# Patient Record
Sex: FEMALE | Race: BLACK OR AFRICAN AMERICAN | NOT HISPANIC OR LATINO | Employment: OTHER | URBAN - METROPOLITAN AREA
[De-identification: names, ages, dates, MRNs, and addresses within clinical notes are randomized per-mention and may not be internally consistent; named-entity substitution may affect disease eponyms.]

---

## 2017-01-09 DIAGNOSIS — E11.9 TYPE 2 DIABETES MELLITUS WITHOUT COMPLICATION: ICD-10-CM

## 2017-01-09 DIAGNOSIS — J30.89 PERENNIAL ALLERGIC RHINITIS: ICD-10-CM

## 2017-01-09 DIAGNOSIS — I10 ESSENTIAL HYPERTENSION: ICD-10-CM

## 2017-01-09 RX ORDER — FLUTICASONE PROPIONATE 50 MCG
2 SPRAY, SUSPENSION (ML) NASAL DAILY
Qty: 3 BOTTLE | Refills: 1 | Status: SHIPPED | OUTPATIENT
Start: 2017-01-09 | End: 2018-03-08 | Stop reason: SDUPTHER

## 2017-01-09 RX ORDER — ATENOLOL 50 MG/1
50 TABLET ORAL 2 TIMES DAILY
Qty: 180 TABLET | Refills: 1 | Status: SHIPPED | OUTPATIENT
Start: 2017-01-09 | End: 2017-06-02 | Stop reason: SDUPTHER

## 2017-01-19 DIAGNOSIS — E11.9 TYPE 2 DIABETES MELLITUS WITHOUT COMPLICATION: ICD-10-CM

## 2017-01-19 DIAGNOSIS — I10 ESSENTIAL HYPERTENSION: ICD-10-CM

## 2017-01-19 RX ORDER — CAPTOPRIL 50 MG/1
TABLET ORAL
Qty: 270 TABLET | Refills: 1 | Status: SHIPPED | OUTPATIENT
Start: 2017-01-19 | End: 2017-06-02 | Stop reason: SDUPTHER

## 2017-01-19 RX ORDER — LANCETS
1 EACH MISCELLANEOUS DAILY
Qty: 100 EACH | Refills: 3 | Status: SHIPPED | OUTPATIENT
Start: 2017-01-19 | End: 2017-01-31 | Stop reason: SDUPTHER

## 2017-01-23 ENCOUNTER — TELEPHONE (OUTPATIENT)
Dept: INTERNAL MEDICINE | Facility: CLINIC | Age: 67
End: 2017-01-23

## 2017-01-23 DIAGNOSIS — N18.2 TYPE 2 DIABETES MELLITUS WITH STAGE 2 CHRONIC KIDNEY DISEASE, WITHOUT LONG-TERM CURRENT USE OF INSULIN: Primary | ICD-10-CM

## 2017-01-23 DIAGNOSIS — E11.22 TYPE 2 DIABETES MELLITUS WITH STAGE 2 CHRONIC KIDNEY DISEASE, WITHOUT LONG-TERM CURRENT USE OF INSULIN: Primary | ICD-10-CM

## 2017-01-23 NOTE — TELEPHONE ENCOUNTER
----- Message from Jaylin Cesar sent at 1/23/2017 12:41 PM CST -----  Contact: call  [phone]  Talk  to someone about contour meter and strips.

## 2017-01-23 NOTE — TELEPHONE ENCOUNTER
Insurance is no longer pay for accu check meter and supplies  The new meter is true metris  Test strips and trueplus lancet 32 gauge pl adv.

## 2017-01-24 NOTE — TELEPHONE ENCOUNTER
----- Message from Elizabeth Morton sent at 1/24/2017  2:06 PM CST -----  Contact: Wilma Cadena (Pharmacy Tech)  at [phone]  Wilma requesting a call back regarding pt's diabetic supplies. Please call/advise.   Fax:   [phone]

## 2017-01-25 DIAGNOSIS — M47.16 OSTEOARTHRITIS OF LUMBAR SPINE WITH MYELOPATHY: ICD-10-CM

## 2017-01-26 RX ORDER — TRAMADOL HYDROCHLORIDE 50 MG/1
TABLET ORAL
Qty: 120 TABLET | Refills: 1 | Status: SHIPPED | OUTPATIENT
Start: 2017-01-26 | End: 2017-03-22 | Stop reason: SDUPTHER

## 2017-01-31 RX ORDER — LANCETS
1 EACH MISCELLANEOUS DAILY
Qty: 100 EACH | Refills: 3 | Status: SHIPPED | OUTPATIENT
Start: 2017-01-31 | End: 2017-01-31 | Stop reason: SDUPTHER

## 2017-01-31 RX ORDER — INSULIN PUMP SYRINGE, 3 ML
EACH MISCELLANEOUS
Qty: 1 EACH | Refills: 0 | Status: SHIPPED | OUTPATIENT
Start: 2017-01-31 | End: 2020-01-16

## 2017-01-31 RX ORDER — LANCETS
1 EACH MISCELLANEOUS DAILY
Qty: 100 EACH | Refills: 3 | Status: SHIPPED | OUTPATIENT
Start: 2017-01-31 | End: 2020-11-11 | Stop reason: SDUPTHER

## 2017-01-31 RX ORDER — INSULIN PUMP SYRINGE, 3 ML
EACH MISCELLANEOUS
Qty: 1 EACH | Refills: 0 | Status: SHIPPED | OUTPATIENT
Start: 2017-01-31 | End: 2017-01-31 | Stop reason: SDUPTHER

## 2017-02-02 ENCOUNTER — OFFICE VISIT (OUTPATIENT)
Dept: INTERNAL MEDICINE | Facility: CLINIC | Age: 67
End: 2017-02-02
Payer: MEDICARE

## 2017-02-02 VITALS
SYSTOLIC BLOOD PRESSURE: 132 MMHG | DIASTOLIC BLOOD PRESSURE: 80 MMHG | HEART RATE: 77 BPM | BODY MASS INDEX: 43.1 KG/M2 | WEIGHT: 284.38 LBS | HEIGHT: 68 IN

## 2017-02-02 DIAGNOSIS — E11.22 TYPE 2 DIABETES MELLITUS WITH STAGE 2 CHRONIC KIDNEY DISEASE, WITHOUT LONG-TERM CURRENT USE OF INSULIN: Primary | ICD-10-CM

## 2017-02-02 DIAGNOSIS — K21.9 GASTROESOPHAGEAL REFLUX DISEASE, ESOPHAGITIS PRESENCE NOT SPECIFIED: ICD-10-CM

## 2017-02-02 DIAGNOSIS — J30.2 SEASONAL ALLERGIC RHINITIS, UNSPECIFIED ALLERGIC RHINITIS TRIGGER: ICD-10-CM

## 2017-02-02 DIAGNOSIS — E78.5 HYPERLIPIDEMIA LDL GOAL <100: ICD-10-CM

## 2017-02-02 DIAGNOSIS — M48.061 DEGENERATIVE LUMBAR SPINAL STENOSIS: ICD-10-CM

## 2017-02-02 DIAGNOSIS — Z85.3 HISTORY OF BREAST CANCER: ICD-10-CM

## 2017-02-02 DIAGNOSIS — E66.01 MORBID OBESITY WITH BMI OF 40.0-44.9, ADULT: ICD-10-CM

## 2017-02-02 DIAGNOSIS — I70.0 ATHEROSCLEROSIS OF AORTA: ICD-10-CM

## 2017-02-02 DIAGNOSIS — I10 ESSENTIAL HYPERTENSION: ICD-10-CM

## 2017-02-02 DIAGNOSIS — N18.2 TYPE 2 DIABETES MELLITUS WITH STAGE 2 CHRONIC KIDNEY DISEASE, WITHOUT LONG-TERM CURRENT USE OF INSULIN: Primary | ICD-10-CM

## 2017-02-02 DIAGNOSIS — K59.09 CHRONIC CONSTIPATION: ICD-10-CM

## 2017-02-02 PROCEDURE — 1125F AMNT PAIN NOTED PAIN PRSNT: CPT | Mod: S$GLB,,, | Performed by: INTERNAL MEDICINE

## 2017-02-02 PROCEDURE — 1157F ADVNC CARE PLAN IN RCRD: CPT | Mod: S$GLB,,, | Performed by: INTERNAL MEDICINE

## 2017-02-02 PROCEDURE — 99215 OFFICE O/P EST HI 40 MIN: CPT | Mod: S$GLB,,, | Performed by: INTERNAL MEDICINE

## 2017-02-02 PROCEDURE — 3044F HG A1C LEVEL LT 7.0%: CPT | Mod: S$GLB,,, | Performed by: INTERNAL MEDICINE

## 2017-02-02 PROCEDURE — 2022F DILAT RTA XM EVC RTNOPTHY: CPT | Mod: S$GLB,,, | Performed by: INTERNAL MEDICINE

## 2017-02-02 PROCEDURE — 3079F DIAST BP 80-89 MM HG: CPT | Mod: S$GLB,,, | Performed by: INTERNAL MEDICINE

## 2017-02-02 PROCEDURE — 1160F RVW MEDS BY RX/DR IN RCRD: CPT | Mod: S$GLB,,, | Performed by: INTERNAL MEDICINE

## 2017-02-02 PROCEDURE — 1159F MED LIST DOCD IN RCRD: CPT | Mod: S$GLB,,, | Performed by: INTERNAL MEDICINE

## 2017-02-02 PROCEDURE — 3075F SYST BP GE 130 - 139MM HG: CPT | Mod: S$GLB,,, | Performed by: INTERNAL MEDICINE

## 2017-02-02 PROCEDURE — 99499 UNLISTED E&M SERVICE: CPT | Mod: S$GLB,,, | Performed by: INTERNAL MEDICINE

## 2017-02-02 PROCEDURE — 4010F ACE/ARB THERAPY RXD/TAKEN: CPT | Mod: S$GLB,,, | Performed by: INTERNAL MEDICINE

## 2017-02-02 PROCEDURE — 99999 PR PBB SHADOW E&M-EST. PATIENT-LVL III: CPT | Mod: PBBFAC,,, | Performed by: INTERNAL MEDICINE

## 2017-02-02 RX ORDER — HYDROCHLOROTHIAZIDE 25 MG/1
25 TABLET ORAL DAILY
Qty: 90 TABLET | Refills: 2 | Status: SHIPPED | OUTPATIENT
Start: 2017-02-02 | End: 2018-03-08 | Stop reason: SDUPTHER

## 2017-02-02 RX ORDER — OMEPRAZOLE 20 MG/1
20 CAPSULE, DELAYED RELEASE ORAL DAILY
Qty: 90 CAPSULE | Refills: 2 | Status: SHIPPED | OUTPATIENT
Start: 2017-02-02 | End: 2019-04-01 | Stop reason: SDUPTHER

## 2017-02-02 RX ORDER — LEVOCETIRIZINE DIHYDROCHLORIDE 5 MG/1
5 TABLET, FILM COATED ORAL NIGHTLY
Qty: 30 TABLET | Refills: 3 | Status: SHIPPED | OUTPATIENT
Start: 2017-02-02 | End: 2018-03-08 | Stop reason: SDUPTHER

## 2017-02-02 RX ORDER — TIZANIDINE 4 MG/1
4 TABLET ORAL 2 TIMES DAILY PRN
Qty: 30 TABLET | Refills: 1 | Status: SHIPPED | OUTPATIENT
Start: 2017-02-02 | End: 2017-04-21 | Stop reason: SDUPTHER

## 2017-02-02 RX ORDER — ATORVASTATIN CALCIUM 20 MG/1
20 TABLET, FILM COATED ORAL DAILY
Qty: 90 TABLET | Refills: 2 | Status: SHIPPED | OUTPATIENT
Start: 2017-02-02 | End: 2018-03-05 | Stop reason: SDUPTHER

## 2017-02-02 NOTE — MR AVS SNAPSHOT
[facility] - Internal Medicine  [address]  [facility] [address]  Phone: [phone]  Fax: [phone]                  Linnea Renae   2017 10:00 AM   Office Visit    Description:  Female : 1950   Provider:  Bailee Moss MD   Department:  [facility] - Internal Medicine           Reason for Visit     Diabetes           Diagnoses this Visit        Comments    Type 2 diabetes mellitus with stage 2 chronic kidney disease, without long-term current use of insulin    -  Primary     Essential hypertension         Atherosclerosis of aorta         Morbid obesity with BMI of 40.0-44.9, adult         History of breast cancer         Degenerative lumbar spinal stenosis         Seasonal allergic rhinitis, unspecified allergic rhinitis trigger         Hyperlipidemia LDL goal <100         Chronic constipation         Gastroesophageal reflux disease, esophagitis presence not specified                To Do List           Future Appointments        Provider Department Dept Phone    3/2/2017 11:00 AM MD [facility] - Hematology Oncology [phone]    2017 10:15 AM Radha Russell MD [facility] Breast Surgery [phone]    2017 9:00 AM Bailee Moss MD [facility] - Internal Medicine [phone]      Goals (5 Years of Data)     None      Follow-Up and Disposition     Return in about 4 months (around 2017).    Follow-up and Disposition History       These Medications        Disp Refills Start End    levocetirizine (XYZAL) 5 MG tablet 30 tablet 3 2017     Take 1 tablet (5 mg total) by mouth every evening. For Allergies. - Oral    Pharmacy: [facility] - [facility] [address] [facility] Ph #: [phone]       atorvastatin (LIPITOR) 20 MG tablet 90 tablet 2 2017     Take 1 tablet (20 mg total) by mouth once daily. - Oral    Pharmacy: [facility] - [facility] [address] [facility] Ph #: [phone]        hydrochlorothiazide (HYDRODIURIL) 25 MG tablet 90 tablet 2 2/2/2017     Take 1 tablet (25 mg total) by mouth once daily. - Oral    Pharmacy: [facility] - [facility] [address] Ph #: [phone]       linaclotide (LINZESS) 145 mcg Cap capsule 90 capsule 1 2/2/2017     Take 1 capsule (145 mcg total) by mouth once daily. - Oral    Pharmacy: [facility] - [facility] [address] Ph #: [phone]       omeprazole (PRILOSEC) 20 MG capsule 90 capsule 2 2/2/2017     Take 1 capsule (20 mg total) by mouth once daily. - Oral    Pharmacy: [facility] - [facility] [address] Ph #: [phone]       tizanidine (ZANAFLEX) 4 MG tablet 30 tablet 1 2/2/2017     Take 1 tablet (4 mg total) by mouth 2 (two) times daily as needed (Muscle spasm.). - Oral    Pharmacy: [address] Ph #: [phone]         [facility]     [facility] Line - 24/7 Assistance  Registered nurses in the [facility] provide clinical advisement, health education, appointment booking, and other advisory services.  Call for this free service at [phone].             Medications           Message regarding Medications     Verify the changes and/or additions to your medication regime listed below are the same as discussed with your clinician today.  If any of these changes or additions are incorrect, please notify your healthcare provider.        START taking these NEW medications        Refills    levocetirizine (XYZAL) 5 MG tablet 3    Sig: Take 1 tablet (5 mg total) by mouth every evening. For Allergies.    Class: Normal    Route: Oral      CHANGE how you are taking these medications     Start Taking Instead of    tizanidine (ZANAFLEX) 4 MG tablet tizanidine (ZANAFLEX) 4 MG tablet    Dosage:  Take 1 tablet (4 mg total) by mouth 2 (two) times daily as needed (Muscle spasm.). Dosage:   TAKE ONE TABLET BY MOUTH EVERY 8 HOURS AS NEEDED    Reason for Change:  Reorder       STOP taking these medications     anastrozole (ARIMIDEX) 1 mg Tab TAKE ONE TABLET BY MOUTH ONCE DAILY    triamcinolone acetonide 0.1% (KENALOG) 0.1 % cream Apply topically 2 (two) times daily.    loratadine (CLARITIN) 10 mg tablet Take 10 mg by mouth every morning.     FLUZONE HIGH-DOSE 2016-17, PF, 180 mcg/0.5 mL Syrg ADM 0.5ML IM UTD           Verify that the below list of medications is an accurate representation of the medications you are currently taking.  If none reported, the list may be blank. If incorrect, please contact your healthcare provider. Carry this list with you in case of emergency.           Current Medications     aspirin 81 MG Chew Take 81 mg by mouth once daily.    atenolol (TENORMIN) 50 MG tablet Take 1 tablet (50 mg total) by mouth 2 (two) times daily.    atorvastatin (LIPITOR) 20 MG tablet Take 1 tablet (20 mg total) by mouth once daily.    blood sugar diagnostic Strp 1 strip by Misc.(Non-Drug; Combo Route) route once daily.    blood-glucose meter kit Use as instructed    calcium-vitamin D3 500 mg(1,250mg) -200 unit per tablet Take 1 tablet by mouth 2 (two) times daily with meals.     captopril (CAPOTEN) 50 MG tablet TAKE TWO TABLETS BY MOUTH IN THE MORNING AND ONE TABLET IN THE EVENING    cycloSPORINE (RESTASIS) 0.05 % ophthalmic emulsion 1 drop 2 (two) times daily.    fluticasone (FLONASE) 50 mcg/actuation nasal spray 2 sprays by Each Nare route once daily.    hydrochlorothiazide (HYDRODIURIL) 25 MG tablet Take 1 tablet (25 mg total) by mouth once daily.    ibuprofen (ADVIL,MOTRIN) 800 MG tablet TAKE ONE TABLET BY MOUTH THREE TIMES DAILY AS NEEDED FOR PAIN    lancets Misc 1 lancet by Misc.(Non-Drug; Combo Route) route once daily.    levocetirizine (XYZAL) 5 MG tablet Take 1 tablet (5 mg total) by mouth every evening. For Allergies.    linaclotide (LINZESS) 145 mcg Cap capsule Take 1 capsule (145 mcg  "total) by mouth once daily.    metformin (GLUCOPHAGE-XR) 500 MG 24 hr tablet TAKE TWO TABLETS BY MOUTH ONCE DAILY WITH BREAKFAST    omeprazole (PRILOSEC) 20 MG capsule Take 1 capsule (20 mg total) by mouth once daily.    tizanidine (ZANAFLEX) 4 MG tablet Take 1 tablet (4 mg total) by mouth 2 (two) times daily as needed (Muscle spasm.).    tramadol (ULTRAM) 50 mg tablet TAKE ONE TABLET BY MOUTH EVERY 6 HOURS AS NEEDED FOR PAIN           Clinical Reference Information           Your Vitals Were     BP Pulse Height Weight BMI    132/80 77 5' 8" (1.727 m) 129 kg (284 lb 6.4 oz) 43.24 kg/m2      Blood Pressure          Most Recent Value    BP  132/80      Allergies as of 2/2/2017     Codeine      Immunizations Administered on Date of Encounter - 2/2/2017     None      Orders Placed During Today's Visit     Future Labs/Procedures Expected by Expires    Basic metabolic panel  2/2/2017 2/2/2018    Hemoglobin A1c  2/2/2017 2/2/2018      Language Assistance Services     ATTENTION: Language assistance services are available, free of charge. Please call [phone].      ATENCIÓN: Si habla brina, tiene a blount disposición servicios gratuitos de asistencia lingüística. Llame al [phone].     CHANA Ý: N?u b?n nói Ti?ng Vi?t, có các d?ch v? h? tr? ngôn ng? mi?n phí dành cho b?n. G?i s? [phone].         [facility] complies with applicable Federal civil rights laws and does not discriminate on the basis of race, color, national origin, age, disability, or sex.        "

## 2017-02-02 NOTE — PROGRESS NOTES
Subjective:       Patient ID: Linnea Renae is a 66 y.o. female.    Chief Complaint: Diabetes    HPI Comments: Last seen for a general check up five months ago, urgent visit three months ago for sinusitis treated with antibiotics. Presents for f/u chronic medical conditions again with upper respiratory complaints including nasal congestion, postnasal drip, right ear pain, yellow nasal discharge. Just started using Flonase three days ago, Claritin has been ineffective. Typically has seasonal allergies around this time, but also catches a lot of colds from her grandchildren.   Compliant with daily meds as prescribed. Home Glucose ranges 80-90 in morning and evening per history, no written log for review. No hypoglycemia.     PMH: .   Hypertension.  Diabetes Type 2, last HbA1c 6.4% Aug. '16, Urine Microalbumin normal.  Hyperlipidemia. TChol 112, TG 91, HDL 37, LDL 57 Aug. '16.  GERD.  Lumbar Spinal Stenosis.   Chronic Dry Eyes.   Perennial Allergic Rhinitis.  Morbid Obesity, BMI up to 46.   Right Breast Cancer diagnosed 10/15.    PSH: C/S x 3, BTL, Hysterectomy and USO, Bilateral Cataracts extracted, Cholecystectomy.    Mammogram normal , no recent Pelvic exam. No BMD. Eye exam  outside [facility]. Has had Pneumovax. Prevnar 8/15. Flu shot . Colonoscopy  - 3 polyps removed, at [facility]. Zostavax 3/16. CBC and CMP normal , Urinalysis clear.    Social: Remote tobacco use, quit over 30 years ago. No alcohol.  with three daughters and two grandchildren. Homemaker.     FMH: Father living age 83 in good health. Mother  young, cause unknown. CHF in PGM, Thyroid disease in PGF.     NKDA.     Medications: list reviewed and reconciled.             Review of Systems   Constitutional: Negative for activity change, appetite change, fatigue, fever and unexpected weight change.   HENT: Positive for congestion and postnasal drip. Negative for hearing loss, rhinorrhea, sinus pressure,  sneezing, sore throat, trouble swallowing and voice change.    Eyes: Positive for itching. Negative for pain, redness and visual disturbance.   Respiratory: Negative for cough, chest tightness, shortness of breath and wheezing.    Cardiovascular: Negative for chest pain, palpitations and leg swelling.   Gastrointestinal: Negative for abdominal pain, blood in stool, constipation, diarrhea, nausea and vomiting.   Genitourinary: Negative for difficulty urinating, dysuria, flank pain, frequency, hematuria and urgency.   Musculoskeletal: Positive for back pain. Negative for arthralgias, joint swelling and neck pain.   Skin: Negative for rash and wound.   Neurological: Negative for dizziness, syncope, facial asymmetry, speech difficulty, weakness, numbness and headaches.   Hematological: Negative for adenopathy. Does not bruise/bleed easily.   Psychiatric/Behavioral: Negative for confusion, dysphoric mood and sleep disturbance. The patient is not nervous/anxious.        Objective:    /80, Pulse 77, Temp 98.6, Wt 284 lbs (from 266 four months ago), BMI=43  Physical Exam   Constitutional: She is oriented to person, place, and time. She appears well-developed and well-nourished. No distress.   HENT:   Head: Normocephalic and atraumatic.   Right Ear: External ear normal.   Left Ear: External ear normal.   Nose: Nose normal.   Mouth/Throat: Oropharynx is clear and moist. No oropharyngeal exudate.   Eyes: Conjunctivae and EOM are normal. Pupils are equal, round, and reactive to light. Right conjunctiva is not injected. Left conjunctiva is not injected. No scleral icterus.   Neck: Normal range of motion. Neck supple. No JVD present. Carotid bruit is not present. No thyromegaly present.   Cardiovascular: Normal rate, regular rhythm, normal heart sounds and intact distal pulses.  Exam reveals no gallop and no friction rub.    No murmur heard.  Pulmonary/Chest: Effort normal and breath sounds normal. No respiratory distress.  She has no wheezes. She has no rhonchi. She has no rales.   Abdominal: Soft. Bowel sounds are normal. She exhibits no distension. There is no hepatosplenomegaly. There is no tenderness. There is no CVA tenderness.   Musculoskeletal: Normal range of motion. She exhibits no edema, tenderness or deformity.   Protective Sensation (w/ 10 gram monofilament):  Right: Intact  Left: Intact    Visual Inspection:  Normal -  Bilateral    Pedal Pulses:   Right: Present  Left: Present    Posterior tibialis:   Right:Present  Left: Present     Lymphadenopathy:     She has no cervical adenopathy.   Neurological: She is alert and oriented to person, place, and time. She has normal strength. No cranial nerve deficit. She exhibits normal muscle tone. Coordination and gait normal.   Skin: Skin is warm and dry. No lesion and no rash noted. She is not diaphoretic. No cyanosis or erythema. No pallor. Nails show no clubbing.   Psychiatric: She has a normal mood and affect. Her behavior is normal. Thought content normal.   Vitals reviewed.      Assessment:       1. Type 2 diabetes mellitus with stage 2 chronic kidney disease, without long-term current use of insulin    2. Essential hypertension    3. Atherosclerosis of aorta    4. Morbid obesity with BMI of 40.0-44.9, adult    5. History of breast cancer    6. Degenerative lumbar spinal stenosis    7. Seasonal allergic rhinitis, unspecified allergic rhinitis trigger    8. Hyperlipidemia LDL goal <100    9. Chronic constipation    10. Gastroesophageal reflux disease, esophagitis presence not specified        Plan:       Type 2 diabetes mellitus with stage 2 chronic kidney disease, without long-term current use of insulin  -     Basic metabolic panel; Future; Expected date: 2/2/17  -     Hemoglobin A1c; Future; Expected date: 2/2/17    Essential hypertension controlled  -     hydrochlorothiazide (HYDRODIURIL) 25 MG tablet; Take 1 tablet (25 mg total) by mouth once daily.  Dispense: 90 tablet;  Refill: 2    Atherosclerosis of aorta - medical management    Morbid obesity with BMI of 40.0-44.9, adult - counseled on diet, chronic pain stops her from exercising.    History of breast cancer - surveillance up to date, noncompliant with Arimidex due to hair loss.    Degenerative lumbar spinal stenosis  -     tizanidine (ZANAFLEX) 4 MG tablet; Take 1 tablet (4 mg total) by mouth 2 (two) times daily as needed (Muscle spasm.).  Dispense: 30 tablet; Refill: 1  -     May continue Tramadol prn, recently refilled.    Seasonal allergic rhinitis, unspecified allergic rhinitis trigger  -     levocetirizine (XYZAL) 5 MG tablet; Take 1 tablet (5 mg total) by mouth every evening. For Allergies.  Dispense: 30 tablet; Refill: 3  -     Continue Flonase.   No evidence of bacterial infection on exam.    Hyperlipidemia LDL goal <100  -     atorvastatin (LIPITOR) 20 MG tablet; Take 1 tablet (20 mg total) by mouth once daily.  Dispense: 90 tablet; Refill: 2    Chronic constipation  -     linaclotide (LINZESS) 145 mcg Cap capsule; Take 1 capsule (145 mcg total) by mouth once daily.  Dispense: 90 capsule; Refill: 1    Gastroesophageal reflux disease, esophagitis presence not specified  -     omeprazole (PRILOSEC) 20 MG capsule; Take 1 capsule (20 mg total) by mouth once daily.  Dispense: 90 capsule; Refill: 2    Return in 4 months with log of home glucose monitoring.

## 2017-02-06 ENCOUNTER — PATIENT MESSAGE (OUTPATIENT)
Dept: INTERNAL MEDICINE | Facility: CLINIC | Age: 67
End: 2017-02-06

## 2017-02-19 DIAGNOSIS — M15.9 PRIMARY OSTEOARTHRITIS INVOLVING MULTIPLE JOINTS: ICD-10-CM

## 2017-02-19 RX ORDER — IBUPROFEN 800 MG/1
TABLET ORAL
Qty: 60 TABLET | Refills: 0 | Status: SHIPPED | OUTPATIENT
Start: 2017-02-19 | End: 2017-03-19 | Stop reason: SDUPTHER

## 2017-02-28 ENCOUNTER — HOSPITAL ENCOUNTER (EMERGENCY)
Facility: OTHER | Age: 67
Discharge: HOME | End: 2017-02-28
Attending: EMERGENCY MEDICINE
Payer: MEDICARE

## 2017-02-28 VITALS
BODY MASS INDEX: 42.44 KG/M2 | SYSTOLIC BLOOD PRESSURE: 141 MMHG | OXYGEN SATURATION: 97 % | TEMPERATURE: 98 F | DIASTOLIC BLOOD PRESSURE: 65 MMHG | HEART RATE: 76 BPM | WEIGHT: 280 LBS | RESPIRATION RATE: 16 BRPM | HEIGHT: 68 IN

## 2017-02-28 DIAGNOSIS — K59.00 CONSTIPATION: ICD-10-CM

## 2017-02-28 DIAGNOSIS — R10.9 FLANK PAIN: Primary | ICD-10-CM

## 2017-02-28 LAB
ALBUMIN SERPL BCP-MCNC: 3.8 G/DL
ALP SERPL-CCNC: 98 U/L
ALT SERPL W/O P-5'-P-CCNC: 16 U/L
ANION GAP SERPL CALC-SCNC: 13 MMOL/L
AST SERPL-CCNC: 16 U/L
BASOPHILS # BLD AUTO: 0.03 K/UL
BASOPHILS NFR BLD: 0.2 %
BILIRUB SERPL-MCNC: 0.4 MG/DL
BILIRUB UR QL STRIP: NEGATIVE
BUN SERPL-MCNC: 12 MG/DL
CALCIUM SERPL-MCNC: 9.5 MG/DL
CHLORIDE SERPL-SCNC: 101 MMOL/L
CLARITY UR: CLEAR
CO2 SERPL-SCNC: 28 MMOL/L
COLOR UR: YELLOW
CREAT SERPL-MCNC: 0.9 MG/DL
DIFFERENTIAL METHOD: ABNORMAL
EOSINOPHIL # BLD AUTO: 0.3 K/UL
EOSINOPHIL NFR BLD: 2.2 %
ERYTHROCYTE [DISTWIDTH] IN BLOOD BY AUTOMATED COUNT: 13.7 %
EST. GFR  (AFRICAN AMERICAN): >60 ML/MIN/1.73 M^2
EST. GFR  (NON AFRICAN AMERICAN): >60 ML/MIN/1.73 M^2
GLUCOSE SERPL-MCNC: 122 MG/DL
GLUCOSE UR QL STRIP: NEGATIVE
HCT VFR BLD AUTO: 39.7 %
HGB BLD-MCNC: 12.8 G/DL
HGB UR QL STRIP: ABNORMAL
KETONES UR QL STRIP: ABNORMAL
LEUKOCYTE ESTERASE UR QL STRIP: ABNORMAL
LYMPHOCYTES # BLD AUTO: 3.9 K/UL
LYMPHOCYTES NFR BLD: 28.5 %
MCH RBC QN AUTO: 27.6 PG
MCHC RBC AUTO-ENTMCNC: 32.2 %
MCV RBC AUTO: 86 FL
MICROSCOPIC COMMENT: NORMAL
MONOCYTES # BLD AUTO: 0.7 K/UL
MONOCYTES NFR BLD: 5.2 %
NEUTROPHILS # BLD AUTO: 8.8 K/UL
NEUTROPHILS NFR BLD: 63.7 %
NITRITE UR QL STRIP: NEGATIVE
PH UR STRIP: 6 [PH] (ref 5–8)
PLATELET # BLD AUTO: 380 K/UL
PMV BLD AUTO: 9.3 FL
POTASSIUM SERPL-SCNC: 3.8 MMOL/L
PROT SERPL-MCNC: 7.6 G/DL
PROT UR QL STRIP: NEGATIVE
RBC # BLD AUTO: 4.63 M/UL
RBC #/AREA URNS HPF: 1 /HPF (ref 0–4)
SODIUM SERPL-SCNC: 142 MMOL/L
SP GR UR STRIP: 1.02 (ref 1–1.03)
URN SPEC COLLECT METH UR: ABNORMAL
UROBILINOGEN UR STRIP-ACNC: NEGATIVE EU/DL
WBC # BLD AUTO: 13.74 K/UL
WBC #/AREA URNS HPF: 1 /HPF (ref 0–5)

## 2017-02-28 PROCEDURE — 99284 EMERGENCY DEPT VISIT MOD MDM: CPT | Mod: 25

## 2017-02-28 PROCEDURE — 96375 TX/PRO/DX INJ NEW DRUG ADDON: CPT

## 2017-02-28 PROCEDURE — 87086 URINE CULTURE/COLONY COUNT: CPT

## 2017-02-28 PROCEDURE — 81000 URINALYSIS NONAUTO W/SCOPE: CPT

## 2017-02-28 PROCEDURE — 80053 COMPREHEN METABOLIC PANEL: CPT

## 2017-02-28 PROCEDURE — 85025 COMPLETE CBC W/AUTO DIFF WBC: CPT

## 2017-02-28 PROCEDURE — 96376 TX/PRO/DX INJ SAME DRUG ADON: CPT

## 2017-02-28 PROCEDURE — 63600175 PHARM REV CODE 636 W HCPCS: Performed by: PHYSICIAN ASSISTANT

## 2017-02-28 PROCEDURE — 96374 THER/PROPH/DIAG INJ IV PUSH: CPT

## 2017-02-28 RX ORDER — MORPHINE SULFATE 2 MG/ML
6 INJECTION, SOLUTION INTRAMUSCULAR; INTRAVENOUS
Status: COMPLETED | OUTPATIENT
Start: 2017-02-28 | End: 2017-02-28

## 2017-02-28 RX ORDER — MORPHINE SULFATE 2 MG/ML
4 INJECTION, SOLUTION INTRAMUSCULAR; INTRAVENOUS
Status: COMPLETED | OUTPATIENT
Start: 2017-02-28 | End: 2017-02-28

## 2017-02-28 RX ORDER — KETOROLAC TROMETHAMINE 30 MG/ML
10 INJECTION, SOLUTION INTRAMUSCULAR; INTRAVENOUS
Status: COMPLETED | OUTPATIENT
Start: 2017-02-28 | End: 2017-02-28

## 2017-02-28 RX ORDER — NAPROXEN 375 MG/1
375 TABLET ORAL 2 TIMES DAILY PRN
Qty: 30 TABLET | Refills: 0 | Status: SHIPPED | OUTPATIENT
Start: 2017-02-28 | End: 2017-03-19

## 2017-02-28 RX ORDER — CYCLOBENZAPRINE HCL 10 MG
10 TABLET ORAL 3 TIMES DAILY PRN
Qty: 15 TABLET | Refills: 0 | Status: SHIPPED | OUTPATIENT
Start: 2017-02-28 | End: 2017-03-05

## 2017-02-28 RX ADMIN — MORPHINE SULFATE 4 MG: 2 INJECTION, SOLUTION INTRAMUSCULAR; INTRAVENOUS at 08:02

## 2017-02-28 RX ADMIN — MORPHINE SULFATE 6 MG: 2 INJECTION, SOLUTION INTRAMUSCULAR; INTRAVENOUS at 07:02

## 2017-02-28 RX ADMIN — KETOROLAC TROMETHAMINE 10 MG: 30 INJECTION, SOLUTION INTRAMUSCULAR at 07:02

## 2017-02-28 NOTE — ED AVS SNAPSHOT
[facility]  [address]  [facility] [address]               Linnea Renae   2017  6:15 PM   ED    Description:  Female : 1950   Department:  [facility]           Your Care was Coordinated By:     Provider Role From To    Yessenia Andrew MD Attending Provider 17 --    Jailyn Olson PA-C Physician Assistant 17 --      Reason for Visit     Abdominal Pain           Diagnoses this Visit        Comments    Flank pain    -  Primary     Constipation           ED Disposition     None           To Do List           Follow-up Information     Follow up with Bailee Moss MD In 2 days.    Specialty:  Internal Medicine    Contact information:    Geeta KAYE  [facility] [address]  [phone]        [facility]     [facility] -  Assistance  Registered nurses in the [facility] provide clinical advisement, health education, appointment booking, and other advisory services.  Call for this free service at [phone].             Medications           Message regarding Medications     Verify the changes and/or additions to your medication regime listed below are the same as discussed with your clinician today.  If any of these changes or additions are incorrect, please notify your healthcare provider.        These medications were administered today        Dose Freq    ketorolac injection 10 mg 10 mg ED 1 Time    Sig: Inject 10 mg into the vein ED 1 Time.    Class: Normal    Route: Intravenous    morphine injection 6 mg 6 mg ED 1 Time    Sig: Inject 3 mLs (6 mg total) into the vein ED 1 Time.    Class: Normal    Route: Intravenous    morphine injection 4 mg 4 mg ED 1 Time    Sig: Inject 2 mLs (4 mg total) into the vein ED 1 Time.    Class: Normal    Route: Intravenous           Verify that the below list of medications is an accurate representation of the medications  you are currently taking.  If none reported, the list may be blank. If incorrect, please contact your healthcare provider. Carry this list with you in case of emergency.           Current Medications     aspirin 81 MG Chew Take 81 mg by mouth once daily.    atenolol (TENORMIN) 50 MG tablet Take 1 tablet (50 mg total) by mouth 2 (two) times daily.    atorvastatin (LIPITOR) 20 MG tablet Take 1 tablet (20 mg total) by mouth once daily.    blood sugar diagnostic Strp 1 strip by Misc.(Non-Drug; Combo Route) route once daily.    blood-glucose meter kit Use as instructed    calcium-vitamin D3 500 mg(1,250mg) -200 unit per tablet Take 1 tablet by mouth 2 (two) times daily with meals.     captopril (CAPOTEN) 50 MG tablet TAKE TWO TABLETS BY MOUTH IN THE MORNING AND ONE TABLET IN THE EVENING    cycloSPORINE (RESTASIS) 0.05 % ophthalmic emulsion 1 drop 2 (two) times daily.    fluticasone (FLONASE) 50 mcg/actuation nasal spray 2 sprays by Each Nare route once daily.    hydrochlorothiazide (HYDRODIURIL) 25 MG tablet Take 1 tablet (25 mg total) by mouth once daily.    ibuprofen (ADVIL,MOTRIN) 800 MG tablet TAKE ONE TABLET BY MOUTH THREE TIMES DAILY AS NEEDED FOR PAIN    lancets Misc 1 lancet by Misc.(Non-Drug; Combo Route) route once daily.    levocetirizine (XYZAL) 5 MG tablet Take 1 tablet (5 mg total) by mouth every evening. For Allergies.    linaclotide (LINZESS) 145 mcg Cap capsule Take 1 capsule (145 mcg total) by mouth once daily.    metformin (GLUCOPHAGE-XR) 500 MG 24 hr tablet TAKE TWO TABLETS BY MOUTH ONCE DAILY WITH BREAKFAST    morphine injection 4 mg Inject 2 mLs (4 mg total) into the vein ED 1 Time.    omeprazole (PRILOSEC) 20 MG capsule Take 1 capsule (20 mg total) by mouth once daily.    tizanidine (ZANAFLEX) 4 MG tablet Take 1 tablet (4 mg total) by mouth 2 (two) times daily as needed (Muscle spasm.).    tramadol (ULTRAM) 50 mg tablet TAKE ONE TABLET BY MOUTH EVERY 6 HOURS AS NEEDED FOR PAIN           Clinical  Reference Information           Your Vitals Were     BP                   154/73           Allergies as of 2/28/2017        Reactions    Codeine Itching      Immunizations Administered on Date of Encounter - 2/28/2017     None      ED Micro, Lab, POCT     Start Ordered       Status Ordering Provider    02/28/17 2039 02/28/17 2038  Urine culture  Add-on      Completed     02/28/17 1830 02/28/17 1829  CBC auto differential  STAT      Final result     02/28/17 1830 02/28/17 1829  Comprehensive metabolic panel  STAT      Final result     02/28/17 1830 02/28/17 1829  Urinalysis  STAT      Final result     02/28/17 1829 02/28/17 1829  Urinalysis Microscopic  Once      Final result       ED Imaging Orders     Start Ordered       Status Ordering Provider    02/28/17 1942 02/28/17 1941  CT Renal Stone Study ABD Pelvis WO  1 time imaging      Final result     02/28/17 1830 02/28/17 1829  X-Ray Abdomen Flat And Erect  1 time imaging      Final result         Discharge Instructions         Flank Pain, Uncertain Cause  The flank is the area between your upper abdomen and your back. Pain there is often caused by a problem with your kidneys. It might be a kidney infection or a kidney stone. Other causes of flank pain include spinal arthritis, a pinched nerve from a back injury, or a back muscle strain or spasm.  The cause of your flank pain is not certain. You may need other tests.  Home care  Follow these tips when caring for yourself at home:  · You may use acetaminophen or ibuprofen to control pain, unless your health care provider prescribed another medicine. If you have chronic liver or kidney disease, talk with your provider before taking these medicines. Also talk with your provider first if youve ever had a stomach ulcer or GI bleeding.  · If the pain is coming from your muscles, you may get relief with ice or heat. During the first 2 days after the injury, put an ice pack on the painful area for 20 minutes every 2 to 4  hours. This will reduce swelling and pain. A hot shower, hot bath, or heating pad works well for a muscle spasm. You can start with ice, then switch to heat after 2 days. You might find that alternating ice and heat works well. Use the method that feels the best to you.  Follow-up care  Follow up with your healthcare provider if your symptoms dont get better over the next few days.  When to seek medical advice  Call your healthcare provider right away if any of these happen:  · Repeated vomiting  · Fever of 100.4ºF (38ºC) or higher, or as directed by your health care provider  · Flank pain that gets worse  · Pain that spreads to the front of your belly (abdomen)  · Dizziness, weakness, or fainting  · Blood in your urine  · Burning feeling when you urinate or the need to urinate often  · Pain in one of your legs that gets worse  · Numbness or weakness in a leg  Date Last Reviewed: 10/1/2016  © 0788-9005 [employer]. [address] [place] [address]. All rights reserved. This information is not intended as a substitute for professional medical care. Always follow your healthcare professional's instructions.          Your Scheduled Appointments     Mar 02, 2017 11:00 AM CST   Established Patient Visit with Sean Woody MD   [facility] ([facility])    [address]  [place] [address]   [phone]            Apr 17, 2017 10:15 AM CDT   Established Patient Visit with MD Marcelo Mclaughlin [facility] Breast Surgery ([facility] )    [address] [facility] [id number] [phone]            Jun 02, 2017  9:00 AM CDT   Established Patient Visit with MD Marcelo Marshall [facility] - Internal Medicine ([facility])    [address]  [place] [id number] [phone]              Smoking Cessation     If you would like to quit smoking:   You may be eligible for free services if you are a  [place] resident and started smoking cigarettes before September 1, 1988.  Call the Smoking Cessation Trust (SCT) toll free at (414) 422-7702 or (998) 857-6770.   Call 1-800-QUIT-NOW if you do not meet the above criteria.             [facility] complies with applicable Federal civil rights laws and does not discriminate on the basis of race, color, national origin, age, disability, or sex.        Language Assistance Services     ATTENTION: Language assistance services are available, free of charge. Please call [phone].      ATENCIÓN: Si habla español, tiene a blount disposición servicios gratuitos de asistencia lingüística. Llame al [phone].     CHÚ Ý: N?u b?n nói Ti?ng Vi?t, có các d?ch v? h? tr? ngôn ng? mi?n phí dành cho b?n. G?i s? [phone].

## 2017-03-01 NOTE — ED NOTES
Two patient identifiers correct for Linnea Renae.  LOC: The patient is awake, alert and aware of environment with an appropriate affect. The patient is oriented x 3 and speaking appropriate.  APPEARANCE: Patient appears uncomfortable.  Patient is clean and well groomed.  The patient's clothing is properly fastened.  SKIN: The skin is warm and dry.  The patient has normal skin turgor and moist mucus membranes.  No rashes or lesions noted.  Skin Intact. No breakdown noted.  MUSCULOSKELETAL:  Left lower back pain.  Patient moving all extremities well, no obvious swelling or deformities noted.  RESPIRATORY: Airway is open and patent.  Respirations are spontaneous.  Patient has a normal effort and rate. Bilateral lungs sounds are clear.  Trachea midline.  No accessory muscle use.  CARDIAC: Patient has a normal rate and rhythm.  No peripheral edema noted.  Capillary refill is less than 3 seconds.    ABDOMEN: Soft and non tender to palpation.  Constipation.     PULSES:   Radial pulses, 2+ bilaterally.    NEUROLOGIC: PERRL . Facial expression is symmetrical.  Moving all extremities equally.  Normal sensation in all extremities when touched with a finger.  Awake, alert and cooperative with a calm affect.  Aware of environment.

## 2017-03-01 NOTE — ED NOTES
Rounding on the patient has been done. Pt is AAOx 1, no acute distress noted, respirations even, unlabored, vital signs stable with monitoring in progress, skin warm and dry. The patient has been updated on the plan of care and current status. Pain was assessed and is currently a pain level 9/10 . MAICOL Howard notified of patients current pain. Comfort positioning and restroom needs were addressed. She denies any needs. She complains of left sided flank pain. Necessary items were placed with in reach and was advised when a reassessment would take place. The call bell remains within reach for any additional patient needs. The patient is resting comfortably in recliner. Will continue to monitor.

## 2017-03-01 NOTE — ED NOTES
"Ambulated to ED room 13.  With complaint of constipation and lower back pain.  With history of chronic constipation.  With lower back pain after performing stretches 4 days ago.  Has been using Prune juice and Miralax for constipation symptoms.  Began taking Percocet after back pain began.  Appears uncomfortable.  Yesterday evening, was reaching towards cabinet and felt a "pop" in back.  Denies any numbness or tingling.  Prescribed Linzess daily. LBM-earlier today, but "it was just a little."  Denies blood in stool.   History of lumbar DDD, lumbar spinal stenosis and breast cancer (DCIS).  "

## 2017-03-01 NOTE — ED PROVIDER NOTES
Encounter Date: 2017       History     Chief Complaint   Patient presents with    Abdominal Pain     + bilateral lower back pain radiaitng to left lower abdomen since yesterday. Pt reprots chronic constipation, last BM this morning.      Review of patient's allergies indicates:   Allergen Reactions    Codeine Itching     HPI Comments: Patient is a 66-year-old female with history of breast cancer, chronic constipation, diabetes, hypertension, and hyperlipidemia who presents to the emergency department with flank pain.  Patient states over the last 2 weeks she has not had a normal bowel movement.  Patient states she had a small bowel movement this morning.  Patient states she has had pain in the left flank that radiates into the right lower abdomen.  Patient states this feels occur typical pain when constipated.  Patient rates her pain 10 out of 10.  Patient denies nausea or vomiting.  Patient denies blood per rectum.  Patient denies urinary symptoms.  Patient denies fevers or chills.  Patient ate she is still eating and drinking normally.  Patient states she is passing gas, but her belly feels bloated  No bowel gas pattern.    The history is provided by the patient.     Past Medical History:   Diagnosis Date    Allergy     Breast cancer     Lumpectomy 10/15.    Chronic constipation     Diabetes mellitus, type 2     Dry eyes     GERD (gastroesophageal reflux disease)     Hyperlipidemia     Hypertension     Lumbar disc disease     Type 2 diabetes mellitus      Past Surgical History:   Procedure Laterality Date    BREAST SURGERY      right lumpectomy    CATARACT EXTRACTION, BILATERAL       SECTION      x3    CHOLECYSTECTOMY      HYSTERECTOMY      and USO    TUBAL LIGATION       Family History   Problem Relation Age of Onset    No Known Problems Mother     No Known Problems Father     Heart disease Paternal Grandmother     Thyroid disease Paternal Grandfather      Hypertension Sister     Hypertension Brother     Glaucoma Brother     No Known Problems Daughter     No Known Problems Daughter     No Known Problems Daughter      Social History   Substance Use Topics    Smoking status: Former Smoker     Packs/day: 0.25     Years: 20.00     Quit date: 1/1/1985    Smokeless tobacco: None      Comment: Quit mid 1980's.    Alcohol use No     Review of Systems   Constitutional: Negative for activity change, appetite change, chills, fatigue and fever.   HENT: Negative for congestion, ear discharge, ear pain, hearing loss, mouth sores, postnasal drip, rhinorrhea, sore throat and trouble swallowing.    Eyes: Negative for photophobia and visual disturbance.   Respiratory: Negative for cough and shortness of breath.    Cardiovascular: Negative for chest pain.   Gastrointestinal: Positive for abdominal pain and constipation. Negative for blood in stool, diarrhea, nausea and vomiting.   Genitourinary: Positive for flank pain. Negative for dysuria, hematuria and pelvic pain.   Musculoskeletal: Negative for back pain, neck pain and neck stiffness.   Skin: Negative for rash and wound.   Neurological: Negative for dizziness, syncope, speech difficulty, weakness, light-headedness, numbness and headaches.       Physical Exam   Initial Vitals   BP Pulse Resp Temp SpO2   02/28/17 1805 02/28/17 1805 02/28/17 1805 02/28/17 1805 02/28/17 1805   190/95 100 16 97.8 °F (36.6 °C) 96 %     Physical Exam    Nursing note and vitals reviewed.  Constitutional: She appears well-developed and well-nourished. She is not diaphoretic.  Non-toxic appearance. No distress.   HENT:   Head: Normocephalic.   Right Ear: Hearing and external ear normal.   Left Ear: Hearing and external ear normal.   Nose: Nose normal.   Mouth/Throat: Uvula is midline, oropharynx is clear and moist and mucous membranes are normal. Mucous membranes are not pale. No trismus in the jaw. No uvula swelling. No oropharyngeal exudate.    Eyes: Conjunctivae are normal. Pupils are equal, round, and reactive to light.   Neck: Normal range of motion.   Cardiovascular: Normal rate, regular rhythm and normal heart sounds.   Pulmonary/Chest: Breath sounds normal. No respiratory distress. She has no wheezes. She has no rhonchi. She has no rales. She exhibits no tenderness.   Abdominal: Soft. Bowel sounds are normal. She exhibits no distension. There is tenderness in the left lower quadrant. There is CVA tenderness (left sided). There is no rebound.   Lymphadenopathy:     She has no cervical adenopathy.   Neurological: She is alert and oriented to person, place, and time.   Skin: Skin is warm and dry.   Psychiatric: She has a normal mood and affect.         ED Course   Procedures  Labs Reviewed   CBC W/ AUTO DIFFERENTIAL - Abnormal; Notable for the following:        Result Value    WBC 13.74 (*)     Platelets 380 (*)     Gran # 8.8 (*)     All other components within normal limits   COMPREHENSIVE METABOLIC PANEL - Abnormal; Notable for the following:     Glucose 122 (*)     All other components within normal limits   URINALYSIS - Abnormal; Notable for the following:     Ketones, UA Trace (*)     Occult Blood UA Trace (*)     Leukocytes, UA Trace (*)     All other components within normal limits   CULTURE, URINE   URINALYSIS MICROSCOPIC          X-Rays:   Independently Interpreted Readings:   Other Readings:  Normal bowel gas pattern    Imaging Results         CT Renal Stone Study ABD Pelvis WO (Final result) Result time:  02/28/17 20:25:24    Final result by Scar Foley MD (02/28/17 20:25:24)    Impression:         No evidence of obstructive uropathy or nephrolithiasis.        Electronically signed by: Dr. Scar Foley MD  Date:     02/28/17  Time:    20:25     Narrative:    CT OF THE ABDOMEN & PELVIS WITHOUT CONTRAST, RENAL STONE PROTOCOL:    Technique:  This exam was done specifically to evaluate for renal stone disease as per request.   Therefore, no oral and no IV contrast was used. Using helical CT technique, axial images of the abdomen and pelvis were obtained from the top of the liver through the base of the bladder.      Comparison:      Findings:     URINARY SYSTEM:  No hydronephrosis, and no perinephric or periureteral inflammatory change.  No  urinary calculi demonstrated.      ABDOMEN: Note that non-contrast CT is relatively insensitive for evaluation of the solid organs.   - Lung bases: No infiltrates and no nodules.   - Liver: Unremarkable.  Surgical clip adjacent to the RIGHT inferior hepatic margin; surgical clip at the gastrohepatic level.  - Gallbladder: Cholecystectomy  - Bile Ducts: No evidence of dilated ducts.  - Spleen: Negative.  - Adrenals: Unremarkable.  - Pancreas: Unremarkable.  - Retroperitoneum: No significant adenopathy.  - Vascular: No abdominal aortic aneurysm.   - Abdominal wall:  Unremarkable.     PELVIS:  No pelvic adenopathy, free fluid, or mass.  Hysterectomy.    BOWEL/MESENTERY:   No evidence of bowel obstruction or inflammatory process.        BONES: No acute bony abnormality or suspicious lytic or blastic lesion.            X-Ray Abdomen Flat And Erect (Final result) Result time:  02/28/17 19:05:54    Final result by Scar Foley MD (02/28/17 19:05:54)    Impression:        No evidence of bowel obstruction or perforation.      Electronically signed by: Dr. Scar Foley MD  Date:     02/28/17  Time:    19:05     Narrative:    SUPINE AND UPRIGHT ABDOMEN:      Comparison: 5/22/16    Findings:     The bowel gas pattern is non-obstructive.Surgical clips RIGHT upper quadrant.No evidence of free air.   No airspace consolidation at the lung bases.   No acute bony abnormality.              Medical Decision Making:   Initial Assessment:   Evaluation of 66-year-old female with history of breast cancer, chronic constipation, diabetes, hypertension, and hyperlipidemia who presents to the emergency department with  flank pain.  Patient is afebrile, nontoxic appearing, and hemodynamically stable.  On exam, there is left-sided CVA tenderness.  There is mild tenderness in the left lower quadrant with deep palpation.  Patient does have decreased bowel sounds.  X-ray will be obtained to rule out bowel obstruction.  Basic labs will be obtained.  Urinalysis will be obtained.  Clinical Tests:   Radiological Study: Ordered and Reviewed  ED Management:  8:12 PM  X-ray reveals normal bowel gas pattern.  Labs reveal no significant leukocytosis, efficiency, or electrolytic disturbance.  Trace leukocytes noted and urinalysis with no other findings of urinary tract infection.  There is blood in urine.  With patient's flank pain, I will obtain CT renal stone study.    8:38 PM  CT renal stone study shows no acute findings.  I am unsure of patient's etiology of pain.  She is advised to follow-up with PCP or return to the emergency department with any worsening symptoms or concerns.  Other:   I have discussed this case with another health care provider.       <> Summary of the Discussion: Dr. Andrew                   ED Course     Clinical Impression:   The primary encounter diagnosis was Flank pain. A diagnosis of Constipation was also pertinent to this visit.          Jailyn Olson PA-C  02/28/17 2039

## 2017-03-01 NOTE — DISCHARGE INSTRUCTIONS
Flank Pain, Uncertain Cause  The flank is the area between your upper abdomen and your back. Pain there is often caused by a problem with your kidneys. It might be a kidney infection or a kidney stone. Other causes of flank pain include spinal arthritis, a pinched nerve from a back injury, or a back muscle strain or spasm.  The cause of your flank pain is not certain. You may need other tests.  Home care  Follow these tips when caring for yourself at home:  · You may use acetaminophen or ibuprofen to control pain, unless your health care provider prescribed another medicine. If you have chronic liver or kidney disease, talk with your provider before taking these medicines. Also talk with your provider first if youve ever had a stomach ulcer or GI bleeding.  · If the pain is coming from your muscles, you may get relief with ice or heat. During the first 2 days after the injury, put an ice pack on the painful area for 20 minutes every 2 to 4 hours. This will reduce swelling and pain. A hot shower, hot bath, or heating pad works well for a muscle spasm. You can start with ice, then switch to heat after 2 days. You might find that alternating ice and heat works well. Use the method that feels the best to you.  Follow-up care  Follow up with your healthcare provider if your symptoms dont get better over the next few days.  When to seek medical advice  Call your healthcare provider right away if any of these happen:  · Repeated vomiting  · Fever of 100.4ºF (38ºC) or higher, or as directed by your health care provider  · Flank pain that gets worse  · Pain that spreads to the front of your belly (abdomen)  · Dizziness, weakness, or fainting  · Blood in your urine  · Burning feeling when you urinate or the need to urinate often  · Pain in one of your legs that gets worse  · Numbness or weakness in a leg  Date Last Reviewed: 10/1/2016  © 1736-8578 [employer]. [address] [place] [address], [place] [address]. All  rights reserved. This information is not intended as a substitute for professional medical care. Always follow your healthcare professional's instructions.

## 2017-03-02 ENCOUNTER — OFFICE VISIT (OUTPATIENT)
Dept: HEMATOLOGY/ONCOLOGY | Facility: CLINIC | Age: 67
End: 2017-03-02
Payer: MEDICARE

## 2017-03-02 VITALS
RESPIRATION RATE: 14 BRPM | BODY MASS INDEX: 42.91 KG/M2 | OXYGEN SATURATION: 100 % | WEIGHT: 282.19 LBS | HEART RATE: 81 BPM | DIASTOLIC BLOOD PRESSURE: 67 MMHG | SYSTOLIC BLOOD PRESSURE: 152 MMHG | TEMPERATURE: 98 F

## 2017-03-02 DIAGNOSIS — R52 PAIN: ICD-10-CM

## 2017-03-02 DIAGNOSIS — D05.11 DCIS (DUCTAL CARCINOMA IN SITU) OF BREAST, RIGHT: ICD-10-CM

## 2017-03-02 DIAGNOSIS — R52 PAIN: Primary | ICD-10-CM

## 2017-03-02 DIAGNOSIS — M54.9 ACUTE MIDLINE BACK PAIN, UNSPECIFIED BACK LOCATION: ICD-10-CM

## 2017-03-02 LAB — BACTERIA UR CULT: NORMAL

## 2017-03-02 PROCEDURE — 1157F ADVNC CARE PLAN IN RCRD: CPT | Mod: S$GLB,,, | Performed by: INTERNAL MEDICINE

## 2017-03-02 PROCEDURE — 99999 PR PBB SHADOW E&M-EST. PATIENT-LVL III: CPT | Mod: PBBFAC,,, | Performed by: INTERNAL MEDICINE

## 2017-03-02 PROCEDURE — 3077F SYST BP >= 140 MM HG: CPT | Mod: S$GLB,,, | Performed by: INTERNAL MEDICINE

## 2017-03-02 PROCEDURE — 1126F AMNT PAIN NOTED NONE PRSNT: CPT | Mod: S$GLB,,, | Performed by: INTERNAL MEDICINE

## 2017-03-02 PROCEDURE — 3078F DIAST BP <80 MM HG: CPT | Mod: S$GLB,,, | Performed by: INTERNAL MEDICINE

## 2017-03-02 PROCEDURE — 1160F RVW MEDS BY RX/DR IN RCRD: CPT | Mod: S$GLB,,, | Performed by: INTERNAL MEDICINE

## 2017-03-02 PROCEDURE — 1159F MED LIST DOCD IN RCRD: CPT | Mod: S$GLB,,, | Performed by: INTERNAL MEDICINE

## 2017-03-02 PROCEDURE — 99214 OFFICE O/P EST MOD 30 MIN: CPT | Mod: S$GLB,,, | Performed by: INTERNAL MEDICINE

## 2017-03-02 RX ORDER — HYDROMORPHONE HYDROCHLORIDE 2 MG/1
2 TABLET ORAL EVERY 4 HOURS PRN
Qty: 16 TABLET | Refills: 0 | Status: SHIPPED | OUTPATIENT
Start: 2017-03-02 | End: 2017-03-02 | Stop reason: SDUPTHER

## 2017-03-02 RX ORDER — HYDROMORPHONE HYDROCHLORIDE 2 MG/1
2 TABLET ORAL EVERY 4 HOURS PRN
Qty: 16 TABLET | Refills: 0 | Status: SHIPPED | OUTPATIENT
Start: 2017-03-02 | End: 2017-03-23 | Stop reason: ALTCHOICE

## 2017-03-02 NOTE — PROGRESS NOTES
Subjective:       Patient ID: Linnea Renae is a 66 y.o. female.    Chief Complaint: No chief complaint on file.    HPI   Mrs. Renae returns today for follow up. She had decided against XRT, and last November she had been started on anastrazole for her DCIS s/p lumpectomy.  Briefly, she is a 65-year-old -American female who underwent a lumpectomy for a 6 mm area of DCIS with the closest margin being at 1 cm superiorly.   The lesion was strongly ER positive, OH positive.     Review of Systems   Overall she feels well, however, for the last few days she has been experiencing significant back pain and she is asking for an analgesic.  She states that she was seen in the ED two nights ago and was given NSAIDs..  She states that since she got off the anastrazole the hair loss has stopped and she is growing new hair.    ECOG PS is 1.   She denies any anxiety, depression, easy bruising, fevers, chills, night sweats, weight loss, nausea, vomiting, diarrhea, constipation, diplopia, blurred vision, headache, chest pain, palpitations, shortness of breath, breast pain, abdominal pain, extremity pain, but she does have difficulty ambulating due to her back pain.  The remainder of the ten-point ROS, including general, skin, lymph, H/N, cardiorespiratory, GI, , Neuro, Endocrine, and psychiatric is negative.        Objective:      Physical Exam  Physical Exam  She is alert, oriented to time, place, person, pleasant, well   nourished, in moderate physical distress due to back pain. She is accompanied by her .   VITAL SIGNS: Reviewed   HEENT: her fair is somewhat thinned in the frontal area. There are no nasal, oral, lip, gingival, auricular, lid,   or conjunctival lesions. Mucosae are moist and pink, and there is no   thrush. Pupils are equal, reactive to light and accommodation.   Extraocular muscle movements are intact.   NECK: Supple without JVD, adenopathy, or thyromegaly.   LUNGS: Clear to auscultation without  wheezing, rales, or rhonchi.   CARDIOVASCULAR: Reveals an S1, S2, no murmurs, no rubs, no gallops.   ABDOMEN: Soft, nontender, without organomegaly. Bowel sounds are   present.   EXTREMITIES: No cyanosis, clubbing, or edema.   BREASTS: She is status post right lumpectomy with a well-healed periareolar incision.   There are no masses in either breast.   LYMPHATIC: There is no cervical, axillary, or supraclavicular adenopathy.   SKIN: Warm and moist, without petechiae, rashes, induration, or ecchymoses.   NEUROLOGIC: DTRs are 0-1+ bilaterally, symmetrical, motor function is 5/5,  and cranial nerves are within normal limits.  Assessment:       1. Pain    2. DCIS (ductal carcinoma in situ) of breast, right    3. Acute midline back pain, unspecified back location      3.     Hair loss  Plan:        She states that she is scheduled to see Dr. Russell in May with a mammogram (already scheduled, per patient).  She also stated that she would like to continue seeing me.  I will therefore see her again in October 2017.  In regards to her back pain, at her request, I have given her a prescription for 16 dilaudid 2 mh tablets and she unserstands that she needs to follow up with her PCP.  Of note, she states that she has had morphine in the past and did not experience any side effects.  Her questions were answered to her satisfaction.

## 2017-03-03 ENCOUNTER — TELEPHONE (OUTPATIENT)
Dept: HEMATOLOGY/ONCOLOGY | Facility: CLINIC | Age: 67
End: 2017-03-03

## 2017-03-03 NOTE — TELEPHONE ENCOUNTER
----- Message from Yulissa Mei sent at 3/3/2017  2:05 PM CST -----  Contact: self  Pt is calling to check the status of pain medication.  Contact number [phone]

## 2017-03-05 DIAGNOSIS — K59.09 CHRONIC CONSTIPATION: ICD-10-CM

## 2017-03-05 RX ORDER — LINACLOTIDE 145 UG/1
CAPSULE, GELATIN COATED ORAL
Qty: 30 CAPSULE | Refills: 5 | Status: SHIPPED | OUTPATIENT
Start: 2017-03-05 | End: 2017-05-08 | Stop reason: ALTCHOICE

## 2017-03-06 ENCOUNTER — OFFICE VISIT (OUTPATIENT)
Dept: INTERNAL MEDICINE | Facility: CLINIC | Age: 67
End: 2017-03-06
Payer: MEDICARE

## 2017-03-06 VITALS
BODY MASS INDEX: 42.77 KG/M2 | WEIGHT: 282.19 LBS | DIASTOLIC BLOOD PRESSURE: 80 MMHG | HEART RATE: 92 BPM | HEIGHT: 68 IN | SYSTOLIC BLOOD PRESSURE: 128 MMHG

## 2017-03-06 DIAGNOSIS — M48.061 DEGENERATIVE LUMBAR SPINAL STENOSIS: ICD-10-CM

## 2017-03-06 DIAGNOSIS — S33.5XXD LUMBAR SPRAIN, SUBSEQUENT ENCOUNTER: Primary | ICD-10-CM

## 2017-03-06 PROCEDURE — 99213 OFFICE O/P EST LOW 20 MIN: CPT | Mod: S$GLB,,, | Performed by: INTERNAL MEDICINE

## 2017-03-06 PROCEDURE — 99999 PR PBB SHADOW E&M-EST. PATIENT-LVL III: CPT | Mod: PBBFAC,,, | Performed by: INTERNAL MEDICINE

## 2017-03-06 PROCEDURE — 1159F MED LIST DOCD IN RCRD: CPT | Mod: S$GLB,,, | Performed by: INTERNAL MEDICINE

## 2017-03-06 PROCEDURE — 3079F DIAST BP 80-89 MM HG: CPT | Mod: S$GLB,,, | Performed by: INTERNAL MEDICINE

## 2017-03-06 PROCEDURE — 3074F SYST BP LT 130 MM HG: CPT | Mod: S$GLB,,, | Performed by: INTERNAL MEDICINE

## 2017-03-06 PROCEDURE — 1125F AMNT PAIN NOTED PAIN PRSNT: CPT | Mod: S$GLB,,, | Performed by: INTERNAL MEDICINE

## 2017-03-06 PROCEDURE — 1160F RVW MEDS BY RX/DR IN RCRD: CPT | Mod: S$GLB,,, | Performed by: INTERNAL MEDICINE

## 2017-03-06 PROCEDURE — 1157F ADVNC CARE PLAN IN RCRD: CPT | Mod: S$GLB,,, | Performed by: INTERNAL MEDICINE

## 2017-03-06 RX ORDER — AMOXICILLIN 500 MG/1
CAPSULE ORAL
Refills: 0 | COMMUNITY
Start: 2017-03-02 | End: 2017-06-02

## 2017-03-06 RX ORDER — HYDROCODONE BITARTRATE AND ACETAMINOPHEN 5; 325 MG/1; MG/1
TABLET ORAL
Refills: 0 | COMMUNITY
Start: 2017-03-02 | End: 2017-03-23

## 2017-03-06 RX ORDER — OXYCODONE AND ACETAMINOPHEN 7.5; 325 MG/1; MG/1
1 TABLET ORAL EVERY 6 HOURS PRN
Qty: 30 TABLET | Refills: 0 | Status: SHIPPED | OUTPATIENT
Start: 2017-03-06 | End: 2017-03-23 | Stop reason: ALTCHOICE

## 2017-03-06 NOTE — PROGRESS NOTES
Subjective:       Patient ID: Linnea Renae is a 66 y.o. female.    Chief Complaint: Follow-up and Back Pain (x 2 weeks ago )    HPI Comments: Patient known to me, last seen one month ago. Presents urgently c/o acute exacerbation of low back pain. She had chronic low back pain associated with lumbar spondylosis and spinal stenosis for which she uses Tramadol, Tizanidine and Ibuprofen 800mg routinely. Describes an acute left low back pain that began upon twisting to reach for something in the back seat of the car two weeks ago. She did this same action a couple of times, and felt sudden pain each time. Then on Jose Gras day it became severe. She presented to the ER where she was treated with Toradol and Morphine. Labs were all stable including urine. Abdominal x-ray revealed no evidence of obstruction or perforation. CT scan of Abdomen and Pelvis was entirely unremarkable. She was discharged with Naproxen and Flexeril which did not help. Presented to her Oncologist two days later, who gave her 16 tablets of Dilaudid. And records indicate Rx's filled THAT SAME DAY for 16 tablets of Norco and some Amoxicillin but she denies getting this, doesn't know where it came from. Here because NONE of these meds have relieved her pain - 10/10 severity in mid back radiating to left flank with no associated GI or  symptoms. All movement aggravates it. She has been unable to do her normal activities due to pain, just resting. No associated radiation to legs, leg weakness or incontinence. There was no fall or other trauma.     Past history, meds and allergies reviewed.       Review of Systems    Objective:    /80, Pulse 92, Wt 282 lbs  Physical Exam   Constitutional: She is oriented to person, place, and time.   Ambulates slowly with guarded posture in mild distress.    Cardiovascular: Normal rate, regular rhythm and normal heart sounds.    Pulmonary/Chest: Effort normal and breath sounds normal. No respiratory distress.    Musculoskeletal:   Tender muscles with spasm on left mid to low back, with pain aggravated upon all movement including bending, rotation, or just standing from the chair.    Neurological: She is alert and oriented to person, place, and time.   Skin: Skin is warm and dry. No rash noted.   Psychiatric: She has a normal mood and affect. Her behavior is normal.       Assessment:       1. Lumbar sprain, subsequent encounter    2. Degenerative lumbar spinal stenosis        Plan:       Lumbar sprain, subsequent encounter  -     oxycodone-acetaminophen (PERCOCET) 7.5-325 mg per tablet; Take 1 tablet by mouth every 6 (six) hours as needed for Pain.  Dispense: 30 tablet; Refill: 0  STOP Hydrocodone, Hydromorphone and Tramadol.  May take EITHER Flexeril or Tizanidine for muscle spasm.   May take EITHER Naproxen or Ibuprofen for anti-inflammatory.     Degenerative lumbar spinal stenosis         -     Follow up with the [facility] is encouraged. Consider Physical Therapy, but I would like her seen there first.   Patient verbalizes understanding and agrees to all of above.

## 2017-03-08 ENCOUNTER — OFFICE VISIT (OUTPATIENT)
Dept: SPINE | Facility: CLINIC | Age: 67
End: 2017-03-08
Payer: MEDICARE

## 2017-03-08 VITALS — HEIGHT: 68 IN | WEIGHT: 282 LBS | BODY MASS INDEX: 42.74 KG/M2

## 2017-03-08 DIAGNOSIS — M47.819 SPONDYLOSIS WITHOUT MYELOPATHY: ICD-10-CM

## 2017-03-08 DIAGNOSIS — M51.37 DDD (DEGENERATIVE DISC DISEASE), LUMBOSACRAL: ICD-10-CM

## 2017-03-08 DIAGNOSIS — M43.10 ACQUIRED SPONDYLOLISTHESIS: ICD-10-CM

## 2017-03-08 DIAGNOSIS — M54.50 ACUTE LEFT-SIDED LOW BACK PAIN WITHOUT SCIATICA: ICD-10-CM

## 2017-03-08 PROCEDURE — 1125F AMNT PAIN NOTED PAIN PRSNT: CPT | Mod: S$GLB,,, | Performed by: PHYSICIAN ASSISTANT

## 2017-03-08 PROCEDURE — 99999 PR PBB SHADOW E&M-EST. PATIENT-LVL IV: CPT | Mod: PBBFAC,,, | Performed by: PHYSICIAN ASSISTANT

## 2017-03-08 PROCEDURE — 1160F RVW MEDS BY RX/DR IN RCRD: CPT | Mod: S$GLB,,, | Performed by: PHYSICIAN ASSISTANT

## 2017-03-08 PROCEDURE — 1159F MED LIST DOCD IN RCRD: CPT | Mod: S$GLB,,, | Performed by: PHYSICIAN ASSISTANT

## 2017-03-08 PROCEDURE — 1157F ADVNC CARE PLAN IN RCRD: CPT | Mod: S$GLB,,, | Performed by: PHYSICIAN ASSISTANT

## 2017-03-08 PROCEDURE — 99214 OFFICE O/P EST MOD 30 MIN: CPT | Mod: S$GLB,,, | Performed by: PHYSICIAN ASSISTANT

## 2017-03-08 RX ORDER — METHYLPREDNISOLONE 4 MG/1
TABLET ORAL
Qty: 1 PACKAGE | Refills: 0 | Status: SHIPPED | OUTPATIENT
Start: 2017-03-08 | End: 2017-03-23 | Stop reason: SDDI

## 2017-03-08 NOTE — PROGRESS NOTES
"Subjective:     Patient ID:  Linnea Renae is a 66 y.o. female.      Chief Complaint: Acute left low back pain    HPI    Linnea Renae is a 66 y.o. female who presents for follow up.  She saw Dr. Mcdonald in October 2016 and he recommended a L3-4, L4-5 XLIF with perc instrumentation.    She wanted to think about it at that time.    She states about 3-4 weeks ago she started to have left low back pain that was severe that got worse with repeated events such as turning to look behind her with a seatbelt on, dancing with her granddaughter and then doing stretching exercises.  She had to go to the ED due to the severity of the pain about 1-2 weeks ago.  She is now taking Percocet and Zanaflex which has helped.    Pain is in the left low back region.  No leg pain or paresthesias.    Patient denies any recent accidents or trauma, no saddle anesthesias, and no bowel or bladder incontinence.      Review of Systems:  Constitution: Negative for chills, fever, night sweats and weight loss.   Musculoskeletal: Negative for falls.   Gastrointestinal: Negative for bowel incontinence, nausea and vomiting.   Genitourinary: Negative for bladder incontinence.   Neurological: Negative for disturbances in coordination and loss of balance.     Objective:      Vitals:    03/08/17 1613   Weight: 127.9 kg (282 lb)   Height: 5' 8" (1.727 m)   PainSc: 10-Worst pain ever   PainLoc: Back         Physical Exam:    General:  Linnea Renae is well-developed, well-nourished, appears stated age, in no acute distress, alert and oriented to person, place, and time.    Pulmonary/Chest:  Respiratory effort normal  Abdominal: Exhibits no distension  Psychiatric:  Normal mood and affect.  Behavior is normal.  Judgement and thought content normal    Musculoskeletal:    Patient arises from a sitting to standing position without difficulty.  Patient walks to the door without evidence of limp, pain, or abnormality of gait. Patient is able to walk on heels and " toes without difficulty.    Lumbar ROM:   Pain in lumbar flexion, extension, right lateral bending, and left lateral bending.  Worse when going from flexion to upright.    Lumbar Spine Inspection:  Normal with no surgical scars with no visible rashes.    Lumbar Spine Palpation:  Moderate tenderness to low back palpation.    SI Joint Palpation:  Mild tenderness to bilateral SI Joint palpation.    Straight Leg Raise:  Negative right and left SLR.    Neurological:  Alert and oriented to person, place, and time    Muscle strength against resistance:     Right Left   Hip flexion  5 / 5 5 / 5   Hip extension 5 / 5 5 / 5   Hip abduction 5 / 5 5 / 5   Hip adduction  5 / 5 5 / 5   Knee extension  5 / 5 5 / 5   Knee flexion 5 / 5 5 / 5   Dorsiflexion  5 / 5 5 / 5   EHL  5 / 5 5 / 5   Plantar flexion  5 / 5 5 / 5   Inversion of the feet 5 / 5 5 / 5   Eversion of the feet  5 / 5 5 / 5     Reflexes:     Right Left   Patellar 2+ 2+   Achilles 2+ 2+     Clonus:  Negative bilaterally    On gross examination of the bilateral upper extremities, patient has full painfree ROM with no signs of clubbing, cyanosis, edema, or weakness.     MRI Interpretation:       Lumbar spine MRI was personally reviewed today. L3-4 grade one spondylolisthesis. There is moderate central and bilateral NFS at L4-5.     XRAY Interpretation:       Lumbar spine ap/lateral/flexion/extension xrays were personally reviewed today. No fractures. No movement on flexion and extension. L3-4 grade one spondylolisthesis. Facet spondylosis at L3-S1.    Assessment:          1. Spondylosis without myelopathy    2. DDD (degenerative disc disease), lumbosacral    3. Acquired spondylolisthesis    4. Acute left-sided low back pain without sciatica             Plan:          Orders Placed This Encounter    methylPREDNISolone (MEDROL, SVITLANA,) 4 mg tablet       Possible flare up of facet joint pain at the level of the spondy at L3-4 versus muscular strain versus worsening spinal  stenosis    -Recommend medrol pack now with food  -Take ibuprofen she has at home with food PRN after the pack is done  -Start spacing out the Percocet over time  -Continue Zanaflex PRN  -If pain worsens or persists will need updated xrays and MRI lumbar spine    Follow-Up:  Return if symptoms worsen or fail to improve. If there are any questions prior to this, the patient was instructed to contact the office.       MICHELLE Mendez, PA-C  Neurosurgery  [facility]  [facility]

## 2017-03-08 NOTE — MR AVS SNAPSHOT
[facility]  [address]  [address]  [place] [address]  Phone: [phone]  Fax: [phone]                  Linnea Renae   3/8/2017 4:00 PM   Office Visit    Description:  Female : 1950   Provider:  Neeta Allan PA-C   Department:  [facility]           Reason for Visit     Follow-up           Diagnoses this Visit        Comments    Spondylosis without myelopathy         DDD (degenerative disc disease), lumbosacral         Acquired spondylolisthesis         Acute left-sided low back pain without sciatica                To Do List           Future Appointments        Provider Department Dept Phone    2017 10:15 AM MD Marcelo Mclaughlin [facility] Breast Surgery [phone]    2017 9:00 AM MD Marcelo Marshall [facility] - Internal Medicine [phone]      Goals (5 Years of Data)     None      Follow-Up and Disposition     Return if symptoms worsen or fail to improve.       These Medications        Disp Refills Start End    methylPREDNISolone (MEDROL, SVITLANA,) 4 mg tablet 1 Package 0 3/8/2017 3/29/2017    use as directed    Pharmacy: [facility] [address] [place] [address] ARNULFO KAYE Ph #: [phone]         [facility]     [facility] On Call Nurse Care Line - 24/7 Assistance  Registered nurses in the [facility] On Call Center provide clinical advisement, health education, appointment booking, and other advisory services.  Call for this free service at [phone].             Medications           Message regarding Medications     Verify the changes and/or additions to your medication regime listed below are the same as discussed with your clinician today.  If any of these changes or additions are incorrect, please notify your healthcare provider.        START taking these NEW medications        Refills    methylPREDNISolone (MEDROL, SVITLANA,) 4 mg tablet 0    Sig: use as directed    Class: Normal           Verify that the below  list of medications is an accurate representation of the medications you are currently taking.  If none reported, the list may be blank. If incorrect, please contact your healthcare provider. Carry this list with you in case of emergency.           Current Medications     amoxicillin (AMOXIL) 500 MG capsule TK 1 C PO Q 8 H TAT    aspirin 81 MG Chew Take 81 mg by mouth once daily.    atenolol (TENORMIN) 50 MG tablet Take 1 tablet (50 mg total) by mouth 2 (two) times daily.    atorvastatin (LIPITOR) 20 MG tablet Take 1 tablet (20 mg total) by mouth once daily.    blood sugar diagnostic Strp 1 strip by Misc.(Non-Drug; Combo Route) route once daily.    blood-glucose meter kit Use as instructed    captopril (CAPOTEN) 50 MG tablet TAKE TWO TABLETS BY MOUTH IN THE MORNING AND ONE TABLET IN THE EVENING    cycloSPORINE (RESTASIS) 0.05 % ophthalmic emulsion 1 drop 2 (two) times daily.    fluticasone (FLONASE) 50 mcg/actuation nasal spray 2 sprays by Each Nare route once daily.    hydrochlorothiazide (HYDRODIURIL) 25 MG tablet Take 1 tablet (25 mg total) by mouth once daily.    ibuprofen (ADVIL,MOTRIN) 800 MG tablet TAKE ONE TABLET BY MOUTH THREE TIMES DAILY AS NEEDED FOR PAIN    lancets Misc 1 lancet by Misc.(Non-Drug; Combo Route) route once daily.    LINZESS 145 mcg Cap capsule TAKE ONE CAPSULE BY MOUTH ONCE DAILY    metformin (GLUCOPHAGE-XR) 500 MG 24 hr tablet TAKE TWO TABLETS BY MOUTH ONCE DAILY WITH BREAKFAST    omeprazole (PRILOSEC) 20 MG capsule Take 1 capsule (20 mg total) by mouth once daily.    oxycodone-acetaminophen (PERCOCET) 7.5-325 mg per tablet Take 1 tablet by mouth every 6 (six) hours as needed for Pain.    tizanidine (ZANAFLEX) 4 MG tablet Take 1 tablet (4 mg total) by mouth 2 (two) times daily as needed (Muscle spasm.).    tramadol (ULTRAM) 50 mg tablet TAKE ONE TABLET BY MOUTH EVERY 6 HOURS AS NEEDED FOR PAIN    calcium-vitamin D3 500 mg(1,250mg) -200 unit per tablet Take 1 tablet by mouth 2 (two) times  "daily with meals.     hydrocodone-acetaminophen 5-325mg (NORCO) 5-325 mg per tablet TK 1 T PO Q 4 TO 6 H PRN P    HYDROmorphone (DILAUDID) 2 MG tablet Take 1 tablet (2 mg total) by mouth every 4 (four) hours as needed for Pain.    levocetirizine (XYZAL) 5 MG tablet Take 1 tablet (5 mg total) by mouth every evening. For Allergies.    methylPREDNISolone (MEDROL, SVITLANA,) 4 mg tablet use as directed    naproxen (NAPROSYN) 375 MG tablet Take 1 tablet (375 mg total) by mouth 2 (two) times daily as needed (Pain).           Clinical Reference Information           Your Vitals Were     Height Weight BMI          5' 8" (1.727 m) 127.9 kg (282 lb) 42.88 kg/m2        Allergies as of 3/8/2017     Codeine      Immunizations Administered on Date of Encounter - 3/8/2017     None      Language Assistance Services     ATTENTION: Language assistance services are available, free of charge. Please call [phone].      ATENCIÓN: Si ian gonsalves, tiene a blount disposición servicios gratuitos de asistencia lingüística. Llame al [phone].     CHANA Ý: N?u b?n nói Ti?ng Vi?t, có các d?ch v? h? tr? ngôn ng? mi?n phí dành cho b?n. G?i s? [phone].         [facility] complies with applicable Federal civil rights laws and does not discriminate on the basis of race, color, national origin, age, disability, or sex.        "

## 2017-03-14 DIAGNOSIS — S33.5XXD LUMBAR SPRAIN, SUBSEQUENT ENCOUNTER: ICD-10-CM

## 2017-03-14 RX ORDER — OXYCODONE AND ACETAMINOPHEN 7.5; 325 MG/1; MG/1
TABLET ORAL
Qty: 30 TABLET | Refills: 0 | OUTPATIENT
Start: 2017-03-14

## 2017-03-14 NOTE — TELEPHONE ENCOUNTER
----- Message from Nichole Bruner sent at 3/14/2017  1:41 PM CDT -----  Contact: patient [phone]  Pt requested a refill at [facility] for percocet and needs to have it approved now because her daughter is at Pediatric building and will be able to pick it up.

## 2017-03-14 NOTE — TELEPHONE ENCOUNTER
----- Message from Nichole Bruner sent at 3/14/2017  1:41 PM CDT -----  Contact: patient [phone]  Pt requested a refill at [facility] for percocet and needs to have it approved now because her daughter is at Pediatric building and will be able to pick it up.

## 2017-03-15 RX ORDER — OXYCODONE AND ACETAMINOPHEN 7.5; 325 MG/1; MG/1
1 TABLET ORAL EVERY 6 HOURS PRN
Qty: 30 TABLET | Refills: 0 | Status: CANCELLED | OUTPATIENT
Start: 2017-03-15

## 2017-03-15 NOTE — TELEPHONE ENCOUNTER
Pt state the pain is getting better  She is afraid to move because it may flare up   She is taking tramadol and ibuprofen but not relieving the pain.  She is requesting just half the script to get her though this.pl adv.

## 2017-03-15 NOTE — TELEPHONE ENCOUNTER
----- Message from Taya Chiang sent at 3/15/2017 10:46 AM CDT -----  Contact: Patient  The patient would like a call regarding the Percocet that was denied.  She would like an explanation.  Please call her at [phone].    Thanks!

## 2017-03-15 NOTE — TELEPHONE ENCOUNTER
Spoke with pt and she stated that the spine clinic told her it would take up to 6 to 8 weeks for the pain to go away. They gave her a steroid pack but she stated she didn't take it because she have to many medical problems as is and is scared of the side effects. She is still in pain but not as severe as she was. Pt also stated the only thing that helped was the meds prescribed from dr boswell and the dr over at the spine clinic told her to come back to dr boswell if symptoms got worse. She is requesting for dr. boswell to give her a call.

## 2017-03-15 NOTE — TELEPHONE ENCOUNTER
Seen at the Spine Clinic two days after she saw me, they have also treated her and have a plan if that treatment didn't work. She needs to follow up there.

## 2017-03-19 DIAGNOSIS — M15.9 PRIMARY OSTEOARTHRITIS INVOLVING MULTIPLE JOINTS: ICD-10-CM

## 2017-03-19 RX ORDER — IBUPROFEN 800 MG/1
TABLET ORAL
Qty: 60 TABLET | Refills: 1 | Status: SHIPPED | OUTPATIENT
Start: 2017-03-19 | End: 2017-05-18 | Stop reason: SDUPTHER

## 2017-03-22 DIAGNOSIS — M47.16 OSTEOARTHRITIS OF LUMBAR SPINE WITH MYELOPATHY: ICD-10-CM

## 2017-03-23 RX ORDER — TRAMADOL HYDROCHLORIDE 50 MG/1
TABLET ORAL
Qty: 120 TABLET | Refills: 2 | Status: SHIPPED | OUTPATIENT
Start: 2017-03-23 | End: 2017-06-02 | Stop reason: SDUPTHER

## 2017-04-17 ENCOUNTER — PATIENT MESSAGE (OUTPATIENT)
Dept: INTERNAL MEDICINE | Facility: CLINIC | Age: 67
End: 2017-04-17

## 2017-04-17 ENCOUNTER — TELEPHONE (OUTPATIENT)
Dept: INTERNAL MEDICINE | Facility: CLINIC | Age: 67
End: 2017-04-17

## 2017-04-17 ENCOUNTER — LAB VISIT (OUTPATIENT)
Dept: LAB | Facility: HOSPITAL | Age: 67
End: 2017-04-17
Attending: INTERNAL MEDICINE
Payer: MEDICARE

## 2017-04-17 ENCOUNTER — OFFICE VISIT (OUTPATIENT)
Dept: SURGERY | Facility: CLINIC | Age: 67
End: 2017-04-17
Payer: MEDICARE

## 2017-04-17 VITALS
WEIGHT: 280 LBS | DIASTOLIC BLOOD PRESSURE: 72 MMHG | SYSTOLIC BLOOD PRESSURE: 150 MMHG | HEART RATE: 80 BPM | HEIGHT: 68 IN | TEMPERATURE: 99 F | BODY MASS INDEX: 42.44 KG/M2

## 2017-04-17 DIAGNOSIS — L65.9 HAIR LOSS: ICD-10-CM

## 2017-04-17 DIAGNOSIS — L65.9 HAIR LOSS: Primary | ICD-10-CM

## 2017-04-17 DIAGNOSIS — D05.11 DUCTAL CARCINOMA IN SITU (DCIS) OF RIGHT BREAST: ICD-10-CM

## 2017-04-17 DIAGNOSIS — C50.912 MALIGNANT NEOPLASM OF LEFT FEMALE BREAST, UNSPECIFIED SITE OF BREAST: Primary | ICD-10-CM

## 2017-04-17 LAB
IRON SERPL-MCNC: 39 UG/DL
SATURATED IRON: 10 %
T4 FREE SERPL-MCNC: 0.93 NG/DL
TOTAL IRON BINDING CAPACITY: 406 UG/DL
TRANSFERRIN SERPL-MCNC: 274 MG/DL
TSH SERPL DL<=0.005 MIU/L-ACNC: 1.13 UIU/ML

## 2017-04-17 PROCEDURE — 1126F AMNT PAIN NOTED NONE PRSNT: CPT | Mod: S$GLB,,, | Performed by: SURGERY

## 2017-04-17 PROCEDURE — 84439 ASSAY OF FREE THYROXINE: CPT

## 2017-04-17 PROCEDURE — 99214 OFFICE O/P EST MOD 30 MIN: CPT | Mod: S$GLB,,, | Performed by: SURGERY

## 2017-04-17 PROCEDURE — 3077F SYST BP >= 140 MM HG: CPT | Mod: S$GLB,,, | Performed by: SURGERY

## 2017-04-17 PROCEDURE — 84443 ASSAY THYROID STIM HORMONE: CPT

## 2017-04-17 PROCEDURE — 36415 COLL VENOUS BLD VENIPUNCTURE: CPT

## 2017-04-17 PROCEDURE — 83540 ASSAY OF IRON: CPT

## 2017-04-17 PROCEDURE — 1160F RVW MEDS BY RX/DR IN RCRD: CPT | Mod: S$GLB,,, | Performed by: SURGERY

## 2017-04-17 PROCEDURE — 1159F MED LIST DOCD IN RCRD: CPT | Mod: S$GLB,,, | Performed by: SURGERY

## 2017-04-17 PROCEDURE — 84466 ASSAY OF TRANSFERRIN: CPT

## 2017-04-17 PROCEDURE — 99999 PR PBB SHADOW E&M-EST. PATIENT-LVL III: CPT | Mod: PBBFAC,,, | Performed by: SURGERY

## 2017-04-17 PROCEDURE — 3078F DIAST BP <80 MM HG: CPT | Mod: S$GLB,,, | Performed by: SURGERY

## 2017-04-17 NOTE — PROGRESS NOTES
"Date of Service:9/19/2016    MEDICAL ONCOLOGIST:    Sean Woody MD  RADIATION ONCOLOGIST:   Jerica Stanford MD      DIAGNOSIS:   This is a 66 y.o. 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    TREATMENT:   1. Right partial mastectomy without sentinel node biopsy on 10/7/2015. Radha Russell M.D. Surgical Oncology. Initially underwent excisional biopsy for atypia upstaged to DCIS at lumpectomy. Final path DICS, negative margins.   2. Declined Radiation therapy. Jerica Stanford M.D. Radiation Oncology   4. Adjuvant endocrine therapy started on 11/2015. Sean Woody M.D. Medical Oncology     HISTORY OF PRESENT ILLNESS:   Linnea Renae is a 66 y.o. female comes in for oncological follow up.  She denies change in her breast self-exam specifically denying new masses, skin or nipple changes, or nipple discharge. Past medical and surgical history is updated without new changes. There have been no changes to family history. The patient denies constitutional symptoms of night sweats, weight loss, new headaches, visual changes, new back or bony pain, chest pain, or shortness of breath.  Reports significant hair thinning despite stopping AI 6 months ago.    IMAGING:   Due in July 2017    MEDICATIONS/ALLERGIES:     Review of patient's allergies indicates:   Allergen Reactions    Codeine Itching       PHYSICAL EXAM:     BP (!) 150/72 (BP Location: Left arm, Patient Position: Sitting, BP Method: Automatic)  Pulse 80  Temp 98.6 °F (37 °C) (Oral)   Ht 5' 8" (1.727 m)  Wt 127 kg (280 lb)  BMI 42.57 kg/m2  General: The patient appears well and is in no acute distress.   Neuro: Alert & oriented x3.  Cardiovascular: RR  Breasts: The exam was done with the patient seated and supine. Left breast - within normal limits. No palpable masses and no abnormal skin or nipple findings. No supraclavicular or axillary lymphadenopathy on the left side.   Right breast - within normal " limits. No palpable masses and no abnormal skin or nipple findings. No supraclavicular or axillary lymphadenopathy on the right side.   Pulmonary: nonlabored breathing bilaterally   Abdomen: soft, nontender, nondistended. No masses.    Extremities: No clubbing or cyanosis. Bilateral full arm range of motion without  lymphedema.     ASSESSMENT:   This is a 66 y.o. female without evidence of recurrence by exam, history or imaging.       PLAN:   1. Continue to follow up with Dr. Woody.   2. Continue monthly self breast exams and call the clinic with any changes or problems.  3. Mammogram in 4 months.  4. Return to clinic in August 2017. The patient is in agreement with the plan. Questions were encouraged and answered to patient's satisfaction. Linnea will call our office with any questions or concerns.   5. Ordered thyroid and iron studies to identify other etiologies of hair thinning. Patient can f/u with her PCP regarding hair thinning since it continued despite stopping AI.

## 2017-04-17 NOTE — TELEPHONE ENCOUNTER
----- Message from Falguni Buchanan sent at 4/17/2017  3:44 PM CDT -----  Contact: Self/[phone]  Pt states she would like for someone to call in regards to the test results she just received. Please advise.

## 2017-04-17 NOTE — TELEPHONE ENCOUNTER
Patient would like to speak with you. Patient didn't want to go in detail with me..Please give patient call

## 2017-04-21 DIAGNOSIS — M48.061 DEGENERATIVE LUMBAR SPINAL STENOSIS: ICD-10-CM

## 2017-04-21 RX ORDER — TIZANIDINE 4 MG/1
TABLET ORAL
Qty: 60 TABLET | Refills: 1 | Status: SHIPPED | OUTPATIENT
Start: 2017-04-21 | End: 2018-07-04 | Stop reason: SDUPTHER

## 2017-04-28 ENCOUNTER — TELEPHONE (OUTPATIENT)
Dept: SURGERY | Facility: CLINIC | Age: 67
End: 2017-04-28

## 2017-04-28 NOTE — TELEPHONE ENCOUNTER
Returned call to pt, pt states that she would like to speak to MD regarding hair loss/labs, pt states that it is not urgent, pt states she would like a referral regarding the hair loss, will notify MD at this time, all questions answered, pt given reassurance

## 2017-04-28 NOTE — TELEPHONE ENCOUNTER
"----- Message from Stephany Garcia RN sent at 4/28/2017  3:22 PM CDT -----  Regarding: patient requesting callback  Patient requested a call from Dr. Russell. Would not give me a message just states "I need to talk to her please." [phone]   "

## 2017-05-01 ENCOUNTER — TELEPHONE (OUTPATIENT)
Dept: SURGERY | Facility: CLINIC | Age: 67
End: 2017-05-01

## 2017-05-01 DIAGNOSIS — L65.9 HAIR LOSS: Primary | ICD-10-CM

## 2017-05-01 NOTE — TELEPHONE ENCOUNTER
Returned call to pt regarding questions about hair loss, pt referred to derm by MD, pt to schedule appt at this time, all questions answered

## 2017-05-04 ENCOUNTER — TELEPHONE (OUTPATIENT)
Dept: INTERNAL MEDICINE | Facility: CLINIC | Age: 67
End: 2017-05-04

## 2017-05-04 DIAGNOSIS — K59.09 CHRONIC CONSTIPATION: Primary | ICD-10-CM

## 2017-05-04 NOTE — TELEPHONE ENCOUNTER
----- Message from Nichole Simon sent at 5/4/2017  9:54 AM CDT -----  Contact: self/[phone]  Pt called in regards to having question about her Rx for LINZESS 145 mcg Cap capsule.         Please advise

## 2017-05-04 NOTE — TELEPHONE ENCOUNTER
Pt is calling to request a alternative to Linzess or should she completely stop the medication, she this the med is causing her hair to thin. Plus she don't think its the chemo cause that therapy is complete. Pl adv.

## 2017-05-08 ENCOUNTER — TELEPHONE (OUTPATIENT)
Dept: SURGERY | Facility: CLINIC | Age: 67
End: 2017-05-08

## 2017-05-08 RX ORDER — LUBIPROSTONE 24 UG/1
24 CAPSULE ORAL 2 TIMES DAILY WITH MEALS
Qty: 60 CAPSULE | Refills: 2 | Status: SHIPPED | OUTPATIENT
Start: 2017-05-08 | End: 2017-06-02

## 2017-05-08 NOTE — TELEPHONE ENCOUNTER
----- Message from Iggy Anaya sent at 5/5/2017  9:59 AM CDT -----  Contact: Pt:[phone]  Pt called and states she would like to speak with 's nurse in regards to the medication she is taking for constipation. Pt states it may be causing her hair to fall out.

## 2017-05-08 NOTE — TELEPHONE ENCOUNTER
----- Message from Edward Adames MA sent at 5/8/2017 10:00 AM CDT -----  Contact: self - [phone]  Patient would like to know if she will get an answer from Dr Moss in regard to the question that was asked last Thursday. Stated the MA will know what its regarding. Please call. Thanks!

## 2017-05-08 NOTE — TELEPHONE ENCOUNTER
Pt says the Linzess is making her hair fall out. She is wanting to know an alternative. Told her to try the Miralax. She is to take one capful daily mixed in 8oz of liquid.

## 2017-05-08 NOTE — TELEPHONE ENCOUNTER
Please advise pt's previous message regarding medication:    Pt is calling to request a alternative to Linzess or should she completely stop the medication, she this the med is causing her hair to thin. Plus she don't think its the chemo cause that therapy is complete. Pl adv.

## 2017-05-08 NOTE — TELEPHONE ENCOUNTER
Stop Linzess.   Start Amitiza 24mg one tab twice a day for chronic constipation - ordered at [facility].

## 2017-05-18 DIAGNOSIS — M15.9 PRIMARY OSTEOARTHRITIS INVOLVING MULTIPLE JOINTS: ICD-10-CM

## 2017-05-19 ENCOUNTER — TELEPHONE (OUTPATIENT)
Dept: SPINE | Facility: CLINIC | Age: 67
End: 2017-05-19

## 2017-05-19 RX ORDER — IBUPROFEN 800 MG/1
TABLET ORAL
Qty: 60 TABLET | Refills: 0 | Status: SHIPPED | OUTPATIENT
Start: 2017-05-19 | End: 2017-06-26 | Stop reason: SDUPTHER

## 2017-05-19 NOTE — TELEPHONE ENCOUNTER
Everything but heavy free weightlifting.    Neeta Allan, MICHELLE, PA-C  Neurosurgery  [facility]  [facility]

## 2017-05-19 NOTE — TELEPHONE ENCOUNTER
----- Message from Ping Mendiola sent at 5/19/2017 12:56 PM CDT -----  Contact: CIARA ZIEGLER [[id number]]  Please Advise  ----- Message -----     From: Priyank Farrell     Sent: 5/19/2017   9:29 AM       To: Jerrell PATEL Staff    X_  1st Request  _  2nd Request  _  3rd Request        Who: CIARA ZIEGLER [[id number]]    Why: Patient calling to see what type of excerises she needs to do in the gym. Please call back to follow up.    What Number to Call Back: [phone]    When to Expect a call back: (Before the end of the day)   -- if the call is after 12:00, the call back will be tomorrow.

## 2017-05-19 NOTE — TELEPHONE ENCOUNTER
Called and spoke with patient and informed her no haevy weightlifting.  Patient verbalized understanding.

## 2017-06-02 ENCOUNTER — OFFICE VISIT (OUTPATIENT)
Dept: INTERNAL MEDICINE | Facility: CLINIC | Age: 67
End: 2017-06-02
Payer: MEDICARE

## 2017-06-02 VITALS
DIASTOLIC BLOOD PRESSURE: 80 MMHG | SYSTOLIC BLOOD PRESSURE: 118 MMHG | WEIGHT: 284.38 LBS | HEIGHT: 68 IN | BODY MASS INDEX: 43.1 KG/M2 | HEART RATE: 77 BPM

## 2017-06-02 DIAGNOSIS — I10 ESSENTIAL HYPERTENSION: Primary | ICD-10-CM

## 2017-06-02 DIAGNOSIS — E78.5 HYPERLIPIDEMIA LDL GOAL <100: ICD-10-CM

## 2017-06-02 DIAGNOSIS — D05.10 DUCTAL CARCINOMA IN SITU (DCIS) OF BREAST, UNSPECIFIED LATERALITY: ICD-10-CM

## 2017-06-02 DIAGNOSIS — I70.0 ATHEROSCLEROSIS OF AORTA: ICD-10-CM

## 2017-06-02 DIAGNOSIS — M48.061 DEGENERATIVE LUMBAR SPINAL STENOSIS: ICD-10-CM

## 2017-06-02 DIAGNOSIS — K21.9 GASTROESOPHAGEAL REFLUX DISEASE, ESOPHAGITIS PRESENCE NOT SPECIFIED: ICD-10-CM

## 2017-06-02 DIAGNOSIS — J30.89 PERENNIAL ALLERGIC RHINITIS: ICD-10-CM

## 2017-06-02 DIAGNOSIS — E11.9 TYPE 2 DIABETES MELLITUS WITHOUT COMPLICATION, WITHOUT LONG-TERM CURRENT USE OF INSULIN: ICD-10-CM

## 2017-06-02 DIAGNOSIS — K59.04 FUNCTIONAL CONSTIPATION: ICD-10-CM

## 2017-06-02 DIAGNOSIS — E66.01 MORBID OBESITY WITH BMI OF 40.0-44.9, ADULT: ICD-10-CM

## 2017-06-02 PROCEDURE — 99499 UNLISTED E&M SERVICE: CPT | Mod: S$GLB,,, | Performed by: INTERNAL MEDICINE

## 2017-06-02 PROCEDURE — 1125F AMNT PAIN NOTED PAIN PRSNT: CPT | Mod: S$GLB,,, | Performed by: INTERNAL MEDICINE

## 2017-06-02 PROCEDURE — 99999 PR PBB SHADOW E&M-EST. PATIENT-LVL III: CPT | Mod: PBBFAC,,, | Performed by: INTERNAL MEDICINE

## 2017-06-02 PROCEDURE — 99214 OFFICE O/P EST MOD 30 MIN: CPT | Mod: S$GLB,,, | Performed by: INTERNAL MEDICINE

## 2017-06-02 PROCEDURE — 3044F HG A1C LEVEL LT 7.0%: CPT | Mod: S$GLB,,, | Performed by: INTERNAL MEDICINE

## 2017-06-02 PROCEDURE — 4010F ACE/ARB THERAPY RXD/TAKEN: CPT | Mod: S$GLB,,, | Performed by: INTERNAL MEDICINE

## 2017-06-02 PROCEDURE — 1159F MED LIST DOCD IN RCRD: CPT | Mod: S$GLB,,, | Performed by: INTERNAL MEDICINE

## 2017-06-02 RX ORDER — BETAMETHASONE DIPROPIONATE 0.5 MG/ML
LOTION, AUGMENTED TOPICAL
Refills: 5 | COMMUNITY
Start: 2017-05-30 | End: 2018-04-11

## 2017-06-02 RX ORDER — TRAMADOL HYDROCHLORIDE 50 MG/1
50 TABLET ORAL EVERY 8 HOURS PRN
Qty: 120 TABLET | Refills: 2 | Status: SHIPPED | OUTPATIENT
Start: 2017-06-02 | End: 2017-08-24 | Stop reason: ALTCHOICE

## 2017-06-02 RX ORDER — ATENOLOL 50 MG/1
50 TABLET ORAL 2 TIMES DAILY
Qty: 180 TABLET | Refills: 1 | Status: SHIPPED | OUTPATIENT
Start: 2017-06-02 | End: 2017-12-11 | Stop reason: SDUPTHER

## 2017-06-02 RX ORDER — CAPTOPRIL 50 MG/1
TABLET ORAL
Qty: 270 TABLET | Refills: 1 | Status: SHIPPED | OUTPATIENT
Start: 2017-06-02 | End: 2018-01-19 | Stop reason: SDUPTHER

## 2017-06-02 RX ORDER — DOXYCYCLINE 100 MG/1
CAPSULE ORAL
COMMUNITY
Start: 2017-05-25 | End: 2018-03-05

## 2017-06-02 NOTE — PROGRESS NOTES
Subjective:       Patient ID: Linnea Renae is a 66 y.o. female.    Chief Complaint: Diabetes    Last seen for a check up four months ago, urgent visit three months ago for acute lumbar sprain with severe pain not responding to Tramadol used for lumbar spondylosis. This was treated short term with Percocet and Tizanidine. That pain has subsided. Also recently stopped using Linzess out of concern that it was contributing to hair loss. Did not take the Amitiza. Just using Miralax for chronic constipation and this is adequate. Has consulted a Dermatologist for hair loss, currently on long term Doxycycline and using a topical treatment. No new complaints. Home glucose ranges  fasting per verbal report.    PMH: .   Hypertension.  Diabetes Type 2, last HbA1c 6.0% .  Hyperlipidemia. TChol 112, TG 91, HDL 37, LDL 57 Aug. '16.  GERD.  Lumbar Spinal Stenosis.   Chronic Dry Eyes.   Perennial Allergic Rhinitis.  Morbid Obesity, BMI up to 46. TSH normal 1.13 .   Right Breast Cancer diagnosed 10/15.    PSH: C/S x 3, BTL, Hysterectomy and USO, Bilateral Cataracts extracted, Cholecystectomy.    Mammogram normal , no recent Pelvic exam. No BMD. Eye exam  outside [facility]. Has had Pneumovax. Prevnar 8/15. Flu shot . Colonoscopy  - 3 polyps removed, at [facility]. Zostavax 3/16. CBC and CMP normal , Urinalysis clear.    Social: Remote tobacco use, quit over 30 years ago. No alcohol.  with three daughters and two grandchildren. Homemaker.     FMH: Father living age 83 in good health. Mother  young, cause unknown. CHF in PGM, Thyroid disease in PGF.     NKDA.     Medications: list reviewed and reconciled.               Review of Systems   Constitutional: Negative for fatigue and fever.   Eyes: Negative for pain and visual disturbance.   Respiratory: Negative for cough and shortness of breath.    Cardiovascular: Negative for chest pain, palpitations and leg swelling.  "  Gastrointestinal: Negative for abdominal pain, nausea and vomiting.   Genitourinary: Negative for dysuria and frequency.   Musculoskeletal: Positive for back pain. Negative for joint swelling.   Skin: Negative for color change and rash.   Neurological: Negative for dizziness, syncope and headaches.   Psychiatric/Behavioral: Negative for dysphoric mood. The patient is not nervous/anxious.        Objective:    /80, Pulse 77, Ht 5' 8", Wt 284 lbs, BMI=43.  Physical Exam   Constitutional: She is oriented to person, place, and time. She appears well-developed and well-nourished. No distress.   HENT:   Nose: Nose normal.   Mouth/Throat: Oropharynx is clear and moist.   Eyes: Conjunctivae are normal. No scleral icterus.   Cardiovascular: Normal rate, regular rhythm and normal heart sounds.    Pulmonary/Chest: Effort normal and breath sounds normal. No respiratory distress. She has no wheezes. She has no rales.   Musculoskeletal: Normal range of motion. She exhibits no edema.   Protective Sensation (w/ 10 gram monofilament):  Right: Intact  Left: Intact    Visual Inspection:  Normal -  Bilateral    Pedal Pulses:   Right: Present  Left: Present    Posterior tibialis:   Right:Present  Left: Present     Neurological: She is alert and oriented to person, place, and time. No cranial nerve deficit. Coordination normal.   Skin: Skin is warm and dry. She is not diaphoretic.   Psychiatric: She has a normal mood and affect. Her behavior is normal.       Assessment:       1. Essential hypertension    2. Type 2 diabetes mellitus without complication, without long-term current use of insulin    3. Hyperlipidemia LDL goal <100    4. Atherosclerosis of aorta    5. Degenerative lumbar spinal stenosis    6. Ductal carcinoma in situ (DCIS) of breast, unspecified laterality    7. Perennial allergic rhinitis    8. Gastroesophageal reflux disease, esophagitis presence not specified    9. Functional constipation    10. Morbid obesity " with BMI of 40.0-44.9, adult        Plan:       Essential hypertension well controlled, continue same.   -     captopril (CAPOTEN) 50 MG tablet; TAKE TWO TABLETS BY MOUTH IN THE MORNING AND ONE TABLET IN THE EVENING  Dispense: 270 tablet; Refill: 1  -     atenolol (TENORMIN) 50 MG tablet; Take 1 tablet (50 mg total) by mouth 2 (two) times daily.  Dispense: 180 tablet; Refill: 1    Type 2 diabetes mellitus without complication, without long-term current use of insulin - well controlled, continue same.     Hyperlipidemia LDL goal <100 - at goal on current med, continue same.     Atherosclerosis of aorta - medical management as above.     Degenerative lumbar spinal stenosis  -     tramadol (ULTRAM) 50 mg tablet; Take 1 tablet (50 mg total) by mouth every 8 (eight) hours as needed.  Dispense: 120 tablet; Refill: 2  -     Ibuprofen to be used sparingly, advised on risk for gastric, renal and cardiovascular side effects.     Ductal carcinoma in situ (DCIS) of breast, unspecified laterality - mammogram due this summer, ordered by Breast Surgery.    Perennial allergic rhinitis - stable on Xyzal.     Gastroesophageal reflux disease, esophagitis presence not specified - stable on Omeprazole.     Functional constipation - relieved with Miralax.     Morbid obesity with BMI of 40.0-44.9, adult - counseled on diet.

## 2017-06-26 DIAGNOSIS — M15.9 PRIMARY OSTEOARTHRITIS INVOLVING MULTIPLE JOINTS: ICD-10-CM

## 2017-06-26 RX ORDER — IBUPROFEN 800 MG/1
TABLET ORAL
Qty: 60 TABLET | Refills: 1 | Status: SHIPPED | OUTPATIENT
Start: 2017-06-26 | End: 2017-09-06 | Stop reason: SDUPTHER

## 2017-06-30 ENCOUNTER — TELEPHONE (OUTPATIENT)
Dept: INTERNAL MEDICINE | Facility: CLINIC | Age: 67
End: 2017-06-30

## 2017-06-30 DIAGNOSIS — L65.9 HAIR LOSS: Primary | ICD-10-CM

## 2017-06-30 NOTE — TELEPHONE ENCOUNTER
----- Message from Alex Taylor sent at 6/30/2017  8:40 AM CDT -----  Contact: self/ [phone] cell  Type: Referral to a Specialist    Where would you like a referral to? Dermatology    Have you previously requested? No    Is the physician within the [facility]? No    Name and phone number of specialist: Dr. Sun Benitez/ [phone]     Reason for appointment: Hair loss    Is an appointment scheduled with specialist? When?      Comments: Pt would like to request that a referral be put in for an appt with the dermatologist she had sometime around May.  She is going to find the exact dates and would like to speak with someone in the office to discuss.  Please call and advise.    Thank you

## 2017-07-22 DIAGNOSIS — E11.9 TYPE 2 DIABETES MELLITUS WITHOUT COMPLICATION: ICD-10-CM

## 2017-07-22 RX ORDER — METFORMIN HYDROCHLORIDE 500 MG/1
TABLET, EXTENDED RELEASE ORAL
Qty: 180 TABLET | Refills: 2 | Status: SHIPPED | OUTPATIENT
Start: 2017-07-22 | End: 2018-03-08 | Stop reason: SDUPTHER

## 2017-08-04 ENCOUNTER — TELEPHONE (OUTPATIENT)
Dept: INTERNAL MEDICINE | Facility: CLINIC | Age: 67
End: 2017-08-04

## 2017-08-04 NOTE — TELEPHONE ENCOUNTER
----- Message from Mary Rodney sent at 8/4/2017  7:57 AM CDT -----  Contact: Self/[phone] cell  Pt said that she is calling in regards to needing to speak with the nurse to check the status of a referral for . Please call and advise              Thanks!!!

## 2017-08-07 ENCOUNTER — HOSPITAL ENCOUNTER (OUTPATIENT)
Dept: RADIOLOGY | Facility: HOSPITAL | Age: 67
Discharge: HOME | End: 2017-08-07
Attending: SURGERY
Payer: MEDICARE

## 2017-08-07 ENCOUNTER — OFFICE VISIT (OUTPATIENT)
Dept: SURGERY | Facility: CLINIC | Age: 67
End: 2017-08-07
Payer: MEDICARE

## 2017-08-07 VITALS
TEMPERATURE: 98 F | DIASTOLIC BLOOD PRESSURE: 75 MMHG | HEIGHT: 68 IN | BODY MASS INDEX: 43.01 KG/M2 | HEART RATE: 76 BPM | WEIGHT: 283.75 LBS | SYSTOLIC BLOOD PRESSURE: 173 MMHG

## 2017-08-07 VITALS — WEIGHT: 284 LBS | HEIGHT: 68 IN | BODY MASS INDEX: 43.04 KG/M2

## 2017-08-07 DIAGNOSIS — D05.11 DUCTAL CARCINOMA IN SITU (DCIS) OF RIGHT BREAST: Primary | ICD-10-CM

## 2017-08-07 DIAGNOSIS — C50.912 MALIGNANT NEOPLASM OF LEFT FEMALE BREAST: ICD-10-CM

## 2017-08-07 PROCEDURE — 99213 OFFICE O/P EST LOW 20 MIN: CPT | Mod: S$GLB,,, | Performed by: SURGERY

## 2017-08-07 PROCEDURE — 77066 DX MAMMO INCL CAD BI: CPT | Mod: 26,,, | Performed by: RADIOLOGY

## 2017-08-07 PROCEDURE — 3008F BODY MASS INDEX DOCD: CPT | Mod: S$GLB,,, | Performed by: SURGERY

## 2017-08-07 PROCEDURE — 1126F AMNT PAIN NOTED NONE PRSNT: CPT | Mod: S$GLB,,, | Performed by: SURGERY

## 2017-08-07 PROCEDURE — 3077F SYST BP >= 140 MM HG: CPT | Mod: S$GLB,,, | Performed by: SURGERY

## 2017-08-07 PROCEDURE — 1159F MED LIST DOCD IN RCRD: CPT | Mod: S$GLB,,, | Performed by: SURGERY

## 2017-08-07 PROCEDURE — 99999 PR PBB SHADOW E&M-EST. PATIENT-LVL III: CPT | Mod: PBBFAC,,, | Performed by: SURGERY

## 2017-08-07 PROCEDURE — 77062 BREAST TOMOSYNTHESIS BI: CPT | Mod: 26,,, | Performed by: RADIOLOGY

## 2017-08-07 PROCEDURE — 3078F DIAST BP <80 MM HG: CPT | Mod: S$GLB,,, | Performed by: SURGERY

## 2017-08-07 NOTE — PROGRESS NOTES
Date of Service:8/7/2017    MEDICAL ONCOLOGIST:    Sean Woody MD  RADIATION ONCOLOGIST:   Jerica Stanford MD      DIAGNOSIS:   This is a 66 y.o. 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    TREATMENT:   1. Right partial mastectomy without sentinel node biopsy on 10/7/2015. Radha Russell M.D. Surgical Oncology. Initially underwent excisional biopsy for atypia upstaged to DCIS at lumpectomy. Final path DICS, negative margins.   2. Declined Radiation therapy. Jerica Stanford M.D. Radiation Oncology   4. Adjuvant endocrine therapy started on 11/2015. Sean Woody M.D. Medical Oncology     HISTORY OF PRESENT ILLNESS:   Linnea Renae is a 66 y.o. female comes in for oncological follow up.  She denies change in her breast self-exam specifically denying new masses, skin or nipple changes, or nipple discharge. Past medical and surgical history is updated without new changes. There have been no changes to family history. The patient denies constitutional symptoms of night sweats, weight loss, new headaches, visual changes, new back or bony pain, chest pain, or shortness of breath.      Was referred to a dermatologist after last office visit. Hair has started to fill back in much to patients satisfaction. She is on a topical cream and topical antibiotics prescribed by her dermatologist    IMAGING:   Diagnostic Mammogram (8/7/2017):  Right  There are post-surgical findings from a previous lumpectomy seen in the upper outer quadrant of the right breast in the anterior portion. There has been no interval development of a suspicious mass, microcalcification, or architectural distortion.      Left  There is no evidence of suspicious masses, calcifications, or other abnormal findings.     Impression:  Bilateral  There is no mammographic evidence of malignancy.     BI-RADS Category:   Overall: 2 - Benign      MEDICATIONS/ALLERGIES:     Review of patient's allergies  "indicates:   Allergen Reactions    Codeine Itching     Review of Systems   Constitutional: Negative for chills, fever and weight loss.   Eyes: Negative for double vision.   Respiratory: Negative for shortness of breath.    Cardiovascular: Negative for chest pain.   Gastrointestinal: Negative for nausea and vomiting.   Genitourinary: Negative for urgency.   Musculoskeletal: Negative for myalgias.   Skin: Negative for rash.   Neurological: Negative for headaches.       PHYSICAL EXAM:     BP (!) 173/75 (BP Location: Left arm, Patient Position: Sitting, BP Method: Automatic)   Pulse 76   Temp 97.8 °F (36.6 °C) (Oral)   Ht 5' 8" (1.727 m)   Wt 128.7 kg (283 lb 11.7 oz)   BMI 43.14 kg/m²   General: The patient appears well and is in no acute distress.   Neuro: Alert & oriented x3.  Cardiovascular: RR  Breasts: The exam was done with the patient seated and supine. Left breast - within normal limits. No palpable masses and no abnormal skin or nipple findings. No supraclavicular or axillary lymphadenopathy on the left side.   Right breast - within normal limits. No palpable masses and no abnormal skin or nipple findings. No supraclavicular or axillary lymphadenopathy on the right side. Postsurgical changes palpated under scar from partial mastectomy.  Pulmonary: nonlabored breathing bilaterally   Abdomen: soft, nontender, nondistended. No masses.    Extremities: No clubbing or cyanosis. Bilateral full arm range of motion without  lymphedema.     ASSESSMENT:   This is a 66 y.o. female without evidence of recurrence by exam, history or imaging.       PLAN:   1. Continue to follow up with Dr. Woody.   2. Continue monthly self breast exams and call the clinic with any changes or problems.  3. Mammogram in 1 year.  4. Return to clinic in 6 months for clinical breast exam. The patient is in agreement with the plan. Questions were encouraged and answered to patient's satisfaction. Linnea will call our office with any " questions or concerns.

## 2017-08-14 ENCOUNTER — TELEPHONE (OUTPATIENT)
Dept: SURGERY | Facility: CLINIC | Age: 67
End: 2017-08-14

## 2017-08-14 NOTE — TELEPHONE ENCOUNTER
Left VM with my direct contact information, patient advised to give a return call with any questions or concerns

## 2017-08-19 ENCOUNTER — HOSPITAL ENCOUNTER (EMERGENCY)
Facility: OTHER | Age: 67
Discharge: HOME | End: 2017-08-19
Attending: EMERGENCY MEDICINE
Payer: MEDICARE

## 2017-08-19 VITALS
WEIGHT: 282 LBS | RESPIRATION RATE: 18 BRPM | OXYGEN SATURATION: 99 % | DIASTOLIC BLOOD PRESSURE: 82 MMHG | HEART RATE: 86 BPM | TEMPERATURE: 98 F | BODY MASS INDEX: 42.74 KG/M2 | SYSTOLIC BLOOD PRESSURE: 140 MMHG | HEIGHT: 68 IN

## 2017-08-19 DIAGNOSIS — L03.115 CELLULITIS OF RIGHT LOWER EXTREMITY WITHOUT FOOT: Primary | ICD-10-CM

## 2017-08-19 PROCEDURE — 25000003 PHARM REV CODE 250: Performed by: EMERGENCY MEDICINE

## 2017-08-19 PROCEDURE — 99283 EMERGENCY DEPT VISIT LOW MDM: CPT

## 2017-08-19 RX ORDER — SULFAMETHOXAZOLE AND TRIMETHOPRIM 800; 160 MG/1; MG/1
2 TABLET ORAL
Status: COMPLETED | OUTPATIENT
Start: 2017-08-19 | End: 2017-08-19

## 2017-08-19 RX ORDER — SULFAMETHOXAZOLE AND TRIMETHOPRIM 800; 160 MG/1; MG/1
2 TABLET ORAL 2 TIMES DAILY
Qty: 28 TABLET | Refills: 0 | Status: SHIPPED | OUTPATIENT
Start: 2017-08-19 | End: 2017-08-26

## 2017-08-19 RX ADMIN — SULFAMETHOXAZOLE AND TRIMETHOPRIM 2 TABLET: 800; 160 TABLET ORAL at 05:08

## 2017-08-19 NOTE — ED PROVIDER NOTES
"Encounter Date: 2017    SCRIBE #1 NOTE: I, Mounika Fletcher, am scribing for, and in the presence of,  Dr. Holguin. I have scribed the entire note.       History     Chief Complaint   Patient presents with    Insect Bite     pt reports insect bite to back or right calf; area is red and swollen     Time seen by provider: 4:45 PM    This is a 67 y.o. female who presents with complaint of inscect bite to R posterior calf last weekend. Pt states that she has been scratching the bite causing it to become "hard and swollen."  She notes some pain with palpation. Pt denies any numbness, tingling, and weakness. She has no lightheadedness, dizziness, fever, chills, SOB, trouble swallowing, and facial swelling. She is allergic to codeine. Pt declined pain medication because she has some at home.      The history is provided by the patient.     Review of patient's allergies indicates:   Allergen Reactions    Codeine Itching     Past Medical History:   Diagnosis Date    Allergy     Breast cancer     Lumpectomy 10/15.    Chronic constipation     Diabetes mellitus, type 2     Dry eyes     GERD (gastroesophageal reflux disease)     Hyperlipidemia     Hypertension     Lumbar disc disease     Type 2 diabetes mellitus      Past Surgical History:   Procedure Laterality Date    BREAST LUMPECTOMY Right         CATARACT EXTRACTION, BILATERAL       SECTION      x3    CHOLECYSTECTOMY  2001    HYSTERECTOMY      and USO    TUBAL LIGATION       Family History   Problem Relation Age of Onset    No Known Problems Mother     No Known Problems Father     Heart disease Paternal Grandmother     Thyroid disease Paternal Grandfather     Hypertension Sister     Hypertension Brother     Glaucoma Brother     No Known Problems Daughter     No Known Problems Daughter     No Known Problems Daughter      Social History   Substance Use Topics    Smoking status: Former Smoker     Packs/day: 0.25     Years: " 20.00     Quit date: 1/1/1985    Smokeless tobacco: Never Used      Comment: Quit mid 1980's.    Alcohol use No     Review of Systems   Constitutional: Negative for chills, diaphoresis and fever.   HENT: Negative for congestion, facial swelling, rhinorrhea, sore throat, trouble swallowing and voice change.    Eyes: Negative for pain and itching.   Respiratory: Negative for cough, choking and shortness of breath.    Cardiovascular: Negative for chest pain.   Gastrointestinal: Negative for abdominal pain, diarrhea, nausea and vomiting.   Endocrine: Negative for polyuria.   Genitourinary: Negative for decreased urine volume and dysuria.   Musculoskeletal: Negative for back pain.   Skin: Negative for rash.        Bug bite to R posterior calf.   Allergic/Immunologic: Negative for immunocompromised state.   Neurological: Negative for dizziness, weakness, light-headedness and numbness.        No tingling.   Hematological: Does not bruise/bleed easily.   Psychiatric/Behavioral: Negative for confusion.       Physical Exam     Initial Vitals [08/19/17 1630]   BP Pulse Resp Temp SpO2   (!) 140/82 86 (!) 187 98.4 °F (36.9 °C) 99 %      MAP       101.33         Physical Exam    Nursing note and vitals reviewed.  Constitutional: She appears well-developed and well-nourished. She is not diaphoretic. No distress.   HENT:   Head: Normocephalic and atraumatic.   Right Ear: External ear normal.   Left Ear: External ear normal.   Eyes: Right eye exhibits no discharge. Left eye exhibits no discharge.   Neck: Normal range of motion. Neck supple.   Cardiovascular:   Pulses:       Dorsalis pedis pulses are 2+ on the right side.        Posterior tibial pulses are 2+ on the right side.   Pulmonary/Chest: No respiratory distress.   Musculoskeletal: Normal range of motion.   Neurological: She is alert and oriented to person, place, and time. She has normal strength.   RLE strength 5/5. Sensation intact to light touch.   Skin: Skin is warm  and dry. No rash noted.   To R posterior mid calf, there is some erythema measuring 5.0cm by 5.0cm. No abscess formation. Subcutaneous induration with tenderness. No fluctuance to incise and drain. Examination consistent with early developing cellulitis. RLE with capillary refill <2 seconds.    Psychiatric: She has a normal mood and affect. Her behavior is normal.         ED Course   Procedures  Labs Reviewed - No data to display                     Scribe Attestation:   Scribe #1: I performed the above scribed service and the documentation accurately describes the services I performed. I attest to the accuracy of the note.    Attending Attestation:           Physician Attestation for Scribe:  Physician Attestation Statement for Scribe #1: I, Dr. Brower, reviewed documentation, as scribed by Mounika Fletcher in my presence, and it is both accurate and complete.         Attending ED Notes:   Urgent evaluation of 67-year-old female with lesion to right lower extremity.  Patient is afebrile, nontoxic-appearing with stable vital signs except for mild elevation in blood pressure.  Patient neurovascularly intact without focal neurologic deficits.  No fluctuant abscess formation to incise and drain.  Examination is consistent with early developing cellulitis.  The patient is extensively counseled on her diagnosis and treatment including all physical exam findings.  The patient is discharged in good condition and directed follow-up with her PCP in the next 24-48 hours.          ED Course     Clinical Impression:     1. Cellulitis of right lower extremity without foot                                 Horacio Brower MD  08/19/17 2756

## 2017-08-19 NOTE — ED NOTES
Pt reports mosquito bite to right calf one week ago. Has redness and itchiness present. Edematous. Denies fever. NAD. Will cont to monitor.

## 2017-08-22 ENCOUNTER — TELEPHONE (OUTPATIENT)
Dept: INTERNAL MEDICINE | Facility: CLINIC | Age: 67
End: 2017-08-22

## 2017-08-22 DIAGNOSIS — M48.061 DEGENERATIVE LUMBAR SPINAL STENOSIS: Primary | ICD-10-CM

## 2017-08-22 NOTE — TELEPHONE ENCOUNTER
Pt is requesting tramadol direction be changed from every 8hrs to every 6hrs due to her pain come back before the next dose pl adv.

## 2017-08-22 NOTE — TELEPHONE ENCOUNTER
----- Message from Marilu Richardson sent at 8/22/2017 12:51 PM CDT -----  Contact: self/[phone]  Patient called in regards needing to talk with Tamie pertaining to patient medication (tramadol (ULTRAM) 50 mg tablet). Please call and advise.       Thank you!!!

## 2017-08-24 RX ORDER — TRAMADOL HYDROCHLORIDE 100 MG/1
100 TABLET, EXTENDED RELEASE ORAL DAILY
Qty: 30 TABLET | Refills: 0 | Status: SHIPPED | OUTPATIENT
Start: 2017-08-24 | End: 2017-10-02 | Stop reason: CLARIF

## 2017-08-24 NOTE — TELEPHONE ENCOUNTER
Her current quantity of 120 tabs per month already allows for four doses per day. I recommend trying an extended release Tramadol, please call in this change in prescription to Tramadol ER 100mg one tablet once daily, #30 with no refills - to try it out.

## 2017-08-25 ENCOUNTER — TELEPHONE (OUTPATIENT)
Dept: INTERNAL MEDICINE | Facility: CLINIC | Age: 67
End: 2017-08-25

## 2017-08-25 NOTE — TELEPHONE ENCOUNTER
----- Message from Marilu Richardson sent at 8/25/2017  2:35 PM CDT -----  Contact: self/[phone]  Patient called in regards needing to talk with Dr Moss medical assistant, needing a response in regards her early question. Please call and advise.         Thank you!!!

## 2017-09-06 DIAGNOSIS — M15.9 PRIMARY OSTEOARTHRITIS INVOLVING MULTIPLE JOINTS: ICD-10-CM

## 2017-09-06 DIAGNOSIS — E11.9 TYPE 2 DIABETES MELLITUS WITHOUT COMPLICATION: ICD-10-CM

## 2017-09-06 RX ORDER — IBUPROFEN 800 MG/1
800 TABLET ORAL 3 TIMES DAILY PRN
Qty: 60 TABLET | Refills: 1 | Status: SHIPPED | OUTPATIENT
Start: 2017-09-06 | End: 2017-11-27 | Stop reason: SDUPTHER

## 2017-09-26 DIAGNOSIS — M48.061 DEGENERATIVE LUMBAR SPINAL STENOSIS: ICD-10-CM

## 2017-10-01 RX ORDER — TRAMADOL HYDROCHLORIDE 50 MG/1
TABLET ORAL
Qty: 120 TABLET | Refills: 2 | OUTPATIENT
Start: 2017-10-01

## 2017-10-01 RX ORDER — SULFAMETHOXAZOLE AND TRIMETHOPRIM 800; 160 MG/1; MG/1
TABLET ORAL
Refills: 0 | COMMUNITY
Start: 2017-08-19 | End: 2018-03-05

## 2017-10-02 DIAGNOSIS — M48.061 DEGENERATIVE LUMBAR SPINAL STENOSIS: ICD-10-CM

## 2017-10-02 RX ORDER — TRAMADOL HYDROCHLORIDE 50 MG/1
50 TABLET ORAL EVERY 8 HOURS PRN
Qty: 120 TABLET | Refills: 1 | Status: SHIPPED | OUTPATIENT
Start: 2017-10-02 | End: 2017-11-06 | Stop reason: SDUPTHER

## 2017-10-02 RX ORDER — TRAMADOL HYDROCHLORIDE 50 MG/1
50 TABLET ORAL EVERY 8 HOURS PRN
Qty: 120 TABLET | Refills: 0 | OUTPATIENT
Start: 2017-10-02 | End: 2017-10-12

## 2017-10-02 NOTE — TELEPHONE ENCOUNTER
Tramadol 50mg was changed to Tramadol ER 100mg once daily on August 24th. I would like to see her and discuss this before going back to Tramadol 50mg. Schedule urgently if necessary.

## 2017-10-04 ENCOUNTER — IMMUNIZATION (OUTPATIENT)
Dept: INTERNAL MEDICINE | Facility: CLINIC | Age: 67
End: 2017-10-04
Payer: MEDICARE

## 2017-10-04 PROCEDURE — 90662 IIV NO PRSV INCREASED AG IM: CPT | Mod: S$GLB,,, | Performed by: INTERNAL MEDICINE

## 2017-10-04 PROCEDURE — G0008 ADMIN INFLUENZA VIRUS VAC: HCPCS | Mod: S$GLB,,, | Performed by: INTERNAL MEDICINE

## 2017-10-10 ENCOUNTER — TELEPHONE (OUTPATIENT)
Dept: SPINE | Facility: CLINIC | Age: 67
End: 2017-10-10

## 2017-10-10 DIAGNOSIS — M54.50 LOW BACK PAIN WITHOUT SCIATICA, UNSPECIFIED BACK PAIN LATERALITY, UNSPECIFIED CHRONICITY: Primary | ICD-10-CM

## 2017-10-11 ENCOUNTER — OFFICE VISIT (OUTPATIENT)
Dept: SPINE | Facility: CLINIC | Age: 67
End: 2017-10-11
Payer: MEDICARE

## 2017-10-11 ENCOUNTER — HOSPITAL ENCOUNTER (OUTPATIENT)
Dept: RADIOLOGY | Facility: OTHER | Age: 67
Discharge: HOME | End: 2017-10-11
Attending: PHYSICIAN ASSISTANT
Payer: MEDICARE

## 2017-10-11 VITALS
WEIGHT: 282 LBS | HEART RATE: 77 BPM | HEIGHT: 68 IN | DIASTOLIC BLOOD PRESSURE: 84 MMHG | BODY MASS INDEX: 42.74 KG/M2 | SYSTOLIC BLOOD PRESSURE: 151 MMHG

## 2017-10-11 DIAGNOSIS — M43.10 ACQUIRED SPONDYLOLISTHESIS: ICD-10-CM

## 2017-10-11 DIAGNOSIS — M54.41 CHRONIC BILATERAL LOW BACK PAIN WITH RIGHT-SIDED SCIATICA: ICD-10-CM

## 2017-10-11 DIAGNOSIS — G89.29 CHRONIC BILATERAL LOW BACK PAIN WITH RIGHT-SIDED SCIATICA: ICD-10-CM

## 2017-10-11 DIAGNOSIS — M51.37 DDD (DEGENERATIVE DISC DISEASE), LUMBOSACRAL: ICD-10-CM

## 2017-10-11 DIAGNOSIS — M54.50 LOW BACK PAIN WITHOUT SCIATICA, UNSPECIFIED BACK PAIN LATERALITY, UNSPECIFIED CHRONICITY: ICD-10-CM

## 2017-10-11 DIAGNOSIS — M47.819 SPONDYLOSIS WITHOUT MYELOPATHY: ICD-10-CM

## 2017-10-11 PROCEDURE — 99999 PR PBB SHADOW E&M-EST. PATIENT-LVL IV: CPT | Mod: PBBFAC,,, | Performed by: PHYSICIAN ASSISTANT

## 2017-10-11 PROCEDURE — 99214 OFFICE O/P EST MOD 30 MIN: CPT | Mod: S$GLB,,, | Performed by: PHYSICIAN ASSISTANT

## 2017-10-11 PROCEDURE — 72120 X-RAY BEND ONLY L-S SPINE: CPT | Mod: TC

## 2017-10-11 PROCEDURE — 72120 X-RAY BEND ONLY L-S SPINE: CPT | Mod: 26,,, | Performed by: RADIOLOGY

## 2017-10-11 PROCEDURE — 72100 X-RAY EXAM L-S SPINE 2/3 VWS: CPT | Mod: 26,,, | Performed by: RADIOLOGY

## 2017-10-12 ENCOUNTER — TELEPHONE (OUTPATIENT)
Dept: PAIN MEDICINE | Facility: CLINIC | Age: 67
End: 2017-10-12

## 2017-10-12 ENCOUNTER — PATIENT MESSAGE (OUTPATIENT)
Dept: SPINE | Facility: CLINIC | Age: 67
End: 2017-10-12

## 2017-10-12 NOTE — TELEPHONE ENCOUNTER
----- Message from Beverly Wang LPN sent at 10/12/2017  3:57 PM CDT -----  Patient has been scheduled for B/L L3-4,L4-5 facet joint injections on 11-3-17 with Dr. Wilson.

## 2017-10-13 NOTE — PROGRESS NOTES
"Subjective:     Patient ID:  Linnea Renae is a 67 y.o. female.      Chief Complaint: Back and right leg pain    HPI    Linnea Renae is a 67 y.o. female who presents for follow up.  Dr. Mcdonald had recommended a L3-4, L4-5 spinal fusion but she is not ready for this.    She continues to have back pain that is constant and worse with standing and walking and better with sitting and laying down.  Pain radiates down the right lateral leg to the knee that comes and goes.  Numbness and pain in the bottom of both feet and toes.    Pain today rated 4/10.    She takes ibuprofen PRN.  Tramadol Daily.  Zanaflex very rarely.    Patient denies any recent accidents or trauma, no saddle anesthesias, and no bowel or bladder incontinence.      Review of Systems:  Constitution: Negative for chills, fever, night sweats and weight loss.   Musculoskeletal: Negative for falls.   Gastrointestinal: Negative for bowel incontinence, nausea and vomiting.   Genitourinary: Negative for bladder incontinence.   Neurological: Negative for disturbances in coordination and loss of balance.     Objective:      Vitals:    10/11/17 1117   BP: (!) 151/84   Pulse: 77   Weight: 127.9 kg (282 lb)   Height: 5' 8" (1.727 m)   PainSc:   4         Physical Exam:    General:  Linnea Renae is well-developed, well-nourished, appears stated age, in no acute distress, alert and oriented to person, place, and time.    Pulmonary/Chest:  Respiratory effort normal  Abdominal: Exhibits no distension  Psychiatric:  Normal mood and affect.  Behavior is normal.  Judgement and thought content normal    Musculoskeletal:    Patient arises from a sitting to standing position without difficulty.  Patient walks to the door without evidence of limp, pain, or abnormality of gait. Patient is able to walk on heels and toes without difficulty.    Lumbar ROM:   Pain in lumbar flexion, extension, right lateral bending, and left lateral bending.  Worse in extension and right lateral " bending.    Lumbar Spine Inspection:  Normal with no surgical scars with no visible rashes.    Lumbar Spine Palpation:  No tenderness to low back palpation.    SI Joint Palpation:  No tenderness to SI Joint palpation.    Straight Leg Raise:  Positive right and negative left SLR.    Neurological:  Alert and oriented to person, place, and time    Muscle strength against resistance:     Right Left   Hip flexion  5 / 5 5 / 5   Hip extension 5 / 5 5 / 5   Hip abduction 5 / 5 5 / 5   Hip adduction  5 / 5 5 / 5   Knee extension  5 / 5 5 / 5   Knee flexion 5 / 5 5 / 5   Dorsiflexion  5 / 5 5 / 5   EHL  5 / 5 5 / 5   Plantar flexion  5 / 5 5 / 5   Inversion of the feet 5 / 5 5 / 5   Eversion of the feet  5 / 5 5 / 5     Reflexes:     Right Left   Patellar 2+ 2+   Achilles 2+ 2+     Clonus:  Negative bilaterally    On gross examination of the bilateral upper extremities, patient has full painfree ROM with no signs of clubbing, cyanosis, edema, or weakness.     MRI Interpretation:       Lumbar spine MRI was personally reviewed today. L3-4 grade one spondylolisthesis. There is moderate central and bilateral NFS at L4-5.     XRAY Interpretation:       Lumbar spine ap/lateral/flexion/extension xrays were personally reviewed today. No fractures. No movement on flexion and extension. L3-4 grade one spondylolisthesis. Facet spondylosis at L3-S1.        Assessment:          1. Spondylosis without myelopathy    2. DDD (degenerative disc disease), lumbosacral    3. Acquired spondylolisthesis    4. Chronic bilateral low back pain with right-sided sciatica             Plan:          Orders Placed This Encounter    Procedure Order to [facility]     L3-4 grade one spondylolisthesis/  Bilateral and NFS at L4-5     -Will try bilateral L3-4 and L4-5 facet injections through the pain clinic  -Continue ibuprofen, tramadol, and zanaflex  -FU in two months      Follow-Up:  Return in about 2 months (around 12/11/2017). If there are  any questions prior to this, the patient was instructed to contact the office.       MICHELLE Mendez, PA-C  Neurosurgery  [facility]  [facility]

## 2017-10-23 ENCOUNTER — TELEPHONE (OUTPATIENT)
Dept: SPINE | Facility: CLINIC | Age: 67
End: 2017-10-23

## 2017-10-23 NOTE — TELEPHONE ENCOUNTER
Called spoke with patient advise her to speak with billing management she stated that she paid her co payment but got a bill I in the mail saying she have a balance patient stated that she have her receipt.  ----- Message from Anali Latham sent at 10/23/2017 10:50 AM CDT -----  _  1st Request  _  2nd Request  _  3rd Request        Who: patient     Why: Requesting a call back in regards to the pt co payment, she talked to billing and they didn't help her.     What Number to Call Back:[phone]    When to Expect a call back: (Within 24 hours)    Please return the call at earliest convenience. Thanks!

## 2017-11-03 ENCOUNTER — SURGERY (OUTPATIENT)
Age: 67
End: 2017-11-03

## 2017-11-03 ENCOUNTER — HOSPITAL ENCOUNTER (OUTPATIENT)
Facility: OTHER | Age: 67
Discharge: HOME | End: 2017-11-03
Attending: ANESTHESIOLOGY | Admitting: ANESTHESIOLOGY
Payer: MEDICARE

## 2017-11-03 VITALS
TEMPERATURE: 98 F | RESPIRATION RATE: 18 BRPM | BODY MASS INDEX: 42.44 KG/M2 | HEIGHT: 68 IN | DIASTOLIC BLOOD PRESSURE: 60 MMHG | OXYGEN SATURATION: 99 % | HEART RATE: 70 BPM | WEIGHT: 280 LBS | SYSTOLIC BLOOD PRESSURE: 133 MMHG

## 2017-11-03 DIAGNOSIS — M47.9 OSTEOARTHRITIS OF SPINE, UNSPECIFIED SPINAL OSTEOARTHRITIS COMPLICATION STATUS, UNSPECIFIED SPINAL REGION: Primary | ICD-10-CM

## 2017-11-03 DIAGNOSIS — M47.816 LUMBAR FACET ARTHROPATHY: ICD-10-CM

## 2017-11-03 DIAGNOSIS — M43.16 SPONDYLOLISTHESIS AT L4-L5 LEVEL: ICD-10-CM

## 2017-11-03 DIAGNOSIS — G89.29 CHRONIC PAIN: ICD-10-CM

## 2017-11-03 LAB — POCT GLUCOSE: 103 MG/DL (ref 70–110)

## 2017-11-03 PROCEDURE — 64494 INJ PARAVERT F JNT L/S 2 LEV: CPT | Mod: 50,,, | Performed by: ANESTHESIOLOGY

## 2017-11-03 PROCEDURE — 25500020 PHARM REV CODE 255: Performed by: ANESTHESIOLOGY

## 2017-11-03 PROCEDURE — 82947 ASSAY GLUCOSE BLOOD QUANT: CPT | Performed by: ANESTHESIOLOGY

## 2017-11-03 PROCEDURE — 63600175 PHARM REV CODE 636 W HCPCS: Performed by: ANESTHESIOLOGY

## 2017-11-03 PROCEDURE — 64493 INJ PARAVERT F JNT L/S 1 LEV: CPT | Mod: 50,,, | Performed by: ANESTHESIOLOGY

## 2017-11-03 PROCEDURE — 99152 MOD SED SAME PHYS/QHP 5/>YRS: CPT | Mod: ,,, | Performed by: ANESTHESIOLOGY

## 2017-11-03 PROCEDURE — 64494 INJ PARAVERT F JNT L/S 2 LEV: CPT | Mod: 50 | Performed by: ANESTHESIOLOGY

## 2017-11-03 PROCEDURE — 25000003 PHARM REV CODE 250: Performed by: ANESTHESIOLOGY

## 2017-11-03 PROCEDURE — 64493 INJ PARAVERT F JNT L/S 1 LEV: CPT | Mod: 50 | Performed by: ANESTHESIOLOGY

## 2017-11-03 RX ORDER — LIDOCAINE HYDROCHLORIDE 10 MG/ML
INJECTION INFILTRATION; PERINEURAL
Status: DISCONTINUED | OUTPATIENT
Start: 2017-11-03 | End: 2017-11-03 | Stop reason: HOSPADM

## 2017-11-03 RX ORDER — MIDAZOLAM HYDROCHLORIDE 1 MG/ML
INJECTION, SOLUTION INTRAMUSCULAR; INTRAVENOUS
Status: DISCONTINUED | OUTPATIENT
Start: 2017-11-03 | End: 2017-11-03 | Stop reason: HOSPADM

## 2017-11-03 RX ORDER — SODIUM CHLORIDE 9 MG/ML
500 INJECTION, SOLUTION INTRAVENOUS CONTINUOUS
Status: DISCONTINUED | OUTPATIENT
Start: 2017-11-03 | End: 2017-11-03 | Stop reason: HOSPADM

## 2017-11-03 RX ORDER — DEXAMETHASONE SODIUM PHOSPHATE 10 MG/ML
INJECTION INTRAMUSCULAR; INTRAVENOUS
Status: DISCONTINUED | OUTPATIENT
Start: 2017-11-03 | End: 2017-11-03 | Stop reason: HOSPADM

## 2017-11-03 RX ORDER — FENTANYL CITRATE 50 UG/ML
INJECTION, SOLUTION INTRAMUSCULAR; INTRAVENOUS
Status: DISCONTINUED | OUTPATIENT
Start: 2017-11-03 | End: 2017-11-03 | Stop reason: HOSPADM

## 2017-11-03 RX ORDER — BUPIVACAINE HYDROCHLORIDE 2.5 MG/ML
INJECTION, SOLUTION EPIDURAL; INFILTRATION; INTRACAUDAL
Status: DISCONTINUED | OUTPATIENT
Start: 2017-11-03 | End: 2017-11-03 | Stop reason: HOSPADM

## 2017-11-03 RX ADMIN — BUPIVACAINE HYDROCHLORIDE 10 ML: 2.5 INJECTION, SOLUTION EPIDURAL; INFILTRATION; INTRACAUDAL; PERINEURAL at 01:11

## 2017-11-03 RX ADMIN — MIDAZOLAM HYDROCHLORIDE 1 MG: 1 INJECTION, SOLUTION INTRAMUSCULAR; INTRAVENOUS at 01:11

## 2017-11-03 RX ADMIN — FENTANYL CITRATE 50 MCG: 50 INJECTION, SOLUTION INTRAMUSCULAR; INTRAVENOUS at 01:11

## 2017-11-03 RX ADMIN — LIDOCAINE HYDROCHLORIDE 10 ML: 10 INJECTION, SOLUTION INFILTRATION; PERINEURAL at 01:11

## 2017-11-03 RX ADMIN — DEXAMETHASONE SODIUM PHOSPHATE 10 MG: 10 INJECTION, SOLUTION INTRAMUSCULAR; INTRAVENOUS at 01:11

## 2017-11-03 RX ADMIN — IOHEXOL 3 ML: 300 INJECTION, SOLUTION INTRAVENOUS at 01:11

## 2017-11-03 RX ADMIN — SODIUM CHLORIDE 500 ML: 900 INJECTION, SOLUTION INTRAVENOUS at 01:11

## 2017-11-03 NOTE — H&P (VIEW-ONLY)
"Subjective:     Patient ID:  Linnea Renae is a 67 y.o. female.      Chief Complaint: Back and right leg pain    HPI    Linnea Renae is a 67 y.o. female who presents for follow up.  Dr. Mcdonald had recommended a L3-4, L4-5 spinal fusion but she is not ready for this.    She continues to have back pain that is constant and worse with standing and walking and better with sitting and laying down.  Pain radiates down the right lateral leg to the knee that comes and goes.  Numbness and pain in the bottom of both feet and toes.    Pain today rated 4/10.    She takes ibuprofen PRN.  Tramadol Daily.  Zanaflex very rarely.    Patient denies any recent accidents or trauma, no saddle anesthesias, and no bowel or bladder incontinence.      Review of Systems:  Constitution: Negative for chills, fever, night sweats and weight loss.   Musculoskeletal: Negative for falls.   Gastrointestinal: Negative for bowel incontinence, nausea and vomiting.   Genitourinary: Negative for bladder incontinence.   Neurological: Negative for disturbances in coordination and loss of balance.     Objective:      Vitals:    10/11/17 1117   BP: (!) 151/84   Pulse: 77   Weight: 127.9 kg (282 lb)   Height: 5' 8" (1.727 m)   PainSc:   4         Physical Exam:    General:  Linnea Renae is well-developed, well-nourished, appears stated age, in no acute distress, alert and oriented to person, place, and time.    Pulmonary/Chest:  Respiratory effort normal  Abdominal: Exhibits no distension  Psychiatric:  Normal mood and affect.  Behavior is normal.  Judgement and thought content normal    Musculoskeletal:    Patient arises from a sitting to standing position without difficulty.  Patient walks to the door without evidence of limp, pain, or abnormality of gait. Patient is able to walk on heels and toes without difficulty.    Lumbar ROM:   Pain in lumbar flexion, extension, right lateral bending, and left lateral bending.  Worse in extension and right lateral " bending.    Lumbar Spine Inspection:  Normal with no surgical scars with no visible rashes.    Lumbar Spine Palpation:  No tenderness to low back palpation.    SI Joint Palpation:  No tenderness to SI Joint palpation.    Straight Leg Raise:  Positive right and negative left SLR.    Neurological:  Alert and oriented to person, place, and time    Muscle strength against resistance:     Right Left   Hip flexion  5 / 5 5 / 5   Hip extension 5 / 5 5 / 5   Hip abduction 5 / 5 5 / 5   Hip adduction  5 / 5 5 / 5   Knee extension  5 / 5 5 / 5   Knee flexion 5 / 5 5 / 5   Dorsiflexion  5 / 5 5 / 5   EHL  5 / 5 5 / 5   Plantar flexion  5 / 5 5 / 5   Inversion of the feet 5 / 5 5 / 5   Eversion of the feet  5 / 5 5 / 5     Reflexes:     Right Left   Patellar 2+ 2+   Achilles 2+ 2+     Clonus:  Negative bilaterally    On gross examination of the bilateral upper extremities, patient has full painfree ROM with no signs of clubbing, cyanosis, edema, or weakness.     MRI Interpretation:       Lumbar spine MRI was personally reviewed today. L3-4 grade one spondylolisthesis. There is moderate central and bilateral NFS at L4-5.     XRAY Interpretation:       Lumbar spine ap/lateral/flexion/extension xrays were personally reviewed today. No fractures. No movement on flexion and extension. L3-4 grade one spondylolisthesis. Facet spondylosis at L3-S1.        Assessment:          1. Spondylosis without myelopathy    2. DDD (degenerative disc disease), lumbosacral    3. Acquired spondylolisthesis    4. Chronic bilateral low back pain with right-sided sciatica             Plan:          Orders Placed This Encounter    Procedure Order to [facility]     L3-4 grade one spondylolisthesis/  Bilateral and NFS at L4-5     -Will try bilateral L3-4 and L4-5 facet injections through the pain clinic  -Continue ibuprofen, tramadol, and zanaflex  -FU in two months      Follow-Up:  Return in about 2 months (around 12/11/2017). If there are  any questions prior to this, the patient was instructed to contact the office.       MICHELLE Mendez, PA-C  Neurosurgery  [facility]  [facility]

## 2017-11-03 NOTE — DISCHARGE SUMMARY
Discharge Note  Short Stay      SUMMARY     Admit Date: 11/3/2017    Attending Physician: Viet Wilson      Discharge Physician: Viet Wilson      Discharge Date: 11/3/2017 2:09 PM    Final Diagnosis: Spondylosis without myelopathy [M19.90]  DDD (degenerative disc disease), lumbosacral [M51.37]  Chronic bilateral low back pain with right-sided sciatica [M54.41, G89.29]    Disposition: Home or self care    Patient Instructions:   Current Discharge Medication List      CONTINUE these medications which have NOT CHANGED    Details   aspirin 81 MG Chew Take 81 mg by mouth once daily.      atenolol (TENORMIN) 50 MG tablet Take 1 tablet (50 mg total) by mouth 2 (two) times daily.  Qty: 180 tablet, Refills: 1    Associated Diagnoses: Essential hypertension      atorvastatin (LIPITOR) 20 MG tablet Take 1 tablet (20 mg total) by mouth once daily.  Qty: 90 tablet, Refills: 2    Associated Diagnoses: Hyperlipidemia LDL goal <100      augmented betamethasone (DIPROLENE) 0.05 % lotion Apply to hairline and eyebrows at bedtime  Refills: 5      blood sugar diagnostic Strp 1 strip by Misc.(Non-Drug; Combo Route) route once daily.  Qty: 100 strip, Refills: 3    Comments: TRUE METRIX STRIPS  Associated Diagnoses: Type 2 diabetes mellitus with stage 2 chronic kidney disease, without long-term current use of insulin      blood-glucose meter kit Use as instructed  Qty: 1 each, Refills: 0    Comments: TRUE METRIX METER  Associated Diagnoses: Type 2 diabetes mellitus with stage 2 chronic kidney disease, without long-term current use of insulin      calcium-vitamin D3 500 mg(1,250mg) -200 unit per tablet Take 1 tablet by mouth 2 (two) times daily with meals.       captopril (CAPOTEN) 50 MG tablet TAKE TWO TABLETS BY MOUTH IN THE MORNING AND ONE TABLET IN THE EVENING  Qty: 270 tablet, Refills: 1    Associated Diagnoses: Essential hypertension      cycloSPORINE (RESTASIS) 0.05 % ophthalmic emulsion 1 drop 2 (two) times daily.       doxycycline (MONODOX) 100 MG capsule       fluticasone (FLONASE) 50 mcg/actuation nasal spray 2 sprays by Each Nare route once daily.  Qty: 3 Bottle, Refills: 1    Associated Diagnoses: Perennial allergic rhinitis      hydrochlorothiazide (HYDRODIURIL) 25 MG tablet Take 1 tablet (25 mg total) by mouth once daily.  Qty: 90 tablet, Refills: 2    Associated Diagnoses: Essential hypertension      ibuprofen (ADVIL,MOTRIN) 800 MG tablet Take 1 tablet (800 mg total) by mouth 3 (three) times daily as needed.  Qty: 60 tablet, Refills: 1    Associated Diagnoses: Primary osteoarthritis involving multiple joints      lancets Misc 1 lancet by Misc.(Non-Drug; Combo Route) route once daily.  Qty: 100 each, Refills: 3    Comments: TRUE PLUS LANCET 32 G  Associated Diagnoses: Type 2 diabetes mellitus with stage 2 chronic kidney disease, without long-term current use of insulin      levocetirizine (XYZAL) 5 MG tablet Take 1 tablet (5 mg total) by mouth every evening. For Allergies.  Qty: 30 tablet, Refills: 3    Associated Diagnoses: Seasonal allergic rhinitis, unspecified allergic rhinitis trigger      metformin (GLUCOPHAGE-XR) 500 MG 24 hr tablet TAKE TWO TABLETS BY MOUTH ONCE DAILY WITH BREAKFAST  Qty: 180 tablet, Refills: 2    Associated Diagnoses: Type 2 diabetes mellitus without complication      omeprazole (PRILOSEC) 20 MG capsule Take 1 capsule (20 mg total) by mouth once daily.  Qty: 90 capsule, Refills: 2    Associated Diagnoses: Gastroesophageal reflux disease, esophagitis presence not specified      sulfamethoxazole-trimethoprim 800-160mg (BACTRIM DS) 800-160 mg Tab TK 2 TS PO BID  Refills: 0      tizanidine (ZANAFLEX) 4 MG tablet TAKE ONE TABLET BY MOUTH TWICE DAILY AS NEEDED MUSCLE  SPASM  Qty: 60 tablet, Refills: 1    Associated Diagnoses: Degenerative lumbar spinal stenosis      tramadol (ULTRAM) 50 mg tablet Take 1 tablet (50 mg total) by mouth every 8 (eight) hours as needed for Pain.  Qty: 120 tablet, Refills: 1     Associated Diagnoses: Degenerative lumbar spinal stenosis                 Discharge Diagnosis: Spondylosis without myelopathy [M19.90]  DDD (degenerative disc disease), lumbosacral [M51.37]  Chronic bilateral low back pain with right-sided sciatica [M54.41, G89.29]  Condition on Discharge: Stable.  Diet on Discharge: Same as before.  Activity: as per instruction sheet.  Discharge to: Home with a responsible adult.  Follow up: as per Discharge instructions.      '

## 2017-11-03 NOTE — OP NOTE
"Patient Name: Linnea Renae  MRN: [id number]    INFORMED CONSENT: The procedure, risks, benefits and options were discussed with patient. There are no contraindications to the procedure. The patient expressed understanding and agreed to proceed. The personnel performing the procedure was discussed. I verify that I personally obtained Linnea's consent prior to the start of the procedure and the signed consent can be found on the patient's chart.    Procedure Date: 11/03/2017    Anesthesia: Topical    Pre Procedure diagnosis: Spondylosis without myelopathy [M19.90]  DDD (degenerative disc disease), lumbosacral [M51.37]  Chronic bilateral low back pain with right-sided sciatica [M54.41, G89.29]  1. Osteoarthritis of spine, unspecified spinal osteoarthritis complication status, unspecified spinal region    2. Lumbar facet arthropathy    3. Spondylolisthesis at L4-L5 level    4. Chronic pain      Post-Procedure diagnosis: SAME      Sedation: Yes - Fentanyl 50 mcg and Midazolam 2 mg    PROCEDURE: bilateral L3-4/4-5 FACET JOINT INJECTION      DESCRIPTION OF PROCEDURE:The patient was brought to the procedure room.  After performing time out. IV access was obtained prior to the procedure. The patient was positioned prone on the fluoroscopy table. Continuous hemodynamic monitoring was initiated including blood pressure, EKG, and pulse oximetry. The area of the lumbar spine was prepped with chlorhexidine and draped into a sterile field.Fluoroscopy was used to identify the location of bilateral L3-4/4-5  joints respectivly. Skin anesthesia was achieved using 3 cc of Lidocaine 1% over the injection sites. A 25 gauge, 3 1/2" spinal needle was slowly inserted at each joint using APand oblique fluoroscopic imaging. Negative aspiration for blood or CSF was confirmed. 0.5 cc of Omnipaque  dye was inserted to confirm placement A combination of 4 ml bupivacaine 0.25% and 10 mg decadron was injected at all joints. The needles were removed " and bleeding was nil. A sterile dressing was applied. No specimens collected. Linnea was taken back to the recovery room for further observation.     Blood Loss: Nill  Specimen: None    Viet Wilson MD

## 2017-11-03 NOTE — DISCHARGE INSTRUCTIONS

## 2017-11-06 ENCOUNTER — TELEPHONE (OUTPATIENT)
Dept: INTERNAL MEDICINE | Facility: CLINIC | Age: 67
End: 2017-11-06

## 2017-11-06 DIAGNOSIS — E11.9 TYPE 2 DIABETES MELLITUS WITHOUT COMPLICATION, WITHOUT LONG-TERM CURRENT USE OF INSULIN: Primary | ICD-10-CM

## 2017-11-06 DIAGNOSIS — M48.061 DEGENERATIVE LUMBAR SPINAL STENOSIS: ICD-10-CM

## 2017-11-06 RX ORDER — TRAMADOL HYDROCHLORIDE 50 MG/1
50 TABLET ORAL EVERY 6 HOURS PRN
Qty: 120 TABLET | Refills: 0 | Status: SHIPPED | OUTPATIENT
Start: 2017-11-06 | End: 2018-01-03 | Stop reason: SDUPTHER

## 2017-11-06 NOTE — TELEPHONE ENCOUNTER
Order called in by me for new directions with an additional refill starting today. Patient informed.   Due for f/u visit with fasting labs prior - please schedule.

## 2017-11-06 NOTE — TELEPHONE ENCOUNTER
Pt is calling to get Tramadol 50 mg direction changed stating she take 1 tab every 6 hours instead of every 8 hours. Pt cannot get her next refill for pain med until next week pl adv.

## 2017-11-17 DIAGNOSIS — Z13.5 DIABETIC RETINOPATHY SCREENING: ICD-10-CM

## 2017-11-27 DIAGNOSIS — M15.9 PRIMARY OSTEOARTHRITIS INVOLVING MULTIPLE JOINTS: ICD-10-CM

## 2017-11-28 RX ORDER — IBUPROFEN 800 MG/1
TABLET ORAL
Qty: 60 TABLET | Refills: 1 | Status: SHIPPED | OUTPATIENT
Start: 2017-11-28 | End: 2018-02-25 | Stop reason: SDUPTHER

## 2017-12-07 ENCOUNTER — OFFICE VISIT (OUTPATIENT)
Dept: SPINE | Facility: CLINIC | Age: 67
End: 2017-12-07
Payer: MEDICARE

## 2017-12-07 VITALS
BODY MASS INDEX: 42.44 KG/M2 | HEIGHT: 68 IN | DIASTOLIC BLOOD PRESSURE: 65 MMHG | HEART RATE: 78 BPM | SYSTOLIC BLOOD PRESSURE: 144 MMHG | WEIGHT: 280 LBS

## 2017-12-07 DIAGNOSIS — G89.29 CHRONIC BILATERAL LOW BACK PAIN WITH RIGHT-SIDED SCIATICA: ICD-10-CM

## 2017-12-07 DIAGNOSIS — M43.10 ACQUIRED SPONDYLOLISTHESIS: ICD-10-CM

## 2017-12-07 DIAGNOSIS — M54.41 CHRONIC BILATERAL LOW BACK PAIN WITH RIGHT-SIDED SCIATICA: ICD-10-CM

## 2017-12-07 DIAGNOSIS — M47.819 SPONDYLOSIS WITHOUT MYELOPATHY: ICD-10-CM

## 2017-12-07 DIAGNOSIS — M51.37 DDD (DEGENERATIVE DISC DISEASE), LUMBOSACRAL: ICD-10-CM

## 2017-12-07 PROCEDURE — 99999 PR PBB SHADOW E&M-EST. PATIENT-LVL IV: CPT | Mod: PBBFAC,,, | Performed by: PHYSICIAN ASSISTANT

## 2017-12-07 PROCEDURE — 99214 OFFICE O/P EST MOD 30 MIN: CPT | Mod: S$GLB,,, | Performed by: PHYSICIAN ASSISTANT

## 2017-12-07 NOTE — PROGRESS NOTES
"Subjective:     Patient ID:  Linnea Renae is a 67 y.o. female.      Chief Complaint: Back and right leg pain    HPI    Linnea Renae is a 67 y.o. female who presents for follow up.  She had bilateral L3-4 and L4-5 facet injection which completely relieved her back pain and helped her right leg pain and bilateral feet pain.  The back pain started to come back some just recently rated 2/10 that comes and goes depending on her activity level.  She has some right lateral leg pain to the knee and pain in both feet.  No left leg pain.    She has been able to do more things since this injection than she has in years and is overall very happy about her results so far.    She take tramadol about every 6 hours.  She takes zanaflex rarely.    Patient denies any recent accidents or trauma, no saddle anesthesias, and no bowel or bladder incontinence.      Review of Systems:  Constitution: Negative for chills, fever, night sweats and weight loss.   Musculoskeletal: Negative for falls.   Gastrointestinal: Negative for bowel incontinence, nausea and vomiting.   Genitourinary: Negative for bladder incontinence.   Neurological: Negative for disturbances in coordination and loss of balance.      Objective:      Vitals:    12/07/17 0806   BP: (!) 144/65   Pulse: 78   Weight: 127 kg (280 lb)   Height: 5' 8" (1.727 m)   PainSc:   1   PainLoc: Back         Physical Exam:    General:  Linnea Renae is well-developed, well-nourished, appears stated age, in no acute distress, alert and oriented to person, place, and time.    Patient sits comfortably in the exam room and answers questions appropriately. Grossly patient is able to move bilateral lower extremities without difficulty.     MRI Interpretation:       Lumbar spine MRI was personally reviewed today. L3-4 grade one spondylolisthesis. There is moderate central and bilateral NFS at L4-5.     XRAY Interpretation:       Lumbar spine ap/lateral/flexion/extension xrays were personally " reviewed today. No fractures. No movement on flexion and extension. L3-4 grade one spondylolisthesis. Facet spondylosis at L3-S1.    Assessment:          1. Spondylosis without myelopathy    2. DDD (degenerative disc disease), lumbosacral    3. Acquired spondylolisthesis    4. Chronic bilateral low back pain with right-sided sciatica            Plan:             L3-4 grade one spondylolisthesis/  Bilateral and NFS at L4-5     -Will try bilateral L3-4 and L4-5 facet injections through the pain clinic again if the pain returns.  If these stop working as well she could also be referred to consider RFA in the future.  -Continue tramadol PRN from another provider  -FU PRN    Follow-Up:  Return if symptoms worsen or fail to improve. If there are any questions prior to this, the patient was instructed to contact the office.       MICHELLE Mendez, PA-C  Neurosurgery  [facility]  [facility]

## 2017-12-09 DIAGNOSIS — E78.5 HYPERLIPIDEMIA LDL GOAL <100: ICD-10-CM

## 2017-12-10 RX ORDER — ATORVASTATIN CALCIUM 20 MG/1
TABLET, FILM COATED ORAL
Qty: 90 TABLET | Refills: 0 | Status: SHIPPED | OUTPATIENT
Start: 2017-12-10 | End: 2018-03-05

## 2017-12-11 DIAGNOSIS — I10 ESSENTIAL HYPERTENSION: ICD-10-CM

## 2017-12-11 RX ORDER — ATENOLOL 50 MG/1
TABLET ORAL
Qty: 180 TABLET | Refills: 0 | Status: SHIPPED | OUTPATIENT
Start: 2017-12-11 | End: 2018-03-08 | Stop reason: SDUPTHER

## 2018-01-03 DIAGNOSIS — M48.061 DEGENERATIVE LUMBAR SPINAL STENOSIS: ICD-10-CM

## 2018-01-04 RX ORDER — TRAMADOL HYDROCHLORIDE 50 MG/1
TABLET ORAL
Qty: 120 TABLET | Refills: 0 | Status: SHIPPED | OUTPATIENT
Start: 2018-01-04 | End: 2018-02-02 | Stop reason: SDUPTHER

## 2018-01-12 ENCOUNTER — PES CALL (OUTPATIENT)
Dept: ADMINISTRATIVE | Facility: CLINIC | Age: 68
End: 2018-01-12

## 2018-01-19 DIAGNOSIS — I10 ESSENTIAL HYPERTENSION: ICD-10-CM

## 2018-01-19 RX ORDER — CAPTOPRIL 50 MG/1
TABLET ORAL
Qty: 270 TABLET | Refills: 0 | Status: SHIPPED | OUTPATIENT
Start: 2018-01-19 | End: 2018-03-08 | Stop reason: SDUPTHER

## 2018-02-02 DIAGNOSIS — M48.061 DEGENERATIVE LUMBAR SPINAL STENOSIS: ICD-10-CM

## 2018-02-02 RX ORDER — TRAMADOL HYDROCHLORIDE 50 MG/1
TABLET ORAL
Qty: 120 TABLET | Refills: 0 | Status: SHIPPED | OUTPATIENT
Start: 2018-02-02 | End: 2018-03-08 | Stop reason: SDUPTHER

## 2018-02-25 DIAGNOSIS — M15.9 PRIMARY OSTEOARTHRITIS INVOLVING MULTIPLE JOINTS: ICD-10-CM

## 2018-02-26 RX ORDER — IBUPROFEN 800 MG/1
TABLET ORAL
Qty: 60 TABLET | Refills: 1 | Status: SHIPPED | OUTPATIENT
Start: 2018-02-26 | End: 2018-05-28 | Stop reason: SDUPTHER

## 2018-03-05 ENCOUNTER — OFFICE VISIT (OUTPATIENT)
Dept: SURGERY | Facility: CLINIC | Age: 68
End: 2018-03-05
Payer: MEDICARE

## 2018-03-05 VITALS
DIASTOLIC BLOOD PRESSURE: 77 MMHG | SYSTOLIC BLOOD PRESSURE: 185 MMHG | WEIGHT: 293 LBS | HEART RATE: 84 BPM | HEIGHT: 68 IN | TEMPERATURE: 99 F | BODY MASS INDEX: 44.41 KG/M2

## 2018-03-05 DIAGNOSIS — Z85.3 PERSONAL HISTORY OF BREAST CANCER: Primary | ICD-10-CM

## 2018-03-05 DIAGNOSIS — E78.5 HYPERLIPIDEMIA LDL GOAL <100: ICD-10-CM

## 2018-03-05 PROCEDURE — 3078F DIAST BP <80 MM HG: CPT | Mod: S$GLB,,, | Performed by: SURGERY

## 2018-03-05 PROCEDURE — 3077F SYST BP >= 140 MM HG: CPT | Mod: S$GLB,,, | Performed by: SURGERY

## 2018-03-05 PROCEDURE — 99213 OFFICE O/P EST LOW 20 MIN: CPT | Mod: S$GLB,,, | Performed by: SURGERY

## 2018-03-05 PROCEDURE — 99999 PR PBB SHADOW E&M-EST. PATIENT-LVL III: CPT | Mod: PBBFAC,,, | Performed by: SURGERY

## 2018-03-05 RX ORDER — ATORVASTATIN CALCIUM 20 MG/1
TABLET, FILM COATED ORAL
Qty: 90 TABLET | Refills: 0 | Status: SHIPPED | OUTPATIENT
Start: 2018-03-05 | End: 2018-06-10 | Stop reason: SDUPTHER

## 2018-03-05 RX ORDER — MINOXIDIL 2.5 MG/1
TABLET ORAL
COMMUNITY
Start: 2018-02-20 | End: 2021-07-16

## 2018-03-05 NOTE — PROGRESS NOTES
Date of Service:3/5/2018    MEDICAL ONCOLOGIST:    Sean Woody MD  RADIATION ONCOLOGIST:   Jerica Stanford MD      DIAGNOSIS:   This is a 67 y.o. 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    TREATMENT:   1. Right partial mastectomy without sentinel node biopsy on 10/7/2015. Radha Russell M.D. Surgical Oncology. Initially underwent excisional biopsy for atypia upstaged to DCIS at lumpectomy. Final path DICS, negative margins.   2. Declined Radiation therapy. Jerica Stanford M.D. Radiation Oncology   4. Adjuvant endocrine therapy started on 11/2015. Sean Woody M.D. Medical Oncology     HISTORY OF PRESENT ILLNESS:   Linnea Renae is a 67 y.o. female comes in for 6 month clinical breast exam.  She denies change in her breast self-exam specifically denying new masses, skin or nipple changes, or nipple discharge. Past medical and surgical history is updated without new changes. There have been no changes to family history. The patient denies constitutional symptoms of night sweats, weight loss, new headaches, visual changes, new back or bony pain, chest pain, or shortness of breath.      IMAGING:   Diagnostic Mammogram (8/7/2017):  Right  There are post-surgical findings from a previous lumpectomy seen in the upper outer quadrant of the right breast in the anterior portion. There has been no interval development of a suspicious mass, microcalcification, or architectural distortion.      Left  There is no evidence of suspicious masses, calcifications, or other abnormal findings.     Impression:  Bilateral  There is no mammographic evidence of malignancy.     BI-RADS Category:   Overall: 2 - Benign    MEDICATIONS/ALLERGIES:     Review of patient's allergies indicates:   Allergen Reactions    Codeine Itching     Review of Systems   Constitutional: Negative for chills, fever and weight loss.   Eyes: Negative for double vision.   Respiratory: Negative for  "shortness of breath.    Cardiovascular: Negative for chest pain.   Gastrointestinal: Negative for nausea and vomiting.   Genitourinary: Negative for urgency.   Musculoskeletal: Positive for back pain. Negative for myalgias.   Skin: Negative for rash.   Neurological: Negative for dizziness and headaches.     PHYSICAL EXAM:     BP (!) 185/77 (BP Location: Left arm, Patient Position: Sitting, BP Method: Large (Automatic))   Pulse 84   Temp 98.6 °F (37 °C) (Oral)   Ht 5' 8" (1.727 m)   Wt 133.8 kg (295 lb)   BMI 44.85 kg/m²   General: The patient appears well and is in no acute distress.   Neuro: Alert & oriented x3.  Cardiovascular: RR  Breasts: The exam was done with the patient seated and supine. Left breast - within normal limits. No palpable masses and no abnormal skin or nipple findings. No supraclavicular or axillary lymphadenopathy on the left side.   Right breast - within normal limits. No palpable masses and no abnormal skin or nipple findings. No supraclavicular or axillary lymphadenopathy on the right side. Postsurgical changes palpated under scar from partial mastectomy.  Pulmonary: nonlabored breathing bilaterally   Abdomen: soft, nontender, nondistended. No masses.    Extremities: No clubbing or cyanosis. Bilateral full arm range of motion without  lymphedema.     ASSESSMENT:   This is a 67 y.o. female without evidence of recurrence by exam, history or imaging.       PLAN:   1. Continue to follow up with Dr. Woody.   2. Continue monthly self breast exams and call the clinic with any changes or problems.  3. Mammogram in 6 months from now  4. Return to clinic in 6 months for clinical breast exam. The patient is in agreement with the plan. Questions were encouraged and answered to patient's satisfaction. Linnea will call our office with any questions or concerns.   "

## 2018-03-08 ENCOUNTER — OFFICE VISIT (OUTPATIENT)
Dept: INTERNAL MEDICINE | Facility: CLINIC | Age: 68
End: 2018-03-08
Payer: MEDICARE

## 2018-03-08 VITALS
SYSTOLIC BLOOD PRESSURE: 130 MMHG | DIASTOLIC BLOOD PRESSURE: 80 MMHG | BODY MASS INDEX: 44.41 KG/M2 | HEIGHT: 68 IN | WEIGHT: 293 LBS | HEART RATE: 80 BPM

## 2018-03-08 DIAGNOSIS — M48.061 DEGENERATIVE LUMBAR SPINAL STENOSIS: ICD-10-CM

## 2018-03-08 DIAGNOSIS — E66.01 MORBID OBESITY WITH BMI OF 40.0-44.9, ADULT: ICD-10-CM

## 2018-03-08 DIAGNOSIS — J30.89 PERENNIAL ALLERGIC RHINITIS: ICD-10-CM

## 2018-03-08 DIAGNOSIS — E78.5 HYPERLIPIDEMIA LDL GOAL <100: ICD-10-CM

## 2018-03-08 DIAGNOSIS — K21.9 GASTROESOPHAGEAL REFLUX DISEASE, ESOPHAGITIS PRESENCE NOT SPECIFIED: ICD-10-CM

## 2018-03-08 DIAGNOSIS — I10 ESSENTIAL HYPERTENSION: ICD-10-CM

## 2018-03-08 DIAGNOSIS — E11.9 TYPE 2 DIABETES MELLITUS WITHOUT COMPLICATION, WITHOUT LONG-TERM CURRENT USE OF INSULIN: Primary | ICD-10-CM

## 2018-03-08 DIAGNOSIS — B37.2 CANDIDAL INTERTRIGO: ICD-10-CM

## 2018-03-08 DIAGNOSIS — I70.0 ATHEROSCLEROSIS OF AORTA: ICD-10-CM

## 2018-03-08 DIAGNOSIS — M47.816 FACET ARTHROPATHY, LUMBAR: ICD-10-CM

## 2018-03-08 LAB
BILIRUB UR QL STRIP: NEGATIVE
CLARITY UR REFRACT.AUTO: ABNORMAL
COLOR UR AUTO: YELLOW
CREAT UR-MCNC: 104 MG/DL
GLUCOSE UR QL STRIP: NEGATIVE
HGB UR QL STRIP: NEGATIVE
KETONES UR QL STRIP: NEGATIVE
LEUKOCYTE ESTERASE UR QL STRIP: ABNORMAL
MICROALBUMIN UR DL<=1MG/L-MCNC: 6 UG/ML
MICROALBUMIN/CREATININE RATIO: 5.8 UG/MG
MICROSCOPIC COMMENT: NORMAL
NITRITE UR QL STRIP: NEGATIVE
PH UR STRIP: 5 [PH] (ref 5–8)
PROT UR QL STRIP: NEGATIVE
RBC #/AREA URNS AUTO: 1 /HPF (ref 0–4)
SP GR UR STRIP: 1.02 (ref 1–1.03)
SQUAMOUS #/AREA URNS AUTO: 1 /HPF
URN SPEC COLLECT METH UR: ABNORMAL
UROBILINOGEN UR STRIP-ACNC: NEGATIVE EU/DL
WBC #/AREA URNS AUTO: 4 /HPF (ref 0–5)

## 2018-03-08 PROCEDURE — 82043 UR ALBUMIN QUANTITATIVE: CPT

## 2018-03-08 PROCEDURE — 99215 OFFICE O/P EST HI 40 MIN: CPT | Mod: S$GLB,,, | Performed by: INTERNAL MEDICINE

## 2018-03-08 PROCEDURE — 3079F DIAST BP 80-89 MM HG: CPT | Mod: S$GLB,,, | Performed by: INTERNAL MEDICINE

## 2018-03-08 PROCEDURE — 81001 URINALYSIS AUTO W/SCOPE: CPT

## 2018-03-08 PROCEDURE — 99999 PR PBB SHADOW E&M-EST. PATIENT-LVL IV: CPT | Mod: PBBFAC,,, | Performed by: INTERNAL MEDICINE

## 2018-03-08 PROCEDURE — 99499 UNLISTED E&M SERVICE: CPT | Mod: S$GLB,,, | Performed by: INTERNAL MEDICINE

## 2018-03-08 PROCEDURE — 3075F SYST BP GE 130 - 139MM HG: CPT | Mod: S$GLB,,, | Performed by: INTERNAL MEDICINE

## 2018-03-08 RX ORDER — CLOTRIMAZOLE 1 %
CREAM (GRAM) TOPICAL 2 TIMES DAILY
Qty: 60 G | Refills: 5 | Status: SHIPPED | OUTPATIENT
Start: 2018-03-08 | End: 2020-01-16

## 2018-03-08 RX ORDER — CAPTOPRIL 50 MG/1
TABLET ORAL
Qty: 270 TABLET | Refills: 1 | Status: SHIPPED | OUTPATIENT
Start: 2018-03-08 | End: 2018-09-17 | Stop reason: SDUPTHER

## 2018-03-08 RX ORDER — ATENOLOL 50 MG/1
50 TABLET ORAL 2 TIMES DAILY
Qty: 180 TABLET | Refills: 1 | Status: SHIPPED | OUTPATIENT
Start: 2018-03-08 | End: 2018-09-07 | Stop reason: SDUPTHER

## 2018-03-08 RX ORDER — METFORMIN HYDROCHLORIDE 500 MG/1
TABLET, EXTENDED RELEASE ORAL
Qty: 180 TABLET | Refills: 1 | Status: SHIPPED | OUTPATIENT
Start: 2018-03-08 | End: 2018-09-17 | Stop reason: SDUPTHER

## 2018-03-08 RX ORDER — FLUTICASONE PROPIONATE 50 MCG
2 SPRAY, SUSPENSION (ML) NASAL DAILY
Qty: 3 BOTTLE | Refills: 1 | Status: SHIPPED | OUTPATIENT
Start: 2018-03-08 | End: 2019-04-01 | Stop reason: SDUPTHER

## 2018-03-08 RX ORDER — HYDROCHLOROTHIAZIDE 25 MG/1
25 TABLET ORAL DAILY
Qty: 90 TABLET | Refills: 1 | Status: SHIPPED | OUTPATIENT
Start: 2018-03-08 | End: 2018-09-17 | Stop reason: SDUPTHER

## 2018-03-08 RX ORDER — LEVOCETIRIZINE DIHYDROCHLORIDE 5 MG/1
5 TABLET, FILM COATED ORAL NIGHTLY
Qty: 90 TABLET | Refills: 1 | Status: SHIPPED | OUTPATIENT
Start: 2018-03-08 | End: 2018-04-11

## 2018-03-08 RX ORDER — MULTIVITAMIN
1 TABLET ORAL DAILY
COMMUNITY

## 2018-03-08 RX ORDER — TRAMADOL HYDROCHLORIDE 50 MG/1
50 TABLET ORAL EVERY 6 HOURS PRN
Qty: 120 TABLET | Refills: 5 | Status: SHIPPED | OUTPATIENT
Start: 2018-03-08 | End: 2018-09-03 | Stop reason: SDUPTHER

## 2018-03-09 ENCOUNTER — LAB VISIT (OUTPATIENT)
Dept: LAB | Facility: HOSPITAL | Age: 68
End: 2018-03-09
Attending: INTERNAL MEDICINE
Payer: MEDICARE

## 2018-03-09 DIAGNOSIS — I10 ESSENTIAL HYPERTENSION: ICD-10-CM

## 2018-03-09 DIAGNOSIS — E78.5 HYPERLIPIDEMIA LDL GOAL <100: ICD-10-CM

## 2018-03-09 DIAGNOSIS — E11.9 TYPE 2 DIABETES MELLITUS WITHOUT COMPLICATION, WITHOUT LONG-TERM CURRENT USE OF INSULIN: ICD-10-CM

## 2018-03-09 LAB
ALBUMIN SERPL BCP-MCNC: 3.8 G/DL
ALP SERPL-CCNC: 87 U/L
ALT SERPL W/O P-5'-P-CCNC: 19 U/L
ANION GAP SERPL CALC-SCNC: 11 MMOL/L
AST SERPL-CCNC: 19 U/L
BASOPHILS # BLD AUTO: 0.02 K/UL
BASOPHILS NFR BLD: 0.2 %
BILIRUB SERPL-MCNC: 0.4 MG/DL
BUN SERPL-MCNC: 15 MG/DL
CALCIUM SERPL-MCNC: 9.5 MG/DL
CHLORIDE SERPL-SCNC: 101 MMOL/L
CHOLEST SERPL-MCNC: 134 MG/DL
CHOLEST/HDLC SERPL: 3.3 {RATIO}
CO2 SERPL-SCNC: 28 MMOL/L
CREAT SERPL-MCNC: 0.7 MG/DL
DIFFERENTIAL METHOD: ABNORMAL
EOSINOPHIL # BLD AUTO: 0.3 K/UL
EOSINOPHIL NFR BLD: 3.1 %
ERYTHROCYTE [DISTWIDTH] IN BLOOD BY AUTOMATED COUNT: 14.6 %
EST. GFR  (AFRICAN AMERICAN): >60 ML/MIN/1.73 M^2
EST. GFR  (NON AFRICAN AMERICAN): >60 ML/MIN/1.73 M^2
ESTIMATED AVG GLUCOSE: 123 MG/DL
GLUCOSE SERPL-MCNC: 114 MG/DL
HBA1C MFR BLD HPLC: 5.9 %
HCT VFR BLD AUTO: 37.6 %
HDLC SERPL-MCNC: 41 MG/DL
HDLC SERPL: 30.6 %
HGB BLD-MCNC: 12 G/DL
LDLC SERPL CALC-MCNC: 67.8 MG/DL
LYMPHOCYTES # BLD AUTO: 2.4 K/UL
LYMPHOCYTES NFR BLD: 27.7 %
MCH RBC QN AUTO: 27 PG
MCHC RBC AUTO-ENTMCNC: 31.9 G/DL
MCV RBC AUTO: 85 FL
MONOCYTES # BLD AUTO: 0.6 K/UL
MONOCYTES NFR BLD: 7.3 %
NEUTROPHILS # BLD AUTO: 5.3 K/UL
NEUTROPHILS NFR BLD: 61.6 %
NONHDLC SERPL-MCNC: 93 MG/DL
PLATELET # BLD AUTO: 380 K/UL
PMV BLD AUTO: 10.3 FL
POTASSIUM SERPL-SCNC: 4 MMOL/L
PROT SERPL-MCNC: 7.6 G/DL
RBC # BLD AUTO: 4.45 M/UL
SODIUM SERPL-SCNC: 140 MMOL/L
TRIGL SERPL-MCNC: 126 MG/DL
WBC # BLD AUTO: 8.64 K/UL

## 2018-03-09 PROCEDURE — 85025 COMPLETE CBC W/AUTO DIFF WBC: CPT

## 2018-03-09 PROCEDURE — 83036 HEMOGLOBIN GLYCOSYLATED A1C: CPT

## 2018-03-09 PROCEDURE — 36415 COLL VENOUS BLD VENIPUNCTURE: CPT

## 2018-03-09 PROCEDURE — 80053 COMPREHEN METABOLIC PANEL: CPT

## 2018-03-09 PROCEDURE — 80061 LIPID PANEL: CPT

## 2018-03-09 NOTE — PROGRESS NOTES
Subjective:       Patient ID: Linnea Renae is a 67 y.o. female.    Chief Complaint: Annual Exam    Last seen 9 months ago. Returns for annual exam and f/u chronic medical conditions. Diabetes has been consistently well controlled. Noted some elevated blood pressure readings at other clinics, but it is typically normal at home 110-120's/70's. Home Glucose ranges  fasting. Compliant with meds as prescribed.     PMH: .   Hypertension.  Diabetes Type 2, last HbA1c 6.0% .  Hyperlipidemia. TChol 112, TG 91, HDL 37, LDL 57 Aug. '16.  GERD.  Lumbar Spinal Stenosis seen regularly by [facility].   Chronic Dry Eyes.   Perennial Allergic Rhinitis.  Morbid Obesity, BMI up to 46. TSH normal 1.13 .   Right Breast Cancer diagnosed 10/15.    PSH: C/S x 3, BTL, Hysterectomy and USO, Bilateral Cataracts extracted, Cholecystectomy. Right breast excisional biopsy .    Mammogram normal , no recent Pelvic exam. No BMD. Eye exam . Pneumovax . Prevnar 8/15. Zostavax 3/16. Flu shot 10/17. Colonoscopy  - 3 polyps removed, at [facility].     Social: Remote tobacco use, quit over 30 years ago. No alcohol.  with three daughters and three grandchildren. Homemaker.     FMH: Father living age 83 in good health. Mother  young, cause unknown. CHF in PGM, Thyroid disease in PGF.     NKDA.     Medications: list reviewed and reconciled.               Review of Systems   Constitutional: Negative for activity change, appetite change, fatigue, fever and unexpected weight change.   HENT: Positive for congestion and postnasal drip. Negative for hearing loss, rhinorrhea, sneezing, sore throat, trouble swallowing and voice change.         Allergy symptoms relieved with Xyzal and Flonase as needed.    Eyes: Negative for pain and visual disturbance.   Respiratory: Negative for cough, chest tightness, shortness of breath and wheezing.    Cardiovascular: Negative for chest pain,  "palpitations and leg swelling.   Gastrointestinal: Negative for abdominal pain, blood in stool, constipation, diarrhea, nausea and vomiting.   Genitourinary: Negative for difficulty urinating, dysuria, flank pain, frequency, hematuria, urgency and vaginal bleeding.   Musculoskeletal: Positive for back pain. Negative for arthralgias, joint swelling, myalgias and neck pain.        Low back pain radiates down both legs to feet.    Skin: Positive for rash. Negative for color change.        Irritation of skin under pannus chronic recurrent, pruritic and painful.    Neurological: Negative for dizziness, syncope, facial asymmetry, speech difficulty, weakness, numbness and headaches.   Hematological: Negative for adenopathy. Does not bruise/bleed easily.   Psychiatric/Behavioral: Negative for agitation, dysphoric mood and sleep disturbance. The patient is not nervous/anxious.        Objective:    /80, Pulse 80, Ht 5' 8", Wt 295 lbs (from 284), BMI=44.9  Physical Exam   Constitutional: She is oriented to person, place, and time. She appears well-developed and well-nourished. No distress.   HENT:   Head: Normocephalic and atraumatic.   Right Ear: External ear normal.   Left Ear: External ear normal.   Nose: Nose normal.   Mouth/Throat: Oropharynx is clear and moist. No oropharyngeal exudate.   Eyes: Conjunctivae and EOM are normal. Pupils are equal, round, and reactive to light. Right conjunctiva is not injected. Left conjunctiva is not injected. No scleral icterus.   Neck: Normal range of motion. Neck supple. No JVD present. Carotid bruit is not present. No thyromegaly present.   Cardiovascular: Normal rate, regular rhythm, normal heart sounds and intact distal pulses.  Exam reveals no gallop and no friction rub.    No murmur heard.  Pulmonary/Chest: Effort normal and breath sounds normal. No respiratory distress. She has no wheezes. She has no rhonchi. She has no rales.   Abdominal: Soft. Bowel sounds are normal. She " exhibits no distension and no mass. There is no hepatosplenomegaly. There is no tenderness. There is no CVA tenderness.   Musculoskeletal: Normal range of motion. She exhibits no edema, tenderness or deformity.   Lymphadenopathy:     She has no cervical adenopathy.   Neurological: She is alert and oriented to person, place, and time. She has normal strength and normal reflexes. No cranial nerve deficit. She exhibits normal muscle tone. Coordination and gait normal.   Skin: Skin is warm and dry. Rash noted. No lesion noted. She is not diaphoretic. No cyanosis or erythema. No pallor. Nails show no clubbing.   Patchy erythema under pannus, worse on right. No induration, open sores or drainage.    Psychiatric: She has a normal mood and affect. Her behavior is normal. Judgment and thought content normal.   Vitals reviewed.      Assessment:       1. Type 2 diabetes mellitus without complication, without long-term current use of insulin    2. Essential hypertension    3. Hyperlipidemia LDL goal <100    4. Atherosclerosis of aorta    5. Perennial allergic rhinitis    6. Gastroesophageal reflux disease, esophagitis presence not specified    7. Morbid obesity with BMI of 40.0-44.9, adult    8. Candidal intertrigo    9. Facet arthropathy, lumbar    10. Degenerative lumbar spinal stenosis        Plan:       Type 2 diabetes mellitus without complication, without long-term current use of insulin  -     Ambulatory Referral to Podiatry  -     CBC auto differential; Future; Expected date: 03/08/2018  -     Hemoglobin A1c; Future; Expected date: 03/08/2018  -     Microalbumin/creatinine urine ratio  -     metFORMIN (GLUCOPHAGE-XR) 500 MG 24 hr tablet; TAKE TWO TABLETS BY MOUTH ONCE DAILY WITH BREAKFAST  Dispense: 180 tablet; Refill: 1  -     blood sugar diagnostic Strp; 1 strip by Misc.(Non-Drug; Combo Route) route once daily.  Dispense: 100 strip; Refill: 3    Essential hypertension controlled, continue same.  -     Comprehensive  metabolic panel; Future; Expected date: 03/08/2018  -     Urinalysis  -     hydroCHLOROthiazide (HYDRODIURIL) 25 MG tablet; Take 1 tablet (25 mg total) by mouth once daily.  Dispense: 90 tablet; Refill: 1  -     captopril (CAPOTEN) 50 MG tablet; TAKE TWO TABLETS BY MOUTH IN THE MORNING AND ONE TABLET IN THE EVENING  Dispense: 270 tablet; Refill: 1  -     atenolol (TENORMIN) 50 MG tablet; Take 1 tablet (50 mg total) by mouth 2 (two) times daily.  Dispense: 180 tablet; Refill: 1    Hyperlipidemia LDL goal <100  -     Lipid panel; Future; Expected date: 03/08/2018    Atherosclerosis of aorta - medical management, continue Atorvastatin.     Perennial allergic rhinitis  -     levocetirizine (XYZAL) 5 MG tablet; Take 1 tablet (5 mg total) by mouth every evening. For Allergies.  Dispense: 90 tablet; Refill: 1  -     fluticasone (FLONASE) 50 mcg/actuation nasal spray; 2 sprays (100 mcg total) by Each Nare route once daily.  Dispense: 3 Bottle; Refill: 1    Gastroesophageal reflux disease, esophagitis presence not specified - stable on Omeprazole as needed.     Morbid obesity with BMI of 40.0-44.9, adult - she requests assistance with weight management.  -     Ambulatory Referral to Bariatric Medicine    Candidal intertrigo  -     clotrimazole (LOTRIMIN) 1 % cream; Apply topically 2 (two) times daily.  Dispense: 60 g; Refill: 5    Facet arthropathy, lumbar  -     traMADol (ULTRAM) 50 mg tablet; Take 1 tablet (50 mg total) by mouth every 6 (six) hours as needed.  Dispense: 120 tablet; Refill: 5    Degenerative lumbar spinal stenosis  -     traMADol (ULTRAM) 50 mg tablet; Take 1 tablet (50 mg total) by mouth every 6 (six) hours as needed.  Dispense: 120 tablet; Refill: 5

## 2018-03-11 ENCOUNTER — PATIENT MESSAGE (OUTPATIENT)
Dept: INTERNAL MEDICINE | Facility: CLINIC | Age: 68
End: 2018-03-11

## 2018-03-21 ENCOUNTER — OFFICE VISIT (OUTPATIENT)
Dept: PODIATRY | Facility: CLINIC | Age: 68
End: 2018-03-21
Payer: MEDICARE

## 2018-03-21 VITALS — WEIGHT: 293 LBS | HEIGHT: 68 IN | BODY MASS INDEX: 44.41 KG/M2

## 2018-03-21 DIAGNOSIS — G89.29 OTHER CHRONIC PAIN: ICD-10-CM

## 2018-03-21 DIAGNOSIS — M21.6X9 EQUINUS DEFORMITY OF FOOT: ICD-10-CM

## 2018-03-21 DIAGNOSIS — E11.42 DIABETIC PERIPHERAL NEUROPATHY ASSOCIATED WITH TYPE 2 DIABETES MELLITUS: Primary | ICD-10-CM

## 2018-03-21 DIAGNOSIS — M77.41 METATARSALGIA OF BOTH FEET: ICD-10-CM

## 2018-03-21 DIAGNOSIS — M77.42 METATARSALGIA OF BOTH FEET: ICD-10-CM

## 2018-03-21 DIAGNOSIS — M48.061 SPINAL STENOSIS OF LUMBAR REGION WITHOUT NEUROGENIC CLAUDICATION: ICD-10-CM

## 2018-03-21 PROCEDURE — 99203 OFFICE O/P NEW LOW 30 MIN: CPT | Mod: S$GLB,,,

## 2018-03-21 PROCEDURE — 3044F HG A1C LEVEL LT 7.0%: CPT | Mod: CPTII,S$GLB,,

## 2018-03-21 PROCEDURE — 99499 UNLISTED E&M SERVICE: CPT | Mod: S$GLB,,,

## 2018-03-21 NOTE — PATIENT INSTRUCTIONS
How to Check Your Feet    Below are tips to help you look for foot problems. Try to check your feet at the same time each day, such as when you get out of bed in the morning.    · Check the top of each foot. The tops of toes, back of the heel, and outer edge of the foot can get a lot of rubbing from poor-fitting shoes.    · Check the bottom of each foot. Daily wear and tear often leads to problems at pressure spots.    · Check the toes and nails. Fungal infections often occur between toes. Toenail problems can also be a sign of fungal infections or lead to breaks in the skin.    · Check your shoes, too. Loose objects inside a shoe can injure the foot. Use your hand to feel inside your shoes for things like bao, loose stitching, or rough areas that could irritate your skin.        Diabetic Foot Care    Diabetes can lead to a number of different foot complications. Fortunately, most of these complications can be prevented with a little extra foot care. If diabetes is not well controlled, the high blood sugar can cause damage to blood vessels and result in poor circulation to the foot. When the skin does not get enough blood flow, it becomes prone to pressure sores and ulcers, which heal slowly.  High blood sugar can also damage nerves, interfering with the ability to feel pain and pressure. When you cant feel your foot normally, it is easy to injure your skin, bones and joints without knowing it. For these reasons diabetes increases the risk of fungal infections, bunions and ulcers. Deep ulcers can lead to bone infection. Gangrene is the most serious foot complication of diabetes. It usually occurs on the tips of the toes as blacked areas of skin. The black area is dead tissue. In severe cases, gangrene spreads to involve the entire toe, other toes and the entire foot. Foot or toe amputation may be required. Good foot care and blood sugar control can prevent this.    Home Care  1. Wear comfortable, proper  fitting shoes.  2. Wash your feet daily with warm water and mild soap.  3. After drying, apply a moisturizing cream or lotion.  4. Check your feet daily for skin breaks, blisters, swelling, or redness. Look between your toes also.  5. Wear cotton socks and change them every day.  6. Trim toe nails carefully and do not cut your cuticles.  7. Strive to keep your blood sugar under control with a combination of medicines, diet and activity.  8. If you smoke and have diabetes, it is very important that you stop. Smoking reduces blood flow to your foot.  9. Avoid activities that increase your risk of foot injury:  · Do not walk barefoot.  · Do not use heating pads or hot water bottles on your feet.  · Do not put your foot in a hot tub without first checking the temperature with your hand.  10) Schedule yearly foot exams.    Follow Up  with your doctor or as advised by our staff. Report any cut, puncture, scrape, other injury, blister, ingrown toenail or ulcer on your foot.    Get Prompt Medical Attention  if any of the following occur:  -- Open ulcer with pus draining from the wound  -- Increasing foot or leg pain  -- New areas of redness or swelling or tender areas of the foot    © 5687-1580 [employer]. [address] [place] [address], [place] [address]. All rights reserved. This information is not intended as a substitute for professional medical care. Always follow your healthcare professional's instructions.

## 2018-03-21 NOTE — LETTER
March 21, 2018      Bailee Moss MD  [address]  [facility] [address]           [facility]  [address] [place] [id number]  Phone: [phone]  Fax: [phone]          Patient: Linnea Renae   MR Number: [id number]   YOB: 1950   Date of Visit: 3/21/2018       Dear Dr. Bailee Moss:    Thank you for referring Linnea Renae to me for evaluation. Attached you will find relevant portions of my assessment and plan of care.    If you have questions, please do not hesitate to call me. I look forward to following Linnea Renae along with you.    Sincerely,    Phillip Lan, DALY    Enclosure  CC:  No Recipients    If you would like to receive this communication electronically, please contact [email or web address] or [phone] to request more information on [email or web address] Link access.    For providers and/or their staff who would like to refer a patient to [facility], please contact us through our one-stop-shop provider referral line, [facility], at [phone].    If you feel you have received this communication in error or would no longer like to receive these types of communications, please e-mail [email or web address]

## 2018-03-21 NOTE — PROGRESS NOTES
Subjective:      Patient ID: Linnea Renae is a 67 y.o. female.    Chief Complaint: Diabetic Foot Exam (A1C 5.9 3-9-18 Dr Moss 3-8-2018) and Numbness    Linnea is a 67 y.o. female who presents to the clinic for evaluation and treatment of high risk feet. Linnea has a past medical history of Allergy; Breast cancer; Chronic constipation; Diabetes mellitus, type 2; Dry eyes; GERD (gastroesophageal reflux disease); Hyperlipidemia; Hypertension; Lumbar disc disease; and Type 2 diabetes mellitus. The patient's chief complaint is pain balls of her feet and right sided sciatica.    PCP: Bailee Moss MD        Current shoe gear:  Affected Foot: Casual shoes     Unaffected Foot: Casual shoes    Hemoglobin A1C   Date Value Ref Range Status   03/09/2018 5.9 (H) 4.0 - 5.6 % Final     Comment:     According to ADA guidelines, hemoglobin A1c <7.0% represents  optimal control in non-pregnant diabetic patients. Different  metrics may apply to specific patient populations.   Standards of Medical Care in Diabetes-2016.  For the purpose of screening for the presence of diabetes:  <5.7%     Consistent with the absence of diabetes  5.7-6.4%  Consistent with increasing risk for diabetes   (prediabetes)  >or=6.5%  Consistent with diabetes  Currently, no consensus exists for use of hemoglobin A1c  for diagnosis of diabetes for children.  This Hemoglobin A1c assay has significant interference with fetal   hemoglobin   (HbF). The results are invalid for patients with abnormal amounts of   HbF,   including those with known Hereditary Persistence   of Fetal Hemoglobin. Heterozygous hemoglobin variants (HbAS, HbAC,   HbAD, HbAE, HbA2) do not significantly interfere with this assay;   however, presence of multiple variants in a sample may impact the %   interference.     02/02/2017 6.0 4.5 - 6.2 % Final     Comment:     According to ADA guidelines, hemoglobin A1C <7.0% represents  optimal control in non-pregnant diabetic patients.   Different  metrics may apply to specific populations.   Standards of Medical Care in Diabetes - 2016.  For the purpose of screening for the presence of diabetes:  <5.7%     Consistent with the absence of diabetes  5.7-6.4%  Consistent with increasing risk for diabetes   (prediabetes)  >or=6.5%  Consistent with diabetes  Currently no consensus exists for use of hemoglobin A1C  for diagnosis of diabetes for children.     2016 6.4 (H) 4.5 - 6.2 % Final     Comment:     According to ADA guidelines, hemoglobin A1C <7.0% represents  optimal control in non-pregnant diabetic patients.  Different  metrics may apply to specific populations.   Standards of Medical Care in Diabetes - 2016.  For the purpose of screening for the presence of diabetes:  <5.7%     Consistent with the absence of diabetes  5.7-6.4%  Consistent with increasing risk for diabetes   (prediabetes)  >or=6.5%  Consistent with diabetes  Currently no consensus exists for use of hemoglobin A1C  for diagnosis of diabetes for children.         Past Medical History:   Diagnosis Date    Allergy     Breast cancer     Lumpectomy 10/15.    Chronic constipation     Diabetes mellitus, type 2     Dry eyes     GERD (gastroesophageal reflux disease)     Hyperlipidemia     Hypertension     Lumbar disc disease     Type 2 diabetes mellitus        Past Surgical History:   Procedure Laterality Date    BREAST LUMPECTOMY Right     2015    CATARACT EXTRACTION, BILATERAL       SECTION      x3    CHOLECYSTECTOMY      HYSTERECTOMY      and USO    TUBAL LIGATION         Family History   Problem Relation Age of Onset    No Known Problems Mother     No Known Problems Father     Heart disease Paternal Grandmother     Thyroid disease Paternal Grandfather     Hypertension Sister     Hypertension Brother     Glaucoma Brother     No Known Problems Daughter     No Known Problems Daughter     No Known Problems Daughter        Social History      Social History    Marital status:      Spouse name: N/A    Number of children: 3    Years of education: N/A     Social History Main Topics    Smoking status: Former Smoker     Packs/day: 0.25     Years: 20.00     Quit date: 1/1/1985    Smokeless tobacco: Never Used      Comment: Quit mid 1980's.    Alcohol use No    Drug use: No    Sexual activity: Yes     Other Topics Concern    None     Social History Narrative    None       Current Outpatient Prescriptions   Medication Sig Dispense Refill    aspirin 81 MG Chew Take 81 mg by mouth once daily.      atenolol (TENORMIN) 50 MG tablet Take 1 tablet (50 mg total) by mouth 2 (two) times daily. 180 tablet 1    atorvastatin (LIPITOR) 20 MG tablet TAKE ONE TABLET BY MOUTH ONCE DAILY 90 tablet 0    augmented betamethasone (DIPROLENE) 0.05 % lotion Apply to hairline and eyebrows at bedtime  5    blood sugar diagnostic Strp 1 strip by Misc.(Non-Drug; Combo Route) route once daily. 100 strip 3    blood-glucose meter kit Use as instructed 1 each 0    calcium-vitamin D3 500 mg(1,250mg) -200 unit per tablet Take 1 tablet by mouth 2 (two) times daily with meals.       captopril (CAPOTEN) 50 MG tablet TAKE TWO TABLETS BY MOUTH IN THE MORNING AND ONE TABLET IN THE EVENING 270 tablet 1    clotrimazole (LOTRIMIN) 1 % cream Apply topically 2 (two) times daily. 60 g 5    cycloSPORINE (RESTASIS) 0.05 % ophthalmic emulsion 1 drop 2 (two) times daily.      fluticasone (FLONASE) 50 mcg/actuation nasal spray 2 sprays (100 mcg total) by Each Nare route once daily. 3 Bottle 1    hydroCHLOROthiazide (HYDRODIURIL) 25 MG tablet Take 1 tablet (25 mg total) by mouth once daily. 90 tablet 1    ibuprofen (ADVIL,MOTRIN) 800 MG tablet TAKE ONE TABLET BY MOUTH THREE TIMES DAILY AS NEEDED 60 tablet 1    lancets Misc 1 lancet by Misc.(Non-Drug; Combo Route) route once daily. 100 each 3    levocetirizine (XYZAL) 5 MG tablet Take 1 tablet (5 mg total) by mouth every  evening. For Allergies. 90 tablet 1    metFORMIN (GLUCOPHAGE-XR) 500 MG 24 hr tablet TAKE TWO TABLETS BY MOUTH ONCE DAILY WITH BREAKFAST 180 tablet 1    minoxidil (LONITEN) 2.5 MG tablet       multivitamin (ONE DAILY MULTIVITAMIN) per tablet Take 1 tablet by mouth once daily.      omeprazole (PRILOSEC) 20 MG capsule Take 1 capsule (20 mg total) by mouth once daily. 90 capsule 2    tizanidine (ZANAFLEX) 4 MG tablet TAKE ONE TABLET BY MOUTH TWICE DAILY AS NEEDED MUSCLE  SPASM 60 tablet 1    traMADol (ULTRAM) 50 mg tablet Take 1 tablet (50 mg total) by mouth every 6 (six) hours as needed. 120 tablet 5     No current facility-administered medications for this visit.        Review of patient's allergies indicates:   Allergen Reactions    Codeine Itching         ROS  ROS:  Constitution: Negative for chills, fever, weakness and malaise/fatigue.   HEENT: Negative for headaches.   Cardiovascular: Negative for chest pain and claudication.   Respiratory: Negative for cough and shortness of breath.   Musculoskeletal: (+) for foot pain.  Negative for muscle cramps and muscle weakness.   Gastrointestinal: Negative for nausea and vomiting.   Neurological:(+) for numbness, tingling and paresthesias.   Dermatological:  (--) for skin rash, (--) for keratosis, (--) for fungal toenails, (--) for wound.          Objective:      Physical Exam  Constitutional:  Patient is oriented to person, place, and time. Vital signs are normal.  Appears well-developed and well-nourished.     Vascular:  Dorsalis pedis pulses are 2/4 on the right side, and 2/4 on the left side.   Posterior tibial pulses are 2/4 on the right side, and 2/4 on the left side.   (+) digital hair growth, capillary fill time to all toes <3 seconds, toes are cool touch, trace pedal swelling    Skin/Dermatological:  Skin is warm and intact.  No cyanosis or clubbing.  No rashes noted.  No open wounds.  Nails trim without thickening.  (--) keratosis     Musculoskeletal:       Hallux abducto valgus bilaterally, hammertoes 2-5 observed.  Pedal rom within normal limits.  (+) ankle joint DF restriction with both knee flexed and extened.    Neurological:  (--) deficits to sharp/dull, light touch or vibratory sensation bilateral feet, ten points tested.   Muscle strength to tibialis anterior, extensor hallucis longus, extensor digitorum longus, peroneal muscles, flexor hallucis/digotorum longus, posterior tibial and gastrosoleal complex is 5/5, normal tone without assymmetry   Patellar reflexes are 2+ on the right side and 2+ on the left side.  Achilles reflexes are 2+ on the right side and 2+ on the left side.            Assessment:       Encounter Diagnoses   Name Primary?    Diabetic peripheral neuropathy associated with type 2 diabetes mellitus Yes    Spinal stenosis of lumbar region without neurogenic claudication     Other chronic pain     Equinus deformity of foot     Metatarsalgia of both feet          Plan:       Linnea was seen today for diabetic foot exam and numbness.    Diagnoses and all orders for this visit:    Diabetic peripheral neuropathy associated with type 2 diabetes mellitus  -     DIABETIC SHOES FOR HOME USE    Spinal stenosis of lumbar region without neurogenic claudication  -     DIABETIC SHOES FOR HOME USE    Other chronic pain  -     DIABETIC SHOES FOR HOME USE    Equinus deformity of foot  -     DIABETIC SHOES FOR HOME USE    Metatarsalgia of both feet  -     DIABETIC SHOES FOR HOME USE      I counseled the patient on her conditions, their implications and medical management.    Achilles tendon stretching and diabetic inserts and shoes.  F/U with pain clinic for sciatica.    Shoe inspection. Diabetic Foot Education. Patient reminded of the importance of good nutrition and blood sugar control to help prevent podiatric complications of diabetes. Patient instructed on proper foot hygeine. We discussed wearing proper shoe gear, daily foot inspections, never walking  without protective shoe gear, never putting sharp instruments to feet.  We also discussed padding and shoes with high toe boxes for foot deformities. Patient relates relief following the procedure. Patient will continue to monitor the areas daily, inspect feet, wear protective shoe gear when ambulatory, moisturizer to maintain skin integrity and follow in this office in approximately 6 months, sooner p.dipika.        Phillip Rangel DPM

## 2018-04-03 ENCOUNTER — TELEPHONE (OUTPATIENT)
Dept: SPINE | Facility: CLINIC | Age: 68
End: 2018-04-03

## 2018-04-03 DIAGNOSIS — M47.819 SPONDYLOSIS WITHOUT MYELOPATHY: Primary | ICD-10-CM

## 2018-04-03 DIAGNOSIS — M54.14 THORACIC AND LUMBOSACRAL NEURITIS: ICD-10-CM

## 2018-04-03 DIAGNOSIS — M54.41 CHRONIC BILATERAL LOW BACK PAIN WITH RIGHT-SIDED SCIATICA: ICD-10-CM

## 2018-04-03 DIAGNOSIS — G89.29 CHRONIC BILATERAL LOW BACK PAIN WITH RIGHT-SIDED SCIATICA: ICD-10-CM

## 2018-04-03 DIAGNOSIS — M54.17 THORACIC AND LUMBOSACRAL NEURITIS: ICD-10-CM

## 2018-04-03 DIAGNOSIS — M43.10 ACQUIRED SPONDYLOLISTHESIS: ICD-10-CM

## 2018-04-03 DIAGNOSIS — M51.37 DDD (DEGENERATIVE DISC DISEASE), LUMBOSACRAL: ICD-10-CM

## 2018-04-03 NOTE — TELEPHONE ENCOUNTER
----- Message from Zo Urban sent at 4/3/2018  3:13 PM CDT -----  Contact: pt            Name of Who is Calling: pt      What is the request in detail: needs to schedule injection. Call pt       Can the clinic reply by [facility]:       What [facility] Back [facility]: [phone]

## 2018-04-04 NOTE — TELEPHONE ENCOUNTER
Bilateral L3-L4 and L4-L5 facet injections ordered with Steve. She should f/u with [facility] in 6/-8 weeks.

## 2018-04-11 ENCOUNTER — SURGERY (OUTPATIENT)
Age: 68
End: 2018-04-11

## 2018-04-11 ENCOUNTER — OFFICE VISIT (OUTPATIENT)
Dept: INTERNAL MEDICINE | Facility: CLINIC | Age: 68
End: 2018-04-11
Payer: MEDICARE

## 2018-04-11 VITALS
DIASTOLIC BLOOD PRESSURE: 82 MMHG | HEIGHT: 68 IN | BODY MASS INDEX: 44.01 KG/M2 | SYSTOLIC BLOOD PRESSURE: 130 MMHG | HEART RATE: 89 BPM | WEIGHT: 290.38 LBS

## 2018-04-11 DIAGNOSIS — Z86.59 HISTORY OF RECENT STRESSFUL LIFE EVENT: ICD-10-CM

## 2018-04-11 DIAGNOSIS — B96.89 BACTERIAL SINUSITIS: Primary | ICD-10-CM

## 2018-04-11 DIAGNOSIS — J32.9 BACTERIAL SINUSITIS: Primary | ICD-10-CM

## 2018-04-11 DIAGNOSIS — I49.3 PVC (PREMATURE VENTRICULAR CONTRACTION): ICD-10-CM

## 2018-04-11 PROCEDURE — 3075F SYST BP GE 130 - 139MM HG: CPT | Mod: CPTII,S$GLB,, | Performed by: INTERNAL MEDICINE

## 2018-04-11 PROCEDURE — 3079F DIAST BP 80-89 MM HG: CPT | Mod: CPTII,S$GLB,, | Performed by: INTERNAL MEDICINE

## 2018-04-11 PROCEDURE — 99214 OFFICE O/P EST MOD 30 MIN: CPT | Mod: S$GLB,,, | Performed by: INTERNAL MEDICINE

## 2018-04-11 PROCEDURE — 99999 PR PBB SHADOW E&M-EST. PATIENT-LVL III: CPT | Mod: PBBFAC,,, | Performed by: INTERNAL MEDICINE

## 2018-04-11 RX ORDER — AMOXICILLIN AND CLAVULANATE POTASSIUM 875; 125 MG/1; MG/1
1 TABLET, FILM COATED ORAL 2 TIMES DAILY
Qty: 14 TABLET | Refills: 0 | Status: SHIPPED | OUTPATIENT
Start: 2018-04-11 | End: 2018-04-18

## 2018-04-11 NOTE — PROGRESS NOTES
Subjective:       Patient ID: Linnea Renae is a 67 y.o. female.    Chief Complaint: Sinus Problem (about a week or two); Headache (sinus related); and Nasal Congestion (week or two)    HPI    Patient of Bailee Moss MD, here today for Urgent Care visit. PMH of HTN, DM, HLD< allergies, obesity, GERD, DCIS, spinal stenosis, back pain. Recent visits with Internal Medicine, Podiatry, Breast Surg, and Orthopedics.     Patient reports having chronic sinus congestion.  She was sent today for a same-day visit from her pain management team because of yellow mucus drainage from sinuses.  She reports this is a problem that occurs all year round, problem comes and goes.  Usually resolves with regular use of Claritin, Flonase, and diabetic cough medications.  She reports having diffuse pain and pressure throughout her sinuses, but denies any fever or chills.  She was having cough, however this has gotten better over the last 2 weeks.  Denies any shortness of breath.  Denies any nausea or vomiting.    Patient has not been using her regular regimen of over-the-counter medications as there have been many deaths in family members and friends over the last few weeks, she hasn't been as regimented about taking care of her health needs.    Review of Systems    As per HPI    Objective:      Physical Exam   Constitutional: No distress.   -American woman whose Body mass index is 44.15 kg/m².    HENT:   Right Ear: Tympanic membrane normal. No tenderness.   Left Ear: Tympanic membrane normal. No tenderness.   Mouth/Throat: Oropharynx is clear and moist. No oropharyngeal exudate.   Neck: Normal range of motion. No thyromegaly present.   Cardiovascular: Normal rate, regular rhythm and normal heart sounds.  Frequent extrasystoles are present.   Pulmonary/Chest: Effort normal and breath sounds normal. No stridor. She has no wheezes. She has no rales.   Lymphadenopathy:     She has no cervical adenopathy.   Skin: She is not  "diaphoretic.   Nursing note and vitals reviewed.      Vitals:    04/11/18 1546   BP: 130/82   BP Location: Right arm   Patient Position: Sitting   BP Method: Large (Manual)   Pulse: 89   Weight: 131.7 kg (290 lb 5.5 oz)   Height: 5' 8" (1.727 m)     Body mass index is 44.15 kg/m².    RESULTS: Reviewed labs from last 2 months. Reviewed EKG 2015.    Assessment:       1. Bacterial sinusitis    2. PVC (premature ventricular contraction)    3. History of recent stressful life event        Plan:   Linnea was seen today for sinus problem, headache and nasal congestion.    Diagnoses and all orders for this visit:    Bacterial sinusitis:  Ongoing problem, persistent but not getting worse. possibly exacerbated by allergies. Usually resolves with regular over-the-counter treatment without need for antibiotics. Continue over-the-counter meds, if after 1 week no improvement or if develops fever/chills, fill printed prescription for Augmentin.  -     amoxicillin-clavulanate 875-125mg (AUGMENTIN) 875-125 mg per tablet; Take 1 tablet by mouth 2 (two) times daily.    PVC:  Prior dx, seen on EKG 2015. Active today however the patient is asymptomatic. No further workup needed at this time, continue to monitor.    History of recent stressful life event:  Multiple deaths of family & friends, pt working to continue taking care of herself throughout this time.    Keep regular follow up appointments with Bailee Moss MD.   Valentin Escobedo MD  Internal Medicine    Portions of this note were completed using [employer] dictation software. Please excuse typographical or syntax errors.   "

## 2018-04-24 ENCOUNTER — TELEPHONE (OUTPATIENT)
Dept: PAIN MEDICINE | Facility: CLINIC | Age: 68
End: 2018-04-24

## 2018-04-24 NOTE — TELEPHONE ENCOUNTER
----- Message from Reva Reece sent at 4/23/2018  4:45 PM CDT -----  Contact: Lninea  Name of Who is Calling: Linnea      What is the request in detail: Patient is requesting a call back to schedule her injection      Can the clinic reply by [facility]: No       [facility][phone]

## 2018-04-24 NOTE — TELEPHONE ENCOUNTER
Spoke with patient and she has rescheduled her procedure bilateral L3-4, L4-5 Facet injections to 5-9-18 with Dr. Wilson.  It was first scheduled for 4-11-18 but was cancelled.

## 2018-05-09 ENCOUNTER — HOSPITAL ENCOUNTER (OUTPATIENT)
Facility: OTHER | Age: 68
Discharge: HOME | End: 2018-05-09
Attending: ANESTHESIOLOGY | Admitting: ANESTHESIOLOGY
Payer: MEDICARE

## 2018-05-09 ENCOUNTER — SURGERY (OUTPATIENT)
Age: 68
End: 2018-05-09

## 2018-05-09 VITALS
HEIGHT: 68 IN | SYSTOLIC BLOOD PRESSURE: 133 MMHG | HEART RATE: 76 BPM | BODY MASS INDEX: 43.19 KG/M2 | DIASTOLIC BLOOD PRESSURE: 65 MMHG | RESPIRATION RATE: 18 BRPM | OXYGEN SATURATION: 97 % | TEMPERATURE: 99 F | WEIGHT: 285 LBS

## 2018-05-09 DIAGNOSIS — M47.9 OSTEOARTHRITIS OF SPINE, UNSPECIFIED SPINAL OSTEOARTHRITIS COMPLICATION STATUS, UNSPECIFIED SPINAL REGION: Primary | ICD-10-CM

## 2018-05-09 DIAGNOSIS — G89.29 CHRONIC PAIN: ICD-10-CM

## 2018-05-09 LAB — POCT GLUCOSE: 103 MG/DL (ref 70–110)

## 2018-05-09 PROCEDURE — 99152 MOD SED SAME PHYS/QHP 5/>YRS: CPT | Mod: ,,, | Performed by: ANESTHESIOLOGY

## 2018-05-09 PROCEDURE — 25000003 PHARM REV CODE 250: Performed by: ANESTHESIOLOGY

## 2018-05-09 PROCEDURE — 25500020 PHARM REV CODE 255: Performed by: ANESTHESIOLOGY

## 2018-05-09 PROCEDURE — 64494 INJ PARAVERT F JNT L/S 2 LEV: CPT | Mod: 50,,, | Performed by: ANESTHESIOLOGY

## 2018-05-09 PROCEDURE — 64494 INJ PARAVERT F JNT L/S 2 LEV: CPT | Mod: 50 | Performed by: ANESTHESIOLOGY

## 2018-05-09 PROCEDURE — 63600175 PHARM REV CODE 636 W HCPCS: Performed by: ANESTHESIOLOGY

## 2018-05-09 PROCEDURE — 82947 ASSAY GLUCOSE BLOOD QUANT: CPT | Performed by: ANESTHESIOLOGY

## 2018-05-09 PROCEDURE — 64493 INJ PARAVERT F JNT L/S 1 LEV: CPT | Mod: 50,,, | Performed by: ANESTHESIOLOGY

## 2018-05-09 PROCEDURE — 64493 INJ PARAVERT F JNT L/S 1 LEV: CPT | Mod: 50 | Performed by: ANESTHESIOLOGY

## 2018-05-09 RX ORDER — BUPIVACAINE HYDROCHLORIDE 2.5 MG/ML
INJECTION, SOLUTION EPIDURAL; INFILTRATION; INTRACAUDAL
Status: DISCONTINUED | OUTPATIENT
Start: 2018-05-09 | End: 2018-05-09 | Stop reason: HOSPADM

## 2018-05-09 RX ORDER — SODIUM CHLORIDE 9 MG/ML
500 INJECTION, SOLUTION INTRAVENOUS CONTINUOUS
Status: DISCONTINUED | OUTPATIENT
Start: 2018-05-09 | End: 2018-05-09 | Stop reason: HOSPADM

## 2018-05-09 RX ORDER — MIDAZOLAM HYDROCHLORIDE 1 MG/ML
INJECTION INTRAMUSCULAR; INTRAVENOUS
Status: DISCONTINUED | OUTPATIENT
Start: 2018-05-09 | End: 2018-05-09 | Stop reason: HOSPADM

## 2018-05-09 RX ORDER — FENTANYL CITRATE 50 UG/ML
INJECTION, SOLUTION INTRAMUSCULAR; INTRAVENOUS
Status: DISCONTINUED | OUTPATIENT
Start: 2018-05-09 | End: 2018-05-09 | Stop reason: HOSPADM

## 2018-05-09 RX ORDER — LIDOCAINE HYDROCHLORIDE 10 MG/ML
INJECTION INFILTRATION; PERINEURAL
Status: DISCONTINUED | OUTPATIENT
Start: 2018-05-09 | End: 2018-05-09 | Stop reason: HOSPADM

## 2018-05-09 RX ORDER — DEXAMETHASONE SODIUM PHOSPHATE 10 MG/ML
INJECTION INTRAMUSCULAR; INTRAVENOUS
Status: DISCONTINUED | OUTPATIENT
Start: 2018-05-09 | End: 2018-05-09 | Stop reason: HOSPADM

## 2018-05-09 RX ADMIN — DEXAMETHASONE SODIUM PHOSPHATE 10 MG: 10 INJECTION, SOLUTION INTRAMUSCULAR; INTRAVENOUS at 02:05

## 2018-05-09 RX ADMIN — BUPIVACAINE HYDROCHLORIDE 10 ML: 2.5 INJECTION, SOLUTION EPIDURAL; INFILTRATION; INTRACAUDAL; PERINEURAL at 02:05

## 2018-05-09 RX ADMIN — IOHEXOL 5 ML: 300 INJECTION, SOLUTION INTRAVENOUS at 02:05

## 2018-05-09 RX ADMIN — MIDAZOLAM HYDROCHLORIDE 1 MG: 1 INJECTION, SOLUTION INTRAMUSCULAR; INTRAVENOUS at 02:05

## 2018-05-09 RX ADMIN — SODIUM CHLORIDE 500 ML: 0.9 INJECTION, SOLUTION INTRAVENOUS at 01:05

## 2018-05-09 RX ADMIN — FENTANYL CITRATE 50 MCG: 50 INJECTION, SOLUTION INTRAMUSCULAR; INTRAVENOUS at 02:05

## 2018-05-09 RX ADMIN — LIDOCAINE HYDROCHLORIDE 10 ML: 10 INJECTION, SOLUTION INFILTRATION; PERINEURAL at 02:05

## 2018-05-09 NOTE — PLAN OF CARE
PATIENT TOLERATED PROCEDURE WELL. PT COMPLAINS OF  4/10 PAIN. ASSISTED PATIENT UP FOR FIRST TIME. STEADY ON FEET AND DISCHARGE INSTRUCTIONS GIVEN.

## 2018-05-09 NOTE — H&P
"HPI  66 yo female with history of facet arthropathy and DDD who previously has undergone bilateral L3-4 and L4-5 facet joint injections with complete resolution of her back pain. She presents back for injections again.     Presented for injections on , however had purulent discharge from her nose as well as a temperature of 99*, thus procedure was postponed.     Past Medical History:   Diagnosis Date    Allergy     Breast cancer     Lumpectomy 10/15.    Chronic constipation     Diabetes mellitus, type 2     Dry eyes     GERD (gastroesophageal reflux disease)     Hyperlipidemia     Hypertension     Lumbar disc disease     Type 2 diabetes mellitus      Past Surgical History:   Procedure Laterality Date    BREAST LUMPECTOMY Right         CATARACT EXTRACTION, BILATERAL       SECTION      x3    CHOLECYSTECTOMY      HYSTERECTOMY      and USO    TUBAL LIGATION       Review of patient's allergies indicates:   Allergen Reactions    Codeine Itching        PMHx, PSHx, Allergies, Medications reviewed in epic      ROS negative except pain complaints in HPI    OBJECTIVE:    BP (!) 184/82   Pulse (!) 53   Temp 98.8 °F (37.1 °C)   Resp 18   Ht 5' 8" (1.727 m)   Wt 129.3 kg (285 lb)   SpO2 99%   Breastfeeding? No   BMI 43.33 kg/m²     PHYSICAL EXAMINATION:    GENERAL: Well appearing, in no acute distress, alert and oriented x3.  PSYCH:  Mood and affect appropriate.  SKIN: Skin color, texture, turgor normal, no rashes or lesions.  CV: RRR with palpation of the radial artery.  PULM: No evidence of respiratory difficulty, symmetric chest rise. Clear to auscultation.  NEURO: Cranial nerves grossly intact.    Plan:    Proceed with procedure as planned    Herrera Fernandez  2018    "

## 2018-05-09 NOTE — DISCHARGE INSTRUCTIONS
Adult Procedural Sedation Instructions    Recovery After Procedural Sedation (Adult)  You have been given medicine by vein to make you sleep during your surgery. This may have included both a pain medicine and sleeping medicine. Most of the effects have worn off. But you may still have some drowsiness for the next 6 to 8 hours.  Home care  Follow these guidelines when you get home:  · For the next 8 hours, you should be watched by a responsible adult. This person should make sure your condition is not getting worse.  · Don't drink any alcohol for the next 24 hours.  · Don't drive, operate dangerous machinery, or make important business or personal decisions during the next 24 hours.  Note: Your healthcare provider may tell you not to take any medicine by mouth for pain or sleep in the next 4 hours. These medicines may react with the medicines you were given in the hospital. This could cause a much stronger response than usual.  Follow-up care  Follow up with your healthcare provider if you are not alert and back to your usual level of activity within 12 hours.  When to seek medical advice  Call your healthcare provider right away if any of these occur:  · Drowsiness gets worse  · Weakness or dizziness gets worse  · Repeated vomiting  · You can't be awakened   Date Last Reviewed: 10/18/2016  © 5324-5566 [employer]. [address] [place] [address], [place] [address]. All rights reserved. This information is not intended as a substitute for professional medical care. Always follow your healthcare professional's instructions.    Home Care Instructions Pain Management:    1. DIET:   You may resume your normal diet today.   2. BATHING:   You may shower with luke warm water.  3. DRESSING:   You may remove your bandage today.   4. ACTIVITY LEVEL:   You may resume your normal activities 24 hrs after your procedure.  5. MEDICATIONS:   You may resume your normal medications today.   6. SPECIAL INSTRUCTIONS:   No heat to  the injection site for 24 hrs including, bath or shower, heating pad, moist heat, or hot tubs.    Use ice pack to injection site for any pain or discomfort.  Apply ice packs for 20 minute intervals as needed.   If you have received any sedatives by mouth today you may not drive for 12 hours.    If you have received any sedation through your IV, you may not drive for 24 hrs.     PLEASE CALL YOUR DOCTOR IF:  1. Redness or swelling around the injection site.  2. Fever of 101 degrees  3. Drainage (pus) from the injection site.  4. For any continuous bleeding (some dried blood over the incision is normal.)    FOR EMERGENCIES:   If any unusual problems or difficulties occur during clinic hours, call [phone] or [phone].     Thank you for allowing us to care for you today. You may receive a survey about the care we provided. Your feedback is valuable and helps us provide excellent care throughout the community.

## 2018-05-09 NOTE — OP NOTE
"Patient Name: Linnea Renae  MRN: [id number]    INFORMED CONSENT: The procedure, risks, benefits and options were discussed with patient. There are no contraindications to the procedure. The patient expressed understanding and agreed to proceed. The personnel performing the procedure was discussed. I verify that I personally obtained Linnea's consent prior to the start of the procedure and the signed consent can be found on the patient's chart.    Procedure Date: 05/09/2018    Anesthesia: Topical    Pre Procedure diagnosis: Lumbar spondylolysis [M43.06]  1. Osteoarthritis of spine, unspecified spinal osteoarthritis complication status, unspecified spinal region    2. Chronic pain      Post-Procedure diagnosis: SAME      Sedation: Yes - Fentanyl 50 mcg and Midazolam 2 mg    PROCEDURE: bilateral L3-4/4-5 FACET JOINT INJECTION      DESCRIPTION OF PROCEDURE:The patient was brought to the procedure room.  After performing time out. IV access was obtained prior to the procedure. The patient was positioned prone on the fluoroscopy table. Continuous hemodynamic monitoring was initiated including blood pressure, EKG, and pulse oximetry. The area of the lumbar spine was prepped with chlorhexidine and draped into a sterile field.Fluoroscopy was used to identify the location of bilateral L3-4/4-5   joints respectivly. Skin anesthesia was achieved using 3 cc of Lidocaine 1% over the injection sites. A 25 gauge, 3 1/2" spinal needle was slowly inserted at each joint using APand oblique fluoroscopic imaging. Negative aspiration for blood or CSF was confirmed. 0.5 cc of Omnipaque  dye was inserted to confirm placement A combination of 4 ml bupivacaine 0.25% and 10 mg decadron was injected at all joints. The needles were removed and bleeding was nil. A sterile dressing was applied. No specimens collected. Linnea was taken back to the recovery room for further observation.     Blood Loss: Nill  Specimen: None    Viet Wilson MD  "

## 2018-05-09 NOTE — DISCHARGE SUMMARY
Discharge Note  Short Stay      SUMMARY     Admit Date: 5/9/2018    Attending Physician: Viet Wilson      Discharge Physician: Viet Wilson      Discharge Date: 5/9/2018 2:28 PM    Procedure(s) (LRB):  INJECTION-FACET (Bilateral)    Final Diagnosis: Lumbar spondylolysis [M43.06]    Disposition: Home or self care    Patient Instructions:   Current Discharge Medication List      CONTINUE these medications which have NOT CHANGED    Details   aspirin 81 MG Chew Take 81 mg by mouth once daily.      atenolol (TENORMIN) 50 MG tablet Take 1 tablet (50 mg total) by mouth 2 (two) times daily.  Qty: 180 tablet, Refills: 1    Associated Diagnoses: Essential hypertension      atorvastatin (LIPITOR) 20 MG tablet TAKE ONE TABLET BY MOUTH ONCE DAILY  Qty: 90 tablet, Refills: 0    Associated Diagnoses: Hyperlipidemia LDL goal <100      blood sugar diagnostic Strp 1 strip by Misc.(Non-Drug; Combo Route) route once daily.  Qty: 100 strip, Refills: 3    Comments: TRUE METRIX STRIPS  Associated Diagnoses: Type 2 diabetes mellitus without complication, without long-term current use of insulin      blood-glucose meter kit Use as instructed  Qty: 1 each, Refills: 0    Comments: TRUE METRIX METER  Associated Diagnoses: Type 2 diabetes mellitus with stage 2 chronic kidney disease, without long-term current use of insulin      calcium-vitamin D3 500 mg(1,250mg) -200 unit per tablet Take 1 tablet by mouth 2 (two) times daily with meals.       captopril (CAPOTEN) 50 MG tablet TAKE TWO TABLETS BY MOUTH IN THE MORNING AND ONE TABLET IN THE EVENING  Qty: 270 tablet, Refills: 1    Associated Diagnoses: Essential hypertension      clotrimazole (LOTRIMIN) 1 % cream Apply topically 2 (two) times daily.  Qty: 60 g, Refills: 5    Associated Diagnoses: Candidal intertrigo      cycloSPORINE (RESTASIS) 0.05 % ophthalmic emulsion 1 drop 2 (two) times daily.      fluticasone (FLONASE) 50 mcg/actuation nasal spray 2 sprays (100 mcg total) by Each  Nare route once daily.  Qty: 3 Bottle, Refills: 1    Associated Diagnoses: Perennial allergic rhinitis      hydroCHLOROthiazide (HYDRODIURIL) 25 MG tablet Take 1 tablet (25 mg total) by mouth once daily.  Qty: 90 tablet, Refills: 1    Associated Diagnoses: Essential hypertension      ibuprofen (ADVIL,MOTRIN) 800 MG tablet TAKE ONE TABLET BY MOUTH THREE TIMES DAILY AS NEEDED  Qty: 60 tablet, Refills: 1    Comments: Please consider 90 day supplies to promote better adherence  Associated Diagnoses: Primary osteoarthritis involving multiple joints      lancets Misc 1 lancet by Misc.(Non-Drug; Combo Route) route once daily.  Qty: 100 each, Refills: 3    Comments: TRUE PLUS LANCET 32 G  Associated Diagnoses: Type 2 diabetes mellitus with stage 2 chronic kidney disease, without long-term current use of insulin      metFORMIN (GLUCOPHAGE-XR) 500 MG 24 hr tablet TAKE TWO TABLETS BY MOUTH ONCE DAILY WITH BREAKFAST  Qty: 180 tablet, Refills: 1    Associated Diagnoses: Type 2 diabetes mellitus without complication, without long-term current use of insulin      minoxidil (LONITEN) 2.5 MG tablet       multivitamin (ONE DAILY MULTIVITAMIN) per tablet Take 1 tablet by mouth once daily.      omeprazole (PRILOSEC) 20 MG capsule Take 1 capsule (20 mg total) by mouth once daily.  Qty: 90 capsule, Refills: 2    Associated Diagnoses: Gastroesophageal reflux disease, esophagitis presence not specified      tizanidine (ZANAFLEX) 4 MG tablet TAKE ONE TABLET BY MOUTH TWICE DAILY AS NEEDED MUSCLE  SPASM  Qty: 60 tablet, Refills: 1    Associated Diagnoses: Degenerative lumbar spinal stenosis      traMADol (ULTRAM) 50 mg tablet Take 1 tablet (50 mg total) by mouth every 6 (six) hours as needed.  Qty: 120 tablet, Refills: 5    Associated Diagnoses: Degenerative lumbar spinal stenosis; Facet arthropathy, lumbar                 Discharge Diagnosis: Lumbar spondylolysis [M43.06]  Condition on Discharge: Stable with no complications to procedure    Diet on Discharge: Same as before.  Activity: as per instruction sheet.  Discharge to: Home with a responsible adult.  Follow up: 2-4 weeks

## 2018-05-17 ENCOUNTER — TELEPHONE (OUTPATIENT)
Dept: ORTHOPEDICS | Facility: CLINIC | Age: 68
End: 2018-05-17

## 2018-05-17 NOTE — TELEPHONE ENCOUNTER
----- Message from Anastasiia Moseley sent at 5/17/2018 11:43 AM CDT -----  Contact: [email or web address]  Patient wants to know can she receive injections in her hips please call patient.

## 2018-05-22 ENCOUNTER — OFFICE VISIT (OUTPATIENT)
Dept: ORTHOPEDICS | Facility: CLINIC | Age: 68
End: 2018-05-22
Payer: MEDICARE

## 2018-05-22 ENCOUNTER — HOSPITAL ENCOUNTER (OUTPATIENT)
Dept: RADIOLOGY | Facility: HOSPITAL | Age: 68
Discharge: HOME | End: 2018-05-22
Attending: PHYSICIAN ASSISTANT
Payer: MEDICARE

## 2018-05-22 VITALS — BODY MASS INDEX: 44.07 KG/M2 | HEIGHT: 68 IN | WEIGHT: 290.81 LBS

## 2018-05-22 DIAGNOSIS — M70.61 TROCHANTERIC BURSITIS OF BOTH HIPS: Primary | ICD-10-CM

## 2018-05-22 DIAGNOSIS — M25.551 BILATERAL HIP PAIN: ICD-10-CM

## 2018-05-22 DIAGNOSIS — M70.62 TROCHANTERIC BURSITIS OF BOTH HIPS: Primary | ICD-10-CM

## 2018-05-22 DIAGNOSIS — M25.552 BILATERAL HIP PAIN: ICD-10-CM

## 2018-05-22 PROCEDURE — 73521 X-RAY EXAM HIPS BI 2 VIEWS: CPT | Mod: 26,,, | Performed by: RADIOLOGY

## 2018-05-22 PROCEDURE — 99213 OFFICE O/P EST LOW 20 MIN: CPT | Mod: 25,S$GLB,, | Performed by: PHYSICIAN ASSISTANT

## 2018-05-22 PROCEDURE — 20610 DRAIN/INJ JOINT/BURSA W/O US: CPT | Mod: 50,S$GLB,, | Performed by: PHYSICIAN ASSISTANT

## 2018-05-22 PROCEDURE — 99999 PR PBB SHADOW E&M-EST. PATIENT-LVL IV: CPT | Mod: PBBFAC,,, | Performed by: PHYSICIAN ASSISTANT

## 2018-05-22 PROCEDURE — 73521 X-RAY EXAM HIPS BI 2 VIEWS: CPT | Mod: TC

## 2018-05-22 RX ORDER — METHYLPREDNISOLONE ACETATE 80 MG/ML
80 INJECTION, SUSPENSION INTRA-ARTICULAR; INTRALESIONAL; INTRAMUSCULAR; SOFT TISSUE
Status: COMPLETED | OUTPATIENT
Start: 2018-05-22 | End: 2018-05-22

## 2018-05-22 RX ADMIN — METHYLPREDNISOLONE ACETATE 80 MG: 80 INJECTION, SUSPENSION INTRA-ARTICULAR; INTRALESIONAL; INTRAMUSCULAR; SOFT TISSUE at 12:05

## 2018-05-22 NOTE — PROGRESS NOTES
Subjective:      Patient ID: Linnea Renae is a 67 y.o. female.    Chief Complaint: No chief complaint on file.    HPI  67 year old female presents with chief complaint of intermittent bilateral hip pain R>L since 2016. No trauma. She has h/o bursitis and has received cortisone injections. Last injections were in 2016 and it gave short relief. She reports the same pain at the lateral hips that is worse with walking. Ibuprofen prn does not give much relief. She denies groin pain. She has LBP. She does not use assistive devices. No PT has been done.   Review of Systems   Constitution: Negative for chills, fever and night sweats.   Cardiovascular: Negative for chest pain.   Respiratory: Negative for cough and shortness of breath.    Hematologic/Lymphatic: Does not bruise/bleed easily.   Skin: Negative for color change.   Gastrointestinal: Negative for heartburn.   Genitourinary: Negative for dysuria.   Neurological: Negative for numbness and paresthesias.   Psychiatric/Behavioral: Negative for altered mental status.   Allergic/Immunologic: Negative for persistent infections.         Objective:            General    Vitals reviewed.  Constitutional: She is oriented to person, place, and time. She appears well-developed and well-nourished.   Cardiovascular: Normal rate.    Neurological: She is alert and oriented to person, place, and time.             Right Hip Exam     Tenderness   The patient tender to palpation of the trochanteric bursa.    Range of Motion   The patient has normal right hip ROM.    Muscle Strength   The patient has normal right hip strength.    Tests   Stinchfield test: negative  Log Roll: negative    Other   Sensation: normal    Comments:  No pain with hip ROM.   Left Hip Exam     Tenderness   The patient tender to palpation of the trochanteric bursa.    Range of Motion   The patient has normal left hip ROM.    Muscle Strength   The patient has normal left hip strength.     Tests   Stinchfield test:  negative  Log Roll: negative    Other   Sensation: normal    Comments:  No pain with hip ROM.           Vascular Exam     Right Pulses  Dorsalis Pedis:      2+          Left Pulses  Dorsalis Pedis:      2+                X-ray: ordered and reviewed by myself. No fx or dislocation.       Assessment:       Encounter Diagnosis   Name Primary?    Trochanteric bursitis of both hips Yes          Plan:       Discussed treatment options with patient. She would like injections. Ice hip. Order placed for PT. RTC prn.     PROCEDURE:  I have explained the risks, benefits, and alternatives of the procedure in detail.  The patient voices understanding and all questions have been answered.  The patient agrees to proceed as planned. So after I performed a sterile pre of the skin in the normal fashion the bilateral greater trochanteric area is injected from the lateral approach using an 2 inch 22 gauge needle with a combination of 4cc 1% lidocaine and 80 mg of depo medrol.  The patient is cautioned and immediate relief of pain is secondary to the local anesthetic and will be temporary.  After the anesthetic wears off there may be a increase in pain that may last for a few hours or a few days and they should use ice to help alleviate this flair up of pain.

## 2018-05-28 DIAGNOSIS — M15.9 PRIMARY OSTEOARTHRITIS INVOLVING MULTIPLE JOINTS: ICD-10-CM

## 2018-05-28 RX ORDER — IBUPROFEN 800 MG/1
TABLET ORAL
Qty: 60 TABLET | Refills: 3 | Status: SHIPPED | OUTPATIENT
Start: 2018-05-28 | End: 2018-12-06 | Stop reason: SDUPTHER

## 2018-06-10 DIAGNOSIS — E78.5 HYPERLIPIDEMIA LDL GOAL <100: ICD-10-CM

## 2018-06-10 RX ORDER — ATORVASTATIN CALCIUM 20 MG/1
TABLET, FILM COATED ORAL
Qty: 90 TABLET | Refills: 2 | Status: SHIPPED | OUTPATIENT
Start: 2018-06-10 | End: 2019-03-19 | Stop reason: SDUPTHER

## 2018-06-13 ENCOUNTER — CLINICAL SUPPORT (OUTPATIENT)
Dept: REHABILITATION | Facility: HOSPITAL | Age: 68
End: 2018-06-13
Payer: MEDICARE

## 2018-06-13 DIAGNOSIS — R26.89 IMPAIRED GAIT AND MOBILITY: ICD-10-CM

## 2018-06-13 DIAGNOSIS — M25.551 HIP PAIN, BILATERAL: ICD-10-CM

## 2018-06-13 DIAGNOSIS — R53.1 DECREASED STRENGTH: ICD-10-CM

## 2018-06-13 DIAGNOSIS — R26.89 DECREASED SPINAL MOBILITY: ICD-10-CM

## 2018-06-13 DIAGNOSIS — R68.89 DECREASED FUNCTIONAL ACTIVITY TOLERANCE: ICD-10-CM

## 2018-06-13 DIAGNOSIS — M25.552 HIP PAIN, BILATERAL: ICD-10-CM

## 2018-06-13 PROCEDURE — G8979 MOBILITY GOAL STATUS: HCPCS | Mod: CJ,PO

## 2018-06-13 PROCEDURE — G8978 MOBILITY CURRENT STATUS: HCPCS | Mod: CK,PO

## 2018-06-13 PROCEDURE — 97161 PT EVAL LOW COMPLEX 20 MIN: CPT | Mod: PO
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
## 2018-06-14 PROBLEM — R26.89 DECREASED SPINAL MOBILITY: Status: ACTIVE | Noted: 2018-06-14

## 2018-06-14 PROBLEM — M25.551 HIP PAIN, BILATERAL: Status: ACTIVE | Noted: 2018-06-14

## 2018-06-14 PROBLEM — R26.89 IMPAIRED GAIT AND MOBILITY: Status: ACTIVE | Noted: 2018-06-14

## 2018-06-14 PROBLEM — R68.89 DECREASED FUNCTIONAL ACTIVITY TOLERANCE: Status: ACTIVE | Noted: 2018-06-14

## 2018-06-14 PROBLEM — R53.1 DECREASED STRENGTH: Status: ACTIVE | Noted: 2018-06-14

## 2018-06-14 PROBLEM — M25.552 HIP PAIN, BILATERAL: Status: ACTIVE | Noted: 2018-06-14

## 2018-06-22 ENCOUNTER — OFFICE VISIT (OUTPATIENT)
Dept: SPINE | Facility: CLINIC | Age: 68
End: 2018-06-22
Payer: MEDICARE

## 2018-06-22 VITALS
BODY MASS INDEX: 43.95 KG/M2 | HEART RATE: 78 BPM | DIASTOLIC BLOOD PRESSURE: 79 MMHG | WEIGHT: 290 LBS | SYSTOLIC BLOOD PRESSURE: 184 MMHG | HEIGHT: 68 IN

## 2018-06-22 DIAGNOSIS — M54.41 CHRONIC BILATERAL LOW BACK PAIN WITH RIGHT-SIDED SCIATICA: ICD-10-CM

## 2018-06-22 DIAGNOSIS — M47.819 SPONDYLOSIS WITHOUT MYELOPATHY: ICD-10-CM

## 2018-06-22 DIAGNOSIS — M43.10 ACQUIRED SPONDYLOLISTHESIS: ICD-10-CM

## 2018-06-22 DIAGNOSIS — G89.29 CHRONIC BILATERAL LOW BACK PAIN WITH RIGHT-SIDED SCIATICA: ICD-10-CM

## 2018-06-22 DIAGNOSIS — M51.37 DDD (DEGENERATIVE DISC DISEASE), LUMBOSACRAL: ICD-10-CM

## 2018-06-22 PROCEDURE — 99214 OFFICE O/P EST MOD 30 MIN: CPT | Mod: S$GLB,,, | Performed by: PHYSICIAN ASSISTANT

## 2018-06-22 PROCEDURE — 99999 PR PBB SHADOW E&M-EST. PATIENT-LVL V: CPT | Mod: PBBFAC,,, | Performed by: PHYSICIAN ASSISTANT

## 2018-06-22 NOTE — PROGRESS NOTES
"Subjective:     Patient ID:  Linnea Renae is a 67 y.o. female.      Chief Complaint: Back and right leg pain    HPI    Linnea Renae is a 67 y.o. female who presents for follow up.  She had bilateral L3-4 and L4-5 facet injections done and got 100% relief of her back and right leg pain.  She then went to PT for her hip pain about one week ago and started to have back and right lateral leg pain to the midthigh region again.  This feels like a different type of pain then what she had before.  No left leg pain.    Patient denies any recent accidents or trauma, no saddle anesthesias, and no bowel or bladder incontinence.      Review of Systems:  Constitution: Negative for chills, fever, night sweats and weight loss.   Musculoskeletal: Negative for falls.   Gastrointestinal: Negative for bowel incontinence, nausea and vomiting.   Genitourinary: Negative for bladder incontinence.   Neurological: Negative for disturbances in coordination and loss of balance.      Objective:      Vitals:    06/22/18 1501   BP: (!) 184/79   Pulse: 78   Weight: 131.5 kg (290 lb)   Height: 5' 8" (1.727 m)   PainSc:   5   PainLoc: Back         Physical Exam:    General:  Linnea Renae is well-developed, well-nourished, appears stated age, in no acute distress, alert and oriented to person, place, and time.    Patient sits comfortably in the exam room and answers questions appropriately. Grossly patient is able to move bilateral lower extremities without difficulty.     MRI Interpretation:     Lumbar spine MRI was personally reviewed today.    Assessment:          1. Spondylosis without myelopathy    2. DDD (degenerative disc disease), lumbosacral    3. Acquired spondylolisthesis    4. Chronic bilateral low back pain with right-sided sciatica            Plan:          Orders Placed This Encounter    Procedure Order to [facility]    Ambulatory referral to Pain Clinic     L3-4 grade one spondylolisthesis/  Bilateral and NFS at " L4-5     -Will try bilateral L3-4 and L4-5 facet injections through the pain clinic again with Dr. Wilson.    -Will refer her to Dr. Wilson after the injections to see if she could benefit from RFA  -FU in the future for worsening pain and an updated MRI lumbar spine and xrays would be needed      Follow-Up:  Follow-up if symptoms worsen or fail to improve. If there are any questions prior to this, the patient was instructed to contact the office.       MICHELLE Mendez, PA-C  Neurosurgery  [facility]  [facility]

## 2018-07-04 DIAGNOSIS — M48.061 DEGENERATIVE LUMBAR SPINAL STENOSIS: ICD-10-CM

## 2018-07-05 RX ORDER — TIZANIDINE 4 MG/1
TABLET ORAL
Qty: 60 TABLET | Refills: 2 | Status: SHIPPED | OUTPATIENT
Start: 2018-07-05 | End: 2019-02-28 | Stop reason: SDUPTHER

## 2018-07-25 ENCOUNTER — SURGERY (OUTPATIENT)
Age: 68
End: 2018-07-25

## 2018-07-25 ENCOUNTER — HOSPITAL ENCOUNTER (OUTPATIENT)
Facility: OTHER | Age: 68
Discharge: HOME | End: 2018-07-25
Attending: ANESTHESIOLOGY | Admitting: ANESTHESIOLOGY
Payer: MEDICARE

## 2018-07-25 VITALS
RESPIRATION RATE: 18 BRPM | HEART RATE: 69 BPM | HEIGHT: 68 IN | BODY MASS INDEX: 44.41 KG/M2 | TEMPERATURE: 99 F | OXYGEN SATURATION: 97 % | SYSTOLIC BLOOD PRESSURE: 117 MMHG | DIASTOLIC BLOOD PRESSURE: 53 MMHG | WEIGHT: 293 LBS

## 2018-07-25 DIAGNOSIS — M47.816 LUMBAR SPONDYLOSIS: ICD-10-CM

## 2018-07-25 DIAGNOSIS — M47.819 SPONDYLOSIS WITHOUT MYELOPATHY: Primary | ICD-10-CM

## 2018-07-25 LAB — POCT GLUCOSE: 89 MG/DL (ref 70–110)

## 2018-07-25 PROCEDURE — 25000003 PHARM REV CODE 250: Performed by: ANESTHESIOLOGY

## 2018-07-25 PROCEDURE — 25500020 PHARM REV CODE 255: Performed by: ANESTHESIOLOGY

## 2018-07-25 PROCEDURE — 64493 INJ PARAVERT F JNT L/S 1 LEV: CPT | Mod: 50,,, | Performed by: ANESTHESIOLOGY

## 2018-07-25 PROCEDURE — 64493 INJ PARAVERT F JNT L/S 1 LEV: CPT | Mod: 50 | Performed by: ANESTHESIOLOGY

## 2018-07-25 PROCEDURE — 82947 ASSAY GLUCOSE BLOOD QUANT: CPT | Performed by: ANESTHESIOLOGY

## 2018-07-25 PROCEDURE — 64494 INJ PARAVERT F JNT L/S 2 LEV: CPT | Mod: 50,,, | Performed by: ANESTHESIOLOGY

## 2018-07-25 PROCEDURE — 64494 INJ PARAVERT F JNT L/S 2 LEV: CPT | Mod: 50 | Performed by: ANESTHESIOLOGY

## 2018-07-25 PROCEDURE — 99152 MOD SED SAME PHYS/QHP 5/>YRS: CPT | Mod: ,,, | Performed by: ANESTHESIOLOGY

## 2018-07-25 PROCEDURE — 63600175 PHARM REV CODE 636 W HCPCS: Performed by: ANESTHESIOLOGY

## 2018-07-25 RX ORDER — BUPIVACAINE HYDROCHLORIDE 2.5 MG/ML
INJECTION, SOLUTION EPIDURAL; INFILTRATION; INTRACAUDAL
Status: DISCONTINUED | OUTPATIENT
Start: 2018-07-25 | End: 2018-07-25 | Stop reason: HOSPADM

## 2018-07-25 RX ORDER — MIDAZOLAM HYDROCHLORIDE 1 MG/ML
INJECTION INTRAMUSCULAR; INTRAVENOUS
Status: DISCONTINUED | OUTPATIENT
Start: 2018-07-25 | End: 2018-07-25 | Stop reason: HOSPADM

## 2018-07-25 RX ORDER — FENTANYL CITRATE 50 UG/ML
INJECTION, SOLUTION INTRAMUSCULAR; INTRAVENOUS
Status: DISCONTINUED | OUTPATIENT
Start: 2018-07-25 | End: 2018-07-25 | Stop reason: HOSPADM

## 2018-07-25 RX ORDER — SODIUM CHLORIDE 9 MG/ML
INJECTION, SOLUTION INTRAVENOUS CONTINUOUS
Status: DISCONTINUED | OUTPATIENT
Start: 2018-07-25 | End: 2018-07-25 | Stop reason: HOSPADM

## 2018-07-25 RX ORDER — DEXAMETHASONE SODIUM PHOSPHATE 4 MG/ML
INJECTION, SOLUTION INTRA-ARTICULAR; INTRALESIONAL; INTRAMUSCULAR; INTRAVENOUS; SOFT TISSUE
Status: DISCONTINUED | OUTPATIENT
Start: 2018-07-25 | End: 2018-07-25 | Stop reason: HOSPADM

## 2018-07-25 RX ORDER — LIDOCAINE HYDROCHLORIDE 10 MG/ML
INJECTION INFILTRATION; PERINEURAL
Status: DISCONTINUED | OUTPATIENT
Start: 2018-07-25 | End: 2018-07-25 | Stop reason: HOSPADM

## 2018-07-25 RX ADMIN — BUPIVACAINE HYDROCHLORIDE 10 ML: 2.5 INJECTION, SOLUTION EPIDURAL; INFILTRATION; INTRACAUDAL; PERINEURAL at 09:07

## 2018-07-25 RX ADMIN — DEXAMETHASONE SODIUM PHOSPHATE 10 MG: 4 INJECTION, SOLUTION INTRAMUSCULAR; INTRAVENOUS at 09:07

## 2018-07-25 RX ADMIN — IOHEXOL 5 ML: 300 INJECTION, SOLUTION INTRAVENOUS at 09:07

## 2018-07-25 RX ADMIN — SODIUM CHLORIDE 100 ML/HR: 0.9 INJECTION, SOLUTION INTRAVENOUS at 08:07

## 2018-07-25 RX ADMIN — LIDOCAINE HYDROCHLORIDE 10 ML: 10 INJECTION, SOLUTION INFILTRATION; PERINEURAL at 09:07

## 2018-07-25 RX ADMIN — MIDAZOLAM HYDROCHLORIDE 1 MG: 1 INJECTION, SOLUTION INTRAMUSCULAR; INTRAVENOUS at 09:07

## 2018-07-25 RX ADMIN — FENTANYL CITRATE 50 MCG: 50 INJECTION, SOLUTION INTRAMUSCULAR; INTRAVENOUS at 09:07

## 2018-07-25 NOTE — OP NOTE
"Patient Name: Linnea Renae  MRN: [id number]    INFORMED CONSENT: The procedure, risks, benefits and options were discussed with patient. There are no contraindications to the procedure. The patient expressed understanding and agreed to proceed. The personnel performing the procedure was discussed. I verify that I personally obtained Linnea's consent prior to the start of the procedure and the signed consent can be found on the patient's chart.    Procedure Date: 07/25/2018    Anesthesia: Topical    Pre Procedure diagnosis: Spondylosis without myelopathy [M47.819]  DDD (degenerative disc disease), lumbosacral [M51.37]  Acquired spondylolisthesis [M43.10]  1. Spondylosis without myelopathy    2. Lumbar spondylosis      Post-Procedure diagnosis: SAME      Sedation: Yes - Fentanyl 100 mcg and Midazolam 2 mg    PROCEDURE: Bilateral L3-4 and L4-5 FACET JOINT INJECTION      DESCRIPTION OF PROCEDURE:The patient was brought to the procedure room.  After performing time out. IV access was obtained prior to the procedure. The patient was positioned prone on the fluoroscopy table. Continuous hemodynamic monitoring was initiated including blood pressure, EKG, and pulse oximetry. The area of the lumbar spine was prepped with chlorhexidine and draped into a sterile field.Fluoroscopy was used to identify the location of L3-4 L4-5  joints respectivly. Skin anesthesia was achieved using 4 cc of Lidocaine 1% over the injection sites. A 22 gauge, 5" spinal needle was slowly inserted at each joint using APand oblique fluoroscopic imaging. Negative aspiration for blood or CSF was confirmed. 0.5 cc of Omnipaque  dye was inserted to confirm placement A combination of 4ml bupivacaine 0.25% and 10 mg decadron was injected at all joints. The needles were removed and bleeding was nil. A sterile dressing was applied. No specimens collected. Linnea was taken back to the recovery room for further observation.     Blood Loss: Nill  Specimen: " None    Viet Wilson MD

## 2018-07-25 NOTE — DISCHARGE INSTRUCTIONS
Thank you for allowing us to care for you today. You may receive a survey about the care we provided. Your feedback is valuable and helps us provide excellent care throughout the community.     Home Care Instructions for Pain Management:    1. DIET:   You may resume your normal diet today.   2. BATHING:   You may shower with luke warm water. No tub baths or anything that will soak injection sites under water for the next 24 hours.  3. DRESSING:   You may remove your bandage today.   4. ACTIVITY LEVEL:   You may resume your normal activities 24 hrs after your procedure. Nothing strenuous today.  5. MEDICATIONS:   You may resume your normal medications today. To restart blood thinners, ask your doctor.  6. DRIVING    If you have received any sedatives by mouth today, you may not drive for 12 hours.    If you have received any sedation through your IV, you may not drive for 24 hrs.   7. SPECIAL INSTRUCTIONS:   No heat to the injection site for 24 hrs including, hot bath or shower, heating pad, moist heat, or hot tubs.    Use ice pack to injection site for any pain or discomfort.  Apply ice packs for 20 minute intervals as needed.    IF you have diabetes, be sure to monitor your blood sugar more closely. IF your injection contained steroids your blood sugar levels may become higher than normal.    If you are still having pain upon discharge:  Your pain may improve over the next 48 hours. The anesthetic (numbing medication) works immediately to 48 hours. IF your injection contained a steroid (anti-inflammatory medication), it takes approximately 3 days to start feeling relief and 7-10 days to see your greatest results from the medication. It is possible you may need subsequent injections. This would be discussed at your follow up appointment with pain management or your referring doctor.      PLEASE CALL YOUR DOCTOR IF:  1. Redness or swelling around the injection site.  2. Fever of 101 degrees or more  3. Drainage  (pus) from the injection site.  4. For any continuous bleeding (some dried blood over the incision is normal.)    FOR EMERGENCIES:   If any unusual problems or difficulties occur during clinic hours, call [phone] or [phone]. Adult Procedural Sedation Instructions    Recovery After Procedural Sedation (Adult)  You have been given medicine by vein to make you sleep during your surgery. This may have included both a pain medicine and sleeping medicine. Most of the effects have worn off. But you may still have some drowsiness for the next 6 to 8 hours.  Home care  Follow these guidelines when you get home:  · For the next 8 hours, you should be watched by a responsible adult. This person should make sure your condition is not getting worse.  · Don't drink any alcohol for the next 24 hours.  · Don't drive, operate dangerous machinery, or make important business or personal decisions during the next 24 hours.  Note: Your healthcare provider may tell you not to take any medicine by mouth for pain or sleep in the next 4 hours. These medicines may react with the medicines you were given in the hospital. This could cause a much stronger response than usual.  Follow-up care  Follow up with your healthcare provider if you are not alert and back to your usual level of activity within 12 hours.  When to seek medical advice  Call your healthcare provider right away if any of these occur:  · Drowsiness gets worse  · Weakness or dizziness gets worse  · Repeated vomiting  · You can't be awakened   Date Last Reviewed: 10/18/2016  © 0861-9187 [employer]. [address] [place] [address], [place] [address]. All rights reserved. This information is not intended as a substitute for professional medical care. Always follow your healthcare professional's instructions.

## 2018-07-25 NOTE — DISCHARGE SUMMARY
Discharge Note  Short Stay      SUMMARY     Admit Date: 7/25/2018    Attending Physician: Viet Wilson      Discharge Physician: Viet Wilson      Discharge Date: 7/25/2018 11:41 AM    Procedure(s) (LRB):  INJECTION, FACET JOINT (Bilateral)    Final Diagnosis: Spondylosis without myelopathy [M47.819]  DDD (degenerative disc disease), lumbosacral [M51.37]  Acquired spondylolisthesis [M43.10]    Disposition: Home or self care    Patient Instructions:   Discharge Medication List as of 7/25/2018  9:58 AM      CONTINUE these medications which have NOT CHANGED    Details   aspirin 81 MG Chew Take 81 mg by mouth once daily., Until Discontinued, Historical Med      atenolol (TENORMIN) 50 MG tablet Take 1 tablet (50 mg total) by mouth 2 (two) times daily., Starting Thu 3/8/2018, Normal      atorvastatin (LIPITOR) 20 MG tablet TAKE 1 TABLET BY MOUTH ONCE DAILY, Normal      blood sugar diagnostic Strp 1 strip by Misc.(Non-Drug; Combo Route) route once daily., Starting Thu 3/8/2018, Normal      blood-glucose meter kit Use as instructed, Normal      calcium-vitamin D3 500 mg(1,250mg) -200 unit per tablet Take 1 tablet by mouth 2 (two) times daily with meals. , Until Discontinued, Historical Med      captopril (CAPOTEN) 50 MG tablet TAKE TWO TABLETS BY MOUTH IN THE MORNING AND ONE TABLET IN THE EVENING, Normal      clotrimazole (LOTRIMIN) 1 % cream Apply topically 2 (two) times daily., Starting Thu 3/8/2018, Normal      cycloSPORINE (RESTASIS) 0.05 % ophthalmic emulsion 1 drop 2 (two) times daily., Until Discontinued, Historical Med      fluticasone (FLONASE) 50 mcg/actuation nasal spray 2 sprays (100 mcg total) by Each Nare route once daily., Starting Thu 3/8/2018, Normal      hydroCHLOROthiazide (HYDRODIURIL) 25 MG tablet Take 1 tablet (25 mg total) by mouth once daily., Starting Thu 3/8/2018, Normal      ibuprofen (ADVIL,MOTRIN) 800 MG tablet TAKE 1 TABLET BY MOUTH THREE TIMES DAILY AS NEEDED, Normal      lancets Misc  1 lancet by Misc.(Non-Drug; Combo Route) route once daily., Starting 1/31/2017, Until Discontinued, Normal      metFORMIN (GLUCOPHAGE-XR) 500 MG 24 hr tablet TAKE TWO TABLETS BY MOUTH ONCE DAILY WITH BREAKFAST, Normal      minoxidil (LONITEN) 2.5 MG tablet Starting Tue 2/20/2018, Historical Med      multivitamin (ONE DAILY MULTIVITAMIN) per tablet Take 1 tablet by mouth once daily., Historical Med      omeprazole (PRILOSEC) 20 MG capsule Take 1 capsule (20 mg total) by mouth once daily., Starting 2/2/2017, Until Discontinued, Normal      tiZANidine (ZANAFLEX) 4 MG tablet TAKE ONE TABLET BY MOUTH TWICE DAILY AS NEEDED FOR MUSCLE SPASM, Normal      traMADol (ULTRAM) 50 mg tablet Take 1 tablet (50 mg total) by mouth every 6 (six) hours as needed., Starting u 3/8/2018, Print                 Discharge Diagnosis: Spondylosis without myelopathy [M47.819]  DDD (degenerative disc disease), lumbosacral [M51.37]  Acquired spondylolisthesis [M43.10]  Condition on Discharge: Stable with no complications to procedure   Diet on Discharge: Same as before.  Activity: as per instruction sheet.  Discharge to: Home with a responsible adult.  Follow up: 2-4 weeks

## 2018-07-25 NOTE — H&P
"HPI    68 yo female with PMHx facet athropathy presents for L3-4, L4-5 facet injections.      No s/s infection, glucose within acceptable range, not currently on anticoagulation or anticoagulation has been held.      PMHx, PSHx, Allergies, Medications reviewed in epic    Review of Systems:  Constitution: Negative for chills, fever, night sweats and weight loss.   Musculoskeletal: Negative for falls.   Gastrointestinal: Negative for bowel incontinence, nausea and vomiting.   Genitourinary: Negative for bladder incontinence.   Neurological: Negative for disturbances in coordination and loss of balance.     OBJECTIVE:    BP (!) 153/70 (BP Location: Right arm, Patient Position: Lying)   Pulse 78   Temp 98.6 °F (37 °C) (Oral)   Resp 18   Ht 5' 8" (1.727 m)   Wt 133.4 kg (294 lb)   SpO2 100%   BMI 44.70 kg/m²     PHYSICAL EXAMINATION:    GENERAL: Well appearing, in no acute distress, alert and oriented x3.  PSYCH:  Mood and affect appropriate.  SKIN: Skin color, texture, turgor normal, no rashes or lesions.  CV: RRR with palpation of the radial artery.  PULM: No evidence of respiratory difficulty, symmetric chest rise. Clear to auscultation.  NEURO: Cranial nerves grossly intact.    Plan:    Proceed with procedure as planned    Allen Brown  07/25/2018          "

## 2018-07-30 ENCOUNTER — DOCUMENTATION ONLY (OUTPATIENT)
Dept: REHABILITATION | Facility: HOSPITAL | Age: 68
End: 2018-07-30

## 2018-07-30 NOTE — PROGRESS NOTES
PHYSICAL THERAPY  Discharge  Date of Discharge 7/30/2018    Name: Linnea PATEL [facility] #: [id number]    Pt was seen in Physical Therapy for initial evaluation on: 6/13/18  Pt's last date of treatment in Physical Therapy was: 6/13/18  Number of treatment sessions completed: 1- Eval only  Reason for discharge:  PT contacted [facility] to transfer pt's care to that location, as it was more convenient. Pt has not scheduled and/or attended more PT visits  Status at Discharge:  Goals not met     Discharge update in functional G code not available due to unplanned discharge.

## 2018-08-09 ENCOUNTER — OFFICE VISIT (OUTPATIENT)
Dept: PAIN MEDICINE | Facility: CLINIC | Age: 68
End: 2018-08-09
Attending: ANESTHESIOLOGY
Payer: MEDICARE

## 2018-08-09 VITALS
TEMPERATURE: 98 F | BODY MASS INDEX: 43.71 KG/M2 | HEART RATE: 76 BPM | DIASTOLIC BLOOD PRESSURE: 66 MMHG | SYSTOLIC BLOOD PRESSURE: 122 MMHG | HEIGHT: 68 IN | WEIGHT: 288.38 LBS

## 2018-08-09 DIAGNOSIS — M47.819 SPONDYLOSIS WITHOUT MYELOPATHY: ICD-10-CM

## 2018-08-09 DIAGNOSIS — M46.1 SACROILIITIS: ICD-10-CM

## 2018-08-09 DIAGNOSIS — M51.36 DDD (DEGENERATIVE DISC DISEASE), LUMBAR: ICD-10-CM

## 2018-08-09 DIAGNOSIS — M47.9 OSTEOARTHRITIS OF SPINE, UNSPECIFIED SPINAL OSTEOARTHRITIS COMPLICATION STATUS, UNSPECIFIED SPINAL REGION: Primary | ICD-10-CM

## 2018-08-09 PROCEDURE — 3074F SYST BP LT 130 MM HG: CPT | Mod: CPTII,S$GLB,, | Performed by: ANESTHESIOLOGY

## 2018-08-09 PROCEDURE — 99999 PR PBB SHADOW E&M-EST. PATIENT-LVL III: CPT | Mod: PBBFAC,,, | Performed by: ANESTHESIOLOGY

## 2018-08-09 PROCEDURE — 99499 UNLISTED E&M SERVICE: CPT | Mod: HCNC,S$GLB,, | Performed by: ANESTHESIOLOGY

## 2018-08-09 PROCEDURE — 3078F DIAST BP <80 MM HG: CPT | Mod: CPTII,S$GLB,, | Performed by: ANESTHESIOLOGY

## 2018-08-09 PROCEDURE — 99214 OFFICE O/P EST MOD 30 MIN: CPT | Mod: GC,S$GLB,, | Performed by: ANESTHESIOLOGY

## 2018-08-09 NOTE — PROGRESS NOTES
Chronic Pain - New Consult    Referring Physician: Neeta Allan, *    Chief Complaint: back pain       SUBJECTIVE:    Linnea Renae presents to the clinic for the evaluation of back pain. The pain started 13 years ago following no specific incident and symptoms have been improving.The pain is located in the lower back area and is localized.  The pain is described as aching, burning, sharp, shooting, throbbing, tight band and tingling and is rated as 3/10. The pain is rated with a score of  3/10 on the BEST day and a score of 10/10 on the WORST day.  Symptoms interfere with daily activity. The pain is exacerbated by Standing,walking, Bending, Morning and Getting out of bed/chair.  The pain is mitigated by rest. She reports spending 4-5 hours per day reclining. The patient reports 8 hours of uninterrupted sleep per night.    Patient was referred to us in the past for facet injections   She states that since having the bilateral facet injections she reports that her pain has significantly improved. She states that this most recent injection reduced her pain 90%. She says that she still has muscular tightness. She reports that she takes the ultram which helps if she has a slight exacerbation.         Patient denies night fever/night sweats, urinary incontinence, bowel incontinence, significant weight loss, significant motor weakness and loss of sensations.    Physical Therapy/Home Exercise: no      Pain Disability Index Review:  Last 3 PDI Scores 8/9/2018   Pain Disability Index (PDI) 31       Pain Medications:    - Opioids: Ultram (Tramadol HCL)  - Adjuvant Medications: Advil,Motrin ( Ibuprofen) and zanaflex  - Anti-Coagulants: Aspirin  - Others: see med list     report:  Reviewed and consistent with medication use as prescribed.    Pain Procedures:     Bilateral facet joint injection on July 25th of 2018.     Imaging: MRI Lumbar Spine W WO Contrast    Narrative     MRI LUMBAR SPINE W WO  CONTRAST    SUPPLIED CLINICAL HISTORY:  Radiculopathy    TECHNIQUE:  Multiplanar, multisequence images of the Lumbar Spine were obtained before and after administration of 10 mL gadavist intravenous contrast.      COMPARISON: MRI lumbar spine 11/23/2015     FINDINGS:  Alignment: There is grade 1 anterolisthesis of L3 on L4.  Normal lumbar lordosis is preserved.  Vertebrae:  Body heights are maintained. No evidence for acute fracture or compression deformity.  No marrow signal abnormality suspicious for an infiltrative process.  There is a L1 hemangioma present.  Discs:  There is desiccation in the lower lumbar spine.  Cord:  Visualized lower thoracic spinal cord, conus medullaris and lumbar spinal nerve roots demonstrate normal signal.  The conus terminates at L1.    Soft tissue structures:  No significant abnormalities.    No abnormal enhancement after administration of intravenous contrast.      T12-L1:  There is no focal disk abnormality.  No significant spinal canal stenosis.  No significant neural foraminal narrowing.    L1-2:  There is no focal disk abnormality.  No significant spinal canal stenosis.  No significant neural foraminal narrowing.    L2-3:  There is diffuse disc bulge, ligamentum flavum buckling and mild facet arthropathy causing mild spinal canal stenosis.  No significant neuroforaminal narrowing.    L3-4:  There is diffuse disc bulge, ligamentum flavum buckling and moderate facet arthropathy causing mild spinal canal stenosis.  No significant neuroforaminal narrowing.    L4-5:  There is diffuse disc bulge, ligamentum flavum buckling and moderate facet arthropathy causing severe spinal canal stenosis and mild left neuroforaminal narrowing.    L5-S1:  There is mild diffuse disc bulge and moderate facet arthropathy causing mild left neuroforaminal narrowing.  No significant spinal canal stenosis.      Impression          Lumbar spondylosis as above, grossly unchanged since prior exam, most  prominent at L4-5 with severe spinal canal stenosis at this level.    ______________________________________     Electronically signed by resident: SHIKHA MASSEY MD  Date: 16  Time: 16:54     ______________________________________     Electronically signed by: JUAN F BLANCO MD  Date: 16  Time: 17:06      X-Ray Lumbar Spine Ap Lateral w/Flex Ext    Narrative     10/11/17 10:17:19    Accession: [id number]    CLINICAL INDICATION: 67 year old F with  low back pain    COMPARISON: Lumbar spine x-rays, 2016.    TECHNIQUE: AP, lateral, flexion, extension, and coned down lateral radiographs of the lumbar spine.    FINDINGS:     Vertebral body heights are maintained.  No evidence of fracture.      Normal sagittal alignment is preserved.No significant translation with flexion-extension.    Moderate multilevel degenerative changes are again present. Mild anterolisthesis of L3 with respect to L4 is again noted. Multilevel facet arthropathy is present, most pronounced in the lower lumbar spine.  No detrimental change is identified when compared with the examination performed one year prior.      Impression         Moderate multilevel degenerative changes in the lumbar spine, similar in appearance to the prior exam.    No evidence of fracture or malalignment.      Electronically signed by: Heber Frost  Date: 10/11/17  Time: 10:37            Past Medical History:   Diagnosis Date    Allergy     Breast cancer     Lumpectomy 10/15.    Chronic constipation     Diabetes mellitus, type 2     Dry eyes     GERD (gastroesophageal reflux disease)     Hyperlipidemia     Hypertension     Lumbar disc disease     Type 2 diabetes mellitus      Past Surgical History:   Procedure Laterality Date    BREAST LUMPECTOMY Right         CATARACT EXTRACTION, BILATERAL       SECTION      x3    CHOLECYSTECTOMY      HYSTERECTOMY      and USO    INJECTION OF FACET JOINT Bilateral 2018    Procedure:  INJECTION, FACET JOINT;  Surgeon: Viet Wilson MD;  Location: [facility];  Service: Pain Management;  Laterality: Bilateral;  LUMBAR BILATERAL L3-L4 AND L4-L5 FACET STEROID INJECTION    NEED CONSENT    TUBAL LIGATION       Social History     Social History    Marital status:      Spouse name: N/A    Number of children: 3    Years of education: N/A     Occupational History    Not on file.     Social History Main Topics    Smoking status: Former Smoker     Packs/day: 0.25     Years: 20.00     Quit date: 1/1/1985    Smokeless tobacco: Never Used      Comment: Quit mid 1980's.    Alcohol use No    Drug use: No    Sexual activity: Yes     Other Topics Concern    Not on file     Social History Narrative    No narrative on file     Family History   Problem Relation Age of Onset    No Known Problems Mother     No Known Problems Father     Heart disease Paternal Grandmother     Thyroid disease Paternal Grandfather     Hypertension Sister     Hypertension Brother     Glaucoma Brother     No Known Problems Daughter     No Known Problems Daughter     No Known Problems Daughter        Review of patient's allergies indicates:   Allergen Reactions    Codeine Itching       Current Outpatient Prescriptions   Medication Sig    aspirin 81 MG Chew Take 81 mg by mouth once daily.    atenolol (TENORMIN) 50 MG tablet Take 1 tablet (50 mg total) by mouth 2 (two) times daily.    atorvastatin (LIPITOR) 20 MG tablet TAKE 1 TABLET BY MOUTH ONCE DAILY    blood sugar diagnostic Strp 1 strip by Misc.(Non-Drug; Combo Route) route once daily.    calcium-vitamin D3 500 mg(1,250mg) -200 unit per tablet Take 1 tablet by mouth 2 (two) times daily with meals.     captopril (CAPOTEN) 50 MG tablet TAKE TWO TABLETS BY MOUTH IN THE MORNING AND ONE TABLET IN THE EVENING    clotrimazole (LOTRIMIN) 1 % cream Apply topically 2 (two) times daily.    cycloSPORINE (RESTASIS) 0.05 % ophthalmic emulsion 1 drop 2 (two)  "times daily.    fluticasone (FLONASE) 50 mcg/actuation nasal spray 2 sprays (100 mcg total) by Each Nare route once daily.    hydroCHLOROthiazide (HYDRODIURIL) 25 MG tablet Take 1 tablet (25 mg total) by mouth once daily.    ibuprofen (ADVIL,MOTRIN) 800 MG tablet TAKE 1 TABLET BY MOUTH THREE TIMES DAILY AS NEEDED    lancets Misc 1 lancet by Misc.(Non-Drug; Combo Route) route once daily.    metFORMIN (GLUCOPHAGE-XR) 500 MG 24 hr tablet TAKE TWO TABLETS BY MOUTH ONCE DAILY WITH BREAKFAST    minoxidil (LONITEN) 2.5 MG tablet     multivitamin (ONE DAILY MULTIVITAMIN) per tablet Take 1 tablet by mouth once daily.    omeprazole (PRILOSEC) 20 MG capsule Take 1 capsule (20 mg total) by mouth once daily.    tiZANidine (ZANAFLEX) 4 MG tablet TAKE ONE TABLET BY MOUTH TWICE DAILY AS NEEDED FOR MUSCLE SPASM    traMADol (ULTRAM) 50 mg tablet Take 1 tablet (50 mg total) by mouth every 6 (six) hours as needed.    blood-glucose meter kit Use as instructed     No current facility-administered medications for this visit.        REVIEW OF SYSTEMS:    GENERAL:  No weight loss, malaise or fevers.  HEENT:  Negative for frequent or significant headaches.  NECK:  Negative for lumps, goiter, pain and significant neck swelling.  RESPIRATORY:  Negative for cough, wheezing or shortness of breath.  CARDIOVASCULAR:  Negative for chest pain, leg swelling or palpitations.  GI:  Negative for abdominal discomfort, blood in stools or black stools or change in bowel habits.  MUSCULOSKELETAL:  See HPI.  SKIN:  Negative for lesions, rash, and itching.  PSYCH: Positive for sleep disturbance, mood disorder and recent psychosocial stressors.  HEMATOLOGY/LYMPHOLOGY:  Negative for prolonged bleeding, bruising easily or swollen nodes.  NEURO:   No history of headaches, syncope, paralysis, seizures or tremors.  All other reviewed and negative other than HPI.    OBJECTIVE:    /66   Pulse 76   Temp 98.2 °F (36.8 °C)   Ht 5' 8" (1.727 m)   Wt " 130.8 kg (288 lb 5.8 oz)   BMI 43.85 kg/m²     PHYSICAL EXAMINATION:    General appearance: Well appearing, in no acute distress, alert and oriented x3.  Psych:  Mood and affect appropriate.  Skin: Skin color, texture, turgor normal, no rashes or lesions, in both upper and lower body.  Head/face:  Atraumatic, normocephalic. No palpable lymph nodes  Neck: No pain to palpation over the cervical paraspinous muscles. Spurling Negative. No pain with neck flexion, extension, or lateral flexion. .  Cor: RRR  Pulm: CTA  GI: Abdomen soft and non-tender.  Back: Straight leg raising in the sitting and supine positions is negative to radicular pain. Moderate pain to palpation over the spine and SI joints. Full range of motion with  pain reproduction on extension. Positive axial loading test bilateral.  Extremities: Peripheral joint ROM is full and pain free without obvious instability or laxity in all four extremities. No deformities, edema, or skin discoloration. Good capillary refill.  Musculoskeletal: Shoulder, hip and knee provocative maneuvers are negative. Bilateral upper and lower extremity strength is normal and symmetric.  No atrophy or tone abnormalities are noted.  Neuro: Bilateral upper and lower extremity coordination and muscle stretch reflexes are physiologic and symmetric.  Plantar response are downgoing. No loss of sensation is noted.  Gait:antalgic      ASSESSMENT: 67 y.o. year old female with low back pain, consistent with     1. Osteoarthritis of spine, unspecified spinal osteoarthritis complication status, unspecified spinal region  Ambulatory consult to Physical Therapy   2. Spondylosis without myelopathy  Ambulatory consult to Physical Therapy   3. Sacroiliitis  Ambulatory consult to Physical Therapy   4. DDD (degenerative disc disease), lumbar  Ambulatory consult to Physical Therapy         PLAN:     - I have stressed the importance of physical activity and a home exercise plan to help with pain and  improve health.  - Patient can continue with medications for now since they are providing benefits, using them appropriately, and without side effects.  - RTC PRN  - Depending on continued response to facet injection will schedule for mbb and rfa  - Water aerobics ordered.    - Counseled patient regarding the importance of activity modification, constant sleeping habits and physical therapy.    The above plan and management options were discussed at length with patient. Patient is in agreement with the above and verbalized understanding. It will be communicated with the referring physician via electronic record, fax, or mail.    David Coombs I have personally reviewed the history and exam of this patient and agree with the resident/fellow/NPs note as stated above.    Viet Wilson MD    08/09/2018

## 2018-08-09 NOTE — LETTER
August 15, 2018      Neeta Allan PA-C  [address]  [facility] [address]           [facility]  [address]  [facility] [address]  Phone: [phone]  Fax: [phone]          Patient: Linnea Renae   MR Number: [id number]   YOB: 1950   Date of Visit: 8/9/2018       Dear Neeta Allan:    Thank you for referring Linnea Renae to me for evaluation. Attached you will find relevant portions of my assessment and plan of care.    If you have questions, please do not hesitate to call me. I look forward to following Linnea Renae along with you.    Sincerely,    Julius Bond  CC:  No Recipients    If you would like to receive this communication electronically, please contact [email or web address] or [phone] to request more information on [facility] access.    For providers and/or their staff who would like to refer a patient to [facility], please contact us through our one-stop-shop provider referral line, [facility], at [phone].    If you feel you have received this communication in error or would no longer like to receive these types of communications, please e-mail [email or web address]

## 2018-09-03 DIAGNOSIS — M47.816 FACET ARTHROPATHY, LUMBAR: ICD-10-CM

## 2018-09-03 DIAGNOSIS — M48.061 DEGENERATIVE LUMBAR SPINAL STENOSIS: ICD-10-CM

## 2018-09-03 RX ORDER — TRAMADOL HYDROCHLORIDE 50 MG/1
TABLET ORAL
Qty: 120 TABLET | Refills: 0 | Status: SHIPPED | OUTPATIENT
Start: 2018-09-03 | End: 2018-09-17

## 2018-09-07 DIAGNOSIS — I10 ESSENTIAL HYPERTENSION: ICD-10-CM

## 2018-09-07 RX ORDER — ATENOLOL 50 MG/1
TABLET ORAL
Qty: 180 TABLET | Refills: 1 | Status: SHIPPED | OUTPATIENT
Start: 2018-09-07 | End: 2019-03-19 | Stop reason: SDUPTHER

## 2018-09-10 ENCOUNTER — HOSPITAL ENCOUNTER (OUTPATIENT)
Dept: RADIOLOGY | Facility: HOSPITAL | Age: 68
Discharge: HOME | End: 2018-09-10
Attending: SURGERY
Payer: MEDICARE

## 2018-09-10 ENCOUNTER — OFFICE VISIT (OUTPATIENT)
Dept: SURGERY | Facility: CLINIC | Age: 68
End: 2018-09-10
Payer: MEDICARE

## 2018-09-10 VITALS
SYSTOLIC BLOOD PRESSURE: 163 MMHG | WEIGHT: 288 LBS | RESPIRATION RATE: 16 BRPM | DIASTOLIC BLOOD PRESSURE: 70 MMHG | TEMPERATURE: 99 F | HEIGHT: 68 IN | HEART RATE: 94 BPM | BODY MASS INDEX: 43.65 KG/M2

## 2018-09-10 DIAGNOSIS — Z85.3 PERSONAL HISTORY OF BREAST CANCER: Primary | ICD-10-CM

## 2018-09-10 DIAGNOSIS — Z85.3 PERSONAL HISTORY OF BREAST CANCER: ICD-10-CM

## 2018-09-10 PROCEDURE — 1101F PT FALLS ASSESS-DOCD LE1/YR: CPT | Mod: CPTII,,, | Performed by: SURGERY

## 2018-09-10 PROCEDURE — 3077F SYST BP >= 140 MM HG: CPT | Mod: CPTII,,, | Performed by: SURGERY

## 2018-09-10 PROCEDURE — 3078F DIAST BP <80 MM HG: CPT | Mod: CPTII,,, | Performed by: SURGERY

## 2018-09-10 PROCEDURE — 77066 DX MAMMO INCL CAD BI: CPT | Mod: TC,PO

## 2018-09-10 PROCEDURE — 99999 PR PBB SHADOW E&M-EST. PATIENT-LVL III: CPT | Mod: PBBFAC,,, | Performed by: SURGERY

## 2018-09-10 PROCEDURE — 99213 OFFICE O/P EST LOW 20 MIN: CPT | Mod: S$PBB,,, | Performed by: SURGERY

## 2018-09-10 PROCEDURE — 99213 OFFICE O/P EST LOW 20 MIN: CPT | Mod: PBBFAC,PO | Performed by: SURGERY

## 2018-09-10 PROCEDURE — 77066 DX MAMMO INCL CAD BI: CPT | Mod: 26,,, | Performed by: RADIOLOGY

## 2018-09-10 PROCEDURE — 77062 BREAST TOMOSYNTHESIS BI: CPT | Mod: 26,,, | Performed by: RADIOLOGY

## 2018-09-10 NOTE — PROGRESS NOTES
Date of Service: 9/10/2018    MEDICAL ONCOLOGIST:    Sean Woody MD  RADIATION ONCOLOGIST:   Jerica Stanford MD      DIAGNOSIS:   This is a 68 y.o. 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    TREATMENT:   1. Right partial mastectomy without sentinel node biopsy on 10/7/2015. Radha Russell M.D. Surgical Oncology. Initially underwent excisional biopsy for atypia upstaged to DCIS at lumpectomy. Final path DICS, negative margins.   2. Declined Radiation therapy. Jerica Stanford M.D. Radiation Oncology   3. Adjuvant endocrine therapy started on 11/2015. Since discontinued 2/2 hair loss. Sean Woody M.D. Medical Oncology     HISTORY OF PRESENT ILLNESS:   Linnea Renae is a 68 y.o. female comes in for 6 month clinical breast exam.  She denies change in her breast self-exam specifically denying new masses, skin or nipple changes, or nipple discharge. Past medical and surgical history is updated without new changes. There have been no changes to family history. The patient denies constitutional symptoms of night sweats, weight loss, new headaches, visual changes, new back or bony pain, chest pain, or shortness of breath.      Interval History:  She has done well since her last clinic visit (3/5/18). Reports no interval changes. No new breast mass or complaints. Bilateral diagnostic mammogram today BIRADs 2. Followed by Dr. Kent. Previously on anastrazole but stopped secondary to side effects (hair loss)    IMAGING:   Diagnostic Mammogram (9/10/18):     Findings:  This procedure was performed using tomosynthesis.  Computer-aided detection was utilized in the interpretation of this examination.  The breasts are almost entirely fatty.      Right  There are post-surgical findings from a previous lumpectomy seen in the upper outer quadrant of the right breast in the anterior depth. There has been no interval development of a suspicious mass, microcalcification, or  "architectural distortion.      Left  There is no evidence of suspicious masses, calcifications, or other abnormal findings.     Impression:  Bilateral  There is no mammographic evidence of malignancy.     BI-RADS Category:   Overall: 2 - Benign    MEDICATIONS/ALLERGIES:     Review of patient's allergies indicates:   Allergen Reactions    Codeine Itching     Review of Systems   Constitutional: Negative for chills, fever and weight loss.   Eyes: Negative for double vision.   Respiratory: Negative for shortness of breath.    Cardiovascular: Negative for chest pain.   Gastrointestinal: Negative for nausea and vomiting.   Genitourinary: Negative for urgency.   Musculoskeletal: Positive for back pain. Negative for myalgias.   Skin: Negative for rash.   Neurological: Negative for dizziness and headaches.     PHYSICAL EXAM:     BP (!) 163/70   Pulse 94   Temp 98.7 °F (37.1 °C) (Oral)   Resp 16   Ht 5' 8" (1.727 m)   Wt 130.6 kg (288 lb)   BMI 43.79 kg/m²   General: The patient appears well and is in no acute distress.   Neuro: Alert & oriented x3.  Cardiovascular: RR  Breasts: The exam was done with the patient seated and supine. Left breast - within normal limits. No palpable masses and no abnormal skin or nipple findings. No supraclavicular or axillary lymphadenopathy on the left side.   Right breast - within normal limits. No palpable masses and no abnormal skin or nipple findings. No supraclavicular or axillary lymphadenopathy on the right side. Postsurgical changes palpated under scar from partial mastectomy.  Pulmonary: nonlabored breathing bilaterally   Abdomen: soft, nontender, nondistended. No masses.    Extremities: No clubbing or cyanosis. Bilateral full arm range of motion without  lymphedema.     ASSESSMENT:   This is a 68 y.o. female without evidence of recurrence by exam, history or imaging.       PLAN:   1. Continue to follow up with Dr. Woody.   2. Continue monthly self breast exams and call " the clinic with any changes or problems.  3. Mammogram in 1 year from now  4. Return to clinic in 6 months for clinical breast exam. The patient is in agreement with the plan. Questions were encouraged and answered to patient's satisfaction. Linnea will call our office with any questions or concerns.

## 2018-09-11 ENCOUNTER — TELEPHONE (OUTPATIENT)
Dept: INTERNAL MEDICINE | Facility: CLINIC | Age: 68
End: 2018-09-11

## 2018-09-11 NOTE — TELEPHONE ENCOUNTER
----- Message from Christie Garcia sent at 9/11/2018  9:40 AM CDT -----  Contact: PT  PT is calling regarding a follow up appointment needed for September  Epic isn't showing anything for me to schedule for her.     Callback: [phone]

## 2018-09-14 ENCOUNTER — CLINICAL SUPPORT (OUTPATIENT)
Dept: REHABILITATION | Facility: HOSPITAL | Age: 68
End: 2018-09-14
Attending: ANESTHESIOLOGY
Payer: MEDICARE

## 2018-09-14 DIAGNOSIS — G89.29 CHRONIC RIGHT-SIDED LOW BACK PAIN WITH RIGHT-SIDED SCIATICA: ICD-10-CM

## 2018-09-14 DIAGNOSIS — M54.41 CHRONIC RIGHT-SIDED LOW BACK PAIN WITH RIGHT-SIDED SCIATICA: ICD-10-CM

## 2018-09-14 DIAGNOSIS — M25.60 DECREASED RANGE OF MOTION: ICD-10-CM

## 2018-09-14 PROBLEM — M54.50 PAIN IN LOWER BACK: Status: ACTIVE | Noted: 2018-09-14

## 2018-09-14 PROCEDURE — 97110 THERAPEUTIC EXERCISES: CPT

## 2018-09-14 PROCEDURE — G8978 MOBILITY CURRENT STATUS: HCPCS | Mod: CL

## 2018-09-14 PROCEDURE — G8979 MOBILITY GOAL STATUS: HCPCS | Mod: CK

## 2018-09-14 PROCEDURE — 97161 PT EVAL LOW COMPLEX 20 MIN: CPT
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
## 2018-09-17 ENCOUNTER — IMMUNIZATION (OUTPATIENT)
Dept: INTERNAL MEDICINE | Facility: CLINIC | Age: 68
End: 2018-09-17
Payer: MEDICARE

## 2018-09-17 ENCOUNTER — OFFICE VISIT (OUTPATIENT)
Dept: INTERNAL MEDICINE | Facility: CLINIC | Age: 68
End: 2018-09-17
Payer: MEDICARE

## 2018-09-17 ENCOUNTER — LAB VISIT (OUTPATIENT)
Dept: LAB | Facility: HOSPITAL | Age: 68
End: 2018-09-17
Attending: INTERNAL MEDICINE
Payer: MEDICARE

## 2018-09-17 VITALS
SYSTOLIC BLOOD PRESSURE: 138 MMHG | DIASTOLIC BLOOD PRESSURE: 90 MMHG | WEIGHT: 293 LBS | HEIGHT: 68 IN | BODY MASS INDEX: 44.41 KG/M2 | HEART RATE: 72 BPM

## 2018-09-17 DIAGNOSIS — E78.5 HYPERLIPIDEMIA LDL GOAL <100: ICD-10-CM

## 2018-09-17 DIAGNOSIS — E11.9 TYPE 2 DIABETES MELLITUS WITHOUT COMPLICATION, WITHOUT LONG-TERM CURRENT USE OF INSULIN: Primary | ICD-10-CM

## 2018-09-17 DIAGNOSIS — Z12.11 COLON CANCER SCREENING: ICD-10-CM

## 2018-09-17 DIAGNOSIS — Z23 INFLUENZA VACCINE NEEDED: ICD-10-CM

## 2018-09-17 DIAGNOSIS — B37.2 CANDIDAL INTERTRIGO: ICD-10-CM

## 2018-09-17 DIAGNOSIS — E11.9 TYPE 2 DIABETES MELLITUS WITHOUT COMPLICATION, WITHOUT LONG-TERM CURRENT USE OF INSULIN: ICD-10-CM

## 2018-09-17 DIAGNOSIS — I10 ESSENTIAL HYPERTENSION: ICD-10-CM

## 2018-09-17 DIAGNOSIS — M48.061 DEGENERATIVE LUMBAR SPINAL STENOSIS: ICD-10-CM

## 2018-09-17 LAB
ANION GAP SERPL CALC-SCNC: 10 MMOL/L
BUN SERPL-MCNC: 12 MG/DL
CALCIUM SERPL-MCNC: 9.4 MG/DL
CHLORIDE SERPL-SCNC: 104 MMOL/L
CO2 SERPL-SCNC: 25 MMOL/L
CREAT SERPL-MCNC: 0.7 MG/DL
EST. GFR  (AFRICAN AMERICAN): >60 ML/MIN/1.73 M^2
EST. GFR  (NON AFRICAN AMERICAN): >60 ML/MIN/1.73 M^2
ESTIMATED AVG GLUCOSE: 128 MG/DL
GLUCOSE SERPL-MCNC: 100 MG/DL
HBA1C MFR BLD HPLC: 6.1 %
POTASSIUM SERPL-SCNC: 3.7 MMOL/L
SODIUM SERPL-SCNC: 139 MMOL/L

## 2018-09-17 PROCEDURE — 80048 BASIC METABOLIC PNL TOTAL CA: CPT

## 2018-09-17 PROCEDURE — 83036 HEMOGLOBIN GLYCOSYLATED A1C: CPT

## 2018-09-17 PROCEDURE — 90662 IIV NO PRSV INCREASED AG IM: CPT | Mod: PBBFAC

## 2018-09-17 PROCEDURE — 36415 COLL VENOUS BLD VENIPUNCTURE: CPT

## 2018-09-17 PROCEDURE — 3044F HG A1C LEVEL LT 7.0%: CPT | Mod: CPTII,,, | Performed by: INTERNAL MEDICINE

## 2018-09-17 PROCEDURE — 99214 OFFICE O/P EST MOD 30 MIN: CPT | Mod: S$PBB,,, | Performed by: INTERNAL MEDICINE

## 2018-09-17 PROCEDURE — 1101F PT FALLS ASSESS-DOCD LE1/YR: CPT | Mod: CPTII,,, | Performed by: INTERNAL MEDICINE

## 2018-09-17 PROCEDURE — 3080F DIAST BP >= 90 MM HG: CPT | Mod: CPTII,,, | Performed by: INTERNAL MEDICINE

## 2018-09-17 PROCEDURE — 99214 OFFICE O/P EST MOD 30 MIN: CPT | Mod: PBBFAC | Performed by: INTERNAL MEDICINE

## 2018-09-17 PROCEDURE — 99499 UNLISTED E&M SERVICE: CPT | Mod: HCNC,S$GLB,, | Performed by: INTERNAL MEDICINE

## 2018-09-17 PROCEDURE — 99999 PR PBB SHADOW E&M-EST. PATIENT-LVL IV: CPT | Mod: PBBFAC,,, | Performed by: INTERNAL MEDICINE

## 2018-09-17 PROCEDURE — 3075F SYST BP GE 130 - 139MM HG: CPT | Mod: CPTII,,, | Performed by: INTERNAL MEDICINE

## 2018-09-17 RX ORDER — HYDROCHLOROTHIAZIDE 25 MG/1
25 TABLET ORAL DAILY
Qty: 90 TABLET | Refills: 2 | Status: SHIPPED | OUTPATIENT
Start: 2018-09-17 | End: 2019-03-19 | Stop reason: SDUPTHER

## 2018-09-17 RX ORDER — CLOTRIMAZOLE AND BETAMETHASONE DIPROPIONATE 10; .64 MG/G; MG/G
CREAM TOPICAL 2 TIMES DAILY
COMMUNITY
End: 2018-09-17

## 2018-09-17 RX ORDER — METFORMIN HYDROCHLORIDE 500 MG/1
TABLET, EXTENDED RELEASE ORAL
Qty: 180 TABLET | Refills: 2 | Status: SHIPPED | OUTPATIENT
Start: 2018-09-17 | End: 2019-03-19 | Stop reason: SDUPTHER

## 2018-09-17 RX ORDER — KETOCONAZOLE 20 MG/G
CREAM TOPICAL DAILY
Qty: 60 G | Refills: 3 | Status: SHIPPED | OUTPATIENT
Start: 2018-09-17 | End: 2019-04-01 | Stop reason: SDUPTHER

## 2018-09-17 RX ORDER — CAPTOPRIL 50 MG/1
TABLET ORAL
Qty: 270 TABLET | Refills: 2 | Status: SHIPPED | OUTPATIENT
Start: 2018-09-17 | End: 2019-03-19 | Stop reason: SDUPTHER

## 2018-09-17 RX ORDER — HYDROCODONE BITARTRATE AND ACETAMINOPHEN 5; 325 MG/1; MG/1
1 TABLET ORAL EVERY 8 HOURS PRN
Qty: 90 TABLET | Refills: 0 | Status: SHIPPED | OUTPATIENT
Start: 2018-09-17 | End: 2018-11-28 | Stop reason: ALTCHOICE

## 2018-09-17 NOTE — PROGRESS NOTES
Subjective:       Patient ID: Linnea Renae is a 68 y.o. female.    Chief Complaint: Hypertension and Diabetes    Last seen 6 months ago for annual exam with hypertension, diabetes and cholesterol all controlled. Returns for scheduled f/u chronic medical conditions. C/O intermittent rash under panniculus has not resolved with Clotrimazole cream. Some involvement in the gluteal crease as well. She tends to wear a lot of synthetic fabrics in restrictive styles, I suggested loose fitting cotton clothing, and change promptly if perspiring. Severe back pain persists, very temporary relief with facet injections she received twice this summer. Little relief with Tramadol, she requests something stronger. Her pain is aggravated by  physical therapy she is doing now.     PMH: .   Hypertension.  Diabetes Type 2, last HbA1c 5.9% .  Hyperlipidemia. TChol 134, , HDL 41, LDL 68 .  GERD.  Lumbar Spinal Stenosis seen [facility].   Chronic Dry Eyes.   Perennial Allergic Rhinitis.  Morbid Obesity, BMI up to 46. TSH normal 1.13 .   Right Breast Cancer diagnosed 10/15.    PSH: C/S x 3, BTL, Hysterectomy and USO, Bilateral Cataracts extracted, Cholecystectomy. Right breast excisional biopsy .    Mammogram normal , no recent Pelvic exam. BMD normal 8/15. Eye exam  - goes twice a year. Podiatry 3/18. Pneumovax . Prevnar 8/15. Zostavax 3/16. Flu shot 10/17. Colonoscopy  - 3 polyps removed, at [facility].     Social: Remote tobacco use, quit over 30 years ago. No alcohol.  with three daughters and three grandchildren. Homemaker.     FMH: Father living age 83 in good health. Mother  young, cause unknown. CHF in PGM, Thyroid disease in PGF.     NKDA.     Medications: list reviewed and reconciled.               Review of Systems   Constitutional: Negative for chills, diaphoresis and fever.   Eyes: Negative for pain and visual disturbance.    Respiratory: Negative for cough and shortness of breath.    Cardiovascular: Negative for chest pain, palpitations and leg swelling.   Gastrointestinal: Negative for abdominal pain, diarrhea, nausea and vomiting.   Genitourinary: Negative for dysuria and frequency.   Musculoskeletal: Positive for arthralgias and myalgias. Negative for gait problem and joint swelling.   Skin: Positive for rash. Negative for wound.   Neurological: Negative for dizziness, syncope, weakness and headaches.   Psychiatric/Behavioral: Negative for agitation, confusion and dysphoric mood. The patient is not nervous/anxious.        Objective:    /90, Pulse 72, Wt 293 lbs, BMI=44.6  Physical Exam   Constitutional: She is oriented to person, place, and time. She appears well-nourished. No distress.   HENT:   Nose: Nose normal.   Mouth/Throat: Oropharynx is clear and moist.   Eyes: Conjunctivae and EOM are normal.   Cardiovascular: Normal rate, regular rhythm, normal heart sounds and intact distal pulses.   Pulmonary/Chest: Effort normal and breath sounds normal. No respiratory distress. She has no wheezes. She has no rales.   Abdominal: Soft. Bowel sounds are normal. She exhibits no distension. There is no tenderness.   Musculoskeletal: Normal range of motion. She exhibits no edema.   Neurological: She is alert and oriented to person, place, and time. She displays normal reflexes. No cranial nerve deficit. She exhibits normal muscle tone.   Skin: Skin is warm and dry.   Small areas of faint erythema under central panniculus, no induration, purulence.    Psychiatric: She has a normal mood and affect. Her behavior is normal. Judgment and thought content normal.       Assessment:       1. Type 2 diabetes mellitus without complication, without long-term current use of insulin    2. Essential hypertension    3. Hyperlipidemia LDL goal <100    4. Degenerative lumbar spinal stenosis        Plan:       Type 2 diabetes mellitus without  complication, without long-term current use of insulin  -     Hemoglobin A1c; Future; Expected date: 09/17/2018  -     metFORMIN (GLUCOPHAGE-XR) 500 MG 24 hr tablet; TAKE TWO TABLETS BY MOUTH ONCE DAILY WITH BREAKFAST  Dispense: 180 tablet; Refill: 2  -     Ambulatory Referral to Podiatry    Essential hypertension - usually well controlled, continue same.   -     Basic metabolic panel; Future; Expected date: 09/17/2018  -     captopril (CAPOTEN) 50 MG tablet; TAKE TWO TABLETS BY MOUTH IN THE MORNING AND ONE TABLET IN THE EVENING  Dispense: 270 tablet; Refill: 2  -     hydroCHLOROthiazide (HYDRODIURIL) 25 MG tablet; Take 1 tablet (25 mg total) by mouth once daily.  Dispense: 90 tablet; Refill: 2    Hyperlipidemia LDL goal <100 - at goal, continue same.     Degenerative lumbar spinal stenosis        -     Stop Tramadol per request.   -     HYDROcodone-acetaminophen (NORCO) 5-325 mg per tablet; Take 1 tablet by mouth every 8 (eight) hours as needed for Pain.  Dispense: 90 tablet; Refill: 0    Candidal intertrigo  -     ketoconazole (NIZORAL) 2 % cream; Apply topically once daily.  Dispense: 60 g; Refill: 3    Colon cancer screening  -     Case request GI: COLONOSCOPY    Influenza vaccine needed - Flu shot today.

## 2018-09-19 ENCOUNTER — CLINICAL SUPPORT (OUTPATIENT)
Dept: REHABILITATION | Facility: HOSPITAL | Age: 68
End: 2018-09-19
Attending: ANESTHESIOLOGY
Payer: MEDICARE

## 2018-09-19 DIAGNOSIS — G89.29 CHRONIC RIGHT-SIDED LOW BACK PAIN WITH RIGHT-SIDED SCIATICA: Primary | ICD-10-CM

## 2018-09-19 DIAGNOSIS — M54.41 CHRONIC RIGHT-SIDED LOW BACK PAIN WITH RIGHT-SIDED SCIATICA: Primary | ICD-10-CM

## 2018-09-19 DIAGNOSIS — M25.60 DECREASED RANGE OF MOTION: ICD-10-CM

## 2018-09-19 PROCEDURE — 97113 AQUATIC THERAPY/EXERCISES: CPT

## 2018-09-19 NOTE — PROGRESS NOTES
"[facility]  Physical Therapy Treatment     Name: Linnea Renae  Clinic Number: [id number]     Therapy Diagnosis:        Encounter Diagnoses   Name Primary?    Chronic right-sided low back pain with right-sided sciatica      Decreased range of motion        Physician: Viet Wilson MD     Physician Orders: Aquatic Therapy   Medical Diagnosis:   M47.9 (ICD-10-CM) - Osteoarthritis of spine, unspecified spinal osteoarthritis complication status, unspecified spinal region   M47.819 (ICD-10-CM) - Spondylosis without myelopathy   M46.1 (ICD-10-CM) - Sacroiliitis   M51.36 (ICD-10-CM) - DDD (degenerative disc disease), lumbar         Evaluation Date: 9/14/2018  Authorization Period Expiration: 12/31/2018  Plan of Care Certification Period: 11/10/2018  Visit # / Visits authorized: 2/20     Time In: 1120  Time Out: 1220  Total Billable Time: 60 minutes     Precautions: standard fear of water    Subjective  Pt reports: LBP that extends to LLE all the way to foot; "I have not moved this much in years."   Pt pain level: 7/10    Objective    Treatment: Pt was instructed in and performed therapeutic exercises to develop core/hip stabilization, BLE flexibility/strengthening, posture correction, and lumbar mobility  for 60 minutes. Patient performed therapeutic exercises consisting of the following exercises:    Warm-up Laps 2 x each  fwd/bkw/lat     Stretches: 2 x 30 sec  HS  Piriformis   Seated trunk flexion lumbar stretch w/ noodle 2 min     LE exs: 20x each   Mini Squats with QS  Heel Raise with GS  Hip flx/ext  Hip abd/add  Lunges fwd/lat  Hip cw/ccw circles    UE exs: 20x each  Shld flex/ext apo   Shld abd/add apo  Lat pull ytb     Endurance: 2 min  Marching    Cool down laps 1 x each  fwd/bkw/lat    Patient was not issued HEP for pool.      Assessment  Pt's tolerated initial aquatic tx well w/ no increase in symptoms.  Tx focused on BLE flexibility/strengthening, lumbar mobility and hip/core " stabilization.  Pt could also benefit from performing some balance activities in the aquatic setting in order to promote core activation and increase gait stability. Will progress as tolerated. Patient will continue to benefit from skilled PT intervention.     Linnea Renae is making good progress towards established goals.    Anticipated barriers to physical therapy: None    Goals:  Short Term Goals: 4 weeks   I HEP  Pt will demonsrate I in body mechanics and posture to improve symptoms  Pt will report pain 5/10 at worst  Long Term Goals: 8-12 weeks   foto 52% limitation or less to show improvement in function  Pt will demonstrate 5/5 strength in BLE to improve pain and function  Pt will be able to walk for 30 mins without rest and increase in symptoms to show improvement in function    Plan  Continue POC established by PT.

## 2018-09-21 DIAGNOSIS — Z12.11 SCREEN FOR COLON CANCER: Primary | ICD-10-CM

## 2018-09-21 RX ORDER — POLYETHYLENE GLYCOL 3350, SODIUM SULFATE ANHYDROUS, SODIUM BICARBONATE, SODIUM CHLORIDE, POTASSIUM CHLORIDE 236; 22.74; 6.74; 5.86; 2.97 G/4L; G/4L; G/4L; G/4L; G/4L
4 POWDER, FOR SOLUTION ORAL ONCE
Qty: 4000 ML | Refills: 0 | Status: SHIPPED | OUTPATIENT
Start: 2018-09-21 | End: 2018-10-03

## 2018-09-23 ENCOUNTER — PATIENT MESSAGE (OUTPATIENT)
Dept: INTERNAL MEDICINE | Facility: CLINIC | Age: 68
End: 2018-09-23

## 2018-09-24 DIAGNOSIS — R21 RASH OF NECK: ICD-10-CM

## 2018-09-24 RX ORDER — TRIAMCINOLONE ACETONIDE 1 MG/G
CREAM TOPICAL
Refills: 0 | OUTPATIENT
Start: 2018-09-24

## 2018-09-25 ENCOUNTER — CLINICAL SUPPORT (OUTPATIENT)
Dept: REHABILITATION | Facility: HOSPITAL | Age: 68
End: 2018-09-25
Attending: ANESTHESIOLOGY
Payer: MEDICARE

## 2018-09-25 DIAGNOSIS — M54.5 LOW BACK PAIN, UNSPECIFIED BACK PAIN LATERALITY, UNSPECIFIED CHRONICITY, WITH SCIATICA PRESENCE UNSPECIFIED: ICD-10-CM

## 2018-09-25 DIAGNOSIS — M25.60 DECREASED RANGE OF MOTION: ICD-10-CM

## 2018-09-25 PROCEDURE — 97113 AQUATIC THERAPY/EXERCISES: CPT

## 2018-09-25 NOTE — PROGRESS NOTES
"[facility]  Physical Therapy Treatment     Name: Linnea Renae  Clinic Number: [id number]     Therapy Diagnosis:        Encounter Diagnoses   Name Primary?    Chronic right-sided low back pain with right-sided sciatica      Decreased range of motion        Physician: Viet Wilson MD     Physician Orders: Aquatic Therapy   Medical Diagnosis:   M47.9 (ICD-10-CM) - Osteoarthritis of spine, unspecified spinal osteoarthritis complication status, unspecified spinal region   M47.819 (ICD-10-CM) - Spondylosis without myelopathy   M46.1 (ICD-10-CM) - Sacroiliitis   M51.36 (ICD-10-CM) - DDD (degenerative disc disease), lumbar         Evaluation Date: 9/14/2018  Authorization Period Expiration: 12/31/2018  Plan of Care Certification Period: 11/10/2018  Visit # / Visits authorized: 3/20     Time In: 1050  Time Out: 1200  Total Billable Time: 70 minutes     Precautions: standard fear of water    Subjective  Pt reports: I had more relief after the last treatment than I have had in years.  I felt good for a few days, "then it all came back".  Pt complains of pain from "head to toe" today.    Pt pain level: 8/10    Objective    Treatment: Pt was instructed in and performed therapeutic exercises to develop core/hip stabilization, BLE flexibility/strengthening, posture correction, and lumbar mobility  for 60 minutes. Patient performed therapeutic exercises consisting of the following exercises:    Warm-up Laps 2 x each  fwd/bkw/lat     Stretches: 2 x 30 sec  HS  Piriformis   Seated trunk flexion lumbar stretch w/ noodle 2 min     LE exs: 20x each   Mini Squats with QS  Heel Raise with GS  Hip flx/ext  Hip abd/add  Lunges lat  Hip cw/ccw circles - np    UE exs: 20x each  Shld flex/ext apo   Shld abd/add apo  Lat pull ytb - np     Endurance: 2 min  Marching    Cool down laps 1 x each  fwd/bkw/lat    Patient was not issued HEP for pool.      Assessment  Pt's tolerated  aquatic tx well w/ no increase in " symptoms.  Tx focused on BLE flexibility/strengthening, lumbar mobility and hip/core stabilization.  She continues to be uneasy in pool requiring HHA on rails and moving very slowly.  Pt could also benefit from performing some balance activities in the aquatic setting in order to promote core activation and increase gait stability. Will progress as tolerated. Patient will continue to benefit from skilled PT intervention.     Linnea Renae is making good progress towards established goals.    Anticipated barriers to physical therapy: None    Goals:  Short Term Goals: 4 weeks   I HEP  Pt will demonsrate I in body mechanics and posture to improve symptoms  Pt will report pain 5/10 at worst  Long Term Goals: 8-12 weeks   foto 52% limitation or less to show improvement in function  Pt will demonstrate 5/5 strength in BLE to improve pain and function  Pt will be able to walk for 30 mins without rest and increase in symptoms to show improvement in function    Plan  Continue POC established by PT.

## 2018-10-02 ENCOUNTER — CLINICAL SUPPORT (OUTPATIENT)
Dept: REHABILITATION | Facility: HOSPITAL | Age: 68
End: 2018-10-02
Attending: ANESTHESIOLOGY
Payer: MEDICARE

## 2018-10-02 ENCOUNTER — TELEPHONE (OUTPATIENT)
Dept: INTERNAL MEDICINE | Facility: CLINIC | Age: 68
End: 2018-10-02

## 2018-10-02 DIAGNOSIS — B37.2 CANDIDAL INTERTRIGO: Primary | ICD-10-CM

## 2018-10-02 DIAGNOSIS — M25.60 DECREASED RANGE OF MOTION: ICD-10-CM

## 2018-10-02 DIAGNOSIS — M54.5 LOW BACK PAIN, UNSPECIFIED BACK PAIN LATERALITY, UNSPECIFIED CHRONICITY, WITH SCIATICA PRESENCE UNSPECIFIED: ICD-10-CM

## 2018-10-02 PROCEDURE — 97113 AQUATIC THERAPY/EXERCISES: CPT

## 2018-10-02 RX ORDER — FLUCONAZOLE 150 MG/1
150 TABLET ORAL DAILY
Qty: 3 TABLET | Refills: 0 | Status: SHIPPED | OUTPATIENT
Start: 2018-10-02 | End: 2018-10-05

## 2018-10-02 NOTE — PROGRESS NOTES
[facility]  Physical Therapy Treatment     Name: Linnea Renae  Clinic Number: [id number]     Therapy Diagnosis:        Encounter Diagnoses   Name Primary?    Chronic right-sided low back pain with right-sided sciatica      Decreased range of motion        Physician: Viet Wilson MD     Physician Orders: Aquatic Therapy   Medical Diagnosis:   M47.9 (ICD-10-CM) - Osteoarthritis of spine, unspecified spinal osteoarthritis complication status, unspecified spinal region   M47.819 (ICD-10-CM) - Spondylosis without myelopathy   M46.1 (ICD-10-CM) - Sacroiliitis   M51.36 (ICD-10-CM) - DDD (degenerative disc disease), lumbar         Evaluation Date: 9/14/2018  Authorization Period Expiration: 12/31/2018  Plan of Care Certification Period: 11/10/2018  Visit # / Visits authorized: 4/20     Time In: 1400  Time Out: 1500  Total Billable Time: 60 minutes     Precautions: standard fear of water    Subjective  Pt reports: She has a lot of pain today in the back. After last visit, she had a rash along her right groin skin fold which was very sore and made her limp around, flaring up her back.    Pt pain level: 8/10    Objective    Treatment: Pt was instructed in and performed therapeutic exercises to develop core/hip stabilization, BLE flexibility/strengthening, posture correction, and lumbar mobility  for 60 minutes. Patient performed therapeutic exercises consisting of the following exercises:    Warm-up Laps 2 x each  fwd/bkw/lat     Stretches: 2 x 30 sec  HS  Piriformis   Seated trunk flexion lumbar stretch w/ noodle 2 min     LE exs: 20x each   Mini Squats with QS  Heel Raise with GS  Hip flx/ext  Hip abd/add  LAQ with hip flex  Lunges lat  Hip cw/ccw circles - np    UE exs: 20x each  Shld flex/ext apo   Shld abd/add apo  Lat pull ytb - np     Endurance: 2 min  Marching    Cool down laps 1 x each  fwd/bkw/lat    Patient was not issued HEP for pool.    Educated pt on skin care, washing chlorine  off after shower and making sure the area is dry.     Assessment  Pt's tolerated  aquatic tx well with a decrease in back and hip symptoms afterwards.   Tx focused on BLE flexibility/strengthening, lumbar mobility and hip/core stabilization.  She is beginning to decrease UE support in the pool and use more core activation for balance. .  Pt could also benefit from adding more  balance activities in the aquatic setting in order to promote core activation and increase gait stability. Will progress as tolerated. Patient will continue to benefit from skilled PT intervention.     Linnea Renae is making good progress towards established goals.    Anticipated barriers to physical therapy: None    Goals:  Short Term Goals: 4 weeks   I HEP  Pt will demonsrate I in body mechanics and posture to improve symptoms  Pt will report pain 5/10 at worst  Long Term Goals: 8-12 weeks   foto 52% limitation or less to show improvement in function  Pt will demonstrate 5/5 strength in BLE to improve pain and function  Pt will be able to walk for 30 mins without rest and increase in symptoms to show improvement in function    Plan  Continue POC established by PT.

## 2018-10-02 NOTE — TELEPHONE ENCOUNTER
Patient here in the office accompanying her  for a visit, c/o worsening rash under panniculus more inflamed and painful but no swelling/pus to suggest abscess. Ketoconazole cream not helping. Ordered oral Diflucan today.

## 2018-10-04 RX ORDER — TRAMADOL HYDROCHLORIDE 50 MG/1
TABLET ORAL
Qty: 120 TABLET | Refills: 0 | OUTPATIENT
Start: 2018-10-04

## 2018-10-05 ENCOUNTER — CLINICAL SUPPORT (OUTPATIENT)
Dept: REHABILITATION | Facility: HOSPITAL | Age: 68
End: 2018-10-05
Attending: ANESTHESIOLOGY
Payer: MEDICARE

## 2018-10-05 DIAGNOSIS — M54.5 LOW BACK PAIN, UNSPECIFIED BACK PAIN LATERALITY, UNSPECIFIED CHRONICITY, WITH SCIATICA PRESENCE UNSPECIFIED: ICD-10-CM

## 2018-10-05 DIAGNOSIS — M25.60 DECREASED RANGE OF MOTION: ICD-10-CM

## 2018-10-05 PROCEDURE — 97113 AQUATIC THERAPY/EXERCISES: CPT

## 2018-10-05 RX ORDER — TRAMADOL HYDROCHLORIDE 50 MG/1
TABLET ORAL
Qty: 120 TABLET | Refills: 0 | OUTPATIENT
Start: 2018-10-05

## 2018-10-05 NOTE — TELEPHONE ENCOUNTER
I denied Tramadol refill yesterday because she asked me to change her treatment when last seen, now has Hydrocodone. What's the deal? I don't see any messages explaining this. Definitely can't have both. Will need to wait until the 17th if she's trying to switch back.

## 2018-10-05 NOTE — TELEPHONE ENCOUNTER
Pt requested the refill   Request denied per Dr. Stringer, pharmacy informed   Pt has alternative pain med

## 2018-10-05 NOTE — PROGRESS NOTES
[facility]  Physical Therapy Treatment     Name: Linnea Renae  Clinic Number: [id number]     Therapy Diagnosis:        Encounter Diagnoses   Name Primary?    Chronic right-sided low back pain with right-sided sciatica      Decreased range of motion        Physician: Viet Wilson MD     Physician Orders: Aquatic Therapy   Medical Diagnosis:   M47.9 (ICD-10-CM) - Osteoarthritis of spine, unspecified spinal osteoarthritis complication status, unspecified spinal region   M47.819 (ICD-10-CM) - Spondylosis without myelopathy   M46.1 (ICD-10-CM) - Sacroiliitis   M51.36 (ICD-10-CM) - DDD (degenerative disc disease), lumbar         Evaluation Date: 9/14/2018  Authorization Period Expiration: 12/31/2018  Plan of Care Certification Period: 11/10/2018  Visit # / Visits authorized: 5/20     Time In: 1100 Time Out: 1200   Total Billable Time: 60 minutes     Precautions: standard fear of water    Subjective  Pt reports: She is feeling better today.  She did not have any skin problems after the last visit  Pt pain level: 6/10    Objective    Treatment: Pt was instructed in and performed therapeutic exercises to develop core/hip stabilization, BLE flexibility/strengthening, posture correction, and lumbar mobility  for 60 minutes. Patient performed therapeutic exercises consisting of the following exercises:    Warm-up Laps 3 x each  fwd/bkw/lat     Stretches: 2 x 30 sec  HS  Piriformis   Seated trunk flexion lumbar stretch w/ noodle 2 min     LE exs: 25x each   Mini Squats with QS  Heel Raise with GS  Hip flx/ext  Hip abd/add  LAQ with hip flex  Ham curls with hip ext   Lunges lat  Hip cw/ccw circles - np    UE exs: 20x each  Shld flex/ext apo   Shld abd/add apo  Lat pull ytb - np     Endurance: 2 min  Marching    Cool down laps 1 x each  fwd/bkw/lat    Patient was not issued HEP for pool.    Educated pt on skin care, washing chlorine off after shower and making sure the area is dry.      Assessment  Pt's tolerated  aquatic tx well with a decrease in back and hip symptoms afterwards.  She tolerated progression and was able to lateral lunges without UE support.    Tx focused on BLE flexibility/strengthening, lumbar mobility and hip/core stabilization.  She is beginning to decrease UE support in the pool and use more core activation for balance. .  Pt could also benefit from adding more  balance activities in the aquatic setting in order to promote core activation and increase gait stability. Will progress as tolerated. Patient will continue to benefit from skilled PT intervention.     Linnea Renae is making good progress towards established goals.    Anticipated barriers to physical therapy: None    Goals:  Short Term Goals: 4 weeks   I HEP  Pt will demonsrate I in body mechanics and posture to improve symptoms  Pt will report pain 5/10 at worst  Long Term Goals: 8-12 weeks   foto 52% limitation or less to show improvement in function  Pt will demonstrate 5/5 strength in BLE to improve pain and function  Pt will be able to walk for 30 mins without rest and increase in symptoms to show improvement in function    Plan  Continue POC established by PT.

## 2018-10-06 ENCOUNTER — PATIENT MESSAGE (OUTPATIENT)
Dept: INTERNAL MEDICINE | Facility: CLINIC | Age: 68
End: 2018-10-06

## 2018-10-08 ENCOUNTER — CLINICAL SUPPORT (OUTPATIENT)
Dept: REHABILITATION | Facility: HOSPITAL | Age: 68
End: 2018-10-08
Attending: ANESTHESIOLOGY
Payer: MEDICARE

## 2018-10-08 ENCOUNTER — PATIENT MESSAGE (OUTPATIENT)
Dept: INTERNAL MEDICINE | Facility: CLINIC | Age: 68
End: 2018-10-08

## 2018-10-08 DIAGNOSIS — M54.5 LOW BACK PAIN, UNSPECIFIED BACK PAIN LATERALITY, UNSPECIFIED CHRONICITY, WITH SCIATICA PRESENCE UNSPECIFIED: ICD-10-CM

## 2018-10-08 DIAGNOSIS — M25.60 DECREASED RANGE OF MOTION: ICD-10-CM

## 2018-10-08 DIAGNOSIS — M48.061 DEGENERATIVE LUMBAR SPINAL STENOSIS: Primary | ICD-10-CM

## 2018-10-08 PROCEDURE — 97113 AQUATIC THERAPY/EXERCISES: CPT

## 2018-10-08 RX ORDER — TRAMADOL HYDROCHLORIDE 50 MG/1
50 TABLET ORAL EVERY 6 HOURS PRN
OUTPATIENT
Start: 2018-10-08 | End: 2018-10-18

## 2018-10-08 RX ORDER — TRAMADOL HYDROCHLORIDE 50 MG/1
50 TABLET ORAL EVERY 8 HOURS PRN
Qty: 60 TABLET | Refills: 0 | Status: SHIPPED | OUTPATIENT
Start: 2018-10-08 | End: 2018-10-25 | Stop reason: SDUPTHER

## 2018-10-08 NOTE — TELEPHONE ENCOUNTER
"Was on Tramadol, changed per HER request to "something stronger". Got 90 tabs of Norco two weeks ago, then turned around and asked for Tramadol again.     I will authorize Tramadol but it will be a smaller quantity because she shouldn't have both meds at the same time,  and she needs to SAVE her Hydrocodone for the severe pain and NOT give these pills away!!! Please CALL IN the Tramadol to [facility].   "

## 2018-10-08 NOTE — PROGRESS NOTES
[facility]  Physical Therapy Treatment     Name: Linnea Renae  Clinic Number: [id number]     Therapy Diagnosis:        Encounter Diagnoses   Name Primary?    Chronic right-sided low back pain with right-sided sciatica      Decreased range of motion        Physician: Viet Wilson MD     Physician Orders: Aquatic Therapy   Medical Diagnosis:   M47.9 (ICD-10-CM) - Osteoarthritis of spine, unspecified spinal osteoarthritis complication status, unspecified spinal region   M47.819 (ICD-10-CM) - Spondylosis without myelopathy   M46.1 (ICD-10-CM) - Sacroiliitis   M51.36 (ICD-10-CM) - DDD (degenerative disc disease), lumbar         Evaluation Date: 9/14/2018  Authorization Period Expiration: 12/31/2018  Plan of Care Certification Period: 11/10/2018  Visit # / Visits authorized: 5/20     Time In: 1100 Time Out: 1200   Total Billable Time: 60 minutes     Precautions: standard fear of water    Subjective  Pt reports: She is feeling better today.  She did not have any skin problems after the last visit  Pt pain level: 6/10    Objective    Treatment: Pt was instructed in and performed therapeutic exercises to develop core/hip stabilization, BLE flexibility/strengthening, posture correction, and lumbar mobility  for 60 minutes. Patient performed therapeutic exercises consisting of the following exercises:    Warm-up Laps 3 x each  fwd/bkw/lat     Stretches: 2 x 30 sec  HS  Piriformis   Seated trunk flexion lumbar stretch w/ noodle 2 min     LE exs: 25x each   Mini Squats with QS  Heel Raise with GS  Hip flx/ext - np  Hip abd/add  LAQ with hip flex  Ham curls with hip ext   Lunges lat - np  Hip cw/ccw circles - np    UE exs: 20x each  Shld flex/ext apo   Shld abd/add apo  Lat pull ytb -      Endurance: 2 min  Marching    Cool down laps 1 x each  fwd/bkw/lat    Patient was not issued HEP for pool.    Educated pt on skin care, washing chlorine off after shower and making sure the area is dry.      Assessment  Pt's was moving slowly throughout treatment.  She has having increased back tightness with standing exercises.  Encouraged pt to increase speed next visit to help increase endurance and blood flow to decrease tightness.    Will progress as tolerated. Patient will continue to benefit from skilled PT intervention.     Linnea PATEL Marianojavier is making good progress towards established goals.    Anticipated barriers to physical therapy: None    Goals:  Short Term Goals: 4 weeks   I HEP  Pt will demonsrate I in body mechanics and posture to improve symptoms  Pt will report pain 5/10 at worst  Long Term Goals: 8-12 weeks   foto 52% limitation or less to show improvement in function  Pt will demonstrate 5/5 strength in BLE to improve pain and function  Pt will be able to walk for 30 mins without rest and increase in symptoms to show improvement in function    Plan  Continue POC established by PT.     Cindi Galicia, PT

## 2018-10-09 ENCOUNTER — PATIENT MESSAGE (OUTPATIENT)
Dept: INTERNAL MEDICINE | Facility: CLINIC | Age: 68
End: 2018-10-09

## 2018-10-11 ENCOUNTER — ANESTHESIA (OUTPATIENT)
Dept: ENDOSCOPY | Facility: HOSPITAL | Age: 68
End: 2018-10-11
Payer: MEDICARE

## 2018-10-11 ENCOUNTER — ANESTHESIA EVENT (OUTPATIENT)
Dept: ENDOSCOPY | Facility: HOSPITAL | Age: 68
End: 2018-10-11
Payer: MEDICARE

## 2018-10-11 ENCOUNTER — HOSPITAL ENCOUNTER (OUTPATIENT)
Facility: HOSPITAL | Age: 68
Discharge: HOME | End: 2018-10-11
Attending: COLON & RECTAL SURGERY | Admitting: COLON & RECTAL SURGERY
Payer: MEDICARE

## 2018-10-11 VITALS
TEMPERATURE: 98 F | OXYGEN SATURATION: 100 % | RESPIRATION RATE: 20 BRPM | WEIGHT: 293 LBS | HEART RATE: 72 BPM | BODY MASS INDEX: 44.41 KG/M2 | HEIGHT: 68 IN | SYSTOLIC BLOOD PRESSURE: 140 MMHG | DIASTOLIC BLOOD PRESSURE: 64 MMHG

## 2018-10-11 DIAGNOSIS — Z12.11 SCREENING FOR COLON CANCER: ICD-10-CM

## 2018-10-11 DIAGNOSIS — Z86.010 HISTORY OF COLON POLYPS: Primary | ICD-10-CM

## 2018-10-11 LAB — POCT GLUCOSE: 127 MG/DL (ref 70–110)

## 2018-10-11 PROCEDURE — 37000009 HC ANESTHESIA EA ADD 15 MINS: Performed by: COLON & RECTAL SURGERY

## 2018-10-11 PROCEDURE — G0105 COLORECTAL SCRN; HI RISK IND: HCPCS | Performed by: COLON & RECTAL SURGERY

## 2018-10-11 PROCEDURE — 37000008 HC ANESTHESIA 1ST 15 MINUTES: Performed by: COLON & RECTAL SURGERY

## 2018-10-11 PROCEDURE — 63600175 PHARM REV CODE 636 W HCPCS: Performed by: NURSE ANESTHETIST, CERTIFIED REGISTERED

## 2018-10-11 PROCEDURE — 25000003 PHARM REV CODE 250: Performed by: NURSE PRACTITIONER

## 2018-10-11 PROCEDURE — G0105 COLORECTAL SCRN; HI RISK IND: HCPCS | Mod: GC,,, | Performed by: COLON & RECTAL SURGERY

## 2018-10-11 PROCEDURE — E9220 PRA ENDO ANESTHESIA: HCPCS | Mod: ,,, | Performed by: NURSE ANESTHETIST, CERTIFIED REGISTERED

## 2018-10-11 RX ORDER — SODIUM CHLORIDE 9 MG/ML
INJECTION, SOLUTION INTRAVENOUS CONTINUOUS
Status: DISCONTINUED | OUTPATIENT
Start: 2018-10-11 | End: 2018-10-11 | Stop reason: HOSPADM

## 2018-10-11 RX ORDER — PROPOFOL 10 MG/ML
VIAL (ML) INTRAVENOUS
Status: DISCONTINUED | OUTPATIENT
Start: 2018-10-11 | End: 2018-10-11

## 2018-10-11 RX ORDER — PROPOFOL 10 MG/ML
VIAL (ML) INTRAVENOUS CONTINUOUS PRN
Status: DISCONTINUED | OUTPATIENT
Start: 2018-10-11 | End: 2018-10-11

## 2018-10-11 RX ORDER — SODIUM CHLORIDE 0.9 % (FLUSH) 0.9 %
3 SYRINGE (ML) INJECTION
Status: DISCONTINUED | OUTPATIENT
Start: 2018-10-11 | End: 2018-10-11 | Stop reason: HOSPADM

## 2018-10-11 RX ORDER — LIDOCAINE HCL/PF 100 MG/5ML
SYRINGE (ML) INTRAVENOUS
Status: DISCONTINUED | OUTPATIENT
Start: 2018-10-11 | End: 2018-10-11

## 2018-10-11 RX ADMIN — PROPOFOL 150 MCG/KG/MIN: 10 INJECTION, EMULSION INTRAVENOUS at 11:10

## 2018-10-11 RX ADMIN — PROPOFOL 20 MG: 10 INJECTION, EMULSION INTRAVENOUS at 11:10

## 2018-10-11 RX ADMIN — LIDOCAINE HYDROCHLORIDE 20 MG: 20 INJECTION, SOLUTION INTRAVENOUS at 11:10

## 2018-10-11 RX ADMIN — SODIUM CHLORIDE: 0.9 INJECTION, SOLUTION INTRAVENOUS at 11:10

## 2018-10-11 RX ADMIN — PROPOFOL 50 MG: 10 INJECTION, EMULSION INTRAVENOUS at 11:10

## 2018-10-11 NOTE — H&P
Endoscopy H&P    Procedure : Colonoscopy      personal history of colon polyps      Past Medical History:   Diagnosis Date    Allergy     Breast cancer     Lumpectomy 10/15.    Chronic constipation     Colon polyps     Diabetes mellitus, type 2     Dry eyes     GERD (gastroesophageal reflux disease)     Hyperlipidemia     Hypertension     Lumbar disc disease     Type 2 diabetes mellitus          Review of patient's allergies indicates:   Allergen Reactions    Codeine Itching         No current facility-administered medications on file prior to encounter.      Current Outpatient Medications on File Prior to Encounter   Medication Sig Dispense Refill    aspirin 81 MG Chew Take 81 mg by mouth once daily.      atenolol (TENORMIN) 50 MG tablet TAKE 1 TABLET BY MOUTH TWICE DAILY 180 tablet 1    atorvastatin (LIPITOR) 20 MG tablet TAKE 1 TABLET BY MOUTH ONCE DAILY 90 tablet 2    blood sugar diagnostic Strp 1 strip by Misc.(Non-Drug; Combo Route) route once daily. 100 strip 3    blood-glucose meter kit Use as instructed 1 each 0    calcium-vitamin D3 500 mg(1,250mg) -200 unit per tablet Take 1 tablet by mouth 2 (two) times daily with meals.       captopril (CAPOTEN) 50 MG tablet TAKE TWO TABLETS BY MOUTH IN THE MORNING AND ONE TABLET IN THE EVENING 270 tablet 2    clotrimazole (LOTRIMIN) 1 % cream Apply topically 2 (two) times daily. 60 g 5    cycloSPORINE (RESTASIS) 0.05 % ophthalmic emulsion 1 drop 2 (two) times daily.      fluticasone (FLONASE) 50 mcg/actuation nasal spray 2 sprays (100 mcg total) by Each Nare route once daily. 3 Bottle 1    hydroCHLOROthiazide (HYDRODIURIL) 25 MG tablet Take 1 tablet (25 mg total) by mouth once daily. 90 tablet 2    HYDROcodone-acetaminophen (NORCO) 5-325 mg per tablet Take 1 tablet by mouth every 8 (eight) hours as needed for Pain. 90 tablet 0    ibuprofen (ADVIL,MOTRIN) 800 MG tablet TAKE 1 TABLET BY MOUTH THREE TIMES DAILY AS NEEDED 60 tablet 3     ketoconazole (NIZORAL) 2 % cream Apply topically once daily. 60 g 3    lancets Misc 1 lancet by Misc.(Non-Drug; Combo Route) route once daily. 100 each 3    metFORMIN (GLUCOPHAGE-XR) 500 MG 24 hr tablet TAKE TWO TABLETS BY MOUTH ONCE DAILY WITH BREAKFAST 180 tablet 2    minoxidil (LONITEN) 2.5 MG tablet       multivitamin (ONE DAILY MULTIVITAMIN) per tablet Take 1 tablet by mouth once daily.      omeprazole (PRILOSEC) 20 MG capsule Take 1 capsule (20 mg total) by mouth once daily. 90 capsule 2    tiZANidine (ZANAFLEX) 4 MG tablet TAKE ONE TABLET BY MOUTH TWICE DAILY AS NEEDED FOR MUSCLE SPASM 60 tablet 2         Review of Systems -ROS:  GENERAL: No fever, chills, fatigability or weight loss.  CHEST: Denies NEVILLE, cyanosis, wheezing, cough and sputum production.  CARDIOVASCULAR: Denies chest pain, PND, orthopnea or reduced exercise tolerance.   Musculoskeletal ROS: negative for - gait disturbance or joint pain  Neurological ROS: negative for - confusion or memory loss        Physical Exam:  General: well developed, well nourished, no distress  Head: normocephalic  Neck: supple, symmetrical, trachea midline  Lungs:  clear to auscultation bilaterally and normal respiratory effort  Heart: regular rate and rhythm, S1, S2 normal, no murmur, rub or gallop and regular rate and rhythm  Abdomen: soft, non-tender non-distented; bowel sounds normal; no masses,  no organomegaly  Extremities: no cyanosis or edema, or clubbing       Moderate Sedation (choice): Mallampati Score per anesthesia    ASA : II    IMP: personal history of colon polyps    Plan: Colonoscopy with Moderate sedation.  I have explained the procedure including indications, alternatives, expected outcomes and potential complications. The patient appears to understand and gives informed consent. The patient is medically ready for surgery.      Christie Montemayor MD

## 2018-10-11 NOTE — ANESTHESIA PREPROCEDURE EVALUATION
10/11/2018  Linnea Renae is a 68 y.o., female.    Past Medical History:   Diagnosis Date    Allergy     Breast cancer     Lumpectomy 10/15.    Chronic constipation     Colon polyps     Diabetes mellitus, type 2     Dry eyes     GERD (gastroesophageal reflux disease)     Hyperlipidemia     Hypertension     Lumbar disc disease     Type 2 diabetes mellitus      Past Surgical History:   Procedure Laterality Date    BIOPSY-EXCISIONAL right breast with Wire Localization  (wire inserted at [facility] for 0900 Right 10/7/2015    Performed by Radha Russell [facility] at [facility] OR 2ND FLR    BLOCK-FACET-LUMBAR Bilateral 11/3/2017    Performed by Viet [facility] at [facility]    BREAST BIOPSY      BREAST LUMPECTOMY Right         CATARACT EXTRACTION, BILATERAL       SECTION      x3    CHOLECYSTECTOMY      COLONOSCOPY W/ POLYPECTOMY      HYSTERECTOMY      and USO    INJECTION OF FACET JOINT Bilateral 2018    Procedure: INJECTION, FACET JOINT;  Surgeon: Viet Wilson MD;  Location: [facility];  Service: Pain Management;  Laterality: Bilateral;  LUMBAR BILATERAL L3-L4 AND L4-L5 FACET STEROID INJECTION    NEED CONSENT    INJECTION, FACET JOINT Bilateral 2018    [facility]    INJECTION-FACET Bilateral 2018    [facility]    INJECTION-STEROID-EPIDURAL-TRANSFORAMINAL Right 2016    [facility]    INJECTION-STEROID-EPIDURAL-TRANSFORAMINAL Bilateral 3/4/2016    [facility]    INJECTION-STEROID-EPIDURAL-TRANSFORAMINAL Bilateral 2016    Performed by [facility] at [facility]    TUBAL LIGATION           Anesthesia Evaluation    I have reviewed the Patient Summary Reports.     I have  reviewed the Medications.     Review of Systems  Anesthesia Hx:  No problems with previous Anesthesia  Denies Family Hx of Anesthesia complications.   Denies Personal Hx of Anesthesia complications.   Social:  Former Smoker    Hematology/Oncology:  Hematology Normal   Oncology Normal     EENT/Dental:EENT/Dental Normal   Cardiovascular:   Hypertension    Pulmonary:  Pulmonary Normal    Renal/:   Chronic Renal Disease    Hepatic/GI:   GERD    Musculoskeletal:   Arthritis     Neurological:  Neurology Normal    Endocrine:   Diabetes    Dermatological:  Skin Normal    Psych:  Psychiatric Normal           Physical Exam  General:  Well nourished, Obesity    Airway/Jaw/Neck:  Airway Findings: Mouth Opening: Normal Tongue: Large  General Airway Assessment: Adult  Mallampati: II  Improves to I with phonation.  TM Distance: Normal, at least 6 cm      Dental:  Dental Findings: In tact   Chest/Lungs:  Chest/Lungs Findings: Clear to auscultation, Normal Respiratory Rate     Heart/Vascular:  Heart Findings: Rate: Normal  Rhythm: Regular Rhythm  Sounds: Normal        Mental Status:  Mental Status Findings:  Cooperative, Alert and Oriented         Anesthesia Plan  Type of Anesthesia, risks & benefits discussed:  Anesthesia Type:  general  Patient's Preference:   Intra-op Monitoring Plan: standard ASA monitors  Intra-op Monitoring Plan Comments:   Post Op Pain Control Plan: per primary service following discharge from PACU  Post Op Pain Control Plan Comments:   Induction:   IV  Beta Blocker:  Patient is on a Beta-Blocker and has received one dose within the past 24 hours (No further documentation required).       Informed Consent: Patient understands risks and agrees with Anesthesia plan.  Questions answered. Anesthesia consent signed with patient.  ASA Score: 3     Day of Surgery Review of History & Physical:    H&P update referred to the provider.         Ready For Surgery From Anesthesia Perspective.

## 2018-10-11 NOTE — ANESTHESIA POSTPROCEDURE EVALUATION
"Anesthesia Post Evaluation    Patient: Linnea Renae    Procedure(s) Performed: Procedure(s) (LRB):  COLONOSCOPY (N/A)    Final Anesthesia Type: general  Patient location during evaluation: PACU  Patient participation: Yes- Able to Participate  Level of consciousness: awake and alert  Post-procedure vital signs: reviewed and stable  Pain management: adequate  Airway patency: patent  PONV status at discharge: No PONV  Anesthetic complications: no      Cardiovascular status: hemodynamically stable  Respiratory status: unassisted  Hydration status: euvolemic  Follow-up not needed.        Visit Vitals  BP (!) 140/64   Pulse 72   Temp 36.4 °C (97.5 °F)   Resp 20   Ht 5' 8" (1.727 m)   Wt 133.4 kg (294 lb)   SpO2 100%   Breastfeeding? No   BMI 44.70 kg/m²       Pain/Nneka Score: Pain Assessment Performed: Yes (10/11/2018 10:58 AM)  Presence of Pain: denies (10/11/2018 12:10 PM)  Nneka Score: 10 (10/11/2018 12:17 PM)        "

## 2018-10-11 NOTE — [employer] PATIENT INSTRUCTIONS
Discharge Summary/Instructions after an Endoscopic Procedure  Patient Name: Linnea Renae  Patient MRN: [id number]  Patient YOB: 1950 Thursday, October 11, 2018  Lorenzo Tanner MD  RESTRICTIONS:  During your procedure today, you received medications for sedation.  These   medications may affect your judgment, balance and coordination.  Therefore,   for 24 hours, you have the following restrictions:   - DO NOT drive a car, operate machinery, make legal/financial decisions,   sign important papers or drink alcohol.    ACTIVITY:  Today: no heavy lifting, straining or running due to procedural   sedation/anesthesia.  The following day: return to full activity including work.  DIET:  Eat and drink normally unless instructed otherwise.     TREATMENT FOR COMMON SIDE EFFECTS:  - Mild abdominal pain, nausea, belching, bloating or excessive gas:  rest,   eat lightly and use a heating pad.  - Sore Throat: treat with throat lozenges and/or gargle with warm salt   water.  - Because air was used during the procedure, expelling large amounts of air   from your rectum or belching is normal.  - If a bowel prep was taken, you may not have a bowel movement for 1-3 days.    This is normal.  SYMPTOMS TO WATCH FOR AND REPORT TO YOUR PHYSICIAN:  1. Abdominal pain or bloating, other than gas cramps.  2. Chest pain.  3. Back pain.  4. Signs of infection such as: chills or fever occurring within 24 hours   after the procedure.  5. Rectal bleeding, which would show as bright red, maroon, or black stools.   (A tablespoon of blood from the rectum is not serious, especially if   hemorrhoids are present.)  6. Vomiting.  7. Weakness or dizziness.  GO DIRECTLY TO THE NEAREST EMERGENCY ROOM IF YOU HAVE ANY OF THE FOLLOWING:      Difficulty breathing              Chills and/or fever over 101 F   Persistent vomiting and/or vomiting blood   Severe abdominal pain   Severe chest pain   Black, tarry stools   Bleeding- more than one tablespoon   Any  other symptom or condition that you feel may need urgent attention  Your doctor recommends these additional instructions:  If any biopsies were taken, your doctors clinic will contact you in 1 to 2   weeks with any results.  - Discharge patient to home (ambulatory).   - High fiber diet daily.   - Repeat colonoscopy in 5 years for surveillance.  For questions, problems or results please call your physician - Lorenzo Tanner MD at Work:  [phone].  [facility], EMERGENCY ROOM PHONE NUMBER: [phone]  IF A COMPLICATION OR EMERGENCY SITUATION ARISES AND YOU ARE UNABLE TO REACH   YOUR PHYSICIAN - GO DIRECTLY TO THE EMERGENCY ROOM.  Lorenzo Tanner MD  10/11/2018 11:42:04 AM  This report has been verified and signed electronically.  [employer]

## 2018-10-18 ENCOUNTER — TELEPHONE (OUTPATIENT)
Dept: ENDOSCOPY | Facility: HOSPITAL | Age: 68
End: 2018-10-18

## 2018-10-18 ENCOUNTER — CLINICAL SUPPORT (OUTPATIENT)
Dept: REHABILITATION | Facility: HOSPITAL | Age: 68
End: 2018-10-18
Attending: ANESTHESIOLOGY
Payer: MEDICARE

## 2018-10-18 DIAGNOSIS — M25.60 DECREASED RANGE OF MOTION: ICD-10-CM

## 2018-10-18 DIAGNOSIS — M54.40 LOW BACK PAIN WITH SCIATICA, SCIATICA LATERALITY UNSPECIFIED, UNSPECIFIED BACK PAIN LATERALITY, UNSPECIFIED CHRONICITY: ICD-10-CM

## 2018-10-18 PROCEDURE — 97113 AQUATIC THERAPY/EXERCISES: CPT

## 2018-10-18 NOTE — PROGRESS NOTES
"[facility]  Physical Therapy Treatment     Name: Linnea Renae  Clinic Number: [id number]     Therapy Diagnosis:        Encounter Diagnoses   Name Primary?    Chronic right-sided low back pain with right-sided sciatica      Decreased range of motion        Physician: Viet Wilson MD     Physician Orders: Aquatic Therapy   Medical Diagnosis:   M47.9 (ICD-10-CM) - Osteoarthritis of spine, unspecified spinal osteoarthritis complication status, unspecified spinal region   M47.819 (ICD-10-CM) - Spondylosis without myelopathy   M46.1 (ICD-10-CM) - Sacroiliitis   M51.36 (ICD-10-CM) - DDD (degenerative disc disease), lumbar         Evaluation Date: 9/14/2018  Authorization Period Expiration: 12/31/2018  Plan of Care Certification Period: 11/10/2018  Visit # / Visits authorized: 6/20     Time In: 1100 Time Out: 1200   Total Billable Time: 60 minutes     Precautions: standard fear of water     Subjective  Pt reports: ", My back feels horrible, I missed 2 appoints and I think I've been doing more than I should ".  Pt reports no adverse effects post last tx. Pt reports that she requires the A of her  to dress post tx.  Pt pain level: 8/10    Objective    Treatment: Pt was instructed in and performed therapeutic exercises to develop core/hip stabilization, BLE flexibility/strengthening, posture correction, and lumbar mobility  for 60 minutes. Patient performed therapeutic exercises consisting of the following exercises:    Warm-up Laps 3 x each  fwd/bkw/lat     Stretches: 2 x 30 sec  HS  Piriformis   Seated trunk flexion lumbar stretch w/ noodle 2 min     LE exs: 25x each   Mini Squats with QS  Heel Raise with GS  Hip flx/ext - np  Hip abd/add  LAQ with hip flex  Ham curls with hip ext   Lunges lat - np  Hip cw/ccw circles - np    UE exs: 20x each  Shld flex/ext apo   Shld abd/add apo  Lat pull ytb -      Endurance: 2 min  Marching    Cool down laps 1 x each  fwd/bkw/lat    Patient was " not issued HEP for pool.    Educated pt on skin care, washing chlorine off after shower and making sure the area is dry.     Assessment  Pt's was moving slowly throughout treatment. Pt breana tx with ther ex well w/o increase in pn sx.  Pt was not progressed on this date due to above report of increased pn sx.   Will progress as tolerated. Patient will continue to benefit from skilled PT intervention.     Linnea Renae is making good progress towards established goals.    Anticipated barriers to physical therapy: None    Goals:  Short Term Goals: 4 weeks   I HEP  Pt will demonsrate I in body mechanics and posture to improve symptoms  Pt will report pain 5/10 at worst  Long Term Goals: 8-12 weeks   foto 52% limitation or less to show improvement in function  Pt will demonstrate 5/5 strength in BLE to improve pain and function  Pt will be able to walk for 30 mins without rest and increase in symptoms to show improvement in function    Plan  Continue POC established by PT.     Cindi Galicia, PT

## 2018-10-22 ENCOUNTER — CLINICAL SUPPORT (OUTPATIENT)
Dept: REHABILITATION | Facility: HOSPITAL | Age: 68
End: 2018-10-22
Attending: ANESTHESIOLOGY
Payer: MEDICARE

## 2018-10-22 DIAGNOSIS — M54.40 LOW BACK PAIN WITH SCIATICA, SCIATICA LATERALITY UNSPECIFIED, UNSPECIFIED BACK PAIN LATERALITY, UNSPECIFIED CHRONICITY: ICD-10-CM

## 2018-10-22 DIAGNOSIS — M25.60 DECREASED RANGE OF MOTION: ICD-10-CM

## 2018-10-22 PROCEDURE — 97113 AQUATIC THERAPY/EXERCISES: CPT

## 2018-10-22 NOTE — PROGRESS NOTES
"[facility]  Physical Therapy Treatment     Name: Linnea Renae  Clinic Number: [id number]     Therapy Diagnosis:        Encounter Diagnoses   Name Primary?    Chronic right-sided low back pain with right-sided sciatica      Decreased range of motion        Physician: Viet Wilson MD     Physician Orders: Aquatic Therapy   Medical Diagnosis:   M47.9 (ICD-10-CM) - Osteoarthritis of spine, unspecified spinal osteoarthritis complication status, unspecified spinal region   M47.819 (ICD-10-CM) - Spondylosis without myelopathy   M46.1 (ICD-10-CM) - Sacroiliitis   M51.36 (ICD-10-CM) - DDD (degenerative disc disease), lumbar         Evaluation Date: 9/14/2018  Authorization Period Expiration: 12/31/2018  Plan of Care Certification Period: 11/10/2018  Visit # / Visits authorized: 7/20     Time In: 1040 Time Out: 1140  Total Billable Time: 60 minutes     Precautions: standard fear of water     Subjective  Pt reports: "I'm all off, I think missing two appointments has set me back". She is aching in her back, R leg/hip and foot.  Pt pain level: 8/10    Objective    Treatment: Pt was instructed in and performed therapeutic exercises to develop core/hip stabilization, BLE flexibility/strengthening, posture correction, and lumbar mobility  for 60 minutes. Patient performed therapeutic exercises consisting of the following exercises:    Warm-up Laps 3 x each  fwd/bkw/lat     Stretches: 2 x 30 sec  HS  Piriformis   Seated trunk flexion lumbar stretch w/ noodle 2 min     LE exs: 25x each   Mini Squats with QS  Heel Raise with GS  Hip flx/ext - np  Hip abd/add  LAQ with hip flex  Ham curls with hip ext   Lunges lat -   Hip cw/ccw circles - np    UE exs: 20x each  Shld flex/ext apo   Shld abd/add apo  Lat pull ytb -      Endurance: 2 min  Marching    Manual trigger point release to R piriformis with PROM of hip IR/ER and flexion.  Hip joint distraction grade 1-2 oscillations.  All manual therapy " performed in the pool.    Cool down laps 1 x each  fwd/bkw/lat      Patient was not issued HEP for pool.    Educated pt on skin care, washing chlorine off after shower and making sure the area is dry.     Assessment  Pt had a big decrease in hip pain after manual treatment and was able to continue her exercises with decreased pain.  She is very tight per palpation, but multiple trigger points released. Pt has met STG for improved body awareness and posture.  Pain rating is still variable.  Will progress as tolerated. Patient will continue to benefit from skilled PT intervention.     Linnea Renae is making good progress towards established goals.    Anticipated barriers to physical therapy: None    Goals:  Short Term Goals: 4 weeks   I HEP  Pt will demonsrate I in body mechanics and posture to improve symptoms - met  Pt will report pain 5/10 at worst  Long Term Goals: 8-12 weeks   foto 52% limitation or less to show improvement in function  Pt will demonstrate 5/5 strength in BLE to improve pain and function  Pt will be able to walk for 30 mins without rest and increase in symptoms to show improvement in function    Plan  Continue POC established by PT.     Cindi Galicia, PT

## 2018-10-24 ENCOUNTER — TELEPHONE (OUTPATIENT)
Dept: INTERNAL MEDICINE | Facility: CLINIC | Age: 68
End: 2018-10-24

## 2018-10-24 DIAGNOSIS — M48.061 DEGENERATIVE LUMBAR SPINAL STENOSIS: ICD-10-CM

## 2018-10-24 NOTE — TELEPHONE ENCOUNTER
----- Message from Collette Mckeon sent at 10/24/2018 11:15 AM CDT -----  Contact: pt  Pt would like to be called back regarding Tramodol    Pt can be reached at [phone]

## 2018-10-25 RX ORDER — TRAMADOL HYDROCHLORIDE 50 MG/1
50 TABLET ORAL EVERY 8 HOURS PRN
Qty: 90 TABLET | Refills: 0 | Status: SHIPPED | OUTPATIENT
Start: 2018-10-25 | End: 2018-11-04

## 2018-10-30 ENCOUNTER — CLINICAL SUPPORT (OUTPATIENT)
Dept: REHABILITATION | Facility: HOSPITAL | Age: 68
End: 2018-10-30
Attending: ANESTHESIOLOGY
Payer: MEDICARE

## 2018-10-30 DIAGNOSIS — M25.60 DECREASED RANGE OF MOTION: ICD-10-CM

## 2018-10-30 DIAGNOSIS — M54.40 LOW BACK PAIN WITH SCIATICA, SCIATICA LATERALITY UNSPECIFIED, UNSPECIFIED BACK PAIN LATERALITY, UNSPECIFIED CHRONICITY: ICD-10-CM

## 2018-10-30 PROCEDURE — 97113 AQUATIC THERAPY/EXERCISES: CPT | Mod: HCNC

## 2018-10-30 NOTE — PROGRESS NOTES
"[facility]  Physical Therapy Treatment     Name: Linnea Renae  Clinic Number: [id number]     Therapy Diagnosis:        Encounter Diagnoses   Name Primary?    Chronic right-sided low back pain with right-sided sciatica      Decreased range of motion        Physician: Viet Wilson MD     Physician Orders: Aquatic Therapy   Medical Diagnosis:   M47.9 (ICD-10-CM) - Osteoarthritis of spine, unspecified spinal osteoarthritis complication status, unspecified spinal region   M47.819 (ICD-10-CM) - Spondylosis without myelopathy   M46.1 (ICD-10-CM) - Sacroiliitis   M51.36 (ICD-10-CM) - DDD (degenerative disc disease), lumbar         Evaluation Date: 9/14/2018  Authorization Period Expiration: 12/31/2018  Plan of Care Certification Period: 11/10/2018  Visit # / Visits authorized: 8/20     Time In: 1305 Time Out: 1405  Total Billable Time: 30 minutes     Precautions: standard fear of water     Subjective  Pt reports:  "I'm in pain!"  Pt states she had relief for a full day but the pain returned the next day.  Pt admits she is not doing any exercises or stretches at home between sessions,  Just staying active taking care of grandkids.    Pt pain level: 8/10    Objective    Treatment: Pt was instructed in and performed therapeutic exercises to develop core/hip stabilization, BLE flexibility/strengthening, posture correction, and lumbar mobility  for 60 minutes. Patient performed therapeutic exercises consisting of the following exercises:    Warm-up Laps 3 x each  fwd/bkw/lat     Stretches: 2 x 30 sec  HS  Piriformis   Seated trunk flexion lumbar stretch w/ noodle 2 min     LE exs: 25x each   Mini Squats with QS  Heel Raise with GS  Hip flx/ext - np  Hip abd/add  LAQ with hip flex  Ham curls with hip ext   Lunges lat -   Hip cw/ccw circles - np    UE exs: 20x each  Shld flex/ext apo   Shld abd/add apo  Lat pull ytb -      Endurance: 2 min  Marching    Cool down laps 1 x " each  fwd/bkw/lat    Patient was not issued HEP for pool.      Assessment  Pt pain level decreased after treatment, but she was still moving slowly.  Pt needs encouragement to continue with home stretching and walking program at home.   Will progress as tolerated. Patient will continue to benefit from skilled PT intervention.     Linnea PATEL Marianojavier is making good progress towards established goals.    Anticipated barriers to physical therapy: None    Goals:  Short Term Goals: 4 weeks   I HEP  Pt will demonsrate I in body mechanics and posture to improve symptoms - met  Pt will report pain 5/10 at worst  Long Term Goals: 8-12 weeks   foto 52% limitation or less to show improvement in function  Pt will demonstrate 5/5 strength in BLE to improve pain and function  Pt will be able to walk for 30 mins without rest and increase in symptoms to show improvement in function    Plan  Continue POC established by PT.     Cindi Galicia, PT

## 2018-11-01 ENCOUNTER — CLINICAL SUPPORT (OUTPATIENT)
Dept: REHABILITATION | Facility: HOSPITAL | Age: 68
End: 2018-11-01
Attending: ANESTHESIOLOGY
Payer: MEDICARE

## 2018-11-01 DIAGNOSIS — M54.40 LOW BACK PAIN WITH SCIATICA, SCIATICA LATERALITY UNSPECIFIED, UNSPECIFIED BACK PAIN LATERALITY, UNSPECIFIED CHRONICITY: ICD-10-CM

## 2018-11-01 DIAGNOSIS — R53.1 DECREASED STRENGTH: ICD-10-CM

## 2018-11-01 DIAGNOSIS — R68.89 DECREASED FUNCTIONAL ACTIVITY TOLERANCE: ICD-10-CM

## 2018-11-01 DIAGNOSIS — M25.551 HIP PAIN, BILATERAL: ICD-10-CM

## 2018-11-01 DIAGNOSIS — R26.89 DECREASED SPINAL MOBILITY: ICD-10-CM

## 2018-11-01 DIAGNOSIS — R26.89 IMPAIRED GAIT AND MOBILITY: ICD-10-CM

## 2018-11-01 DIAGNOSIS — M25.60 DECREASED RANGE OF MOTION: ICD-10-CM

## 2018-11-01 DIAGNOSIS — M25.552 HIP PAIN, BILATERAL: ICD-10-CM

## 2018-11-01 PROCEDURE — 97113 AQUATIC THERAPY/EXERCISES: CPT | Mod: HCNC

## 2018-11-01 NOTE — PROGRESS NOTES
[facility]  Physical Therapy Treatment     Name: Linnea Renae  Clinic Number: [id number]     Therapy Diagnosis:        Encounter Diagnoses   Name Primary?    Chronic right-sided low back pain with right-sided sciatica      Decreased range of motion        Physician: Viet Wilson MD     Physician Orders: Aquatic Therapy   Medical Diagnosis:   M47.9 (ICD-10-CM) - Osteoarthritis of spine, unspecified spinal osteoarthritis complication status, unspecified spinal region   M47.819 (ICD-10-CM) - Spondylosis without myelopathy   M46.1 (ICD-10-CM) - Sacroiliitis   M51.36 (ICD-10-CM) - DDD (degenerative disc disease), lumbar         Evaluation Date: 9/14/2018  Authorization Period Expiration: 12/31/2018  Plan of Care Certification Period: 11/10/2018  Visit # / Visits authorized: 9/20     Time In: 11:00   Time Out: 12:00  Total Billable Time: 60 minutes     Precautions: standard fear of water     Subjective  Pt reports: she had relief for the rest of the day post last tx, but the pain returned the next day. rates her pn 9/10 today  Pt states that her bal is improving since beginning therapy.Pt admits she is not doing any exercises or stretches at home     Pt pain level: 9/10 upon arrival, 4/10 post tx    Objective    Treatment: Pt was instructed in and performed therapeutic exercises to develop core/hip stabilization, BLE flexibility/strengthening, posture correction, and lumbar mobility  for 60 minutes. Patient performed therapeutic exercises consisting of the following exercises:    Warm-up Laps 3 x each  fwd/bkw/lat     Stretches: 2 x 30 sec  HS  Piriformis   Seated trunk flexion lumbar stretch w/ noodle 2 min     LE exs: 25x each   Mini Squats with QS  Heel Raise with GS  Hip flx/ext - np  Hip abd/add  LAQ with hip flex  Ham curls with hip ext   Lunges lat -   Hip cw/ccw circles - np    UE exs: 25x each  Shld flex/ext apo   Shld abd/add apo  Lat pull ytb -      Endurance: 2  min  Marching    Cool down laps 1 x each  fwd/bkw/lat    Patient was not issued HEP for pool.      Assessment  Pt  breana tx with progression of reps well with benefit of  decreased pain level after treatment, but she was still moving slowly and continues to require the A of her  to change post tx.  Pt needs encouragement to continue with home stretching and walking program at home.   Will progress as tolerated. Patient will continue to benefit from skilled PT intervention.     Linnea Renae is making good progress towards established goals.    Anticipated barriers to physical therapy: None    Goals:  Short Term Goals: 4 weeks   I HEP  Pt will demonsrate I in body mechanics and posture to improve symptoms - met  Pt will report pain 5/10 at worst  Long Term Goals: 8-12 weeks   foto 52% limitation or less to show improvement in function  Pt will demonstrate 5/5 strength in BLE to improve pain and function  Pt will be able to walk for 30 mins without rest and increase in symptoms to show improvement in function    Plan  Continue POC established by PT.

## 2018-11-06 ENCOUNTER — CLINICAL SUPPORT (OUTPATIENT)
Dept: REHABILITATION | Facility: HOSPITAL | Age: 68
End: 2018-11-06
Attending: ANESTHESIOLOGY
Payer: MEDICARE

## 2018-11-06 DIAGNOSIS — M25.60 DECREASED RANGE OF MOTION: ICD-10-CM

## 2018-11-06 DIAGNOSIS — M54.40 LOW BACK PAIN WITH SCIATICA, SCIATICA LATERALITY UNSPECIFIED, UNSPECIFIED BACK PAIN LATERALITY, UNSPECIFIED CHRONICITY: ICD-10-CM

## 2018-11-06 PROCEDURE — G8978 MOBILITY CURRENT STATUS: HCPCS | Mod: CK,HCNC

## 2018-11-06 PROCEDURE — G8980 MOBILITY D/C STATUS: HCPCS | Mod: CK,HCNC

## 2018-11-06 PROCEDURE — 97113 AQUATIC THERAPY/EXERCISES: CPT | Mod: HCNC

## 2018-11-06 NOTE — PLAN OF CARE
[facility]  Physical Therapy Progress Note    Name: Linnea Renae  Clinic Number: [id number]     Therapy Diagnosis:        Encounter Diagnoses   Name Primary?    Chronic right-sided low back pain with right-sided sciatica      Decreased range of motion        Physician: Viet Wilson MD     Physician Orders: Aquatic Therapy   Medical Diagnosis:   M47.9 (ICD-10-CM) - Osteoarthritis of spine, unspecified spinal osteoarthritis complication status, unspecified spinal region   M47.819 (ICD-10-CM) - Spondylosis without myelopathy   M46.1 (ICD-10-CM) - Sacroiliitis   M51.36 (ICD-10-CM) - DDD (degenerative disc disease), lumbar         Evaluation Date: 9/14/2018  Authorization Period Expiration: 12/31/2018  Updated Plan of Care Certification Period: 11/06/2018 - 12/5/2018  Visit # / Visits authorized:11/20     Time In: 11:00   Time Out: 12:00  Total Billable Time: 60 minutes     Precautions: standard fear of water     Subjective  Pt reports: Pt has felt a little better over the past few day, but she woke up with pain in her right hip this morning.  Overall she feels therapy is helping.  Her balance is better, she is standing more upright, and can stand to cook longer.  She is still having difficulty walking longer than 5 min at a time without assist due to back pain.   Pt pain level: 7/10 upon arrival, 4/10 post tx    Objective    Strength:  Hip Left Right   Flexion 4+/5 4/5   Abduction 4/5 4-/5   Adduction 4/5 4/5      Knee Left Right   Extension 4+/5 4/5   Flexion 4+/5 4/5            Ankle Left Right   Dorsiflexion 5/5 5/5      Slump neural tension negative B     LUMBAR SPINE AROM:   Flexion: 0 romero   Extension: WNL   Sidebend R WNL with pain   Sidebend L  WNL min pain   Left Rotation: WNL   Right Rotation: 75%      SLS L 15 sec, R 4 sec         CMS Impairment/Limitation/Restriction for FOTO back Survey     Therapist reviewed FOTO scores for Linnea Renae on 11/6/2018   FOTO documents  entered into EPIC - see Media section.     Limitation Score: 54%  Category: Mobility     Current : CK =at least 40% but less than 60% limited    Goal: CK= atleast 40 but less than 60% limited               Treatment: Pt was instructed in and performed therapeutic exercises to develop core/hip stabilization, BLE flexibility/strengthening, posture correction, and lumbar mobility  for 60 minutes. Patient performed therapeutic exercises consisting of the following exercises:    Warm-up Laps 3 x each  fwd/bkw/lat     Stretches: 2 x 30 sec  HS  Piriformis   QS  Seated trunk flexion lumbar stretch w/ noodle 2 min     LE exs: 25x each   Mini Squats with QS  Heel Raise with GS  Hip flx/ext - np  Hip abd/add  LAQ with hip flex  Ham curls with hip ext   Lunges lat -   Hip cw/ccw circles - np    UE exs: 25x each  Shld flex/ext apo   Shld abd/add apo  Lat pull ytb -      Endurance: 2 min  Marching    Cool down laps 1 x each  fwd/bkw/lat    Patient was not issued HEP for pool.      Assessment  Pt  breana tx with progression of reps well with benefit of  decreased pain level after treatment, but she was still moving slowly and continues to require the A of her  to change post tx.  Pt needs encouragement to continue with home stretching and walking program at home.   Will progress as tolerated. Patient will continue to benefit from skilled PT intervention.     Linnea Renae is making good progress towards established goals.    Anticipated barriers to physical therapy: None    Goals:  Short Term Goals: 4 weeks   I HEP  Pt will demonsrate I in body mechanics and posture to improve symptoms - met  Pt will report pain 5/10 at worst    Long Term Goals: 8-12 weeks   foto 52% limitation or less to show improvement in function - near goal  Pt will demonstrate 5/5 strength in BLE to improve pain and function- improving  Pt will be able to walk for 30 mins without rest and increase in symptoms to show improvement in function - not  met    Plan    Updated Plan of Care Certification Period: 11/06/2018 - 12/5/2018    Continue PT 2x wk x 4 wk to progress functional strength, gait, balance, and ROM.  She benefits from treatment in aquatic environment because she is able to complete weight bearing activities without pain and challenge balance in safety     Cindi Galicia, PT

## 2018-11-06 NOTE — PROGRESS NOTES
[facility]  Physical Therapy Treatment     Name: Linnea Renae  Clinic Number: [id number]     Therapy Diagnosis:        Encounter Diagnoses   Name Primary?    Chronic right-sided low back pain with right-sided sciatica      Decreased range of motion        Physician: Viet Wilson MD     Physician Orders: Aquatic Therapy   Medical Diagnosis:   M47.9 (ICD-10-CM) - Osteoarthritis of spine, unspecified spinal osteoarthritis complication status, unspecified spinal region   M47.819 (ICD-10-CM) - Spondylosis without myelopathy   M46.1 (ICD-10-CM) - Sacroiliitis   M51.36 (ICD-10-CM) - DDD (degenerative disc disease), lumbar         Evaluation Date: 9/14/2018  Authorization Period Expiration: 12/31/2018  Updated Plan of Care Certification Period: 11/06/2018 - 12/5/2018  Visit # / Visits authorized:11/20     Time In: 11:00   Time Out: 12:00  Total Billable Time: 60 minutes     Precautions: standard fear of water     Subjective  Pt reports: Pt has felt a little better over the past few day, but she woke up with pain in her right hip this morning.  Overall she feels therapy is helping.  Her balance is better, she is standing more upright, and can stand to cook longer.  She is still having difficulty walking longer than 5 min at a time without assist due to back pain.   Pt pain level: 7/10 upon arrival, 4/10 post tx    Objective    Strength:  Hip Left Right   Flexion 4+/5 4/5   Abduction 4/5 4-/5   Adduction 4/5 4/5      Knee Left Right   Extension 4+/5 4/5   Flexion 4+/5 4/5            Ankle Left Right   Dorsiflexion 5/5 5/5      Slump neural tension negative B     LUMBAR SPINE AROM:   Flexion: 0 romero   Extension: WNL   Sidebend R WNL with pain   Sidebend L  WNL min pain   Left Rotation: WNL   Right Rotation: 75%      SLS L 15 sec, R 4 sec         CMS Impairment/Limitation/Restriction for FOTO back Survey     Therapist reviewed FOTO scores for Linnea Renae on 11/6/2018   FOTO documents entered  into EPIC - see Media section.     Limitation Score: 54%  Category: Mobility     Current : CK =at least 40% but less than 60% limited    Goal: CK= atleast 40 but less than 60% limited               Treatment: Pt was instructed in and performed therapeutic exercises to develop core/hip stabilization, BLE flexibility/strengthening, posture correction, and lumbar mobility  for 60 minutes. Patient performed therapeutic exercises consisting of the following exercises:    Warm-up Laps 3 x each  fwd/bkw/lat     Stretches: 2 x 30 sec  HS  Piriformis   QS  Seated trunk flexion lumbar stretch w/ noodle 2 min     LE exs: 25x each   Mini Squats with QS  Heel Raise with GS  Hip flx/ext - np  Hip abd/add  LAQ with hip flex  Ham curls with hip ext   Lunges lat -   Hip cw/ccw circles - np    UE exs: 25x each  Shld flex/ext apo   Shld abd/add apo  Lat pull ytb -      Endurance: 2 min  Marching    Cool down laps 1 x each  fwd/bkw/lat    Patient was not issued HEP for pool.      Assessment  Pt  breana tx with progression of reps well with benefit of  decreased pain level after treatment, but she was still moving slowly and continues to require the A of her  to change post tx.  Pt needs encouragement to continue with home stretching and walking program at home.   Will progress as tolerated. Patient will continue to benefit from skilled PT intervention.     Linnea Renae is making good progress towards established goals.    Anticipated barriers to physical therapy: None    Goals:  Short Term Goals: 4 weeks   I HEP  Pt will demonsrate I in body mechanics and posture to improve symptoms - met  Pt will report pain 5/10 at worst    Long Term Goals: 8-12 weeks   foto 52% limitation or less to show improvement in function - near goal  Pt will demonstrate 5/5 strength in BLE to improve pain and function- improving  Pt will be able to walk for 30 mins without rest and increase in symptoms to show improvement in function - not  met    Plan    Updated Plan of Care Certification Period: 11/06/2018 - 12/5/2018    Continue PT 2x wk x 4 wk to progress functional strength, gait, balance, and ROM.  She benefits from treatment in aquatic environment because she is able to complete weight bearing activities without pain and challenge balance in safety     Cindi Galicia, PT

## 2018-11-08 ENCOUNTER — CLINICAL SUPPORT (OUTPATIENT)
Dept: REHABILITATION | Facility: HOSPITAL | Age: 68
End: 2018-11-08
Attending: ANESTHESIOLOGY
Payer: MEDICARE

## 2018-11-08 DIAGNOSIS — M25.60 DECREASED RANGE OF MOTION: ICD-10-CM

## 2018-11-08 PROCEDURE — 97113 AQUATIC THERAPY/EXERCISES: CPT | Mod: HCNC

## 2018-11-08 NOTE — PROGRESS NOTES
[facility]  Physical Therapy Treatment     Name: Linnea Renae  Clinic Number: [id number]     Therapy Diagnosis:        Encounter Diagnoses   Name Primary?    Chronic right-sided low back pain with right-sided sciatica      Decreased range of motion        Physician: Viet Wilson MD     Physician Orders: Aquatic Therapy   Medical Diagnosis:   M47.9 (ICD-10-CM) - Osteoarthritis of spine, unspecified spinal osteoarthritis complication status, unspecified spinal region   M47.819 (ICD-10-CM) - Spondylosis without myelopathy   M46.1 (ICD-10-CM) - Sacroiliitis   M51.36 (ICD-10-CM) - DDD (degenerative disc disease), lumbar         Evaluation Date: 9/14/2018  Authorization Period Expiration: 12/31/2018  Updated Plan of Care Certification Period: 11/06/2018 - 12/5/2018  Visit # / Visits authorized:11/20     Time In: 11:00   Time Out: 12:00  Total Billable Time: 60 minutes     Precautions: standard fear of water     Subjective  Pt reports: that she is able to amb up stairs with decreased pn.    Pt pain level: 5/10 upon arrival, 4/10 post tx    Objective      Treatment: Pt was instructed in and performed therapeutic exercises to develop core/hip stabilization, BLE flexibility/strengthening, posture correction, and lumbar mobility  for 60 minutes. Patient performed therapeutic exercises consisting of the following exercises:    Warm-up Laps 3 x each  fwd/bkw/lat     Stretches: 2 x 30 sec  HS  Piriformis   QS  Seated trunk flexion lumbar stretch w/ noodle 2 min     LE exs: 25x each   Mini Squats with QS  Heel Raise with GS  Hip flx/ext - np  Hip abd/add  LAQ with hip flex  Ham curls with hip ext   Lunges lat -   Hip cw/ccw circles - np    UE exs: 25x each  Shld flex/ext apo   Shld abd/add apo  Lat pull ytb -      Endurance: 2 min  Marching    Cool down laps 1 x each  fwd/bkw/lat    Patient was not issued HEP for pool.      Assessment  Pt  breana tx with ther ex well with benefit of  decreased pain  level after treatment, but continues to have return of sx after therapy. She continues to require the A of her  to change post tx.  Pt needs encouragement to continue with home stretching and walking program at home.   Will progress as tolerated. Patient will continue to benefit from skilled PT intervention.     Linnea Renae is making good progress towards established goals.    Anticipated barriers to physical therapy: None    Goals:  Short Term Goals: 4 weeks   I HEP  Pt will demonsrate I in body mechanics and posture to improve symptoms - met  Pt will report pain 5/10 at worst    Long Term Goals: 8-12 weeks   foto 52% limitation or less to show improvement in function - near goal  Pt will demonstrate 5/5 strength in BLE to improve pain and function- improving  Pt will be able to walk for 30 mins without rest and increase in symptoms to show improvement in function - not met    Plan    Updated Plan of Care Certification Period: 11/06/2018 - 12/5/2018    Continue PT 2x wk x 4 wk to progress functional strength, gait, balance, and ROM.  She benefits from treatment in aquatic environment because she is able to complete weight bearing activities without pain and challenge balance in safety

## 2018-11-15 ENCOUNTER — PATIENT MESSAGE (OUTPATIENT)
Dept: REHABILITATION | Facility: HOSPITAL | Age: 68
End: 2018-11-15

## 2018-11-23 ENCOUNTER — CLINICAL SUPPORT (OUTPATIENT)
Dept: REHABILITATION | Facility: HOSPITAL | Age: 68
End: 2018-11-23
Attending: ANESTHESIOLOGY
Payer: MEDICARE

## 2018-11-23 DIAGNOSIS — G89.29 CHRONIC BILATERAL LOW BACK PAIN, WITH SCIATICA PRESENCE UNSPECIFIED: ICD-10-CM

## 2018-11-23 DIAGNOSIS — M54.5 CHRONIC BILATERAL LOW BACK PAIN, WITH SCIATICA PRESENCE UNSPECIFIED: ICD-10-CM

## 2018-11-23 DIAGNOSIS — M25.60 DECREASED RANGE OF MOTION: ICD-10-CM

## 2018-11-23 PROCEDURE — 97113 AQUATIC THERAPY/EXERCISES: CPT | Mod: HCNC

## 2018-11-23 NOTE — PROGRESS NOTES
[facility]  Physical Therapy Treatment     Name: Linnea Renae  Clinic Number: [id number]     Therapy Diagnosis:    Chronic right-sided low back pain with right-sided sciatica      Decreased range of motion        Physician: Viet Wilson MD     Physician Orders: Aquatic Therapy   Medical Diagnosis:   M47.9 (ICD-10-CM) - Osteoarthritis of spine, unspecified spinal osteoarthritis complication status, unspecified spinal region   M47.819 (ICD-10-CM) - Spondylosis without myelopathy   M46.1 (ICD-10-CM) - Sacroiliitis   M51.36 (ICD-10-CM) - DDD (degenerative disc disease), lumbar         Evaluation Date: 9/14/2018  Authorization Period Expiration: 12/31/2018  Updated Plan of Care Certification Period: 11/06/2018 - 12/5/2018  Visit # / Visits authorized:12/20     Time In: 10:30   Time Out: 1130  Total Billable Time: 30 minutes     Precautions: standard fear of water     Subjective  Pt reports: She is very tired after Thanksgiving holiday. She is having a sharp pain on and off in the left buttocks even though pain is usually on the right.      Pt pain level: 5/10 upon arrival, 4/10 post tx    Objective  Treatment: Pt was instructed in and performed therapeutic exercises to develop core/hip stabilization, BLE flexibility/strengthening, posture correction, and lumbar mobility  for 60 minutes. Patient performed therapeutic exercises consisting of the following exercises:    Warm-up Laps 3 x each  fwd/bkw/lat     Stretches: 2 x 30 sec  HS  Piriformis   QS  Seated trunk flexion lumbar stretch w/ noodle 2 min     LE exs: 25x each   Mini Squats with QS  Heel Raise with GS  Hip flx/ext - np  Hip abd/add  LAQ with hip flex  Ham curls with hip ext   Lunges lat -   Hip cw/ccw circles - np    UE exs: 25x each  Shld flex/ext apo   Shld abd/add apo  Lat pull ytb -      Endurance: 2 min  Marching    Cool down laps 1 x each  fwd/bkw/lat    Patient was not issued HEP for pool.      Assessment  Pt  breana tx  with ther ex fair with some symptoms in the L hip, but symptoms decreased after treatment.  She continues to require the A of her  to change post tx.  Pt needs encouragement to continue with home stretching and walking program at home.   Will progress as tolerated. Patient will continue to benefit from skilled PT intervention.     Linnea Renae is making good progress towards established goals.    Anticipated barriers to physical therapy: None    Goals:  Short Term Goals: 4 weeks   I HEP  Pt will demonsrate I in body mechanics and posture to improve symptoms - met  Pt will report pain 5/10 at worst    Long Term Goals: 8-12 weeks   foto 52% limitation or less to show improvement in function - near goal  Pt will demonstrate 5/5 strength in BLE to improve pain and function- improving  Pt will be able to walk for 30 mins without rest and increase in symptoms to show improvement in function - not met    Plan    Updated Plan of Care Certification Period: 11/06/2018 - 12/5/2018    Continue PT 2x wk x 4 wk to progress functional strength, gait, balance, and ROM.  She benefits from treatment in aquatic environment because she is able to complete weight bearing activities without pain and challenge balance in safety

## 2018-11-26 ENCOUNTER — PATIENT MESSAGE (OUTPATIENT)
Dept: INTERNAL MEDICINE | Facility: CLINIC | Age: 68
End: 2018-11-26

## 2018-11-26 ENCOUNTER — PATIENT MESSAGE (OUTPATIENT)
Dept: BARIATRICS | Facility: CLINIC | Age: 68
End: 2018-11-26

## 2018-11-26 DIAGNOSIS — M48.061 DEGENERATIVE LUMBAR SPINAL STENOSIS: ICD-10-CM

## 2018-11-28 ENCOUNTER — OFFICE VISIT (OUTPATIENT)
Dept: PAIN MEDICINE | Facility: CLINIC | Age: 68
End: 2018-11-28
Payer: MEDICARE

## 2018-11-28 VITALS
WEIGHT: 291 LBS | BODY MASS INDEX: 44.1 KG/M2 | HEIGHT: 68 IN | HEART RATE: 74 BPM | TEMPERATURE: 98 F | DIASTOLIC BLOOD PRESSURE: 69 MMHG | SYSTOLIC BLOOD PRESSURE: 133 MMHG

## 2018-11-28 DIAGNOSIS — M47.816 LUMBAR FACET ARTHROPATHY: ICD-10-CM

## 2018-11-28 DIAGNOSIS — M47.819 SPONDYLOSIS WITHOUT MYELOPATHY: ICD-10-CM

## 2018-11-28 DIAGNOSIS — M47.9 OSTEOARTHRITIS OF SPINE, UNSPECIFIED SPINAL OSTEOARTHRITIS COMPLICATION STATUS, UNSPECIFIED SPINAL REGION: ICD-10-CM

## 2018-11-28 DIAGNOSIS — M51.36 DDD (DEGENERATIVE DISC DISEASE), LUMBAR: ICD-10-CM

## 2018-11-28 DIAGNOSIS — M47.816 LUMBAR SPONDYLOSIS: Primary | ICD-10-CM

## 2018-11-28 DIAGNOSIS — M46.1 SACROILIITIS: ICD-10-CM

## 2018-11-28 PROCEDURE — 99214 OFFICE O/P EST MOD 30 MIN: CPT | Mod: HCNC,S$GLB,, | Performed by: NURSE PRACTITIONER

## 2018-11-28 PROCEDURE — 3078F DIAST BP <80 MM HG: CPT | Mod: CPTII,HCNC,S$GLB, | Performed by: NURSE PRACTITIONER

## 2018-11-28 PROCEDURE — 99999 PR PBB SHADOW E&M-EST. PATIENT-LVL III: CPT | Mod: PBBFAC,HCNC,, | Performed by: NURSE PRACTITIONER

## 2018-11-28 PROCEDURE — 1101F PT FALLS ASSESS-DOCD LE1/YR: CPT | Mod: CPTII,HCNC,S$GLB, | Performed by: NURSE PRACTITIONER

## 2018-11-28 PROCEDURE — 3075F SYST BP GE 130 - 139MM HG: CPT | Mod: CPTII,HCNC,S$GLB, | Performed by: NURSE PRACTITIONER

## 2018-11-28 PROCEDURE — 99499 UNLISTED E&M SERVICE: CPT | Mod: HCNC,S$GLB,, | Performed by: NURSE PRACTITIONER

## 2018-11-28 RX ORDER — TRAMADOL HYDROCHLORIDE 50 MG/1
50 TABLET ORAL EVERY 6 HOURS PRN
Qty: 120 TABLET | Refills: 2 | Status: SHIPPED | OUTPATIENT
Start: 2018-11-28 | End: 2019-02-27 | Stop reason: SDUPTHER

## 2018-11-28 NOTE — TELEPHONE ENCOUNTER
Please call in Tramadol with new directions and quantity, total of 3 fills, then tell her it's done.

## 2018-11-28 NOTE — PROGRESS NOTES
Chronic Pain - Established Visit    Referring Physician: No ref. provider found    Chief Complaint: back pain       SUBJECTIVE:    Linnea Renae presents to the clinic for follow uo of lower back pain.  She had facet injections in the past with benefit, last in July.  She feels as though that lasted for 2-3 months.  She has been having significant pain for about one month.  She is currently in PT which is helping some.  Her pain is across the back mainly, worse on the right.  She does have some radiation into the legs, stopping at the knees.  She has persistent numbness to both feet which has been present for many years.  She is diabetic but has been fairly well controlled.  Her pain today is 7/10.      Patient denies night fever/night sweats, urinary incontinence, bowel incontinence, significant weight loss, significant motor weakness.    Physical Therapy/Home Exercise: yes currently    Pain Disability Index Review:  Last 3 PDI Scores 11/28/2018 8/9/2018   Pain Disability Index (PDI) 33 31       Pain Medications:    - Opioids: Ultram (Tramadol HCL)  - Adjuvant Medications: Advil,Motrin ( Ibuprofen) and zanaflex  - Anti-Coagulants: Aspirin  - Others: see med list     report:  Reviewed and consistent with medication use as prescribed.    Lab Results   Component Value Date    HGBA1C 6.1 (H) 09/17/2018     CMP  Sodium   Date Value Ref Range Status   09/17/2018 139 136 - 145 mmol/L Final     Potassium   Date Value Ref Range Status   09/17/2018 3.7 3.5 - 5.1 mmol/L Final     Chloride   Date Value Ref Range Status   09/17/2018 104 95 - 110 mmol/L Final     CO2   Date Value Ref Range Status   09/17/2018 25 23 - 29 mmol/L Final     Glucose   Date Value Ref Range Status   09/17/2018 100 70 - 110 mg/dL Final     BUN, Bld   Date Value Ref Range Status   09/17/2018 12 8 - 23 mg/dL Final     Creatinine   Date Value Ref Range Status   09/17/2018 0.7 0.5 - 1.4 mg/dL Final     Calcium   Date Value Ref Range Status   09/17/2018  9.4 8.7 - 10.5 mg/dL Final     Total Protein   Date Value Ref Range Status   03/09/2018 7.6 6.0 - 8.4 g/dL Final     Albumin   Date Value Ref Range Status   03/09/2018 3.8 3.5 - 5.2 g/dL Final     Total Bilirubin   Date Value Ref Range Status   03/09/2018 0.4 0.1 - 1.0 mg/dL Final     Comment:     For infants and newborns, interpretation of results should be based  on gestational age, weight and in agreement with clinical  observations.  Premature Infant recommended reference ranges:  Up to 24 hours.............<8.0 mg/dL  Up to 48 hours............<12.0 mg/dL  3-5 days..................<15.0 mg/dL  6-29 days.................<15.0 mg/dL       Alkaline Phosphatase   Date Value Ref Range Status   03/09/2018 87 55 - 135 U/L Final     AST   Date Value Ref Range Status   03/09/2018 19 10 - 40 U/L Final     ALT   Date Value Ref Range Status   03/09/2018 19 10 - 44 U/L Final     Anion Gap   Date Value Ref Range Status   09/17/2018 10 8 - 16 mmol/L Final     eGFR if    Date Value Ref Range Status   09/17/2018 >60 >60 mL/min/1.73 m^2 Final     eGFR if non    Date Value Ref Range Status   09/17/2018 >60 >60 mL/min/1.73 m^2 Final     Comment:     Calculation used to obtain the estimated glomerular filtration  rate (eGFR) is the CKD-EPI equation.          Pain Procedures:   7/29/16 Right L3-4 TF CHRISTIAN  11/3/17 Bilateral L3-4 and L4-5 facet injections  5/9/18 Bilateral L3-4 and L4-5 facet injections  7/25/18 Bilateral L3-4 and L4-5 facet injections    Imaging: MRI Lumbar Spine W WO Contrast    Narrative     MRI LUMBAR SPINE W WO CONTRAST    SUPPLIED CLINICAL HISTORY:  Radiculopathy    TECHNIQUE:  Multiplanar, multisequence images of the Lumbar Spine were obtained before and after administration of 10 mL gadavist intravenous contrast.      COMPARISON: MRI lumbar spine 11/23/2015     FINDINGS:  Alignment: There is grade 1 anterolisthesis of L3 on L4.  Normal lumbar lordosis is preserved.  Vertebrae:   Body heights are maintained. No evidence for acute fracture or compression deformity.  No marrow signal abnormality suspicious for an infiltrative process.  There is a L1 hemangioma present.  Discs:  There is desiccation in the lower lumbar spine.  Cord:  Visualized lower thoracic spinal cord, conus medullaris and lumbar spinal nerve roots demonstrate normal signal.  The conus terminates at L1.    Soft tissue structures:  No significant abnormalities.    No abnormal enhancement after administration of intravenous contrast.      T12-L1:  There is no focal disk abnormality.  No significant spinal canal stenosis.  No significant neural foraminal narrowing.    L1-2:  There is no focal disk abnormality.  No significant spinal canal stenosis.  No significant neural foraminal narrowing.    L2-3:  There is diffuse disc bulge, ligamentum flavum buckling and mild facet arthropathy causing mild spinal canal stenosis.  No significant neuroforaminal narrowing.    L3-4:  There is diffuse disc bulge, ligamentum flavum buckling and moderate facet arthropathy causing mild spinal canal stenosis.  No significant neuroforaminal narrowing.    L4-5:  There is diffuse disc bulge, ligamentum flavum buckling and moderate facet arthropathy causing severe spinal canal stenosis and mild left neuroforaminal narrowing.    L5-S1:  There is mild diffuse disc bulge and moderate facet arthropathy causing mild left neuroforaminal narrowing.  No significant spinal canal stenosis.      Impression          Lumbar spondylosis as above, grossly unchanged since prior exam, most prominent at L4-5 with severe spinal canal stenosis at this level.    ______________________________________     Electronically signed by resident: SHIKHA MASSEY MD  Date: 09/20/16  Time: 16:54     ______________________________________     Electronically signed by: JUAN F BLANCO MD  Date: 09/21/16  Time: 17:06      X-Ray Lumbar Spine Ap Lateral w/Flex Ext    Narrative     10/11/17  10:17:19    Accession: [id number]    CLINICAL INDICATION: 67 year old F with  low back pain    COMPARISON: Lumbar spine x-rays, 2016.    TECHNIQUE: AP, lateral, flexion, extension, and coned down lateral radiographs of the lumbar spine.    FINDINGS:     Vertebral body heights are maintained.  No evidence of fracture.      Normal sagittal alignment is preserved.No significant translation with flexion-extension.    Moderate multilevel degenerative changes are again present. Mild anterolisthesis of L3 with respect to L4 is again noted. Multilevel facet arthropathy is present, most pronounced in the lower lumbar spine.  No detrimental change is identified when compared with the examination performed one year prior.      Impression         Moderate multilevel degenerative changes in the lumbar spine, similar in appearance to the prior exam.    No evidence of fracture or malalignment.      Electronically signed by: Heber Frost  Date: 10/11/17  Time: 10:37            Past Medical History:   Diagnosis Date    Allergy     Breast cancer     Lumpectomy 10/15.    Chronic constipation     Colon polyps     Diabetes mellitus, type 2     Dry eyes     GERD (gastroesophageal reflux disease)     Hyperlipidemia     Hypertension     Lumbar disc disease     Type 2 diabetes mellitus      Past Surgical History:   Procedure Laterality Date    BIOPSY-EXCISIONAL right breast with Wire Localization  (wire inserted at [facility] for 0900 Right 10/7/2015    Performed by [facility] at [facility] OR 2ND FLR    BLOCK-FACET-LUMBAR Bilateral 11/3/2017    [facility] T    BREAST BIOPSY      BREAST LUMPECTOMY Right         CATARACT EXTRACTION, BILATERAL       SECTION      x3    CHOLECYSTECTOMY      COLONOSCOPY N/A 10/11/2018    Procedure: COLONOSCOPY;  Surgeon: Lorenzo Tanner MD;  Location: [facility] (4TH FLR);  Service: Endoscopy;  Laterality: N/A;    COLONOSCOPY N/A  10/11/2018    Performed by Lorenzo Tanner MD at [facility] (4TH FLR)    COLONOSCOPY W/ POLYPECTOMY      HYSTERECTOMY      and USO    INJECTION OF FACET JOINT Bilateral 2018    Procedure: INJECTION, FACET JOINT;  Surgeon: Viet Wilson MD;  Location: [facility];  Service: Pain Management;  Laterality: Bilateral;  LUMBAR BILATERAL L3-L4 AND L4-L5 FACET STEROID INJECTION    NEED CONSENT    INJECTION, FACET JOINT Bilateral 2018    Performed by [facility] at [facility]    INJECTION-FACET Bilateral 2018    Performed by [facility] at [facility]    INJECTION-STEROID-EPIDURAL-TRANSFORAMINAL Right 2016    Performed by [facility] at [facility]    INJECTION-STEROID-EPIDURAL-TRANSFORAMINAL Bilateral 3/4/2016    Performed by [facility] at [facility]    INJECTION-STEROID-EPIDURAL-TRANSFORAMINAL Bilateral 2016    Performed by [facility] at [facility]    TUBAL LIGATION       Social History     Socioeconomic History    Marital status:      Spouse name: Not on file    Number of children: 3    Years of education: Not on file    Highest education level: Not on file   Social Needs    Financial resource strain: Not on file    Food insecurity - worry: Not on file    Food insecurity - inability: Not on file    Transportation needs - medical: Not on file    Transportation needs - non-medical: Not on file   Occupational History    Not on file   Tobacco Use    Smoking status: Former Smoker     Packs/day: 0.25     Years: 20.00     Pack years: 5.00     Last attempt to quit: 1985     Years since quittin.9    Smokeless tobacco: Never Used    Tobacco comment: Quit mid .   Substance and Sexual Activity    Alcohol use: No     Alcohol/week: 0.0 oz    Drug use: No    Sexual activity: Yes   Other Topics Concern    Not on file   Social History Narrative    Not on file     Family History   Problem  Relation Age of Onset    No Known Problems Mother     No Known Problems Father     Heart disease Paternal Grandmother     Thyroid disease Paternal Grandfather     Hypertension Sister     Hypertension Brother     Glaucoma Brother     No Known Problems Daughter     No Known Problems Daughter     No Known Problems Daughter        Review of patient's allergies indicates:   Allergen Reactions    Codeine Itching       Current Outpatient Medications   Medication Sig    aspirin 81 MG Chew Take 81 mg by mouth once daily.    atenolol (TENORMIN) 50 MG tablet TAKE 1 TABLET BY MOUTH TWICE DAILY    atorvastatin (LIPITOR) 20 MG tablet TAKE 1 TABLET BY MOUTH ONCE DAILY    blood sugar diagnostic Strp 1 strip by Misc.(Non-Drug; Combo Route) route once daily.    blood-glucose meter kit Use as instructed    calcium-vitamin D3 500 mg(1,250mg) -200 unit per tablet Take 1 tablet by mouth 2 (two) times daily with meals.     captopril (CAPOTEN) 50 MG tablet TAKE TWO TABLETS BY MOUTH IN THE MORNING AND ONE TABLET IN THE EVENING    clotrimazole (LOTRIMIN) 1 % cream Apply topically 2 (two) times daily.    cycloSPORINE (RESTASIS) 0.05 % ophthalmic emulsion 1 drop 2 (two) times daily.    fluticasone (FLONASE) 50 mcg/actuation nasal spray 2 sprays (100 mcg total) by Each Nare route once daily.    hydroCHLOROthiazide (HYDRODIURIL) 25 MG tablet Take 1 tablet (25 mg total) by mouth once daily.    HYDROcodone-acetaminophen (NORCO) 5-325 mg per tablet Take 1 tablet by mouth every 8 (eight) hours as needed for Pain.    ibuprofen (ADVIL,MOTRIN) 800 MG tablet TAKE 1 TABLET BY MOUTH THREE TIMES DAILY AS NEEDED    ketoconazole (NIZORAL) 2 % cream Apply topically once daily.    lancets Misc 1 lancet by Misc.(Non-Drug; Combo Route) route once daily.    metFORMIN (GLUCOPHAGE-XR) 500 MG 24 hr tablet TAKE TWO TABLETS BY MOUTH ONCE DAILY WITH BREAKFAST    minoxidil (LONITEN) 2.5 MG tablet     multivitamin (ONE DAILY MULTIVITAMIN) per  "tablet Take 1 tablet by mouth once daily.    omeprazole (PRILOSEC) 20 MG capsule Take 1 capsule (20 mg total) by mouth once daily.    tiZANidine (ZANAFLEX) 4 MG tablet TAKE ONE TABLET BY MOUTH TWICE DAILY AS NEEDED FOR MUSCLE SPASM     No current facility-administered medications for this visit.        REVIEW OF SYSTEMS:    GENERAL:  No weight loss, malaise or fevers.  HEENT:  Negative for frequent or significant headaches.  NECK:  Negative for lumps, goiter, pain and significant neck swelling.  RESPIRATORY:  Negative for cough, wheezing or shortness of breath.  CARDIOVASCULAR:  Negative for chest pain, leg swelling or palpitations.  GI:  Negative for abdominal discomfort, blood in stools or black stools or change in bowel habits.  MUSCULOSKELETAL:  See HPI.  SKIN:  Negative for lesions, rash, and itching.  PSYCH: Positive for sleep disturbance, mood disorder and recent psychosocial stressors.  HEMATOLOGY/LYMPHOLOGY:  Negative for prolonged bleeding, bruising easily or swollen nodes.  NEURO:   No history of headaches, syncope, paralysis, seizures or tremors.  All other reviewed and negative other than HPI.    OBJECTIVE:    /69   Pulse 74   Temp 98 °F (36.7 °C) (Oral)   Ht 5' 8" (1.727 m)   Wt 132 kg (291 lb)   BMI 44.25 kg/m²     PHYSICAL EXAMINATION:    General appearance: Well appearing, in no acute distress, alert and oriented x3.  Psych:  Mood and affect appropriate.  Skin: Skin color, texture, turgor normal, no rashes or lesions, in both upper and lower body.  Head/face:  Atraumatic, normocephalic. No palpable lymph nodes  Cor: RRR  Pulm: CTA  GI: Abdomen soft and non-tender.  Back: Straight leg raising in the sitting and supine positions is negative to radicular pain.  Moderate pain to palpation over the lumbar facet joints.  Limited ROM with pain on extension greater than flexion.  Positive facet loading bilaterally.  Painful palpation to SI joints.  MARY is positive on the right.  Extremities: " Peripheral joint ROM is full and pain free without obvious instability or laxity in all four extremities. No deformities, edema, or skin discoloration. Good capillary refill.  Musculoskeletal:  Bilateral upper and lower extremity strength is normal and symmetric.  No atrophy or tone abnormalities are noted.  Neuro: Bilateral upper and lower extremity coordination and muscle stretch reflexes are physiologic and symmetric.  Plantar response are downgoing.  Decreased sensation to bilateral feet.  Gait: Antalgic.      ASSESSMENT: 68 y.o. year old female with lower back pain, consistent with the following diagnoses:     1. Lumbar spondylosis     2. Osteoarthritis of spine, unspecified spinal osteoarthritis complication status, unspecified spinal region     3. Spondylosis without myelopathy     4. Lumbar facet arthropathy     5. Sacroiliitis     6. DDD (degenerative disc disease), lumbar           PLAN:     - I have stressed the importance of physical activity and a home exercise plan to help with pain and improve health.    - Patient can continue with medications for now since they are providing benefits, using them appropriately, and without side effects.    - Schedule for right then left L2,3,4 RFAs, 2 weeks apart.  The procedure, risks, benefits and options were discussed with patient. There are no contraindications to the procedure. The patient expressed understanding and agreed to proceed.     - Continue with PT.    - RTC 4 weeks after completion of procedures.    - Counseled patient regarding the importance of activity modification, constant sleeping habits and physical therapy.      The above plan and management options were discussed at length with patient. Patient is in agreement with the above and verbalized understanding.      CARMEN Granados  11/28/2018

## 2018-12-05 ENCOUNTER — HOSPITAL ENCOUNTER (OUTPATIENT)
Facility: OTHER | Age: 68
Discharge: HOME | End: 2018-12-05
Attending: ANESTHESIOLOGY | Admitting: ANESTHESIOLOGY
Payer: MEDICARE

## 2018-12-05 VITALS
RESPIRATION RATE: 20 BRPM | WEIGHT: 280 LBS | TEMPERATURE: 99 F | OXYGEN SATURATION: 98 % | BODY MASS INDEX: 42.44 KG/M2 | HEART RATE: 63 BPM | DIASTOLIC BLOOD PRESSURE: 56 MMHG | SYSTOLIC BLOOD PRESSURE: 112 MMHG | HEIGHT: 68 IN

## 2018-12-05 DIAGNOSIS — M47.9 OSTEOARTHRITIS OF SPINE, UNSPECIFIED SPINAL OSTEOARTHRITIS COMPLICATION STATUS, UNSPECIFIED SPINAL REGION: Primary | ICD-10-CM

## 2018-12-05 DIAGNOSIS — G89.29 CHRONIC PAIN: ICD-10-CM

## 2018-12-05 LAB — POCT GLUCOSE: 96 MG/DL (ref 70–110)

## 2018-12-05 PROCEDURE — 64636 DESTROY L/S FACET JNT ADDL: CPT | Mod: HCNC | Performed by: ANESTHESIOLOGY

## 2018-12-05 PROCEDURE — 99152 MOD SED SAME PHYS/QHP 5/>YRS: CPT | Mod: HCNC,,, | Performed by: ANESTHESIOLOGY

## 2018-12-05 PROCEDURE — 25000003 PHARM REV CODE 250: Mod: HCNC | Performed by: ANESTHESIOLOGY

## 2018-12-05 PROCEDURE — 64635 DESTROY LUMB/SAC FACET JNT: CPT | Mod: HCNC,RT,, | Performed by: ANESTHESIOLOGY

## 2018-12-05 PROCEDURE — 82947 ASSAY GLUCOSE BLOOD QUANT: CPT | Mod: HCNC | Performed by: ANESTHESIOLOGY

## 2018-12-05 PROCEDURE — 64636 DESTROY L/S FACET JNT ADDL: CPT | Mod: HCNC,RT,, | Performed by: ANESTHESIOLOGY

## 2018-12-05 PROCEDURE — 63600175 PHARM REV CODE 636 W HCPCS: Mod: HCNC | Performed by: ANESTHESIOLOGY

## 2018-12-05 PROCEDURE — 64635 DESTROY LUMB/SAC FACET JNT: CPT | Mod: HCNC | Performed by: ANESTHESIOLOGY

## 2018-12-05 RX ORDER — FENTANYL CITRATE 50 UG/ML
INJECTION, SOLUTION INTRAMUSCULAR; INTRAVENOUS
Status: DISCONTINUED | OUTPATIENT
Start: 2018-12-05 | End: 2018-12-05 | Stop reason: HOSPADM

## 2018-12-05 RX ORDER — DEXAMETHASONE SODIUM PHOSPHATE 4 MG/ML
INJECTION, SOLUTION INTRA-ARTICULAR; INTRALESIONAL; INTRAMUSCULAR; INTRAVENOUS; SOFT TISSUE
Status: DISCONTINUED | OUTPATIENT
Start: 2018-12-05 | End: 2018-12-05 | Stop reason: HOSPADM

## 2018-12-05 RX ORDER — LIDOCAINE HYDROCHLORIDE 10 MG/ML
INJECTION INFILTRATION; PERINEURAL
Status: DISCONTINUED | OUTPATIENT
Start: 2018-12-05 | End: 2018-12-05 | Stop reason: HOSPADM

## 2018-12-05 RX ORDER — SODIUM CHLORIDE 9 MG/ML
INJECTION, SOLUTION INTRAVENOUS
Status: COMPLETED | OUTPATIENT
Start: 2018-12-05 | End: 2018-12-05

## 2018-12-05 RX ORDER — MIDAZOLAM HYDROCHLORIDE 1 MG/ML
INJECTION INTRAMUSCULAR; INTRAVENOUS
Status: DISCONTINUED | OUTPATIENT
Start: 2018-12-05 | End: 2018-12-05 | Stop reason: HOSPADM

## 2018-12-05 RX ORDER — SODIUM CHLORIDE 9 MG/ML
500 INJECTION, SOLUTION INTRAVENOUS CONTINUOUS
Status: CANCELLED | OUTPATIENT
Start: 2018-12-05

## 2018-12-05 RX ORDER — BUPIVACAINE HYDROCHLORIDE 2.5 MG/ML
INJECTION, SOLUTION EPIDURAL; INFILTRATION; INTRACAUDAL
Status: DISCONTINUED | OUTPATIENT
Start: 2018-12-05 | End: 2018-12-05 | Stop reason: HOSPADM

## 2018-12-05 NOTE — H&P
HPI  67yo with lumbar spondylosis who presents for right RFA L2, 3, 4 after having good results with MBB in July.  Patient denies any recent changes in health status or medication adjustments.         Past Medical History:   Diagnosis Date    Allergy     Breast cancer     Lumpectomy 10/15.    Chronic constipation     Colon polyps     Diabetes mellitus, type 2     Dry eyes     GERD (gastroesophageal reflux disease)     Hyperlipidemia     Hypertension     Lumbar disc disease     Type 2 diabetes mellitus      Past Surgical History:   Procedure Laterality Date    BIOPSY-EXCISIONAL right breast with Wire Localization  (wire inserted at [facility] for 0900 Right 10/7/2015    [facility] 2ND FLR    BLOCK-FACET-LUMBAR Bilateral 11/3/2017    Per[facility] by [facility] at [facility]    BREAST BIOPSY      BREAST LUMPECTOMY Right         CATARACT EXTRACTION, BILATERAL       SECTION      x3    CHOLECYSTECTOMY      COLONOSCOPY N/A 10/11/2018    Procedure: COLONOSCOPY;  Surgeon: Lorenzo Tanner MD;  Location: [facility] (4TH FLR);  Service: Endoscopy;  Laterality: N/A;    COLONOSCOPY N/A 10/11/2018    Performed by Lorenzo Tanner MD at [facility] (4TH FLR)    COLONOSCOPY W/ POLYPECTOMY      HYSTERECTOMY      and USO    INJECTION OF FACET JOINT Bilateral 2018    Procedure: INJECTION, FACET JOINT;  Surgeon: Viet Wilson MD;  Location: [facility];  Service: Pain Management;  Laterality: Bilateral;  LUMBAR BILATERAL L3-L4 AND L4-L5 FACET STEROID INJECTION    NEED CONSENT    INJECTION, FACET JOINT Bilateral 2018    [facility]    INJECTION-FACET Bilateral 2018    [facility]    INJECTION-STEROID-EPIDURAL-TRANSFORAMINAL Right 2016    [facility]    INJECTION-STEROID-EPIDURAL-TRANSFORAMINAL Bilateral 3/4/2016     "[facility] at [facility]    INJECTION-STEROID-EPIDURAL-TRANSFORAMINAL Bilateral 1/8/2016    Performed [facility] at [facility]    TUBAL LIGATION       Review of patient's allergies indicates:   Allergen Reactions    Codeine Itching        PMHx, PSHx, Allergies, Medications reviewed in epic      ROS negative except pain complaints in HPI    OBJECTIVE:    /69 (BP Location: Right arm, Patient Position: Lying)   Pulse 77   Temp 98.5 °F (36.9 °C) (Oral)   Resp 18   Ht 5' 8" (1.727 m)   Wt 127 kg (280 lb)   SpO2 98%   BMI 42.57 kg/m²     PHYSICAL EXAMINATION:    GENERAL: Well appearing, in no acute distress, alert and oriented x3.  PSYCH:  Mood and affect appropriate.  SKIN: Skin color, texture, turgor normal, no rashes or lesions.  CV: RRR with palpation of the radial artery.  PULM: No evidence of respiratory difficulty, symmetric chest rise. Clear to auscultation.  NEURO: Cranial nerves grossly intact.    Plan:    Proceed with procedure as planned    Jami Henson  12/05/2018    "

## 2018-12-05 NOTE — OP NOTE
Patient Name: Linnea Renae  MRN: [id number]    INFORMED CONSENT: The procedure, risks, benefits and options were discussed with patient. There are no contraindications to the procedure. The patient expressed understanding and agreed to proceed. The personnel performing the procedure was discussed. I verify that I personally obtained Linnea's consent prior to the start of the procedure and the signed consent can be found on the patient's chart.    Procedure Date: 12/05/2018    Anesthesia: Topical    Pre Procedure diagnosis: Lumbar spondylosis [M47.816]  1. Osteoarthritis of spine, unspecified spinal osteoarthritis complication status, unspecified spinal region    2. Chronic pain      Post-Procedure diagnosis: SAME      Moderate Sedation: Yes - Fentanyl 100 mcg and Midazolam 2 mg    PROCEDURE: right L2-3-4-5 FACET MEDIAL BRANCH NERVE RADIOFREQUENCY NEUROTOMY (lumbar)          DESCRIPTION OF PROCEDURE: The patient was brought to the procedure room.  After performing time out IV access was obtained prior to the procedure. The patient was positioned prone on the fluoroscopy table. Continuous hemodynamic monitoring was initiated including blood pressure and pulse oximetry. IV sedation was administered incrementally to allow the patient to remain comfortable and conversant throughout the procedure. The area of the lumbar spine was prepped chlorhexidine three times and draped into a sterile field.  Fluoroscopy was used to identify the location of the rt side L2, L3, L4, and L5 medial branch nerves at the junctions of the superior articular process and the transverse processes of L3, L4, L5, and the sacral ala respectively.  Skin anesthesia was achieved using 3 cc of Lidocaine 1% over the injection sites. A 20 gauge, 150mm (10mm active tip) curved RF needle was slowly inserted at each level using AP, lateral and oblique fluoroscopic imaging. Negative aspiration for blood or CSF was confirmed.  Sensory stimulation at 50Hz below  0.5V was achieved at every level. Motor stimulation at 2Hz up to 1.5V did not cause any radicular symptoms at any level. Each level was anesthetized with 1.5 cc of lidocaine 1%.  Radiofrequency lesioning was performed for 90 seconds at 80 degrees in two different positions at each level.  Total of 4 cc of bupivacaine 0.25% and 10 mg of Decadron was injected was injected at all levels.. The needles were removed and bleeding was nil.  A sterile dressing was applied. Linnea was taken back to the recovery room for further observation.     Stimulation Results:  L2 = Sensory positive @ 0.5, Motor negative @ 1.5  L3 = Sensory positive @ 0.4, Motor negative @ 1.5  L4 = Sensory positive @ 0.5, Motor negative @ 1.5  L5 = Sensory positive @ 0.5, Motor negative @ 1.5    Blood Loss: Nill  Specimen: None    Viet Wilson MD

## 2018-12-05 NOTE — DISCHARGE INSTRUCTIONS
Thank you for allowing us to care for you today. You may receive a survey about the care we provided. Your feedback is valuable and helps us provide excellent care throughout the community.     Home Care Instructions for Pain Management:    1. DIET:   You may resume your normal diet today.   2. BATHING:   You may shower with luke warm water. No tub baths or anything that will soak injection sites under water for the next 24 hours.  3. DRESSING:   You may remove your bandage today.   4. ACTIVITY LEVEL:   You may resume your normal activities 24 hrs after your procedure. Nothing strenuous today.  5. MEDICATIONS:   You may resume your normal medications today. To restart blood thinners, ask your doctor.  6. DRIVING    If you have received any sedatives by mouth today, you may not drive for 12 hours.    If you have received any sedation through your IV, you may not drive for 24 hrs.   7. SPECIAL INSTRUCTIONS:   No heat to the injection site for 24 hrs including, hot bath or shower, heating pad, moist heat, or hot tubs.    Use ice pack to injection site for any pain or discomfort.  Apply ice packs for 20 minute intervals as needed.    IF you have diabetes, be sure to monitor your blood sugar more closely. IF your injection contained steroids your blood sugar levels may become higher than normal.    If you are still having pain upon discharge:  Your pain may improve over the next 48 hours. The anesthetic (numbing medication) works immediately to 48 hours. IF your injection contained a steroid (anti-inflammatory medication), it takes approximately 3 days to start feeling relief and 7-10 days to see your greatest results from the medication. It is possible you may need subsequent injections. This would be discussed at your follow up appointment with pain management or your referring doctor.      PLEASE CALL YOUR DOCTOR IF:  1. Redness or swelling around the injection site.  2. Fever of 101 degrees or more  3. Drainage  (pus) from the injection site.  4. For any continuous bleeding (some dried blood over the incision is normal.)    FOR EMERGENCIES:   If any unusual problems or difficulties occur during clinic hours, call [phone] or [phone]. Adult Procedural Sedation Instructions    Recovery After Procedural Sedation (Adult)  You have been given medicine by vein to make you sleep during your surgery. This may have included both a pain medicine and sleeping medicine. Most of the effects have worn off. But you may still have some drowsiness for the next 6 to 8 hours.  Home care  Follow these guidelines when you get home:  · For the next 8 hours, you should be watched by a responsible adult. This person should make sure your condition is not getting worse.  · Don't drink any alcohol for the next 24 hours.  · Don't drive, operate dangerous machinery, or make important business or personal decisions during the next 24 hours.  Note: Your healthcare provider may tell you not to take any medicine by mouth for pain or sleep in the next 4 hours. These medicines may react with the medicines you were given in the hospital. This could cause a much stronger response than usual.  Follow-up care  Follow up with your healthcare provider if you are not alert and back to your usual level of activity within 12 hours.  When to seek medical advice  Call your healthcare provider right away if any of these occur:  · Drowsiness gets worse  · Weakness or dizziness gets worse  · Repeated vomiting  · You can't be awakened   Date Last Reviewed: 10/18/2016  © 9082-9699 [employer]. [address] [place] [address], [place] [address]. All rights reserved. This information is not intended as a substitute for professional medical care. Always follow your healthcare professional's instructions.

## 2018-12-05 NOTE — DISCHARGE SUMMARY
Discharge Note  Short Stay      SUMMARY     Admit Date: 12/5/2018    Attending Physician: Viet Wilson      Discharge Physician: Viet Wilson      Discharge Date: 12/5/2018 1:53 PM    Procedure(s) (LRB):  Radiofrequency Ablation RIGHT LUMBAR L2,3,4 RFA (Right)    Final Diagnosis: Lumbar spondylosis [M47.816]    Disposition: Home or self care    Patient Instructions:   Current Discharge Medication List      CONTINUE these medications which have NOT CHANGED    Details   aspirin 81 MG Chew Take 81 mg by mouth once daily.      atenolol (TENORMIN) 50 MG tablet TAKE 1 TABLET BY MOUTH TWICE DAILY  Qty: 180 tablet, Refills: 1    Associated Diagnoses: Essential hypertension      atorvastatin (LIPITOR) 20 MG tablet TAKE 1 TABLET BY MOUTH ONCE DAILY  Qty: 90 tablet, Refills: 2    Associated Diagnoses: Hyperlipidemia LDL goal <100      blood sugar diagnostic Strp 1 strip by Misc.(Non-Drug; Combo Route) route once daily.  Qty: 100 strip, Refills: 3    Comments: TRUE METRIX STRIPS  Associated Diagnoses: Type 2 diabetes mellitus without complication, without long-term current use of insulin      blood-glucose meter kit Use as instructed  Qty: 1 each, Refills: 0    Comments: TRUE METRIX METER  Associated Diagnoses: Type 2 diabetes mellitus with stage 2 chronic kidney disease, without long-term current use of insulin      calcium-vitamin D3 500 mg(1,250mg) -200 unit per tablet Take 1 tablet by mouth 2 (two) times daily with meals.       captopril (CAPOTEN) 50 MG tablet TAKE TWO TABLETS BY MOUTH IN THE MORNING AND ONE TABLET IN THE EVENING  Qty: 270 tablet, Refills: 2    Associated Diagnoses: Essential hypertension      clotrimazole (LOTRIMIN) 1 % cream Apply topically 2 (two) times daily.  Qty: 60 g, Refills: 5    Associated Diagnoses: Candidal intertrigo      cycloSPORINE (RESTASIS) 0.05 % ophthalmic emulsion 1 drop 2 (two) times daily.      fluticasone (FLONASE) 50 mcg/actuation nasal spray 2 sprays (100 mcg total) by Each  Nare route once daily.  Qty: 3 Bottle, Refills: 1    Associated Diagnoses: Perennial allergic rhinitis      hydroCHLOROthiazide (HYDRODIURIL) 25 MG tablet Take 1 tablet (25 mg total) by mouth once daily.  Qty: 90 tablet, Refills: 2    Associated Diagnoses: Essential hypertension      ibuprofen (ADVIL,MOTRIN) 800 MG tablet TAKE 1 TABLET BY MOUTH THREE TIMES DAILY AS NEEDED  Qty: 60 tablet, Refills: 3    Comments: Please consider 90 day supplies to promote better adherence  Associated Diagnoses: Primary osteoarthritis involving multiple joints      ketoconazole (NIZORAL) 2 % cream Apply topically once daily.  Qty: 60 g, Refills: 3    Associated Diagnoses: Candidal intertrigo      lancets Misc 1 lancet by Misc.(Non-Drug; Combo Route) route once daily.  Qty: 100 each, Refills: 3    Comments: TRUE PLUS LANCET 32 G  Associated Diagnoses: Type 2 diabetes mellitus with stage 2 chronic kidney disease, without long-term current use of insulin      metFORMIN (GLUCOPHAGE-XR) 500 MG 24 hr tablet TAKE TWO TABLETS BY MOUTH ONCE DAILY WITH BREAKFAST  Qty: 180 tablet, Refills: 2    Associated Diagnoses: Type 2 diabetes mellitus without complication, without long-term current use of insulin      minoxidil (LONITEN) 2.5 MG tablet       multivitamin (ONE DAILY MULTIVITAMIN) per tablet Take 1 tablet by mouth once daily.      omeprazole (PRILOSEC) 20 MG capsule Take 1 capsule (20 mg total) by mouth once daily.  Qty: 90 capsule, Refills: 2    Associated Diagnoses: Gastroesophageal reflux disease, esophagitis presence not specified      tiZANidine (ZANAFLEX) 4 MG tablet TAKE ONE TABLET BY MOUTH TWICE DAILY AS NEEDED FOR MUSCLE SPASM  Qty: 60 tablet, Refills: 2    Comments: Please consider 90 day supplies to promote better adherence  Associated Diagnoses: Degenerative lumbar spinal stenosis      traMADol (ULTRAM) 50 mg tablet Take 1 tablet (50 mg total) by mouth every 6 (six) hours as needed.  Qty: 120 tablet, Refills: 2    Associated  Diagnoses: Degenerative lumbar spinal stenosis                 Discharge Diagnosis: Lumbar spondylosis [M47.816]  Condition on Discharge: Stable with no complications to procedure   Diet on Discharge: Same as before.  Activity: as per instruction sheet.  Discharge to: Home with a responsible adult.  Follow up: 2-4 weeks

## 2018-12-06 DIAGNOSIS — M15.9 PRIMARY OSTEOARTHRITIS INVOLVING MULTIPLE JOINTS: ICD-10-CM

## 2018-12-06 RX ORDER — IBUPROFEN 800 MG/1
TABLET ORAL
Qty: 60 TABLET | Refills: 3 | Status: CANCELLED | OUTPATIENT
Start: 2018-12-06

## 2018-12-06 RX ORDER — IBUPROFEN 800 MG/1
800 TABLET ORAL 3 TIMES DAILY PRN
Qty: 60 TABLET | Refills: 3 | Status: SHIPPED | OUTPATIENT
Start: 2018-12-06 | End: 2019-11-29 | Stop reason: SDUPTHER

## 2018-12-17 ENCOUNTER — OFFICE VISIT (OUTPATIENT)
Dept: PAIN MEDICINE | Facility: CLINIC | Age: 68
End: 2018-12-17
Payer: MEDICARE

## 2018-12-17 VITALS
WEIGHT: 289.88 LBS | HEIGHT: 68 IN | HEART RATE: 71 BPM | SYSTOLIC BLOOD PRESSURE: 126 MMHG | BODY MASS INDEX: 43.93 KG/M2 | DIASTOLIC BLOOD PRESSURE: 76 MMHG

## 2018-12-17 DIAGNOSIS — M79.2 NEURITIS: Primary | ICD-10-CM

## 2018-12-17 DIAGNOSIS — M51.36 DDD (DEGENERATIVE DISC DISEASE), LUMBAR: ICD-10-CM

## 2018-12-17 DIAGNOSIS — G89.29 OTHER CHRONIC PAIN: ICD-10-CM

## 2018-12-17 DIAGNOSIS — M47.819 SPONDYLOSIS WITHOUT MYELOPATHY: ICD-10-CM

## 2018-12-17 DIAGNOSIS — M79.10 MYALGIA: ICD-10-CM

## 2018-12-17 DIAGNOSIS — M46.1 SACROILIITIS: ICD-10-CM

## 2018-12-17 DIAGNOSIS — M47.816 LUMBAR SPONDYLOSIS: ICD-10-CM

## 2018-12-17 DIAGNOSIS — M47.9 OSTEOARTHRITIS OF SPINE, UNSPECIFIED SPINAL OSTEOARTHRITIS COMPLICATION STATUS, UNSPECIFIED SPINAL REGION: ICD-10-CM

## 2018-12-17 PROCEDURE — 1101F PT FALLS ASSESS-DOCD LE1/YR: CPT | Mod: CPTII,HCNC,S$GLB, | Performed by: NURSE PRACTITIONER

## 2018-12-17 PROCEDURE — 99213 OFFICE O/P EST LOW 20 MIN: CPT | Mod: 25,HCNC,S$GLB, | Performed by: NURSE PRACTITIONER

## 2018-12-17 PROCEDURE — 99999 PR PBB SHADOW E&M-EST. PATIENT-LVL III: CPT | Mod: PBBFAC,HCNC,, | Performed by: NURSE PRACTITIONER

## 2018-12-17 PROCEDURE — 20553 NJX 1/MLT TRIGGER POINTS 3/>: CPT | Mod: HCNC,S$GLB,, | Performed by: NURSE PRACTITIONER

## 2018-12-17 PROCEDURE — 99499 UNLISTED E&M SERVICE: CPT | Mod: HCNC,S$GLB,, | Performed by: NURSE PRACTITIONER

## 2018-12-17 PROCEDURE — 3078F DIAST BP <80 MM HG: CPT | Mod: CPTII,HCNC,S$GLB, | Performed by: NURSE PRACTITIONER

## 2018-12-17 PROCEDURE — 3074F SYST BP LT 130 MM HG: CPT | Mod: CPTII,HCNC,S$GLB, | Performed by: NURSE PRACTITIONER

## 2018-12-17 RX ORDER — KETOCONAZOLE 20 MG/ML
SHAMPOO, SUSPENSION TOPICAL
COMMUNITY
Start: 2018-12-03 | End: 2021-07-16 | Stop reason: SDUPTHER

## 2018-12-17 RX ORDER — METHYLPREDNISOLONE ACETATE 40 MG/ML
40 INJECTION, SUSPENSION INTRA-ARTICULAR; INTRALESIONAL; INTRAMUSCULAR; SOFT TISSUE ONCE
Status: COMPLETED | OUTPATIENT
Start: 2018-12-17 | End: 2018-12-17

## 2018-12-17 RX ORDER — BUPIVACAINE HYDROCHLORIDE 5 MG/ML
9 INJECTION, SOLUTION EPIDURAL; INTRACAUDAL ONCE
Status: COMPLETED | OUTPATIENT
Start: 2018-12-17 | End: 2018-12-17

## 2018-12-17 RX ADMIN — METHYLPREDNISOLONE ACETATE 40 MG: 40 INJECTION, SUSPENSION INTRA-ARTICULAR; INTRALESIONAL; INTRAMUSCULAR; SOFT TISSUE at 02:12

## 2018-12-17 RX ADMIN — BUPIVACAINE HYDROCHLORIDE 45 MG: 5 INJECTION, SOLUTION EPIDURAL; INTRACAUDAL at 02:12

## 2018-12-17 NOTE — PROGRESS NOTES
Chronic Pain - Established Visit    Referring Physician: No ref. provider found    Chief Complaint: back pain       SUBJECTIVE:    Linnea Renae presents to the clinic for follow uo of lower back pain.  She is s/p right L2,3,4,5 RFA completed on 12/5/18.  She says that she started to have increased pain the day after the procedure.  She noticed a bruise to her skin which was tender.  She also has been having burning and numb sensations throughout the right side of her back and hip.  She is diabetic but states that her sugar has been stable.  She denies fever, falls or significant weakness.  Her pain today is 10/10.      Physical Therapy/Home Exercise: yes     Pain Disability Index Review:  Last 3 PDI Scores 12/17/2018 11/28/2018 8/9/2018   Pain Disability Index (PDI) 70 33 31       Pain Medications:    - Opioids: Ultram (Tramadol HCL)  - Adjuvant Medications: Advil,Motrin ( Ibuprofen) and zanaflex  - Anti-Coagulants: Aspirin  - Others: see med list     report:  Reviewed and consistent with medication use as prescribed.    Lab Results   Component Value Date    HGBA1C 6.1 (H) 09/17/2018     CMP  Sodium   Date Value Ref Range Status   09/17/2018 139 136 - 145 mmol/L Final     Potassium   Date Value Ref Range Status   09/17/2018 3.7 3.5 - 5.1 mmol/L Final     Chloride   Date Value Ref Range Status   09/17/2018 104 95 - 110 mmol/L Final     CO2   Date Value Ref Range Status   09/17/2018 25 23 - 29 mmol/L Final     Glucose   Date Value Ref Range Status   09/17/2018 100 70 - 110 mg/dL Final     BUN, Bld   Date Value Ref Range Status   09/17/2018 12 8 - 23 mg/dL Final     Creatinine   Date Value Ref Range Status   09/17/2018 0.7 0.5 - 1.4 mg/dL Final     Calcium   Date Value Ref Range Status   09/17/2018 9.4 8.7 - 10.5 mg/dL Final     Total Protein   Date Value Ref Range Status   03/09/2018 7.6 6.0 - 8.4 g/dL Final     Albumin   Date Value Ref Range Status   03/09/2018 3.8 3.5 - 5.2 g/dL Final     Total Bilirubin   Date  Value Ref Range Status   03/09/2018 0.4 0.1 - 1.0 mg/dL Final     Comment:     For infants and newborns, interpretation of results should be based  on gestational age, weight and in agreement with clinical  observations.  Premature Infant recommended reference ranges:  Up to 24 hours.............<8.0 mg/dL  Up to 48 hours............<12.0 mg/dL  3-5 days..................<15.0 mg/dL  6-29 days.................<15.0 mg/dL       Alkaline Phosphatase   Date Value Ref Range Status   03/09/2018 87 55 - 135 U/L Final     AST   Date Value Ref Range Status   03/09/2018 19 10 - 40 U/L Final     ALT   Date Value Ref Range Status   03/09/2018 19 10 - 44 U/L Final     Anion Gap   Date Value Ref Range Status   09/17/2018 10 8 - 16 mmol/L Final     eGFR if    Date Value Ref Range Status   09/17/2018 >60 >60 mL/min/1.73 m^2 Final     eGFR if non    Date Value Ref Range Status   09/17/2018 >60 >60 mL/min/1.73 m^2 Final     Comment:     Calculation used to obtain the estimated glomerular filtration  rate (eGFR) is the CKD-EPI equation.          Pain Procedures:   7/29/16 Right L3-4 TF [facility]IAN  11/3/17 Bilateral L3-4 and L4-5 facet injections  5/9/18 Bilateral L3-4 and L4-5 facet injections  7/25/18 Bilateral L3-4 and L4-5 facet injections  12/5/18 Right L2,3,4,5 RFA    Imaging: MRI Lumbar Spine W WO Contrast    Narrative     MRI LUMBAR SPINE W WO CONTRAST    SUPPLIED CLINICAL HISTORY:  Radiculopathy    TECHNIQUE:  Multiplanar, multisequence images of the Lumbar Spine were obtained before and after administration of 10 mL gadavist intravenous contrast.      COMPARISON: MRI lumbar spine 11/23/2015     FINDINGS:  Alignment: There is grade 1 anterolisthesis of L3 on L4.  Normal lumbar lordosis is preserved.  Vertebrae:  Body heights are maintained. No evidence for acute fracture or compression deformity.  No marrow signal abnormality suspicious for an infiltrative process.  There is a L1 hemangioma  present.  Discs:  There is desiccation in the lower lumbar spine.  Cord:  Visualized lower thoracic spinal cord, conus medullaris and lumbar spinal nerve roots demonstrate normal signal.  The conus terminates at L1.    Soft tissue structures:  No significant abnormalities.    No abnormal enhancement after administration of intravenous contrast.      T12-L1:  There is no focal disk abnormality.  No significant spinal canal stenosis.  No significant neural foraminal narrowing.    L1-2:  There is no focal disk abnormality.  No significant spinal canal stenosis.  No significant neural foraminal narrowing.    L2-3:  There is diffuse disc bulge, ligamentum flavum buckling and mild facet arthropathy causing mild spinal canal stenosis.  No significant neuroforaminal narrowing.    L3-4:  There is diffuse disc bulge, ligamentum flavum buckling and moderate facet arthropathy causing mild spinal canal stenosis.  No significant neuroforaminal narrowing.    L4-5:  There is diffuse disc bulge, ligamentum flavum buckling and moderate facet arthropathy causing severe spinal canal stenosis and mild left neuroforaminal narrowing.    L5-S1:  There is mild diffuse disc bulge and moderate facet arthropathy causing mild left neuroforaminal narrowing.  No significant spinal canal stenosis.      Impression          Lumbar spondylosis as above, grossly unchanged since prior exam, most prominent at L4-5 with severe spinal canal stenosis at this level.    ______________________________________     Electronically signed by resident: SHIKHA MASSEY MD  Date: 09/20/16  Time: 16:54     ______________________________________     Electronically signed by: JUAN F BLANCO MD  Date: 09/21/16  Time: 17:06      X-Ray Lumbar Spine Ap Lateral w/Flex Ext    Narrative     10/11/17 10:17:19    Accession: [id number]    CLINICAL INDICATION: 67 year old F with  low back pain    COMPARISON: Lumbar spine x-rays, 09/20/2016.    TECHNIQUE: AP, lateral, flexion,  extension, and coned down lateral radiographs of the lumbar spine.    FINDINGS:     Vertebral body heights are maintained.  No evidence of fracture.      Normal sagittal alignment is preserved.No significant translation with flexion-extension.    Moderate multilevel degenerative changes are again present. Mild anterolisthesis of L3 with respect to L4 is again noted. Multilevel facet arthropathy is present, most pronounced in the lower lumbar spine.  No detrimental change is identified when compared with the examination performed one year prior.      Impression         Moderate multilevel degenerative changes in the lumbar spine, similar in appearance to the prior exam.    No evidence of fracture or malalignment.      Electronically signed by: Heber Frost  Date: 10/11/17  Time: 10:37            Past Medical History:   Diagnosis Date    Allergy     Breast cancer     Lumpectomy 10/15.    Chronic constipation     Colon polyps     Diabetes mellitus, type 2     Dry eyes     GERD (gastroesophageal reflux disease)     Hyperlipidemia     Hypertension     Lumbar disc disease     Type 2 diabetes mellitus      Past Surgical History:   Procedure Laterality Date    BIOPSY-EXCISIONAL right breast with Wire Localization  (wire [facility] at [facility] for 0900 Right 10/7/2015    Performed by Radha Russell [facility] at [facility] OR 2ND FLR    BLOCK-FACET-LUMBAR Bilateral 11/3/2017    Per[facility] Viet Wilson [facility] at [facility]    BREAST BIOPSY      BREAST LUMPECTOMY Right         CATARACT EXTRACTION, BILATERAL       SECTION      x3    CHOLECYSTECTOMY      COLONOSCOPY N/A 10/11/2018    Procedure: COLONOSCOPY;  Surgeon: Lorenzo Tanner MD;  Location: [facility] (4TH FLR);  Service: Endoscopy;  Laterality: N/A;    COLONOSCOPY N/A 10/11/2018    Performed by Lorenzo Tanner MD at [facility] (4TH FLR)    COLONOSCOPY W/ POLYPECTOMY      HYSTERECTOMY      and USO    INJECTION OF FACET JOINT Bilateral  2018    Procedure: INJECTION, FACET JOINT;  Surgeon: Viet Wilson MD;  Location: [facility];  Service: Pain Management;  Laterality: Bilateral;  LUMBAR BILATERAL L3-L4 AND L4-L5 FACET STEROID INJECTION    NEED CONSENT    INJECTION, FACET JOINT Bilateral 2018    Performed by Viet [facility] MD at [facility]    INJECTION-FACET Bilateral 2018    Performed by [facility] MD at [facility]    INJECTION-STEROID-EPIDURAL-TRANSFORAMINAL Right 2016    Performed by Viet Wilson MD at [facility]    INJECTION-STEROID-EPIDURAL-TRANSFORAMINAL Bilateral 3/4/2016    Performed by Viet Wilson MD at [facility]    INJECTION-STEROID-EPIDURAL-TRANSFORAMINAL Bilateral 2016    Performed by Viet [facility] MD at [facility]    Radiofrequency Ablation RIGHT LUMBAR L2,3,4 RFA Right 2018    Performed by Viet Wilson MD at [facility]    TUBAL LIGATION       Social History     Socioeconomic History    Marital status:      Spouse name: Not on file    Number of children: 3    Years of education: Not on file    Highest education level: Not on file   Social Needs    Financial resource strain: Not on file    Food insecurity - worry: Not on file    Food insecurity - inability: Not on file    Transportation needs - medical: Not on file    Transportation needs - non-medical: Not on file   Occupational History    Not on file   Tobacco Use    Smoking status: Former Smoker     Packs/day: 0.25     Years: 20.00     Pack years: 5.00     Last attempt to quit: 1985     Years since quittin.9    Smokeless tobacco: Never Used    Tobacco comment: Quit mid .   Substance and Sexual Activity    Alcohol use: No     Alcohol/week: 0.0 oz    Drug use: No    Sexual activity: Yes   Other Topics Concern    Not on file   Social History Narrative    Not on file     Family History   Problem Relation Age of Onset    No Known Problems Mother     No  Known Problems Father     Heart disease Paternal Grandmother     Thyroid disease Paternal Grandfather     Hypertension Sister     Hypertension Brother     Glaucoma Brother     No Known Problems Daughter     No Known Problems Daughter     No Known Problems Daughter        Review of patient's allergies indicates:   Allergen Reactions    Codeine Itching       Current Outpatient Medications   Medication Sig    aspirin 81 MG Chew Take 81 mg by mouth once daily.    atenolol (TENORMIN) 50 MG tablet TAKE 1 TABLET BY MOUTH TWICE DAILY    atorvastatin (LIPITOR) 20 MG tablet TAKE 1 TABLET BY MOUTH ONCE DAILY    blood sugar diagnostic Strp 1 strip by Misc.(Non-Drug; Combo Route) route once daily.    calcium-vitamin D3 500 mg(1,250mg) -200 unit per tablet Take 1 tablet by mouth 2 (two) times daily with meals.     captopril (CAPOTEN) 50 MG tablet TAKE TWO TABLETS BY MOUTH IN THE MORNING AND ONE TABLET IN THE EVENING    clotrimazole (LOTRIMIN) 1 % cream Apply topically 2 (two) times daily.    cycloSPORINE (RESTASIS) 0.05 % ophthalmic emulsion 1 drop 2 (two) times daily.    fluticasone (FLONASE) 50 mcg/actuation nasal spray 2 sprays (100 mcg total) by Each Nare route once daily.    hydroCHLOROthiazide (HYDRODIURIL) 25 MG tablet Take 1 tablet (25 mg total) by mouth once daily.    ibuprofen (ADVIL,MOTRIN) 800 MG tablet Take 1 tablet (800 mg total) by mouth 3 (three) times daily as needed.    ketoconazole (NIZORAL) 2 % cream Apply topically once daily.    ketoconazole (NIZORAL) 2 % shampoo     lancets Misc 1 lancet by Misc.(Non-Drug; Combo Route) route once daily.    metFORMIN (GLUCOPHAGE-XR) 500 MG 24 hr tablet TAKE TWO TABLETS BY MOUTH ONCE DAILY WITH BREAKFAST    minoxidil (LONITEN) 2.5 MG tablet     multivitamin (ONE DAILY MULTIVITAMIN) per tablet Take 1 tablet by mouth once daily.    omeprazole (PRILOSEC) 20 MG capsule Take 1 capsule (20 mg total) by mouth once daily.    tiZANidine (ZANAFLEX) 4 MG  "tablet TAKE ONE TABLET BY MOUTH TWICE DAILY AS NEEDED FOR MUSCLE SPASM    traMADol (ULTRAM) 50 mg tablet Take 1 tablet (50 mg total) by mouth every 6 (six) hours as needed.    blood-glucose meter kit Use as instructed     No current facility-administered medications for this visit.        REVIEW OF SYSTEMS:    GENERAL:  No weight loss, malaise or fevers.  HEENT:  Negative for frequent or significant headaches.  NECK:  Negative for lumps, goiter, pain and significant neck swelling.  RESPIRATORY:  Negative for cough, wheezing or shortness of breath.  CARDIOVASCULAR:  Negative for chest pain, leg swelling or palpitations.  GI:  Negative for abdominal discomfort, blood in stools or black stools or change in bowel habits. GERD.  MUSCULOSKELETAL:  See HPI.  SKIN:  Negative for lesions, rash, and itching.  PSYCH: Positive for sleep disturbance, mood disorder and recent psychosocial stressors.  HEMATOLOGY/LYMPHOLOGY:  Negative for prolonged bleeding, bruising easily or swollen nodes. H/O breast cancer with mastectomy.  NEURO:   No history of headaches, syncope, paralysis, seizures or tremors.  All other reviewed and negative other than HPI.    OBJECTIVE:    /76   Pulse 71   Ht 5' 8" (1.727 m)   Wt 131.5 kg (289 lb 14.5 oz)   BMI 44.08 kg/m²     PHYSICAL EXAMINATION:    General appearance: Well appearing, in no acute distress, alert and oriented x3.  Psych:  Mood and affect appropriate.  Skin: Bruising to right lower back at recent injection site.  Head/face:  Atraumatic, normocephalic. No palpable lymph nodes  Cor: RRR  Pulm: CTA  GI: Abdomen soft and non-tender.  Back: Straight leg raising in the sitting and supine positions is negative to radicular pain.  Pain to palpation over the lumbar facet joints, R>L.  Limited ROM with pain on extension greater than flexion.  Positive facet loading bilaterally, R>L.  Painful palpation to SI joints.  MARY is negative.  Extremities: Peripheral joint ROM is full and pain " free without obvious instability or laxity in all four extremities. No deformities, edema, or skin discoloration. Good capillary refill.  Musculoskeletal:  Bilateral upper and lower extremity strength is normal and symmetric.  No atrophy or tone abnormalities are noted.  Neuro: Bilateral upper and lower extremity coordination and muscle stretch reflexes are physiologic and symmetric.  Plantar response are downgoing.  Decreased sensation to bilateral feet and right lower back.  Gait: Antalgic.      ASSESSMENT: 68 y.o. year old female with lower back pain, consistent with the following diagnoses:     1. Neuritis     2. Lumbar spondylosis     3. Other chronic pain     4. Osteoarthritis of spine, unspecified spinal osteoarthritis complication status, unspecified spinal region     5. Sacroiliitis     6. Spondylosis without myelopathy     7. DDD (degenerative disc disease), lumbar     8. Myalgia  methylPREDNISolone acetate injection 40 mg    bupivacaine (PF) injection 45 mg         PLAN:     - I have stressed the importance of physical activity and a home exercise plan to help with pain and improve health.    - She is s/p right sided lumbar RFA.  She has bruising from the procedure which is sensitive.  She also seems to have neuritis.  Will give TPIs today as below.  She denies fever or malaise.  If pain worsens, consider MRI.    - She will contact us with any weakness, falls or fever.    - RTC in 2 weeks or sooner if needed.    - Counseled patient regarding the importance of activity modification, constant sleeping habits and physical therapy.      The above plan and management options were discussed at length with patient. Patient is in agreement with the above and verbalized understanding.      Amara Hawthorne, CARMEN  12/17/2018     Trigger Point Injection:   The procedure was discussed with the patient including complications of nerve damage,  bleeding, infection, and failure of pain relief.   Trigger points were  identified by palpation and marked. Alcohol prep of sites done. A mixture of 9mL 0.50% bupivacaine +40mg Depo-Medrol was prepared (10 mL total).   A 27-gauge needle was advanced to the point of maximal tenderness, and medication was injected after negative aspiration. All sites done in the same manner. Patient tolerated the procedure well and without complications. Sites injected included: lumbar paraspinals on the right (3 sites total).

## 2018-12-19 ENCOUNTER — TELEPHONE (OUTPATIENT)
Dept: PAIN MEDICINE | Facility: CLINIC | Age: 68
End: 2018-12-19

## 2018-12-19 NOTE — TELEPHONE ENCOUNTER
----- Message from Rain Baeza sent at 12/19/2018  3:01 PM CST -----  Contact: CIARA ZIEGLER [[id number]]  Name of Who is Calling: CIARA ZIEGLER [[id number]]        What is the request in detail: Pt called stating she is experiencing excrutiating back pain (level 10). She has additional concerns. Contact pt at your earliest convenience.  Thanks-          Can the clinic reply by [facility]: N    What [facility] [phone]

## 2018-12-20 ENCOUNTER — TELEPHONE (OUTPATIENT)
Dept: PAIN MEDICINE | Facility: CLINIC | Age: 68
End: 2018-12-20

## 2018-12-20 DIAGNOSIS — M47.819 SPONDYLOSIS WITHOUT MYELOPATHY: ICD-10-CM

## 2018-12-20 DIAGNOSIS — M54.50 ACUTE LEFT-SIDED LOW BACK PAIN WITHOUT SCIATICA: ICD-10-CM

## 2018-12-20 DIAGNOSIS — M54.16 LUMBAR RADICULOPATHY: Primary | ICD-10-CM

## 2018-12-20 DIAGNOSIS — M43.10 ACQUIRED SPONDYLOLISTHESIS: ICD-10-CM

## 2018-12-20 DIAGNOSIS — G89.29 CHRONIC BILATERAL LOW BACK PAIN WITH RIGHT-SIDED SCIATICA: ICD-10-CM

## 2018-12-20 DIAGNOSIS — M54.41 CHRONIC BILATERAL LOW BACK PAIN WITH RIGHT-SIDED SCIATICA: ICD-10-CM

## 2018-12-20 DIAGNOSIS — M51.37 DDD (DEGENERATIVE DISC DISEASE), LUMBOSACRAL: ICD-10-CM

## 2018-12-20 NOTE — TELEPHONE ENCOUNTER
Staff contacted the patient to get her scheduled for her MRI L Spine ordered by Amara Hawthorne Np.     Patient accepted an appointment for 12/28/18 for 9:45am. Patient was concerned about the smaller machines but staff informed the patient that the new imaging center has Wide Bore MRI machines which are wider than the regular MRI machine.     Patient vebalized understanding and expressed thanks.

## 2018-12-20 NOTE — TELEPHONE ENCOUNTER
----- Message from CARMEN Delgadillo sent at 12/20/2018  4:09 PM CST -----  Regarding: MRI  Please call patient and schedule MRI I ordered for as soon as her insurance allows.  Thank you

## 2018-12-20 NOTE — TELEPHONE ENCOUNTER
----- Message from Jami Lazaro sent at 12/20/2018 12:54 PM CST -----  Contact: CIARA ZIEGLER [[id number]]            Name of Who is Calling: CIARA ZIEGLER [[id number]]      What is the request in detail: patient is returning NP phone call       Can the clinic reply by [facility]: no    [facility]: [phone]

## 2018-12-28 ENCOUNTER — HOSPITAL ENCOUNTER (OUTPATIENT)
Dept: RADIOLOGY | Facility: HOSPITAL | Age: 68
Discharge: HOME | End: 2018-12-28
Attending: NURSE PRACTITIONER
Payer: MEDICARE

## 2018-12-28 DIAGNOSIS — M47.819 SPONDYLOSIS WITHOUT MYELOPATHY: ICD-10-CM

## 2018-12-28 DIAGNOSIS — M54.50 ACUTE LEFT-SIDED LOW BACK PAIN WITHOUT SCIATICA: ICD-10-CM

## 2018-12-28 DIAGNOSIS — M43.10 ACQUIRED SPONDYLOLISTHESIS: ICD-10-CM

## 2018-12-28 DIAGNOSIS — G89.29 CHRONIC BILATERAL LOW BACK PAIN WITH RIGHT-SIDED SCIATICA: ICD-10-CM

## 2018-12-28 DIAGNOSIS — M51.37 DDD (DEGENERATIVE DISC DISEASE), LUMBOSACRAL: ICD-10-CM

## 2018-12-28 DIAGNOSIS — M54.41 CHRONIC BILATERAL LOW BACK PAIN WITH RIGHT-SIDED SCIATICA: ICD-10-CM

## 2018-12-28 DIAGNOSIS — M54.16 LUMBAR RADICULOPATHY: ICD-10-CM

## 2018-12-28 LAB
CREAT SERPL-MCNC: 0.8 MG/DL (ref 0.5–1.4)
SAMPLE: NORMAL

## 2018-12-28 PROCEDURE — 72158 MRI LUMBAR SPINE W/O & W/DYE: CPT | Mod: TC,HCNC

## 2018-12-28 PROCEDURE — 25500020 PHARM REV CODE 255: Mod: HCNC | Performed by: NURSE PRACTITIONER

## 2018-12-28 PROCEDURE — A9585 GADOBUTROL INJECTION: HCPCS | Mod: HCNC | Performed by: NURSE PRACTITIONER

## 2018-12-28 PROCEDURE — 72158 MRI LUMBAR SPINE W/O & W/DYE: CPT | Mod: 26,HCNC,, | Performed by: RADIOLOGY

## 2018-12-28 RX ORDER — GADOBUTROL 604.72 MG/ML
10 INJECTION INTRAVENOUS
Status: COMPLETED | OUTPATIENT
Start: 2018-12-28 | End: 2018-12-28

## 2018-12-28 RX ADMIN — GADOBUTROL 10 ML: 604.72 INJECTION INTRAVENOUS at 09:12

## 2019-01-01 ENCOUNTER — HOSPITAL ENCOUNTER (EMERGENCY)
Facility: OTHER | Age: 69
Discharge: HOME | End: 2019-01-01
Attending: EMERGENCY MEDICINE
Payer: MEDICARE

## 2019-01-01 VITALS
RESPIRATION RATE: 18 BRPM | HEART RATE: 75 BPM | SYSTOLIC BLOOD PRESSURE: 141 MMHG | WEIGHT: 280 LBS | OXYGEN SATURATION: 98 % | HEIGHT: 68 IN | BODY MASS INDEX: 42.44 KG/M2 | DIASTOLIC BLOOD PRESSURE: 67 MMHG | TEMPERATURE: 98 F

## 2019-01-01 DIAGNOSIS — S91.211A LACERATION OF RIGHT GREAT TOE WITHOUT FOREIGN BODY WITH DAMAGE TO NAIL, INITIAL ENCOUNTER: Primary | ICD-10-CM

## 2019-01-01 DIAGNOSIS — S99.929A TOE INJURY: ICD-10-CM

## 2019-01-01 PROCEDURE — 90471 IMMUNIZATION ADMIN: CPT | Mod: HCNC | Performed by: EMERGENCY MEDICINE

## 2019-01-01 PROCEDURE — 12041 INTMD RPR N-HF/GENIT 2.5CM/<: CPT

## 2019-01-01 PROCEDURE — 90714 TD VACC NO PRESV 7 YRS+ IM: CPT | Mod: HCNC | Performed by: EMERGENCY MEDICINE

## 2019-01-01 PROCEDURE — 99283 EMERGENCY DEPT VISIT LOW MDM: CPT | Mod: 25,HCNC

## 2019-01-01 PROCEDURE — 25000003 PHARM REV CODE 250: Mod: HCNC | Performed by: EMERGENCY MEDICINE

## 2019-01-01 PROCEDURE — 63600175 PHARM REV CODE 636 W HCPCS: Mod: HCNC | Performed by: EMERGENCY MEDICINE

## 2019-01-01 PROCEDURE — 11760 REPAIR OF NAIL BED: CPT

## 2019-01-01 PROCEDURE — 12001 RPR S/N/AX/GEN/TRNK 2.5CM/<: CPT | Mod: TA,HCNC

## 2019-01-01 PROCEDURE — 11730 AVULSION NAIL PLATE SIMPLE 1: CPT | Mod: TA,59,HCNC

## 2019-01-01 RX ORDER — LIDOCAINE HYDROCHLORIDE 10 MG/ML
10 INJECTION, SOLUTION EPIDURAL; INFILTRATION; INTRACAUDAL; PERINEURAL
Status: COMPLETED | OUTPATIENT
Start: 2019-01-01 | End: 2019-01-01

## 2019-01-01 RX ADMIN — LIDOCAINE HYDROCHLORIDE 100 MG: 10 INJECTION, SOLUTION EPIDURAL; INFILTRATION; INTRACAUDAL; PERINEURAL at 08:01

## 2019-01-01 RX ADMIN — CLOSTRIDIUM TETANI TOXOID ANTIGEN (FORMALDEHYDE INACTIVATED) AND CORYNEBACTERIUM DIPHTHERIAE TOXOID ANTIGEN (FORMALDEHYDE INACTIVATED) 0.5 ML: 5; 2 INJECTION, SUSPENSION INTRAMUSCULAR at 09:01

## 2019-01-01 NOTE — ED PROVIDER NOTES
Encounter Date: 2019       History     Chief Complaint   Patient presents with    Toe Injury     pt c/o pain and bleeding to right first toe after stumbling over the rug      68-year-old female complains of sudden onset of left toe laceration when she bent it backwards after stubbing on a into the bed.  Complains of sharp pain worse with movement.  It is constant.           Review of patient's allergies indicates:   Allergen Reactions    Codeine Itching     Past Medical History:   Diagnosis Date    Allergy     Breast cancer     Lumpectomy 10/15.    Chronic constipation     Colon polyps     Diabetes mellitus, type 2     Dry eyes     GERD (gastroesophageal reflux disease)     Hyperlipidemia     Hypertension     Lumbar disc disease     Type 2 diabetes mellitus      Past Surgical History:   Procedure Laterality Date    BIOPSY-EXCISIONAL right breast with Wire Localization  (wire inserted at [facility] for 0900 Right 10/7/2015    Performed by Radha Russell MD at [facility] OR 2ND FLR    BLOCK-FACET-LUMBAR Bilateral 11/3/2017    Performed by Viet Wilson MD at [facility] PAIN MGT    BREAST BIOPSY      BREAST LUMPECTOMY Right         CATARACT EXTRACTION, BILATERAL       SECTION      x3    CHOLECYSTECTOMY      COLONOSCOPY N/A 10/11/2018    Performed by Lorenzo Tanner MD at [facility] ENDO (4TH FLR)    COLONOSCOPY W/ POLYPECTOMY      HYSTERECTOMY      and USO    INJECTION, FACET JOINT Bilateral 2018    Performed by Viet [facility] at [facility] PAIN MGT    INJECTION-FACET Bilateral 2018    Performed by Viet Wilson MD at [facility] PAIN MGT    INJECTION-STEROID-EPIDURAL-TRANSFORAMINAL Right 2016    Performed by Viet Wilson MD at [facility] PAIN MGT    INJECTION-STEROID-EPIDURAL-TRANSFORAMINAL Bilateral 3/4/2016    Performed by Viet Wilson MD at [facility] PAIN MGT    INJECTION-STEROID-EPIDURAL-TRANSFORAMINAL Bilateral 2016    Performed by Viet Wilson MD at [facility]  PAIN MGT    Radiofrequency Ablation RIGHT LUMBAR L2,3,4 RFA Right 2018    Performed by [facility] at [facility] PAIN MGT    TUBAL LIGATION       Family History   Problem Relation Age of Onset    No Known Problems Mother     No Known Problems Father     Heart disease Paternal Grandmother     Thyroid disease Paternal Grandfather     Hypertension Sister     Hypertension Brother     Glaucoma Brother     No Known Problems Daughter     No Known Problems Daughter     No Known Problems Daughter      Social History     Tobacco Use    Smoking status: Former Smoker     Packs/day: 0.25     Years: 20.00     Pack years: 5.00     Last attempt to quit: 1985     Years since quittin.0    Smokeless tobacco: Never Used    Tobacco comment: Quit mid .   Substance Use Topics    Alcohol use: No     Alcohol/week: 0.0 oz    Drug use: No     Review of Systems   Constitutional: Negative for activity change and appetite change.   HENT: Negative for rhinorrhea.    Respiratory: Negative for shortness of breath.    Cardiovascular: Negative for chest pain.   Skin:        Positive for left toe laceration       Physical Exam     Initial Vitals [19 0420]   BP Pulse Resp Temp SpO2   (!) 150/73 85 16 98.4 °F (36.9 °C) 96 %      MAP       --         Physical Exam    Nursing note and vitals reviewed.  Constitutional: Vital signs are normal. She appears well-developed and well-nourished. She does not have a sickly appearance.   HENT:   Head: Normocephalic and atraumatic.   Eyes: Conjunctivae and EOM are normal.   Neck: Normal range of motion.   Musculoskeletal:   The patient has 2 lacerations each adjacent to the junction of the paronychia and eponychium of the great toe on the left foot.  The nail itself appears to be intact. There is an avulsion of the proximal germinal matrix that is overlying the nail.  Neurovascularly intact.   Neurological: She is alert and oriented to person, place, and time.   Skin:  Skin is warm, dry and intact. Capillary refill takes less than 2 seconds.         ED Course   Lac Repair  Date/Time: 1/1/2019 7:27 AM  Performed by: Aden Logan DO  Authorized by: Aden Logan DO   Body area: lower extremity  Location details: left big toe  Laceration length: 2 cm  Tendon involvement: none  Nerve involvement: none  Anesthesia: digital block    Anesthesia:  Local Anesthetic: lidocaine 1% without epinephrine  Irrigation solution: saline  Irrigation method: syringe  Amount of cleaning: standard  Debridement: minimal  Degree of undermining: removed the nail.  Skin closure: 4-0 nylon  Subcutaneous closure: 4-0 Vicryl  Number of sutures: 4  Technique: simple  Approximation: close  Approximation difficulty: simple  Dressing: 4x4 sterile gauze  Patient tolerance: Patient tolerated the procedure well with no immediate complications  Comments: One does all verbal suture approximating the germinal matrix to the underlying tissue from which was .  Due to avulsion of underlying proximal nail it was necessary to remove the entire nail the to evaluate the germinal matrix as well as reattached to its proximal tissue.        Labs Reviewed - No data to display       Imaging Results          X-Ray Foot Complete Right (Final result)  Result time 01/01/19 07:55:46    Final result by Scar Foley MD (01/01/19 07:55:46)                 Impression:      No evidence for displaced fracture.      Electronically signed by: Scar Foley MD  Date:    01/01/2019  Time:    07:55             Narrative:    EXAMINATION:  XR FOOT COMPLETE 3 VIEW RIGHT    CLINICAL HISTORY:  . Unspecified injury of unspecified foot, initial encounter    TECHNIQUE:  AP, lateral, and oblique views of the right foot were performed.    COMPARISON:  None.    FINDINGS:  No displaced fracture or dislocation.  Mild cortical thickening identified level the diaphysis of the RIGHT 2nd and 3rd metatarsals.  Osseous calcaneal  spurs.  Lisfranc articulation is congruent.  Cartilage spaces are maintained.  Soft tissues are unremarkable.                              X-Rays:   Independently Interpreted Readings:   Other Readings:  X-ray independently interpreted by myself shows no fracture dislocation.    Medical Decision Making:   ED Management:  A 68-year-old female with toe injury. X-rays negative for fracture.  Lacerations repaired and will discharge to follow up with primary care for suture removal.  No antibiotics indicated at this time.                      Clinical Impression:     1. Laceration of right great toe without foreign body with damage to nail, initial encounter    2. Toe injury                                  Aden Logan,   01/01/19 0910

## 2019-01-01 NOTE — ED NOTES
Patient Identifiers for Linnea Renae checked and correct  LOC: The patient is awake, alert and aware of environment with an appropriate affect, the patient is oriented x 3 and speaking appropriate.  APPEARANCE: Patient resting comfortably and in no acute distress. The patient is clean and well groomed. The patient's clothing is properly fastened.  SKIN: The skin is warm and dry. The patient has normal skin turgor and moist mucus membranes. No rashes or lesions upon observation. Pt has jagged 0.5cm lac on the lateral & medial edges of the great toe at the top of the nail bed, tissue protruding from the cuticle at the top of the nailbed. Right great toe is pink & warm with brisk cap refill = 2 seconds.  Musculoskeletal :  Normal range of motion noted. Moves all extremities well. Pt has full active & passive ROM of the right great toe. No swelling or palpation tenderness.  RESPIRATORY: Airway is open and patent, respirations are spontaneous, patient has a normal effort and rate.   PULSES: 2+ radial & pedal pulses, symmetrical in all extremities.        Will continue to monitor

## 2019-01-03 ENCOUNTER — HOSPITAL ENCOUNTER (EMERGENCY)
Facility: OTHER | Age: 69
Discharge: HOME | End: 2019-01-03
Attending: EMERGENCY MEDICINE
Payer: MEDICARE

## 2019-01-03 ENCOUNTER — NURSE TRIAGE (OUTPATIENT)
Dept: ADMINISTRATIVE | Facility: CLINIC | Age: 69
End: 2019-01-03

## 2019-01-03 ENCOUNTER — TELEPHONE (OUTPATIENT)
Dept: INTERNAL MEDICINE | Facility: CLINIC | Age: 69
End: 2019-01-03

## 2019-01-03 ENCOUNTER — OFFICE VISIT (OUTPATIENT)
Dept: SPINE | Facility: CLINIC | Age: 69
End: 2019-01-03
Payer: MEDICARE

## 2019-01-03 VITALS
HEART RATE: 75 BPM | WEIGHT: 280 LBS | HEIGHT: 68 IN | SYSTOLIC BLOOD PRESSURE: 154 MMHG | BODY MASS INDEX: 42.44 KG/M2 | DIASTOLIC BLOOD PRESSURE: 70 MMHG

## 2019-01-03 VITALS
HEIGHT: 68 IN | SYSTOLIC BLOOD PRESSURE: 198 MMHG | DIASTOLIC BLOOD PRESSURE: 81 MMHG | RESPIRATION RATE: 18 BRPM | TEMPERATURE: 99 F | HEART RATE: 87 BPM | OXYGEN SATURATION: 98 % | BODY MASS INDEX: 42.44 KG/M2 | WEIGHT: 280 LBS

## 2019-01-03 DIAGNOSIS — M51.37 DDD (DEGENERATIVE DISC DISEASE), LUMBOSACRAL: ICD-10-CM

## 2019-01-03 DIAGNOSIS — S91.211A LACERATION OF RIGHT GREAT TOE WITHOUT FOREIGN BODY WITH DAMAGE TO NAIL, INITIAL ENCOUNTER: Primary | ICD-10-CM

## 2019-01-03 DIAGNOSIS — Z51.89 VISIT FOR WOUND CHECK: ICD-10-CM

## 2019-01-03 DIAGNOSIS — M47.819 SPONDYLOSIS WITHOUT MYELOPATHY: Primary | ICD-10-CM

## 2019-01-03 DIAGNOSIS — M54.41 CHRONIC BILATERAL LOW BACK PAIN WITH RIGHT-SIDED SCIATICA: ICD-10-CM

## 2019-01-03 DIAGNOSIS — S91.211S: ICD-10-CM

## 2019-01-03 DIAGNOSIS — G89.29 CHRONIC BILATERAL LOW BACK PAIN WITH RIGHT-SIDED SCIATICA: ICD-10-CM

## 2019-01-03 DIAGNOSIS — E11.9 TYPE 2 DIABETES MELLITUS WITHOUT COMPLICATION, WITHOUT LONG-TERM CURRENT USE OF INSULIN: Primary | ICD-10-CM

## 2019-01-03 DIAGNOSIS — M43.10 ACQUIRED SPONDYLOLISTHESIS: ICD-10-CM

## 2019-01-03 DIAGNOSIS — M54.16 LUMBAR RADICULOPATHY: ICD-10-CM

## 2019-01-03 DIAGNOSIS — M54.50 ACUTE LEFT-SIDED LOW BACK PAIN WITHOUT SCIATICA: ICD-10-CM

## 2019-01-03 PROCEDURE — 99499 RISK ADDL DX/OHS AUDIT: ICD-10-PCS | Mod: HCNC,S$GLB,, | Performed by: NURSE PRACTITIONER

## 2019-01-03 PROCEDURE — 3078F DIAST BP <80 MM HG: CPT | Mod: CPTII,HCNC,S$GLB, | Performed by: NURSE PRACTITIONER

## 2019-01-03 PROCEDURE — 3078F PR MOST RECENT DIASTOLIC BLOOD PRESSURE < 80 MM HG: ICD-10-PCS | Mod: CPTII,HCNC,S$GLB, | Performed by: NURSE PRACTITIONER

## 2019-01-03 PROCEDURE — 99999 PR PBB SHADOW E&M-EST. PATIENT-LVL III: CPT | Mod: PBBFAC,HCNC,, | Performed by: NURSE PRACTITIONER

## 2019-01-03 PROCEDURE — 99213 PR OFFICE/OUTPT VISIT, EST, LEVL III, 20-29 MIN: ICD-10-PCS | Mod: HCNC,S$GLB,, | Performed by: NURSE PRACTITIONER

## 2019-01-03 PROCEDURE — 3077F PR MOST RECENT SYSTOLIC BLOOD PRESSURE >= 140 MM HG: ICD-10-PCS | Mod: CPTII,HCNC,S$GLB, | Performed by: NURSE PRACTITIONER

## 2019-01-03 PROCEDURE — 99499 UNLISTED E&M SERVICE: CPT | Mod: HCNC,S$GLB,, | Performed by: NURSE PRACTITIONER

## 2019-01-03 PROCEDURE — 99283 EMERGENCY DEPT VISIT LOW MDM: CPT | Mod: HCNC

## 2019-01-03 PROCEDURE — 99213 OFFICE O/P EST LOW 20 MIN: CPT | Mod: HCNC,S$GLB,, | Performed by: NURSE PRACTITIONER

## 2019-01-03 PROCEDURE — 1101F PR PT FALLS ASSESS DOC 0-1 FALLS W/OUT INJ PAST YR: ICD-10-PCS | Mod: CPTII,HCNC,S$GLB, | Performed by: NURSE PRACTITIONER

## 2019-01-03 PROCEDURE — 99999 PR PBB SHADOW E&M-EST. PATIENT-LVL III: ICD-10-PCS | Mod: PBBFAC,HCNC,, | Performed by: NURSE PRACTITIONER

## 2019-01-03 PROCEDURE — 3077F SYST BP >= 140 MM HG: CPT | Mod: CPTII,HCNC,S$GLB, | Performed by: NURSE PRACTITIONER

## 2019-01-03 PROCEDURE — 1101F PT FALLS ASSESS-DOCD LE1/YR: CPT | Mod: CPTII,HCNC,S$GLB, | Performed by: NURSE PRACTITIONER

## 2019-01-03 NOTE — PROGRESS NOTES
Patient, Linnea Renae (MRN #[id number]), presented with a recorded BMI of 42.57 kg/m^2 consistent with the definition of morbid obesity (ICD-10 E66.01). The patient's morbid obesity was monitored, evaluated, addressed and/or treated. This addendum to the medical record is made on 01/03/2019.

## 2019-01-03 NOTE — PROGRESS NOTES
Chronic Pain - Established Visit    Referring Physician: No ref. provider found    Chief Complaint: back pain       SUBJECTIVE:    Linnea Renae presents to the clinic for follow uo of lower back pain. I last evaluated her after increased pain following right L2,3,4,5 RFA completed on 12/5/18.  I gave her an IM steroid injection which helped her.  She had an updated lumbar MRI which did not show any acute changes.  She denies fever, falls or significant weakness.  She went to the ED on 1/1/19 after she stubbed her first toe.  She had her toenail removed and stitches placed.  She went to today for wound evaluation because she did not know if it was healing properly.  She has an appointment with podiatry next week.  Her pain today is 7/10.      Physical Therapy/Home Exercise: yes     Pain Disability Index Review:  Last 3 PDI Scores 12/17/2018 11/28/2018 8/9/2018   Pain Disability Index (PDI) 70 33 31       Pain Medications:    - Opioids: Ultram (Tramadol HCL)  - Adjuvant Medications: Advil,Motrin ( Ibuprofen) and zanaflex  - Anti-Coagulants: Aspirin  - Others: see med list     report:  Reviewed and consistent with medication use as prescribed.    Lab Results   Component Value Date    HGBA1C 6.1 (H) 09/17/2018     CMP  Sodium   Date Value Ref Range Status   09/17/2018 139 136 - 145 mmol/L Final     Potassium   Date Value Ref Range Status   09/17/2018 3.7 3.5 - 5.1 mmol/L Final     Chloride   Date Value Ref Range Status   09/17/2018 104 95 - 110 mmol/L Final     CO2   Date Value Ref Range Status   09/17/2018 25 23 - 29 mmol/L Final     Glucose   Date Value Ref Range Status   09/17/2018 100 70 - 110 mg/dL Final     BUN, Bld   Date Value Ref Range Status   09/17/2018 12 8 - 23 mg/dL Final     Creatinine   Date Value Ref Range Status   09/17/2018 0.7 0.5 - 1.4 mg/dL Final     Calcium   Date Value Ref Range Status   09/17/2018 9.4 8.7 - 10.5 mg/dL Final     Total Protein   Date Value Ref Range Status   03/09/2018 7.6 6.0  - 8.4 g/dL Final     Albumin   Date Value Ref Range Status   03/09/2018 3.8 3.5 - 5.2 g/dL Final     Total Bilirubin   Date Value Ref Range Status   03/09/2018 0.4 0.1 - 1.0 mg/dL Final     Comment:     For infants and newborns, interpretation of results should be based  on gestational age, weight and in agreement with clinical  observations.  Premature Infant recommended reference ranges:  Up to 24 hours.............<8.0 mg/dL  Up to 48 hours............<12.0 mg/dL  3-5 days..................<15.0 mg/dL  6-29 days.................<15.0 mg/dL       Alkaline Phosphatase   Date Value Ref Range Status   03/09/2018 87 55 - 135 U/L Final     AST   Date Value Ref Range Status   03/09/2018 19 10 - 40 U/L Final     ALT   Date Value Ref Range Status   03/09/2018 19 10 - 44 U/L Final     Anion Gap   Date Value Ref Range Status   09/17/2018 10 8 - 16 mmol/L Final     eGFR if    Date Value Ref Range Status   09/17/2018 >60 >60 mL/min/1.73 m^2 Final     eGFR if non    Date Value Ref Range Status   09/17/2018 >60 >60 mL/min/1.73 m^2 Final     Comment:     Calculation used to obtain the estimated glomerular filtration  rate (eGFR) is the CKD-EPI equation.          Pain Procedures:   7/29/16 Right L3-4 TF CHRISTIAN  11/3/17 Bilateral L3-4 and L4-5 facet injections  5/9/18 Bilateral L3-4 and L4-5 facet injections  7/25/18 Bilateral L3-4 and L4-5 facet injections  12/5/18 Right L2,3,4,5 RFA    Imaging:  Narrative     EXAMINATION:  MRI LUMBAR SPINE W WO CONTRAST    CLINICAL HISTORY:  Low back pain, >6wks conservative tx, persistent-progressive sx, surgical candidate; Spondylosis without myelopathy or radiculopathy, site unspecified    TECHNIQUE:  Multiplanar, multisequence MR images were acquired from the thoracolumbar junction to the sacrum prior to and following administration of 10 cc IV Gadavist.    COMPARISON:  Lumbar spine radiograph 10/11/2017; MRI lumbar spine with/without contrast  09/20/2016    FINDINGS:  Sagittal alignment demonstrates grade 1 anterolisthesis of L3 on L4 with slight uncovering of the intervertebral disc.  Vertebral body heights are satisfactorily maintained.  Intervertebral disc spaces are preserved.  Marrow signal is within normal limits with no evidence of fracture or marrow replacement process noting a small vertebral body hemangioma at L1.    Conus appears within normal limits terminating at the level of the L1 level.  Cauda equina appears normal.    Paraspinous soft tissues demonstrate mild prominence of the common bile duct and small left renal cysts..    T12-L1:   No spinal canal or neuroforaminal narrowing.    L1-2:  No spinal canal or neuroforaminal narrowing.    L2-3:  Mild posterior disc bulge and moderate bilateral facet arthropathy without spinal canal stenosis or neural foraminal narrowing.    L3-4:  The diffuse posterior disc bulge, buckling of the ligamentum flavum, and moderate bilateral facet arthropathy results in mild spinal canal stenosis and no neural foraminal narrowing.    L4-5:  Diffuse posterior disc bulge with superimposed central disc protrusion, buckling of the ligamentum flavum, and moderate bilateral facet arthropathy result in moderate spinal canal stenosis and no significant neural foraminal narrowing.    L5-S1:  Mild posterior disc bulge and mild facet arthropathy results in mild right/moderate left neural foraminal narrowing.    No abnormal enhancement.      Impression       Multilevel lumbar spondylosis worst at L4-5 resulting in moderate spinal canal stenosis.    Mild prominence of the common bile duct which is nonspecific and may represent sequela of cholecystectomy.         X-Ray Lumbar Spine Ap Lateral w/Flex Ext    Narrative     10/11/17 10:17:19    Accession: [id number]    CLINICAL INDICATION: 67 year old F with  low back pain    COMPARISON: Lumbar spine x-rays, 09/20/2016.    TECHNIQUE: AP, lateral, flexion, extension, and coned down  lateral radiographs of the lumbar spine.    FINDINGS:     Vertebral body heights are maintained.  No evidence of fracture.      Normal sagittal alignment is preserved.No significant translation with flexion-extension.    Moderate multilevel degenerative changes are again present. Mild anterolisthesis of L3 with respect to L4 is again noted. Multilevel facet arthropathy is present, most pronounced in the lower lumbar spine.  No detrimental change is identified when compared with the examination performed one year prior.      Impression         Moderate multilevel degenerative changes in the lumbar spine, similar in appearance to the prior exam.    No evidence of fracture or malalignment.      Electronically signed by: Heber Frost  Date: 10/11/17  Time: 10:37            Past Medical History:   Diagnosis Date    Allergy     Breast cancer     Lumpectomy 10/15.    Chronic constipation     Colon polyps     Diabetes mellitus, type 2     Dry eyes     GERD (gastroesophageal reflux disease)     Hyperlipidemia     Hypertension     Lumbar disc disease     Type 2 diabetes mellitus      Past Surgical History:   Procedure Laterality Date    BIOPSY-EXCISIONAL right breast with Wire Localization  (wire inserted at [facility] for 0900 Right 10/7/2015    Performed by Radha Russell MD at [facility] OR 2ND FLR    BLOCK-FACET-LUMBAR Bilateral 11/3/2017    Performed by [facility] at [facility]    BREAST BIOPSY      BREAST LUMPECTOMY Right         CATARACT EXTRACTION, BILATERAL       SECTION      x3    CHOLECYSTECTOMY      COLONOSCOPY N/A 10/11/2018    Performed by Lorenzo Tanner MD at [facility] ENDO (4TH FLR)    COLONOSCOPY W/ POLYPECTOMY      HYSTERECTOMY      and USO    INJECTION, FACET JOINT Bilateral 2018    Performed by [facility] at [facility]    INJECTION-FACET Bilateral 2018    Performed by [facility] MD at [facility]     INJECTION-STEROID-EPIDURAL-TRANSFORAMINAL Right 2016    Performed by [facility] at [facility]    INJECTION-STEROID-EPIDURAL-TRANSFORAMINAL Bilateral 3/4/2016    Performed by Viet [facility] at [facility]    INJECTION-STEROID-EPIDURAL-TRANSFORAMINAL Bilateral 2016    Performed by Viet [facility] MD at [facility]    Radiofrequency Ablation RIGHT LUMBAR L2,3,4 RFA Right 2018    Performed by Viet Wilson MD at [facility]    TUBAL LIGATION       Social History     Socioeconomic History    Marital status:      Spouse name: Not on file    Number of children: 3    Years of education: Not on file    Highest education level: Not on file   Social Needs    Financial resource strain: Not on file    Food insecurity - worry: Not on file    Food insecurity - inability: Not on file    Transportation needs - medical: Not on file    Transportation needs - non-medical: Not on file   Occupational History    Not on file   Tobacco Use    Smoking status: Former Smoker     Packs/day: 0.25     Years: 20.00     Pack years: 5.00     Last attempt to quit: 1985     Years since quittin.0    Smokeless tobacco: Never Used    Tobacco comment: Quit mid .   Substance and Sexual Activity    Alcohol use: No     Alcohol/week: 0.0 oz    Drug use: No    Sexual activity: Yes   Other Topics Concern    Not on file   Social History Narrative    Not on file     Family History   Problem Relation Age of Onset    No Known Problems Mother     No Known Problems Father     Heart disease Paternal Grandmother     Thyroid disease Paternal Grandfather     Hypertension Sister     Hypertension Brother     Glaucoma Brother     No Known Problems Daughter     No Known Problems Daughter     No Known Problems Daughter        Review of patient's allergies indicates:   Allergen Reactions    Codeine Itching       Current Outpatient Medications   Medication Sig    aspirin 81  MG Chew Take 81 mg by mouth once daily.    atenolol (TENORMIN) 50 MG tablet TAKE 1 TABLET BY MOUTH TWICE DAILY    atorvastatin (LIPITOR) 20 MG tablet TAKE 1 TABLET BY MOUTH ONCE DAILY    blood sugar diagnostic Strp 1 strip by Misc.(Non-Drug; Combo Route) route once daily.    blood-glucose meter kit Use as instructed    calcium-vitamin D3 500 mg(1,250mg) -200 unit per tablet Take 1 tablet by mouth 2 (two) times daily with meals.     captopril (CAPOTEN) 50 MG tablet TAKE TWO TABLETS BY MOUTH IN THE MORNING AND ONE TABLET IN THE EVENING    clotrimazole (LOTRIMIN) 1 % cream Apply topically 2 (two) times daily.    cycloSPORINE (RESTASIS) 0.05 % ophthalmic emulsion 1 drop 2 (two) times daily.    fluticasone (FLONASE) 50 mcg/actuation nasal spray 2 sprays (100 mcg total) by Each Nare route once daily.    hydroCHLOROthiazide (HYDRODIURIL) 25 MG tablet Take 1 tablet (25 mg total) by mouth once daily.    ibuprofen (ADVIL,MOTRIN) 800 MG tablet Take 1 tablet (800 mg total) by mouth 3 (three) times daily as needed.    ketoconazole (NIZORAL) 2 % cream Apply topically once daily.    ketoconazole (NIZORAL) 2 % shampoo     lancets Misc 1 lancet by Misc.(Non-Drug; Combo Route) route once daily.    metFORMIN (GLUCOPHAGE-XR) 500 MG 24 hr tablet TAKE TWO TABLETS BY MOUTH ONCE DAILY WITH BREAKFAST (Patient taking differently: 1,000 mg 2 (two) times daily with meals. TAKE TWO TABLETS BY MOUTH ONCE DAILY WITH BREAKFAST)    minoxidil (LONITEN) 2.5 MG tablet     multivitamin (ONE DAILY MULTIVITAMIN) per tablet Take 1 tablet by mouth once daily.    omeprazole (PRILOSEC) 20 MG capsule Take 1 capsule (20 mg total) by mouth once daily.    tiZANidine (ZANAFLEX) 4 MG tablet TAKE ONE TABLET BY MOUTH TWICE DAILY AS NEEDED FOR MUSCLE SPASM    traMADol (ULTRAM) 50 mg tablet Take 1 tablet (50 mg total) by mouth every 6 (six) hours as needed.     No current facility-administered medications for this visit.        REVIEW OF  "SYSTEMS:    GENERAL:  No weight loss, malaise or fevers.  HEENT:  Negative for frequent or significant headaches.  NECK:  Negative for lumps, goiter, pain and significant neck swelling.  RESPIRATORY:  Negative for cough, wheezing or shortness of breath.  CARDIOVASCULAR:  Negative for chest pain, leg swelling or palpitations.  GI:  Negative for abdominal discomfort, blood in stools or black stools or change in bowel habits. GERD.  MUSCULOSKELETAL:  See HPI.  SKIN:  Negative for lesions, rash, and itching.  PSYCH: Positive for sleep disturbance, mood disorder and recent psychosocial stressors.  HEMATOLOGY/LYMPHOLOGY:  Negative for prolonged bleeding, bruising easily or swollen nodes. H/O breast cancer with mastectomy.  NEURO:   No history of headaches, syncope, paralysis, seizures or tremors.  All other reviewed and negative other than HPI.    OBJECTIVE:    BP (!) 154/70   Pulse 75   Ht 5' 8" (1.727 m)   Wt 127 kg (280 lb)   BMI 42.57 kg/m²     PHYSICAL EXAMINATION:    General appearance: Well appearing, in no acute distress, alert and oriented x3.  Psych:  Mood and affect appropriate.  Skin: Bruising to right lower back at recent injection site- improved.  Head/face:  Atraumatic, normocephalic. No palpable lymph nodes  Cor: RRR  Pulm: CTA  GI: Abdomen soft and non-tender.  Back: Straight leg raising in the sitting and supine positions is negative to radicular pain.  Pain to palpation over the lumbar facet joints.  Limited ROM with pain on extension and flexion.  Positive facet loading bilaterally.  Painful palpation to SI joints.  MARY is negative.  Musculoskeletal:  Bilateral upper and lower extremity strength is normal and symmetric.  No atrophy or tone abnormalities are noted.  Neuro: Bilateral upper and lower extremity coordination and muscle stretch reflexes are physiologic and symmetric.  Plantar response are downgoing.  Decreased sensation to bilateral feet and right lower back.  Gait: " Antalgic.      ASSESSMENT: 68 y.o. year old female with lower back pain, consistent with the following diagnoses:     1. Spondylosis without myelopathy     2. DDD (degenerative disc disease), lumbosacral     3. Acquired spondylolisthesis     4. Lumbar radiculopathy     5. Acute left-sided low back pain without sciatica     6. Chronic bilateral low back pain with right-sided sciatica           PLAN:     - I have stressed the importance of physical activity and a home exercise plan to help with pain and improve health.    - MRI reviewed with patient.    - She will contact us with any weakness, falls or fever.    - She is going back to the ER today for evaluation of wound healing of great toe.  I evaluated this today and it seems to be healing OK.    - RTC in 2 weeks or sooner if needed.    - Counseled patient regarding the importance of activity modification, constant sleeping habits and physical therapy.      The above plan and management options were discussed at length with patient. Patient is in agreement with the above and verbalized understanding.      Amara Hawthorne, CARMEN  01/03/2019

## 2019-01-03 NOTE — ED NOTES
Pt was seen in PIT by CHAVEZ Merlos PA-C. Pt AAOx4 and appropriate at this time. Respirations even and unlabored. No acute distress noted. Ambulatory with steady gait.

## 2019-01-03 NOTE — TELEPHONE ENCOUNTER
Pt is asking to be seen ASAP for a ER visit for toe injury  I advised pt Dr. Stringer do not have anything available for today . Pt state she will go back to the ER

## 2019-01-03 NOTE — ED PROVIDER NOTES
Encounter Date: 1/3/2019       History     Chief Complaint   Patient presents with    Wound Check     wound check to R great toe. reports yellow drainage. denies any redness     68-year-old female with history of breast cancer, chronic constipation, colon polyps, diabetes, GERD, hyperlipidemia, hypertension, lumbar disc disease presents to the emergency department here for wound check of the right great toe.  She admits that she was seen here a couple of days ago after she injured her toe.  She states the nail was removed in sutures were placed.  She states that when she changed the dressing today she noticed a small area of yellow and was concerned that it was pus.  She denies any other complaints at this time.  She denies fever, chills, redness or worsening swelling. She admits that she did not uncover the wound for the 1st 24 hr as directed on her visit.  She states that she change dressing for the 1st time this morning.  She admits that she clean with mild soap and water as well as applied antibiotic ointment and replaced antibiotic gauze and dressing.  She states that she has scheduled follow-up with Podiatry on the 8th.      The history is provided by the patient and the spouse.     Review of patient's allergies indicates:   Allergen Reactions    Codeine Itching     Past Medical History:   Diagnosis Date    Allergy     Breast cancer     Lumpectomy 10/15.    Chronic constipation     Colon polyps     Diabetes mellitus, type 2     Dry eyes     GERD (gastroesophageal reflux disease)     Hyperlipidemia     Hypertension     Lumbar disc disease     Type 2 diabetes mellitus      Past Surgical History:   Procedure Laterality Date    BIOPSY-EXCISIONAL right breast with Wire Localization  (wire inserted at [facility] for 0900 Right 10/7/2015    [facility] 2ND FLR    BLOCK-FACET-LUMBAR Bilateral 11/3/2017    [facility]    BREAST BIOPSY       BREAST LUMPECTOMY Right         CATARACT EXTRACTION, BILATERAL       SECTION      x3    CHOLECYSTECTOMY      COLONOSCOPY N/A 10/11/2018    Performed by Lorenzo [facility]anner [facility] at [facility] (4TH FLR)    COLONOSCOPY W/ POLYPECTOMY      HYSTERECTOMY      and USO    INJECTION, FACET JOINT Bilateral 2018    [facility] M[facility]    INJECTION-FACET Bilateral 2018    [facility] at [facility]    INJECTION-STEROID-EPIDURAL-TRANSFORAMINAL Right 2016    Per[facility] at [facility] MGT    INJECTION-STEROID-EPIDURAL-TRANSFORAMINAL Bilateral 3/4/2016    Per[facility] at [facility] MGT    INJECTION-STEROID-EPIDURAL-TRANSFORAMINAL Bilateral 2016    [facility]    Radiofrequency Ablation RIGHT LUMBAR L2,3,4 RFA Right 2018    [facility]    TUBAL LIGATION       Family History   Problem Relation Age of Onset    No Known Problems Mother     No Known Problems Father     Heart disease Paternal Grandmother     Thyroid disease Paternal Grandfather     Hypertension Sister     Hypertension Brother     Glaucoma Brother     No Known Problems Daughter     No Known Problems Daughter     No Known Problems Daughter      Social History     Tobacco Use    Smoking status: Former Smoker     Packs/day: 0.25     Years: 20.00     Pack years: 5.00     Last attempt to quit: 1985     Years since quittin.0    Smokeless tobacco: Never Used    Tobacco comment: Quit mid .   Substance Use Topics    Alcohol use: No     Alcohol/week: 0.0 oz    Drug use: No     Review of Systems   Constitutional: Negative for chills and fever.   Musculoskeletal: Negative for back pain.   Skin: Positive for wound (right great toe). Negative for rash.   Neurological: Negative for weakness and numbness.   Hematological: Does not  bruise/bleed easily.       Physical Exam     Initial Vitals [01/03/19 1233]   BP Pulse Resp Temp SpO2   (!) 198/81 87 18 98.5 °F (36.9 °C) 98 %      MAP       --         Physical Exam    Nursing note and vitals reviewed.  Constitutional: Vital signs are normal. She appears well-developed and well-nourished.  Non-toxic appearance. No distress.   HENT:   Head: Normocephalic and atraumatic.   Right Ear: External ear normal.   Left Ear: External ear normal.   Nose: Nose normal.   Eyes: Conjunctivae and lids are normal. No scleral icterus.   Neck: Phonation normal.   Cardiovascular: Intact distal pulses.   Abdominal: Normal appearance.   Musculoskeletal: Normal range of motion.   No obvious deformities, moving all extremities, normal gait  Right great toe- s/p nail removal with 2 sutures in place to lateral and medial aspect of the base of the nail. No drainage. No erythema or warmth. There is some maceration of the skin present. No significant TTP. Mild edema noted to the digit   Neurological: She is alert and oriented to person, place, and time. She has normal strength. No sensory deficit.   Skin: Skin is warm, dry and intact. Capillary refill takes less than 2 seconds. No lesion and no rash noted. No erythema.   Psychiatric: She has a normal mood and affect. Her speech is normal and behavior is normal. Judgment normal. Cognition and memory are normal.         ED Course   Procedures  Labs Reviewed - No data to display       Imaging Results    None          Medical Decision Making:   History:   I obtained history from: someone other than patient.       <> Summary of History: spouse  Old Medical Records: I decided to obtain old medical records.  Initial Assessment:   68-year-old female with complaints consistent with wound to the right great toe here for wound check.  Afebrile neurovascularly intact.  She is alert and healthy and nontoxic appearing.  She is in no apparent distress. Exam documented above.  Wound appears  clean, dry and intact.  There is some mild maceration.  No drainage or signs of infection.  ED Management:  Wound was redressed in the emergency department.  She is urged to intermittently let the wound air out to prevent maceration.  She is given care instructions and will be discharged home.  She is urged to keep scheduled follow-up with podiatry or return to the emergency department for any worsening signs or symptoms. She states understanding agrees with this plan.  This is the extent of patient's complaints today.  This patient was discussed with the attending physician who agrees with treatment plan.  Other:   I have discussed this case with another health care provider.       <> Summary of the Discussion: Kamran  This note was created using [employer] dictation.  There may be typographical errors secondary to dictation.                        Clinical Impression:     1. Laceration of right great toe without foreign body with damage to nail, initial encounter    2. Visit for wound check            Disposition:   Disposition: Discharged  Condition: Stable                        Dana Merlos PA-C  01/03/19 3993

## 2019-01-03 NOTE — TELEPHONE ENCOUNTER
Reason for Disposition   Pus or cloudy fluid draining from wound    Protocols used: ST WOUND INFECTION-A-OH    Pt states that she removed dressing from foot wound and noticed a yellow spot at the tip of her toe. Unsure if it is pus. Denies fever, odor, swelling or redness. Advised pt make appt to see PCP today. States she has appt with neurosurgeon office will ask them to look at it and if they are unable to she will go to the ED.

## 2019-01-03 NOTE — TELEPHONE ENCOUNTER
----- Message from Lesli Norton sent at 1/3/2019 11:13 AM CST -----  Contact: patient  Patient needs to speak with the nurse to find out if Dr Stringer does wound care as she injured her right great Toe New Years Day she tripped on a rug.  Patient's # is [phone]

## 2019-01-08 ENCOUNTER — OFFICE VISIT (OUTPATIENT)
Dept: PODIATRY | Facility: CLINIC | Age: 69
End: 2019-01-08
Payer: MEDICARE

## 2019-01-08 VITALS — WEIGHT: 280 LBS | BODY MASS INDEX: 42.44 KG/M2 | HEIGHT: 68 IN

## 2019-01-08 DIAGNOSIS — S99.921A INJURY OF TOENAIL OF RIGHT FOOT, INITIAL ENCOUNTER: Primary | ICD-10-CM

## 2019-01-08 DIAGNOSIS — E11.42 DIABETIC PERIPHERAL NEUROPATHY ASSOCIATED WITH TYPE 2 DIABETES MELLITUS: ICD-10-CM

## 2019-01-08 PROCEDURE — 99999 PR PBB SHADOW E&M-EST. PATIENT-LVL III: ICD-10-PCS | Mod: PBBFAC,,,

## 2019-01-08 PROCEDURE — 1101F PT FALLS ASSESS-DOCD LE1/YR: CPT | Mod: CPTII,S$GLB,,

## 2019-01-08 PROCEDURE — 99499 RISK ADDL DX/OHS AUDIT: ICD-10-PCS | Mod: HCNC,S$GLB,,

## 2019-01-08 PROCEDURE — 99213 OFFICE O/P EST LOW 20 MIN: CPT | Mod: S$GLB,,,

## 2019-01-08 PROCEDURE — 3044F HG A1C LEVEL LT 7.0%: CPT | Mod: CPTII,S$GLB,,

## 2019-01-08 PROCEDURE — 3044F PR MOST RECENT HEMOGLOBIN A1C LEVEL <7.0%: ICD-10-PCS | Mod: CPTII,S$GLB,,

## 2019-01-08 PROCEDURE — 99499 UNLISTED E&M SERVICE: CPT | Mod: HCNC,S$GLB,,

## 2019-01-08 PROCEDURE — 99999 PR PBB SHADOW E&M-EST. PATIENT-LVL III: CPT | Mod: PBBFAC,,,

## 2019-01-08 PROCEDURE — 99213 PR OFFICE/OUTPT VISIT, EST, LEVL III, 20-29 MIN: ICD-10-PCS | Mod: S$GLB,,,

## 2019-01-08 PROCEDURE — 1101F PR PT FALLS ASSESS DOC 0-1 FALLS W/OUT INJ PAST YR: ICD-10-PCS | Mod: CPTII,S$GLB,,

## 2019-01-08 NOTE — PROGRESS NOTES
Subjective:      Patient ID: Linnea Renae is a 68 y.o. female.    Chief Complaint: Follow-up (ER ) and Nail Problem (Right Great Toe nail)    Linnea is a 68 y.o. female who presents to the clinic reporting that she ripped off her nail about a week ago, went to ED twice, it was  Removed from the right hallux and sutured, last ed note states no SOI 1/3/18.  She has been cleaning and airing out as needed, no pain or drainage. Linnea has a past medical history of Allergy, Breast cancer, Chronic constipation, Colon polyps, Diabetes mellitus, type 2, Dry eyes, GERD (gastroesophageal reflux disease), Hyperlipidemia, Hypertension, Lumbar disc disease, and Type 2 diabetes mellitus.     PCP: Bailee Moss MD        Current shoe gear:  Affected Foot: Casual shoes     Unaffected Foot: Casual shoes    Hemoglobin A1C   Date Value Ref Range Status   09/17/2018 6.1 (H) 4.0 - 5.6 % Final     Comment:     ADA Screening Guidelines:  5.7-6.4%  Consistent with prediabetes  >or=6.5%  Consistent with diabetes  High levels of fetal hemoglobin interfere with the HbA1C  assay. Heterozygous hemoglobin variants (HbS, HgC, etc)do  not significantly interfere with this assay.   However, presence of multiple variants may affect accuracy.     03/09/2018 5.9 (H) 4.0 - 5.6 % Final     Comment:     According to ADA guidelines, hemoglobin A1c <7.0% represents  optimal control in non-pregnant diabetic patients. Different  metrics may apply to specific patient populations.   Standards of Medical Care in Diabetes-2016.  For the purpose of screening for the presence of diabetes:  <5.7%     Consistent with the absence of diabetes  5.7-6.4%  Consistent with increasing risk for diabetes   (prediabetes)  >or=6.5%  Consistent with diabetes  Currently, no consensus exists for use of hemoglobin A1c  for diagnosis of diabetes for children.  This Hemoglobin A1c assay has significant interference with fetal   hemoglobin   (HbF). The results are invalid for  patients with abnormal amounts of   HbF,   including those with known Hereditary Persistence   of Fetal Hemoglobin. Heterozygous hemoglobin variants (HbAS, HbAC,   HbAD, HbAE, HbA2) do not significantly interfere with this assay;   however, presence of multiple variants in a sample may impact the %   interference.     2017 6.0 4.5 - 6.2 % Final     Comment:     According to ADA guidelines, hemoglobin A1C <7.0% represents  optimal control in non-pregnant diabetic patients.  Different  metrics may apply to specific populations.   Standards of Medical Care in Diabetes - 2016.  For the purpose of screening for the presence of diabetes:  <5.7%     Consistent with the absence of diabetes  5.7-6.4%  Consistent with increasing risk for diabetes   (prediabetes)  >or=6.5%  Consistent with diabetes  Currently no consensus exists for use of hemoglobin A1C  for diagnosis of diabetes for children.         Past Medical History:   Diagnosis Date    Allergy     Breast cancer     Lumpectomy 10/15.    Chronic constipation     Colon polyps     Diabetes mellitus, type 2     Dry eyes     GERD (gastroesophageal reflux disease)     Hyperlipidemia     Hypertension     Lumbar disc disease     Type 2 diabetes mellitus        Past Surgical History:   Procedure Laterality Date    BIOPSY-EXCISIONAL right breast with Wire Localization  (wire inserted at [facility] for 0900 Right 10/7/2015    Performed by [facility] at [facility] OR 2ND FLR    BLOCK-FACET-LUMBAR Bilateral 11/3/2017    [facility]    BREAST BIOPSY      BREAST LUMPECTOMY Right         CATARACT EXTRACTION, BILATERAL       SECTION      x3    CHOLECYSTECTOMY      COLONOSCOPY N/A 10/11/2018    Performed by [facility] at [facility] ENDO (4TH FLR)    COLONOSCOPY W/ POLYPECTOMY      HYSTERECTOMY      and USO    INJECTION, FACET JOINT Bilateral 2018    [facility]  MGT    INJECTION-FACET Bilateral 2018    Performed by [facility] at [facility] M[facility]    INJECTION-STEROID-EPIDURAL-TRANSFORAMINAL Right 2016    Performed by [facility] at [facility] MGT    INJECTION-STEROID-EPIDURAL-TRANSFORAMINAL Bilateral 3/4/2016    Performed by [facility] at [facility] MGT    INJECTION-STEROID-EPIDURAL-TRANSFORAMINAL Bilateral 2016    [facility]    Radiofrequency Ablation RIGHT LUMBAR L2,3,4 RFA Right 2018    [facility] at [facility]    TUBAL LIGATION         Family History   Problem Relation Age of Onset    No Known Problems Mother     No Known Problems Father     Heart disease Paternal Grandmother     Thyroid disease Paternal Grandfather     Hypertension Sister     Hypertension Brother     Glaucoma Brother     No Known Problems Daughter     No Known Problems Daughter     No Known Problems Daughter        Social History     Socioeconomic History    Marital status:      Spouse name: None    Number of children: 3    Years of education: None    Highest education level: None   Social Needs    Financial resource strain: None    Food insecurity - worry: None    Food insecurity - inability: None    Transportation needs - medical: None    Transportation needs - non-medical: None   Occupational History    None   Tobacco Use    Smoking status: Former Smoker     Packs/day: 0.25     Years: 20.00     Pack years: 5.00     Last attempt to quit: 1985     Years since quittin.0    Smokeless tobacco: Never Used    Tobacco comment: Quit mid .   Substance and Sexual Activity    Alcohol use: No     Alcohol/week: 0.0 oz    Drug use: No    Sexual activity: Yes   Other Topics Concern    None   Social History Narrative    None       Current Outpatient Medications   Medication Sig Dispense Refill    aspirin 81 MG Chew Take 81 mg by mouth once daily.       atenolol (TENORMIN) 50 MG tablet TAKE 1 TABLET BY MOUTH TWICE DAILY 180 tablet 1    atorvastatin (LIPITOR) 20 MG tablet TAKE 1 TABLET BY MOUTH ONCE DAILY 90 tablet 2    blood sugar diagnostic Strp 1 strip by Misc.(Non-Drug; Combo Route) route once daily. 100 strip 3    calcium-vitamin D3 500 mg(1,250mg) -200 unit per tablet Take 1 tablet by mouth 2 (two) times daily with meals.       captopril (CAPOTEN) 50 MG tablet TAKE TWO TABLETS BY MOUTH IN THE MORNING AND ONE TABLET IN THE EVENING 270 tablet 2    clotrimazole (LOTRIMIN) 1 % cream Apply topically 2 (two) times daily. 60 g 5    cycloSPORINE (RESTASIS) 0.05 % ophthalmic emulsion 1 drop 2 (two) times daily.      fluticasone (FLONASE) 50 mcg/actuation nasal spray 2 sprays (100 mcg total) by Each Nare route once daily. 3 Bottle 1    hydroCHLOROthiazide (HYDRODIURIL) 25 MG tablet Take 1 tablet (25 mg total) by mouth once daily. 90 tablet 2    ibuprofen (ADVIL,MOTRIN) 800 MG tablet Take 1 tablet (800 mg total) by mouth 3 (three) times daily as needed. 60 tablet 3    ketoconazole (NIZORAL) 2 % cream Apply topically once daily. 60 g 3    ketoconazole (NIZORAL) 2 % shampoo       lancets Misc 1 lancet by Misc.(Non-Drug; Combo Route) route once daily. 100 each 3    metFORMIN (GLUCOPHAGE-XR) 500 MG 24 hr tablet TAKE TWO TABLETS BY MOUTH ONCE DAILY WITH BREAKFAST (Patient taking differently: 1,000 mg 2 (two) times daily with meals. TAKE TWO TABLETS BY MOUTH ONCE DAILY WITH BREAKFAST) 180 tablet 2    minoxidil (LONITEN) 2.5 MG tablet       multivitamin (ONE DAILY MULTIVITAMIN) per tablet Take 1 tablet by mouth once daily.      omeprazole (PRILOSEC) 20 MG capsule Take 1 capsule (20 mg total) by mouth once daily. 90 capsule 2    tiZANidine (ZANAFLEX) 4 MG tablet TAKE ONE TABLET BY MOUTH TWICE DAILY AS NEEDED FOR MUSCLE SPASM 60 tablet 2    traMADol (ULTRAM) 50 mg tablet Take 1 tablet (50 mg total) by mouth every 6 (six) hours as needed. 120 tablet 2     blood-glucose meter kit Use as instructed 1 each 0     No current facility-administered medications for this visit.        Review of patient's allergies indicates:   Allergen Reactions    Codeine Itching         ROS  ROS:  Constitution: Negative for chills, fever, weakness and malaise/fatigue.   HEENT: Negative for headaches.   Cardiovascular: Negative for chest pain and claudication.   Respiratory: Negative for cough and shortness of breath.   Musculoskeletal: (+) for foot pain.  Negative for muscle cramps and muscle weakness.   Gastrointestinal: Negative for nausea and vomiting.   Neurological:(+) for numbness, tingling and paresthesias.   Dermatological:  (--) for skin rash, (--) for keratosis, (--) for fungal toenails, (--) for wound.          Objective:      Physical Exam  Constitutional:  Patient is oriented to person, place, and time. Vital signs are normal.  Appears well-developed and well-nourished.     Vascular:  Dorsalis pedis pulses are 2/4 on the right side, and 2/4 on the left side.   Posterior tibial pulses are 2/4 on the right side, and 2/4 on the left side.   (+) digital hair growth, capillary fill time to all toes <3 seconds, toes are cool touch, trace pedal swelling    Skin/Dermatological:  Right hallux nail bed is granular, proximal medial and lateral borders sutures intact and coapted, no redness or drainage, no soi.    Musculoskeletal:      Hallux abducto valgus bilaterally, hammertoes 2-5 observed.  Pedal rom within normal limits.  (+) ankle joint DF restriction with both knee flexed and extened.    Neurological:  (--) deficits to sharp/dull, light touch or vibratory sensation bilateral feet, ten points tested.   Muscle strength to tibialis anterior, extensor hallucis longus, extensor digitorum longus, peroneal muscles, flexor hallucis/digotorum longus, posterior tibial and gastrosoleal complex is 5/5, normal tone without assymmetry   Patellar reflexes are 2+ on the right side and 2+ on the  left side.  Achilles reflexes are 2+ on the right side and 2+ on the left side.            Assessment:       Encounter Diagnoses   Name Primary?    Injury of toenail of right foot, initial encounter Yes    Diabetic peripheral neuropathy associated with type 2 diabetes mellitus          Plan:       Linnea was seen today for follow-up and nail problem.    Diagnoses and all orders for this visit:    Injury of toenail of right foot, initial encounter    Diabetic peripheral neuropathy associated with type 2 diabetes mellitus      I counseled the patient on her conditions, their implications and medical management.    Removed sutures, neosporin and bandaid applied. Encouraged to apply antifungal soluttion to toenail daily for up to a year as nail grows out. We discussed chance that the nail tye not grow out normally due to the trauma and a risk of a fungal infection.     Shoe inspection. Diabetic Foot Education. Patient reminded of the importance of good nutrition and blood sugar control to help prevent podiatric complications of diabetes. Patient instructed on proper foot hygeine. We discussed wearing proper shoe gear, daily foot inspections, never walking without protective shoe gear, never putting sharp instruments to feet.  We also discussed padding and shoes with high toe boxes for foot deformities. Patient relates relief following the procedure. Patient will continue to monitor the areas daily, inspect feet, wear protective shoe gear when ambulatory, moisturizer to maintain skin integrity and follow in this office in approximately 3 months, sooner p.dipika.        Phillip Rangel DPM

## 2019-01-22 ENCOUNTER — OFFICE VISIT (OUTPATIENT)
Dept: PODIATRY | Facility: CLINIC | Age: 69
End: 2019-01-22
Payer: MEDICARE

## 2019-01-22 VITALS — WEIGHT: 280 LBS | HEIGHT: 68 IN | BODY MASS INDEX: 42.44 KG/M2

## 2019-01-22 DIAGNOSIS — S99.921A INJURY OF TOENAIL OF RIGHT FOOT, INITIAL ENCOUNTER: ICD-10-CM

## 2019-01-22 DIAGNOSIS — E11.42 DIABETIC PERIPHERAL NEUROPATHY ASSOCIATED WITH TYPE 2 DIABETES MELLITUS: Primary | ICD-10-CM

## 2019-01-22 PROCEDURE — 99999 PR PBB SHADOW E&M-EST. PATIENT-LVL II: ICD-10-PCS | Mod: PBBFAC,HCNC,,

## 2019-01-22 PROCEDURE — 3044F PR MOST RECENT HEMOGLOBIN A1C LEVEL <7.0%: ICD-10-PCS | Mod: HCNC,CPTII,S$GLB,

## 2019-01-22 PROCEDURE — 1101F PR PT FALLS ASSESS DOC 0-1 FALLS W/OUT INJ PAST YR: ICD-10-PCS | Mod: HCNC,CPTII,S$GLB,

## 2019-01-22 PROCEDURE — 99213 PR OFFICE/OUTPT VISIT, EST, LEVL III, 20-29 MIN: ICD-10-PCS | Mod: HCNC,S$GLB,,

## 2019-01-22 PROCEDURE — 3044F HG A1C LEVEL LT 7.0%: CPT | Mod: HCNC,CPTII,S$GLB,

## 2019-01-22 PROCEDURE — 99999 PR PBB SHADOW E&M-EST. PATIENT-LVL II: CPT | Mod: PBBFAC,HCNC,,

## 2019-01-22 PROCEDURE — 1101F PT FALLS ASSESS-DOCD LE1/YR: CPT | Mod: HCNC,CPTII,S$GLB,

## 2019-01-22 PROCEDURE — 99213 OFFICE O/P EST LOW 20 MIN: CPT | Mod: HCNC,S$GLB,,

## 2019-01-24 DIAGNOSIS — R21 RASH OF NECK: ICD-10-CM

## 2019-01-24 RX ORDER — TRIAMCINOLONE ACETONIDE 1 MG/G
CREAM TOPICAL
Refills: 0 | OUTPATIENT
Start: 2019-01-24

## 2019-01-29 PROBLEM — E11.42 DIABETIC PERIPHERAL NEUROPATHY ASSOCIATED WITH TYPE 2 DIABETES MELLITUS: Status: ACTIVE | Noted: 2019-01-29

## 2019-01-29 NOTE — PROGRESS NOTES
Subjective:      Patient ID: Linnea Renae is a 68 y.o. female.    Chief Complaint: Post-op Evaluation (Nail removal, Right great toe)  PAtient f/u for right great toenail injury, doing much better.    1/8/16  Linnea is a 68 y.o. female who presents to the clinic reporting that she ripped off her nail about a week ago, went to ED twice, it was  Removed from the right hallux and sutured, last ed note states no SOI 1/3/18.  She has been cleaning and airing out as needed, no pain or drainage. Linnea has a past medical history of Allergy, Breast cancer, Chronic constipation, Colon polyps, Diabetes mellitus, type 2, Dry eyes, GERD (gastroesophageal reflux disease), Hyperlipidemia, Hypertension, Lumbar disc disease, and Type 2 diabetes mellitus.     PCP: Bailee Moss MD        Current shoe gear:  Affected Foot: Casual shoes     Unaffected Foot: Casual shoes    Hemoglobin A1C   Date Value Ref Range Status   09/17/2018 6.1 (H) 4.0 - 5.6 % Final     Comment:     ADA Screening Guidelines:  5.7-6.4%  Consistent with prediabetes  >or=6.5%  Consistent with diabetes  High levels of fetal hemoglobin interfere with the HbA1C  assay. Heterozygous hemoglobin variants (HbS, HgC, etc)do  not significantly interfere with this assay.   However, presence of multiple variants may affect accuracy.     03/09/2018 5.9 (H) 4.0 - 5.6 % Final     Comment:     According to ADA guidelines, hemoglobin A1c <7.0% represents  optimal control in non-pregnant diabetic patients. Different  metrics may apply to specific patient populations.   Standards of Medical Care in Diabetes-2016.  For the purpose of screening for the presence of diabetes:  <5.7%     Consistent with the absence of diabetes  5.7-6.4%  Consistent with increasing risk for diabetes   (prediabetes)  >or=6.5%  Consistent with diabetes  Currently, no consensus exists for use of hemoglobin A1c  for diagnosis of diabetes for children.  This Hemoglobin A1c assay has significant  interference with fetal   hemoglobin   (HbF). The results are invalid for patients with abnormal amounts of   HbF,   including those with known Hereditary Persistence   of Fetal Hemoglobin. Heterozygous hemoglobin variants (HbAS, HbAC,   HbAD, HbAE, HbA2) do not significantly interfere with this assay;   however, presence of multiple variants in a sample may impact the %   interference.     2017 6.0 4.5 - 6.2 % Final     Comment:     According to ADA guidelines, hemoglobin A1C <7.0% represents  optimal control in non-pregnant diabetic patients.  Different  metrics may apply to specific populations.   Standards of Medical Care in Diabetes - 2016.  For the purpose of screening for the presence of diabetes:  <5.7%     Consistent with the absence of diabetes  5.7-6.4%  Consistent with increasing risk for diabetes   (prediabetes)  >or=6.5%  Consistent with diabetes  Currently no consensus exists for use of hemoglobin A1C  for diagnosis of diabetes for children.         Past Medical History:   Diagnosis Date    Allergy     Breast cancer     Lumpectomy 10/15.    Chronic constipation     Colon polyps     Diabetes mellitus, type 2     Dry eyes     GERD (gastroesophageal reflux disease)     Hyperlipidemia     Hypertension     Lumbar disc disease     Type 2 diabetes mellitus        Past Surgical History:   Procedure Laterality Date    BIOPSY-EXCISIONAL right breast with Wire Localization  (wire [facility] at [facility] for 0900 Right 10/7/2015    Performed by Rad[facility] Russell [facility] at [facility] OR 2ND FLR    BLOCK-FACET-LUMBAR Bilateral 11/3/2017    [facility]    BREAST BIOPSY      BREAST LUMPECTOMY Right         CATARACT EXTRACTION, BILATERAL       SECTION      x3    CHOLECYSTECTOMY      COLONOSCOPY N/A 10/11/2018    Performed by Lorenzo Tanner [facility] at [facility] ENDO (4TH FLR)    COLONOSCOPY W/ POLYPECTOMY      HYSTERECTOMY      and USO    INJECTION, FACET JOINT  Bilateral 2018    [facility]formed [facility]    INJECTION-FACET Bilateral 2018    [facility]    INJECTION-STEROID-EPIDURAL-TRANSFORAMINAL Right 2016    [facility]    INJECTION-STEROID-EPIDURAL-TRANSFORAMINAL Bilateral 3/4/2016    [facility]    INJECTION-STEROID-EPIDURAL-TRANSFORAMINAL Bilateral 2016    [facility]    Radiofrequency Ablation RIGHT LUMBAR L2,3,4 RFA Right 2018    [facility]    TUBAL LIGATION         Family History   Problem Relation Age of Onset    No Known Problems Mother     No Known Problems Father     Heart disease Paternal Grandmother     Thyroid disease Paternal Grandfather     Hypertension Sister     Hypertension Brother     Glaucoma Brother     No Known Problems Daughter     No Known Problems Daughter     No Known Problems Daughter        Social History     Socioeconomic History    Marital status:      Spouse name: None    Number of children: 3    Years of education: None    Highest education level: None   Social Needs    Financial resource strain: None    Food insecurity - worry: None    Food insecurity - inability: None    Transportation needs - medical: None    Transportation needs - non-medical: None   Occupational History    None   Tobacco Use    Smoking status: Former Smoker     Packs/day: 0.25     Years: 20.00     Pack years: 5.00     Last attempt to quit: 1985     Years since quittin.0    Smokeless tobacco: Never Used    Tobacco comment: Quit mid .   Substance and Sexual Activity    Alcohol use: No     Alcohol/week: 0.0 oz    Drug use: No    Sexual activity: Yes   Other Topics Concern    None   Social History Narrative    None       Current Outpatient Medications   Medication Sig  Dispense Refill    aspirin 81 MG Chew Take 81 mg by mouth once daily.      atenolol (TENORMIN) 50 MG tablet TAKE 1 TABLET BY MOUTH TWICE DAILY 180 tablet 1    atorvastatin (LIPITOR) 20 MG tablet TAKE 1 TABLET BY MOUTH ONCE DAILY 90 tablet 2    blood sugar diagnostic Strp 1 strip by Misc.(Non-Drug; Combo Route) route once daily. 100 strip 3    calcium-vitamin D3 500 mg(1,250mg) -200 unit per tablet Take 1 tablet by mouth 2 (two) times daily with meals.       captopril (CAPOTEN) 50 MG tablet TAKE TWO TABLETS BY MOUTH IN THE MORNING AND ONE TABLET IN THE EVENING 270 tablet 2    clotrimazole (LOTRIMIN) 1 % cream Apply topically 2 (two) times daily. 60 g 5    cycloSPORINE (RESTASIS) 0.05 % ophthalmic emulsion 1 drop 2 (two) times daily.      fluticasone (FLONASE) 50 mcg/actuation nasal spray 2 sprays (100 mcg total) by Each Nare route once daily. 3 Bottle 1    hydroCHLOROthiazide (HYDRODIURIL) 25 MG tablet Take 1 tablet (25 mg total) by mouth once daily. 90 tablet 2    ibuprofen (ADVIL,MOTRIN) 800 MG tablet Take 1 tablet (800 mg total) by mouth 3 (three) times daily as needed. 60 tablet 3    ketoconazole (NIZORAL) 2 % cream Apply topically once daily. 60 g 3    ketoconazole (NIZORAL) 2 % shampoo       lancets Misc 1 lancet by Misc.(Non-Drug; Combo Route) route once daily. 100 each 3    metFORMIN (GLUCOPHAGE-XR) 500 MG 24 hr tablet TAKE TWO TABLETS BY MOUTH ONCE DAILY WITH BREAKFAST (Patient taking differently: 1,000 mg 2 (two) times daily with meals. TAKE TWO TABLETS BY MOUTH ONCE DAILY WITH BREAKFAST) 180 tablet 2    minoxidil (LONITEN) 2.5 MG tablet       multivitamin (ONE DAILY MULTIVITAMIN) per tablet Take 1 tablet by mouth once daily.      omeprazole (PRILOSEC) 20 MG capsule Take 1 capsule (20 mg total) by mouth once daily. 90 capsule 2    tiZANidine (ZANAFLEX) 4 MG tablet TAKE ONE TABLET BY MOUTH TWICE DAILY AS NEEDED FOR MUSCLE SPASM 60 tablet 2    traMADol (ULTRAM) 50 mg tablet Take 1 tablet  (50 mg total) by mouth every 6 (six) hours as needed. 120 tablet 2    blood-glucose meter kit Use as instructed 1 each 0     No current facility-administered medications for this visit.        Review of patient's allergies indicates:   Allergen Reactions    Codeine Itching         ROS  ROS:  Constitution: Negative for chills, fever, weakness and malaise/fatigue.   HEENT: Negative for headaches.   Cardiovascular: Negative for chest pain and claudication.   Respiratory: Negative for cough and shortness of breath.   Musculoskeletal: (+) for foot pain.  Negative for muscle cramps and muscle weakness.   Gastrointestinal: Negative for nausea and vomiting.   Neurological:(+) for numbness, tingling and paresthesias.   Dermatological:  (--) for skin rash, (--) for keratosis, (--) for fungal toenails, (--) for wound.          Objective:      Physical Exam  Constitutional:  Patient is oriented to person, place, and time. Vital signs are normal.  Appears well-developed and well-nourished.     Vascular:  Dorsalis pedis pulses are 2/4 on the right side, and 2/4 on the left side.   Posterior tibial pulses are 2/4 on the right side, and 2/4 on the left side.   (+) digital hair growth, capillary fill time to all toes <3 seconds, toes are cool touch, trace pedal swelling    Skin/Dermatological:  Right hallux toenail shows no cryptosis, dry, skin formation  Over the nail bed, no SOI.    Musculoskeletal:      Hallux abducto valgus bilaterally, hammertoes 2-5 observed.  Pedal rom within normal limits.  (+) ankle joint DF restriction with both knee flexed and extened.    Neurological:  (--) deficits to sharp/dull, light touch or vibratory sensation bilateral feet, ten points tested.   Muscle strength to tibialis anterior, extensor hallucis longus, extensor digitorum longus, peroneal muscles, flexor hallucis/digotorum longus, posterior tibial and gastrosoleal complex is 5/5, normal tone without assymmetry   Patellar reflexes are 2+ on  the right side and 2+ on the left side.  Achilles reflexes are 2+ on the right side and 2+ on the left side.            Assessment:       Encounter Diagnoses   Name Primary?    Diabetic peripheral neuropathy associated with type 2 diabetes mellitus Yes    Injury of toenail of right foot, initial encounter          Plan:       Linnea was seen today for post-op evaluation.    Diagnoses and all orders for this visit:    Diabetic peripheral neuropathy associated with type 2 diabetes mellitus    Injury of toenail of right foot, initial encounter      I counseled the patient on her conditions, their implications and medical management.    No further treatment needed for nail injury.    Shoe inspection. Diabetic Foot Education. Patient reminded of the importance of good nutrition and blood sugar control to help prevent podiatric complications of diabetes. Patient instructed on proper foot hygeine. We discussed wearing proper shoe gear, daily foot inspections, never walking without protective shoe gear, never putting sharp instruments to feet.  We also discussed padding and shoes with high toe boxes for foot deformities. Patient relates relief following the procedure. Patient will continue to monitor the areas daily, inspect feet, wear protective shoe gear when ambulatory, moisturizer to maintain skin integrity and follow in this office in approximately 3 months, sooner p.r.n.        Phillip Rangel DPM

## 2019-02-14 ENCOUNTER — OFFICE VISIT (OUTPATIENT)
Dept: PAIN MEDICINE | Facility: CLINIC | Age: 69
End: 2019-02-14
Payer: MEDICARE

## 2019-02-14 VITALS
HEART RATE: 74 BPM | BODY MASS INDEX: 42.44 KG/M2 | WEIGHT: 280 LBS | TEMPERATURE: 98 F | SYSTOLIC BLOOD PRESSURE: 147 MMHG | HEIGHT: 68 IN | DIASTOLIC BLOOD PRESSURE: 69 MMHG

## 2019-02-14 DIAGNOSIS — M47.816 LUMBAR SPONDYLOSIS: Primary | ICD-10-CM

## 2019-02-14 DIAGNOSIS — G89.29 OTHER CHRONIC PAIN: ICD-10-CM

## 2019-02-14 DIAGNOSIS — M47.9 OSTEOARTHRITIS OF SPINE, UNSPECIFIED SPINAL OSTEOARTHRITIS COMPLICATION STATUS, UNSPECIFIED SPINAL REGION: ICD-10-CM

## 2019-02-14 DIAGNOSIS — M79.10 MYALGIA: ICD-10-CM

## 2019-02-14 DIAGNOSIS — M51.36 DDD (DEGENERATIVE DISC DISEASE), LUMBAR: ICD-10-CM

## 2019-02-14 PROCEDURE — 3077F PR MOST RECENT SYSTOLIC BLOOD PRESSURE >= 140 MM HG: ICD-10-PCS | Mod: HCNC,CPTII,S$GLB, | Performed by: NURSE PRACTITIONER

## 2019-02-14 PROCEDURE — 3078F DIAST BP <80 MM HG: CPT | Mod: HCNC,CPTII,S$GLB, | Performed by: NURSE PRACTITIONER

## 2019-02-14 PROCEDURE — 99499 RISK ADDL DX/OHS AUDIT: ICD-10-PCS | Mod: HCNC,S$GLB,, | Performed by: NURSE PRACTITIONER

## 2019-02-14 PROCEDURE — 99213 OFFICE O/P EST LOW 20 MIN: CPT | Mod: HCNC,S$GLB,, | Performed by: NURSE PRACTITIONER

## 2019-02-14 PROCEDURE — 99213 PR OFFICE/OUTPT VISIT, EST, LEVL III, 20-29 MIN: ICD-10-PCS | Mod: HCNC,S$GLB,, | Performed by: NURSE PRACTITIONER

## 2019-02-14 PROCEDURE — 1101F PR PT FALLS ASSESS DOC 0-1 FALLS W/OUT INJ PAST YR: ICD-10-PCS | Mod: HCNC,CPTII,S$GLB, | Performed by: NURSE PRACTITIONER

## 2019-02-14 PROCEDURE — 1101F PT FALLS ASSESS-DOCD LE1/YR: CPT | Mod: HCNC,CPTII,S$GLB, | Performed by: NURSE PRACTITIONER

## 2019-02-14 PROCEDURE — 99999 PR PBB SHADOW E&M-EST. PATIENT-LVL III: CPT | Mod: PBBFAC,HCNC,, | Performed by: NURSE PRACTITIONER

## 2019-02-14 PROCEDURE — 3077F SYST BP >= 140 MM HG: CPT | Mod: HCNC,CPTII,S$GLB, | Performed by: NURSE PRACTITIONER

## 2019-02-14 PROCEDURE — 3078F PR MOST RECENT DIASTOLIC BLOOD PRESSURE < 80 MM HG: ICD-10-PCS | Mod: HCNC,CPTII,S$GLB, | Performed by: NURSE PRACTITIONER

## 2019-02-14 PROCEDURE — 99999 PR PBB SHADOW E&M-EST. PATIENT-LVL III: ICD-10-PCS | Mod: PBBFAC,HCNC,, | Performed by: NURSE PRACTITIONER

## 2019-02-14 PROCEDURE — 99499 UNLISTED E&M SERVICE: CPT | Mod: HCNC,S$GLB,, | Performed by: NURSE PRACTITIONER

## 2019-02-14 NOTE — PROGRESS NOTES
Chronic Pain - Established Visit    Referring Physician: No ref. provider found    Chief Complaint: back pain       SUBJECTIVE:    Linnea Renae presents to the clinic for follow up of lower back pain.  She reports significant improvement in pain since last OV.  She feels that right L2,3,4,5 RFA has provided about 80% relief.  She is having pain on the left and would like to schedule this side.  Her toe pain has improved.  She is not having drainage nor is she on antibiotics.  Her pain today is 3/10.      Physical Therapy/Home Exercise: yes     Pain Disability Index Review:  Last 3 PDI Scores 2/14/2019 1/3/2019 12/17/2018   Pain Disability Index (PDI) 12 35 70       Pain Medications:    - Opioids: Ultram (Tramadol HCL)  - Adjuvant Medications: Advil,Motrin ( Ibuprofen) and zanaflex  - Anti-Coagulants: Aspirin  - Others: see med list     report:  Reviewed and consistent with medication use as prescribed.    Lab Results   Component Value Date    HGBA1C 6.1 (H) 09/17/2018     CMP  Sodium   Date Value Ref Range Status   09/17/2018 139 136 - 145 mmol/L Final     Potassium   Date Value Ref Range Status   09/17/2018 3.7 3.5 - 5.1 mmol/L Final     Chloride   Date Value Ref Range Status   09/17/2018 104 95 - 110 mmol/L Final     CO2   Date Value Ref Range Status   09/17/2018 25 23 - 29 mmol/L Final     Glucose   Date Value Ref Range Status   09/17/2018 100 70 - 110 mg/dL Final     BUN, Bld   Date Value Ref Range Status   09/17/2018 12 8 - 23 mg/dL Final     Creatinine   Date Value Ref Range Status   09/17/2018 0.7 0.5 - 1.4 mg/dL Final     Calcium   Date Value Ref Range Status   09/17/2018 9.4 8.7 - 10.5 mg/dL Final     Total Protein   Date Value Ref Range Status   03/09/2018 7.6 6.0 - 8.4 g/dL Final     Albumin   Date Value Ref Range Status   03/09/2018 3.8 3.5 - 5.2 g/dL Final     Total Bilirubin   Date Value Ref Range Status   03/09/2018 0.4 0.1 - 1.0 mg/dL Final     Comment:     For infants and newborns,  interpretation of results should be based  on gestational age, weight and in agreement with clinical  observations.  Premature Infant recommended reference ranges:  Up to 24 hours.............<8.0 mg/dL  Up to 48 hours............<12.0 mg/dL  3-5 days..................<15.0 mg/dL  6-29 days.................<15.0 mg/dL       Alkaline Phosphatase   Date Value Ref Range Status   03/09/2018 87 55 - 135 U/L Final     AST   Date Value Ref Range Status   03/09/2018 19 10 - 40 U/L Final     ALT   Date Value Ref Range Status   03/09/2018 19 10 - 44 U/L Final     Anion Gap   Date Value Ref Range Status   09/17/2018 10 8 - 16 mmol/L Final     eGFR if    Date Value Ref Range Status   09/17/2018 >60 >60 mL/min/1.73 m^2 Final     eGFR if non    Date Value Ref Range Status   09/17/2018 >60 >60 mL/min/1.73 m^2 Final     Comment:     Calculation used to obtain the estimated glomerular filtration  rate (eGFR) is the CKD-EPI equation.          Pain Procedures:   7/29/16 Right L3-4 TF CHRISTIAN  11/3/17 Bilateral L3-4 and L4-5 facet injections  5/9/18 Bilateral L3-4 and L4-5 facet injections  7/25/18 Bilateral L3-4 and L4-5 facet injections  12/5/18 Right L2,3,4,5 RFA- 80% relief    Imaging:  Narrative     EXAMINATION:  MRI LUMBAR SPINE W WO CONTRAST    CLINICAL HISTORY:  Low back pain, >6wks conservative tx, persistent-progressive sx, surgical candidate; Spondylosis without myelopathy or radiculopathy, site unspecified    TECHNIQUE:  Multiplanar, multisequence MR images were acquired from the thoracolumbar junction to the sacrum prior to and following administration of 10 cc IV Gadavist.    COMPARISON:  Lumbar spine radiograph 10/11/2017; MRI lumbar spine with/without contrast 09/20/2016    FINDINGS:  Sagittal alignment demonstrates grade 1 anterolisthesis of L3 on L4 with slight uncovering of the intervertebral disc.  Vertebral body heights are satisfactorily maintained.  Intervertebral disc spaces are  preserved.  Marrow signal is within normal limits with no evidence of fracture or marrow replacement process noting a small vertebral body hemangioma at L1.    Conus appears within normal limits terminating at the level of the L1 level.  Cauda equina appears normal.    Paraspinous soft tissues demonstrate mild prominence of the common bile duct and small left renal cysts..    T12-L1:   No spinal canal or neuroforaminal narrowing.    L1-2:  No spinal canal or neuroforaminal narrowing.    L2-3:  Mild posterior disc bulge and moderate bilateral facet arthropathy without spinal canal stenosis or neural foraminal narrowing.    L3-4:  The diffuse posterior disc bulge, buckling of the ligamentum flavum, and moderate bilateral facet arthropathy results in mild spinal canal stenosis and no neural foraminal narrowing.    L4-5:  Diffuse posterior disc bulge with superimposed central disc protrusion, buckling of the ligamentum flavum, and moderate bilateral facet arthropathy result in moderate spinal canal stenosis and no significant neural foraminal narrowing.    L5-S1:  Mild posterior disc bulge and mild facet arthropathy results in mild right/moderate left neural foraminal narrowing.    No abnormal enhancement.      Impression       Multilevel lumbar spondylosis worst at L4-5 resulting in moderate spinal canal stenosis.    Mild prominence of the common bile duct which is nonspecific and may represent sequela of cholecystectomy.         X-Ray Lumbar Spine Ap Lateral w/Flex Ext    Narrative     10/11/17 10:17:19    Accession: [id number]    CLINICAL INDICATION: 67 year old F with  low back pain    COMPARISON: Lumbar spine x-rays, 09/20/2016.    TECHNIQUE: AP, lateral, flexion, extension, and coned down lateral radiographs of the lumbar spine.    FINDINGS:     Vertebral body heights are maintained.  No evidence of fracture.      Normal sagittal alignment is preserved.No significant translation with flexion-extension.    Moderate  multilevel degenerative changes are again present. Mild anterolisthesis of L3 with respect to L4 is again noted. Multilevel facet arthropathy is present, most pronounced in the lower lumbar spine.  No detrimental change is identified when compared with the examination performed one year prior.      Impression         Moderate multilevel degenerative changes in the lumbar spine, similar in appearance to the prior exam.    No evidence of fracture or malalignment.      Electronically signed by: Heber Frost  Date: 10/11/17  Time: 10:37            Past Medical History:   Diagnosis Date    Allergy     Breast cancer     Lumpectomy 10/15.    Chronic constipation     Colon polyps     Diabetes mellitus, type 2     Dry eyes     GERD (gastroesophageal reflux disease)     Hyperlipidemia     Hypertension     Lumbar disc disease     Type 2 diabetes mellitus      Past Surgical History:   Procedure Laterality Date    BIOPSY-EXCISIONAL right breast with Wire Localization  (wire inserted at [facility] for 0900 Right 10/7/2015    Performed [facility] Radha Russell [facility] at [facility] OR 2ND FLR    BLOCK-FACET-LUMBAR Bilateral 11/3/2017    [facility] by [facility] at [facility]    BREAST BIOPSY      BREAST LUMPECTOMY Right         CATARACT EXTRACTION, BILATERAL       SECTION      x3    CHOLECYSTECTOMY      COLONOSCOPY N/A 10/11/2018    Performed by Lorenzo Tanner MD at [facility] ENDO (4TH FLR)    COLONOSCOPY W/ POLYPECTOMY      HYSTERECTOMY      and USO    INJECTION, FACET JOINT Bilateral 2018    Performed by [facility] MGT    [facility] 2018    [facility]    INJECTION-STEROID-EPIDURAL-TRANSFORAMINAL Right 2016    [facility]    INJECTION-STEROID-EPIDURAL-TRANSFORAMINAL Bilateral 3/4/2016    [facility] at [facility]     INJECTION-STEROID-EPIDURAL-TRANSFORAMINAL Bilateral 2016    [facility] PAIN T    Radiofrequency Ablation RIGHT LUMBAR L2,3,4 RFA Right 2018    Per[facility] at [facility] PAIN MGT    TUBAL LIGATION       Social History     Socioeconomic History    Marital status:      Spouse name: Not on file    Number of children: 3    Years of education: Not on file    Highest education level: Not on file   Social Needs    Financial resource strain: Not on file    Food insecurity - worry: Not on file    Food insecurity - inability: Not on file    Transportation needs - medical: Not on file    Transportation needs - non-medical: Not on file   Occupational History    Not on file   Tobacco Use    Smoking status: Former Smoker     Packs/day: 0.25     Years: 20.00     Pack years: 5.00     Last attempt to quit: 1985     Years since quittin.1    Smokeless tobacco: Never Used    Tobacco comment: Quit mid .   Substance and Sexual Activity    Alcohol use: No     Alcohol/week: 0.0 oz    Drug use: No    Sexual activity: Yes   Other Topics Concern    Not on file   Social History Narrative    Not on file     Family History   Problem Relation Age of Onset    No Known Problems Mother     No Known Problems Father     Heart disease Paternal Grandmother     Thyroid disease Paternal Grandfather     Hypertension Sister     Hypertension Brother     Glaucoma Brother     No Known Problems Daughter     No Known Problems Daughter     No Known Problems Daughter        Review of patient's allergies indicates:   Allergen Reactions    Codeine Itching       Current Outpatient Medications   Medication Sig    aspirin 81 MG Chew Take 81 mg by mouth once daily.    atenolol (TENORMIN) 50 MG tablet TAKE 1 TABLET BY MOUTH TWICE DAILY    atorvastatin (LIPITOR) 20 MG tablet TAKE 1 TABLET BY MOUTH ONCE DAILY    blood sugar diagnostic Strp 1 strip by  Misc.(Non-Drug; Combo Route) route once daily.    calcium-vitamin D3 500 mg(1,250mg) -200 unit per tablet Take 1 tablet by mouth 2 (two) times daily with meals.     captopril (CAPOTEN) 50 MG tablet TAKE TWO TABLETS BY MOUTH IN THE MORNING AND ONE TABLET IN THE EVENING    clotrimazole (LOTRIMIN) 1 % cream Apply topically 2 (two) times daily.    cycloSPORINE (RESTASIS) 0.05 % ophthalmic emulsion 1 drop 2 (two) times daily.    fluticasone (FLONASE) 50 mcg/actuation nasal spray 2 sprays (100 mcg total) by Each Nare route once daily.    hydroCHLOROthiazide (HYDRODIURIL) 25 MG tablet Take 1 tablet (25 mg total) by mouth once daily.    ibuprofen (ADVIL,MOTRIN) 800 MG tablet Take 1 tablet (800 mg total) by mouth 3 (three) times daily as needed.    ketoconazole (NIZORAL) 2 % cream Apply topically once daily.    ketoconazole (NIZORAL) 2 % shampoo     lancets Misc 1 lancet by Misc.(Non-Drug; Combo Route) route once daily.    metFORMIN (GLUCOPHAGE-XR) 500 MG 24 hr tablet TAKE TWO TABLETS BY MOUTH ONCE DAILY WITH BREAKFAST (Patient taking differently: 1,000 mg 2 (two) times daily with meals. TAKE TWO TABLETS BY MOUTH ONCE DAILY WITH BREAKFAST)    minoxidil (LONITEN) 2.5 MG tablet     multivitamin (ONE DAILY MULTIVITAMIN) per tablet Take 1 tablet by mouth once daily.    omeprazole (PRILOSEC) 20 MG capsule Take 1 capsule (20 mg total) by mouth once daily.    tiZANidine (ZANAFLEX) 4 MG tablet TAKE ONE TABLET BY MOUTH TWICE DAILY AS NEEDED FOR MUSCLE SPASM    traMADol (ULTRAM) 50 mg tablet Take 1 tablet (50 mg total) by mouth every 6 (six) hours as needed.    blood-glucose meter kit Use as instructed     No current facility-administered medications for this visit.        REVIEW OF SYSTEMS:    GENERAL:  No weight loss, malaise or fevers.  HEENT:  Negative for frequent or significant headaches.  NECK:  Negative for lumps, goiter, pain and significant neck swelling.  RESPIRATORY:  Negative for cough, wheezing or  "shortness of breath.  CARDIOVASCULAR:  Negative for chest pain, leg swelling or palpitations.  GI:  Negative for abdominal discomfort, blood in stools or black stools or change in bowel habits. GERD.  MUSCULOSKELETAL:  See HPI.  SKIN:  Negative for lesions, rash, and itching.  PSYCH: Positive for sleep disturbance, mood disorder and recent psychosocial stressors.  HEMATOLOGY/LYMPHOLOGY:  Negative for prolonged bleeding, bruising easily or swollen nodes. H/O breast cancer with mastectomy.  NEURO:   No history of headaches, syncope, paralysis, seizures or tremors.  All other reviewed and negative other than HPI.    OBJECTIVE:    BP (!) 147/69   Pulse 74   Temp 98.3 °F (36.8 °C)   Ht 5' 8" (1.727 m)   Wt 127 kg (279 lb 15.8 oz)   BMI 42.57 kg/m²     PHYSICAL EXAMINATION:    General appearance: Well appearing, in no acute distress, alert and oriented x3.  Psych:  Mood and affect appropriate.  Skin: No bruising or rashes.  Head/face:  Atraumatic, normocephalic. No palpable lymph nodes  Cor: RRR  Pulm: CTA  GI: Abdomen soft and non-tender.  Back: Straight leg raising in the sitting and supine positions is negative to radicular pain.  Pain to palpation over the lumbar facet joints on the left.  Limited ROM with pain on extension.  Positive facet loading bilaterally, L>R.  Painful palpation to SI joints.  MARY is negative.  Musculoskeletal:  Bilateral upper and lower extremity strength is normal and symmetric.  No atrophy or tone abnormalities are noted.  Neuro: Bilateral upper and lower extremity coordination and muscle stretch reflexes are physiologic and symmetric.  Plantar response are downgoing.  Decreased sensation to bilateral feet and right lower back.  Gait: Antalgic.      ASSESSMENT: 68 y.o. year old female with lower back pain, consistent with the following diagnoses:     1. Lumbar spondylosis     2. Other chronic pain     3. Osteoarthritis of spine, unspecified spinal osteoarthritis complication status, " unspecified spinal region     4. Myalgia     5. DDD (degenerative disc disease), lumbar           PLAN:     - I have stressed the importance of physical activity and a home exercise plan to help with pain and improve health.    - Previous imaging was reviewed and discussed with the patient today.    - Schedule for left L2,3,4,5 RFA.    - RTC 4 weeks after procedure.    - Counseled patient regarding the importance of activity modification, constant sleeping habits and physical therapy.      The above plan and management options were discussed at length with patient. Patient is in agreement with the above and verbalized understanding.      Amara Hawthorne, CARMEN  02/14/2019

## 2019-02-27 DIAGNOSIS — M48.061 DEGENERATIVE LUMBAR SPINAL STENOSIS: ICD-10-CM

## 2019-02-27 RX ORDER — TRAMADOL HYDROCHLORIDE 50 MG/1
TABLET ORAL
Qty: 120 TABLET | Refills: 0 | Status: SHIPPED | OUTPATIENT
Start: 2019-02-27 | End: 2019-03-19 | Stop reason: SDUPTHER

## 2019-02-28 ENCOUNTER — PATIENT MESSAGE (OUTPATIENT)
Dept: INTERNAL MEDICINE | Facility: CLINIC | Age: 69
End: 2019-02-28

## 2019-02-28 DIAGNOSIS — E11.9 TYPE 2 DIABETES MELLITUS WITHOUT COMPLICATION, WITHOUT LONG-TERM CURRENT USE OF INSULIN: ICD-10-CM

## 2019-02-28 DIAGNOSIS — M48.061 DEGENERATIVE LUMBAR SPINAL STENOSIS: ICD-10-CM

## 2019-02-28 RX ORDER — TIZANIDINE 4 MG/1
4 TABLET ORAL 2 TIMES DAILY PRN
Qty: 60 TABLET | Refills: 2 | Status: SHIPPED | OUTPATIENT
Start: 2019-02-28 | End: 2020-11-23

## 2019-02-28 RX ORDER — TRAMADOL HYDROCHLORIDE 50 MG/1
50 TABLET ORAL EVERY 6 HOURS PRN
Qty: 120 TABLET | Refills: 0 | Status: CANCELLED | OUTPATIENT
Start: 2019-02-28

## 2019-02-28 NOTE — TELEPHONE ENCOUNTER
Pt was asking for me to call the pharmacy to have med tramdol filled   Kit at the pharmacy has some information her needed from me about pt's condition, diagnosis, and reason for drug  Therapy. All questions answered

## 2019-03-06 ENCOUNTER — TELEPHONE (OUTPATIENT)
Dept: PAIN MEDICINE | Facility: CLINIC | Age: 69
End: 2019-03-06

## 2019-03-06 NOTE — TELEPHONE ENCOUNTER
----- Message from Rain Baeza sent at 3/6/2019 11:03 AM CST -----  Contact: CIARA ZIEGLER [[id number]]  Name of Who is Calling:CIARA ZIEGLER [[id number]]        What is the request in detail: Pt requesting to cancel 3.13.19 procedure. She will call back to reschedule. Thanks-          Can the clinic reply by [facility]: N    [facility]: [phone]

## 2019-03-18 ENCOUNTER — OFFICE VISIT (OUTPATIENT)
Dept: PODIATRY | Facility: CLINIC | Age: 69
End: 2019-03-18
Payer: MEDICARE

## 2019-03-18 VITALS
HEART RATE: 86 BPM | HEIGHT: 68 IN | BODY MASS INDEX: 42.28 KG/M2 | DIASTOLIC BLOOD PRESSURE: 81 MMHG | SYSTOLIC BLOOD PRESSURE: 148 MMHG | WEIGHT: 279 LBS

## 2019-03-18 DIAGNOSIS — G57.53 TARSAL TUNNEL SYNDROME, BILATERAL LOWER LIMBS: ICD-10-CM

## 2019-03-18 DIAGNOSIS — E11.42 DIABETIC POLYNEUROPATHY ASSOCIATED WITH TYPE 2 DIABETES MELLITUS: Primary | ICD-10-CM

## 2019-03-18 DIAGNOSIS — G58.9 NERVE ENTRAPMENT: ICD-10-CM

## 2019-03-18 PROCEDURE — 64450 NJX AA&/STRD OTHER PN/BRANCH: CPT | Mod: 50,HCNC,S$GLB, | Performed by: PODIATRIST

## 2019-03-18 PROCEDURE — 99214 OFFICE O/P EST MOD 30 MIN: CPT | Mod: 25,HCNC,S$GLB, | Performed by: PODIATRIST

## 2019-03-18 PROCEDURE — 99999 PR PBB SHADOW E&M-EST. PATIENT-LVL III: ICD-10-PCS | Mod: PBBFAC,HCNC,, | Performed by: PODIATRIST

## 2019-03-18 PROCEDURE — 64450 PR NERVE BLOCK INJ, ANES/STEROID, OTHER PERIPHERAL: ICD-10-PCS | Mod: 50,HCNC,S$GLB, | Performed by: PODIATRIST

## 2019-03-18 PROCEDURE — 3079F PR MOST RECENT DIASTOLIC BLOOD PRESSURE 80-89 MM HG: ICD-10-PCS | Mod: HCNC,CPTII,S$GLB, | Performed by: PODIATRIST

## 2019-03-18 PROCEDURE — 1101F PT FALLS ASSESS-DOCD LE1/YR: CPT | Mod: HCNC,CPTII,S$GLB, | Performed by: PODIATRIST

## 2019-03-18 PROCEDURE — 3044F PR MOST RECENT HEMOGLOBIN A1C LEVEL <7.0%: ICD-10-PCS | Mod: HCNC,CPTII,S$GLB, | Performed by: PODIATRIST

## 2019-03-18 PROCEDURE — 99999 PR PBB SHADOW E&M-EST. PATIENT-LVL III: CPT | Mod: PBBFAC,HCNC,, | Performed by: PODIATRIST

## 2019-03-18 PROCEDURE — 1101F PR PT FALLS ASSESS DOC 0-1 FALLS W/OUT INJ PAST YR: ICD-10-PCS | Mod: HCNC,CPTII,S$GLB, | Performed by: PODIATRIST

## 2019-03-18 PROCEDURE — 3077F SYST BP >= 140 MM HG: CPT | Mod: HCNC,CPTII,S$GLB, | Performed by: PODIATRIST

## 2019-03-18 PROCEDURE — 3044F HG A1C LEVEL LT 7.0%: CPT | Mod: HCNC,CPTII,S$GLB, | Performed by: PODIATRIST

## 2019-03-18 PROCEDURE — 3077F PR MOST RECENT SYSTOLIC BLOOD PRESSURE >= 140 MM HG: ICD-10-PCS | Mod: HCNC,CPTII,S$GLB, | Performed by: PODIATRIST

## 2019-03-18 PROCEDURE — 99214 PR OFFICE/OUTPT VISIT, EST, LEVL IV, 30-39 MIN: ICD-10-PCS | Mod: 25,HCNC,S$GLB, | Performed by: PODIATRIST

## 2019-03-18 PROCEDURE — 3079F DIAST BP 80-89 MM HG: CPT | Mod: HCNC,CPTII,S$GLB, | Performed by: PODIATRIST

## 2019-03-18 RX ORDER — DICLOFENAC SODIUM 10 MG/G
2 GEL TOPICAL 4 TIMES DAILY
Qty: 1 TUBE | Refills: 2 | Status: SHIPPED | OUTPATIENT
Start: 2019-03-18 | End: 2019-05-21 | Stop reason: SDUPTHER

## 2019-03-18 NOTE — PROGRESS NOTES
Subjective:      Patient ID: Linnea Renae is a 68 y.o. female.    Chief Complaint: Diabetes Mellitus (dr bailee moss 3/19/19) and Nail Care    Linnea is a 68 y.o. female who presents to the clinic upon referral from Dr. Hassan  for evaluation and treatment of diabetic feet. Linnea has a past medical history of Allergy, Breast cancer, Chronic constipation, Colon polyps, Diabetes mellitus, type 2, Dry eyes, GERD (gastroesophageal reflux disease), Hyperlipidemia, Hypertension, Lumbar disc disease, and Type 2 diabetes mellitus. Patient relates no major problem with feet. Only complaints today consist of routine foot evaluation and painful nerve symptoms to both feet.    PCP: Bailee Moss MD    Date Last Seen by PCP:   Chief Complaint   Patient presents with    Diabetes Mellitus     dr bailee moss 3/19/19    Nail Care         Current shoe gear: Casual shoes    Hemoglobin A1C   Date Value Ref Range Status   09/17/2018 6.1 (H) 4.0 - 5.6 % Final     Comment:     ADA Screening Guidelines:  5.7-6.4%  Consistent with prediabetes  >or=6.5%  Consistent with diabetes  High levels of fetal hemoglobin interfere with the HbA1C  assay. Heterozygous hemoglobin variants (HbS, HgC, etc)do  not significantly interfere with this assay.   However, presence of multiple variants may affect accuracy.     03/09/2018 5.9 (H) 4.0 - 5.6 % Final     Comment:     According to ADA guidelines, hemoglobin A1c <7.0% represents  optimal control in non-pregnant diabetic patients. Different  metrics may apply to specific patient populations.   Standards of Medical Care in Diabetes-2016.  For the purpose of screening for the presence of diabetes:  <5.7%     Consistent with the absence of diabetes  5.7-6.4%  Consistent with increasing risk for diabetes   (prediabetes)  >or=6.5%  Consistent with diabetes  Currently, no consensus exists for use of hemoglobin A1c  for diagnosis of diabetes for children.  This Hemoglobin A1c assay has  significant interference with fetal   hemoglobin   (HbF). The results are invalid for patients with abnormal amounts of   HbF,   including those with known Hereditary Persistence   of Fetal Hemoglobin. Heterozygous hemoglobin variants (HbAS, HbAC,   HbAD, HbAE, HbA2) do not significantly interfere with this assay;   however, presence of multiple variants in a sample may impact the %   interference.     02/02/2017 6.0 4.5 - 6.2 % Final     Comment:     According to ADA guidelines, hemoglobin A1C <7.0% represents  optimal control in non-pregnant diabetic patients.  Different  metrics may apply to specific populations.   Standards of Medical Care in Diabetes - 2016.  For the purpose of screening for the presence of diabetes:  <5.7%     Consistent with the absence of diabetes  5.7-6.4%  Consistent with increasing risk for diabetes   (prediabetes)  >or=6.5%  Consistent with diabetes  Currently no consensus exists for use of hemoglobin A1C  for diagnosis of diabetes for children.             Review of Systems   Constitution: Negative for chills and fever.   HENT: Negative for congestion and tinnitus.    Eyes: Negative for double vision and visual disturbance.   Cardiovascular: Negative for chest pain and claudication.   Respiratory: Negative for hemoptysis and shortness of breath.    Endocrine: Negative for cold intolerance and heat intolerance.   Hematologic/Lymphatic: Negative for adenopathy and bleeding problem.   Skin: Positive for color change, dry skin and nail changes.   Musculoskeletal: Positive for stiffness. Negative for myalgias.   Gastrointestinal: Negative for nausea and vomiting.   Genitourinary: Negative for dysuria and hematuria.   Neurological: Positive for numbness and paresthesias.   Psychiatric/Behavioral: Negative for altered mental status and suicidal ideas.   Allergic/Immunologic: Negative for environmental allergies and persistent infections.           Objective:      Physical Exam    Constitutional: She is oriented to person, place, and time. She appears well-developed and well-nourished.   Cardiovascular:   Pulses:       Dorsalis pedis pulses are 1+ on the right side, and 1+ on the left side.        Posterior tibial pulses are 1+ on the right side, and 1+ on the left side.   Pulmonary/Chest: Effort normal.   Musculoskeletal: Normal range of motion.   Anterior, lateral, and posterior muscle groups bilateral lower extremities show strength 4 over 5 symmetrically. Inspection and palpation of the joints and bones reveal no crepitus or joint effusion. No tenderness upon palpation. Mild plantar flexor contractures noted to digits 2 through 5 bilaterally.  Angle and base of gait are normal.   Feet:   Right Foot:   Skin Integrity: Positive for callus and dry skin.   Left Foot:   Skin Integrity: Positive for callus and dry skin.   Neurological: She is alert and oriented to person, place, and time. She displays atrophy and abnormal reflex. A sensory deficit is present.   Reflex Scores:       Patellar reflexes are 1+ on the right side and 1+ on the left side.       Achilles reflexes are 1+ on the right side and 1+ on the left side.  Sharp, dull, light touch, and vibratory sensation are diminished bilaterally. Proprioceptive sensation is intact to both lower extremities. Lincoln Jackie monofilament exam shows loss of protective sensation to plantar toes 1 through 5 bilaterally. Deep tendon reflexes to the patellar tendons is 1 over 4 bilaterally symmetrical. Deep tendon reflexes to the Achilles tendon is 0 over 4 bilaterally symmetrical. No ankle clonus or Babinski reflex noted bilaterally. Coordination is fair to both lower extremities.  Patient admits to intermittent burning and tingling in the feet.    Positive Tinel sign bilateral tibial nerves with tenderness and electrical sensation distally.   Skin: Skin is warm and dry. Capillary refill takes 2 to 3 seconds. There is pallor.   Skin bilateral  lower extremities noted to be thin, dry, and atrophic.  Toenails normal.   Psychiatric: She has a normal mood and affect.   Vitals reviewed.            Assessment:       Encounter Diagnoses   Name Primary?    Diabetic polyneuropathy associated with type 2 diabetes mellitus Yes    Nerve entrapment     Tarsal tunnel syndrome, bilateral lower limbs          Plan:       Linnea was seen today for diabetes mellitus and nail care.    Diagnoses and all orders for this visit:    Diabetic polyneuropathy associated with type 2 diabetes mellitus    Nerve entrapment    Tarsal tunnel syndrome, bilateral lower limbs    Other orders  -     diclofenac sodium (VOLTAREN) 1 % Gel; Apply 2 g topically 4 (four) times daily.      I counseled the patient on her conditions, their implications and medical management.      Shoe inspection. Diabetic Foot Education. Patient reminded of the importance of good nutrition and blood sugar control to help prevent podiatric complications of diabetes. Patient instructed on proper foot hygeine. We discussed wearing proper shoe gear, daily foot inspections and Diabetic foot education in detail.    The area overlying the bilateral tibial nerve(s) was sterilely prepped, verbal consent was obtained, 2 cc's of 0.5% Marcaine plain was infiltrated around the affected nerve(s) for a diagnostic nerve block.  This was well tolerated with no complications.  .  Follow-up and 1-2 weeks to discuss the effectiveness of the nerve blocks.

## 2019-03-19 ENCOUNTER — LAB VISIT (OUTPATIENT)
Dept: LAB | Facility: HOSPITAL | Age: 69
End: 2019-03-19
Attending: INTERNAL MEDICINE
Payer: MEDICARE

## 2019-03-19 ENCOUNTER — OFFICE VISIT (OUTPATIENT)
Dept: INTERNAL MEDICINE | Facility: CLINIC | Age: 69
End: 2019-03-19
Payer: MEDICARE

## 2019-03-19 ENCOUNTER — PATIENT MESSAGE (OUTPATIENT)
Dept: INTERNAL MEDICINE | Facility: CLINIC | Age: 69
End: 2019-03-19

## 2019-03-19 ENCOUNTER — IMMUNIZATION (OUTPATIENT)
Dept: PHARMACY | Facility: CLINIC | Age: 69
End: 2019-03-19
Payer: MEDICARE

## 2019-03-19 VITALS
HEIGHT: 68 IN | HEART RATE: 69 BPM | BODY MASS INDEX: 43.44 KG/M2 | DIASTOLIC BLOOD PRESSURE: 80 MMHG | WEIGHT: 286.63 LBS | SYSTOLIC BLOOD PRESSURE: 118 MMHG

## 2019-03-19 DIAGNOSIS — J30.89 PERENNIAL ALLERGIC RHINITIS: ICD-10-CM

## 2019-03-19 DIAGNOSIS — E66.01 MORBID OBESITY WITH BMI OF 40.0-44.9, ADULT: ICD-10-CM

## 2019-03-19 DIAGNOSIS — M89.9 DISORDER OF BONE AND CARTILAGE: ICD-10-CM

## 2019-03-19 DIAGNOSIS — E11.9 TYPE 2 DIABETES MELLITUS WITHOUT COMPLICATION, WITHOUT LONG-TERM CURRENT USE OF INSULIN: Primary | ICD-10-CM

## 2019-03-19 DIAGNOSIS — I10 ESSENTIAL HYPERTENSION: ICD-10-CM

## 2019-03-19 DIAGNOSIS — M48.061 DEGENERATIVE LUMBAR SPINAL STENOSIS: ICD-10-CM

## 2019-03-19 DIAGNOSIS — K21.9 GASTROESOPHAGEAL REFLUX DISEASE, ESOPHAGITIS PRESENCE NOT SPECIFIED: ICD-10-CM

## 2019-03-19 DIAGNOSIS — Z23 NEED FOR PNEUMOCOCCAL VACCINE: ICD-10-CM

## 2019-03-19 DIAGNOSIS — I70.0 ATHEROSCLEROSIS OF AORTA: ICD-10-CM

## 2019-03-19 DIAGNOSIS — M94.9 DISORDER OF BONE AND CARTILAGE: ICD-10-CM

## 2019-03-19 DIAGNOSIS — E11.9 TYPE 2 DIABETES MELLITUS WITHOUT COMPLICATION, WITHOUT LONG-TERM CURRENT USE OF INSULIN: ICD-10-CM

## 2019-03-19 DIAGNOSIS — E78.5 HYPERLIPIDEMIA LDL GOAL <100: ICD-10-CM

## 2019-03-19 LAB
25(OH)D3+25(OH)D2 SERPL-MCNC: 42 NG/ML
ALBUMIN SERPL BCP-MCNC: 3.9 G/DL
ALBUMIN/CREAT UR: 6.2 UG/MG
ALP SERPL-CCNC: 82 U/L
ALT SERPL W/O P-5'-P-CCNC: 12 U/L
ANION GAP SERPL CALC-SCNC: 12 MMOL/L
AST SERPL-CCNC: 20 U/L
BASOPHILS # BLD AUTO: 0.03 K/UL
BASOPHILS NFR BLD: 0.3 %
BILIRUB SERPL-MCNC: 0.5 MG/DL
BILIRUB UR QL STRIP: NEGATIVE
BUN SERPL-MCNC: 15 MG/DL
CALCIUM SERPL-MCNC: 9.9 MG/DL
CHLORIDE SERPL-SCNC: 100 MMOL/L
CHOLEST SERPL-MCNC: 135 MG/DL
CHOLEST/HDLC SERPL: 3.1 {RATIO}
CLARITY UR REFRACT.AUTO: CLEAR
CO2 SERPL-SCNC: 28 MMOL/L
COLOR UR AUTO: YELLOW
CREAT SERPL-MCNC: 0.8 MG/DL
CREAT UR-MCNC: 81 MG/DL
DIFFERENTIAL METHOD: ABNORMAL
EOSINOPHIL # BLD AUTO: 0.1 K/UL
EOSINOPHIL NFR BLD: 1.5 %
ERYTHROCYTE [DISTWIDTH] IN BLOOD BY AUTOMATED COUNT: 14.1 %
EST. GFR  (AFRICAN AMERICAN): >60 ML/MIN/1.73 M^2
EST. GFR  (NON AFRICAN AMERICAN): >60 ML/MIN/1.73 M^2
ESTIMATED AVG GLUCOSE: 126 MG/DL
GLUCOSE SERPL-MCNC: 102 MG/DL
GLUCOSE UR QL STRIP: NEGATIVE
HBA1C MFR BLD HPLC: 6 %
HCT VFR BLD AUTO: 38.3 %
HDLC SERPL-MCNC: 43 MG/DL
HDLC SERPL: 31.9 %
HGB BLD-MCNC: 12 G/DL
HGB UR QL STRIP: NEGATIVE
KETONES UR QL STRIP: NEGATIVE
LDLC SERPL CALC-MCNC: 78 MG/DL
LEUKOCYTE ESTERASE UR QL STRIP: NEGATIVE
LYMPHOCYTES # BLD AUTO: 2.4 K/UL
LYMPHOCYTES NFR BLD: 24.7 %
MCH RBC QN AUTO: 26.7 PG
MCHC RBC AUTO-ENTMCNC: 31.3 G/DL
MCV RBC AUTO: 85 FL
MICROALBUMIN UR DL<=1MG/L-MCNC: 5 UG/ML
MONOCYTES # BLD AUTO: 0.6 K/UL
MONOCYTES NFR BLD: 6.7 %
NEUTROPHILS # BLD AUTO: 6.4 K/UL
NEUTROPHILS NFR BLD: 66.6 %
NITRITE UR QL STRIP: NEGATIVE
NONHDLC SERPL-MCNC: 92 MG/DL
PH UR STRIP: 7 [PH] (ref 5–8)
PLATELET # BLD AUTO: 360 K/UL
PMV BLD AUTO: 10.1 FL
POTASSIUM SERPL-SCNC: 4 MMOL/L
PROT SERPL-MCNC: 7.7 G/DL
PROT UR QL STRIP: NEGATIVE
RBC # BLD AUTO: 4.49 M/UL
SODIUM SERPL-SCNC: 140 MMOL/L
SP GR UR STRIP: 1.01 (ref 1–1.03)
TRIGL SERPL-MCNC: 70 MG/DL
URN SPEC COLLECT METH UR: NORMAL
WBC # BLD AUTO: 9.56 K/UL

## 2019-03-19 PROCEDURE — 3044F PR MOST RECENT HEMOGLOBIN A1C LEVEL <7.0%: ICD-10-PCS | Mod: HCNC,CPTII,S$GLB, | Performed by: INTERNAL MEDICINE

## 2019-03-19 PROCEDURE — 85025 COMPLETE CBC W/AUTO DIFF WBC: CPT | Mod: HCNC

## 2019-03-19 PROCEDURE — 3074F PR MOST RECENT SYSTOLIC BLOOD PRESSURE < 130 MM HG: ICD-10-PCS | Mod: HCNC,CPTII,S$GLB, | Performed by: INTERNAL MEDICINE

## 2019-03-19 PROCEDURE — 80053 COMPREHEN METABOLIC PANEL: CPT | Mod: HCNC

## 2019-03-19 PROCEDURE — 82306 VITAMIN D 25 HYDROXY: CPT | Mod: HCNC

## 2019-03-19 PROCEDURE — 81003 URINALYSIS AUTO W/O SCOPE: CPT | Mod: HCNC

## 2019-03-19 PROCEDURE — 99999 PR PBB SHADOW E&M-EST. PATIENT-LVL III: ICD-10-PCS | Mod: PBBFAC,HCNC,, | Performed by: INTERNAL MEDICINE

## 2019-03-19 PROCEDURE — 83036 HEMOGLOBIN GLYCOSYLATED A1C: CPT | Mod: HCNC

## 2019-03-19 PROCEDURE — 3074F SYST BP LT 130 MM HG: CPT | Mod: HCNC,CPTII,S$GLB, | Performed by: INTERNAL MEDICINE

## 2019-03-19 PROCEDURE — 99214 OFFICE O/P EST MOD 30 MIN: CPT | Mod: HCNC,S$GLB,, | Performed by: INTERNAL MEDICINE

## 2019-03-19 PROCEDURE — 3079F PR MOST RECENT DIASTOLIC BLOOD PRESSURE 80-89 MM HG: ICD-10-PCS | Mod: HCNC,CPTII,S$GLB, | Performed by: INTERNAL MEDICINE

## 2019-03-19 PROCEDURE — 82043 UR ALBUMIN QUANTITATIVE: CPT | Mod: HCNC

## 2019-03-19 PROCEDURE — 3044F HG A1C LEVEL LT 7.0%: CPT | Mod: HCNC,CPTII,S$GLB, | Performed by: INTERNAL MEDICINE

## 2019-03-19 PROCEDURE — 99999 PR PBB SHADOW E&M-EST. PATIENT-LVL III: CPT | Mod: PBBFAC,HCNC,, | Performed by: INTERNAL MEDICINE

## 2019-03-19 PROCEDURE — 1101F PR PT FALLS ASSESS DOC 0-1 FALLS W/OUT INJ PAST YR: ICD-10-PCS | Mod: HCNC,CPTII,S$GLB, | Performed by: INTERNAL MEDICINE

## 2019-03-19 PROCEDURE — 99499 RISK ADDL DX/OHS AUDIT: ICD-10-PCS | Mod: HCNC,S$GLB,, | Performed by: INTERNAL MEDICINE

## 2019-03-19 PROCEDURE — 99499 UNLISTED E&M SERVICE: CPT | Mod: HCNC,S$GLB,, | Performed by: INTERNAL MEDICINE

## 2019-03-19 PROCEDURE — 99214 PR OFFICE/OUTPT VISIT, EST, LEVL IV, 30-39 MIN: ICD-10-PCS | Mod: HCNC,S$GLB,, | Performed by: INTERNAL MEDICINE

## 2019-03-19 PROCEDURE — 80061 LIPID PANEL: CPT | Mod: HCNC

## 2019-03-19 PROCEDURE — 36415 COLL VENOUS BLD VENIPUNCTURE: CPT | Mod: HCNC

## 2019-03-19 PROCEDURE — 1101F PT FALLS ASSESS-DOCD LE1/YR: CPT | Mod: HCNC,CPTII,S$GLB, | Performed by: INTERNAL MEDICINE

## 2019-03-19 PROCEDURE — 3079F DIAST BP 80-89 MM HG: CPT | Mod: HCNC,CPTII,S$GLB, | Performed by: INTERNAL MEDICINE

## 2019-03-19 RX ORDER — HYDROCHLOROTHIAZIDE 25 MG/1
25 TABLET ORAL DAILY
Qty: 90 TABLET | Refills: 2 | Status: SHIPPED | OUTPATIENT
Start: 2019-03-19 | End: 2020-01-16 | Stop reason: SDUPTHER

## 2019-03-19 RX ORDER — ATENOLOL 50 MG/1
50 TABLET ORAL 2 TIMES DAILY
Qty: 180 TABLET | Refills: 2 | Status: SHIPPED | OUTPATIENT
Start: 2019-03-19 | End: 2019-12-23

## 2019-03-19 RX ORDER — ATORVASTATIN CALCIUM 20 MG/1
20 TABLET, FILM COATED ORAL DAILY
Qty: 90 TABLET | Refills: 2 | Status: SHIPPED | OUTPATIENT
Start: 2019-03-19 | End: 2019-12-23

## 2019-03-19 RX ORDER — CAPTOPRIL 50 MG/1
TABLET ORAL
Qty: 270 TABLET | Refills: 2 | Status: SHIPPED | OUTPATIENT
Start: 2019-03-19 | End: 2019-07-18 | Stop reason: ALTCHOICE

## 2019-03-19 RX ORDER — METFORMIN HYDROCHLORIDE 500 MG/1
TABLET, EXTENDED RELEASE ORAL
Qty: 180 TABLET | Refills: 2 | Status: SHIPPED | OUTPATIENT
Start: 2019-03-19 | End: 2020-01-16 | Stop reason: SDUPTHER

## 2019-03-19 RX ORDER — TRAMADOL HYDROCHLORIDE 50 MG/1
50 TABLET ORAL EVERY 6 HOURS PRN
Qty: 120 TABLET | Refills: 2 | Status: SHIPPED | OUTPATIENT
Start: 2019-03-27 | End: 2019-06-27 | Stop reason: SDUPTHER

## 2019-03-19 NOTE — PROGRESS NOTES
Subjective:       Patient ID: Linnea Renae is a 68 y.o. female.    Chief Complaint: Annual Exam    Last seen 6 months ago. Returns for annual exam and f/u chronic medical conditions. Recent right great toe injury has healed, seen by Podiatry yesterday. Home glucose ranges in the 90's fasting per history, no written log for review.     PMH: .   Hypertension.  Diabetes Type 2, last HbA1c 6.1% Sep. '18.  Hyperlipidemia. TChol 134, , HDL 41, LDL 68 .  GERD.  Lumbar Spinal Stenosis seen [facility].   Chronic Dry Eyes.   Perennial Allergic Rhinitis.  Morbid Obesity, BMI up to 46. TSH normal 1.13 .   Right Breast Cancer diagnosed 10/15.  H/O Colon Polyps.     PSH: C/S x 3, BTL, Hysterectomy and USO, Bilateral Cataracts extracted, Cholecystectomy. Right breast excisional biopsy .    Mammogram normal , no recent Pelvic exam. BMD normal 8/15. Colonoscopy 10/18 - sigmoid diverticulosis, otherwise normal. Eye exam  - scheduled later this month. Podiatry 3/19. Pneumovax . Prevnar 8/15. Zostavax 3/16. Flu shot . Td .     Social: Remote tobacco use, quit over 30 years ago. No alcohol.  with three daughters and three grandchildren. Homemaker.     FMH: Father living age 83 in good health. Mother  young, cause unknown. CHF in PGM, Thyroid disease in PGF.     NKDA.     Medications: list reviewed and reconciled. Uses Ibuprofen intermittently, not every day.               Review of Systems   Constitutional: Negative for activity change, appetite change, fatigue, fever and unexpected weight change.        Exercise has been limited by bilateral foot pain that is just getting better.    HENT: Negative for congestion, hearing loss, rhinorrhea, sneezing, sore throat, trouble swallowing and voice change.         Recent bout of sinusitis managed with OTC meds.    Eyes: Negative for pain and visual disturbance.   Respiratory: Negative for cough, chest tightness,  shortness of breath and wheezing.    Cardiovascular: Negative for chest pain, palpitations and leg swelling.   Gastrointestinal: Negative for abdominal pain, blood in stool, constipation, diarrhea, nausea and vomiting.   Genitourinary: Negative for difficulty urinating, dysuria, flank pain, frequency, hematuria and urgency.   Musculoskeletal: Positive for arthralgias and back pain. Negative for gait problem, joint swelling, myalgias and neck pain.        Right elbow pain after leaning on it a lot.    Skin: Negative for color change and rash.        Recurrent rash under panniculus is doing better.    Neurological: Negative for dizziness, syncope, facial asymmetry, speech difficulty, weakness, numbness and headaches.   Hematological: Negative for adenopathy. Does not bruise/bleed easily.   Psychiatric/Behavioral: Negative for agitation, dysphoric mood and sleep disturbance. The patient is not nervous/anxious.        Objective:    /80, Pulse 69, Wt 286.6 lbs, BMI=43.6  Physical Exam   Constitutional: She is oriented to person, place, and time. She appears well-developed and well-nourished. No distress.   HENT:   Head: Normocephalic and atraumatic.   Right Ear: External ear normal.   Left Ear: External ear normal.   Nose: Nose normal.   Mouth/Throat: Oropharynx is clear and moist. No oropharyngeal exudate.   Eyes: Conjunctivae and EOM are normal. Pupils are equal, round, and reactive to light. Right conjunctiva is not injected. Left conjunctiva is not injected. No scleral icterus.   Neck: Normal range of motion. Neck supple. No JVD present. Carotid bruit is not present. No thyromegaly present.   Cardiovascular: Normal rate, regular rhythm, normal heart sounds and intact distal pulses. Exam reveals no gallop and no friction rub.   No murmur heard.  Pulmonary/Chest: Effort normal and breath sounds normal. No respiratory distress. She has no wheezes. She has no rhonchi. She has no rales.   Abdominal: Soft. Bowel  sounds are normal. She exhibits no distension and no mass. There is no hepatosplenomegaly. There is no tenderness. There is no CVA tenderness.   Musculoskeletal: Normal range of motion. She exhibits no edema, tenderness or deformity.   Lymphadenopathy:     She has no cervical adenopathy.   Neurological: She is alert and oriented to person, place, and time. She has normal strength and normal reflexes. No cranial nerve deficit. She exhibits normal muscle tone. Coordination and gait normal.   Skin: Skin is warm and dry. No lesion noted. She is not diaphoretic. No cyanosis. Nails show no clubbing.   Psychiatric: She has a normal mood and affect. Her behavior is normal. Judgment and thought content normal.   Vitals reviewed.      Assessment:       1. Type 2 diabetes mellitus without complication, without long-term current use of insulin    2. Essential hypertension    3. Hyperlipidemia LDL goal <100    4. Atherosclerosis of aorta    5. Perennial allergic rhinitis    6. Degenerative lumbar spinal stenosis    7. Gastroesophageal reflux disease, esophagitis presence not specified    8. Morbid obesity with BMI of 40.0-44.9, adult    9. Need for pneumococcal vaccine        Plan:       Type 2 diabetes mellitus without complication, without long-term current use of insulin  -     CBC auto differential; Future; Expected date: 03/19/2019  -     Comprehensive metabolic panel; Future; Expected date: 03/19/2019  -     Lipid panel; Future; Expected date: 03/19/2019  -     Hemoglobin A1c; Future; Expected date: 03/19/2019  -     Urinalysis  -     Microalbumin/creatinine urine ratio  -     metFORMIN (GLUCOPHAGE-XR) 500 MG 24 hr tablet; TAKE TWO TABLETS BY MOUTH ONCE DAILY WITH BREAKFAST  Dispense: 180 tablet; Refill: 2    Essential hypertension controlled   -     hydroCHLOROthiazide (HYDRODIURIL) 25 MG tablet; Take 1 tablet (25 mg total) by mouth once daily.  Dispense: 90 tablet; Refill: 2  -     captopril (CAPOTEN) 50 MG tablet; TAKE  TWO TABLETS BY MOUTH IN THE MORNING AND ONE TABLET IN THE EVENING  Dispense: 270 tablet; Refill: 2  -     atenolol (TENORMIN) 50 MG tablet; Take 1 tablet (50 mg total) by mouth 2 (two) times daily.  Dispense: 180 tablet; Refill: 2    Hyperlipidemia LDL goal <100  -     atorvastatin (LIPITOR) 20 MG tablet; Take 1 tablet (20 mg total) by mouth once daily.  Dispense: 90 tablet; Refill: 2    Atherosclerosis of aorta  -     atorvastatin (LIPITOR) 20 MG tablet; Take 1 tablet (20 mg total) by mouth once daily.  Dispense: 90 tablet; Refill: 2    Perennial allergic rhinitis - antihistamines prn.    Degenerative lumbar spinal stenosis  -     traMADol (ULTRAM) 50 mg tablet; Take 1 tablet (50 mg total) by mouth every 6 (six) hours as needed.  Dispense: 120 tablet; Refill: 2  -     Also has Tizanidine and Ibuprofen prn, and getting injections from Pain Management.    Disorder of bone and cartilage  -     Vitamin D; Future; Expected date: 03/19/2019    Gastroesophageal reflux disease, esophagitis presence not specified - stable.     Morbid obesity with BMI of 40.0-44.9, adult - encouraged diet for weight reduction.    Need for pneumococcal vaccine - due to repeat Pneumovax 23 today.

## 2019-03-20 ENCOUNTER — HOSPITAL ENCOUNTER (OUTPATIENT)
Facility: OTHER | Age: 69
Discharge: HOME | End: 2019-03-20
Attending: ANESTHESIOLOGY | Admitting: ANESTHESIOLOGY
Payer: MEDICARE

## 2019-03-20 VITALS
OXYGEN SATURATION: 95 % | TEMPERATURE: 98 F | DIASTOLIC BLOOD PRESSURE: 61 MMHG | HEIGHT: 68 IN | WEIGHT: 289 LBS | BODY MASS INDEX: 43.8 KG/M2 | HEART RATE: 65 BPM | RESPIRATION RATE: 18 BRPM | SYSTOLIC BLOOD PRESSURE: 133 MMHG

## 2019-03-20 DIAGNOSIS — G89.29 CHRONIC PAIN: ICD-10-CM

## 2019-03-20 DIAGNOSIS — M47.9 OSTEOARTHRITIS OF SPINE, UNSPECIFIED SPINAL OSTEOARTHRITIS COMPLICATION STATUS, UNSPECIFIED SPINAL REGION: Primary | ICD-10-CM

## 2019-03-20 LAB — POCT GLUCOSE: 102 MG/DL (ref 70–110)

## 2019-03-20 PROCEDURE — 99152 MOD SED SAME PHYS/QHP 5/>YRS: CPT | Mod: HCNC,,, | Performed by: PHYSICAL MEDICINE & REHABILITATION

## 2019-03-20 PROCEDURE — 64636 DESTROY L/S FACET JNT ADDL: CPT | Mod: HCNC,LT,, | Performed by: PHYSICAL MEDICINE & REHABILITATION

## 2019-03-20 PROCEDURE — 64636 PR DESTROY L/S FACET JNT ADDL: ICD-10-PCS | Mod: HCNC,LT,, | Performed by: PHYSICAL MEDICINE & REHABILITATION

## 2019-03-20 PROCEDURE — 99152 PR MOD CONSCIOUS SEDATION, SAME PHYS, 5+ YRS, FIRST 15 MIN: ICD-10-PCS | Mod: HCNC,,, | Performed by: PHYSICAL MEDICINE & REHABILITATION

## 2019-03-20 PROCEDURE — 64636 DESTROY L/S FACET JNT ADDL: CPT | Mod: HCNC | Performed by: ANESTHESIOLOGY

## 2019-03-20 PROCEDURE — 64635 PR DESTROY LUMB/SAC FACET JNT: ICD-10-PCS | Mod: HCNC,LT,, | Performed by: PHYSICAL MEDICINE & REHABILITATION

## 2019-03-20 PROCEDURE — 25000003 PHARM REV CODE 250: Mod: HCNC | Performed by: STUDENT IN AN ORGANIZED HEALTH CARE EDUCATION/TRAINING PROGRAM

## 2019-03-20 PROCEDURE — 64635 DESTROY LUMB/SAC FACET JNT: CPT | Mod: HCNC,LT,, | Performed by: PHYSICAL MEDICINE & REHABILITATION

## 2019-03-20 PROCEDURE — 25000003 PHARM REV CODE 250: Mod: HCNC | Performed by: ANESTHESIOLOGY

## 2019-03-20 PROCEDURE — 63600175 PHARM REV CODE 636 W HCPCS: Mod: HCNC | Performed by: ANESTHESIOLOGY

## 2019-03-20 PROCEDURE — 64635 DESTROY LUMB/SAC FACET JNT: CPT | Mod: HCNC | Performed by: ANESTHESIOLOGY

## 2019-03-20 RX ORDER — MIDAZOLAM HYDROCHLORIDE 1 MG/ML
INJECTION INTRAMUSCULAR; INTRAVENOUS
Status: DISCONTINUED | OUTPATIENT
Start: 2019-03-20 | End: 2019-03-20 | Stop reason: HOSPADM

## 2019-03-20 RX ORDER — DEXAMETHASONE SODIUM PHOSPHATE 4 MG/ML
INJECTION, SOLUTION INTRA-ARTICULAR; INTRALESIONAL; INTRAMUSCULAR; INTRAVENOUS; SOFT TISSUE
Status: DISCONTINUED | OUTPATIENT
Start: 2019-03-20 | End: 2019-03-20 | Stop reason: HOSPADM

## 2019-03-20 RX ORDER — LIDOCAINE HYDROCHLORIDE 10 MG/ML
INJECTION INFILTRATION; PERINEURAL
Status: DISCONTINUED | OUTPATIENT
Start: 2019-03-20 | End: 2019-03-20 | Stop reason: HOSPADM

## 2019-03-20 RX ORDER — BUPIVACAINE HYDROCHLORIDE 2.5 MG/ML
INJECTION, SOLUTION EPIDURAL; INFILTRATION; INTRACAUDAL
Status: DISCONTINUED | OUTPATIENT
Start: 2019-03-20 | End: 2019-03-20 | Stop reason: HOSPADM

## 2019-03-20 RX ORDER — FENTANYL CITRATE 50 UG/ML
INJECTION, SOLUTION INTRAMUSCULAR; INTRAVENOUS
Status: DISCONTINUED | OUTPATIENT
Start: 2019-03-20 | End: 2019-03-20 | Stop reason: HOSPADM

## 2019-03-20 RX ORDER — SODIUM CHLORIDE 9 MG/ML
500 INJECTION, SOLUTION INTRAVENOUS CONTINUOUS
Status: DISCONTINUED | OUTPATIENT
Start: 2019-03-20 | End: 2019-03-20 | Stop reason: HOSPADM

## 2019-03-20 RX ADMIN — SODIUM CHLORIDE 500 ML: 0.9 INJECTION, SOLUTION INTRAVENOUS at 10:03

## 2019-03-20 NOTE — H&P
HPI  Ms. Renae is a 67 yo F with PMHx of lumbar spondylosis who is scheduled for Left L2, L3, L4, L5 RFA.         Past Medical History:   Diagnosis Date    Allergy     Breast cancer     Lumpectomy 10/15.    Chronic constipation     Colon polyps     Diabetes mellitus, type 2     Dry eyes     GERD (gastroesophageal reflux disease)     Hyperlipidemia     Hypertension     Lumbar disc disease     Type 2 diabetes mellitus      Past Surgical History:   Procedure Laterality Date    BIOPSY-EXCISIONAL right breast with Wire Localization  (wire inserted at [facility] for 0900 Right 10/7/2015    [facility] Rad[facility] Russell [facility] at [facility] OR 2ND FLR    BLOCK-FACET-LUMBAR Bilateral 11/3/2017    [facility]    BREAST BIOPSY      BREAST LUMPECTOMY Right         CATARACT EXTRACTION, BILATERAL       SECTION      x3    CHOLECYSTECTOMY      COLONOSCOPY N/A 10/11/2018    Performed by Lorenzo Tanner [facility] at [facility] ENDO (4TH FLR)    COLONOSCOPY W/ POLYPECTOMY      HYSTERECTOMY      and USO    INJECTION, FACET JOINT Bilateral 2018    Performed by Viet Wilson MD [facility]    INJECTION-FACET Bilateral 2018    Performed [facility] at [facility]    INJECTION-STEROID-EPIDURAL-TRANSFORAMINAL Right 2016    [facility]    INJECTION-STEROID-EPIDURAL-TRANSFORAMINAL Bilateral 3/4/2016    [facility]    INJECTION-STEROID-EPIDURAL-TRANSFORAMINAL Bilateral 2016    [facility] at [facility]    Radiofrequency Ablation RIGHT LUMBAR L2,3,4 RFA Right 2018    [facility] at [facility]    TUBAL LIGATION       Review of patient's allergies indicates:   Allergen Reactions    Codeine Itching        PMHx, PSHx, Allergies, Medications reviewed in epic      ROS negative except pain complaints in  HPI    OBJECTIVE:    There were no vitals taken for this visit.    PHYSICAL EXAMINATION:    GENERAL: Well appearing, in no acute distress, alert and oriented x3.  PSYCH:  Mood and affect appropriate.  SKIN: Skin color, texture, turgor normal, no rashes or lesions.  CV: RRR with palpation of the radial artery.  PULM: No evidence of respiratory difficulty, symmetric chest rise. Clear to auscultation.  NEURO: Cranial nerves grossly intact.    Plan:    Proceed with procedure as planned    Jonn Bennett MD  03/20/2019

## 2019-03-20 NOTE — DISCHARGE INSTRUCTIONS
Thank you for allowing us to care for you today. You may receive a survey about the care we provided. Your feedback is valuable and helps us provide excellent care throughout the community.     Home Care Instructions for Pain Management:    1. DIET:   You may resume your normal diet today.   2. BATHING:   You may shower with luke warm water. No tub baths or anything that will soak injection sites under water for the next 24 hours.  3. DRESSING:   You may remove your bandage today.   4. ACTIVITY LEVEL:   You may resume your normal activities 24 hrs after your procedure. Nothing strenuous today.  5. MEDICATIONS:   You may resume your normal medications today. To restart blood thinners, ask your doctor.  6. DRIVING    If you have received any sedatives by mouth today, you may not drive for 12 hours.    If you have received any sedation through your IV, you may not drive for 24 hrs.   7. SPECIAL INSTRUCTIONS:   No heat to the injection site for 24 hrs including, hot bath or shower, heating pad, moist heat, or hot tubs.    Use ice pack to injection site for any pain or discomfort.  Apply ice packs for 20 minute intervals as needed.    IF you have diabetes, be sure to monitor your blood sugar more closely. IF your injection contained steroids your blood sugar levels may become higher than normal.    If you are still having pain upon discharge:  Your pain may improve over the next 48 hours. The anesthetic (numbing medication) works immediately to 48 hours. IF your injection contained a steroid (anti-inflammatory medication), it takes approximately 3 days to start feeling relief and 7-10 days to see your greatest results from the medication. It is possible you may need subsequent injections. This would be discussed at your follow up appointment with pain management or your referring doctor.      PLEASE CALL YOUR DOCTOR IF:  1. Redness or swelling around the injection site.  2. Fever of 101 degrees or more  3. Drainage  (pus) from the injection site.  4. For any continuous bleeding (some dried blood over the incision is normal.)    FOR EMERGENCIES:   If any unusual problems or difficulties occur during clinic hours, call [phone] or [phone].     Adult Procedural Sedation Instructions    Recovery After Procedural Sedation (Adult)  You have been given medicine by vein to make you sleep during your surgery. This may have included both a pain medicine and sleeping medicine. Most of the effects have worn off. But you may still have some drowsiness for the next 6 to 8 hours.  Home care  Follow these guidelines when you get home:  · For the next 8 hours, you should be watched by a responsible adult. This person should make sure your condition is not getting worse.  · Don't drink any alcohol for the next 24 hours.  · Don't drive, operate dangerous machinery, or make important business or personal decisions during the next 24 hours.  Note: Your healthcare provider may tell you not to take any medicine by mouth for pain or sleep in the next 4 hours. These medicines may react with the medicines you were given in the hospital. This could cause a much stronger response than usual.  Follow-up care  Follow up with your healthcare provider if you are not alert and back to your usual level of activity within 12 hours.  When to seek medical advice  Call your healthcare provider right away if any of these occur:  · Drowsiness gets worse  · Weakness or dizziness gets worse  · Repeated vomiting  · You can't be awakened   Date Last Reviewed: 10/18/2016  © 6622-0703 [employer]. [address] [place] [address], [place] [address]. All rights reserved. This information is not intended as a substitute for professional medical care. Always follow your healthcare professional's instructions.

## 2019-03-20 NOTE — OP NOTE
Patient Name: Linnea Renae  MRN: [id number]    INFORMED CONSENT: The procedure, risks, benefits and options were discussed with patient. There are no contraindications to the procedure. The patient expressed understanding and agreed to proceed. The personnel performing the procedure was discussed. I verify that I personally obtained Linnea's consent prior to the start of the procedure and the signed consent can be found on the patient's chart.    Procedure Date: 03/20/2019    Anesthesia: Topical    Pre Procedure diagnosis: Lumbar spondylosis [M47.816]  1. Osteoarthritis of spine, unspecified spinal osteoarthritis complication status, unspecified spinal region    2. Chronic pain      Post-Procedure diagnosis: SAME      Moderate Sedation: Yes - Fentanyl 50 mcg and Midazolam 2 mg    PROCEDURE: Left L2, L3, L4, L5 FACET MEDIAL BRANCH NERVE RADIOFREQUENCY NEUROTOMY (lumbar)          DESCRIPTION OF PROCEDURE: The patient was brought to the procedure room.  After performing time out IV access was obtained prior to the procedure. The patient was positioned prone on the fluoroscopy table. Continuous hemodynamic monitoring was initiated including blood pressure and pulse oximetry. IV sedation was administered incrementally to allow the patient to remain comfortable and conversant throughout the procedure. The area of the lumbar spine was prepped chlorhexidine three times and draped into a sterile field.  Fluoroscopy was used to identify the location of the left side L2, L3, L4, and L5 medial branch nerves at the junctions of the superior articular process and the transverse processes of L3, L4, L5, and the sacral ala respectively.  Skin anesthesia was achieved using 3 cc of Lidocaine 1% over the injection sites. A 20 gauge, 150mm (10mm active tip) curved RF needle was slowly inserted at each level using AP, lateral and oblique fluoroscopic imaging. Negative aspiration for blood or CSF was confirmed.  Sensory stimulation at 50Hz  below 0.5V was achieved at every level. Motor stimulation at 2Hz up to 1.5V did not cause any radicular symptoms at any level. Each level was anesthetized with 1.5 cc of lidocaine 1%.  Radiofrequency lesioning was performed for 90 seconds at 80 degrees in two different positions at each level.  Total of 3 cc of bupivacaine 0.25% and 10 mg of Decadron was injected was injected at all levels.. The needles were removed and bleeding was nil.  A sterile dressing was applied. Linnea was taken back to the recovery room for further observation.     Stimulation Results:  L2 = Sensory positive @ 0.5, Motor negative @ 1.5  L3 = Sensory positive @ 0.4, Motor negative @ 1.5  L4 = Sensory positive @ 0.5, Motor negative @ 1.5  L5 = Sensory positive @ 0.6, Motor negative @ 1.5    Blood Loss: Nill  Specimen: None    Deanna Causey MD    Staff Physician was present during the critical and key portions of the procedure.

## 2019-03-28 ENCOUNTER — OFFICE VISIT (OUTPATIENT)
Dept: SPINE | Facility: CLINIC | Age: 69
End: 2019-03-28
Payer: MEDICARE

## 2019-03-28 VITALS
HEIGHT: 68 IN | HEART RATE: 75 BPM | DIASTOLIC BLOOD PRESSURE: 81 MMHG | WEIGHT: 284.63 LBS | BODY MASS INDEX: 43.14 KG/M2 | SYSTOLIC BLOOD PRESSURE: 140 MMHG | TEMPERATURE: 99 F

## 2019-03-28 DIAGNOSIS — M51.37 DDD (DEGENERATIVE DISC DISEASE), LUMBOSACRAL: ICD-10-CM

## 2019-03-28 DIAGNOSIS — M47.819 SPONDYLOSIS WITHOUT MYELOPATHY: Primary | ICD-10-CM

## 2019-03-28 DIAGNOSIS — M54.50 LOW BACK PAIN WITHOUT SCIATICA, UNSPECIFIED BACK PAIN LATERALITY, UNSPECIFIED CHRONICITY: ICD-10-CM

## 2019-03-28 DIAGNOSIS — M54.16 LUMBAR RADICULOPATHY: ICD-10-CM

## 2019-03-28 DIAGNOSIS — M79.2 NEURITIS: ICD-10-CM

## 2019-03-28 LAB
LEFT EYE DM RETINOPATHY: NEGATIVE
RIGHT EYE DM RETINOPATHY: NEGATIVE

## 2019-03-28 PROCEDURE — 3079F PR MOST RECENT DIASTOLIC BLOOD PRESSURE 80-89 MM HG: ICD-10-PCS | Mod: HCNC,CPTII,S$GLB, | Performed by: NURSE PRACTITIONER

## 2019-03-28 PROCEDURE — 96372 PR INJECTION,THERAP/PROPH/DIAG2ST, IM OR SUBCUT: ICD-10-PCS | Mod: HCNC,S$GLB,, | Performed by: NURSE PRACTITIONER

## 2019-03-28 PROCEDURE — 99213 PR OFFICE/OUTPT VISIT, EST, LEVL III, 20-29 MIN: ICD-10-PCS | Mod: 25,HCNC,S$GLB, | Performed by: NURSE PRACTITIONER

## 2019-03-28 PROCEDURE — 99499 UNLISTED E&M SERVICE: CPT | Mod: HCNC,S$GLB,, | Performed by: NURSE PRACTITIONER

## 2019-03-28 PROCEDURE — 1101F PT FALLS ASSESS-DOCD LE1/YR: CPT | Mod: HCNC,CPTII,S$GLB, | Performed by: NURSE PRACTITIONER

## 2019-03-28 PROCEDURE — 1101F PR PT FALLS ASSESS DOC 0-1 FALLS W/OUT INJ PAST YR: ICD-10-PCS | Mod: HCNC,CPTII,S$GLB, | Performed by: NURSE PRACTITIONER

## 2019-03-28 PROCEDURE — 99999 PR PBB SHADOW E&M-EST. PATIENT-LVL III: CPT | Mod: PBBFAC,HCNC,, | Performed by: NURSE PRACTITIONER

## 2019-03-28 PROCEDURE — 3077F SYST BP >= 140 MM HG: CPT | Mod: HCNC,CPTII,S$GLB, | Performed by: NURSE PRACTITIONER

## 2019-03-28 PROCEDURE — 96372 THER/PROPH/DIAG INJ SC/IM: CPT | Mod: HCNC,S$GLB,, | Performed by: NURSE PRACTITIONER

## 2019-03-28 PROCEDURE — 3077F PR MOST RECENT SYSTOLIC BLOOD PRESSURE >= 140 MM HG: ICD-10-PCS | Mod: HCNC,CPTII,S$GLB, | Performed by: NURSE PRACTITIONER

## 2019-03-28 PROCEDURE — 3079F DIAST BP 80-89 MM HG: CPT | Mod: HCNC,CPTII,S$GLB, | Performed by: NURSE PRACTITIONER

## 2019-03-28 PROCEDURE — 99213 OFFICE O/P EST LOW 20 MIN: CPT | Mod: 25,HCNC,S$GLB, | Performed by: NURSE PRACTITIONER

## 2019-03-28 PROCEDURE — 99999 PR PBB SHADOW E&M-EST. PATIENT-LVL III: ICD-10-PCS | Mod: PBBFAC,HCNC,, | Performed by: NURSE PRACTITIONER

## 2019-03-28 PROCEDURE — 99499 RISK ADDL DX/OHS AUDIT: ICD-10-PCS | Mod: HCNC,S$GLB,, | Performed by: NURSE PRACTITIONER

## 2019-03-28 RX ORDER — METHYLPREDNISOLONE ACETATE 40 MG/ML
40 INJECTION, SUSPENSION INTRA-ARTICULAR; INTRALESIONAL; INTRAMUSCULAR; SOFT TISSUE ONCE
Status: COMPLETED | OUTPATIENT
Start: 2019-03-28 | End: 2019-03-28

## 2019-03-28 RX ADMIN — METHYLPREDNISOLONE ACETATE 40 MG: 40 INJECTION, SUSPENSION INTRA-ARTICULAR; INTRALESIONAL; INTRAMUSCULAR; SOFT TISSUE at 02:03

## 2019-03-28 NOTE — PROGRESS NOTES
Chronic Pain - Established Visit    Referring Physician: No ref. provider found    Chief Complaint: back pain       SUBJECTIVE:    Linnea Renae presents to the clinic for follow up of lower back pain.  She is s/p left L2,3,4,5 RFA with 60% relief.  She is having a burning pain around the injections areas and into the left buttocks.  She says that this is similar to what occurred with the right side.  However, the pain is not as severe as after the RFA on the right.  She is not having any leg pain today.  Her pain today is 7/10.      Physical Therapy/Home Exercise: yes     Pain Disability Index Review:  Last 3 PDI Scores 2/14/2019 1/3/2019 12/17/2018   Pain Disability Index (PDI) 12 35 70       Pain Medications:    - Opioids: Ultram (Tramadol HCL)  - Adjuvant Medications: Advil,Motrin ( Ibuprofen) and zanaflex  - Anti-Coagulants: Aspirin  - Others: see med list     report:  Reviewed and consistent with medication use as prescribed.    Lab Results   Component Value Date    HGBA1C 6.0 (H) 03/19/2019     CMP  Sodium   Date Value Ref Range Status   03/19/2019 140 136 - 145 mmol/L Final     Potassium   Date Value Ref Range Status   03/19/2019 4.0 3.5 - 5.1 mmol/L Final     Chloride   Date Value Ref Range Status   03/19/2019 100 95 - 110 mmol/L Final     CO2   Date Value Ref Range Status   03/19/2019 28 23 - 29 mmol/L Final     Glucose   Date Value Ref Range Status   03/19/2019 102 70 - 110 mg/dL Final     BUN, Bld   Date Value Ref Range Status   03/19/2019 15 8 - 23 mg/dL Final     Creatinine   Date Value Ref Range Status   03/19/2019 0.8 0.5 - 1.4 mg/dL Final     Calcium   Date Value Ref Range Status   03/19/2019 9.9 8.7 - 10.5 mg/dL Final     Total Protein   Date Value Ref Range Status   03/19/2019 7.7 6.0 - 8.4 g/dL Final     Albumin   Date Value Ref Range Status   03/19/2019 3.9 3.5 - 5.2 g/dL Final     Total Bilirubin   Date Value Ref Range Status   03/19/2019 0.5 0.1 - 1.0 mg/dL Final     Comment:     For  infants and newborns, interpretation of results should be based  on gestational age, weight and in agreement with clinical  observations.  Premature Infant recommended reference ranges:  Up to 24 hours.............<8.0 mg/dL  Up to 48 hours............<12.0 mg/dL  3-5 days..................<15.0 mg/dL  6-29 days.................<15.0 mg/dL       Alkaline Phosphatase   Date Value Ref Range Status   03/19/2019 82 55 - 135 U/L Final     AST   Date Value Ref Range Status   03/19/2019 20 10 - 40 U/L Final     ALT   Date Value Ref Range Status   03/19/2019 12 10 - 44 U/L Final     Anion Gap   Date Value Ref Range Status   03/19/2019 12 8 - 16 mmol/L Final     eGFR if    Date Value Ref Range Status   03/19/2019 >60 >60 mL/min/1.73 m^2 Final     eGFR if non    Date Value Ref Range Status   03/19/2019 >60 >60 mL/min/1.73 m^2 Final     Comment:     Calculation used to obtain the estimated glomerular filtration  rate (eGFR) is the CKD-EPI equation.          Pain Procedures:   7/29/16 Right L3-4 TF CHRISTIAN  11/3/17 Bilateral L3-4 and L4-5 facet injections  5/9/18 Bilateral L3-4 and L4-5 facet injections  7/25/18 Bilateral L3-4 and L4-5 facet injections  12/5/18 Right L2,3,4,5 RFA- 80% relief  3/20/19 Left L2,3,4,5 RFA- 60% relief    Imaging:  Narrative     EXAMINATION:  MRI LUMBAR SPINE W WO CONTRAST    CLINICAL HISTORY:  Low back pain, >6wks conservative tx, persistent-progressive sx, surgical candidate; Spondylosis without myelopathy or radiculopathy, site unspecified    TECHNIQUE:  Multiplanar, multisequence MR images were acquired from the thoracolumbar junction to the sacrum prior to and following administration of 10 cc IV Gadavist.    COMPARISON:  Lumbar spine radiograph 10/11/2017; MRI lumbar spine with/without contrast 09/20/2016    FINDINGS:  Sagittal alignment demonstrates grade 1 anterolisthesis of L3 on L4 with slight uncovering of the intervertebral disc.  Vertebral body heights are  satisfactorily maintained.  Intervertebral disc spaces are preserved.  Marrow signal is within normal limits with no evidence of fracture or marrow replacement process noting a small vertebral body hemangioma at L1.    Conus appears within normal limits terminating at the level of the L1 level.  Cauda equina appears normal.    Paraspinous soft tissues demonstrate mild prominence of the common bile duct and small left renal cysts..    T12-L1:   No spinal canal or neuroforaminal narrowing.    L1-2:  No spinal canal or neuroforaminal narrowing.    L2-3:  Mild posterior disc bulge and moderate bilateral facet arthropathy without spinal canal stenosis or neural foraminal narrowing.    L3-4:  The diffuse posterior disc bulge, buckling of the ligamentum flavum, and moderate bilateral facet arthropathy results in mild spinal canal stenosis and no neural foraminal narrowing.    L4-5:  Diffuse posterior disc bulge with superimposed central disc protrusion, buckling of the ligamentum flavum, and moderate bilateral facet arthropathy result in moderate spinal canal stenosis and no significant neural foraminal narrowing.    L5-S1:  Mild posterior disc bulge and mild facet arthropathy results in mild right/moderate left neural foraminal narrowing.    No abnormal enhancement.      Impression       Multilevel lumbar spondylosis worst at L4-5 resulting in moderate spinal canal stenosis.    Mild prominence of the common bile duct which is nonspecific and may represent sequela of cholecystectomy.         X-Ray Lumbar Spine Ap Lateral w/Flex Ext    Narrative     10/11/17 10:17:19    Accession: [id number]    CLINICAL INDICATION: 67 year old F with  low back pain    COMPARISON: Lumbar spine x-rays, 09/20/2016.    TECHNIQUE: AP, lateral, flexion, extension, and coned down lateral radiographs of the lumbar spine.    FINDINGS:     Vertebral body heights are maintained.  No evidence of fracture.      Normal sagittal alignment is preserved.No  significant translation with flexion-extension.    Moderate multilevel degenerative changes are again present. Mild anterolisthesis of L3 with respect to L4 is again noted. Multilevel facet arthropathy is present, most pronounced in the lower lumbar spine.  No detrimental change is identified when compared with the examination performed one year prior.      Impression         Moderate multilevel degenerative changes in the lumbar spine, similar in appearance to the prior exam.    No evidence of fracture or malalignment.      Electronically signed by: Heber Frost  Date: 10/11/17  Time: 10:37            Past Medical History:   Diagnosis Date    Allergy     Breast cancer     Lumpectomy 10/15.    Chronic constipation     Colon polyps     Diabetes mellitus, type 2     Dry eyes     GERD (gastroesophageal reflux disease)     Hyperlipidemia     Hypertension     Lumbar disc disease     Type 2 diabetes mellitus      Past Surgical History:   Procedure Laterality Date    BIOPSY-EXCISIONAL right breast with Wire Localization  (wire inserted at [facility] for 0900 Right 10/7/2015    Performed by Radha Russell [facility] at [facility] OR 2ND FLR    BLOCK-FACET-LUMBAR Bilateral 11/3/2017    Per[facility] PAIN MGT    BREAST BIOPSY      BREAST LUMPECTOMY Right         CATARACT EXTRACTION, BILATERAL       SECTION      x3    CHOLECYSTECTOMY      COLONOSCOPY N/A 10/11/2018    Performed by Lorenzo Tanner MD at [facility] ENDO (4TH FLR)    COLONOSCOPY W/ POLYPECTOMY      HYSTERECTOMY      and USO    INJECTION, FACET JOINT Bilateral 2018    Performed by Viet Wilson MD at [facility] PAIN MGT    INJECTION-FACET Bilateral 2018    Performed by Viet Wilson MD at [facility] PAIN MGT    INJECTION-STEROID-EPIDURAL-TRANSFORAMINAL Right 2016    Performed by Viet Wilson MD at [facility] PAIN MGT    INJECTION-STEROID-EPIDURAL-TRANSFORAMINAL Bilateral 3/4/2016    Performed by Viet  [facility]ABRINA Wilson [facility] at [facility]    INJECTION-STEROID-EPIDURAL-TRANSFORAMINAL Bilateral 2016    Per[facility] by [facility] at [facility]    RADIOFREQUENCY ABLATION LEFT L2,3,4,5 Left 3/20/2019    Performed by [facility] at [facility]    Radiofrequency Ablation RIGHT LUMBAR L2,3,4 RFA Right 2018    Per[facility] by [facility] at [facility]    TUBAL LIGATION       Social History     Socioeconomic History    Marital status:      Spouse name: Not on file    Number of children: 3    Years of education: Not on file    Highest education level: Not on file   Occupational History    Not on file   Social Needs    Financial resource strain: Not on file    Food insecurity:     Worry: Not on file     Inability: Not on file    Transportation needs:     Medical: Not on file     Non-medical: Not on file   Tobacco Use    Smoking status: Former Smoker     Packs/day: 0.25     Years: 20.00     Pack years: 5.00     Last attempt to quit: 1985     Years since quittin.2    Smokeless tobacco: Never Used    Tobacco comment: Quit mid .   Substance and Sexual Activity    Alcohol use: No     Alcohol/week: 0.0 oz    Drug use: No    Sexual activity: Yes   Lifestyle    Physical activity:     Days per week: Not on file     Minutes per session: Not on file    Stress: Not on file   Relationships    Social connections:     Talks on phone: Not on file     Gets together: Not on file     Attends Rastafari service: Not on file     Active member of club or organization: Not on file     Attends meetings of clubs or organizations: Not on file     Relationship status: Not on file    Intimate partner violence:     Fear of current or ex partner: Not on file     Emotionally abused: Not on file     Physically abused: Not on file     Forced sexual activity: Not on file   Other Topics Concern    Not on file   Social History Narrative    Not on file     Family History   Problem  Relation Age of Onset    No Known Problems Mother     No Known Problems Father     Heart disease Paternal Grandmother     Thyroid disease Paternal Grandfather     Hypertension Sister     Hypertension Brother     Glaucoma Brother     No Known Problems Daughter     No Known Problems Daughter     No Known Problems Daughter        Review of patient's allergies indicates:   Allergen Reactions    Codeine Itching       Current Outpatient Medications   Medication Sig    aspirin 81 MG Chew Take 81 mg by mouth once daily.    atenolol (TENORMIN) 50 MG tablet Take 1 tablet (50 mg total) by mouth 2 (two) times daily.    atorvastatin (LIPITOR) 20 MG tablet Take 1 tablet (20 mg total) by mouth once daily.    blood sugar diagnostic Strp 1 strip by Misc.(Non-Drug; Combo Route) route once daily.    blood-glucose meter kit Use as instructed    calcium-vitamin D3 500 mg(1,250mg) -200 unit per tablet Take 1 tablet by mouth 2 (two) times daily with meals.     captopril (CAPOTEN) 50 MG tablet TAKE TWO TABLETS BY MOUTH IN THE MORNING AND ONE TABLET IN THE EVENING    clotrimazole (LOTRIMIN) 1 % cream Apply topically 2 (two) times daily.    cycloSPORINE (RESTASIS) 0.05 % ophthalmic emulsion 1 drop 2 (two) times daily.    diclofenac sodium (VOLTAREN) 1 % Gel Apply 2 g topically 4 (four) times daily.    fluticasone (FLONASE) 50 mcg/actuation nasal spray 2 sprays (100 mcg total) by Each Nare route once daily.    hydroCHLOROthiazide (HYDRODIURIL) 25 MG tablet Take 1 tablet (25 mg total) by mouth once daily.    ibuprofen (ADVIL,MOTRIN) 800 MG tablet Take 1 tablet (800 mg total) by mouth 3 (three) times daily as needed.    ketoconazole (NIZORAL) 2 % cream Apply topically once daily.    ketoconazole (NIZORAL) 2 % shampoo     lancets Misc 1 lancet by Misc.(Non-Drug; Combo Route) route once daily.    metFORMIN (GLUCOPHAGE-XR) 500 MG 24 hr tablet TAKE TWO TABLETS BY MOUTH ONCE DAILY WITH BREAKFAST    minoxidil (LONITEN)  "2.5 MG tablet     multivitamin (ONE DAILY MULTIVITAMIN) per tablet Take 1 tablet by mouth once daily.    omeprazole (PRILOSEC) 20 MG capsule Take 1 capsule (20 mg total) by mouth once daily.    tiZANidine (ZANAFLEX) 4 MG tablet Take 1 tablet (4 mg total) by mouth 2 (two) times daily as needed.    traMADol (ULTRAM) 50 mg tablet Take 1 tablet (50 mg total) by mouth every 6 (six) hours as needed.     No current facility-administered medications for this visit.        REVIEW OF SYSTEMS:    GENERAL:  No weight loss, malaise or fevers.  HEENT:  Negative for frequent or significant headaches.  NECK:  Negative for lumps, goiter, pain and significant neck swelling.  RESPIRATORY:  Negative for cough, wheezing or shortness of breath.  CARDIOVASCULAR:  Negative for chest pain, leg swelling or palpitations.  GI:  Negative for abdominal discomfort, blood in stools or black stools or change in bowel habits. GERD.  MUSCULOSKELETAL:  See HPI.  SKIN:  Negative for lesions, rash, and itching.  PSYCH: Positive for sleep disturbance, mood disorder and recent psychosocial stressors.  HEMATOLOGY/LYMPHOLOGY:  Negative for prolonged bleeding, bruising easily or swollen nodes. H/O breast cancer with mastectomy.  NEURO:   No history of headaches, syncope, paralysis, seizures or tremors.  All other reviewed and negative other than HPI.    OBJECTIVE:    BP (!) 140/81   Pulse 75   Temp 98.6 °F (37 °C)   Ht 5' 8" (1.727 m)   Wt 129.1 kg (284 lb 9.8 oz)   BMI 43.28 kg/m²     PHYSICAL EXAMINATION:    General appearance: Well appearing, in no acute distress, alert and oriented x3.  Psych:  Mood and affect appropriate.  Skin: No bruising or rashes.  Head/face:  Atraumatic, normocephalic. No palpable lymph nodes  Cor: RRR  Pulm: CTA  GI: Abdomen soft and non-tender.  Back: Straight leg raising in the sitting and supine positions is negative to radicular pain.  Pain to palpation over the lumbar facet joints on the left.  Limited ROM with pain " on extension > flexion.  Positive facet loading bilaterally, L>R.  Painful palpation to left SI joint.  MARY is negative.  Musculoskeletal:  Bilateral upper and lower extremity strength is normal and symmetric.  No atrophy or tone abnormalities are noted.  Neuro: Bilateral upper and lower extremity coordination and muscle stretch reflexes are physiologic and symmetric.  Plantar response are downgoing.  Decreased sensation to bilateral feet and right lower back.  Gait: Antalgic.      ASSESSMENT: 68 y.o. year old female with lower back pain, consistent with the following diagnoses:     1. Spondylosis without myelopathy     2. DDD (degenerative disc disease), lumbosacral     3. Low back pain without sciatica, unspecified back pain laterality, unspecified chronicity     4. Lumbar radiculopathy     5. Neuritis           PLAN:     - I have stressed the importance of physical activity and a home exercise plan to help with pain and improve health.    - Previous imaging was reviewed and discussed with the patient today.    - She is s/p left L2,3,4,5 RFA with benefit.  She is having some symptoms of neuritis and myalgia.  Will give 40 mg IM depo today.    - RTC in 6 weeks or sooner if needed.    - Counseled patient regarding the importance of activity modification, constant sleeping habits and physical therapy.      The above plan and management options were discussed at length with patient. Patient is in agreement with the above and verbalized understanding.      Amara Hawthorne, CARMEN  03/28/2019

## 2019-04-01 DIAGNOSIS — B37.2 CANDIDAL INTERTRIGO: ICD-10-CM

## 2019-04-01 DIAGNOSIS — K21.9 GASTROESOPHAGEAL REFLUX DISEASE, ESOPHAGITIS PRESENCE NOT SPECIFIED: ICD-10-CM

## 2019-04-01 DIAGNOSIS — J30.89 PERENNIAL ALLERGIC RHINITIS: ICD-10-CM

## 2019-04-01 RX ORDER — OMEPRAZOLE 20 MG/1
20 CAPSULE, DELAYED RELEASE ORAL DAILY
Qty: 90 CAPSULE | Refills: 2 | Status: SHIPPED | OUTPATIENT
Start: 2019-04-01 | End: 2019-12-10 | Stop reason: SDUPTHER

## 2019-04-01 RX ORDER — KETOCONAZOLE 20 MG/G
CREAM TOPICAL DAILY
Qty: 60 G | Refills: 3 | Status: SHIPPED | OUTPATIENT
Start: 2019-04-01 | End: 2021-07-16 | Stop reason: SDUPTHER

## 2019-04-01 RX ORDER — FLUTICASONE PROPIONATE 50 MCG
2 SPRAY, SUSPENSION (ML) NASAL DAILY
Qty: 3 BOTTLE | Refills: 1 | Status: SHIPPED | OUTPATIENT
Start: 2019-04-01 | End: 2020-01-16 | Stop reason: SDUPTHER

## 2019-04-08 ENCOUNTER — TELEPHONE (OUTPATIENT)
Dept: SURGERY | Facility: CLINIC | Age: 69
End: 2019-04-08

## 2019-04-08 NOTE — TELEPHONE ENCOUNTER
Returning patient's call, she has been scheduled for 5/1/19 at 1:30 with Dr. Russell here at [facility]. Patient voiced understanding of appointment date and time. Reminder letter sent to patient.

## 2019-04-08 NOTE — TELEPHONE ENCOUNTER
Called to schedule patien'ts 6mo f/u with Dr. Russell. Patient did not answer, left her a message with my direct number.     ----- Message from Phillip Ponce sent at 4/8/2019  9:24 AM CDT -----  Contact: pt  Needs Advice    Reason for call: Pt calling to schedule her 6 month f/u appt. Pt states she saw an appt scheduled for march on her my [facility] and then the appt went away. Pt states she is unsure why this happened.       Communication Preference: [phone]    Additional Information: please contact pt regarding this matter

## 2019-04-15 ENCOUNTER — TELEPHONE (OUTPATIENT)
Dept: INTERNAL MEDICINE | Facility: CLINIC | Age: 69
End: 2019-04-15

## 2019-04-15 NOTE — TELEPHONE ENCOUNTER
----- Message from Avril Ozuna sent at 4/15/2019  2:43 PM CDT -----  Contact: pt  [phone]  Pt is requesting to speak with nurse in regards to insurance authorization.

## 2019-04-15 NOTE — TELEPHONE ENCOUNTER
Pt is asking for a  Verification letter for her insurance stating that she do have diabetes this is due before 4/30      [facility] ID number   [id number]

## 2019-04-16 NOTE — TELEPHONE ENCOUNTER
Isn't there a standard form sent to patient for me to complete and sign? If so, make them provide it to me.

## 2019-04-17 ENCOUNTER — OFFICE VISIT (OUTPATIENT)
Dept: PODIATRY | Facility: CLINIC | Age: 69
End: 2019-04-17
Payer: MEDICARE

## 2019-04-17 VITALS
HEART RATE: 64 BPM | DIASTOLIC BLOOD PRESSURE: 71 MMHG | HEIGHT: 68 IN | WEIGHT: 293 LBS | BODY MASS INDEX: 44.41 KG/M2 | SYSTOLIC BLOOD PRESSURE: 132 MMHG

## 2019-04-17 DIAGNOSIS — E11.42 DIABETIC POLYNEUROPATHY ASSOCIATED WITH TYPE 2 DIABETES MELLITUS: ICD-10-CM

## 2019-04-17 DIAGNOSIS — G57.53 TARSAL TUNNEL SYNDROME, BILATERAL LOWER LIMBS: Primary | ICD-10-CM

## 2019-04-17 PROCEDURE — 64450 PR NERVE BLOCK INJ, ANES/STEROID, OTHER PERIPHERAL: ICD-10-PCS | Mod: 50,HCNC,S$GLB, | Performed by: PODIATRIST

## 2019-04-17 PROCEDURE — 3078F DIAST BP <80 MM HG: CPT | Mod: HCNC,CPTII,S$GLB, | Performed by: PODIATRIST

## 2019-04-17 PROCEDURE — 1101F PT FALLS ASSESS-DOCD LE1/YR: CPT | Mod: HCNC,CPTII,S$GLB, | Performed by: PODIATRIST

## 2019-04-17 PROCEDURE — 99999 PR PBB SHADOW E&M-EST. PATIENT-LVL III: ICD-10-PCS | Mod: PBBFAC,HCNC,, | Performed by: PODIATRIST

## 2019-04-17 PROCEDURE — 3044F PR MOST RECENT HEMOGLOBIN A1C LEVEL <7.0%: ICD-10-PCS | Mod: HCNC,CPTII,S$GLB, | Performed by: PODIATRIST

## 2019-04-17 PROCEDURE — 1101F PR PT FALLS ASSESS DOC 0-1 FALLS W/OUT INJ PAST YR: ICD-10-PCS | Mod: HCNC,CPTII,S$GLB, | Performed by: PODIATRIST

## 2019-04-17 PROCEDURE — 99999 PR PBB SHADOW E&M-EST. PATIENT-LVL III: CPT | Mod: PBBFAC,HCNC,, | Performed by: PODIATRIST

## 2019-04-17 PROCEDURE — 64450 NJX AA&/STRD OTHER PN/BRANCH: CPT | Mod: 50,HCNC,S$GLB, | Performed by: PODIATRIST

## 2019-04-17 PROCEDURE — 3044F HG A1C LEVEL LT 7.0%: CPT | Mod: HCNC,CPTII,S$GLB, | Performed by: PODIATRIST

## 2019-04-17 PROCEDURE — 99213 OFFICE O/P EST LOW 20 MIN: CPT | Mod: 25,HCNC,S$GLB, | Performed by: PODIATRIST

## 2019-04-17 PROCEDURE — 3078F PR MOST RECENT DIASTOLIC BLOOD PRESSURE < 80 MM HG: ICD-10-PCS | Mod: HCNC,CPTII,S$GLB, | Performed by: PODIATRIST

## 2019-04-17 PROCEDURE — 99213 PR OFFICE/OUTPT VISIT, EST, LEVL III, 20-29 MIN: ICD-10-PCS | Mod: 25,HCNC,S$GLB, | Performed by: PODIATRIST

## 2019-04-17 PROCEDURE — 3075F SYST BP GE 130 - 139MM HG: CPT | Mod: HCNC,CPTII,S$GLB, | Performed by: PODIATRIST

## 2019-04-17 PROCEDURE — 3075F PR MOST RECENT SYSTOLIC BLOOD PRESS GE 130-139MM HG: ICD-10-PCS | Mod: HCNC,CPTII,S$GLB, | Performed by: PODIATRIST

## 2019-04-17 RX ORDER — DICLOFENAC SODIUM 10 MG/G
2 GEL TOPICAL 4 TIMES DAILY
Qty: 1 TUBE | Refills: 2 | Status: SHIPPED | OUTPATIENT
Start: 2019-04-17 | End: 2020-09-08

## 2019-04-23 ENCOUNTER — TELEPHONE (OUTPATIENT)
Dept: INTERNAL MEDICINE | Facility: CLINIC | Age: 69
End: 2019-04-23

## 2019-04-23 NOTE — TELEPHONE ENCOUNTER
----- Message from Avril Ozuna sent at 4/23/2019  2:43 PM CDT -----  Contact: pt [phone]  Pt called in requesting a return call to confirmed fax was received for varications paperwork .  Please

## 2019-04-24 NOTE — PROGRESS NOTES
Subjective:      Patient ID: Linnea Renae is a 68 y.o. female.    Chief Complaint: Diabetic polyneuropathy associated with type 2 diabetes autumn (bilateral pcp Dr Moss 3/19/19)    Linnea is a 68 y.o. female who presents to the clinic upon referral from Dr. Nat marsh. provider found  for evaluation and treatment of diabetic feet. Linnea has a past medical history of Allergy, Breast cancer, Chronic constipation, Colon polyps, Diabetes mellitus, type 2, Dry eyes, GERD (gastroesophageal reflux disease), Hyperlipidemia, Hypertension, Lumbar disc disease, and Type 2 diabetes mellitus. Patient relates no major problem with feet. Only complaints today consist of routine foot evaluation and painful nerve symptoms to both feet.  She did very well with the nerve blocks previous visit, she would like to repeat these today for further comfort level and to decide possible nerve decompression procedures in the future.    PCP: Bailee Moss MD    Date Last Seen by PCP:   Chief Complaint   Patient presents with    Diabetic polyneuropathy associated with type 2 diabetes autumn     bilateral pcp Dr Moss 3/19/19         Current shoe gear: Casual shoes    Hemoglobin A1C   Date Value Ref Range Status   03/19/2019 6.0 (H) 4.0 - 5.6 % Final     Comment:     ADA Screening Guidelines:  5.7-6.4%  Consistent with prediabetes  >or=6.5%  Consistent with diabetes  High levels of fetal hemoglobin interfere with the HbA1C  assay. Heterozygous hemoglobin variants (HbS, HgC, etc)do  not significantly interfere with this assay.   However, presence of multiple variants may affect accuracy.     09/17/2018 6.1 (H) 4.0 - 5.6 % Final     Comment:     ADA Screening Guidelines:  5.7-6.4%  Consistent with prediabetes  >or=6.5%  Consistent with diabetes  High levels of fetal hemoglobin interfere with the HbA1C  assay. Heterozygous hemoglobin variants (HbS, HgC, etc)do  not significantly interfere with this assay.   However, presence of multiple variants  may affect accuracy.     03/09/2018 5.9 (H) 4.0 - 5.6 % Final     Comment:     According to ADA guidelines, hemoglobin A1c <7.0% represents  optimal control in non-pregnant diabetic patients. Different  metrics may apply to specific patient populations.   Standards of Medical Care in Diabetes-2016.  For the purpose of screening for the presence of diabetes:  <5.7%     Consistent with the absence of diabetes  5.7-6.4%  Consistent with increasing risk for diabetes   (prediabetes)  >or=6.5%  Consistent with diabetes  Currently, no consensus exists for use of hemoglobin A1c  for diagnosis of diabetes for children.  This Hemoglobin A1c assay has significant interference with fetal   hemoglobin   (HbF). The results are invalid for patients with abnormal amounts of   HbF,   including those with known Hereditary Persistence   of Fetal Hemoglobin. Heterozygous hemoglobin variants (HbAS, HbAC,   HbAD, HbAE, HbA2) do not significantly interfere with this assay;   however, presence of multiple variants in a sample may impact the %   interference.             Review of Systems   Constitution: Negative for chills and fever.   HENT: Negative for congestion and tinnitus.    Eyes: Negative for double vision and visual disturbance.   Cardiovascular: Negative for chest pain and claudication.   Respiratory: Negative for hemoptysis and shortness of breath.    Endocrine: Negative for cold intolerance and heat intolerance.   Hematologic/Lymphatic: Negative for adenopathy and bleeding problem.   Skin: Positive for color change, dry skin and nail changes.   Musculoskeletal: Positive for stiffness. Negative for myalgias.   Gastrointestinal: Negative for nausea and vomiting.   Genitourinary: Negative for dysuria and hematuria.   Neurological: Positive for numbness and paresthesias.   Psychiatric/Behavioral: Negative for altered mental status and suicidal ideas.   Allergic/Immunologic: Negative for environmental allergies and persistent  infections.           Objective:      Physical Exam   Constitutional: She is oriented to person, place, and time. She appears well-developed and well-nourished.   Cardiovascular:   Pulses:       Dorsalis pedis pulses are 1+ on the right side, and 1+ on the left side.        Posterior tibial pulses are 1+ on the right side, and 1+ on the left side.   Pulmonary/Chest: Effort normal.   Musculoskeletal: Normal range of motion.   Anterior, lateral, and posterior muscle groups bilateral lower extremities show strength 4 over 5 symmetrically. Inspection and palpation of the joints and bones reveal no crepitus or joint effusion. No tenderness upon palpation. Mild plantar flexor contractures noted to digits 2 through 5 bilaterally.  Angle and base of gait are normal.   Feet:   Right Foot:   Skin Integrity: Positive for callus and dry skin.   Left Foot:   Skin Integrity: Positive for callus and dry skin.   Neurological: She is alert and oriented to person, place, and time. She displays atrophy and abnormal reflex. A sensory deficit is present.   Reflex Scores:       Patellar reflexes are 1+ on the right side and 1+ on the left side.       Achilles reflexes are 1+ on the right side and 1+ on the left side.  Sharp, dull, light touch, and vibratory sensation are diminished bilaterally. Proprioceptive sensation is intact to both lower extremities. [place] Jackie monofilament exam shows loss of protective sensation to plantar toes 1 through 5 bilaterally. Deep tendon reflexes to the patellar tendons is 1 over 4 bilaterally symmetrical. Deep tendon reflexes to the Achilles tendon is 0 over 4 bilaterally symmetrical. No ankle clonus or Babinski reflex noted bilaterally. Coordination is fair to both lower extremities.  Patient admits to intermittent burning and tingling in the feet.    Positive Tinel sign bilateral tibial nerves with tenderness and electrical sensation distally.   Skin: Skin is warm and dry. Capillary refill takes  2 to 3 seconds. There is pallor.   Skin bilateral lower extremities noted to be thin, dry, and atrophic.  Toenails normal.   Psychiatric: She has a normal mood and affect.   Vitals reviewed.            Assessment:       No diagnosis found.      Plan:       There are no diagnoses linked to this encounter.  I counseled the patient on her conditions, their implications and medical management.      Shoe inspection. Diabetic Foot Education. Patient reminded of the importance of good nutrition and blood sugar control to help prevent podiatric complications of diabetes. Patient instructed on proper foot hygeine. We discussed wearing proper shoe gear, daily foot inspections and Diabetic foot education in detail.    The area overlying the bilateral tibial nerve(s) was sterilely prepped, verbal consent was obtained, 2 cc's of 0.5% Marcaine plain was infiltrated around the affected nerve(s) for a diagnostic nerve block.  This was well tolerated with no complications.  .  Follow-up in 3 months.

## 2019-04-28 NOTE — PROGRESS NOTES
Date of Service: 4/28/2019    MEDICAL ONCOLOGIST:    Sean Woody MD  RADIATION ONCOLOGIST:   Jerica Stanford MD      DIAGNOSIS:   This is a 68 y.o. 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    TREATMENT:   1. Right partial mastectomy without sentinel node biopsy on 10/7/2015. Radha Russell M.D. Surgical Oncology. Initially underwent excisional biopsy for atypia upstaged to DCIS at lumpectomy. Final path DICS, negative margins.   2. Declined Radiation therapy. Jerica Stanford M.D. Radiation Oncology   3. Adjuvant endocrine therapy started on 11/2015. Since discontinued 2/2 hair loss. Sean Woody M.D. Medical Oncology     HISTORY OF PRESENT ILLNESS:   Linnea Renae is a 68 y.o. female comes in for 6 month clinical breast exam.  She denies change in her breast self-exam specifically denying new masses, skin or nipple changes, or nipple discharge. Past medical and surgical history is updated without new changes. There have been no changes to family history. The patient denies constitutional symptoms of night sweats, weight loss, new headaches, visual changes, new back or bony pain, chest pain, or shortness of breath.      Interval History:  She has done well since her last clinic visit (9/11/18). Reports no interval changes. No new breast mass or complaints. Followed by Dr. Kent. Previously on anastrazole but stopped secondary to side effects (hair loss).    IMAGING:   Diagnostic Mammogram (9/10/18):     Findings:  This procedure was performed using tomosynthesis.  Computer-aided detection was utilized in the interpretation of this examination.  The breasts are almost entirely fatty.      Right  There are post-surgical findings from a previous lumpectomy seen in the upper outer quadrant of the right breast in the anterior depth. There has been no interval development of a suspicious mass, microcalcification, or architectural distortion.      Left  There is  "no evidence of suspicious masses, calcifications, or other abnormal findings.     Impression:  Bilateral  There is no mammographic evidence of malignancy.     BI-RADS Category:   Overall: 2 - Benign    MEDICATIONS/ALLERGIES:     Review of patient's allergies indicates:   Allergen Reactions    Codeine Itching     Review of Systems   Constitutional: Negative for chills, fever and weight loss.   Eyes: Negative for double vision.   Respiratory: Negative for shortness of breath.    Cardiovascular: Negative for chest pain.   Gastrointestinal: Negative for nausea and vomiting.   Genitourinary: Negative for urgency.   Musculoskeletal: Positive for back pain. Negative for myalgias.   Skin: Negative for rash.   Neurological: Negative for dizziness and headaches.     PHYSICAL EXAM:     BP (!) 147/64 (BP Location: Right arm, Patient Position: Sitting, BP Method: Large (Automatic))   Pulse 70   Temp 96.9 °F (36.1 °C) (Oral)   Ht 5' 8" (1.727 m)   Wt 127.1 kg (280 lb 3.3 oz)   BMI 42.60 kg/m²     General: The patient appears well and is in no acute distress.   Neuro: Alert & oriented x3.  Cardiovascular: RR  Breasts: The exam was done with the patient seated and supine. Left breast - within normal limits. No palpable masses and no abnormal skin or nipple findings. No supraclavicular or axillary lymphadenopathy on the left side.   Right breast - within normal limits. No palpable masses and no abnormal skin or nipple findings. No supraclavicular or axillary lymphadenopathy on the right side. Postsurgical changes palpated under scar from partial mastectomy; incision well healed.  Pulmonary: nonlabored breathing bilaterally   Abdomen: soft, nontender, nondistended. No masses.    Extremities: No clubbing or cyanosis. Bilateral full arm range of motion without  lymphedema.     ASSESSMENT:   This is a 68 y.o. female without evidence of recurrence by exam, history or imaging.       PLAN:   1. Continue to follow up with  " Bong (no longer on anastrozole therapy).  2. Continue monthly self breast exams and call the clinic with any changes or problems.  3. Mammogram due September, 2019.  4. Return to clinic in 6 months for clinical breast exam. The patient is in agreement with the plan. Questions were encouraged and answered to patient's satisfaction. Linnea will call our office with any questions or concerns.

## 2019-05-01 ENCOUNTER — OFFICE VISIT (OUTPATIENT)
Dept: SURGERY | Facility: CLINIC | Age: 69
End: 2019-05-01
Payer: MEDICARE

## 2019-05-01 VITALS
DIASTOLIC BLOOD PRESSURE: 64 MMHG | BODY MASS INDEX: 42.46 KG/M2 | TEMPERATURE: 97 F | HEIGHT: 68 IN | SYSTOLIC BLOOD PRESSURE: 147 MMHG | WEIGHT: 280.19 LBS | HEART RATE: 70 BPM

## 2019-05-01 DIAGNOSIS — Z85.3 PERSONAL HISTORY OF BREAST CANCER: Primary | ICD-10-CM

## 2019-05-01 DIAGNOSIS — Z12.31 ENCOUNTER FOR SCREENING MAMMOGRAM FOR BREAST CANCER: ICD-10-CM

## 2019-05-01 PROCEDURE — 99213 OFFICE O/P EST LOW 20 MIN: CPT | Mod: HCNC,S$GLB,, | Performed by: SURGERY

## 2019-05-01 PROCEDURE — 3077F SYST BP >= 140 MM HG: CPT | Mod: HCNC,CPTII,S$GLB, | Performed by: SURGERY

## 2019-05-01 PROCEDURE — 3077F PR MOST RECENT SYSTOLIC BLOOD PRESSURE >= 140 MM HG: ICD-10-PCS | Mod: HCNC,CPTII,S$GLB, | Performed by: SURGERY

## 2019-05-01 PROCEDURE — 99213 PR OFFICE/OUTPT VISIT, EST, LEVL III, 20-29 MIN: ICD-10-PCS | Mod: HCNC,S$GLB,, | Performed by: SURGERY

## 2019-05-01 PROCEDURE — 3288F PR FALLS RISK ASSESSMENT DOCUMENTED: ICD-10-PCS | Mod: HCNC,CPTII,S$GLB, | Performed by: SURGERY

## 2019-05-01 PROCEDURE — 1100F PTFALLS ASSESS-DOCD GE2>/YR: CPT | Mod: HCNC,CPTII,S$GLB, | Performed by: SURGERY

## 2019-05-01 PROCEDURE — 3078F PR MOST RECENT DIASTOLIC BLOOD PRESSURE < 80 MM HG: ICD-10-PCS | Mod: HCNC,CPTII,S$GLB, | Performed by: SURGERY

## 2019-05-01 PROCEDURE — 99999 PR PBB SHADOW E&M-EST. PATIENT-LVL III: CPT | Mod: PBBFAC,HCNC,, | Performed by: SURGERY

## 2019-05-01 PROCEDURE — 3078F DIAST BP <80 MM HG: CPT | Mod: HCNC,CPTII,S$GLB, | Performed by: SURGERY

## 2019-05-01 PROCEDURE — 99999 PR PBB SHADOW E&M-EST. PATIENT-LVL III: ICD-10-PCS | Mod: PBBFAC,HCNC,, | Performed by: SURGERY

## 2019-05-01 PROCEDURE — 1100F PR PT FALLS ASSESS DOC 2+ FALLS/FALL W/INJURY/YR: ICD-10-PCS | Mod: HCNC,CPTII,S$GLB, | Performed by: SURGERY

## 2019-05-01 PROCEDURE — 3288F FALL RISK ASSESSMENT DOCD: CPT | Mod: HCNC,CPTII,S$GLB, | Performed by: SURGERY

## 2019-05-07 DIAGNOSIS — Z13.5 ENCOUNTER FOR SCREENING FOR DIABETIC RETINOPATHY: Primary | ICD-10-CM

## 2019-05-09 ENCOUNTER — OFFICE VISIT (OUTPATIENT)
Dept: PAIN MEDICINE | Facility: CLINIC | Age: 69
End: 2019-05-09
Payer: MEDICARE

## 2019-05-09 VITALS
SYSTOLIC BLOOD PRESSURE: 153 MMHG | TEMPERATURE: 99 F | HEART RATE: 83 BPM | HEIGHT: 68 IN | WEIGHT: 283.5 LBS | DIASTOLIC BLOOD PRESSURE: 83 MMHG | BODY MASS INDEX: 42.97 KG/M2

## 2019-05-09 DIAGNOSIS — M47.819 SPONDYLOSIS WITHOUT MYELOPATHY: ICD-10-CM

## 2019-05-09 DIAGNOSIS — M47.816 LUMBAR SPONDYLOSIS: ICD-10-CM

## 2019-05-09 DIAGNOSIS — M79.10 MYALGIA: ICD-10-CM

## 2019-05-09 DIAGNOSIS — M46.1 SACROILIITIS: ICD-10-CM

## 2019-05-09 DIAGNOSIS — M51.36 DDD (DEGENERATIVE DISC DISEASE), LUMBAR: ICD-10-CM

## 2019-05-09 DIAGNOSIS — M47.9 OSTEOARTHRITIS OF SPINE, UNSPECIFIED SPINAL OSTEOARTHRITIS COMPLICATION STATUS, UNSPECIFIED SPINAL REGION: Primary | ICD-10-CM

## 2019-05-09 PROCEDURE — 3077F PR MOST RECENT SYSTOLIC BLOOD PRESSURE >= 140 MM HG: ICD-10-PCS | Mod: CPTII,S$GLB,, | Performed by: NURSE PRACTITIONER

## 2019-05-09 PROCEDURE — 1101F PR PT FALLS ASSESS DOC 0-1 FALLS W/OUT INJ PAST YR: ICD-10-PCS | Mod: CPTII,S$GLB,, | Performed by: NURSE PRACTITIONER

## 2019-05-09 PROCEDURE — 99999 PR PBB SHADOW E&M-EST. PATIENT-LVL III: ICD-10-PCS | Mod: PBBFAC,HCNC,, | Performed by: NURSE PRACTITIONER

## 2019-05-09 PROCEDURE — 99499 RISK ADDL DX/OHS AUDIT: ICD-10-PCS | Mod: HCNC,S$GLB,, | Performed by: NURSE PRACTITIONER

## 2019-05-09 PROCEDURE — 99999 PR PBB SHADOW E&M-EST. PATIENT-LVL III: CPT | Mod: PBBFAC,HCNC,, | Performed by: NURSE PRACTITIONER

## 2019-05-09 PROCEDURE — 3077F SYST BP >= 140 MM HG: CPT | Mod: CPTII,S$GLB,, | Performed by: NURSE PRACTITIONER

## 2019-05-09 PROCEDURE — 99499 UNLISTED E&M SERVICE: CPT | Mod: HCNC,S$GLB,, | Performed by: NURSE PRACTITIONER

## 2019-05-09 PROCEDURE — 99213 PR OFFICE/OUTPT VISIT, EST, LEVL III, 20-29 MIN: ICD-10-PCS | Mod: S$GLB,,, | Performed by: NURSE PRACTITIONER

## 2019-05-09 PROCEDURE — 3079F PR MOST RECENT DIASTOLIC BLOOD PRESSURE 80-89 MM HG: ICD-10-PCS | Mod: CPTII,S$GLB,, | Performed by: NURSE PRACTITIONER

## 2019-05-09 PROCEDURE — 3079F DIAST BP 80-89 MM HG: CPT | Mod: CPTII,S$GLB,, | Performed by: NURSE PRACTITIONER

## 2019-05-09 PROCEDURE — 99213 OFFICE O/P EST LOW 20 MIN: CPT | Mod: S$GLB,,, | Performed by: NURSE PRACTITIONER

## 2019-05-09 PROCEDURE — 1101F PT FALLS ASSESS-DOCD LE1/YR: CPT | Mod: CPTII,S$GLB,, | Performed by: NURSE PRACTITIONER

## 2019-05-09 NOTE — PROGRESS NOTES
Chronic Pain - Established Visit    Referring Physician: No ref. provider found    Chief Complaint: back pain       SUBJECTIVE:    Linnea Renae presents to the clinic for follow up of lower back pain.  She reports significant improvement since her previous encounter.  Her left sided back pain has resolved.  She is a slight ache to right lower back and buttocks which is tolerable.  She is interested in PT for strengthening.  She has done aquatic PT in the past but would like land therapy.  She is a member at [employer] in [place].  She denies any radicular symptoms at this time.  Her pain today is 1/10.      Physical Therapy/Home Exercise: yes     Pain Disability Index Review:  Last 3 PDI Scores 5/9/2019 3/28/2019 2/14/2019   Pain Disability Index (PDI) 0 0 12       Pain Medications:    - Opioids: Ultram (Tramadol HCL)  - Adjuvant Medications: Advil,Motrin ( Ibuprofen) and zanaflex  - Anti-Coagulants: Aspirin  - Others: see med list     report:  Reviewed and consistent with medication use as prescribed.    Lab Results   Component Value Date    HGBA1C 6.0 (H) 03/19/2019     CMP  Sodium   Date Value Ref Range Status   03/19/2019 140 136 - 145 mmol/L Final     Potassium   Date Value Ref Range Status   03/19/2019 4.0 3.5 - 5.1 mmol/L Final     Chloride   Date Value Ref Range Status   03/19/2019 100 95 - 110 mmol/L Final     CO2   Date Value Ref Range Status   03/19/2019 28 23 - 29 mmol/L Final     Glucose   Date Value Ref Range Status   03/19/2019 102 70 - 110 mg/dL Final     BUN, Bld   Date Value Ref Range Status   03/19/2019 15 8 - 23 mg/dL Final     Creatinine   Date Value Ref Range Status   03/19/2019 0.8 0.5 - 1.4 mg/dL Final     Calcium   Date Value Ref Range Status   03/19/2019 9.9 8.7 - 10.5 mg/dL Final     Total Protein   Date Value Ref Range Status   03/19/2019 7.7 6.0 - 8.4 g/dL Final     Albumin   Date Value Ref Range Status   03/19/2019 3.9 3.5 - 5.2 g/dL Final     Total Bilirubin   Date Value Ref  Range Status   03/19/2019 0.5 0.1 - 1.0 mg/dL Final     Comment:     For infants and newborns, interpretation of results should be based  on gestational age, weight and in agreement with clinical  observations.  Premature Infant recommended reference ranges:  Up to 24 hours.............<8.0 mg/dL  Up to 48 hours............<12.0 mg/dL  3-5 days..................<15.0 mg/dL  6-29 days.................<15.0 mg/dL       Alkaline Phosphatase   Date Value Ref Range Status   03/19/2019 82 55 - 135 U/L Final     AST   Date Value Ref Range Status   03/19/2019 20 10 - 40 U/L Final     ALT   Date Value Ref Range Status   03/19/2019 12 10 - 44 U/L Final     Anion Gap   Date Value Ref Range Status   03/19/2019 12 8 - 16 mmol/L Final     eGFR if    Date Value Ref Range Status   03/19/2019 >60 >60 mL/min/1.73 m^2 Final     eGFR if non    Date Value Ref Range Status   03/19/2019 >60 >60 mL/min/1.73 m^2 Final     Comment:     Calculation used to obtain the estimated glomerular filtration  rate (eGFR) is the CKD-EPI equation.          Pain Procedures:   7/29/16 Right L3-4 TF CHRISTIAN  11/3/17 Bilateral L3-4 and L4-5 facet injections  5/9/18 Bilateral L3-4 and L4-5 facet injections  7/25/18 Bilateral L3-4 and L4-5 facet injections  12/5/18 Right L2,3,4,5 RFA- 80% relief  3/20/19 Left L2,3,4,5 RFA- 100% relief    Imaging:  Narrative     EXAMINATION:  MRI LUMBAR SPINE W WO CONTRAST    CLINICAL HISTORY:  Low back pain, >6wks conservative tx, persistent-progressive sx, surgical candidate; Spondylosis without myelopathy or radiculopathy, site unspecified    TECHNIQUE:  Multiplanar, multisequence MR images were acquired from the thoracolumbar junction to the sacrum prior to and following administration of 10 cc IV Gadavist.    COMPARISON:  Lumbar spine radiograph 10/11/2017; MRI lumbar spine with/without contrast 09/20/2016    FINDINGS:  Sagittal alignment demonstrates grade 1 anterolisthesis of L3 on L4 with  slight uncovering of the intervertebral disc.  Vertebral body heights are satisfactorily maintained.  Intervertebral disc spaces are preserved.  Marrow signal is within normal limits with no evidence of fracture or marrow replacement process noting a small vertebral body hemangioma at L1.    Conus appears within normal limits terminating at the level of the L1 level.  Cauda equina appears normal.    Paraspinous soft tissues demonstrate mild prominence of the common bile duct and small left renal cysts..    T12-L1:   No spinal canal or neuroforaminal narrowing.    L1-2:  No spinal canal or neuroforaminal narrowing.    L2-3:  Mild posterior disc bulge and moderate bilateral facet arthropathy without spinal canal stenosis or neural foraminal narrowing.    L3-4:  The diffuse posterior disc bulge, buckling of the ligamentum flavum, and moderate bilateral facet arthropathy results in mild spinal canal stenosis and no neural foraminal narrowing.    L4-5:  Diffuse posterior disc bulge with superimposed central disc protrusion, buckling of the ligamentum flavum, and moderate bilateral facet arthropathy result in moderate spinal canal stenosis and no significant neural foraminal narrowing.    L5-S1:  Mild posterior disc bulge and mild facet arthropathy results in mild right/moderate left neural foraminal narrowing.    No abnormal enhancement.      Impression       Multilevel lumbar spondylosis worst at L4-5 resulting in moderate spinal canal stenosis.    Mild prominence of the common bile duct which is nonspecific and may represent sequela of cholecystectomy.         X-Ray Lumbar Spine Ap Lateral w/Flex Ext    Narrative     10/11/17 10:17:19    Accession: [id number]    CLINICAL INDICATION: 67 year old F with  low back pain    COMPARISON: Lumbar spine x-rays, 09/20/2016.    TECHNIQUE: AP, lateral, flexion, extension, and coned down lateral radiographs of the lumbar spine.    FINDINGS:     Vertebral body heights are maintained.   No evidence of fracture.      Normal sagittal alignment is preserved.No significant translation with flexion-extension.    Moderate multilevel degenerative changes are again present. Mild anterolisthesis of L3 with respect to L4 is again noted. Multilevel facet arthropathy is present, most pronounced in the lower lumbar spine.  No detrimental change is identified when compared with the examination performed one year prior.      Impression         Moderate multilevel degenerative changes in the lumbar spine, similar in appearance to the prior exam.    No evidence of fracture or malalignment.      Electronically signed by: Heber Frost  Date: 10/11/17  Time: 10:37            Past Medical History:   Diagnosis Date    Allergy     Breast cancer     Lumpectomy 10/15.    Chronic constipation     Colon polyps     Diabetes mellitus, type 2     Dry eyes     GERD (gastroesophageal reflux disease)     Hyperlipidemia     Hypertension     Lumbar disc disease     Type 2 diabetes mellitus      Past Surgical History:   Procedure Laterality Date    BIOPSY-EXCISIONAL right breast with Wire Localization  (wire inserted at [facility] for 0900 Right 10/7/2015    Performed by Radha Russell MD at [facility] OR 2ND FLR    BLOCK-FACET-LUMBAR Bilateral 11/3/2017    [facility] by [facility] at [facility]    BREAST BIOPSY      BREAST LUMPECTOMY Right         CATARACT EXTRACTION, BILATERAL       SECTION      x3    CHOLECYSTECTOMY      COLONOSCOPY N/A 10/11/2018    Performed by Lorenzo Tanner MD at [facility] ENDO (4TH FLR)    COLONOSCOPY W/ POLYPECTOMY      HYSTERECTOMY      and USO    INJECTION, FACET JOINT Bilateral 2018    Performed by [facility] at [facility]    INJECTION-FACET Bilateral 2018    Performed by [facility] at [facility]    INJECTION-STEROID-EPIDURAL-TRANSFORAMINAL Right 2016    Performed by [facility] at [facility]     INJECTION-STEROID-EPIDURAL-TRANSFORAMINAL Bilateral 3/4/2016    [facility] at [facility]    INJECTION-STEROID-EPIDURAL-TRANSFORAMINAL Bilateral 2016    Performed by [facility] at [facility]    RADIOFREQUENCY ABLATION LEFT L2,3,4,5 Left 3/20/2019    Performed by [facility] at [facility]    Radiofrequency Ablation RIGHT LUMBAR L2,3,4 RFA Right 2018    Performed by [facility] at [facility]    TUBAL LIGATION       Social History     Socioeconomic History    Marital status:      Spouse name: Not on file    Number of children: 3    Years of education: Not on file    Highest education level: Not on file   Occupational History    Not on file   Social Needs    Financial resource strain: Not on file    Food insecurity:     Worry: Not on file     Inability: Not on file    Transportation needs:     Medical: Not on file     Non-medical: Not on file   Tobacco Use    Smoking status: Former Smoker     Packs/day: 0.25     Years: 20.00     Pack years: 5.00     Last attempt to quit: 1985     Years since quittin.3    Smokeless tobacco: Never Used    Tobacco comment: Quit mid .   Substance and Sexual Activity    Alcohol use: No     Alcohol/week: 0.0 oz    Drug use: No    Sexual activity: Yes   Lifestyle    Physical activity:     Days per week: Not on file     Minutes per session: Not on file    Stress: Not on file   Relationships    Social connections:     Talks on phone: Not on file     Gets together: Not on file     Attends [employer] service: Not on file     Active member of club or organization: Not on file     Attends meetings of clubs or organizations: Not on file     Relationship status: Not on file   Other Topics Concern    Not on file   Social History Narrative    Not on file     Family History   Problem Relation Age of Onset    No Known Problems Mother     No Known Problems Father     Heart disease  Paternal Grandmother     Thyroid disease Paternal Grandfather     Hypertension Sister     Hypertension Brother     Glaucoma Brother     No Known Problems Daughter     No Known Problems Daughter     No Known Problems Daughter        Review of patient's allergies indicates:   Allergen Reactions    Codeine Itching       Current Outpatient Medications   Medication Sig    aspirin 81 MG Chew Take 81 mg by mouth once daily.    atenolol (TENORMIN) 50 MG tablet Take 1 tablet (50 mg total) by mouth 2 (two) times daily.    atorvastatin (LIPITOR) 20 MG tablet Take 1 tablet (20 mg total) by mouth once daily.    blood sugar diagnostic Strp 1 strip by Misc.(Non-Drug; Combo Route) route once daily.    calcium-vitamin D3 500 mg(1,250mg) -200 unit per tablet Take 1 tablet by mouth 2 (two) times daily with meals.     captopril (CAPOTEN) 50 MG tablet TAKE TWO TABLETS BY MOUTH IN THE MORNING AND ONE TABLET IN THE EVENING    clotrimazole (LOTRIMIN) 1 % cream Apply topically 2 (two) times daily.    cycloSPORINE (RESTASIS) 0.05 % ophthalmic emulsion 1 drop 2 (two) times daily.    diclofenac sodium (VOLTAREN) 1 % Gel Apply 2 g topically 4 (four) times daily.    diclofenac sodium (VOLTAREN) 1 % Gel Apply 2 g topically 4 (four) times daily.    fluticasone (FLONASE) 50 mcg/actuation nasal spray 2 sprays (100 mcg total) by Each Nare route once daily.    hydroCHLOROthiazide (HYDRODIURIL) 25 MG tablet Take 1 tablet (25 mg total) by mouth once daily.    ibuprofen (ADVIL,MOTRIN) 800 MG tablet Take 1 tablet (800 mg total) by mouth 3 (three) times daily as needed.    ketoconazole (NIZORAL) 2 % cream Apply topically once daily.    ketoconazole (NIZORAL) 2 % shampoo     lancets Misc 1 lancet by Misc.(Non-Drug; Combo Route) route once daily.    loratadine 10 mg Cap     metFORMIN (GLUCOPHAGE-XR) 500 MG 24 hr tablet TAKE TWO TABLETS BY MOUTH ONCE DAILY WITH BREAKFAST    minoxidil (LONITEN) 2.5 MG tablet     multivitamin (ONE  "DAILY MULTIVITAMIN) per tablet Take 1 tablet by mouth once daily.    omeprazole (PRILOSEC) 20 MG capsule Take 1 capsule (20 mg total) by mouth once daily.    tiZANidine (ZANAFLEX) 4 MG tablet Take 1 tablet (4 mg total) by mouth 2 (two) times daily as needed.    traMADol (ULTRAM) 50 mg tablet Take 1 tablet (50 mg total) by mouth every 6 (six) hours as needed.    blood-glucose meter kit Use as instructed     No current facility-administered medications for this visit.        REVIEW OF SYSTEMS:    GENERAL:  No weight loss, malaise or fevers.  HEENT:  Negative for frequent or significant headaches.  NECK:  Negative for lumps, goiter, pain and significant neck swelling.  RESPIRATORY:  Negative for cough, wheezing or shortness of breath.  CARDIOVASCULAR:  Negative for chest pain, leg swelling or palpitations.  GI:  Negative for abdominal discomfort, blood in stools or black stools or change in bowel habits. GERD.  MUSCULOSKELETAL:  See HPI.  SKIN:  Negative for lesions, rash, and itching.  PSYCH: Positive for sleep disturbance, mood disorder and recent psychosocial stressors.  HEMATOLOGY/LYMPHOLOGY:  Negative for prolonged bleeding, bruising easily or swollen nodes. H/O breast cancer with mastectomy.  NEURO:   No history of headaches, syncope, paralysis, seizures or tremors.  All other reviewed and negative other than HPI.    OBJECTIVE:    BP (!) 153/83   Pulse 83   Temp 98.6 °F (37 °C)   Ht 5' 8" (1.727 m)   Wt 128.6 kg (283 lb 8.2 oz)   BMI 43.11 kg/m²     PHYSICAL EXAMINATION:    General appearance: Well appearing, in no acute distress, alert and oriented x3.  Psych:  Mood and affect appropriate.  Skin: No bruising or rashes.  Head/face:  Atraumatic, normocephalic. No palpable lymph nodes  Cor: RRR  Pulm: CTA  GI: Abdomen soft and non-tender.  Back: Straight leg raising in the sitting and supine positions is negative to radicular pain.  Pain to palpation over the lumbar facet joints on the left.  Limited ROM " with pain on flexion.  Mild facet loading on the right.  Painful palpation to both SI joints.  MARY is positive on the right side.  Musculoskeletal:  Bilateral upper and lower extremity strength is normal and symmetric.  No atrophy or tone abnormalities are noted.  Neuro: Bilateral upper and lower extremity coordination and muscle stretch reflexes are physiologic and symmetric.  Plantar response are downgoing.  Decreased sensation to bilateral feet and right lower back.  Gait: Antalgic.      ASSESSMENT: 68 y.o. year old female with lower back pain, consistent with the following diagnoses:     1. Osteoarthritis of spine, unspecified spinal osteoarthritis complication status, unspecified spinal region  Ambulatory Referral to Physical/Occupational Therapy   2. Myalgia  Ambulatory Referral to Physical/Occupational Therapy   3. Lumbar spondylosis  Ambulatory Referral to Physical/Occupational Therapy   4. DDD (degenerative disc disease), lumbar  Ambulatory Referral to Physical/Occupational Therapy   5. Sacroiliitis  Ambulatory Referral to Physical/Occupational Therapy   6. Spondylosis without myelopathy  Ambulatory Referral to Physical/Occupational Therapy         PLAN:     - I have stressed the importance of physical activity and a home exercise plan to help with pain and improve health.    - Previous imaging was reviewed and discussed with the patient today.    - She is doing well at this time.  Can consider right SI joint injection if pain worsens.  Can also repeat right L2,3,4,5 RFA if needed after June.    - Referral placed for PT.    - RTC in 8 weeks or sooner if needed.    - Counseled patient regarding the importance of activity modification, constant sleeping habits and physical therapy.      The above plan and management options were discussed at length with patient. Patient is in agreement with the above and verbalized understanding.      Amara Hawthorne, CARMEN  05/09/2019

## 2019-05-12 NOTE — TELEPHONE ENCOUNTER
----- Message from Darlin House MA sent at 1/19/2017 11:28 AM CST -----  Contact: [phone]  Pt is requesting to speak with the Assistant regarding a Pharmaceutical issue that she is having. Please advise and call. Thanks!   12-May-2019 03:53

## 2019-05-21 ENCOUNTER — INITIAL CONSULT (OUTPATIENT)
Dept: BARIATRICS | Facility: CLINIC | Age: 69
End: 2019-05-21
Payer: MEDICARE

## 2019-05-21 VITALS
HEIGHT: 67 IN | WEIGHT: 285.06 LBS | BODY MASS INDEX: 44.74 KG/M2 | DIASTOLIC BLOOD PRESSURE: 66 MMHG | HEART RATE: 73 BPM | SYSTOLIC BLOOD PRESSURE: 116 MMHG

## 2019-05-21 DIAGNOSIS — E66.01 CLASS 3 SEVERE OBESITY DUE TO EXCESS CALORIES WITH SERIOUS COMORBIDITY AND BODY MASS INDEX (BMI) OF 45.0 TO 49.9 IN ADULT: Primary | ICD-10-CM

## 2019-05-21 DIAGNOSIS — I10 ESSENTIAL HYPERTENSION: ICD-10-CM

## 2019-05-21 DIAGNOSIS — E11.9 TYPE 2 DIABETES MELLITUS WITHOUT COMPLICATION, WITHOUT LONG-TERM CURRENT USE OF INSULIN: ICD-10-CM

## 2019-05-21 DIAGNOSIS — M48.061 SPINAL STENOSIS OF LUMBAR REGION WITHOUT NEUROGENIC CLAUDICATION: ICD-10-CM

## 2019-05-21 DIAGNOSIS — K21.9 GASTROESOPHAGEAL REFLUX DISEASE, ESOPHAGITIS PRESENCE NOT SPECIFIED: ICD-10-CM

## 2019-05-21 DIAGNOSIS — E78.5 HYPERLIPIDEMIA, UNSPECIFIED HYPERLIPIDEMIA TYPE: ICD-10-CM

## 2019-05-21 PROCEDURE — 99215 OFFICE O/P EST HI 40 MIN: CPT | Mod: S$GLB,,, | Performed by: INTERNAL MEDICINE

## 2019-05-21 PROCEDURE — 3074F SYST BP LT 130 MM HG: CPT | Mod: CPTII,S$GLB,, | Performed by: INTERNAL MEDICINE

## 2019-05-21 PROCEDURE — 1101F PT FALLS ASSESS-DOCD LE1/YR: CPT | Mod: CPTII,S$GLB,, | Performed by: INTERNAL MEDICINE

## 2019-05-21 PROCEDURE — 3044F PR MOST RECENT HEMOGLOBIN A1C LEVEL <7.0%: ICD-10-PCS | Mod: CPTII,S$GLB,, | Performed by: INTERNAL MEDICINE

## 2019-05-21 PROCEDURE — 99999 PR PBB SHADOW E&M-EST. PATIENT-LVL III: CPT | Mod: PBBFAC,,, | Performed by: INTERNAL MEDICINE

## 2019-05-21 PROCEDURE — 1101F PR PT FALLS ASSESS DOC 0-1 FALLS W/OUT INJ PAST YR: ICD-10-PCS | Mod: CPTII,S$GLB,, | Performed by: INTERNAL MEDICINE

## 2019-05-21 PROCEDURE — 3074F PR MOST RECENT SYSTOLIC BLOOD PRESSURE < 130 MM HG: ICD-10-PCS | Mod: CPTII,S$GLB,, | Performed by: INTERNAL MEDICINE

## 2019-05-21 PROCEDURE — 3078F PR MOST RECENT DIASTOLIC BLOOD PRESSURE < 80 MM HG: ICD-10-PCS | Mod: CPTII,S$GLB,, | Performed by: INTERNAL MEDICINE

## 2019-05-21 PROCEDURE — 3044F HG A1C LEVEL LT 7.0%: CPT | Mod: CPTII,S$GLB,, | Performed by: INTERNAL MEDICINE

## 2019-05-21 PROCEDURE — 99215 PR OFFICE/OUTPT VISIT, EST, LEVL V, 40-54 MIN: ICD-10-PCS | Mod: S$GLB,,, | Performed by: INTERNAL MEDICINE

## 2019-05-21 PROCEDURE — 3078F DIAST BP <80 MM HG: CPT | Mod: CPTII,S$GLB,, | Performed by: INTERNAL MEDICINE

## 2019-05-21 PROCEDURE — 99999 PR PBB SHADOW E&M-EST. PATIENT-LVL III: ICD-10-PCS | Mod: PBBFAC,,, | Performed by: INTERNAL MEDICINE

## 2019-05-21 RX ORDER — TOPIRAMATE 25 MG/1
25 TABLET ORAL 2 TIMES DAILY
Qty: 60 TABLET | Refills: 3 | Status: SHIPPED | OUTPATIENT
Start: 2019-05-21 | End: 2019-10-15 | Stop reason: SDUPTHER

## 2019-05-21 NOTE — PATIENT INSTRUCTIONS
Patient was informed that topiramate is used for migraine prevention and seizures. Weight loss is a common side effect that is well documented. S/he understands this. S/he was informed of the potential side effects such as serious and possibly fatal rash in which case the medication should be discontinued immediately. Paresthesias, forgetfulness, fatigue, kidney stones, GI symptoms, and changes in lab values such as electrolytes, blood counts and kidney function.    Start topiramate  in the evening for 1 week, then morning and evening.       No soda, sweet tea, juices or lemonade. All drinks should be 5 calories or less.     Exercise - Start exercise program.        Limit starchy carbohydrates (bread, rice, pasta, potatoes, corn, peas, oatmeal, grits, tortillas, crackers, chips)        Sample meal plan  80-120g protein; 7169-0434 calories  Protein drinks and bars: 0-4 grams sugar  Drink lots of water throughout the day and exercise!  MENU # 1  Breakfast: 2 eggs, 1 turkey sausage nina  Lunch: 2-3 roll-ups (sliced turkey, low-fat slice cheese), baby carrots dipped in 1 Tbsp natural peanut butter  Mid-Day Snack: ¼ cup unsalted almonds, ½ cup fruit  Supper: 1 chicken thigh simmered in tomato sauce + 2 Tbsp mozzarella cheese, ½ cup black beans, 1/2 cup steamed veggies  Evening Snack: 1/2 cup grapes with 4 cubes low-fat cheddar cheese   ___________________________________________________  MENU # 2  Breakfast: protein drink  Mid-morning snack : ¼ cup unsalted nuts  Lunch: 1 cup tuna or chicken salad made with light pineda, over salad.   Supper: hamburger nina, slice of cheese, 1 cup steamed veggies.   Snack: light yogurt      Menu #3  Breakfast: 6oz plain Greek yogurt + fruit (½ banana, ½ cup fruit - pineapple, blueberries, strawberries, peach), may add Splenda to juliann.  Lunch: ½  chicken breast w/ slice pepper favian cheese, 1/2 cup steamed veggies and small salad with Salad Spritzer.    Mid-Day snack: protein drink    Supper: 4oz Grilled fish, grilled veggie kabob ( mushrooms, onion, bell pepper, yellow squash, zucchini, cherry tomatoes)  Evening Snack: fudgsicle no-sugar-added    Menu # 4  Breakfast: vanilla iced coffee: 1 scoop vanilla protein powder + 4oz skim milk + 4oz coffee   Snack: protein bar  Lunch: 2 Lettuce tacos: ¼ cup seasoned ground turkey wrapped in a Lyle lettuce leaf with 1 Tbsp shredded cheese and dollop low-fat sour cream  Dinner: Shrimp omelet: 2 eggs, ½ cup shrimp, green onions, and shredded cheese        Menu #5  Breakfast: 1 cup low-fat cottage cheese, ½ cup peaches (no sugar added)  Lunch: 2 oz baked pork chop, 1 cup okra and tomato stew  Snack: 1 cup black beans + salsa + dollop sour cream  Dinner: Caprese chicken salad: 2 oz chicken, 1oz fresh mozzarella, sliced tomato, 1 Tbsp olive oil, basil  Snack: sugar-free pudding cup      Menu #6  Breakfast: ½ cup part-skim ricotta cheese, 2 Tbsp sugar-free strawberry preserves, ¼ cup slivered almonds  Lunch: 2 oz canned chicken, 1oz shredded cheddar cheese, ½ cup black beans, 2 Tbsp salsa  Snack: Protein drink  Dinner: 4 oz grilled salmon steak, over mixed green salad with light dressing        Meal Ideas for Regular Bariatric Diet  *Recipes and products available at [email or web address]      Breakfast: (15-20g protein)    - Egg white omelet: 2 egg whites or ½ cup Egg Beaters. (Optional proteins: cheese, shrimp, black beans, chicken, sliced turkey) (Optional veggies: tomatoes, salsa, spinach, mushrooms, onions, green peppers, or small slice avocado)     - Egg and sausage: 1 egg or ¼ cup Egg Beaters (any variety), with 1 nina or 2 links of Turkey sausage or Veggie breakfast sausage ([employer]is [employer]ms or Boca)    - Crust-less breakfast quiche: To make a glass pie dish, mix 4oz part skim Ricotta, 1 cup skim milk, and 2 eggs as your base. Add protein: shredded cheese, sliced lean ham or turkey, turkey lara/sausage. Add veggies: tomato, onion, green onion,  mushroom, green pepper, spinach, etc.    - Yogurt parfait: Mix 1 - 6oz container Dannon Light N Fit vanilla yogurt, with ¼ cup Kashi Go Lean cereal    - Cottage cheese and fruit: ½ cup part-skim cottage cheese or ricotta cheese topped with fresh fruit or sugar free preserves     - Gayathri Stewards Vanilla Egg custard* (add 2 Tbsp instant coffee granules to make Cappuccino Custard*)    - Hi-Protein café latte (skim milk, decaf coffee, 1 scoop protein powder). Optional to add Sugar free syrup or extract flavoring.    Lunch: (20-30g protein)    - ½ cup Black bean soup (Homemade or Progresso), with ¼ cup shredded low-fat cheese. Top with chopped tomato or fresh salsa.     - Lean deli turkey breast and low-fat sliced cheese, mustard or light pineda to moisten, rolled up together, or wrapped in a Lyle lettuce leaf    - Chicken salad made from dinner leftovers, moisten with low-fat salad dressing or light pineda. Also try leftover salmon, shrimp, tuna or boiled eggs. Serve ½ cup over dark green salad    - Fat-free canned refried beans, topped with ¼ cup shredded low-fat cheese. Top with chopped tomato or fresh salsa.     - Greek salad: Top mixed greens with 1-2oz grilled chicken, tomatoes, red onions, 2-3 kalamata olives, and sprinkle lightly with feta cheese. Spritz with Balsamic vinegar to taste.     - Crust-less lunch quiche: To make a glass pie dish, mix 4oz part skim Ricotta, 1 cup skim milk, and 2 eggs as your base. Add protein: shredded cheese, sliced lean ham or turkey, shrimp, chicken. Add veggies: tomato, onion, green onion, mushroom, green pepper, spinach, artichoke, broccoli, etc.    - Pizza bake: tomato sauce, low-fat shredded mozzarella and turkey pepperoni or German lara. Add any veggies.    - Cucumber crab bites: Spread ¼ cup crab dip (lump crabmeat + light cream cheese and green onions) over sliced cucumber.     - Chicken with light spinach and artichoke dip*: Puree in : 6oz cooked and  drained spinach, 2 cloves garlic, 1 can cannelloni beans, ½ cup chopped green onions, 1 can drained artichoke hearts (not marinated in oil), lemon juice and basil. Mix in 2oz chopped up chicken.    Supper: (20-30g protein)    - Serve grilled fish over dark green salad tossed with low-fat dressing, served with grilled asparagus aragon     - Rotisserie chicken salad: served with sliced strawberries, walnuts, fat-free feta cheese crumbles and 1 tbsp Graces Own Light Raspberry Matador Vinaigrette    - Shrimp cocktail: Dip cold boiled shrimp in homemade low-sugar cocktail sauce (1/2 cup Charlette One Carb ketchup, 2 tbsp horseradish, 1/4 tsp hot sauce, 1 tsp Worcestershire sauce, 1 tbsp freshly-squeezed lemon juice). Serve with dark green salad, walnuts, and crumbled blue cheese drizzled with olive oil and Balsamic vinegar    - Tuna Melt: Spread tuna salad onto 2 thick slices of tomato. Top with low-fat cheese and broil until cheese is melted. May also be made with chicken salad of shrimp salad. [place] with different types of cheeses.    - Homemade low-fat Chili using extra lean ground beef or ground turkey. Top with shredded cheese and salsa as desired. May add dollop fat-free sour cream if desired    - Dinner Omelet with shrimp or chicken and onion, green peppers and chives.    - No noodle lasagna: Use sliced zucchini or eggplant in place of noodles.  Layer with part skim ricotta cheese and low sugar meat sauce (use very lean ground beef or ground turkey).    - Mexican chicken bake: Bake chunks of chicken breast or thigh with taco seasoning, Pace brand enchilada sauce, green onions and low-fat cheese. Serve with ¼ cup black beans or fat free refried beans topped with chopped tomatoes or salsa.    - Tiffanie frozen meatballs, simmered in Classico Marinara sauce. Different flavors of salsa or spaghetti sauce create different dishes! Sprinkle with parmesan cheese. Serve with grilled or steamed veggies, or a dark green  salad.    - Simmer boneless skinless chicken thigh chunks in Classico Marinara sauce or roasted salsa until tender with chopped onion, bell pepper, garlic, mushrooms, spinach, etc.     - Hamburger, without the bun, dressed the way you like. Served with grilled or steamed veggies.    - Eggplant parmesan: Bake slices of eggplant at 350 degrees for 15 minutes. Layer tomato sauce, sliced eggplant and low-fat mozzarella cheese in a baking dish and cover with foil. Bake 30-40 more minutes or until bubbly. Uncover and bake at 400 degrees for about 15 more minutes, or until top is slightly crisp.    - Fish tacos: grilled/baked white fish, wrapped in Lyle lettuce leaf, topped with salsa, shredded low-fat cheese, and light coleslaw.    Snacks: (100-200 calories; >5g protein)    - 1 low-fat cheese stick with 8 cherry tomatoes or 1 serving fresh fruit  - 4 thin slices fat-free turkey breast and 1 slice low-fat cheese  - 4 thin slices fat-free honey ham with wedge of melon  - 1/4 cup unsalted nuts with ½ cup fruit  - 6-oz container Dannon Light n Fit vanilla yogurt, topped with 1oz unsalted nuts         - apple, celery or baby carrots spread with 2 Tbsp natural peanut butter or almond butter   - apple slices with 1 oz slice low-fat cheese  - celery, cucumber, bell pepper or baby carrots dipped in ¼ cup hummus bean spread or light spinach and artichoke dip (*recipe in lunch section)  - 100 calorie bag microwave light popcorn with 3 tbsp grated parmesan cheese  - Ck Links Beef Steak - 14g protein! (similar to beef jerky)  - 2 wedges [place] Cow - Light Herb & Garlic Cheese with sliced cucumber or green bell pepper  - 1/2 cup low-fat cottage cheese with ¼ cup fruit or ¼ cup salsa  - RTD Protein drinks: Atkins, Low Carb Slim Fast, EAS light, Muscle Milk Light, etc.  - Homemade Protein drinks: GNC Soy95, Isopure, Nectar, UNJURY, Whey Gourmet, etc. Mix 1 scoop powder with 8oz skim/1% milk or light soymilk.  - Protein bars:  Atkins, EAS, Pure Protein, Think Thin, Detour, etc. Must have 0-4 grams sugar - Read the label.    Takeout Options: No more than twice/week  Deli - Salads (no pasta or rice), meats, cheeses. Roasted chicken. Lox (salmon)    Mexican - Platters which don't include tortillas, chips, or rice. Go easy on the beans. Example: Fajitas without the tortillas. Ask the  not to bring chips to the table if they are too tempting.    Greek - Meat or fish and vegetable, but no bread or rice. Including hummus, baba ganoush, etc, is OK. Most sit-down Greek restaurants can provide you with cucumber slices for dipping instead of najma bread.    Fast Food (Avoid as much as possible) - Salads (no croutons and limit salad dressing to 2 tbsp), grilled chicken sandwich without the bun and ask for no pineda. Nells low fat chili or Taco Bell pintos and cheese.    BBQ - The meats are fine if you ask for sauces on the side, but most of the traditional side dishes are loaded with carbs. Trevor slaw, baked beans and BBQ sauce are typically made with sugar.    Chinese - Nothing deep-fried, no rice or noodles. Many Chinese sauces have starch and sugar in them, so you'll have to use your judgement. If you find that these sauces trigger cravings, or cause Dumping, you can ask for the sauce to be made without sugar or just use soy sauce.

## 2019-05-21 NOTE — LETTER
May 23, 2019      Bailee Moss MD  [address]  [facility] [address]           [facility]  [address]  [facility] [address]  Phone: [phone]  Fax: [phone]          Patient: Linnea Renae   MR Number: [id number]   YOB: 1950   Date of Visit: 5/21/2019       Dear Dr. Bailee Moss:    Thank you for referring Linnea Renae to me for evaluation. Attached you will find relevant portions of my assessment and plan of care.    If you have questions, please do not hesitate to call me. I look forward to following Linnea Renae along with you.    Sincerely,    Tonja Bennett MD    Enclosure  CC:  No Recipients    If you would like to receive this communication electronically, please contact [email or web address] or [phone] to request more information on [employer] Link access.    For providers and/or their staff who would like to refer a patient to [facility], please contact us through our one-stop-shop provider referral line, [facility], at [phone].    If you feel you have received this communication in error or would no longer like to receive these types of communications, please e-mail [email or web address]

## 2019-05-21 NOTE — PROGRESS NOTES
Subjective:       Patient ID: Linnea Renae is a 68 y.o. female.    Chief Complaint: Consult    CC: Weight. Hanging pannus.     Current attempts at weight loss: New pt to me, referred by Bailee Moss MD  [address] [place] [address] , with Patient Active Problem List:     Hypertension     Type 2 diabetes mellitus without complication, without long-term current use of insulin     Hyperlipidemia     DDD (degenerative disc disease), lumbar     Perennial allergic rhinitis     Dry eyes     Morbid obesity with BMI of 40.0-44.9, adult     GERD (gastroesophageal reflux disease)     Mazoplasia     DCIS (ductal carcinoma in situ) of breast     Anal skin tag     Spondylolisthesis at L4-L5 level     Spinal stenosis, lumbar     Atherosclerosis of aorta     Chronic kidney disease, stage II (mild)     Positive depression screening     Back pain     Chronic pain     Hip pain, bilateral     Impaired gait and mobility     Decreased functional activity tolerance     Decreased spinal mobility     Decreased strength     Lumbar spondylosis     Pain in lower back     Decreased range of motion     Diabetic peripheral neuropathy associated with type 2 diabetes mellitus     Lab Results       Component                Value               Date                       HGBA1C                   6.0 (H)             03/19/2019                 HGBA1C                   6.1 (H)             09/17/2018                 HGBA1C                   5.9 (H)             03/09/2018            Lab Results       Component                Value               Date                       LDLCALC                  78.0                03/19/2019                 CREATININE               0.8                 03/19/2019                 No efforts currently.  Does not exercise. Will be starting with  in June. Does state that eating habits are poor.       Previous diet attempts:  Denies.     History of medication for loss: Denies.     Heaviest  "weight: 310#    Lightest weight: 150#    Goal weight: 200#      Last eye exam:       March 28, 2019. No glaucoma per pt.                                 Provider:    Typical eating patterns:  Retired. Lives with . Pt does cooking.   Breakfast: cheerios with milk. Weekends: egg, meat, bread.     Lunch: may skip. If out- fried oysters.     Dinner: beans and rice. Stewed or baked chicken. Meatballs and spaghetti. [place]. Fried chicken with mashed and salad.  or pork chop on bread     Snacks: chips, popcorn, pickles, lollipops.     Beverages: water. Lemonade. Tea on occasion. Denies ETOH    Willingness to change: 8/10    EKG: Sinus rhythm with occasional Premature ventricular complexes  Otherwise normal ECG  When compared with ECG of 26-AUG-2014 08:53,  Premature ventricular complexes Now present    BMR: [id number]    Review of Systems   Constitutional: Negative for chills and fever.   Respiratory: Negative for shortness of breath.         Denies snoring   Cardiovascular: Positive for leg swelling. Negative for chest pain.   Gastrointestinal: Positive for constipation. Negative for diarrhea.        + GERD   Genitourinary: Negative for difficulty urinating and dysuria.   Musculoskeletal: Positive for arthralgias and back pain.   Skin: Positive for rash.   Neurological: Positive for dizziness. Negative for light-headedness.   Psychiatric/Behavioral: Positive for dysphoric mood. The patient is nervous/anxious.        Objective:     /66   Pulse 73   Ht 5' 6.5" (1.689 m)   Wt 129.3 kg (285 lb 0.9 oz)   BMI 45.32 kg/m²     Physical Exam   Constitutional: She is oriented to person, place, and time. She appears well-developed. No distress.   obese   HENT:   Head: Normocephalic and atraumatic.   Eyes: Pupils are equal, round, and reactive to light. EOM are normal. No scleral icterus.   Neck: Normal range of motion. Neck supple.   Cardiovascular: Normal rate and normal heart sounds. Exam reveals no gallop and no " friction rub.   No murmur heard.  Pulmonary/Chest: Effort normal and breath sounds normal. No respiratory distress. She has no wheezes.   Abdominal: Soft. Bowel sounds are normal. She exhibits no distension. There is no tenderness.   Musculoskeletal: Normal range of motion. She exhibits no edema.   Neurological: She is alert and oriented to person, place, and time. No cranial nerve deficit.   Skin: Skin is warm and dry. No erythema.   Psychiatric: She has a normal mood and affect. Her behavior is normal. Judgment normal.   Vitals reviewed.      Assessment:       1. Class 3 severe obesity due to excess calories with serious comorbidity and body mass index (BMI) of 45.0 to 49.9 in adult    2. Essential hypertension    3. Type 2 diabetes mellitus without complication, without long-term current use of insulin    4. Hyperlipidemia, unspecified hyperlipidemia type    5. Gastroesophageal reflux disease, esophagitis presence not specified    6. Spinal stenosis of lumbar region without neurogenic claudication        Plan:         1. Class 3 severe obesity due to excess calories with serious comorbidity and body mass index (BMI) of 45.0 to 49.9 in adult  Medical and surgical options discussed today.     Patient was informed that topiramate is used for migraine prevention and seizures. Weight loss is a common side effect that is well documented. S/he understands this. S/he was informed of the potential side effects such as serious and possibly fatal rash in which case the medication should be discontinued immediately. Paresthesias, forgetfulness, fatigue, kidney stones, GI symptoms, and changes in lab values such as electrolytes, blood counts and kidney function.    Start topiramate  in the evening for 1 week, then morning and evening.       No soda, sweet tea, juices or lemonade. All drinks should be 5 calories or less.     Exercise - Start exercise program.        Limit starchy carbohydrates (bread, rice, pasta, potatoes,  corn, peas, oatmeal, grits, tortillas, crackers, chips)    4401-2113 piyush menu and meal ideas given.     2. Essential hypertension  The current medical regimen is effective;  continue present plan and medications. Expect improvement with weight loss.     3. Type 2 diabetes mellitus without complication, without long-term current use of insulin  Expect improvement with weight loss.     4. Hyperlipidemia, unspecified hyperlipidemia type  .Expect improvement with weight loss. Recheck after 10% TBW lost.      5. Gastroesophageal reflux disease, esophagitis presence not specified  Expect improvement with weight loss  Attempt to wean PPI once 10% TBW lost.      6. Spinal stenosis of lumbar region without neurogenic claudication  Increase low impact activity as tolerated.  Avoid high impact activity, very heavy lifting or other exercise regimens that may cause discomfort.

## 2019-06-02 DIAGNOSIS — R21 RASH OF NECK: ICD-10-CM

## 2019-06-02 RX ORDER — TRIAMCINOLONE ACETONIDE 1 MG/G
CREAM TOPICAL
Refills: 0 | OUTPATIENT
Start: 2019-06-02

## 2019-06-03 ENCOUNTER — CLINICAL SUPPORT (OUTPATIENT)
Dept: REHABILITATION | Facility: HOSPITAL | Age: 69
End: 2019-06-03
Payer: MEDICARE

## 2019-06-03 DIAGNOSIS — M53.86 DECREASED RANGE OF MOTION OF LUMBAR SPINE: ICD-10-CM

## 2019-06-03 DIAGNOSIS — R29.898 DECREASED STRENGTH OF LOWER EXTREMITY: Primary | ICD-10-CM

## 2019-06-03 PROCEDURE — G8978 MOBILITY CURRENT STATUS: HCPCS | Mod: CK,HCNC

## 2019-06-03 PROCEDURE — 97110 THERAPEUTIC EXERCISES: CPT | Mod: HCNC

## 2019-06-03 PROCEDURE — 97161 PT EVAL LOW COMPLEX 20 MIN: CPT | Mod: HCNC

## 2019-06-03 PROCEDURE — G8979 MOBILITY GOAL STATUS: HCPCS | Mod: CK,HCNC

## 2019-06-03 NOTE — PLAN OF CARE
Physical Therapy Initial Evaluation     Name: Linnea Renae  [facility] Number: [id number]    Diagnosis:   Encounter Diagnoses   Name Primary?    Decreased strength of lower extremity Yes    Decreased range of motion of lumbar spine      Physician: Amara Hawthorne,*  Treatment Orders: PT Eval and Treat  Past Medical History:   Diagnosis Date    Allergy     Breast cancer     Lumpectomy 10/15.    Chronic constipation     Colon polyps     Diabetes mellitus, type 2     Dry eyes     GERD (gastroesophageal reflux disease)     Hyperlipidemia     Hypertension     Lumbar disc disease     Type 2 diabetes mellitus      Current Outpatient Medications   Medication Sig    aspirin 81 MG Chew Take 81 mg by mouth once daily.    atenolol (TENORMIN) 50 MG tablet Take 1 tablet (50 mg total) by mouth 2 (two) times daily.    atorvastatin (LIPITOR) 20 MG tablet Take 1 tablet (20 mg total) by mouth once daily.    blood sugar diagnostic Strp 1 strip by Misc.(Non-Drug; Combo Route) route once daily.    blood-glucose meter kit Use as instructed    calcium-vitamin D3 500 mg(1,250mg) -200 unit per tablet Take 1 tablet by mouth 2 (two) times daily with meals.     captopril (CAPOTEN) 50 MG tablet TAKE TWO TABLETS BY MOUTH IN THE MORNING AND ONE TABLET IN THE EVENING    clotrimazole (LOTRIMIN) 1 % cream Apply topically 2 (two) times daily.    cycloSPORINE (RESTASIS) 0.05 % ophthalmic emulsion 1 drop 2 (two) times daily.    diclofenac sodium (VOLTAREN) 1 % Gel Apply 2 g topically 4 (four) times daily.    fluticasone (FLONASE) 50 mcg/actuation nasal spray 2 sprays (100 mcg total) by Each Nare route once daily.    hydroCHLOROthiazide (HYDRODIURIL) 25 MG tablet Take 1 tablet (25 mg total) by mouth once daily.    ibuprofen (ADVIL,MOTRIN) 800 MG tablet Take 1 tablet (800 mg total) by mouth 3 (three) times daily as needed.    ketoconazole (NIZORAL) 2 % cream Apply topically  once daily.    ketoconazole (NIZORAL) 2 % shampoo     lancets Misc 1 lancet by Misc.(Non-Drug; Combo Route) route once daily.    loratadine 10 mg Cap     metFORMIN (GLUCOPHAGE-XR) 500 MG 24 hr tablet TAKE TWO TABLETS BY MOUTH ONCE DAILY WITH BREAKFAST    minoxidil (LONITEN) 2.5 MG tablet     multivitamin (ONE DAILY MULTIVITAMIN) per tablet Take 1 tablet by mouth once daily.    omeprazole (PRILOSEC) 20 MG capsule Take 1 capsule (20 mg total) by mouth once daily.    tiZANidine (ZANAFLEX) 4 MG tablet Take 1 tablet (4 mg total) by mouth 2 (two) times daily as needed.    topiramate (TOPAMAX) 25 MG tablet Take 1 tablet (25 mg total) by mouth 2 (two) times daily.    traMADol (ULTRAM) 50 mg tablet Take 1 tablet (50 mg total) by mouth every 6 (six) hours as needed.     No current facility-administered medications for this visit.      Review of patient's allergies indicates:   Allergen Reactions    Codeine Itching       Time In: 1:00 PM  Time Out: 1:50 PM  Visit amount: 113.2  Total amount: 113.2    Evaluation Date: 6/3/19  Visit # authorized: 1/20  Authorization period: 12/31/19  Plan of care Expiration: 9/3/19 or 12 visits  MD referral:   M47.9 (ICD-10-CM) - Osteoarthritis of spine, unspecified spinal osteoarthritis complication status, unspecified spinal region   M79.10 (ICD-10-CM) - Myalgia   M47.816 (ICD-10-CM) - Lumbar spondylosis   M51.36 (ICD-10-CM) - DDD (degenerative disc disease), lumbar   M46.1 (ICD-10-CM) - Sacroiliitis   M47.819 (ICD-10-CM) - Spondylosis without myelopathy         Subjective     Patient reports that her low back has been bothering her since 2005.  Symptoms have now increased to B hips c/ R hip worse than L.  Radicular symptoms down to B feet. Pt reports that she could not place any weight on R side and she was compensating c/ LLE.  Nerve ablasion to R side in December 2018 and L side March 2019; symptoms decreased in April 2019.      Diagnostic Imaging: See EMR for imaging    Pain  Scale: Linnea rates pain on a scale of 0-10 to be 10 at worst; 2 currently; 2 at best .  Onset: gradual  Aggravating factors:   Walking long distances and standing  Easing factors:  Sitting in certain positions and sleep.  Pain medications and ice.  Prior Therapy: aquatic therapy  Functional Deficits Leading to Referral: pain and limitations with functional mobility  Prior functional status: Independent   Occupation: Retired                       Pts goals:  To be able to walk in the park and play with grandkids.    Objective     Posture Alignment: slouched posture, forward head, rounded shoulders    Palpation: TTP along lateral hips and low back    LUMBAR SPINE AROM:   Flexion: 100%   Extension: 50%   Left Sidebend: 50%   Right Sidebend: 50%   Left Rotation: 50%   Right Rotation: 50%     SEGMENTAL MOBILITY: hypomobile    LOWER EXTREMITY STRENGTH:   Left Right   Quadriceps 4/5 4/5   Hamstrings 4/5 4/5     Iliopsoas 4-/5 4-/5   PGM 4/5 4/5   Hip IR 4/5 4/5   Hip ER 4/5 4/5   Hip Ext 4/5 4/5     Dermatomes: Sensation: Light Touch: Intact    FLEXIBILITY: decreased B piriformis length    Special Tests:   Left Right   Slump (-) (+)     GAIT: Linnea displays antalgic gait pattern c/ B trendelenburg and decreased step length.    Pt/family was provided educational information, including: role of PT, goals for PT, scheduling - pt verbalized understanding. Discussed insurance limitations with pt.     TREATMENT     PT Evaluation Completed? Yes  Discussed Plan of Care with patient: Yes    Linnea received 10 minutes of therapeutic exercise including:    *pt performed therex in inclined position*  Prone on elbows (centralized symptoms)  Supine clamshells  Ball squeeze    Written Home Exercises Provided: see pt instructions (see handout)   Linnea demo good understanding of the education provided. Patient demo good return demo of skill of exercises.    Assessment     History  Co-morbidities and personal factors that may impact the plan of  care Examination  Body Structures and Functions, activity limitations and participation restrictions that may impact the plan of care    Clinical Presentation   Co-morbidities:   diabetes, high BMI, history of cancer and HTN        Personal Factors:   lifestyle Body Regions:   back  lower extremities    Body Systems:    ROM  strength  gait            Participation Restrictions:   Exercise, fishing, and crabbing     Activity limitations:     Mobility  walking    Self care  dressing    Domestic Life  assisting others             stable and uncomplicated                      low   low  low Decision Making/ Complexity Score:  low     Patient is a 68 year old female that presents to outpatient PT c/ a PT diagnosis of decreased BLE strength and lumbar ROM. Pt responded well to prone on elbows c/ symptoms centralizing.  Pt is highly motivated to return to ambulating in the park c/ her .  Pt prognosis is Good.  Pt will benefit from skilled outpatient physical therapy to address the above stated deficits, provide pt/family education and to maximize pt's level of independence.     Medical necessity is demonstrated by the following IMPAIRMENTS/PROBLEMS:  1. Increased Pain  2. Decreased Segmental Mobility & Decreased ROM  3. Decreased Core & BLE strength  4. Decreased Flexibility BLE  5. Decreased Tolerance to Functional Activities    Pt's spiritual, cultural and educational needs considered and pt agreeable to plan of care and goals as stated below:     Anticipated Barriers for physical therapy: none    Short Term GOALS: 4 visits. Pt agrees with goals set.  1. Patient demonstrates independence with HEP.   2. Patient demonstrates independence with Postural Awareness.   3. Patient demonstrates independence with body mechanics.   4. Pt will be able to walk 2 blocks without an increase in symptoms.    Long Term GOALS: 12 visits. Pt agrees with goals set.  1. Patient demonstrates increased lumbar ROM by 25% in all planes to  improve tolerance to functional activities.   2. Patient demonstrates increased strength BLE's to 4+/5 or greater to improve tolerance to functional activities.   3. Patient demonstrates improved overall function per FOTO Lumbar Survey to 51% or less.   4. Pt will be able to walk 4 blocks without an increase in symptoms.      Functional Limitations Reports - G Codes  Category: Mobility  Tool: FOTO Lumbar Survey  Score: 53% Limitation   TEST SCORE  Modifier  Impairment Limitation Restriction   0  CH  0 % impaired, limited or restricted   1-9  CI  @ least 1% but less than 20% impaired, limited or restricted   10- 19  CJ  @ least 20%<40% impaired, limited or restricted   20- 29  CK  @ least 40%<60% impaired, limited or restricted   30- 39  CL  @ least 60% <80% impaired, limited or restricted   40- 49  CM  @ least 80%<100% impaired limited or restricted   50/50  CN  100% impaired, limited or restricted     Current/: CK = 53% Limitation  Goal/ : CK = 51% Limitation     PLAN     Outpatient physical therapy 2 times weekly to include: pt ed, hep, therapeutic exercises, neuromuscular re-education/ balance exercises, joint mobilizations, aquatic therapy and modalities prn. Cont PT for 12 visits. Pt may be seen by PTA as part of the rehabilitation team.     Therapist: Basil Mcmillan, PT,DPT  6/3/2019

## 2019-06-03 NOTE — PROGRESS NOTES
Physical Therapy Initial Evaluation     Name: Linnea Renae  [facility] Number: [id number]    Diagnosis:   Encounter Diagnoses   Name Primary?    Decreased strength of lower extremity Yes    Decreased range of motion of lumbar spine      Physician: Amara Hawthorne,*  Treatment Orders: PT Eval and Treat  Past Medical History:   Diagnosis Date    Allergy     Breast cancer     Lumpectomy 10/15.    Chronic constipation     Colon polyps     Diabetes mellitus, type 2     Dry eyes     GERD (gastroesophageal reflux disease)     Hyperlipidemia     Hypertension     Lumbar disc disease     Type 2 diabetes mellitus      Current Outpatient Medications   Medication Sig    aspirin 81 MG Chew Take 81 mg by mouth once daily.    atenolol (TENORMIN) 50 MG tablet Take 1 tablet (50 mg total) by mouth 2 (two) times daily.    atorvastatin (LIPITOR) 20 MG tablet Take 1 tablet (20 mg total) by mouth once daily.    blood sugar diagnostic Strp 1 strip by Misc.(Non-Drug; Combo Route) route once daily.    blood-glucose meter kit Use as instructed    calcium-vitamin D3 500 mg(1,250mg) -200 unit per tablet Take 1 tablet by mouth 2 (two) times daily with meals.     captopril (CAPOTEN) 50 MG tablet TAKE TWO TABLETS BY MOUTH IN THE MORNING AND ONE TABLET IN THE EVENING    clotrimazole (LOTRIMIN) 1 % cream Apply topically 2 (two) times daily.    cycloSPORINE (RESTASIS) 0.05 % ophthalmic emulsion 1 drop 2 (two) times daily.    diclofenac sodium (VOLTAREN) 1 % Gel Apply 2 g topically 4 (four) times daily.    fluticasone (FLONASE) 50 mcg/actuation nasal spray 2 sprays (100 mcg total) by Each Nare route once daily.    hydroCHLOROthiazide (HYDRODIURIL) 25 MG tablet Take 1 tablet (25 mg total) by mouth once daily.    ibuprofen (ADVIL,MOTRIN) 800 MG tablet Take 1 tablet (800 mg total) by mouth 3 (three) times daily as needed.    ketoconazole (NIZORAL) 2 % cream Apply topically  once daily.    ketoconazole (NIZORAL) 2 % shampoo     lancets Misc 1 lancet by Misc.(Non-Drug; Combo Route) route once daily.    loratadine 10 mg Cap     metFORMIN (GLUCOPHAGE-XR) 500 MG 24 hr tablet TAKE TWO TABLETS BY MOUTH ONCE DAILY WITH BREAKFAST    minoxidil (LONITEN) 2.5 MG tablet     multivitamin (ONE DAILY MULTIVITAMIN) per tablet Take 1 tablet by mouth once daily.    omeprazole (PRILOSEC) 20 MG capsule Take 1 capsule (20 mg total) by mouth once daily.    tiZANidine (ZANAFLEX) 4 MG tablet Take 1 tablet (4 mg total) by mouth 2 (two) times daily as needed.    topiramate (TOPAMAX) 25 MG tablet Take 1 tablet (25 mg total) by mouth 2 (two) times daily.    traMADol (ULTRAM) 50 mg tablet Take 1 tablet (50 mg total) by mouth every 6 (six) hours as needed.     No current facility-administered medications for this visit.      Review of patient's allergies indicates:   Allergen Reactions    Codeine Itching       Time In: 1:00 PM  Time Out: 1:50 PM  Visit amount: 113.2  Total amount: 113.2    Evaluation Date: 6/3/19  Visit # authorized: 1/20  Authorization period: 12/31/19  Plan of care Expiration: 9/3/19 or 12 visits  MD referral:   M47.9 (ICD-10-CM) - Osteoarthritis of spine, unspecified spinal osteoarthritis complication status, unspecified spinal region   M79.10 (ICD-10-CM) - Myalgia   M47.816 (ICD-10-CM) - Lumbar spondylosis   M51.36 (ICD-10-CM) - DDD (degenerative disc disease), lumbar   M46.1 (ICD-10-CM) - Sacroiliitis   M47.819 (ICD-10-CM) - Spondylosis without myelopathy         Subjective     Patient reports that her low back has been bothering her since 2005.  Symptoms have now increased to B hips c/ R hip worse than L.  Radicular symptoms down to B feet. Pt reports that she could not place any weight on R side and she was compensating c/ LLE.  Nerve ablasion to R side in December 2018 and L side March 2019; symptoms decreased in April 2019.      Diagnostic Imaging: See EMR for imaging    Pain  Scale: Linnea rates pain on a scale of 0-10 to be 10 at worst; 2 currently; 2 at best .  Onset: gradual  Aggravating factors:   Walking long distances and standing  Easing factors:  Sitting in certain positions and sleep.  Pain medications and ice.  Prior Therapy: aquatic therapy  Functional Deficits Leading to Referral: pain and limitations with functional mobility  Prior functional status: Independent   Occupation: Retired                       Pts goals:  To be able to walk in the park and play with grandkids.    Objective     Posture Alignment: slouched posture, forward head, rounded shoulders    Palpation: TTP along lateral hips and low back    LUMBAR SPINE AROM:   Flexion: 100%   Extension: 50%   Left Sidebend: 50%   Right Sidebend: 50%   Left Rotation: 50%   Right Rotation: 50%     SEGMENTAL MOBILITY: hypomobile    LOWER EXTREMITY STRENGTH:   Left Right   Quadriceps 4/5 4/5   Hamstrings 4/5 4/5     Iliopsoas 4-/5 4-/5   PGM 4/5 4/5   Hip IR 4/5 4/5   Hip ER 4/5 4/5   Hip Ext 4/5 4/5     Dermatomes: Sensation: Light Touch: Intact    FLEXIBILITY: decreased B piriformis length    Special Tests:   Left Right   Slump (-) (+)     GAIT: Linnea displays antalgic gait pattern c/ B trendelenburg and decreased step length.    Pt/family was provided educational information, including: role of PT, goals for PT, scheduling - pt verbalized understanding. Discussed insurance limitations with pt.     TREATMENT     PT Evaluation Completed? Yes  Discussed Plan of Care with patient: Yes    Linnea received 10 minutes of therapeutic exercise including:    *pt performed therex in inclined position*  Prone on elbows (centralized symptoms)  Supine clamshells  Ball squeeze    Written Home Exercises Provided: see pt instructions (see handout)   Linnea demo good understanding of the education provided. Patient demo good return demo of skill of exercises.    Assessment     History  Co-morbidities and personal factors that may impact the plan of  care Examination  Body Structures and Functions, activity limitations and participation restrictions that may impact the plan of care    Clinical Presentation   Co-morbidities:   diabetes, high BMI, history of cancer and HTN        Personal Factors:   lifestyle Body Regions:   back  lower extremities    Body Systems:    ROM  strength  gait            Participation Restrictions:   Exercise, fishing, and crabbing     Activity limitations:     Mobility  walking    Self care  dressing    Domestic Life  assisting others             stable and uncomplicated                      low   low  low Decision Making/ Complexity Score:  low     Patient is a 68 year old female that presents to outpatient PT c/ a PT diagnosis of decreased BLE strength and lumbar ROM. Pt responded well to prone on elbows c/ symptoms centralizing.  Pt is highly motivated to return to ambulating in the park c/ her .  Pt prognosis is Good.  Pt will benefit from skilled outpatient physical therapy to address the above stated deficits, provide pt/family education and to maximize pt's level of independence.     Medical necessity is demonstrated by the following IMPAIRMENTS/PROBLEMS:  1. Increased Pain  2. Decreased Segmental Mobility & Decreased ROM  3. Decreased Core & BLE strength  4. Decreased Flexibility BLE  5. Decreased Tolerance to Functional Activities    Pt's spiritual, cultural and educational needs considered and pt agreeable to plan of care and goals as stated below:     Anticipated Barriers for physical therapy: none    Short Term GOALS: 4 visits. Pt agrees with goals set.  1. Patient demonstrates independence with HEP.   2. Patient demonstrates independence with Postural Awareness.   3. Patient demonstrates independence with body mechanics.   4. Pt will be able to walk 2 blocks without an increase in symptoms.    Long Term GOALS: 12 visits. Pt agrees with goals set.  1. Patient demonstrates increased lumbar ROM by 25% in all planes to  improve tolerance to functional activities.   2. Patient demonstrates increased strength BLE's to 4+/5 or greater to improve tolerance to functional activities.   3. Patient demonstrates improved overall function per FOTO Lumbar Survey to 51% or less.   4. Pt will be able to walk 4 blocks without an increase in symptoms.      Functional Limitations Reports - G Codes  Category: Mobility  Tool: FOTO Lumbar Survey  Score: 53% Limitation   TEST SCORE  Modifier  Impairment Limitation Restriction   0  CH  0 % impaired, limited or restricted   1-9  CI  @ least 1% but less than 20% impaired, limited or restricted   10- 19  CJ  @ least 20%<40% impaired, limited or restricted   20- 29  CK  @ least 40%<60% impaired, limited or restricted   30- 39  CL  @ least 60% <80% impaired, limited or restricted   40- 49  CM  @ least 80%<100% impaired limited or restricted   50/50  CN  100% impaired, limited or restricted     Current/: CK = 53% Limitation  Goal/ : CK = 51% Limitation     PLAN     Outpatient physical therapy 2 times weekly to include: pt ed, hep, therapeutic exercises, neuromuscular re-education/ balance exercises, joint mobilizations, aquatic therapy and modalities prn. Cont PT for 12 visits. Pt may be seen by PTA as part of the rehabilitation team.     Therapist: Basil Mcmillan, PT,DPT  6/3/2019

## 2019-06-17 NOTE — PROGRESS NOTES
Physical Therapy Daily Treatment Note     Name: Linnea Renae  Clinic Number: [id number]    Therapy Diagnosis:   Encounter Diagnoses   Name Primary?    Decreased strength of lower extremity     Decreased range of motion of lumbar spine      Physician: Amara Hawthorne,*    Visit Date: 6/19/2019    Physician Orders: PT eval & treat  Medical Diagnosis:   M47.9 (ICD-10-CM) - Osteoarthritis of spine, unspecified spinal osteoarthritis complication status, unspecified spinal region   M79.10 (ICD-10-CM) - Myalgia   M47.816 (ICD-10-CM) - Lumbar spondylosis   M51.36 (ICD-10-CM) - DDD (degenerative disc disease), lumbar   M46.1 (ICD-10-CM) - Sacroiliitis   M47.819 (ICD-10-CM) - Spondylosis without myelopathy       Evaluation Date: 6/3/19  Authorization Period Expiration: 12/31/19  Plan of Care Certification Period: 9/3/19 or 12 visits  Visit #/Visits authorized: 2/ 20   FOTO: 2/5    Gcode: 2/10  Visit:  57.78  Total: 170.98    Time In: 4:00pm  Time Out: 4:35pm  Total Billable Time: 25 minutes    Precautions: Standard and Diabetes    Subjective     Pt reports:  She was cleaning the bathtub today & thinks she pulled something in her gluteal because she felt good enough to do it. C/o R gluteal pain.   She was compliant with home exercise program.  Response to previous treatment: was hurting more an hour after completing the exercises, each time   Functional change: none; last visit was initial eval     Pain: 3/10  Location: right back  and buttocks      Objective     Linnea received education & therapeutic exercises to develop strength, endurance, ROM, flexibility and posture for 15 minutes including:    Prone on elbows (centralized symptoms) 3' x3 reps     *pt performed therex in inclined position*  Supine clamshells NP  Ball squeeze NP      Linnea received the following manual therapy techniques: Soft tissue Mobilization were applied to the: R piriformis for 10 minutes, including:  Gentle STM in L sidelying       Linnea  received cold pack for 10 minutes to R gluteal in supine (on incline)  to decrease circulation, pain, and swelling.      Home Exercises Provided and Patient Education Provided     Education provided:   - HEP compliance, avoidance of repetitive & prolonged lumbar flexion, proper sitting posture, slow return to pain free activities     Written Home Exercises Provided: yes.  Exercises were reviewed and Linnea was able to demonstrate them prior to the end of the session.  Linnea demonstrated good  understanding of the education provided.     See EMR under Patient Instructions for exercises provided 6/19/2019.      Assessment     Pt demonstrated fair tolerance today, had increased pain from cleaning the tub prior to therapy. Pt's symptoms centralized to central lumbar spine after prone lumbar extension exercise. Pt had increased TTP R gluteal, was unable to tolerate moderate pressure with STM; light pressure was applied.   Linnea is progressing well towards her goals.   Pt prognosis is Good.     Pt will continue to benefit from skilled outpatient physical therapy to address the deficits listed in the problem list box on initial evaluation, provide pt/family education and to maximize pt's level of independence in the home and community environment.     Pt's spiritual, cultural and educational needs considered and pt agreeable to plan of care and goals.    Anticipated barriers to physical therapy: none    Goals:   Short Term GOALS: 4 visits. Pt agrees with goals set.  1. Patient demonstrates independence with HEP.  (progressing, not met)   2. Patient demonstrates independence with Postural Awareness. (progressing, not met)   3. Patient demonstrates independence with body mechanics.  (progressing, not met)   4. Pt will be able to walk 2 blocks without an increase in symptoms. (progressing, not met)      Long Term GOALS: 12 visits. Pt agrees with goals set.  1. Patient demonstrates increased lumbar ROM by 25% in all planes to  improve tolerance to functional activities. (progressing, not met)   2. Patient demonstrates increased strength BLE's to 4+/5 or greater to improve tolerance to functional activities. (progressing, not met)   3. Patient demonstrates improved overall function per FOTO Lumbar Survey to 51% or less. (progressing, not met)   4. Pt will be able to walk 4 blocks without an increase in symptoms. (progressing, not met)     Plan     Continue with established plan of care towards PT goals.     Mariza Galaviz, PT

## 2019-06-19 ENCOUNTER — CLINICAL SUPPORT (OUTPATIENT)
Dept: REHABILITATION | Facility: HOSPITAL | Age: 69
End: 2019-06-19
Payer: MEDICARE

## 2019-06-19 DIAGNOSIS — M53.86 DECREASED RANGE OF MOTION OF LUMBAR SPINE: ICD-10-CM

## 2019-06-19 DIAGNOSIS — R29.898 DECREASED STRENGTH OF LOWER EXTREMITY: ICD-10-CM

## 2019-06-19 PROCEDURE — 97140 MANUAL THERAPY 1/> REGIONS: CPT | Mod: HCNC

## 2019-06-19 PROCEDURE — 97110 THERAPEUTIC EXERCISES: CPT | Mod: HCNC

## 2019-06-27 DIAGNOSIS — M48.061 DEGENERATIVE LUMBAR SPINAL STENOSIS: ICD-10-CM

## 2019-06-28 RX ORDER — TRAMADOL HYDROCHLORIDE 50 MG/1
TABLET ORAL
Qty: 120 TABLET | Refills: 2 | Status: SHIPPED | OUTPATIENT
Start: 2019-06-28 | End: 2019-09-27 | Stop reason: SDUPTHER

## 2019-07-09 ENCOUNTER — OFFICE VISIT (OUTPATIENT)
Dept: PAIN MEDICINE | Facility: CLINIC | Age: 69
End: 2019-07-09
Payer: MEDICARE

## 2019-07-09 VITALS
DIASTOLIC BLOOD PRESSURE: 62 MMHG | BODY MASS INDEX: 43.47 KG/M2 | HEART RATE: 68 BPM | HEIGHT: 67 IN | SYSTOLIC BLOOD PRESSURE: 152 MMHG | WEIGHT: 277 LBS | TEMPERATURE: 98 F | RESPIRATION RATE: 18 BRPM

## 2019-07-09 DIAGNOSIS — M79.10 MYALGIA: ICD-10-CM

## 2019-07-09 DIAGNOSIS — M47.816 LUMBAR SPONDYLOSIS: ICD-10-CM

## 2019-07-09 DIAGNOSIS — M47.819 SPONDYLOSIS WITHOUT MYELOPATHY: ICD-10-CM

## 2019-07-09 DIAGNOSIS — M47.9 OSTEOARTHRITIS OF SPINE, UNSPECIFIED SPINAL OSTEOARTHRITIS COMPLICATION STATUS, UNSPECIFIED SPINAL REGION: Primary | ICD-10-CM

## 2019-07-09 DIAGNOSIS — M46.1 SACROILIITIS: ICD-10-CM

## 2019-07-09 PROCEDURE — 1101F PT FALLS ASSESS-DOCD LE1/YR: CPT | Mod: HCNC,CPTII,S$GLB, | Performed by: NURSE PRACTITIONER

## 2019-07-09 PROCEDURE — 3077F PR MOST RECENT SYSTOLIC BLOOD PRESSURE >= 140 MM HG: ICD-10-PCS | Mod: HCNC,CPTII,S$GLB, | Performed by: NURSE PRACTITIONER

## 2019-07-09 PROCEDURE — 99999 PR PBB SHADOW E&M-EST. PATIENT-LVL III: CPT | Mod: PBBFAC,HCNC,, | Performed by: NURSE PRACTITIONER

## 2019-07-09 PROCEDURE — 99213 PR OFFICE/OUTPT VISIT, EST, LEVL III, 20-29 MIN: ICD-10-PCS | Mod: HCNC,S$GLB,, | Performed by: NURSE PRACTITIONER

## 2019-07-09 PROCEDURE — 1101F PR PT FALLS ASSESS DOC 0-1 FALLS W/OUT INJ PAST YR: ICD-10-PCS | Mod: HCNC,CPTII,S$GLB, | Performed by: NURSE PRACTITIONER

## 2019-07-09 PROCEDURE — 99213 OFFICE O/P EST LOW 20 MIN: CPT | Mod: HCNC,S$GLB,, | Performed by: NURSE PRACTITIONER

## 2019-07-09 PROCEDURE — 3077F SYST BP >= 140 MM HG: CPT | Mod: HCNC,CPTII,S$GLB, | Performed by: NURSE PRACTITIONER

## 2019-07-09 PROCEDURE — 99499 RISK ADDL DX/OHS AUDIT: ICD-10-PCS | Mod: HCNC,S$GLB,, | Performed by: NURSE PRACTITIONER

## 2019-07-09 PROCEDURE — 99999 PR PBB SHADOW E&M-EST. PATIENT-LVL III: ICD-10-PCS | Mod: PBBFAC,HCNC,, | Performed by: NURSE PRACTITIONER

## 2019-07-09 PROCEDURE — 99499 UNLISTED E&M SERVICE: CPT | Mod: HCNC,S$GLB,, | Performed by: NURSE PRACTITIONER

## 2019-07-09 PROCEDURE — 3078F DIAST BP <80 MM HG: CPT | Mod: HCNC,CPTII,S$GLB, | Performed by: NURSE PRACTITIONER

## 2019-07-09 PROCEDURE — 3078F PR MOST RECENT DIASTOLIC BLOOD PRESSURE < 80 MM HG: ICD-10-PCS | Mod: HCNC,CPTII,S$GLB, | Performed by: NURSE PRACTITIONER

## 2019-07-09 NOTE — PROGRESS NOTES
Chronic Pain - Established Visit    Referring Physician: No ref. provider found    Chief Complaint: back pain       SUBJECTIVE:    Linnea Renae presents to the clinic for follow up of lower back pain.  She reports doing well since last visit.  Her pain has been tolerable.  She had benefit with previous RFAs.  She has been doing PT but feels like it worsens her pain.  She would like to stop and go to the gym on her own.  She is a member at [employer] in [place].  She is not having any leg pain or numbness today.  Her pain today is 2/10.    Physical Therapy/Home Exercise: yes     Pain Disability Index Review:  Last 3 PDI Scores 7/9/2019 5/9/2019 3/28/2019   Pain Disability Index (PDI) 41 0 0       Pain Medications:    - Opioids: Ultram (Tramadol HCL)  - Adjuvant Medications: Advil,Motrin ( Ibuprofen) and zanaflex  - Anti-Coagulants: Aspirin  - Others: see med list     report:  Reviewed and consistent with medication use as prescribed.    Lab Results   Component Value Date    HGBA1C 6.0 (H) 03/19/2019     CMP  Sodium   Date Value Ref Range Status   03/19/2019 140 136 - 145 mmol/L Final     Potassium   Date Value Ref Range Status   03/19/2019 4.0 3.5 - 5.1 mmol/L Final     Chloride   Date Value Ref Range Status   03/19/2019 100 95 - 110 mmol/L Final     CO2   Date Value Ref Range Status   03/19/2019 28 23 - 29 mmol/L Final     Glucose   Date Value Ref Range Status   03/19/2019 102 70 - 110 mg/dL Final     BUN, Bld   Date Value Ref Range Status   03/19/2019 15 8 - 23 mg/dL Final     Creatinine   Date Value Ref Range Status   03/19/2019 0.8 0.5 - 1.4 mg/dL Final     Calcium   Date Value Ref Range Status   03/19/2019 9.9 8.7 - 10.5 mg/dL Final     Total Protein   Date Value Ref Range Status   03/19/2019 7.7 6.0 - 8.4 g/dL Final     Albumin   Date Value Ref Range Status   03/19/2019 3.9 3.5 - 5.2 g/dL Final     Total Bilirubin   Date Value Ref Range Status   03/19/2019 0.5 0.1 - 1.0 mg/dL Final     Comment:     For  infants and newborns, interpretation of results should be based  on gestational age, weight and in agreement with clinical  observations.  Premature Infant recommended reference ranges:  Up to 24 hours.............<8.0 mg/dL  Up to 48 hours............<12.0 mg/dL  3-5 days..................<15.0 mg/dL  6-29 days.................<15.0 mg/dL       Alkaline Phosphatase   Date Value Ref Range Status   03/19/2019 82 55 - 135 U/L Final     AST   Date Value Ref Range Status   03/19/2019 20 10 - 40 U/L Final     ALT   Date Value Ref Range Status   03/19/2019 12 10 - 44 U/L Final     Anion Gap   Date Value Ref Range Status   03/19/2019 12 8 - 16 mmol/L Final     eGFR if    Date Value Ref Range Status   03/19/2019 >60 >60 mL/min/1.73 m^2 Final     eGFR if non    Date Value Ref Range Status   03/19/2019 >60 >60 mL/min/1.73 m^2 Final     Comment:     Calculation used to obtain the estimated glomerular filtration  rate (eGFR) is the CKD-EPI equation.          Pain Procedures:   7/29/16 Right L3-4 TF CHRISTIAN  11/3/17 Bilateral L3-4 and L4-5 facet injections  5/9/18 Bilateral L3-4 and L4-5 facet injections  7/25/18 Bilateral L3-4 and L4-5 facet injections  12/5/18 Right L2,3,4,5 RFA- 80% relief  3/20/19 Left L2,3,4,5 RFA- 100% relief    Imaging:  Narrative     EXAMINATION:  MRI LUMBAR SPINE W WO CONTRAST    CLINICAL HISTORY:  Low back pain, >6wks conservative tx, persistent-progressive sx, surgical candidate; Spondylosis without myelopathy or radiculopathy, site unspecified    TECHNIQUE:  Multiplanar, multisequence MR images were acquired from the thoracolumbar junction to the sacrum prior to and following administration of 10 cc IV Gadavist.    COMPARISON:  Lumbar spine radiograph 10/11/2017; MRI lumbar spine with/without contrast 09/20/2016    FINDINGS:  Sagittal alignment demonstrates grade 1 anterolisthesis of L3 on L4 with slight uncovering of the intervertebral disc.  Vertebral body heights are  satisfactorily maintained.  Intervertebral disc spaces are preserved.  Marrow signal is within normal limits with no evidence of fracture or marrow replacement process noting a small vertebral body hemangioma at L1.    Conus appears within normal limits terminating at the level of the L1 level.  Cauda equina appears normal.    Paraspinous soft tissues demonstrate mild prominence of the common bile duct and small left renal cysts..    T12-L1:   No spinal canal or neuroforaminal narrowing.    L1-2:  No spinal canal or neuroforaminal narrowing.    L2-3:  Mild posterior disc bulge and moderate bilateral facet arthropathy without spinal canal stenosis or neural foraminal narrowing.    L3-4:  The diffuse posterior disc bulge, buckling of the ligamentum flavum, and moderate bilateral facet arthropathy results in mild spinal canal stenosis and no neural foraminal narrowing.    L4-5:  Diffuse posterior disc bulge with superimposed central disc protrusion, buckling of the ligamentum flavum, and moderate bilateral facet arthropathy result in moderate spinal canal stenosis and no significant neural foraminal narrowing.    L5-S1:  Mild posterior disc bulge and mild facet arthropathy results in mild right/moderate left neural foraminal narrowing.    No abnormal enhancement.      Impression       Multilevel lumbar spondylosis worst at L4-5 resulting in moderate spinal canal stenosis.    Mild prominence of the common bile duct which is nonspecific and may represent sequela of cholecystectomy.         X-Ray Lumbar Spine Ap Lateral w/Flex Ext    Narrative     10/11/17 10:17:19    Accession: [id number]    CLINICAL INDICATION: 67 year old F with  low back pain    COMPARISON: Lumbar spine x-rays, 09/20/2016.    TECHNIQUE: AP, lateral, flexion, extension, and coned down lateral radiographs of the lumbar spine.    FINDINGS:     Vertebral body heights are maintained.  No evidence of fracture.      Normal sagittal alignment is preserved.No  significant translation with flexion-extension.    Moderate multilevel degenerative changes are again present. Mild anterolisthesis of L3 with respect to L4 is again noted. Multilevel facet arthropathy is present, most pronounced in the lower lumbar spine.  No detrimental change is identified when compared with the examination performed one year prior.      Impression         Moderate multilevel degenerative changes in the lumbar spine, similar in appearance to the prior exam.    No evidence of fracture or malalignment.      Electronically signed by: Heber Frost  Date: 10/11/17  Time: 10:37            Past Medical History:   Diagnosis Date    Allergy     Breast cancer     Lumpectomy 10/15.    Chronic constipation     Colon polyps     Diabetes mellitus, type 2     Dry eyes     GERD (gastroesophageal reflux disease)     Hyperlipidemia     Hypertension     Lumbar disc disease     Type 2 diabetes mellitus      Past Surgical History:   Procedure Laterality Date    BIOPSY-EXCISIONAL right breast with Wire Localization  (wire [facility] at [facility] for 0900 Right 10/7/2015    Performed by Rad[facility] Russell [facility] at [facility] OR 2ND FLR    BLOCK-FACET-LUMBAR Bilateral 11/3/2017    Performed by Viet Wilson [facility] at [facility] PAIN MGT    BREAST BIOPSY      BREAST LUMPECTOMY Right         CATARACT EXTRACTION, BILATERAL       SECTION      x3    CHOLECYSTECTOMY      COLONOSCOPY N/A 10/11/2018    Performed by Lorenzo Tanner MD at [facility] ENDO (4TH FLR)    COLONOSCOPY W/ POLYPECTOMY      HYSTERECTOMY      and USO    INJECTION, FACET JOINT Bilateral 2018    Performed by Viet Wilson MD at [facility] PAIN MGT    INJECTION-FACET Bilateral 2018    Performed by Viet Wilson MD at [facility] PAIN MGT    INJECTION-STEROID-EPIDURAL-TRANSFORAMINAL Right 2016    Performed by Viet Wilson MD at [facility] PAIN MGT    INJECTION-STEROID-EPIDURAL-TRANSFORAMINAL Bilateral 3/4/2016    Performed by Viet  [facility]ABRINA [facility] at [facility]    INJECTION-STEROID-EPIDURAL-TRANSFORAMINAL Bilateral 2016    Performed by [facility] at [facility]    RADIOFREQUENCY ABLATION LEFT L2,3,4,5 Left 3/20/2019    Performed by Viet Wislon MD at [facility]    Radiofrequency Ablation RIGHT LUMBAR L2,3,4 RFA Right 2018    Performed by Viet [facility] at [facility]    TUBAL LIGATION       Social History     Socioeconomic History    Marital status:      Spouse name: Not on file    Number of children: 3    Years of education: Not on file    Highest education level: Not on file   Occupational History    Not on file   Social Needs    Financial resource strain: Not on file    Food insecurity:     Worry: Not on file     Inability: Not on file    Transportation needs:     Medical: Not on file     Non-medical: Not on file   Tobacco Use    Smoking status: Former Smoker     Packs/day: 0.25     Years: 20.00     Pack years: 5.00     Last attempt to quit: 1985     Years since quittin.5    Smokeless tobacco: Never Used    Tobacco comment: Quit mid .   Substance and Sexual Activity    Alcohol use: No     Alcohol/week: 0.0 oz    Drug use: No    Sexual activity: Yes   Lifestyle    Physical activity:     Days per week: Not on file     Minutes per session: Not on file    Stress: Not on file   Relationships    Social connections:     Talks on phone: Not on file     Gets together: Not on file     Attends [employer] service: Not on file     Active member of club or organization: Not on file     Attends meetings of clubs or organizations: Not on file     Relationship status: Not on file   Other Topics Concern    Not on file   Social History Narrative    Not on file     Family History   Problem Relation Age of Onset    No Known Problems Mother     No Known Problems Father     Heart disease Paternal Grandmother     Thyroid disease Paternal Grandfather     Hypertension Sister      Hypertension Brother     Glaucoma Brother     No Known Problems Daughter     No Known Problems Daughter     No Known Problems Daughter        Review of patient's allergies indicates:   Allergen Reactions    Codeine Itching       Current Outpatient Medications   Medication Sig    aspirin 81 MG Chew Take 81 mg by mouth once daily.    atenolol (TENORMIN) 50 MG tablet Take 1 tablet (50 mg total) by mouth 2 (two) times daily.    atorvastatin (LIPITOR) 20 MG tablet Take 1 tablet (20 mg total) by mouth once daily.    blood sugar diagnostic Strp 1 strip by Misc.(Non-Drug; Combo Route) route once daily.    calcium-vitamin D3 500 mg(1,250mg) -200 unit per tablet Take 1 tablet by mouth 2 (two) times daily with meals.     captopril (CAPOTEN) 50 MG tablet TAKE TWO TABLETS BY MOUTH IN THE MORNING AND ONE TABLET IN THE EVENING    clotrimazole (LOTRIMIN) 1 % cream Apply topically 2 (two) times daily.    cycloSPORINE (RESTASIS) 0.05 % ophthalmic emulsion 1 drop 2 (two) times daily.    diclofenac sodium (VOLTAREN) 1 % Gel Apply 2 g topically 4 (four) times daily.    fluticasone (FLONASE) 50 mcg/actuation nasal spray 2 sprays (100 mcg total) by Each Nare route once daily.    hydroCHLOROthiazide (HYDRODIURIL) 25 MG tablet Take 1 tablet (25 mg total) by mouth once daily.    ibuprofen (ADVIL,MOTRIN) 800 MG tablet Take 1 tablet (800 mg total) by mouth 3 (three) times daily as needed.    ketoconazole (NIZORAL) 2 % cream Apply topically once daily.    ketoconazole (NIZORAL) 2 % shampoo     lancets Misc 1 lancet by Misc.(Non-Drug; Combo Route) route once daily.    loratadine 10 mg Cap     metFORMIN (GLUCOPHAGE-XR) 500 MG 24 hr tablet TAKE TWO TABLETS BY MOUTH ONCE DAILY WITH BREAKFAST    minoxidil (LONITEN) 2.5 MG tablet     multivitamin (ONE DAILY MULTIVITAMIN) per tablet Take 1 tablet by mouth once daily.    omeprazole (PRILOSEC) 20 MG capsule Take 1 capsule (20 mg total) by mouth once daily.    tiZANidine  "(ZANAFLEX) 4 MG tablet Take 1 tablet (4 mg total) by mouth 2 (two) times daily as needed.    topiramate (TOPAMAX) 25 MG tablet Take 1 tablet (25 mg total) by mouth 2 (two) times daily.    traMADol (ULTRAM) 50 mg tablet TAKE 1 TABLET BY MOUTH EVERY 6 HOURS AS NEEDED    blood-glucose meter kit Use as instructed     No current facility-administered medications for this visit.        REVIEW OF SYSTEMS:    GENERAL:  No weight loss, malaise or fevers.  HEENT:  Negative for frequent or significant headaches.  NECK:  Negative for lumps, goiter, pain and significant neck swelling.  RESPIRATORY:  Negative for cough, wheezing or shortness of breath.  CARDIOVASCULAR:  Negative for chest pain, leg swelling or palpitations.  GI:  Negative for abdominal discomfort, blood in stools or black stools or change in bowel habits. GERD.  MUSCULOSKELETAL:  See HPI.  SKIN:  Negative for lesions, rash, and itching.  PSYCH: Positive for sleep disturbance, mood disorder and recent psychosocial stressors.  HEMATOLOGY/LYMPHOLOGY:  Negative for prolonged bleeding, bruising easily or swollen nodes. H/O breast cancer with mastectomy.  NEURO:   No history of headaches, syncope, paralysis, seizures or tremors.  All other reviewed and negative other than HPI.    OBJECTIVE:    BP (!) 152/62   Pulse 68   Temp 98.1 °F (36.7 °C)   Resp 18   Ht 5' 6.5" (1.689 m)   Wt 125.6 kg (277 lb)   BMI 44.04 kg/m²     PHYSICAL EXAMINATION:    General appearance: Well appearing, in no acute distress, alert and oriented x3.  Psych:  Mood and affect appropriate.  Skin: No bruising or rashes.  Head/face:  Atraumatic, normocephalic. No palpable lymph nodes  Cor: RRR  Pulm: CTA  GI: Abdomen soft and non-tender.  Back: Straight leg raising in the sitting and supine positions is negative to radicular pain.  Pain to palpation over the lumbar facet joints on the right.  Limited ROM with pain on flexion greater than extension.  Mild facet loading bilaterally.  Painful " palpation to right SI joint.  MARY is negative.  Musculoskeletal:  Bilateral upper and lower extremity strength is normal and symmetric.  No atrophy or tone abnormalities are noted.  Neuro: Bilateral upper and lower extremity coordination and muscle stretch reflexes are physiologic and symmetric.  Plantar response are downgoing.  No loss of sensation.  Gait: Antalgic.      ASSESSMENT: 68 y.o. year old female with lower back pain, consistent with the following diagnoses:     1. Osteoarthritis of spine, unspecified spinal osteoarthritis complication status, unspecified spinal region     2. Myalgia     3. Lumbar spondylosis     4. Spondylosis without myelopathy     5. Sacroiliitis           PLAN:     - I have stressed the importance of physical activity and a home exercise plan to help with pain and improve health.    - Previous imaging was reviewed and discussed with the patient today.    - She is doing well at this time.  Can consider right SI joint injection or repeat right RFA if needed.    - Can discontinue PT and participate in gym exercises on her own.    - RTC in 6-8 weeks or sooner if needed.    - Counseled patient regarding the importance of activity modification, constant sleeping habits and physical therapy.      The above plan and management options were discussed at length with patient. Patient is in agreement with the above and verbalized understanding.      Amara Hawthorne, CARMEN  07/09/2019

## 2019-07-17 ENCOUNTER — OFFICE VISIT (OUTPATIENT)
Dept: PODIATRY | Facility: CLINIC | Age: 69
End: 2019-07-17
Payer: MEDICARE

## 2019-07-17 VITALS
HEIGHT: 66 IN | HEART RATE: 69 BPM | BODY MASS INDEX: 45.96 KG/M2 | WEIGHT: 286 LBS | SYSTOLIC BLOOD PRESSURE: 132 MMHG | DIASTOLIC BLOOD PRESSURE: 59 MMHG

## 2019-07-17 DIAGNOSIS — G57.53 TARSAL TUNNEL SYNDROME, BILATERAL LOWER LIMBS: Primary | ICD-10-CM

## 2019-07-17 DIAGNOSIS — E11.42 DIABETIC PERIPHERAL NEUROPATHY ASSOCIATED WITH TYPE 2 DIABETES MELLITUS: ICD-10-CM

## 2019-07-17 PROCEDURE — 99999 PR PBB SHADOW E&M-EST. PATIENT-LVL III: ICD-10-PCS | Mod: PBBFAC,HCNC,, | Performed by: PODIATRIST

## 2019-07-17 PROCEDURE — 99214 PR OFFICE/OUTPT VISIT, EST, LEVL IV, 30-39 MIN: ICD-10-PCS | Mod: HCNC,S$GLB,, | Performed by: PODIATRIST

## 2019-07-17 PROCEDURE — 3075F SYST BP GE 130 - 139MM HG: CPT | Mod: HCNC,CPTII,S$GLB, | Performed by: PODIATRIST

## 2019-07-17 PROCEDURE — 1101F PT FALLS ASSESS-DOCD LE1/YR: CPT | Mod: HCNC,CPTII,S$GLB, | Performed by: PODIATRIST

## 2019-07-17 PROCEDURE — 3044F HG A1C LEVEL LT 7.0%: CPT | Mod: HCNC,CPTII,S$GLB, | Performed by: PODIATRIST

## 2019-07-17 PROCEDURE — 99214 OFFICE O/P EST MOD 30 MIN: CPT | Mod: HCNC,S$GLB,, | Performed by: PODIATRIST

## 2019-07-17 PROCEDURE — 3078F PR MOST RECENT DIASTOLIC BLOOD PRESSURE < 80 MM HG: ICD-10-PCS | Mod: HCNC,CPTII,S$GLB, | Performed by: PODIATRIST

## 2019-07-17 PROCEDURE — 3078F DIAST BP <80 MM HG: CPT | Mod: HCNC,CPTII,S$GLB, | Performed by: PODIATRIST

## 2019-07-17 PROCEDURE — 3075F PR MOST RECENT SYSTOLIC BLOOD PRESS GE 130-139MM HG: ICD-10-PCS | Mod: HCNC,CPTII,S$GLB, | Performed by: PODIATRIST

## 2019-07-17 PROCEDURE — 1101F PR PT FALLS ASSESS DOC 0-1 FALLS W/OUT INJ PAST YR: ICD-10-PCS | Mod: HCNC,CPTII,S$GLB, | Performed by: PODIATRIST

## 2019-07-17 PROCEDURE — 3044F PR MOST RECENT HEMOGLOBIN A1C LEVEL <7.0%: ICD-10-PCS | Mod: HCNC,CPTII,S$GLB, | Performed by: PODIATRIST

## 2019-07-17 PROCEDURE — 99999 PR PBB SHADOW E&M-EST. PATIENT-LVL III: CPT | Mod: PBBFAC,HCNC,, | Performed by: PODIATRIST

## 2019-07-18 ENCOUNTER — OFFICE VISIT (OUTPATIENT)
Dept: PRIMARY CARE CLINIC | Facility: CLINIC | Age: 69
End: 2019-07-18
Payer: MEDICARE

## 2019-07-18 ENCOUNTER — IMMUNIZATION (OUTPATIENT)
Dept: PHARMACY | Facility: CLINIC | Age: 69
End: 2019-07-18
Payer: MEDICARE

## 2019-07-18 VITALS
WEIGHT: 280.88 LBS | DIASTOLIC BLOOD PRESSURE: 70 MMHG | HEART RATE: 70 BPM | BODY MASS INDEX: 45.14 KG/M2 | SYSTOLIC BLOOD PRESSURE: 128 MMHG | HEIGHT: 66 IN

## 2019-07-18 DIAGNOSIS — Z23 NEED FOR SHINGLES VACCINE: ICD-10-CM

## 2019-07-18 DIAGNOSIS — M48.061 DEGENERATIVE LUMBAR SPINAL STENOSIS: ICD-10-CM

## 2019-07-18 DIAGNOSIS — E78.5 HYPERLIPIDEMIA, UNSPECIFIED HYPERLIPIDEMIA TYPE: ICD-10-CM

## 2019-07-18 DIAGNOSIS — I10 ESSENTIAL HYPERTENSION: ICD-10-CM

## 2019-07-18 DIAGNOSIS — E11.9 TYPE 2 DIABETES MELLITUS WITHOUT COMPLICATION, WITHOUT LONG-TERM CURRENT USE OF INSULIN: Primary | ICD-10-CM

## 2019-07-18 PROCEDURE — 99214 OFFICE O/P EST MOD 30 MIN: CPT | Mod: HCNC,S$GLB,, | Performed by: INTERNAL MEDICINE

## 2019-07-18 PROCEDURE — 99999 PR PBB SHADOW E&M-EST. PATIENT-LVL III: CPT | Mod: PBBFAC,HCNC,, | Performed by: INTERNAL MEDICINE

## 2019-07-18 PROCEDURE — 3074F SYST BP LT 130 MM HG: CPT | Mod: HCNC,CPTII,S$GLB, | Performed by: INTERNAL MEDICINE

## 2019-07-18 PROCEDURE — 3074F PR MOST RECENT SYSTOLIC BLOOD PRESSURE < 130 MM HG: ICD-10-PCS | Mod: HCNC,CPTII,S$GLB, | Performed by: INTERNAL MEDICINE

## 2019-07-18 PROCEDURE — 1101F PT FALLS ASSESS-DOCD LE1/YR: CPT | Mod: HCNC,CPTII,S$GLB, | Performed by: INTERNAL MEDICINE

## 2019-07-18 PROCEDURE — 99214 PR OFFICE/OUTPT VISIT, EST, LEVL IV, 30-39 MIN: ICD-10-PCS | Mod: HCNC,S$GLB,, | Performed by: INTERNAL MEDICINE

## 2019-07-18 PROCEDURE — 3044F PR MOST RECENT HEMOGLOBIN A1C LEVEL <7.0%: ICD-10-PCS | Mod: HCNC,CPTII,S$GLB, | Performed by: INTERNAL MEDICINE

## 2019-07-18 PROCEDURE — 99999 PR PBB SHADOW E&M-EST. PATIENT-LVL III: ICD-10-PCS | Mod: PBBFAC,HCNC,, | Performed by: INTERNAL MEDICINE

## 2019-07-18 PROCEDURE — 3044F HG A1C LEVEL LT 7.0%: CPT | Mod: HCNC,CPTII,S$GLB, | Performed by: INTERNAL MEDICINE

## 2019-07-18 PROCEDURE — 1101F PR PT FALLS ASSESS DOC 0-1 FALLS W/OUT INJ PAST YR: ICD-10-PCS | Mod: HCNC,CPTII,S$GLB, | Performed by: INTERNAL MEDICINE

## 2019-07-18 PROCEDURE — 3078F DIAST BP <80 MM HG: CPT | Mod: HCNC,CPTII,S$GLB, | Performed by: INTERNAL MEDICINE

## 2019-07-18 PROCEDURE — 99499 RISK ADDL DX/OHS AUDIT: ICD-10-PCS | Mod: HCNC,S$GLB,, | Performed by: INTERNAL MEDICINE

## 2019-07-18 PROCEDURE — 3078F PR MOST RECENT DIASTOLIC BLOOD PRESSURE < 80 MM HG: ICD-10-PCS | Mod: HCNC,CPTII,S$GLB, | Performed by: INTERNAL MEDICINE

## 2019-07-18 PROCEDURE — 99499 UNLISTED E&M SERVICE: CPT | Mod: HCNC,S$GLB,, | Performed by: INTERNAL MEDICINE

## 2019-07-18 RX ORDER — VALSARTAN 160 MG/1
160 TABLET ORAL DAILY
Qty: 30 TABLET | Refills: 3 | Status: SHIPPED | OUTPATIENT
Start: 2019-07-18 | End: 2019-10-11 | Stop reason: ALTCHOICE

## 2019-07-18 NOTE — PROGRESS NOTES
"Subjective:       Patient ID: Linnea Renae is a 68 y.o. female.    Chief Complaint: Diabetes    Last seen 4 months ago for annual exam. Returns for f/u chronic medical conditions. Back pain has improved after having the ablation earlier this year, and foot pain has improved after receiving injections from Podiatry. Glucose remains controlled near 100 fasting. No new complaints.     PMH: .   Hypertension.  Diabetes Type 2, last HbA1c 6.0% .  Hyperlipidemia. LDL 78 .  GERD.  Lumbar Spinal Stenosis seen regularly by [facility].   Chronic Dry Eyes.   Perennial Allergic Rhinitis.  Morbid Obesity, BMI up to 46. TSH normal 1.13 .   Right Breast Cancer diagnosed 10/15.  H/O Colon Polyps.     PSH: C/S x 3, BTL, Hysterectomy and USO, Bilateral Cataracts extracted, Cholecystectomy. Right breast excisional biopsy .    Mammogram normal , no recent Pelvic exam. BMD normal 8/15. Colonoscopy 10/18 - sigmoid diverticulosis, otherwise normal. Eye exam 3/28/19. Podiatry . Prevnar 8/15. Zostavax 3/16. Flu shot . Td . Pneumovax 3/19.     Social: Remote tobacco use, quit over 30 years ago. No alcohol.  with three daughters and three grandchildren. Homemaker.     FMH: Father living age 83 in good health. Mother  young, cause unknown. CHF in PGM, Thyroid disease in PGF.     NKDA.     Medications: list reviewed and reconciled. Uses Ibuprofen intermittently, not every day.             Review of Systems   Constitutional: Negative for chills and fever.   Respiratory: Negative for cough and shortness of breath.    Cardiovascular: Negative for chest pain, palpitations and leg swelling.   Neurological: Negative for dizziness, syncope, weakness and headaches.       Objective:    /70, Pulse 70, Ht 5' 6", Wt 281 lbs, BMI=45  Physical Exam   Constitutional: She is oriented to person, place, and time. She appears well-developed and well-nourished. No distress.   Neck: Normal " range of motion. Neck supple. No JVD present.   Cardiovascular: Normal rate, regular rhythm and normal heart sounds.   Pulmonary/Chest: Effort normal and breath sounds normal. No respiratory distress. She has no wheezes. She has no rales.   Neurological: She is alert and oriented to person, place, and time. No cranial nerve deficit. Coordination normal.   Skin: Skin is warm and dry.   Psychiatric: She has a normal mood and affect. Her behavior is normal.       Assessment:       1. Type 2 diabetes mellitus without complication, without long-term current use of insulin    2. Essential hypertension    3. Hyperlipidemia, unspecified hyperlipidemia type    4. Degenerative lumbar spinal stenosis        Plan:       Type 2 diabetes mellitus without complication, without long-term current use of insulin  -     Basic metabolic panel; Future; Expected date: 07/18/2019  -     Hemoglobin A1c; Future; Expected date: 07/18/2019    Essential hypertension  -     Change Captopril to Valsartan (DIOVAN) 160 MG tablet; Take 1 tablet (160 mg total) by mouth once daily. For Blood Pressure.  Dispense: 30 tablet; Refill: 3    Hyperlipidemia, unspecified hyperlipidemia type - controlled, continue same.     Degenerative lumbar spinal stenosis - stable on current meds.     Need for shingles vaccine - Shingrix from the pharmacy.

## 2019-07-19 ENCOUNTER — DOCUMENTATION ONLY (OUTPATIENT)
Dept: ADMINISTRATIVE | Facility: HOSPITAL | Age: 69
End: 2019-07-19

## 2019-07-20 NOTE — PROGRESS NOTES
Subjective:      Patient ID: Linnea Renae is a 68 y.o. female.    Chief Complaint: Foot Pain (both feet) and Diabetes Mellitus    Linnea is a 68 y.o. female who presents to the clinic upon referral from Dr. Nat marsh. provider found  for evaluation and treatment of diabetic feet. Linnea has a past medical history of Allergy, Breast cancer, Chronic constipation, Colon polyps, Diabetes mellitus, type 2, Dry eyes, GERD (gastroesophageal reflux disease), Hyperlipidemia, Hypertension, Lumbar disc disease, and Type 2 diabetes mellitus. Patient relates no major problem with feet. Only complaints today consist of routine foot evaluation and painful nerve symptoms to both feet.  She is here for routine evaluation/diabetic foot check.  She is also here to discuss her neuropathic symptoms which have significantly improved injections.  PCP: Bailee Moss MD    Date Last Seen by PCP:   Chief Complaint   Patient presents with    Foot Pain     both feet    Diabetes Mellitus         Current shoe gear: Casual shoes    Hemoglobin A1C   Date Value Ref Range Status   03/19/2019 6.0 (H) 4.0 - 5.6 % Final     Comment:     ADA Screening Guidelines:  5.7-6.4%  Consistent with prediabetes  >or=6.5%  Consistent with diabetes  High levels of fetal hemoglobin interfere with the HbA1C  assay. Heterozygous hemoglobin variants (HbS, HgC, etc)do  not significantly interfere with this assay.   However, presence of multiple variants may affect accuracy.     09/17/2018 6.1 (H) 4.0 - 5.6 % Final     Comment:     ADA Screening Guidelines:  5.7-6.4%  Consistent with prediabetes  >or=6.5%  Consistent with diabetes  High levels of fetal hemoglobin interfere with the HbA1C  assay. Heterozygous hemoglobin variants (HbS, HgC, etc)do  not significantly interfere with this assay.   However, presence of multiple variants may affect accuracy.     03/09/2018 5.9 (H) 4.0 - 5.6 % Final     Comment:     According to ADA guidelines, hemoglobin A1c <7.0%  represents  optimal control in non-pregnant diabetic patients. Different  metrics may apply to specific patient populations.   Standards of Medical Care in Diabetes-2016.  For the purpose of screening for the presence of diabetes:  <5.7%     Consistent with the absence of diabetes  5.7-6.4%  Consistent with increasing risk for diabetes   (prediabetes)  >or=6.5%  Consistent with diabetes  Currently, no consensus exists for use of hemoglobin A1c  for diagnosis of diabetes for children.  This Hemoglobin A1c assay has significant interference with fetal   hemoglobin   (HbF). The results are invalid for patients with abnormal amounts of   HbF,   including those with known Hereditary Persistence   of Fetal Hemoglobin. Heterozygous hemoglobin variants (HbAS, HbAC,   HbAD, HbAE, HbA2) do not significantly interfere with this assay;   however, presence of multiple variants in a sample may impact the %   interference.             Review of Systems   Constitution: Negative for chills and fever.   HENT: Negative for congestion and tinnitus.    Eyes: Negative for double vision and visual disturbance.   Cardiovascular: Negative for chest pain and claudication.   Respiratory: Negative for hemoptysis and shortness of breath.    Endocrine: Negative for cold intolerance and heat intolerance.   Hematologic/Lymphatic: Negative for adenopathy and bleeding problem.   Skin: Positive for color change, dry skin and nail changes.   Musculoskeletal: Positive for stiffness. Negative for myalgias.   Gastrointestinal: Negative for nausea and vomiting.   Genitourinary: Negative for dysuria and hematuria.   Neurological: Positive for numbness and paresthesias.   Psychiatric/Behavioral: Negative for altered mental status and suicidal ideas.   Allergic/Immunologic: Negative for environmental allergies and persistent infections.           Objective:      Physical Exam   Constitutional: She is oriented to person, place, and time. She appears  well-developed and well-nourished.   Cardiovascular:   Pulses:       Dorsalis pedis pulses are 1+ on the right side, and 1+ on the left side.        Posterior tibial pulses are 1+ on the right side, and 1+ on the left side.   Pulmonary/Chest: Effort normal.   Musculoskeletal: Normal range of motion.   Anterior, lateral, and posterior muscle groups bilateral lower extremities show strength 4 over 5 symmetrically. Inspection and palpation of the joints and bones reveal no crepitus or joint effusion. No tenderness upon palpation. Mild plantar flexor contractures noted to digits 2 through 5 bilaterally.  Angle and base of gait are normal.   Feet:   Right Foot:   Skin Integrity: Positive for callus and dry skin.   Left Foot:   Skin Integrity: Positive for callus and dry skin.   Neurological: She is alert and oriented to person, place, and time. She displays atrophy and abnormal reflex. A sensory deficit is present.   Reflex Scores:       Patellar reflexes are 1+ on the right side and 1+ on the left side.       Achilles reflexes are 1+ on the right side and 1+ on the left side.  Sharp, dull, light touch, and vibratory sensation are diminished bilaterally. Proprioceptive sensation is intact to both lower extremities. [place] Jackie monofilament exam shows loss of protective sensation to plantar toes 1 through 5 bilaterally. Deep tendon reflexes to the patellar tendons is 1 over 4 bilaterally symmetrical. Deep tendon reflexes to the Achilles tendon is 0 over 4 bilaterally symmetrical. No ankle clonus or Babinski reflex noted bilaterally. Coordination is fair to both lower extremities.  Patient admits to intermittent burning and tingling in the feet.    Positive Tinel sign bilateral tibial nerves with tenderness and electrical sensation distally.   Skin: Skin is warm and dry. Capillary refill takes 2 to 3 seconds. There is pallor.   Skin bilateral lower extremities noted to be thin, dry, and atrophic.  Toenails normal.    Psychiatric: She has a normal mood and affect.   Vitals reviewed.            Assessment:       Encounter Diagnoses   Name Primary?    Tarsal tunnel syndrome, bilateral lower limbs Yes    Diabetic peripheral neuropathy associated with type 2 diabetes mellitus          Plan:       Linnea was seen today for foot pain and diabetes mellitus.    Diagnoses and all orders for this visit:    Tarsal tunnel syndrome, bilateral lower limbs    Diabetic peripheral neuropathy associated with type 2 diabetes mellitus      I counseled the patient on her conditions, their implications and medical management.      Shoe inspection. Diabetic Foot Education. Patient reminded of the importance of good nutrition and blood sugar control to help prevent podiatric complications of diabetes. Patient instructed on proper foot hygeine. We discussed wearing proper shoe gear, daily foot inspections and Diabetic foot education in detail.    Follow-up in 3-6 months to discuss possible nerve decompression bilateral tarsal tunnel.  .  Follow-up in 3 months.

## 2019-07-26 ENCOUNTER — TELEPHONE (OUTPATIENT)
Dept: PAIN MEDICINE | Facility: CLINIC | Age: 69
End: 2019-07-26

## 2019-07-26 NOTE — TELEPHONE ENCOUNTER
Staff left a detail message for patient.     Staff was informing patient about her appointment she have on 09.09.19 with JESSICA. Due to JESSICA not being available that day, staff was trying to get pt reschedule to a newer date and time.        **Staff reschedule patient with JESSICA 09.03.19(Mon)**        Pt verbalized understanding and has confirmed new appt

## 2019-08-07 ENCOUNTER — PES CALL (OUTPATIENT)
Dept: ADMINISTRATIVE | Facility: CLINIC | Age: 69
End: 2019-08-07

## 2019-08-21 ENCOUNTER — DOCUMENTATION ONLY (OUTPATIENT)
Dept: SURGERY | Facility: CLINIC | Age: 69
End: 2019-08-21

## 2019-08-21 ENCOUNTER — PATIENT MESSAGE (OUTPATIENT)
Dept: SURGERY | Facility: CLINIC | Age: 69
End: 2019-08-21

## 2019-08-21 NOTE — PROGRESS NOTES
Received notice from [facility] that they were unable to reach the patient for her scheduled genetic counseling appointment.  Messaged patient.

## 2019-09-05 ENCOUNTER — OFFICE VISIT (OUTPATIENT)
Dept: SPINE | Facility: CLINIC | Age: 69
End: 2019-09-05
Payer: MEDICARE

## 2019-09-05 VITALS
DIASTOLIC BLOOD PRESSURE: 63 MMHG | HEART RATE: 69 BPM | WEIGHT: 284 LBS | SYSTOLIC BLOOD PRESSURE: 149 MMHG | BODY MASS INDEX: 45.64 KG/M2 | TEMPERATURE: 98 F | HEIGHT: 66 IN

## 2019-09-05 DIAGNOSIS — M54.16 LUMBAR RADICULOPATHY: ICD-10-CM

## 2019-09-05 DIAGNOSIS — M79.18 MYOFASCIAL PAIN: ICD-10-CM

## 2019-09-05 DIAGNOSIS — M47.819 SPONDYLOSIS WITHOUT MYELOPATHY: Primary | ICD-10-CM

## 2019-09-05 DIAGNOSIS — M79.2 NEURITIS: ICD-10-CM

## 2019-09-05 DIAGNOSIS — M51.37 DDD (DEGENERATIVE DISC DISEASE), LUMBOSACRAL: ICD-10-CM

## 2019-09-05 DIAGNOSIS — M43.10 ACQUIRED SPONDYLOLISTHESIS: ICD-10-CM

## 2019-09-05 PROCEDURE — 99999 PR PBB SHADOW E&M-EST. PATIENT-LVL III: CPT | Mod: PBBFAC,HCNC,, | Performed by: NURSE PRACTITIONER

## 2019-09-05 PROCEDURE — 99213 PR OFFICE/OUTPT VISIT, EST, LEVL III, 20-29 MIN: ICD-10-PCS | Mod: 25,HCNC,S$GLB, | Performed by: NURSE PRACTITIONER

## 2019-09-05 PROCEDURE — 99213 OFFICE O/P EST LOW 20 MIN: CPT | Mod: 25,HCNC,S$GLB, | Performed by: NURSE PRACTITIONER

## 2019-09-05 PROCEDURE — 96372 PR INJECTION,THERAP/PROPH/DIAG2ST, IM OR SUBCUT: ICD-10-PCS | Mod: HCNC,S$GLB,, | Performed by: NURSE PRACTITIONER

## 2019-09-05 PROCEDURE — 99499 RISK ADDL DX/OHS AUDIT: ICD-10-PCS | Mod: HCNC,S$GLB,, | Performed by: NURSE PRACTITIONER

## 2019-09-05 PROCEDURE — 1101F PR PT FALLS ASSESS DOC 0-1 FALLS W/OUT INJ PAST YR: ICD-10-PCS | Mod: HCNC,CPTII,S$GLB, | Performed by: NURSE PRACTITIONER

## 2019-09-05 PROCEDURE — 3077F PR MOST RECENT SYSTOLIC BLOOD PRESSURE >= 140 MM HG: ICD-10-PCS | Mod: HCNC,CPTII,S$GLB, | Performed by: NURSE PRACTITIONER

## 2019-09-05 PROCEDURE — 3078F DIAST BP <80 MM HG: CPT | Mod: HCNC,CPTII,S$GLB, | Performed by: NURSE PRACTITIONER

## 2019-09-05 PROCEDURE — 99999 PR PBB SHADOW E&M-EST. PATIENT-LVL III: ICD-10-PCS | Mod: PBBFAC,HCNC,, | Performed by: NURSE PRACTITIONER

## 2019-09-05 PROCEDURE — 1101F PT FALLS ASSESS-DOCD LE1/YR: CPT | Mod: HCNC,CPTII,S$GLB, | Performed by: NURSE PRACTITIONER

## 2019-09-05 PROCEDURE — 99499 UNLISTED E&M SERVICE: CPT | Mod: HCNC,S$GLB,, | Performed by: NURSE PRACTITIONER

## 2019-09-05 PROCEDURE — 3077F SYST BP >= 140 MM HG: CPT | Mod: HCNC,CPTII,S$GLB, | Performed by: NURSE PRACTITIONER

## 2019-09-05 PROCEDURE — 3078F PR MOST RECENT DIASTOLIC BLOOD PRESSURE < 80 MM HG: ICD-10-PCS | Mod: HCNC,CPTII,S$GLB, | Performed by: NURSE PRACTITIONER

## 2019-09-05 PROCEDURE — 96372 THER/PROPH/DIAG INJ SC/IM: CPT | Mod: HCNC,S$GLB,, | Performed by: NURSE PRACTITIONER

## 2019-09-05 RX ORDER — KETOROLAC TROMETHAMINE 30 MG/ML
60 INJECTION, SOLUTION INTRAMUSCULAR; INTRAVENOUS
Status: COMPLETED | OUTPATIENT
Start: 2019-09-05 | End: 2019-09-05

## 2019-09-05 RX ADMIN — KETOROLAC TROMETHAMINE 60 MG: 30 INJECTION, SOLUTION INTRAMUSCULAR; INTRAVENOUS at 01:09

## 2019-09-05 NOTE — PROGRESS NOTES
Chronic Pain - Established Visit    Referring Physician: No ref. provider found    Chief Complaint: back pain       SUBJECTIVE:    Linnea Renae presents to the clinic for follow up of lower back pain.  She reports that she had a near fall about two weeks ago and has had increased pain since this time.  It is located across the back and into the buttocks.  Her back pain is greater than her buttock pain.  She had benefit with previous RFAs.  She admits that she has not been very active.  She plans to start again.  She is not having any leg pain or numbness today.  Her pain today is 2/10.    Physical Therapy/Home Exercise: yes per self    Pain Disability Index Review:  Last 3 PDI Scores 9/5/2019 7/9/2019 5/9/2019   Pain Disability Index (PDI) 10 41 0       Pain Medications:    - Opioids: Ultram (Tramadol HCL)  - Adjuvant Medications: Advil,Motrin ( Ibuprofen) and zanaflex  - Anti-Coagulants: Aspirin  - Others: see med list     report:  Reviewed and consistent with medication use as prescribed.    Lab Results   Component Value Date    HGBA1C 6.0 (H) 03/19/2019     CMP  Sodium   Date Value Ref Range Status   03/19/2019 140 136 - 145 mmol/L Final     Potassium   Date Value Ref Range Status   03/19/2019 4.0 3.5 - 5.1 mmol/L Final     Chloride   Date Value Ref Range Status   03/19/2019 100 95 - 110 mmol/L Final     CO2   Date Value Ref Range Status   03/19/2019 28 23 - 29 mmol/L Final     Glucose   Date Value Ref Range Status   03/19/2019 102 70 - 110 mg/dL Final     BUN, Bld   Date Value Ref Range Status   03/19/2019 15 8 - 23 mg/dL Final     Creatinine   Date Value Ref Range Status   03/19/2019 0.8 0.5 - 1.4 mg/dL Final     Calcium   Date Value Ref Range Status   03/19/2019 9.9 8.7 - 10.5 mg/dL Final     Total Protein   Date Value Ref Range Status   03/19/2019 7.7 6.0 - 8.4 g/dL Final     Albumin   Date Value Ref Range Status   03/19/2019 3.9 3.5 - 5.2 g/dL Final     Total Bilirubin   Date Value Ref Range Status    03/19/2019 0.5 0.1 - 1.0 mg/dL Final     Comment:     For infants and newborns, interpretation of results should be based  on gestational age, weight and in agreement with clinical  observations.  Premature Infant recommended reference ranges:  Up to 24 hours.............<8.0 mg/dL  Up to 48 hours............<12.0 mg/dL  3-5 days..................<15.0 mg/dL  6-29 days.................<15.0 mg/dL       Alkaline Phosphatase   Date Value Ref Range Status   03/19/2019 82 55 - 135 U/L Final     AST   Date Value Ref Range Status   03/19/2019 20 10 - 40 U/L Final     ALT   Date Value Ref Range Status   03/19/2019 12 10 - 44 U/L Final     Anion Gap   Date Value Ref Range Status   03/19/2019 12 8 - 16 mmol/L Final     eGFR if    Date Value Ref Range Status   03/19/2019 >60 >60 mL/min/1.73 m^2 Final     eGFR if non    Date Value Ref Range Status   03/19/2019 >60 >60 mL/min/1.73 m^2 Final     Comment:     Calculation used to obtain the estimated glomerular filtration  rate (eGFR) is the CKD-EPI equation.          Pain Procedures:   7/29/16 Right L3-4 TF CHRISTIAN  11/3/17 Bilateral L3-4 and L4-5 facet injections  5/9/18 Bilateral L3-4 and L4-5 facet injections  7/25/18 Bilateral L3-4 and L4-5 facet injections  12/5/18 Right L2,3,4,5 RFA- 80% relief  3/20/19 Left L2,3,4,5 RFA- 100% relief    Imaging:  Narrative     EXAMINATION:  MRI LUMBAR SPINE W WO CONTRAST    CLINICAL HISTORY:  Low back pain, >6wks conservative tx, persistent-progressive sx, surgical candidate; Spondylosis without myelopathy or radiculopathy, site unspecified    TECHNIQUE:  Multiplanar, multisequence MR images were acquired from the thoracolumbar junction to the sacrum prior to and following administration of 10 cc IV Gadavist.    COMPARISON:  Lumbar spine radiograph 10/11/2017; MRI lumbar spine with/without contrast 09/20/2016    FINDINGS:  Sagittal alignment demonstrates grade 1 anterolisthesis of L3 on L4 with slight  uncovering of the intervertebral disc.  Vertebral body heights are satisfactorily maintained.  Intervertebral disc spaces are preserved.  Marrow signal is within normal limits with no evidence of fracture or marrow replacement process noting a small vertebral body hemangioma at L1.    Conus appears within normal limits terminating at the level of the L1 level.  Cauda equina appears normal.    Paraspinous soft tissues demonstrate mild prominence of the common bile duct and small left renal cysts..    T12-L1:   No spinal canal or neuroforaminal narrowing.    L1-2:  No spinal canal or neuroforaminal narrowing.    L2-3:  Mild posterior disc bulge and moderate bilateral facet arthropathy without spinal canal stenosis or neural foraminal narrowing.    L3-4:  The diffuse posterior disc bulge, buckling of the ligamentum flavum, and moderate bilateral facet arthropathy results in mild spinal canal stenosis and no neural foraminal narrowing.    L4-5:  Diffuse posterior disc bulge with superimposed central disc protrusion, buckling of the ligamentum flavum, and moderate bilateral facet arthropathy result in moderate spinal canal stenosis and no significant neural foraminal narrowing.    L5-S1:  Mild posterior disc bulge and mild facet arthropathy results in mild right/moderate left neural foraminal narrowing.    No abnormal enhancement.      Impression       Multilevel lumbar spondylosis worst at L4-5 resulting in moderate spinal canal stenosis.    Mild prominence of the common bile duct which is nonspecific and may represent sequela of cholecystectomy.         X-Ray Lumbar Spine Ap Lateral w/Flex Ext    Narrative     10/11/17 10:17:19    Accession: [id number]    CLINICAL INDICATION: 67 year old F with  low back pain    COMPARISON: Lumbar spine x-rays, 09/20/2016.    TECHNIQUE: AP, lateral, flexion, extension, and coned down lateral radiographs of the lumbar spine.    FINDINGS:     Vertebral body heights are maintained.  No  evidence of fracture.      Normal sagittal alignment is preserved.No significant translation with flexion-extension.    Moderate multilevel degenerative changes are again present. Mild anterolisthesis of L3 with respect to L4 is again noted. Multilevel facet arthropathy is present, most pronounced in the lower lumbar spine.  No detrimental change is identified when compared with the examination performed one year prior.      Impression         Moderate multilevel degenerative changes in the lumbar spine, similar in appearance to the prior exam.    No evidence of fracture or malalignment.      Electronically signed by: Heber Frost  Date: 10/11/17  Time: 10:37            Past Medical History:   Diagnosis Date    Allergy     Breast cancer     Lumpectomy 10/15.    Chronic constipation     Colon polyps     Diabetes mellitus, type 2     Dry eyes     GERD (gastroesophageal reflux disease)     Hyperlipidemia     Hypertension     Lumbar disc disease     Type 2 diabetes mellitus      Past Surgical History:   Procedure Laterality Date    BIOPSY-EXCISIONAL right breast with Wire Localization  (wire [facility] at [facility] for 0900 Right 10/7/2015    Performed by [facility] Russell [facility] at [facility] OR 2ND FLR    BLOCK-FACET-LUMBAR Bilateral 11/3/2017    [facility] at [facility]    BREAST BIOPSY      BREAST LUMPECTOMY Right         CATARACT EXTRACTION, BILATERAL       SECTION      x3    CHOLECYSTECTOMY      COLONOSCOPY N/A 10/11/2018    Performed by Lorenzo Tanner MD at [facility] ENDO (4TH FLR)    COLONOSCOPY W/ POLYPECTOMY      HYSTERECTOMY      and USO    INJECTION, FACET JOINT Bilateral 2018    Performed by [facility] at [facility]    INJECTION-FACET Bilateral 2018    Performed by [facility] at [facility]    INJECTION-STEROID-EPIDURAL-TRANSFORAMINAL Right 2016    Performed by [facility] at [facility]     INJECTION-STEROID-EPIDURAL-TRANSFORAMINAL Bilateral 3/4/2016    [facility]    INJECTION-STEROID-EPIDURAL-TRANSFORAMINAL Bilateral 2016    Performed by [facility] at [facility]    RADIOFREQUENCY ABLATION LEFT L2,3,4,5 Left 3/20/2019    Performed by [facility] at [facility]    Radiofrequency Ablation RIGHT LUMBAR L2,3,4 RFA Right 2018    Per[facility] at [facility]    TUBAL LIGATION       Social History     Socioeconomic History    Marital status:      Spouse name: Not on file    Number of children: 3    Years of education: Not on file    Highest education level: Not on file   Occupational History    Not on file   Social Needs    Financial resource strain: Not on file    Food insecurity:     Worry: Not on file     Inability: Not on file    Transportation needs:     Medical: Not on file     Non-medical: Not on file   Tobacco Use    Smoking status: Former Smoker     Packs/day: 0.25     Years: 20.00     Pack years: 5.00     Last attempt to quit: 1985     Years since quittin.6    Smokeless tobacco: Never Used    Tobacco comment: Quit mid .   Substance and Sexual Activity    Alcohol use: No     Alcohol/week: 0.0 oz    Drug use: No    Sexual activity: Yes   Lifestyle    Physical activity:     Days per week: Not on file     Minutes per session: Not on file    Stress: Not on file   Relationships    Social connections:     Talks on phone: Not on file     Gets together: Not on file     Attends Sikh service: Not on file     Active member of club or organization: Not on file     Attends meetings of clubs or organizations: Not on file     Relationship status: Not on file   Other Topics Concern    Not on file   Social History Narrative    Not on file     Family History   Problem Relation Age of Onset    No Known Problems Mother     No Known Problems Father     Heart disease  Paternal Grandmother     Thyroid disease Paternal Grandfather     Hypertension Sister     Hypertension Brother     Glaucoma Brother     No Known Problems Daughter     No Known Problems Daughter     No Known Problems Daughter        Review of patient's allergies indicates:   Allergen Reactions    Codeine Itching       Current Outpatient Medications   Medication Sig    aspirin 81 MG Chew Take 81 mg by mouth once daily.    atenolol (TENORMIN) 50 MG tablet Take 1 tablet (50 mg total) by mouth 2 (two) times daily.    atorvastatin (LIPITOR) 20 MG tablet Take 1 tablet (20 mg total) by mouth once daily.    blood sugar diagnostic Strp 1 strip by Misc.(Non-Drug; Combo Route) route once daily.    blood-glucose meter kit Use as instructed    calcium-vitamin D3 500 mg(1,250mg) -200 unit per tablet Take 1 tablet by mouth 2 (two) times daily with meals.     clotrimazole (LOTRIMIN) 1 % cream Apply topically 2 (two) times daily.    cycloSPORINE (RESTASIS) 0.05 % ophthalmic emulsion 1 drop 2 (two) times daily.    diclofenac sodium (VOLTAREN) 1 % Gel Apply 2 g topically 4 (four) times daily.    fluticasone (FLONASE) 50 mcg/actuation nasal spray 2 sprays (100 mcg total) by Each Nare route once daily.    hydroCHLOROthiazide (HYDRODIURIL) 25 MG tablet Take 1 tablet (25 mg total) by mouth once daily.    ibuprofen (ADVIL,MOTRIN) 800 MG tablet Take 1 tablet (800 mg total) by mouth 3 (three) times daily as needed.    ketoconazole (NIZORAL) 2 % cream Apply topically once daily.    ketoconazole (NIZORAL) 2 % shampoo     lancets Misc 1 lancet by Misc.(Non-Drug; Combo Route) route once daily.    loratadine 10 mg Cap     metFORMIN (GLUCOPHAGE-XR) 500 MG 24 hr tablet TAKE TWO TABLETS BY MOUTH ONCE DAILY WITH BREAKFAST    minoxidil (LONITEN) 2.5 MG tablet     multivitamin (ONE DAILY MULTIVITAMIN) per tablet Take 1 tablet by mouth once daily.    omeprazole (PRILOSEC) 20 MG capsule Take 1 capsule (20 mg total) by mouth  "once daily.    tiZANidine (ZANAFLEX) 4 MG tablet Take 1 tablet (4 mg total) by mouth 2 (two) times daily as needed.    topiramate (TOPAMAX) 25 MG tablet Take 1 tablet (25 mg total) by mouth 2 (two) times daily.    traMADol (ULTRAM) 50 mg tablet TAKE 1 TABLET BY MOUTH EVERY 6 HOURS AS NEEDED    valsartan (DIOVAN) 160 MG tablet Take 1 tablet (160 mg total) by mouth once daily. For Blood Pressure.    varicella-zoster gE-AS01B, PF, (SHINGRIX, PF,) 50 mcg/0.5 mL injection Inject into the muscle.     No current facility-administered medications for this visit.        REVIEW OF SYSTEMS:    GENERAL:  No weight loss, malaise or fevers.  HEENT:  Negative for frequent or significant headaches.  NECK:  Negative for lumps, goiter, pain and significant neck swelling.  RESPIRATORY:  Negative for cough, wheezing or shortness of breath.  CARDIOVASCULAR:  Negative for chest pain, leg swelling or palpitations.  GI:  Negative for abdominal discomfort, blood in stools or black stools or change in bowel habits. GERD.  MUSCULOSKELETAL:  See HPI.  SKIN:  Negative for lesions, rash, and itching.  PSYCH: Positive for sleep disturbance, mood disorder and recent psychosocial stressors.  HEMATOLOGY/LYMPHOLOGY:  Negative for prolonged bleeding, bruising easily or swollen nodes. H/O breast cancer with mastectomy.  NEURO:   No history of headaches, syncope, paralysis, seizures or tremors.  All other reviewed and negative other than HPI.    OBJECTIVE:    BP (!) 149/63   Pulse 69   Temp 98 °F (36.7 °C) (Oral)   Ht 5' 6" (1.676 m)   Wt 128.8 kg (284 lb)   BMI 45.84 kg/m²     PHYSICAL EXAMINATION:    General appearance: Well appearing, in no acute distress, alert and oriented x3.  Psych:  Mood and affect appropriate.  Skin: No bruising or rashes.  Head/face:  Atraumatic, normocephalic. No palpable lymph nodes  Cor: RRR  Pulm: CTA  GI: Abdomen soft and non-tender.  Back: Straight leg raising in the sitting and supine positions is negative to " radicular pain.  Pain to palpation over the lumbar facet joints bilaterally.  Limited ROM with pain on flexion and extension.  Positive facet loading bilaterally.  Painful palpation to both SI joints.  MARY is negative.  Musculoskeletal:  Bilateral upper and lower extremity strength is normal and symmetric.  No atrophy or tone abnormalities are noted.  Neuro: Bilateral upper and lower extremity coordination and muscle stretch reflexes are physiologic and symmetric.  Plantar response are downgoing.  No loss of sensation.  Gait: Antalgic.      ASSESSMENT: 69 y.o. year old female with lower back pain, consistent with the following diagnoses:     1. Spondylosis without myelopathy     2. DDD (degenerative disc disease), lumbosacral     3. Lumbar radiculopathy     4. Neuritis     5. Acquired spondylolisthesis     6. Myofascial pain           PLAN:     - I have stressed the importance of physical activity and a home exercise plan to help with pain and improve health.    - Previous imaging was reviewed and discussed with the patient today.    - Will give 60 mg IM Toradol today per her request.  If this provides limited benefit she can call for repeat lumbar RFAs.    - She will work on increasing physical activity and stretching.    - RTC in 8 weeks or sooner if needed.    - Counseled patient regarding the importance of activity modification, constant sleeping habits and physical therapy.      The above plan and management options were discussed at length with patient. Patient is in agreement with the above and verbalized understanding.      Amara Hawthorne, CARMEN  09/05/2019

## 2019-09-12 ENCOUNTER — OFFICE VISIT (OUTPATIENT)
Dept: SPINE | Facility: CLINIC | Age: 69
End: 2019-09-12
Payer: MEDICARE

## 2019-09-12 VITALS
WEIGHT: 282.19 LBS | BODY MASS INDEX: 45.35 KG/M2 | DIASTOLIC BLOOD PRESSURE: 68 MMHG | TEMPERATURE: 99 F | HEIGHT: 66 IN | HEART RATE: 75 BPM | SYSTOLIC BLOOD PRESSURE: 148 MMHG

## 2019-09-12 DIAGNOSIS — M54.16 LUMBAR RADICULOPATHY: Primary | ICD-10-CM

## 2019-09-12 DIAGNOSIS — M79.18 MYOFASCIAL PAIN: ICD-10-CM

## 2019-09-12 DIAGNOSIS — M47.819 SPONDYLOSIS WITHOUT MYELOPATHY: ICD-10-CM

## 2019-09-12 DIAGNOSIS — M51.37 DDD (DEGENERATIVE DISC DISEASE), LUMBOSACRAL: ICD-10-CM

## 2019-09-12 PROCEDURE — 99499 UNLISTED E&M SERVICE: CPT | Mod: HCNC,S$GLB,, | Performed by: NURSE PRACTITIONER

## 2019-09-12 PROCEDURE — 3288F FALL RISK ASSESSMENT DOCD: CPT | Mod: HCNC,CPTII,S$GLB, | Performed by: NURSE PRACTITIONER

## 2019-09-12 PROCEDURE — 99999 PR PBB SHADOW E&M-EST. PATIENT-LVL III: CPT | Mod: PBBFAC,HCNC,, | Performed by: NURSE PRACTITIONER

## 2019-09-12 PROCEDURE — 3078F PR MOST RECENT DIASTOLIC BLOOD PRESSURE < 80 MM HG: ICD-10-PCS | Mod: HCNC,CPTII,S$GLB, | Performed by: NURSE PRACTITIONER

## 2019-09-12 PROCEDURE — 3288F PR FALLS RISK ASSESSMENT DOCUMENTED: ICD-10-PCS | Mod: HCNC,CPTII,S$GLB, | Performed by: NURSE PRACTITIONER

## 2019-09-12 PROCEDURE — 1100F PTFALLS ASSESS-DOCD GE2>/YR: CPT | Mod: HCNC,CPTII,S$GLB, | Performed by: NURSE PRACTITIONER

## 2019-09-12 PROCEDURE — 99999 PR PBB SHADOW E&M-EST. PATIENT-LVL III: ICD-10-PCS | Mod: PBBFAC,HCNC,, | Performed by: NURSE PRACTITIONER

## 2019-09-12 PROCEDURE — 3077F SYST BP >= 140 MM HG: CPT | Mod: HCNC,CPTII,S$GLB, | Performed by: NURSE PRACTITIONER

## 2019-09-12 PROCEDURE — 99499 RISK ADDL DX/OHS AUDIT: ICD-10-PCS | Mod: HCNC,S$GLB,, | Performed by: NURSE PRACTITIONER

## 2019-09-12 PROCEDURE — 99214 OFFICE O/P EST MOD 30 MIN: CPT | Mod: HCNC,S$GLB,, | Performed by: NURSE PRACTITIONER

## 2019-09-12 PROCEDURE — 3078F DIAST BP <80 MM HG: CPT | Mod: HCNC,CPTII,S$GLB, | Performed by: NURSE PRACTITIONER

## 2019-09-12 PROCEDURE — 3077F PR MOST RECENT SYSTOLIC BLOOD PRESSURE >= 140 MM HG: ICD-10-PCS | Mod: HCNC,CPTII,S$GLB, | Performed by: NURSE PRACTITIONER

## 2019-09-12 PROCEDURE — 1100F PR PT FALLS ASSESS DOC 2+ FALLS/FALL W/INJURY/YR: ICD-10-PCS | Mod: HCNC,CPTII,S$GLB, | Performed by: NURSE PRACTITIONER

## 2019-09-12 PROCEDURE — 99214 PR OFFICE/OUTPT VISIT, EST, LEVL IV, 30-39 MIN: ICD-10-PCS | Mod: HCNC,S$GLB,, | Performed by: NURSE PRACTITIONER

## 2019-09-12 NOTE — PROGRESS NOTES
Chronic Pain - Established Visit    Referring Physician: No ref. provider found    Chief Complaint: back pain       SUBJECTIVE:    Interval History 9/12/2019:  The patient returns to discuss back pain.  I saw her last week, at which time I gave her a Toradol shot.  She is still having significant pain across the lower back.  However, she has developed a new symptoms of shooting pain down the left buttock and posterolateral aspect to the calf.  She reports numbness and burning to the leg.  Currently, her left leg pain is greater than her back pain.  It is preventinh her from walking.  It is worse at night and keeps her awake.  She would like to discuss a procedure.  Her pain today is 10/10.  The patient denies any bowel or bladder incontinence or signs of saddle paresthesia.  The patient denies any major medical changes since last office visit.    Previous Encounter:  Linnea Renae presents to the clinic for follow up of lower back pain.  She reports that she had a near fall about two weeks ago and has had increased pain since this time.  It is located across the back and into the buttocks.  Her back pain is greater than her buttock pain.  She had benefit with previous RFAs.  She admits that she has not been very active.  She plans to start again.  She is not having any leg pain or numbness today.  Her pain today is 2/10.    Physical Therapy/Home Exercise: yes per self    Pain Disability Index Review:  Last 3 PDI Scores 9/5/2019 7/9/2019 5/9/2019   Pain Disability Index (PDI) 10 41 0       Pain Medications:    - Opioids: Ultram (Tramadol HCL)  - Adjuvant Medications: Advil,Motrin ( Ibuprofen) and zanaflex  - Anti-Coagulants: Aspirin  - Others: see med list     report:  Reviewed and consistent with medication use as prescribed.    Lab Results   Component Value Date    HGBA1C 6.0 (H) 03/19/2019     CMP  Sodium   Date Value Ref Range Status   03/19/2019 140 136 - 145 mmol/L Final     Potassium   Date Value Ref Range  Status   03/19/2019 4.0 3.5 - 5.1 mmol/L Final     Chloride   Date Value Ref Range Status   03/19/2019 100 95 - 110 mmol/L Final     CO2   Date Value Ref Range Status   03/19/2019 28 23 - 29 mmol/L Final     Glucose   Date Value Ref Range Status   03/19/2019 102 70 - 110 mg/dL Final     BUN, Bld   Date Value Ref Range Status   03/19/2019 15 8 - 23 mg/dL Final     Creatinine   Date Value Ref Range Status   03/19/2019 0.8 0.5 - 1.4 mg/dL Final     Calcium   Date Value Ref Range Status   03/19/2019 9.9 8.7 - 10.5 mg/dL Final     Total Protein   Date Value Ref Range Status   03/19/2019 7.7 6.0 - 8.4 g/dL Final     Albumin   Date Value Ref Range Status   03/19/2019 3.9 3.5 - 5.2 g/dL Final     Total Bilirubin   Date Value Ref Range Status   03/19/2019 0.5 0.1 - 1.0 mg/dL Final     Comment:     For infants and newborns, interpretation of results should be based  on gestational age, weight and in agreement with clinical  observations.  Premature Infant recommended reference ranges:  Up to 24 hours.............<8.0 mg/dL  Up to 48 hours............<12.0 mg/dL  3-5 days..................<15.0 mg/dL  6-29 days.................<15.0 mg/dL       Alkaline Phosphatase   Date Value Ref Range Status   03/19/2019 82 55 - 135 U/L Final     AST   Date Value Ref Range Status   03/19/2019 20 10 - 40 U/L Final     ALT   Date Value Ref Range Status   03/19/2019 12 10 - 44 U/L Final     Anion Gap   Date Value Ref Range Status   03/19/2019 12 8 - 16 mmol/L Final     eGFR if    Date Value Ref Range Status   03/19/2019 >60 >60 mL/min/1.73 m^2 Final     eGFR if non    Date Value Ref Range Status   03/19/2019 >60 >60 mL/min/1.73 m^2 Final     Comment:     Calculation used to obtain the estimated glomerular filtration  rate (eGFR) is the CKD-EPI equation.          Pain Procedures:   7/29/16 Right L3-4 TF CHRISTIAN  11/3/17 Bilateral L3-4 and L4-5 facet injections  5/9/18 Bilateral L3-4 and L4-5 facet injections  7/25/18  Bilateral L3-4 and L4-5 facet injections  12/5/18 Right L2,3,4,5 RFA- 80% relief  3/20/19 Left L2,3,4,5 RFA- 100% relief    Imaging:  Narrative     EXAMINATION:  MRI LUMBAR SPINE W WO CONTRAST    CLINICAL HISTORY:  Low back pain, >6wks conservative tx, persistent-progressive sx, surgical candidate; Spondylosis without myelopathy or radiculopathy, site unspecified    TECHNIQUE:  Multiplanar, multisequence MR images were acquired from the thoracolumbar junction to the sacrum prior to and following administration of 10 cc IV Gadavist.    COMPARISON:  Lumbar spine radiograph 10/11/2017; MRI lumbar spine with/without contrast 09/20/2016    FINDINGS:  Sagittal alignment demonstrates grade 1 anterolisthesis of L3 on L4 with slight uncovering of the intervertebral disc.  Vertebral body heights are satisfactorily maintained.  Intervertebral disc spaces are preserved.  Marrow signal is within normal limits with no evidence of fracture or marrow replacement process noting a small vertebral body hemangioma at L1.    Conus appears within normal limits terminating at the level of the L1 level.  Cauda equina appears normal.    Paraspinous soft tissues demonstrate mild prominence of the common bile duct and small left renal cysts..    T12-L1:   No spinal canal or neuroforaminal narrowing.    L1-2:  No spinal canal or neuroforaminal narrowing.    L2-3:  Mild posterior disc bulge and moderate bilateral facet arthropathy without spinal canal stenosis or neural foraminal narrowing.    L3-4:  The diffuse posterior disc bulge, buckling of the ligamentum flavum, and moderate bilateral facet arthropathy results in mild spinal canal stenosis and no neural foraminal narrowing.    L4-5:  Diffuse posterior disc bulge with superimposed central disc protrusion, buckling of the ligamentum flavum, and moderate bilateral facet arthropathy result in moderate spinal canal stenosis and no significant neural foraminal narrowing.    L5-S1:  Mild  posterior disc bulge and mild facet arthropathy results in mild right/moderate left neural foraminal narrowing.    No abnormal enhancement.      Impression       Multilevel lumbar spondylosis worst at L4-5 resulting in moderate spinal canal stenosis.    Mild prominence of the common bile duct which is nonspecific and may represent sequela of cholecystectomy.         X-Ray Lumbar Spine Ap Lateral w/Flex Ext    Narrative     10/11/17 10:17:19    Accession: [id number]    CLINICAL INDICATION: 67 year old F with  low back pain    COMPARISON: Lumbar spine x-rays, 09/20/2016.    TECHNIQUE: AP, lateral, flexion, extension, and coned down lateral radiographs of the lumbar spine.    FINDINGS:     Vertebral body heights are maintained.  No evidence of fracture.      Normal sagittal alignment is preserved.No significant translation with flexion-extension.    Moderate multilevel degenerative changes are again present. Mild anterolisthesis of L3 with respect to L4 is again noted. Multilevel facet arthropathy is present, most pronounced in the lower lumbar spine.  No detrimental change is identified when compared with the examination performed one year prior.      Impression         Moderate multilevel degenerative changes in the lumbar spine, similar in appearance to the prior exam.    No evidence of fracture or malalignment.      Electronically signed by: Heber Frost  Date: 10/11/17  Time: 10:37            Past Medical History:   Diagnosis Date    Allergy     Breast cancer     Lumpectomy 10/15.    Chronic constipation     Colon polyps     Diabetes mellitus, type 2     Dry eyes     GERD (gastroesophageal reflux disease)     Hyperlipidemia     Hypertension     Lumbar disc disease     Type 2 diabetes mellitus      Past Surgical History:   Procedure Laterality Date    BIOPSY-EXCISIONAL right breast with Wire Localization  (wire inserted at [facility] for 0900 Right 10/7/2015    [facility]  [facility]    BLOCK-FACET-LUMBAR Bilateral 11/3/2017    [facility]    BREAST BIOPSY      BREAST LUMPECTOMY Right         CATARACT EXTRACTION, BILATERAL       SECTION      x3    CHOLECYSTECTOMY      COLONOSCOPY N/A 10/11/2018    [facility] ([facility]    COLONOSCOPY W/ POLYPECTOMY      HYSTERECTOMY      and USO    INJECTION, FACET JOINT Bilateral 2018    [facility]    INJECTION-FACET Bilateral 2018    [facility]    INJECTION-STEROID-EPIDURAL-TRANSFORAMINAL Right 2016    [facility]    INJECTION-STEROID-EPIDURAL-TRANSFORAMINAL Bilateral 3/4/2016    [facility]    INJECTION-STEROID-EPIDURAL-TRANSFORAMINAL Bilateral 2016    [facility] at [facility]    RADIOFREQUENCY ABLATION LEFT L2,3,4,5 Left 3/20/2019    [facility]    Radiofrequency Ablation RIGHT LUMBAR L2,3,4 RFA Right 2018    [facility] at [facility]    TUBAL LIGATION       Social History     Socioeconomic History    Marital status:      Spouse name: Not on file    Number of children: 3    Years of education: Not on file    Highest education level: Not on file   Occupational History    Not on file   Social Needs    Financial resource strain: Not on file    Food insecurity:     Worry: Not on file     Inability: Not on file    Transportation needs:     Medical: Not on file     Non-medical: Not on file   Tobacco Use    Smoking status: Former Smoker     Packs/day: 0.25     Years: 20.00     Pack years: 5.00     Last attempt to quit: 1985     Years since quittin.7    Smokeless tobacco: Never Used    Tobacco comment: Quit mid .   Substance and Sexual Activity     Alcohol use: No     Alcohol/week: 0.0 oz    Drug use: No    Sexual activity: Yes   Lifestyle    Physical activity:     Days per week: Not on file     Minutes per session: Not on file    Stress: Not on file   Relationships    Social connections:     Talks on phone: Not on file     Gets together: Not on file     Attends Gnosticism service: Not on file     Active member of club or organization: Not on file     Attends meetings of clubs or organizations: Not on file     Relationship status: Not on file   Other Topics Concern    Not on file   Social History Narrative    Not on file     Family History   Problem Relation Age of Onset    No Known Problems Mother     No Known Problems Father     Heart disease Paternal Grandmother     Thyroid disease Paternal Grandfather     Hypertension Sister     Hypertension Brother     Glaucoma Brother     No Known Problems Daughter     No Known Problems Daughter     No Known Problems Daughter        Review of patient's allergies indicates:   Allergen Reactions    Codeine Itching       Current Outpatient Medications   Medication Sig    aspirin 81 MG Chew Take 81 mg by mouth once daily.    atenolol (TENORMIN) 50 MG tablet Take 1 tablet (50 mg total) by mouth 2 (two) times daily.    atorvastatin (LIPITOR) 20 MG tablet Take 1 tablet (20 mg total) by mouth once daily.    blood sugar diagnostic Strp 1 strip by Misc.(Non-Drug; Combo Route) route once daily.    blood-glucose meter kit Use as instructed    calcium-vitamin D3 500 mg(1,250mg) -200 unit per tablet Take 1 tablet by mouth 2 (two) times daily with meals.     clotrimazole (LOTRIMIN) 1 % cream Apply topically 2 (two) times daily.    cycloSPORINE (RESTASIS) 0.05 % ophthalmic emulsion 1 drop 2 (two) times daily.    diclofenac sodium (VOLTAREN) 1 % Gel Apply 2 g topically 4 (four) times daily.    fluticasone (FLONASE) 50 mcg/actuation nasal spray 2 sprays (100 mcg total) by Each Nare route once daily.     hydroCHLOROthiazide (HYDRODIURIL) 25 MG tablet Take 1 tablet (25 mg total) by mouth once daily.    ibuprofen (ADVIL,MOTRIN) 800 MG tablet Take 1 tablet (800 mg total) by mouth 3 (three) times daily as needed.    ketoconazole (NIZORAL) 2 % cream Apply topically once daily.    ketoconazole (NIZORAL) 2 % shampoo     lancets Misc 1 lancet by Misc.(Non-Drug; Combo Route) route once daily.    loratadine 10 mg Cap     metFORMIN (GLUCOPHAGE-XR) 500 MG 24 hr tablet TAKE TWO TABLETS BY MOUTH ONCE DAILY WITH BREAKFAST    minoxidil (LONITEN) 2.5 MG tablet     multivitamin (ONE DAILY MULTIVITAMIN) per tablet Take 1 tablet by mouth once daily.    omeprazole (PRILOSEC) 20 MG capsule Take 1 capsule (20 mg total) by mouth once daily.    tiZANidine (ZANAFLEX) 4 MG tablet Take 1 tablet (4 mg total) by mouth 2 (two) times daily as needed.    topiramate (TOPAMAX) 25 MG tablet Take 1 tablet (25 mg total) by mouth 2 (two) times daily.    traMADol (ULTRAM) 50 mg tablet TAKE 1 TABLET BY MOUTH EVERY 6 HOURS AS NEEDED    valsartan (DIOVAN) 160 MG tablet Take 1 tablet (160 mg total) by mouth once daily. For Blood Pressure.    varicella-zoster [id number], PF, (SHINGRIX, PF,) 50 mcg/0.5 mL injection Inject into the muscle.     No current facility-administered medications for this visit.        REVIEW OF SYSTEMS:    GENERAL:  No weight loss, malaise or fevers.  HEENT:  Negative for frequent or significant headaches.  NECK:  Negative for lumps, goiter, pain and significant neck swelling.  RESPIRATORY:  Negative for cough, wheezing or shortness of breath.  CARDIOVASCULAR:  Negative for chest pain, leg swelling or palpitations.  GI:  Negative for abdominal discomfort, blood in stools or black stools or change in bowel habits. GERD.  MUSCULOSKELETAL:  See HPI.  SKIN:  Negative for lesions, rash, and itching.  PSYCH: Positive for sleep disturbance, mood disorder and recent psychosocial stressors.  HEMATOLOGY/LYMPHOLOGY:  Negative for  "prolonged bleeding, bruising easily or swollen nodes. H/O breast cancer with mastectomy.  NEURO:   No history of headaches, syncope, paralysis, seizures or tremors.  All other reviewed and negative other than HPI.    OBJECTIVE:    BP (!) 148/68   Pulse 75   Temp 98.6 °F (37 °C) (Oral)   Ht 5' 6" (1.676 m)   Wt 128 kg (282 lb 3 oz)   BMI 45.55 kg/m²     PHYSICAL EXAMINATION:    General appearance: Well appearing, in no acute distress, alert and oriented x3.  Psych:  Mood and affect appropriate.  Skin: No bruising or rashes.  Head/face:  Atraumatic, normocephalic. No palpable lymph nodes  Cor: RRR  Pulm: CTA  GI: Abdomen soft and non-tender.  Back: Straight leg raising in the sitting and supine positions is positive to radicular pain at a left L5 distribution.  Pain to palpation over the lumbar facet joints bilaterally.  Limited ROM with pain on flexion and extension.  Positive facet loading bilaterally, L>R.  Painful palpation to both SI joints.  MARY is negative bilaterally.  Musculoskeletal:  Bilateral upper and lower extremity strength is normal and symmetric.  No atrophy or tone abnormalities are noted.  Neuro: Bilateral upper and lower extremity coordination and muscle stretch reflexes are physiologic and symmetric.  Plantar response are downgoing.  Decreased sensation to LLE.  Gait: Antalgic.      ASSESSMENT: 69 y.o. year old female with lower back pain, consistent with the following diagnoses:     1. Lumbar radiculopathy     2. Spondylosis without myelopathy     3. DDD (degenerative disc disease), lumbosacral     4. Myofascial pain           PLAN:     - I have stressed the importance of physical activity and a home exercise plan to help with pain and improve health.    - Previous imaging was reviewed and discussed with the patient today.    - Schedule for left L4 and L5 TF CHRISTIAN.  If limited benefit, will plan to update lumbar MRI.    - Also can repeat lumbar RFAs.  Currently her leg pain is greater than " her back pain.    - Continue current medications.    - RTC 2 weeks after CHRISTIAN.    - Counseled patient regarding the importance of activity modification, constant sleeping habits and physical therapy.      The above plan and management options were discussed at length with patient. Patient is in agreement with the above and verbalized understanding.      CARMEN Granados  09/12/2019

## 2019-09-12 NOTE — H&P (VIEW-ONLY)
Chronic Pain - Established Visit    Referring Physician: No ref. provider found    Chief Complaint: back pain       SUBJECTIVE:    Interval History 9/12/2019:  The patient returns to discuss back pain.  I saw her last week, at which time I gave her a Toradol shot.  She is still having significant pain across the lower back.  However, she has developed a new symptoms of shooting pain down the left buttock and posterolateral aspect to the calf.  She reports numbness and burning to the leg.  Currently, her left leg pain is greater than her back pain.  It is preventinh her from walking.  It is worse at night and keeps her awake.  She would like to discuss a procedure.  Her pain today is 10/10.  The patient denies any bowel or bladder incontinence or signs of saddle paresthesia.  The patient denies any major medical changes since last office visit.    Previous Encounter:  Linnea Renae presents to the clinic for follow up of lower back pain.  She reports that she had a near fall about two weeks ago and has had increased pain since this time.  It is located across the back and into the buttocks.  Her back pain is greater than her buttock pain.  She had benefit with previous RFAs.  She admits that she has not been very active.  She plans to start again.  She is not having any leg pain or numbness today.  Her pain today is 2/10.    Physical Therapy/Home Exercise: yes per self    Pain Disability Index Review:  Last 3 PDI Scores 9/5/2019 7/9/2019 5/9/2019   Pain Disability Index (PDI) 10 41 0       Pain Medications:    - Opioids: Ultram (Tramadol HCL)  - Adjuvant Medications: Advil,Motrin ( Ibuprofen) and zanaflex  - Anti-Coagulants: Aspirin  - Others: see med list     report:  Reviewed and consistent with medication use as prescribed.    Lab Results   Component Value Date    HGBA1C 6.0 (H) 03/19/2019     CMP  Sodium   Date Value Ref Range Status   03/19/2019 140 136 - 145 mmol/L Final     Potassium   Date Value Ref Range  Status   03/19/2019 4.0 3.5 - 5.1 mmol/L Final     Chloride   Date Value Ref Range Status   03/19/2019 100 95 - 110 mmol/L Final     CO2   Date Value Ref Range Status   03/19/2019 28 23 - 29 mmol/L Final     Glucose   Date Value Ref Range Status   03/19/2019 102 70 - 110 mg/dL Final     BUN, Bld   Date Value Ref Range Status   03/19/2019 15 8 - 23 mg/dL Final     Creatinine   Date Value Ref Range Status   03/19/2019 0.8 0.5 - 1.4 mg/dL Final     Calcium   Date Value Ref Range Status   03/19/2019 9.9 8.7 - 10.5 mg/dL Final     Total Protein   Date Value Ref Range Status   03/19/2019 7.7 6.0 - 8.4 g/dL Final     Albumin   Date Value Ref Range Status   03/19/2019 3.9 3.5 - 5.2 g/dL Final     Total Bilirubin   Date Value Ref Range Status   03/19/2019 0.5 0.1 - 1.0 mg/dL Final     Comment:     For infants and newborns, interpretation of results should be based  on gestational age, weight and in agreement with clinical  observations.  Premature Infant recommended reference ranges:  Up to 24 hours.............<8.0 mg/dL  Up to 48 hours............<12.0 mg/dL  3-5 days..................<15.0 mg/dL  6-29 days.................<15.0 mg/dL       Alkaline Phosphatase   Date Value Ref Range Status   03/19/2019 82 55 - 135 U/L Final     AST   Date Value Ref Range Status   03/19/2019 20 10 - 40 U/L Final     ALT   Date Value Ref Range Status   03/19/2019 12 10 - 44 U/L Final     Anion Gap   Date Value Ref Range Status   03/19/2019 12 8 - 16 mmol/L Final     eGFR if    Date Value Ref Range Status   03/19/2019 >60 >60 mL/min/1.73 m^2 Final     eGFR if non    Date Value Ref Range Status   03/19/2019 >60 >60 mL/min/1.73 m^2 Final     Comment:     Calculation used to obtain the estimated glomerular filtration  rate (eGFR) is the CKD-EPI equation.          Pain Procedures:   7/29/16 Right L3-4 TF CHRISTIAN  11/3/17 Bilateral L3-4 and L4-5 facet injections  5/9/18 Bilateral L3-4 and L4-5 facet injections  7/25/18  Bilateral L3-4 and L4-5 facet injections  12/5/18 Right L2,3,4,5 RFA- 80% relief  3/20/19 Left L2,3,4,5 RFA- 100% relief    Imaging:  Narrative     EXAMINATION:  MRI LUMBAR SPINE W WO CONTRAST    CLINICAL HISTORY:  Low back pain, >6wks conservative tx, persistent-progressive sx, surgical candidate; Spondylosis without myelopathy or radiculopathy, site unspecified    TECHNIQUE:  Multiplanar, multisequence MR images were acquired from the thoracolumbar junction to the sacrum prior to and following administration of 10 cc IV Gadavist.    COMPARISON:  Lumbar spine radiograph 10/11/2017; MRI lumbar spine with/without contrast 09/20/2016    FINDINGS:  Sagittal alignment demonstrates grade 1 anterolisthesis of L3 on L4 with slight uncovering of the intervertebral disc.  Vertebral body heights are satisfactorily maintained.  Intervertebral disc spaces are preserved.  Marrow signal is within normal limits with no evidence of fracture or marrow replacement process noting a small vertebral body hemangioma at L1.    Conus appears within normal limits terminating at the level of the L1 level.  Cauda equina appears normal.    Paraspinous soft tissues demonstrate mild prominence of the common bile duct and small left renal cysts..    T12-L1:   No spinal canal or neuroforaminal narrowing.    L1-2:  No spinal canal or neuroforaminal narrowing.    L2-3:  Mild posterior disc bulge and moderate bilateral facet arthropathy without spinal canal stenosis or neural foraminal narrowing.    L3-4:  The diffuse posterior disc bulge, buckling of the ligamentum flavum, and moderate bilateral facet arthropathy results in mild spinal canal stenosis and no neural foraminal narrowing.    L4-5:  Diffuse posterior disc bulge with superimposed central disc protrusion, buckling of the ligamentum flavum, and moderate bilateral facet arthropathy result in moderate spinal canal stenosis and no significant neural foraminal narrowing.    L5-S1:  Mild  posterior disc bulge and mild facet arthropathy results in mild right/moderate left neural foraminal narrowing.    No abnormal enhancement.      Impression       Multilevel lumbar spondylosis worst at L4-5 resulting in moderate spinal canal stenosis.    Mild prominence of the common bile duct which is nonspecific and may represent sequela of cholecystectomy.         X-Ray Lumbar Spine Ap Lateral w/Flex Ext    Narrative     10/11/17 10:17:19    Accession: [id number]    CLINICAL INDICATION: 67 year old F with  low back pain    COMPARISON: Lumbar spine x-rays, 09/20/2016.    TECHNIQUE: AP, lateral, flexion, extension, and coned down lateral radiographs of the lumbar spine.    FINDINGS:     Vertebral body heights are maintained.  No evidence of fracture.      Normal sagittal alignment is preserved.No significant translation with flexion-extension.    Moderate multilevel degenerative changes are again present. Mild anterolisthesis of L3 with respect to L4 is again noted. Multilevel facet arthropathy is present, most pronounced in the lower lumbar spine.  No detrimental change is identified when compared with the examination performed one year prior.      Impression         Moderate multilevel degenerative changes in the lumbar spine, similar in appearance to the prior exam.    No evidence of fracture or malalignment.      Electronically signed by: Heber Frost  Date: 10/11/17  Time: 10:37            Past Medical History:   Diagnosis Date    Allergy     Breast cancer     Lumpectomy 10/15.    Chronic constipation     Colon polyps     Diabetes mellitus, type 2     Dry eyes     GERD (gastroesophageal reflux disease)     Hyperlipidemia     Hypertension     Lumbar disc disease     Type 2 diabetes mellitus      Past Surgical History:   Procedure Laterality Date    BIOPSY-EXCISIONAL right breast with Wire Localization  (wire inserted at [facility] for 0900 Right 10/7/2015    [facility]formed [facility] Radha [facility]  FLR    BLOCK-FACET-LUMBAR Bilateral 11/3/2017    [facility] by [facility] at [facility]    BREAST BIOPSY      BREAST LUMPECTOMY Right         CATARACT EXTRACTION, BILATERAL       SECTION      x3    CHOLECYSTECTOMY      COLONOSCOPY N/A 10/11/2018    [facility] ([facility]    COLONOSCOPY W/ POLYPECTOMY      HYSTERECTOMY      and USO    INJECTION, FACET JOINT Bilateral 2018    [facility]    INJECTION-FACET Bilateral 2018    Performed by [facility] at [facility]    INJECTION-STEROID-EPIDURAL-TRANSFORAMINAL Right 2016    [facility]    INJECTION-STEROID-EPIDURAL-TRANSFORAMINAL Bilateral 3/4/2016    Performed by [facility] at [facility]    INJECTION-STEROID-EPIDURAL-TRANSFORAMINAL Bilateral 2016    Performed by Viet [facility] at [facility]    RADIOFREQUENCY ABLATION LEFT L2,3,4,5 Left 3/20/2019    Performed by Viet [facility] at [facility]    Radiofrequency Ablation RIGHT LUMBAR L2,3,4 RFA Right 2018    Performed by Viet [facility] at [facility]    TUBAL LIGATION       Social History     Socioeconomic History    Marital status:      Spouse name: Not on file    Number of children: 3    Years of education: Not on file    Highest education level: Not on file   Occupational History    Not on file   Social Needs    Financial resource strain: Not on file    Food insecurity:     Worry: Not on file     Inability: Not on file    Transportation needs:     Medical: Not on file     Non-medical: Not on file   Tobacco Use    Smoking status: Former Smoker     Packs/day: 0.25     Years: 20.00     Pack years: 5.00     Last attempt to quit: 1985     Years since quittin.7    Smokeless tobacco: Never Used    Tobacco comment: Quit mid .   Substance and Sexual Activity     Alcohol use: No     Alcohol/week: 0.0 oz    Drug use: No    Sexual activity: Yes   Lifestyle    Physical activity:     Days per week: Not on file     Minutes per session: Not on file    Stress: Not on file   Relationships    Social connections:     Talks on phone: Not on file     Gets together: Not on file     Attends Baptism service: Not on file     Active member of club or organization: Not on file     Attends meetings of clubs or organizations: Not on file     Relationship status: Not on file   Other Topics Concern    Not on file   Social History Narrative    Not on file     Family History   Problem Relation Age of Onset    No Known Problems Mother     No Known Problems Father     Heart disease Paternal Grandmother     Thyroid disease Paternal Grandfather     Hypertension Sister     Hypertension Brother     Glaucoma Brother     No Known Problems Daughter     No Known Problems Daughter     No Known Problems Daughter        Review of patient's allergies indicates:   Allergen Reactions    Codeine Itching       Current Outpatient Medications   Medication Sig    aspirin 81 MG Chew Take 81 mg by mouth once daily.    atenolol (TENORMIN) 50 MG tablet Take 1 tablet (50 mg total) by mouth 2 (two) times daily.    atorvastatin (LIPITOR) 20 MG tablet Take 1 tablet (20 mg total) by mouth once daily.    blood sugar diagnostic Strp 1 strip by Misc.(Non-Drug; Combo Route) route once daily.    blood-glucose meter kit Use as instructed    calcium-vitamin D3 500 mg(1,250mg) -200 unit per tablet Take 1 tablet by mouth 2 (two) times daily with meals.     clotrimazole (LOTRIMIN) 1 % cream Apply topically 2 (two) times daily.    cycloSPORINE (RESTASIS) 0.05 % ophthalmic emulsion 1 drop 2 (two) times daily.    diclofenac sodium (VOLTAREN) 1 % Gel Apply 2 g topically 4 (four) times daily.    fluticasone (FLONASE) 50 mcg/actuation nasal spray 2 sprays (100 mcg total) by Each Nare route once daily.     hydroCHLOROthiazide (HYDRODIURIL) 25 MG tablet Take 1 tablet (25 mg total) by mouth once daily.    ibuprofen (ADVIL,MOTRIN) 800 MG tablet Take 1 tablet (800 mg total) by mouth 3 (three) times daily as needed.    ketoconazole (NIZORAL) 2 % cream Apply topically once daily.    ketoconazole (NIZORAL) 2 % shampoo     lancets Misc 1 lancet by Misc.(Non-Drug; Combo Route) route once daily.    loratadine 10 mg Cap     metFORMIN (GLUCOPHAGE-XR) 500 MG 24 hr tablet TAKE TWO TABLETS BY MOUTH ONCE DAILY WITH BREAKFAST    minoxidil (LONITEN) 2.5 MG tablet     multivitamin (ONE DAILY MULTIVITAMIN) per tablet Take 1 tablet by mouth once daily.    omeprazole (PRILOSEC) 20 MG capsule Take 1 capsule (20 mg total) by mouth once daily.    tiZANidine (ZANAFLEX) 4 MG tablet Take 1 tablet (4 mg total) by mouth 2 (two) times daily as needed.    topiramate (TOPAMAX) 25 MG tablet Take 1 tablet (25 mg total) by mouth 2 (two) times daily.    traMADol (ULTRAM) 50 mg tablet TAKE 1 TABLET BY MOUTH EVERY 6 HOURS AS NEEDED    valsartan (DIOVAN) 160 MG tablet Take 1 tablet (160 mg total) by mouth once daily. For Blood Pressure.    varicella-zoster [id number], PF, (SHINGRIX, PF,) 50 mcg/0.5 mL injection Inject into the muscle.     No current facility-administered medications for this visit.        REVIEW OF SYSTEMS:    GENERAL:  No weight loss, malaise or fevers.  HEENT:  Negative for frequent or significant headaches.  NECK:  Negative for lumps, goiter, pain and significant neck swelling.  RESPIRATORY:  Negative for cough, wheezing or shortness of breath.  CARDIOVASCULAR:  Negative for chest pain, leg swelling or palpitations.  GI:  Negative for abdominal discomfort, blood in stools or black stools or change in bowel habits. GERD.  MUSCULOSKELETAL:  See HPI.  SKIN:  Negative for lesions, rash, and itching.  PSYCH: Positive for sleep disturbance, mood disorder and recent psychosocial stressors.  HEMATOLOGY/LYMPHOLOGY:  Negative for  "prolonged bleeding, bruising easily or swollen nodes. H/O breast cancer with mastectomy.  NEURO:   No history of headaches, syncope, paralysis, seizures or tremors.  All other reviewed and negative other than HPI.    OBJECTIVE:    BP (!) 148/68   Pulse 75   Temp 98.6 °F (37 °C) (Oral)   Ht 5' 6" (1.676 m)   Wt 128 kg (282 lb 3 oz)   BMI 45.55 kg/m²     PHYSICAL EXAMINATION:    General appearance: Well appearing, in no acute distress, alert and oriented x3.  Psych:  Mood and affect appropriate.  Skin: No bruising or rashes.  Head/face:  Atraumatic, normocephalic. No palpable lymph nodes  Cor: RRR  Pulm: CTA  GI: Abdomen soft and non-tender.  Back: Straight leg raising in the sitting and supine positions is positive to radicular pain at a left L5 distribution.  Pain to palpation over the lumbar facet joints bilaterally.  Limited ROM with pain on flexion and extension.  Positive facet loading bilaterally, L>R.  Painful palpation to both SI joints.  MARY is negative bilaterally.  Musculoskeletal:  Bilateral upper and lower extremity strength is normal and symmetric.  No atrophy or tone abnormalities are noted.  Neuro: Bilateral upper and lower extremity coordination and muscle stretch reflexes are physiologic and symmetric.  Plantar response are downgoing.  Decreased sensation to LLE.  Gait: Antalgic.      ASSESSMENT: 69 y.o. year old female with lower back pain, consistent with the following diagnoses:     1. Lumbar radiculopathy     2. Spondylosis without myelopathy     3. DDD (degenerative disc disease), lumbosacral     4. Myofascial pain           PLAN:     - I have stressed the importance of physical activity and a home exercise plan to help with pain and improve health.    - Previous imaging was reviewed and discussed with the patient today.    - Schedule for left L4 and L5 TF CHRISTIAN.  If limited benefit, will plan to update lumbar MRI.    - Also can repeat lumbar RFAs.  Currently her leg pain is greater than " her back pain.    - Continue current medications.    - RTC 2 weeks after CHRISTIAN.    - Counseled patient regarding the importance of activity modification, constant sleeping habits and physical therapy.      The above plan and management options were discussed at length with patient. Patient is in agreement with the above and verbalized understanding.      CARMEN Granados  09/12/2019

## 2019-09-17 ENCOUNTER — TELEPHONE (OUTPATIENT)
Dept: PAIN MEDICINE | Facility: CLINIC | Age: 69
End: 2019-09-17

## 2019-09-17 NOTE — TELEPHONE ENCOUNTER
Staff contacted and spoke with patient from return call.    Patient wanted to reschedule her original appointment with JESSICA 10/11/19 due to having another appointment schedule that same day around the same time.    Staff informed patient that they are willing to assist her in getting a newer appt. Staff also informed patient that next available would be 10/15/19 at 2:00    Pt verbalized understanding and has accepted the new appt

## 2019-09-17 NOTE — TELEPHONE ENCOUNTER
----- Message from Deisy Meadows sent at 9/17/2019  2:37 PM CDT -----  Contact: CIARA ZIEGLER [[id number]]   Name of Who is Calling :  CIARA ZIEGLER [[id number]]    What is the request in detail :      Patient is returning a call from staff in regards to scheduling an appointment after her procedure    .....Please contact to further discuss and advise.    Can the clinic reply by [facility] :  phone call only     What Number to Call Back : [phone]

## 2019-09-17 NOTE — TELEPHONE ENCOUNTER
----- Message from Radha Huerta sent at 9/17/2019  1:57 PM CDT -----  Contact: CIARA ZIEGLER [[id number]]   Name of Who is Calling: CIARA ZIEGLER [[id number]]    What is the request in detail: patient request call back in reference to her appointment  Following her procedure  Please contact to further discuss and advise      Can the clinic reply by [facility]: no    What Number to Call Back if not in [facility]:  [phone]

## 2019-09-23 ENCOUNTER — OFFICE VISIT (OUTPATIENT)
Dept: SURGERY | Facility: CLINIC | Age: 69
End: 2019-09-23
Payer: MEDICARE

## 2019-09-23 ENCOUNTER — HOSPITAL ENCOUNTER (OUTPATIENT)
Dept: RADIOLOGY | Facility: HOSPITAL | Age: 69
Discharge: HOME | End: 2019-09-23
Attending: SURGERY
Payer: MEDICARE

## 2019-09-23 ENCOUNTER — TELEPHONE (OUTPATIENT)
Dept: PAIN MEDICINE | Facility: CLINIC | Age: 69
End: 2019-09-23

## 2019-09-23 VITALS
WEIGHT: 282.19 LBS | HEART RATE: 72 BPM | SYSTOLIC BLOOD PRESSURE: 156 MMHG | DIASTOLIC BLOOD PRESSURE: 78 MMHG | HEIGHT: 66 IN | BODY MASS INDEX: 45.35 KG/M2

## 2019-09-23 DIAGNOSIS — Z85.3 PERSONAL HISTORY OF BREAST CANCER: Primary | ICD-10-CM

## 2019-09-23 DIAGNOSIS — Z85.3 PERSONAL HISTORY OF BREAST CANCER: ICD-10-CM

## 2019-09-23 DIAGNOSIS — Z12.31 ENCOUNTER FOR SCREENING MAMMOGRAM FOR BREAST CANCER: ICD-10-CM

## 2019-09-23 PROCEDURE — 1101F PT FALLS ASSESS-DOCD LE1/YR: CPT | Mod: HCNC,CPTII,S$GLB, | Performed by: SURGERY

## 2019-09-23 PROCEDURE — 99214 OFFICE O/P EST MOD 30 MIN: CPT | Mod: HCNC,S$GLB,, | Performed by: SURGERY

## 2019-09-23 PROCEDURE — 3078F PR MOST RECENT DIASTOLIC BLOOD PRESSURE < 80 MM HG: ICD-10-PCS | Mod: HCNC,CPTII,S$GLB, | Performed by: SURGERY

## 2019-09-23 PROCEDURE — 99999 PR PBB SHADOW E&M-EST. PATIENT-LVL III: CPT | Mod: PBBFAC,HCNC,, | Performed by: SURGERY

## 2019-09-23 PROCEDURE — 77063 BREAST TOMOSYNTHESIS BI: CPT | Mod: 26,HCNC,, | Performed by: RADIOLOGY

## 2019-09-23 PROCEDURE — 3077F PR MOST RECENT SYSTOLIC BLOOD PRESSURE >= 140 MM HG: ICD-10-PCS | Mod: HCNC,CPTII,S$GLB, | Performed by: SURGERY

## 2019-09-23 PROCEDURE — 99214 PR OFFICE/OUTPT VISIT, EST, LEVL IV, 30-39 MIN: ICD-10-PCS | Mod: HCNC,S$GLB,, | Performed by: SURGERY

## 2019-09-23 PROCEDURE — 3077F SYST BP >= 140 MM HG: CPT | Mod: HCNC,CPTII,S$GLB, | Performed by: SURGERY

## 2019-09-23 PROCEDURE — 77067 SCR MAMMO BI INCL CAD: CPT | Mod: TC,HCNC,PO

## 2019-09-23 PROCEDURE — 3078F DIAST BP <80 MM HG: CPT | Mod: HCNC,CPTII,S$GLB, | Performed by: SURGERY

## 2019-09-23 PROCEDURE — 77067 MAMMO DIGITAL SCREENING BILAT WITH TOMOSYNTHESIS_CAD: ICD-10-PCS | Mod: 26,HCNC,, | Performed by: RADIOLOGY

## 2019-09-23 PROCEDURE — 1101F PR PT FALLS ASSESS DOC 0-1 FALLS W/OUT INJ PAST YR: ICD-10-PCS | Mod: HCNC,CPTII,S$GLB, | Performed by: SURGERY

## 2019-09-23 PROCEDURE — 99999 PR PBB SHADOW E&M-EST. PATIENT-LVL III: ICD-10-PCS | Mod: PBBFAC,HCNC,, | Performed by: SURGERY

## 2019-09-23 PROCEDURE — 77063 MAMMO DIGITAL SCREENING BILAT WITH TOMOSYNTHESIS_CAD: ICD-10-PCS | Mod: 26,HCNC,, | Performed by: RADIOLOGY

## 2019-09-23 PROCEDURE — 77067 SCR MAMMO BI INCL CAD: CPT | Mod: 26,HCNC,, | Performed by: RADIOLOGY

## 2019-09-23 NOTE — TELEPHONE ENCOUNTER
----- Message from Jami Lazaro sent at 9/23/2019  1:11 PM CDT -----  Contact: CIARA ZIEGLER [[id number]]  Name of Who is Calling: CIARA ZIEGLER [[id number]]      What is the request in detail: patient would like to reschedule procedure. Please call     Can the clinic reply by [facility]: no    What Number to Call Back if not in [facility]: [phone]

## 2019-09-23 NOTE — PROGRESS NOTES
Date of Service: 9/23/2019    MEDICAL ONCOLOGIST:    Sean Woody MD  RADIATION ONCOLOGIST:   Jerica Stanford MD      DIAGNOSIS:   This is a 69 y.o. 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    TREATMENT:   1. Right partial mastectomy without sentinel node biopsy on 10/7/2015. Radha Russell M.D. Surgical Oncology. Initially underwent excisional biopsy for atypia upstaged to DCIS at lumpectomy. Final path DICS, negative margins.   2. Declined Radiation therapy. Jerica Stanford M.D. Radiation Oncology   3. Adjuvant endocrine therapy started on 11/2015. Since discontinued 2/2 hair loss. Sean Woody M.D. Medical Oncology     HISTORY OF PRESENT ILLNESS:   Linnea Renae is a 69 y.o. female comes in for 6 month clinical breast exam.  She denies change in her breast self-exam specifically denying new masses, skin or nipple changes, or nipple discharge. Past medical and surgical history is updated without new changes. There have been no changes to family history. The patient denies constitutional symptoms of night sweats, weight loss, new headaches, visual changes, new back or bony pain, chest pain, or shortness of breath.      Interval History:  She has done well since her last clinic visit (9/11/18). Reports no interval changes. No new breast mass or complaints. Followed by Dr. Kent. Previously on anastrazole but stopped secondary to side effects (hair loss).    Only complaint is some social stressors.  discused psych referral with daughter present.    IMAGING:   Diagnostic Mammogram (9/10/18):     Findings:  This procedure was performed using tomosynthesis.  Computer-aided detection was utilized in the interpretation of this examination.  The breasts are almost entirely fatty.      Right  There are post-surgical findings from a previous lumpectomy seen in the upper outer quadrant of the right breast in the anterior depth. There has been no interval development  "of a suspicious mass, microcalcification, or architectural distortion.      Left  There is no evidence of suspicious masses, calcifications, or other abnormal findings.     Impression:  Bilateral  There is no mammographic evidence of malignancy.     BI-RADS Category:   Overall: 2 - Benign    MEDICATIONS/ALLERGIES:     Review of patient's allergies indicates:   Allergen Reactions    Codeine Itching     Review of Systems   Constitutional: Negative for chills, fever and weight loss.   Eyes: Negative for double vision.   Respiratory: Negative for shortness of breath.    Cardiovascular: Negative for chest pain.   Gastrointestinal: Negative for nausea and vomiting.   Genitourinary: Negative for urgency.   Musculoskeletal: Positive for back pain. Negative for myalgias.   Skin: Negative for rash.   Neurological: Negative for dizziness and headaches.     PHYSICAL EXAM:     BP (!) 156/78 (BP Location: Left arm, Patient Position: Sitting, BP Method: Medium (Automatic))   Pulse 72   Ht 5' 6" (1.676 m)   Wt 128 kg (282 lb 3 oz)   BMI 45.55 kg/m²     General: The patient appears well and is in no acute distress.   Neuro: Alert & oriented x3.  Cardiovascular: RR  Breasts: The exam was done with the patient seated and supine. Left breast - within normal limits. No palpable masses and no abnormal skin or nipple findings. No supraclavicular or axillary lymphadenopathy on the left side.   Right breast - within normal limits. No palpable masses and no abnormal skin or nipple findings. No supraclavicular or axillary lymphadenopathy on the right side. Postsurgical changes palpated under scar from partial mastectomy; incision well healed.  Pulmonary: nonlabored breathing bilaterally   Abdomen: soft, nontender, nondistended. No masses.    Extremities: No clubbing or cyanosis. Bilateral full arm range of motion without  lymphedema.     ASSESSMENT:   This is a 69 y.o. female without evidence of recurrence by exam, history or imaging.  "      PLAN:   1. Continue to follow up with Dr. Woody (no longer on anastrozole therapy).  2. Continue monthly self breast exams and call the clinic with any changes or problems.  3. Mammogram due September, 2020.  4. Return to clinic in 6 months for clinical breast exam. The patient is in agreement with the plan. Questions were encouraged and answered to patient's satisfaction. Linnea will call our office with any questions or concerns.   5. Discussed referral to therapy .  Has some other stressors and would likely benefit.  Is not interested at this time.

## 2019-09-24 ENCOUNTER — TELEPHONE (OUTPATIENT)
Dept: PAIN MEDICINE | Facility: CLINIC | Age: 69
End: 2019-09-24

## 2019-09-24 NOTE — TELEPHONE ENCOUNTER
Staff contacted and spoke with patient in regards.    Patient stated that her entire back, both legs and knee pain is back. Patient is having sinus issues and had to reschedule her procedure 9/26 to 10/2 now patient is reconsidering having the injection and would like for JESSICA to give her a call.    Staff informed patient that they will present information to JESSICA and to please allow 1hr-24hrs for a response.    Pt verbalized understanding.

## 2019-09-24 NOTE — TELEPHONE ENCOUNTER
----- Message from Monica Miller sent at 9/24/2019  3:00 PM CDT -----  Contact: CIARA ZIEGLER [[id number]]  Name of Who is Calling: CIARA ZIEGLER [[id number]]    What is the request in detail: Would like to speak with staff in regards to 10/2 procedure. Please contact to further discuss and advise      Can the clinic reply by [facility]: no    [facility]: [phone]

## 2019-09-24 NOTE — TELEPHONE ENCOUNTER
Returned patient call regarding a message related to procedure,patient answered the phone and placed me on hold and never returned to the call

## 2019-09-27 DIAGNOSIS — M48.061 DEGENERATIVE LUMBAR SPINAL STENOSIS: ICD-10-CM

## 2019-09-30 ENCOUNTER — PATIENT MESSAGE (OUTPATIENT)
Dept: PRIMARY CARE CLINIC | Facility: CLINIC | Age: 69
End: 2019-09-30

## 2019-09-30 DIAGNOSIS — M48.061 DEGENERATIVE LUMBAR SPINAL STENOSIS: ICD-10-CM

## 2019-09-30 RX ORDER — TRAMADOL HYDROCHLORIDE 50 MG/1
TABLET ORAL
Qty: 120 TABLET | Refills: 2 | OUTPATIENT
Start: 2019-09-30

## 2019-09-30 RX ORDER — TRAMADOL HYDROCHLORIDE 50 MG/1
TABLET ORAL
Qty: 120 TABLET | Refills: 2 | Status: SHIPPED | OUTPATIENT
Start: 2019-09-30 | End: 2019-12-30

## 2019-10-01 NOTE — TELEPHONE ENCOUNTER
----- Message from Karol Ocampo sent at 10/1/2019  9:14 AM CDT -----  Contact: Self Call  [phone]  Pharmacy has not receive her Refill request for her traMADol (ULTRAM) 50 mg tablet. Please call her.        [facility] [address] [facility] [facility] [phone] (Phone) or  [phone] (Fax)

## 2019-10-02 ENCOUNTER — HOSPITAL ENCOUNTER (OUTPATIENT)
Facility: OTHER | Age: 69
Discharge: HOME | End: 2019-10-02
Attending: ANESTHESIOLOGY | Admitting: ANESTHESIOLOGY
Payer: MEDICARE

## 2019-10-02 VITALS
BODY MASS INDEX: 45.32 KG/M2 | RESPIRATION RATE: 14 BRPM | WEIGHT: 282 LBS | HEIGHT: 66 IN | HEART RATE: 72 BPM | OXYGEN SATURATION: 99 % | TEMPERATURE: 98 F | DIASTOLIC BLOOD PRESSURE: 76 MMHG | SYSTOLIC BLOOD PRESSURE: 108 MMHG

## 2019-10-02 DIAGNOSIS — G89.4 CHRONIC PAIN SYNDROME: ICD-10-CM

## 2019-10-02 DIAGNOSIS — M48.061 SPINAL STENOSIS OF LUMBAR REGION WITHOUT NEUROGENIC CLAUDICATION: Primary | ICD-10-CM

## 2019-10-02 DIAGNOSIS — G89.29 CHRONIC PAIN: ICD-10-CM

## 2019-10-02 LAB — POCT GLUCOSE: 99 MG/DL (ref 70–110)

## 2019-10-02 PROCEDURE — 63600175 PHARM REV CODE 636 W HCPCS: Mod: HCNC | Performed by: ANESTHESIOLOGY

## 2019-10-02 PROCEDURE — 63600175 PHARM REV CODE 636 W HCPCS: Mod: HCNC | Performed by: STUDENT IN AN ORGANIZED HEALTH CARE EDUCATION/TRAINING PROGRAM

## 2019-10-02 PROCEDURE — 64484 NJX AA&/STRD TFRM EPI L/S EA: CPT | Mod: HCNC | Performed by: ANESTHESIOLOGY

## 2019-10-02 PROCEDURE — 82947 ASSAY GLUCOSE BLOOD QUANT: CPT | Mod: HCNC | Performed by: ANESTHESIOLOGY

## 2019-10-02 PROCEDURE — 99152 MOD SED SAME PHYS/QHP 5/>YRS: CPT | Mod: HCNC,,, | Performed by: ANESTHESIOLOGY

## 2019-10-02 PROCEDURE — 64483 NJX AA&/STRD TFRM EPI L/S 1: CPT | Mod: HCNC,RT | Performed by: ANESTHESIOLOGY

## 2019-10-02 PROCEDURE — 64483 NJX AA&/STRD TFRM EPI L/S 1: CPT | Mod: HCNC,LT,, | Performed by: ANESTHESIOLOGY

## 2019-10-02 PROCEDURE — 99152 PR MOD CONSCIOUS SEDATION, SAME PHYS, 5+ YRS, FIRST 15 MIN: ICD-10-PCS | Mod: HCNC,,, | Performed by: ANESTHESIOLOGY

## 2019-10-02 PROCEDURE — 25000003 PHARM REV CODE 250: Mod: HCNC | Performed by: ANESTHESIOLOGY

## 2019-10-02 PROCEDURE — 64483 PR EPIDURAL INJ, ANES/STEROID, TRANSFORAMINAL, LUMB/SACR, SNGL LEVL: ICD-10-PCS | Mod: HCNC,LT,, | Performed by: ANESTHESIOLOGY

## 2019-10-02 RX ORDER — FENTANYL CITRATE 50 UG/ML
INJECTION, SOLUTION INTRAMUSCULAR; INTRAVENOUS
Status: DISCONTINUED | OUTPATIENT
Start: 2019-10-02 | End: 2019-10-02 | Stop reason: HOSPADM

## 2019-10-02 RX ORDER — DEXAMETHASONE SODIUM PHOSPHATE 4 MG/ML
INJECTION, SOLUTION INTRA-ARTICULAR; INTRALESIONAL; INTRAMUSCULAR; INTRAVENOUS; SOFT TISSUE
Status: DISCONTINUED | OUTPATIENT
Start: 2019-10-02 | End: 2019-10-02 | Stop reason: HOSPADM

## 2019-10-02 RX ORDER — LIDOCAINE HYDROCHLORIDE 5 MG/ML
INJECTION, SOLUTION INFILTRATION; INTRAVENOUS
Status: DISCONTINUED | OUTPATIENT
Start: 2019-10-02 | End: 2019-10-02 | Stop reason: HOSPADM

## 2019-10-02 RX ORDER — LIDOCAINE HYDROCHLORIDE 10 MG/ML
INJECTION INFILTRATION; PERINEURAL
Status: DISCONTINUED | OUTPATIENT
Start: 2019-10-02 | End: 2019-10-02 | Stop reason: HOSPADM

## 2019-10-02 RX ORDER — MIDAZOLAM HYDROCHLORIDE 1 MG/ML
INJECTION INTRAMUSCULAR; INTRAVENOUS
Status: DISCONTINUED | OUTPATIENT
Start: 2019-10-02 | End: 2019-10-02 | Stop reason: HOSPADM

## 2019-10-02 RX ORDER — SODIUM CHLORIDE 9 MG/ML
500 INJECTION, SOLUTION INTRAVENOUS CONTINUOUS
Status: ACTIVE | OUTPATIENT
Start: 2019-10-02 | End: 2019-10-03

## 2019-10-02 RX ORDER — DEXAMETHASONE SODIUM PHOSPHATE 10 MG/ML
INJECTION INTRAMUSCULAR; INTRAVENOUS
Status: DISCONTINUED | OUTPATIENT
Start: 2019-10-02 | End: 2019-10-02 | Stop reason: HOSPADM

## 2019-10-02 RX ADMIN — SODIUM CHLORIDE 500 ML: 0.9 INJECTION, SOLUTION INTRAVENOUS at 01:10

## 2019-10-02 NOTE — DISCHARGE INSTRUCTIONS
Thank you for allowing us to care for you today. You may receive a survey about the care we provided. Your feedback is valuable and helps us provide excellent care throughout the community.     Home Care Instructions for Pain Management:    1. DIET:   You may resume your normal diet today.   2. BATHING:   You may shower with luke warm water. No tub baths or anything that will soak injection sites under water for the next 24 hours.  3. DRESSING:   You may remove your bandage today.   4. ACTIVITY LEVEL:   You may resume your normal activities 24 hrs after your procedure. Nothing strenuous today.  5. MEDICATIONS:   You may resume your normal medications today. To restart blood thinners, ask your doctor.  6. DRIVING    If you have received any sedatives by mouth today, you may not drive for 12 hours.    If you have received any sedation through your IV, you may not drive for 24 hrs.   7. SPECIAL INSTRUCTIONS:   No heat to the injection site for 24 hrs including, hot bath or shower, heating pad, moist heat, or hot tubs.    Use ice pack to injection site for any pain or discomfort.  Apply ice packs for 20 minute intervals as needed.    IF you have diabetes, be sure to monitor your blood sugar more closely. IF your injection contained steroids your blood sugar levels may become higher than normal.    If you are still having pain upon discharge:  Your pain may improve over the next 48 hours. The anesthetic (numbing medication) works immediately to 48 hours. IF your injection contained a steroid (anti-inflammatory medication), it takes approximately 3 days to start feeling relief and 7-10 days to see your greatest results from the medication. It is possible you may need subsequent injections. This would be discussed at your follow up appointment with pain management or your referring doctor.    Please call the PAIN MANAGEMENT office at [phone] or ON CALL pager at [phone] if you experienced any:   -Weakness or  loss of sensation  -Fever > 101.5  -Pain uncontrolled with oral medications   -Persistent nausea, vomiting, or diarrhea  -Redness or drainage from the injection sites, or any other worrisome concerns.   If physician on call was not reached or could not communicate with our office for any reason please go to the nearest emergency department.  Adult Procedural Sedation Instructions    Recovery After Procedural Sedation (Adult)  You have been given medicine by vein to make you sleep during your surgery. This may have included both a pain medicine and sleeping medicine. Most of the effects have worn off. But you may still have some drowsiness for the next 6 to 8 hours.  Home care  Follow these guidelines when you get home:  · For the next 8 hours, you should be watched by a responsible adult. This person should make sure your condition is not getting worse.  · Don't drink any alcohol for the next 24 hours.  · Don't drive, operate dangerous machinery, or make important business or personal decisions during the next 24 hours.  Note: Your healthcare provider may tell you not to take any medicine by mouth for pain or sleep in the next 4 hours. These medicines may react with the medicines you were given in the hospital. This could cause a much stronger response than usual.  Follow-up care  Follow up with your healthcare provider if you are not alert and back to your usual level of activity within 12 hours.  When to seek medical advice  Call your healthcare provider right away if any of these occur:  · Drowsiness gets worse  · Weakness or dizziness gets worse  · Repeated vomiting  · You can't be awakened   Date Last Reviewed: 10/18/2016  © 3342-9779 [employer]. [address] [place] [address], [place] [address]. All rights reserved. This information is not intended as a substitute for professional medical care. Always follow your healthcare professional's instructions.

## 2019-10-03 ENCOUNTER — TELEPHONE (OUTPATIENT)
Dept: PAIN MEDICINE | Facility: CLINIC | Age: 69
End: 2019-10-03

## 2019-10-03 NOTE — BRIEF OP NOTE
Discharge Note  Short Stay      SUMMARY     Admit Date: 10/2/2019    Attending Physician: Viet Wilson MD    Discharge Physician: Gen Hurtado MD      Discharge Date: 10/2/2019 9:48 PM    Procedure(s) (LRB):  INJECTION, STEROID, EPIDURAL, TRANSFORAMINAL APPROACH, L4 AND L5 (Left)    Final Diagnosis: Lumbar radiculopathy [M54.16]    Disposition: Home or self care    Patient Instructions:   Discharge Medication List as of 10/2/2019  3:39 PM      CONTINUE these medications which have NOT CHANGED    Details   alcohol antiseptic pads (ALCOHOL PREP PADS TOP) Starting Wed 9/11/2019, Historical Med      aspirin 81 MG Chew Take 81 mg by mouth once daily., Until Discontinued, Historical Med      atenolol (TENORMIN) 50 MG tablet Take 1 tablet (50 mg total) by mouth 2 (two) times daily., Starting Tue 3/19/2019, Normal      atorvastatin (LIPITOR) 20 MG tablet Take 1 tablet (20 mg total) by mouth once daily., Starting Tue 3/19/2019, Normal      blood sugar diagnostic Strp 1 strip by Misc.(Non-Drug; Combo Route) route once daily., Starting Thu 2/28/2019, Normal      blood-glucose meter kit Use as instructed, Normal      calcium-vitamin D3 500 mg(1,250mg) -200 unit per tablet Take 1 tablet by mouth 2 (two) times daily with meals. , Until Discontinued, Historical Med      clotrimazole (LOTRIMIN) 1 % cream Apply topically 2 (two) times daily., Starting Thu 3/8/2018, Normal      cycloSPORINE (RESTASIS) 0.05 % ophthalmic emulsion 1 drop 2 (two) times daily., Until Discontinued, Historical Med      diclofenac sodium (VOLTAREN) 1 % Gel Apply 2 g topically 4 (four) times daily., Starting Wed 4/17/2019, Normal      fluticasone (FLONASE) 50 mcg/actuation nasal spray 2 sprays (100 mcg total) by Each Nare route once daily., Starting Mon 4/1/2019, Normal      hydroCHLOROthiazide (HYDRODIURIL) 25 MG tablet Take 1 tablet (25 mg total) by mouth once daily., Starting Tue 3/19/2019, Normal      ibuprofen (ADVIL,MOTRIN) 800 MG tablet Take 1  tablet (800 mg total) by mouth 3 (three) times daily as needed., Starting Thu 12/6/2018, Normal      ketoconazole (NIZORAL) 2 % cream Apply topically once daily., Starting Mon 4/1/2019, Normal      ketoconazole (NIZORAL) 2 % shampoo Starting Mon 12/3/2018, Historical Med      lancets Misc 1 lancet by Misc.(Non-Drug; Combo Route) route once daily., Starting 1/31/2017, Until Discontinued, Normal      loratadine 10 mg Cap Starting Sat 3/2/2019, Historical Med      metFORMIN (GLUCOPHAGE-XR) 500 MG 24 hr tablet TAKE TWO TABLETS BY MOUTH ONCE DAILY WITH BREAKFAST, Normal      minoxidil (LONITEN) 2.5 MG tablet Starting Tue 2/20/2018, Historical Med      MINOXIDIL TOP Apply topically., Historical Med      multivitamin (ONE DAILY MULTIVITAMIN) per tablet Take 1 tablet by mouth once daily., Historical Med      omeprazole (PRILOSEC) 20 MG capsule Take 1 capsule (20 mg total) by mouth once daily., Starting Mon 4/1/2019, Normal      tiZANidine (ZANAFLEX) 4 MG tablet Take 1 tablet (4 mg total) by mouth 2 (two) times daily as needed., Starting Thu 2/28/2019, Normal      topiramate (TOPAMAX) 25 MG tablet Take 1 tablet (25 mg total) by mouth 2 (two) times daily., Starting Tue 5/21/2019, Until Wed 5/20/2020, Normal      traMADol (ULTRAM) 50 mg tablet TAKE 1 TABLET BY MOUTH EVERY 6 HOURS AS NEEDED, Print      valsartan (DIOVAN) 160 MG tablet Take 1 tablet (160 mg total) by mouth once daily. For Blood Pressure., Starting Thu 7/18/2019, Normal      varicella-zoster [id number], PF, (SHINGRIX, PF,) 50 mcg/0.5 mL injection Inject into the muscle., Starting Thu 7/18/2019, Normal                 Discharge Diagnosis: Lumbar radiculopathy [M54.16]  Condition on Discharge: Stable with no complications to procedure   Diet on Discharge: Same as before.  Activity: as per instruction sheet.  Discharge to: Home with a responsible adult.  Follow up: 2-4 weeks       Please call my office or pager at [phone] if experienced any weakness or loss of  sensation, fever > 101.5, pain uncontrolled with oral medications, persistent nausea/vomiting/or diarrhea, redness or drainage from the incisions, or any other worrisome concerns. If physician on call was not reached or could not communicate with our office for any reason please go to the nearest emergency department

## 2019-10-03 NOTE — OP NOTE
Patient Name: Linnea Renae  MRN: [id number]    INFORMED CONSENT: The procedure, risks, benefits and options were discussed with patient. There are no contraindications to the procedure. The patient expressed understanding and agreed to proceed. The personnel performing the procedure was discussed. I verify that I personally obtained Linnea's consent prior to the start of the procedure and the signed consent can be found on the patient's chart.    Procedure Date: 10/02/2019    Anesthesia: Topical    Pre Procedure diagnosis: Lumbar radiculopathy [M54.16]  1. Spinal stenosis of lumbar region without neurogenic claudication    2. Chronic pain syndrome    3. Chronic pain      Post-Procedure diagnosis: SAME    Sedation: Yes - Midazolam 2 mg and Fentanyl 100mcg    PROCEDURE: Left L4-L5   TRANSFORAMINAL EPIDURAL STEROID INJECTION        DESCRIPTION OF PROCEDURE: The patient was brought to the procedure room. After performing time out  IV access was obtained prior to the procedure. The patient was positioned prone on the fluoroscopy table. Continuous hemodynamic monitoring was initiated including blood pressure, EKG, and pulse oximetry. . The skin was prepped with chlorhexidine three times and draped in a sterile fashion. Skin anesthesia was achieved using 3 mL of lidocaine 1% over the respective injection site.     An oblique fluoroscopic view was obtained, with the superior articular process of the inferior vertebral body aligned with the pedicle. The tip of a 22-gauge 3.5-inch Quincke-type spinal needle was advanced toward the 6 oclock position of the pedicle under intermittent fluoroscopic guidance. Confirmation of proper needle position was made with AP, oblique, and lateral fluoroscopic views. Negative aspiration for blood or CSF was confirmed. 2 mL of Omnipaque 300 was injected. Live fluoroscopic imaging revealed a clear outline of the spinal nerve with proximal spread of agent through the neural foramen into the  anterior epidural space. A total combination of 3 mL of Lidocaine 0.5% and 10 mg decadron was injected at each level. Contrast spread was noted from L3 to L5 level. There was no pain on injection. The needle was removed and bleeding was nil.  A sterile dressing was applied. Linnea was taken back to the recovery room for further observation.     Blood Loss: Nill  Specimen: None    Discharge Diagnosis: Lumbar radiculopathy [M54.16]  Condition on Discharge: Stable.  Diet on Discharge: Same as before.  Activity: as per instruction sheet.  Discharge to: Home with a responsible adult.  Follow up: as per Discharge instructions        Gen Hurtado MD

## 2019-10-03 NOTE — TELEPHONE ENCOUNTER
Staff contacted and spoke with patient in regards to her message.    Staff informed patient that she can resume all activities after 24hrs from having INJECTION, STEROID, EPIDURAL, TRANSFORAMINAL APPROACH, L4 AND L5 with provider Dr. Wilson 10/02/19    Patient also wanted to know if she can have any written orders to present to her  at her gym about what exercises she can and can not do.    JESSICA also spoke with pt.    Pt verbalized understanding.

## 2019-10-10 ENCOUNTER — PATIENT OUTREACH (OUTPATIENT)
Dept: ADMINISTRATIVE | Facility: OTHER | Age: 69
End: 2019-10-10

## 2019-10-11 ENCOUNTER — PATIENT MESSAGE (OUTPATIENT)
Dept: BARIATRICS | Facility: CLINIC | Age: 69
End: 2019-10-11

## 2019-10-11 DIAGNOSIS — I10 ESSENTIAL HYPERTENSION: ICD-10-CM

## 2019-10-11 RX ORDER — OLMESARTAN MEDOXOMIL 20 MG/1
20 TABLET ORAL DAILY
Qty: 30 TABLET | Refills: 3 | Status: SHIPPED | OUTPATIENT
Start: 2019-10-11 | End: 2019-12-31 | Stop reason: SDUPTHER

## 2019-10-11 RX ORDER — VALSARTAN 160 MG/1
160 TABLET ORAL DAILY
Qty: 30 TABLET | Refills: 3 | Status: CANCELLED | OUTPATIENT
Start: 2019-10-11

## 2019-10-11 NOTE — TELEPHONE ENCOUNTER
----- Message from Karol Ocampo sent at 10/11/2019 10:57 AM CDT -----  Contact: self call [phone]  Having an issue with [facility] [phone] (Phone)  [phone] (Fax) filling her blood pressure Rx   valsartan (DIOVAN) 160 MG tablet. She would not tell me what, only want to speak with your office about it.

## 2019-10-11 NOTE — TELEPHONE ENCOUNTER
Pt was told by the pharmacy to see is she supposed to be taking the medication valsartan 160 mg  and if so she will need refills

## 2019-10-14 ENCOUNTER — PATIENT MESSAGE (OUTPATIENT)
Dept: BARIATRICS | Facility: CLINIC | Age: 69
End: 2019-10-14

## 2019-10-15 RX ORDER — TOPIRAMATE 25 MG/1
25 TABLET ORAL 2 TIMES DAILY
Qty: 60 TABLET | Refills: 2 | Status: SHIPPED | OUTPATIENT
Start: 2019-10-15 | End: 2019-12-12 | Stop reason: SDUPTHER

## 2019-10-23 ENCOUNTER — PATIENT OUTREACH (OUTPATIENT)
Dept: ADMINISTRATIVE | Facility: OTHER | Age: 69
End: 2019-10-23

## 2019-11-04 ENCOUNTER — IMMUNIZATION (OUTPATIENT)
Dept: PHARMACY | Facility: CLINIC | Age: 69
End: 2019-11-04

## 2019-11-04 ENCOUNTER — IMMUNIZATION (OUTPATIENT)
Dept: PHARMACY | Facility: CLINIC | Age: 69
End: 2019-11-04
Payer: MEDICARE

## 2019-11-29 DIAGNOSIS — M15.9 PRIMARY OSTEOARTHRITIS INVOLVING MULTIPLE JOINTS: ICD-10-CM

## 2019-11-30 RX ORDER — IBUPROFEN 800 MG/1
TABLET ORAL
Qty: 60 TABLET | Refills: 0 | Status: SHIPPED | OUTPATIENT
Start: 2019-11-30 | End: 2020-03-02

## 2019-12-04 ENCOUNTER — PATIENT OUTREACH (OUTPATIENT)
Dept: ADMINISTRATIVE | Facility: OTHER | Age: 69
End: 2019-12-04

## 2019-12-05 ENCOUNTER — OFFICE VISIT (OUTPATIENT)
Dept: SPINE | Facility: CLINIC | Age: 69
End: 2019-12-05
Payer: MEDICARE

## 2019-12-05 ENCOUNTER — LAB VISIT (OUTPATIENT)
Dept: LAB | Facility: OTHER | Age: 69
End: 2019-12-05
Attending: INTERNAL MEDICINE
Payer: MEDICARE

## 2019-12-05 VITALS
HEART RATE: 70 BPM | DIASTOLIC BLOOD PRESSURE: 60 MMHG | SYSTOLIC BLOOD PRESSURE: 128 MMHG | BODY MASS INDEX: 45.31 KG/M2 | HEIGHT: 66 IN | WEIGHT: 281.94 LBS | OXYGEN SATURATION: 100 % | RESPIRATION RATE: 18 BRPM

## 2019-12-05 DIAGNOSIS — M79.18 MYOFASCIAL PAIN: Primary | ICD-10-CM

## 2019-12-05 DIAGNOSIS — M54.16 LUMBAR RADICULOPATHY: ICD-10-CM

## 2019-12-05 DIAGNOSIS — M54.50 ACUTE LEFT-SIDED LOW BACK PAIN WITHOUT SCIATICA: ICD-10-CM

## 2019-12-05 DIAGNOSIS — M51.37 DDD (DEGENERATIVE DISC DISEASE), LUMBOSACRAL: ICD-10-CM

## 2019-12-05 DIAGNOSIS — M47.816 LUMBAR SPONDYLOSIS: ICD-10-CM

## 2019-12-05 DIAGNOSIS — E11.9 TYPE 2 DIABETES MELLITUS WITHOUT COMPLICATION, WITHOUT LONG-TERM CURRENT USE OF INSULIN: ICD-10-CM

## 2019-12-05 DIAGNOSIS — M47.819 SPONDYLOSIS WITHOUT MYELOPATHY: ICD-10-CM

## 2019-12-05 PROCEDURE — 1125F PR PAIN SEVERITY QUANTIFIED, PAIN PRESENT: ICD-10-PCS | Mod: HCNC,S$GLB,, | Performed by: NURSE PRACTITIONER

## 2019-12-05 PROCEDURE — 36415 COLL VENOUS BLD VENIPUNCTURE: CPT | Mod: HCNC

## 2019-12-05 PROCEDURE — 99213 OFFICE O/P EST LOW 20 MIN: CPT | Mod: 25,HCNC,S$GLB, | Performed by: NURSE PRACTITIONER

## 2019-12-05 PROCEDURE — 3078F DIAST BP <80 MM HG: CPT | Mod: HCNC,CPTII,S$GLB, | Performed by: NURSE PRACTITIONER

## 2019-12-05 PROCEDURE — 3074F PR MOST RECENT SYSTOLIC BLOOD PRESSURE < 130 MM HG: ICD-10-PCS | Mod: HCNC,CPTII,S$GLB, | Performed by: NURSE PRACTITIONER

## 2019-12-05 PROCEDURE — 1101F PT FALLS ASSESS-DOCD LE1/YR: CPT | Mod: HCNC,CPTII,S$GLB, | Performed by: NURSE PRACTITIONER

## 2019-12-05 PROCEDURE — 99499 UNLISTED E&M SERVICE: CPT | Mod: HCNC,S$GLB,, | Performed by: NURSE PRACTITIONER

## 2019-12-05 PROCEDURE — 1159F MED LIST DOCD IN RCRD: CPT | Mod: HCNC,S$GLB,, | Performed by: NURSE PRACTITIONER

## 2019-12-05 PROCEDURE — 83036 HEMOGLOBIN GLYCOSYLATED A1C: CPT | Mod: HCNC

## 2019-12-05 PROCEDURE — 99213 PR OFFICE/OUTPT VISIT, EST, LEVL III, 20-29 MIN: ICD-10-PCS | Mod: 25,HCNC,S$GLB, | Performed by: NURSE PRACTITIONER

## 2019-12-05 PROCEDURE — 3078F PR MOST RECENT DIASTOLIC BLOOD PRESSURE < 80 MM HG: ICD-10-PCS | Mod: HCNC,CPTII,S$GLB, | Performed by: NURSE PRACTITIONER

## 2019-12-05 PROCEDURE — 99999 PR PBB SHADOW E&M-EST. PATIENT-LVL V: ICD-10-PCS | Mod: PBBFAC,HCNC,, | Performed by: NURSE PRACTITIONER

## 2019-12-05 PROCEDURE — 3074F SYST BP LT 130 MM HG: CPT | Mod: HCNC,CPTII,S$GLB, | Performed by: NURSE PRACTITIONER

## 2019-12-05 PROCEDURE — 99499 RISK ADDL DX/OHS AUDIT: ICD-10-PCS | Mod: HCNC,S$GLB,, | Performed by: NURSE PRACTITIONER

## 2019-12-05 PROCEDURE — 1101F PR PT FALLS ASSESS DOC 0-1 FALLS W/OUT INJ PAST YR: ICD-10-PCS | Mod: HCNC,CPTII,S$GLB, | Performed by: NURSE PRACTITIONER

## 2019-12-05 PROCEDURE — 1159F PR MEDICATION LIST DOCUMENTED IN MEDICAL RECORD: ICD-10-PCS | Mod: HCNC,S$GLB,, | Performed by: NURSE PRACTITIONER

## 2019-12-05 PROCEDURE — 20553 PR INJECT TRIGGER POINTS, > 3: ICD-10-PCS | Mod: HCNC,S$GLB,, | Performed by: NURSE PRACTITIONER

## 2019-12-05 PROCEDURE — 99999 PR PBB SHADOW E&M-EST. PATIENT-LVL V: CPT | Mod: PBBFAC,HCNC,, | Performed by: NURSE PRACTITIONER

## 2019-12-05 PROCEDURE — 1125F AMNT PAIN NOTED PAIN PRSNT: CPT | Mod: HCNC,S$GLB,, | Performed by: NURSE PRACTITIONER

## 2019-12-05 PROCEDURE — 20553 NJX 1/MLT TRIGGER POINTS 3/>: CPT | Mod: HCNC,S$GLB,, | Performed by: NURSE PRACTITIONER

## 2019-12-05 RX ORDER — METHYLPREDNISOLONE ACETATE 40 MG/ML
40 INJECTION, SUSPENSION INTRA-ARTICULAR; INTRALESIONAL; INTRAMUSCULAR; SOFT TISSUE
Status: COMPLETED | OUTPATIENT
Start: 2019-12-05 | End: 2019-12-05

## 2019-12-05 RX ORDER — BUPIVACAINE HYDROCHLORIDE 5 MG/ML
9 INJECTION, SOLUTION EPIDURAL; INTRACAUDAL
Status: COMPLETED | OUTPATIENT
Start: 2019-12-05 | End: 2019-12-05

## 2019-12-05 RX ADMIN — METHYLPREDNISOLONE ACETATE 40 MG: 40 INJECTION, SUSPENSION INTRA-ARTICULAR; INTRALESIONAL; INTRAMUSCULAR; SOFT TISSUE at 03:12

## 2019-12-05 RX ADMIN — BUPIVACAINE HYDROCHLORIDE 45 MG: 5 INJECTION, SOLUTION EPIDURAL; INTRACAUDAL at 03:12

## 2019-12-05 NOTE — H&P (VIEW-ONLY)
Chronic Pain - Established Visit    Referring Physician: No ref. provider found    Chief Complaint: back pain       SUBJECTIVE:    Interval History 12/5/2019:  The patient is here to discuss increased back pain.  She previously had benefit of left leg pain with left L4 and L5 TF CHRISTIAN.  Her leg pain is no longer bothering her.  She is having return of aching pain across the lower back which previously responded well to RFAs.  She wishes to repeat this.  She is also having some muscle spasms and tightness and would like repeat TPIs for this.  She has increased her walking and activity level with limited benefit of current symptoms.  Her pain today is 10/10.    Interval History 9/12/2019:  The patient returns to discuss back pain.  I saw her last week, at which time I gave her a Toradol shot.  She is still having significant pain across the lower back.  However, she has developed a new symptoms of shooting pain down the left buttock and posterolateral aspect to the calf.  She reports numbness and burning to the leg.  Currently, her left leg pain is greater than her back pain.  It is preventinh her from walking.  It is worse at night and keeps her awake.  She would like to discuss a procedure.  Her pain today is 10/10.  The patient denies any bowel or bladder incontinence or signs of saddle paresthesia.  The patient denies any major medical changes since last office visit.    Previous Encounter:  Linnea Renae presents to the clinic for follow up of lower back pain.  She reports that she had a near fall about two weeks ago and has had increased pain since this time.  It is located across the back and into the buttocks.  Her back pain is greater than her buttock pain.  She had benefit with previous RFAs.  She admits that she has not been very active.  She plans to start again.  She is not having any leg pain or numbness today.  Her pain today is 2/10.    Physical Therapy/Home Exercise: yes per self    Pain Disability  Index Review:  Last 3 PDI Scores 12/5/2019 9/12/2019 9/5/2019   Pain Disability Index (PDI) 0 50 10       Pain Medications:    - Opioids: Ultram (Tramadol HCL)  - Adjuvant Medications: Advil,Motrin ( Ibuprofen) and zanaflex  - Anti-Coagulants: Aspirin  - Others: see med list     report:  Reviewed and consistent with medication use as prescribed.    Lab Results   Component Value Date    HGBA1C 6.0 (H) 03/19/2019     CMP  Sodium   Date Value Ref Range Status   03/19/2019 140 136 - 145 mmol/L Final     Potassium   Date Value Ref Range Status   03/19/2019 4.0 3.5 - 5.1 mmol/L Final     Chloride   Date Value Ref Range Status   03/19/2019 100 95 - 110 mmol/L Final     CO2   Date Value Ref Range Status   03/19/2019 28 23 - 29 mmol/L Final     Glucose   Date Value Ref Range Status   03/19/2019 102 70 - 110 mg/dL Final     BUN, Bld   Date Value Ref Range Status   03/19/2019 15 8 - 23 mg/dL Final     Creatinine   Date Value Ref Range Status   03/19/2019 0.8 0.5 - 1.4 mg/dL Final     Calcium   Date Value Ref Range Status   03/19/2019 9.9 8.7 - 10.5 mg/dL Final     Total Protein   Date Value Ref Range Status   03/19/2019 7.7 6.0 - 8.4 g/dL Final     Albumin   Date Value Ref Range Status   03/19/2019 3.9 3.5 - 5.2 g/dL Final     Total Bilirubin   Date Value Ref Range Status   03/19/2019 0.5 0.1 - 1.0 mg/dL Final     Comment:     For infants and newborns, interpretation of results should be based  on gestational age, weight and in agreement with clinical  observations.  Premature Infant recommended reference ranges:  Up to 24 hours.............<8.0 mg/dL  Up to 48 hours............<12.0 mg/dL  3-5 days..................<15.0 mg/dL  6-29 days.................<15.0 mg/dL       Alkaline Phosphatase   Date Value Ref Range Status   03/19/2019 82 55 - 135 U/L Final     AST   Date Value Ref Range Status   03/19/2019 20 10 - 40 U/L Final     ALT   Date Value Ref Range Status   03/19/2019 12 10 - 44 U/L Final     Anion Gap   Date Value  Ref Range Status   03/19/2019 12 8 - 16 mmol/L Final     eGFR if    Date Value Ref Range Status   03/19/2019 >60 >60 mL/min/1.73 m^2 Final     eGFR if non    Date Value Ref Range Status   03/19/2019 >60 >60 mL/min/1.73 m^2 Final     Comment:     Calculation used to obtain the estimated glomerular filtration  rate (eGFR) is the CKD-EPI equation.          Pain Procedures:   7/29/16 Right L3-4 TF CHRISTIAN  11/3/17 Bilateral L3-4 and L4-5 facet injections  5/9/18 Bilateral L3-4 and L4-5 facet injections  7/25/18 Bilateral L3-4 and L4-5 facet injections  12/5/18 Right L2,3,4,5 RFA- 80% relief  3/20/19 Left L2,3,4,5 RFA- 100% relief  10/2/19 Left L4 and L5 TF CHRISTIAN- 80% relief    Imaging:  Narrative     EXAMINATION:  MRI LUMBAR SPINE W WO CONTRAST    CLINICAL HISTORY:  Low back pain, >6wks conservative tx, persistent-progressive sx, surgical candidate; Spondylosis without myelopathy or radiculopathy, site unspecified    TECHNIQUE:  Multiplanar, multisequence MR images were acquired from the thoracolumbar junction to the sacrum prior to and following administration of 10 cc IV Gadavist.    COMPARISON:  Lumbar spine radiograph 10/11/2017; MRI lumbar spine with/without contrast 09/20/2016    FINDINGS:  Sagittal alignment demonstrates grade 1 anterolisthesis of L3 on L4 with slight uncovering of the intervertebral disc.  Vertebral body heights are satisfactorily maintained.  Intervertebral disc spaces are preserved.  Marrow signal is within normal limits with no evidence of fracture or marrow replacement process noting a small vertebral body hemangioma at L1.    Conus appears within normal limits terminating at the level of the L1 level.  Cauda equina appears normal.    Paraspinous soft tissues demonstrate mild prominence of the common bile duct and small left renal cysts..    T12-L1:   No spinal canal or neuroforaminal narrowing.    L1-2:  No spinal canal or neuroforaminal narrowing.    L2-3:  Mild  posterior disc bulge and moderate bilateral facet arthropathy without spinal canal stenosis or neural foraminal narrowing.    L3-4:  The diffuse posterior disc bulge, buckling of the ligamentum flavum, and moderate bilateral facet arthropathy results in mild spinal canal stenosis and no neural foraminal narrowing.    L4-5:  Diffuse posterior disc bulge with superimposed central disc protrusion, buckling of the ligamentum flavum, and moderate bilateral facet arthropathy result in moderate spinal canal stenosis and no significant neural foraminal narrowing.    L5-S1:  Mild posterior disc bulge and mild facet arthropathy results in mild right/moderate left neural foraminal narrowing.    No abnormal enhancement.      Impression       Multilevel lumbar spondylosis worst at L4-5 resulting in moderate spinal canal stenosis.    Mild prominence of the common bile duct which is nonspecific and may represent sequela of cholecystectomy.         X-Ray Lumbar Spine Ap Lateral w/Flex Ext    Narrative     10/11/17 10:17:19    Accession: [id number]    CLINICAL INDICATION: 67 year old F with  low back pain    COMPARISON: Lumbar spine x-rays, 09/20/2016.    TECHNIQUE: AP, lateral, flexion, extension, and coned down lateral radiographs of the lumbar spine.    FINDINGS:     Vertebral body heights are maintained.  No evidence of fracture.      Normal sagittal alignment is preserved.No significant translation with flexion-extension.    Moderate multilevel degenerative changes are again present. Mild anterolisthesis of L3 with respect to L4 is again noted. Multilevel facet arthropathy is present, most pronounced in the lower lumbar spine.  No detrimental change is identified when compared with the examination performed one year prior.      Impression         Moderate multilevel degenerative changes in the lumbar spine, similar in appearance to the prior exam.    No evidence of fracture or malalignment.      Electronically signed by: Heber  MayHackett  Date: 10/11/17  Time: 10:37            Past Medical History:   Diagnosis Date    Allergy     Breast cancer     Lumpectomy 10/15.    Chronic constipation     Colon polyps     Diabetes mellitus, type 2     Dry eyes     GERD (gastroesophageal reflux disease)     Hyperlipidemia     Hypertension     Lumbar disc disease     Type 2 diabetes mellitus      Past Surgical History:   Procedure Laterality Date    BREAST BIOPSY      BREAST LUMPECTOMY Right     2015    CATARACT EXTRACTION, BILATERAL       SECTION      x3    CHOLECYSTECTOMY      COLONOSCOPY N/A 10/11/2018    Procedure: COLONOSCOPY;  Surgeon: Lorenzo Tanner MD;  Location: [address]);  Service: Endoscopy;  Laterality: N/A;    COLONOSCOPY W/ POLYPECTOMY      HYSTERECTOMY      and USO    INJECTION OF FACET JOINT Bilateral 2018    Procedure: INJECTION, FACET JOINT;  Surgeon: Viet Wilson MD;  Location: [facility];  Service: Pain Management;  Laterality: Bilateral;  LUMBAR BILATERAL L3-L4 AND L4-L5 FACET STEROID INJECTION    NEED CONSENT    RADIOFREQUENCY ABLATION Left 3/20/2019    Procedure: RADIOFREQUENCY ABLATION LEFT L2,3,4,5;  Surgeon: Viet Wilson MD;  Location: [facility];  Service: Pain Management;  Laterality: Left;  LEFT RFA L2,3,4,5    NEEDS CONSENT    TRANSFORAMINAL EPIDURAL INJECTION OF STEROID Left 10/2/2019    Procedure: INJECTION, STEROID, EPIDURAL, TRANSFORAMINAL APPROACH, L4 AND L5;  Surgeon: Viet Wilson MD;  Location: [facility];  Service: Pain Management;  Laterality: Left;    TUBAL LIGATION       Social History     Socioeconomic History    Marital status:      Spouse name: Not on file    Number of children: 3    Years of education: Not on file    Highest education level: Not on file   Occupational History    Not on file   Social Needs    Financial resource strain: Not on file    Food insecurity:     Worry: Not on file     Inability: Not on file     Transportation needs:     Medical: Not on file     Non-medical: Not on file   Tobacco Use    Smoking status: Former Smoker     Packs/day: 0.25     Years: 20.00     Pack years: 5.00     Last attempt to quit: 1985     Years since quittin.9    Smokeless tobacco: Never Used    Tobacco comment: Quit mid .   Substance and Sexual Activity    Alcohol use: No     Alcohol/week: 0.0 standard drinks    Drug use: No    Sexual activity: Yes   Lifestyle    Physical activity:     Days per week: Not on file     Minutes per session: Not on file    Stress: Not on file   Relationships    Social connections:     Talks on phone: Not on file     Gets together: Not on file     Attends Caodaism service: Not on file     Active member of club or organization: Not on file     Attends meetings of clubs or organizations: Not on file     Relationship status: Not on file   Other Topics Concern    Not on file   Social History Narrative    Not on file     Family History   Problem Relation Age of Onset    No Known Problems Mother     No Known Problems Father     Heart disease Paternal Grandmother     Thyroid disease Paternal Grandfather     Hypertension Sister     Hypertension Brother     Glaucoma Brother     No Known Problems Daughter     No Known Problems Daughter     No Known Problems Daughter        Review of patient's allergies indicates:   Allergen Reactions    Codeine Itching       Current Outpatient Medications   Medication Sig    alcohol antiseptic pads (ALCOHOL PREP PADS TOP)     aspirin 81 MG Chew Take 81 mg by mouth once daily.    atenolol (TENORMIN) 50 MG tablet Take 1 tablet (50 mg total) by mouth 2 (two) times daily.    atorvastatin (LIPITOR) 20 MG tablet Take 1 tablet (20 mg total) by mouth once daily.    blood sugar diagnostic Strp 1 strip by Misc.(Non-Drug; Combo Route) route once daily.    calcium-vitamin D3 500 mg(1,250mg) -200 unit per tablet Take 1 tablet by mouth 2 (two) times daily  with meals.     clotrimazole (LOTRIMIN) 1 % cream Apply topically 2 (two) times daily.    cycloSPORINE (RESTASIS) 0.05 % ophthalmic emulsion 1 drop 2 (two) times daily.    diclofenac sodium (VOLTAREN) 1 % Gel Apply 2 g topically 4 (four) times daily.    fluticasone (FLONASE) 50 mcg/actuation nasal spray 2 sprays (100 mcg total) by Each Nare route once daily.    hydroCHLOROthiazide (HYDRODIURIL) 25 MG tablet Take 1 tablet (25 mg total) by mouth once daily.    ibuprofen (ADVIL,MOTRIN) 800 MG tablet TAKE 1 TABLET BY MOUTH THREE TIMES DAILY AS NEEDED    ketoconazole (NIZORAL) 2 % cream Apply topically once daily.    ketoconazole (NIZORAL) 2 % shampoo     lancets Misc 1 lancet by Misc.(Non-Drug; Combo Route) route once daily.    loratadine 10 mg Cap     metFORMIN (GLUCOPHAGE-XR) 500 MG 24 hr tablet TAKE TWO TABLETS BY MOUTH ONCE DAILY WITH BREAKFAST    minoxidil (LONITEN) 2.5 MG tablet     MINOXIDIL TOP Apply topically.    multivitamin (ONE DAILY MULTIVITAMIN) per tablet Take 1 tablet by mouth once daily.    olmesartan (BENICAR) 20 MG tablet Take 1 tablet (20 mg total) by mouth once daily.    omeprazole (PRILOSEC) 20 MG capsule Take 1 capsule (20 mg total) by mouth once daily.    tiZANidine (ZANAFLEX) 4 MG tablet Take 1 tablet (4 mg total) by mouth 2 (two) times daily as needed.    topiramate (TOPAMAX) 25 MG tablet Take 1 tablet (25 mg total) by mouth 2 (two) times daily.    traMADol (ULTRAM) 50 mg tablet TAKE 1 TABLET BY MOUTH EVERY 6 HOURS AS NEEDED    varicella-zoster gE-AS01B, PF, (SHINGRIX, PF,) 50 mcg/0.5 mL injection Inject into the muscle.    blood-glucose meter kit Use as instructed     No current facility-administered medications for this visit.        REVIEW OF SYSTEMS:    GENERAL:  No weight loss, malaise or fevers.  HEENT:  Negative for frequent or significant headaches.  NECK:  Negative for lumps, goiter, pain and significant neck swelling.  RESPIRATORY:  Negative for cough, wheezing or  "shortness of breath.  CARDIOVASCULAR:  Negative for chest pain, leg swelling or palpitations.  GI:  Negative for abdominal discomfort, blood in stools or black stools or change in bowel habits. GERD.  MUSCULOSKELETAL:  See HPI.  SKIN:  Negative for lesions, rash, and itching.  PSYCH: Positive for sleep disturbance, mood disorder and recent psychosocial stressors.  HEMATOLOGY/LYMPHOLOGY:  Negative for prolonged bleeding, bruising easily or swollen nodes. H/O breast cancer with mastectomy.  NEURO:   No history of headaches, syncope, paralysis, seizures or tremors.  All other reviewed and negative other than HPI.    OBJECTIVE:    /60   Pulse 70   Resp 18   Ht 5' 6" (1.676 m)   Wt 127.9 kg (281 lb 15.5 oz)   SpO2 100%   BMI 45.51 kg/m²     PHYSICAL EXAMINATION:    General appearance: Well appearing, in no acute distress, alert and oriented x3.  Psych:  Mood and affect appropriate.  Skin: No bruising or rashes.  Head/face:  Atraumatic, normocephalic. No palpable lymph nodes  Cor: RRR  Pulm: CTA  GI: Abdomen soft and non-tender.  Back: Straight leg raising in the sitting and supine positions is positive to radicular pain at a left L5 distribution.  Pain to palpation over the lumbar facet joints and paraspinals bilaterally.  Limited ROM with pain on flexion and extension.  Positive facet loading bilaterally.  Painful palpation to both SI joints.  MARY is negative bilaterally.  Musculoskeletal:  Bilateral upper and lower extremity strength is normal and symmetric.  No atrophy or tone abnormalities are noted.  TTP over both GT bursa.  Neuro: Bilateral upper and lower extremity coordination and muscle stretch reflexes are physiologic and symmetric.  Plantar response are downgoing.  Decreased sensation to LLE.  Gait: Antalgic.      ASSESSMENT: 69 y.o. year old female with lower back pain, consistent with the following diagnoses:     1. Myofascial pain  methylPREDNISolone acetate injection 40 mg    bupivacaine (PF) " 0.5% (5 mg/mL) injection 45 mg   2. Lumbar radiculopathy     3. Spondylosis without myelopathy     4. DDD (degenerative disc disease), lumbosacral     5. Lumbar spondylosis     6. Acute left-sided low back pain without sciatica           PLAN:     - I have stressed the importance of physical activity and a home exercise plan to help with pain and improve health.    - Previous imaging was reviewed and discussed with the patient today.    - Schedule for repeat left then right L2,3,4, RFAs 2 weeks apart.    - TPIs today as below.    - Continue current medications.    - RTC 4 weeks after completion of procedures.    - Counseled patient regarding the importance of activity modification, constant sleeping habits and physical therapy.      The above plan and management options were discussed at length with patient. Patient is in agreement with the above and verbalized understanding.    CARMEN Granados  12/05/2019     Trigger Point Injection:   The procedure was discussed with the patient including complications of nerve damage,  bleeding, infection, and failure of pain relief.   Trigger points were identified by palpation and marked. Alcohol prep of sites done. A mixture of 9mL 0.50% bupivacaine +40mg Depo-Medrol was prepared (10 mL total).   A 27-gauge needle was advanced to the point of maximal tenderness, and medication was injected after negative aspiration. All sites done in the same manner. Patient tolerated the procedure well and without complications. Sites injected included: lumbar paraspinals bilaterally (5 sites total).

## 2019-12-05 NOTE — PROGRESS NOTES
Chronic Pain - Established Visit    Referring Physician: No ref. provider found    Chief Complaint: back pain       SUBJECTIVE:    Interval History 12/5/2019:  The patient is here to discuss increased back pain.  She previously had benefit of left leg pain with left L4 and L5 TF CHRISTIAN.  Her leg pain is no longer bothering her.  She is having return of aching pain across the lower back which previously responded well to RFAs.  She wishes to repeat this.  She is also having some muscle spasms and tightness and would like repeat TPIs for this.  She has increased her walking and activity level with limited benefit of current symptoms.  Her pain today is 10/10.    Interval History 9/12/2019:  The patient returns to discuss back pain.  I saw her last week, at which time I gave her a Toradol shot.  She is still having significant pain across the lower back.  However, she has developed a new symptoms of shooting pain down the left buttock and posterolateral aspect to the calf.  She reports numbness and burning to the leg.  Currently, her left leg pain is greater than her back pain.  It is preventinh her from walking.  It is worse at night and keeps her awake.  She would like to discuss a procedure.  Her pain today is 10/10.  The patient denies any bowel or bladder incontinence or signs of saddle paresthesia.  The patient denies any major medical changes since last office visit.    Previous Encounter:  Linnea Renae presents to the clinic for follow up of lower back pain.  She reports that she had a near fall about two weeks ago and has had increased pain since this time.  It is located across the back and into the buttocks.  Her back pain is greater than her buttock pain.  She had benefit with previous RFAs.  She admits that she has not been very active.  She plans to start again.  She is not having any leg pain or numbness today.  Her pain today is 2/10.    Physical Therapy/Home Exercise: yes per self    Pain Disability  Index Review:  Last 3 PDI Scores 12/5/2019 9/12/2019 9/5/2019   Pain Disability Index (PDI) 0 50 10       Pain Medications:    - Opioids: Ultram (Tramadol HCL)  - Adjuvant Medications: Advil,Motrin ( Ibuprofen) and zanaflex  - Anti-Coagulants: Aspirin  - Others: see med list     report:  Reviewed and consistent with medication use as prescribed.    Lab Results   Component Value Date    HGBA1C 6.0 (H) 03/19/2019     CMP  Sodium   Date Value Ref Range Status   03/19/2019 140 136 - 145 mmol/L Final     Potassium   Date Value Ref Range Status   03/19/2019 4.0 3.5 - 5.1 mmol/L Final     Chloride   Date Value Ref Range Status   03/19/2019 100 95 - 110 mmol/L Final     CO2   Date Value Ref Range Status   03/19/2019 28 23 - 29 mmol/L Final     Glucose   Date Value Ref Range Status   03/19/2019 102 70 - 110 mg/dL Final     BUN, Bld   Date Value Ref Range Status   03/19/2019 15 8 - 23 mg/dL Final     Creatinine   Date Value Ref Range Status   03/19/2019 0.8 0.5 - 1.4 mg/dL Final     Calcium   Date Value Ref Range Status   03/19/2019 9.9 8.7 - 10.5 mg/dL Final     Total Protein   Date Value Ref Range Status   03/19/2019 7.7 6.0 - 8.4 g/dL Final     Albumin   Date Value Ref Range Status   03/19/2019 3.9 3.5 - 5.2 g/dL Final     Total Bilirubin   Date Value Ref Range Status   03/19/2019 0.5 0.1 - 1.0 mg/dL Final     Comment:     For infants and newborns, interpretation of results should be based  on gestational age, weight and in agreement with clinical  observations.  Premature Infant recommended reference ranges:  Up to 24 hours.............<8.0 mg/dL  Up to 48 hours............<12.0 mg/dL  3-5 days..................<15.0 mg/dL  6-29 days.................<15.0 mg/dL       Alkaline Phosphatase   Date Value Ref Range Status   03/19/2019 82 55 - 135 U/L Final     AST   Date Value Ref Range Status   03/19/2019 20 10 - 40 U/L Final     ALT   Date Value Ref Range Status   03/19/2019 12 10 - 44 U/L Final     Anion Gap   Date Value  Ref Range Status   03/19/2019 12 8 - 16 mmol/L Final     eGFR if    Date Value Ref Range Status   03/19/2019 >60 >60 mL/min/1.73 m^2 Final     eGFR if non    Date Value Ref Range Status   03/19/2019 >60 >60 mL/min/1.73 m^2 Final     Comment:     Calculation used to obtain the estimated glomerular filtration  rate (eGFR) is the CKD-EPI equation.          Pain Procedures:   7/29/16 Right L3-4 TF CHRISTIAN  11/3/17 Bilateral L3-4 and L4-5 facet injections  5/9/18 Bilateral L3-4 and L4-5 facet injections  7/25/18 Bilateral L3-4 and L4-5 facet injections  12/5/18 Right L2,3,4,5 RFA- 80% relief  3/20/19 Left L2,3,4,5 RFA- 100% relief  10/2/19 Left L4 and L5 TF CHRISTIAN- 80% relief    Imaging:  Narrative     EXAMINATION:  MRI LUMBAR SPINE W WO CONTRAST    CLINICAL HISTORY:  Low back pain, >6wks conservative tx, persistent-progressive sx, surgical candidate; Spondylosis without myelopathy or radiculopathy, site unspecified    TECHNIQUE:  Multiplanar, multisequence MR images were acquired from the thoracolumbar junction to the sacrum prior to and following administration of 10 cc IV Gadavist.    COMPARISON:  Lumbar spine radiograph 10/11/2017; MRI lumbar spine with/without contrast 09/20/2016    FINDINGS:  Sagittal alignment demonstrates grade 1 anterolisthesis of L3 on L4 with slight uncovering of the intervertebral disc.  Vertebral body heights are satisfactorily maintained.  Intervertebral disc spaces are preserved.  Marrow signal is within normal limits with no evidence of fracture or marrow replacement process noting a small vertebral body hemangioma at L1.    Conus appears within normal limits terminating at the level of the L1 level.  Cauda equina appears normal.    Paraspinous soft tissues demonstrate mild prominence of the common bile duct and small left renal cysts..    T12-L1:   No spinal canal or neuroforaminal narrowing.    L1-2:  No spinal canal or neuroforaminal narrowing.    L2-3:  Mild  posterior disc bulge and moderate bilateral facet arthropathy without spinal canal stenosis or neural foraminal narrowing.    L3-4:  The diffuse posterior disc bulge, buckling of the ligamentum flavum, and moderate bilateral facet arthropathy results in mild spinal canal stenosis and no neural foraminal narrowing.    L4-5:  Diffuse posterior disc bulge with superimposed central disc protrusion, buckling of the ligamentum flavum, and moderate bilateral facet arthropathy result in moderate spinal canal stenosis and no significant neural foraminal narrowing.    L5-S1:  Mild posterior disc bulge and mild facet arthropathy results in mild right/moderate left neural foraminal narrowing.    No abnormal enhancement.      Impression       Multilevel lumbar spondylosis worst at L4-5 resulting in moderate spinal canal stenosis.    Mild prominence of the common bile duct which is nonspecific and may represent sequela of cholecystectomy.         X-Ray Lumbar Spine Ap Lateral w/Flex Ext    Narrative     10/11/17 10:17:19    Accession: [id number]    CLINICAL INDICATION: 67 year old F with  low back pain    COMPARISON: Lumbar spine x-rays, 09/20/2016.    TECHNIQUE: AP, lateral, flexion, extension, and coned down lateral radiographs of the lumbar spine.    FINDINGS:     Vertebral body heights are maintained.  No evidence of fracture.      Normal sagittal alignment is preserved.No significant translation with flexion-extension.    Moderate multilevel degenerative changes are again present. Mild anterolisthesis of L3 with respect to L4 is again noted. Multilevel facet arthropathy is present, most pronounced in the lower lumbar spine.  No detrimental change is identified when compared with the examination performed one year prior.      Impression         Moderate multilevel degenerative changes in the lumbar spine, similar in appearance to the prior exam.    No evidence of fracture or malalignment.      Electronically signed by: [place]  [place]  Date: 10/11/17  Time: 10:37            Past Medical History:   Diagnosis Date    Allergy     Breast cancer     Lumpectomy 10/15.    Chronic constipation     Colon polyps     Diabetes mellitus, type 2     Dry eyes     GERD (gastroesophageal reflux disease)     Hyperlipidemia     Hypertension     Lumbar disc disease     Type 2 diabetes mellitus      Past Surgical History:   Procedure Laterality Date    BREAST BIOPSY      BREAST LUMPECTOMY Right     2015    CATARACT EXTRACTION, BILATERAL       SECTION      x3    CHOLECYSTECTOMY      COLONOSCOPY N/A 10/11/2018    Procedure: COLONOSCOPY;  Surgeon: Lorenzo Tanner MD;  Location: [address]);  Service: Endoscopy;  Laterality: N/A;    COLONOSCOPY W/ POLYPECTOMY      HYSTERECTOMY      and USO    INJECTION OF FACET JOINT Bilateral 2018    Procedure: INJECTION, FACET JOINT;  Surgeon: Viet Wilson MD;  Location: [facility];  Service: Pain Management;  Laterality: Bilateral;  LUMBAR BILATERAL L3-L4 AND L4-L5 FACET STEROID INJECTION    NEED CONSENT    RADIOFREQUENCY ABLATION Left 3/20/2019    Procedure: RADIOFREQUENCY ABLATION LEFT L2,3,4,5;  Surgeon: Viet Wilson MD;  Location: [facility];  Service: Pain Management;  Laterality: Left;  LEFT RFA L2,3,4,5    NEEDS CONSENT    TRANSFORAMINAL EPIDURAL INJECTION OF STEROID Left 10/2/2019    Procedure: INJECTION, STEROID, EPIDURAL, TRANSFORAMINAL APPROACH, L4 AND L5;  Surgeon: Viet Wilson MD;  Location: [facility];  Service: Pain Management;  Laterality: Left;    TUBAL LIGATION       Social History     Socioeconomic History    Marital status:      Spouse name: Not on file    Number of children: 3    Years of education: Not on file    Highest education level: Not on file   Occupational History    Not on file   Social Needs    Financial resource strain: Not on file    Food insecurity:     Worry: Not on file     Inability: Not on file     Transportation needs:     Medical: Not on file     Non-medical: Not on file   Tobacco Use    Smoking status: Former Smoker     Packs/day: 0.25     Years: 20.00     Pack years: 5.00     Last attempt to quit: 1985     Years since quittin.9    Smokeless tobacco: Never Used    Tobacco comment: Quit mid .   Substance and Sexual Activity    Alcohol use: No     Alcohol/week: 0.0 standard drinks    Drug use: No    Sexual activity: Yes   Lifestyle    Physical activity:     Days per week: Not on file     Minutes per session: Not on file    Stress: Not on file   Relationships    Social connections:     Talks on phone: Not on file     Gets together: Not on file     Attends Congregation service: Not on file     Active member of club or organization: Not on file     Attends meetings of clubs or organizations: Not on file     Relationship status: Not on file   Other Topics Concern    Not on file   Social History Narrative    Not on file     Family History   Problem Relation Age of Onset    No Known Problems Mother     No Known Problems Father     Heart disease Paternal Grandmother     Thyroid disease Paternal Grandfather     Hypertension Sister     Hypertension Brother     Glaucoma Brother     No Known Problems Daughter     No Known Problems Daughter     No Known Problems Daughter        Review of patient's allergies indicates:   Allergen Reactions    Codeine Itching       Current Outpatient Medications   Medication Sig    alcohol antiseptic pads (ALCOHOL PREP PADS TOP)     aspirin 81 MG Chew Take 81 mg by mouth once daily.    atenolol (TENORMIN) 50 MG tablet Take 1 tablet (50 mg total) by mouth 2 (two) times daily.    atorvastatin (LIPITOR) 20 MG tablet Take 1 tablet (20 mg total) by mouth once daily.    blood sugar diagnostic Strp 1 strip by Misc.(Non-Drug; Combo Route) route once daily.    calcium-vitamin D3 500 mg(1,250mg) -200 unit per tablet Take 1 tablet by mouth 2 (two) times daily  with meals.     clotrimazole (LOTRIMIN) 1 % cream Apply topically 2 (two) times daily.    cycloSPORINE (RESTASIS) 0.05 % ophthalmic emulsion 1 drop 2 (two) times daily.    diclofenac sodium (VOLTAREN) 1 % Gel Apply 2 g topically 4 (four) times daily.    fluticasone (FLONASE) 50 mcg/actuation nasal spray 2 sprays (100 mcg total) by Each Nare route once daily.    hydroCHLOROthiazide (HYDRODIURIL) 25 MG tablet Take 1 tablet (25 mg total) by mouth once daily.    ibuprofen (ADVIL,MOTRIN) 800 MG tablet TAKE 1 TABLET BY MOUTH THREE TIMES DAILY AS NEEDED    ketoconazole (NIZORAL) 2 % cream Apply topically once daily.    ketoconazole (NIZORAL) 2 % shampoo     lancets Misc 1 lancet by Misc.(Non-Drug; Combo Route) route once daily.    loratadine 10 mg Cap     metFORMIN (GLUCOPHAGE-XR) 500 MG 24 hr tablet TAKE TWO TABLETS BY MOUTH ONCE DAILY WITH BREAKFAST    minoxidil (LONITEN) 2.5 MG tablet     MINOXIDIL TOP Apply topically.    multivitamin (ONE DAILY MULTIVITAMIN) per tablet Take 1 tablet by mouth once daily.    olmesartan (BENICAR) 20 MG tablet Take 1 tablet (20 mg total) by mouth once daily.    omeprazole (PRILOSEC) 20 MG capsule Take 1 capsule (20 mg total) by mouth once daily.    tiZANidine (ZANAFLEX) 4 MG tablet Take 1 tablet (4 mg total) by mouth 2 (two) times daily as needed.    topiramate (TOPAMAX) 25 MG tablet Take 1 tablet (25 mg total) by mouth 2 (two) times daily.    traMADol (ULTRAM) 50 mg tablet TAKE 1 TABLET BY MOUTH EVERY 6 HOURS AS NEEDED    varicella-zoster gE-AS01B, PF, (SHINGRIX, PF,) 50 mcg/0.5 mL injection Inject into the muscle.    blood-glucose meter kit Use as instructed     No current facility-administered medications for this visit.        REVIEW OF SYSTEMS:    GENERAL:  No weight loss, malaise or fevers.  HEENT:  Negative for frequent or significant headaches.  NECK:  Negative for lumps, goiter, pain and significant neck swelling.  RESPIRATORY:  Negative for cough, wheezing or  "shortness of breath.  CARDIOVASCULAR:  Negative for chest pain, leg swelling or palpitations.  GI:  Negative for abdominal discomfort, blood in stools or black stools or change in bowel habits. GERD.  MUSCULOSKELETAL:  See HPI.  SKIN:  Negative for lesions, rash, and itching.  PSYCH: Positive for sleep disturbance, mood disorder and recent psychosocial stressors.  HEMATOLOGY/LYMPHOLOGY:  Negative for prolonged bleeding, bruising easily or swollen nodes. H/O breast cancer with mastectomy.  NEURO:   No history of headaches, syncope, paralysis, seizures or tremors.  All other reviewed and negative other than HPI.    OBJECTIVE:    /60   Pulse 70   Resp 18   Ht 5' 6" (1.676 m)   Wt 127.9 kg (281 lb 15.5 oz)   SpO2 100%   BMI 45.51 kg/m²     PHYSICAL EXAMINATION:    General appearance: Well appearing, in no acute distress, alert and oriented x3.  Psych:  Mood and affect appropriate.  Skin: No bruising or rashes.  Head/face:  Atraumatic, normocephalic. No palpable lymph nodes  Cor: RRR  Pulm: CTA  GI: Abdomen soft and non-tender.  Back: Straight leg raising in the sitting and supine positions is positive to radicular pain at a left L5 distribution.  Pain to palpation over the lumbar facet joints and paraspinals bilaterally.  Limited ROM with pain on flexion and extension.  Positive facet loading bilaterally.  Painful palpation to both SI joints.  MARY is negative bilaterally.  Musculoskeletal:  Bilateral upper and lower extremity strength is normal and symmetric.  No atrophy or tone abnormalities are noted.  TTP over both GT bursa.  Neuro: Bilateral upper and lower extremity coordination and muscle stretch reflexes are physiologic and symmetric.  Plantar response are downgoing.  Decreased sensation to LLE.  Gait: Antalgic.      ASSESSMENT: 69 y.o. year old female with lower back pain, consistent with the following diagnoses:     1. Myofascial pain  methylPREDNISolone acetate injection 40 mg    bupivacaine (PF) " 0.5% (5 mg/mL) injection 45 mg   2. Lumbar radiculopathy     3. Spondylosis without myelopathy     4. DDD (degenerative disc disease), lumbosacral     5. Lumbar spondylosis     6. Acute left-sided low back pain without sciatica           PLAN:     - I have stressed the importance of physical activity and a home exercise plan to help with pain and improve health.    - Previous imaging was reviewed and discussed with the patient today.    - Schedule for repeat left then right L2,3,4, RFAs 2 weeks apart.    - TPIs today as below.    - Continue current medications.    - RTC 4 weeks after completion of procedures.    - Counseled patient regarding the importance of activity modification, constant sleeping habits and physical therapy.      The above plan and management options were discussed at length with patient. Patient is in agreement with the above and verbalized understanding.    CARMEN Granados  12/05/2019     Trigger Point Injection:   The procedure was discussed with the patient including complications of nerve damage,  bleeding, infection, and failure of pain relief.   Trigger points were identified by palpation and marked. Alcohol prep of sites done. A mixture of 9mL 0.50% bupivacaine +40mg Depo-Medrol was prepared (10 mL total).   A 27-gauge needle was advanced to the point of maximal tenderness, and medication was injected after negative aspiration. All sites done in the same manner. Patient tolerated the procedure well and without complications. Sites injected included: lumbar paraspinals bilaterally (5 sites total).

## 2019-12-06 LAB
ESTIMATED AVG GLUCOSE: 123 MG/DL (ref 68–131)
HBA1C MFR BLD HPLC: 5.9 % (ref 4–5.6)

## 2019-12-10 DIAGNOSIS — K21.9 GASTROESOPHAGEAL REFLUX DISEASE, ESOPHAGITIS PRESENCE NOT SPECIFIED: ICD-10-CM

## 2019-12-10 DIAGNOSIS — M15.9 PRIMARY OSTEOARTHRITIS INVOLVING MULTIPLE JOINTS: ICD-10-CM

## 2019-12-11 RX ORDER — IBUPROFEN 800 MG/1
TABLET ORAL
Refills: 0 | OUTPATIENT
Start: 2019-12-11

## 2019-12-11 RX ORDER — OMEPRAZOLE 20 MG/1
CAPSULE, DELAYED RELEASE ORAL
Qty: 90 CAPSULE | Refills: 1 | Status: SHIPPED | OUTPATIENT
Start: 2019-12-11 | End: 2020-05-26

## 2019-12-12 ENCOUNTER — OFFICE VISIT (OUTPATIENT)
Dept: BARIATRICS | Facility: CLINIC | Age: 69
End: 2019-12-12
Payer: MEDICARE

## 2019-12-12 VITALS
HEART RATE: 70 BPM | HEIGHT: 66 IN | BODY MASS INDEX: 45.56 KG/M2 | DIASTOLIC BLOOD PRESSURE: 60 MMHG | SYSTOLIC BLOOD PRESSURE: 110 MMHG | WEIGHT: 283.5 LBS

## 2019-12-12 DIAGNOSIS — L98.7 EXCESS SKIN OF ABDOMEN: ICD-10-CM

## 2019-12-12 DIAGNOSIS — E66.01 CLASS 3 SEVERE OBESITY DUE TO EXCESS CALORIES WITHOUT SERIOUS COMORBIDITY WITH BODY MASS INDEX (BMI) OF 45.0 TO 49.9 IN ADULT: Primary | ICD-10-CM

## 2019-12-12 PROCEDURE — 3078F PR MOST RECENT DIASTOLIC BLOOD PRESSURE < 80 MM HG: ICD-10-PCS | Mod: HCNC,CPTII,S$GLB, | Performed by: INTERNAL MEDICINE

## 2019-12-12 PROCEDURE — 99999 PR PBB SHADOW E&M-EST. PATIENT-LVL III: CPT | Mod: PBBFAC,HCNC,, | Performed by: INTERNAL MEDICINE

## 2019-12-12 PROCEDURE — 1101F PR PT FALLS ASSESS DOC 0-1 FALLS W/OUT INJ PAST YR: ICD-10-PCS | Mod: HCNC,CPTII,S$GLB, | Performed by: INTERNAL MEDICINE

## 2019-12-12 PROCEDURE — 1159F MED LIST DOCD IN RCRD: CPT | Mod: HCNC,S$GLB,, | Performed by: INTERNAL MEDICINE

## 2019-12-12 PROCEDURE — 1126F AMNT PAIN NOTED NONE PRSNT: CPT | Mod: HCNC,S$GLB,, | Performed by: INTERNAL MEDICINE

## 2019-12-12 PROCEDURE — 99999 PR PBB SHADOW E&M-EST. PATIENT-LVL III: ICD-10-PCS | Mod: PBBFAC,HCNC,, | Performed by: INTERNAL MEDICINE

## 2019-12-12 PROCEDURE — 99214 PR OFFICE/OUTPT VISIT, EST, LEVL IV, 30-39 MIN: ICD-10-PCS | Mod: HCNC,S$GLB,, | Performed by: INTERNAL MEDICINE

## 2019-12-12 PROCEDURE — 3074F PR MOST RECENT SYSTOLIC BLOOD PRESSURE < 130 MM HG: ICD-10-PCS | Mod: HCNC,CPTII,S$GLB, | Performed by: INTERNAL MEDICINE

## 2019-12-12 PROCEDURE — 1126F PR PAIN SEVERITY QUANTIFIED, NO PAIN PRESENT: ICD-10-PCS | Mod: HCNC,S$GLB,, | Performed by: INTERNAL MEDICINE

## 2019-12-12 PROCEDURE — 99214 OFFICE O/P EST MOD 30 MIN: CPT | Mod: HCNC,S$GLB,, | Performed by: INTERNAL MEDICINE

## 2019-12-12 PROCEDURE — 3074F SYST BP LT 130 MM HG: CPT | Mod: HCNC,CPTII,S$GLB, | Performed by: INTERNAL MEDICINE

## 2019-12-12 PROCEDURE — 3078F DIAST BP <80 MM HG: CPT | Mod: HCNC,CPTII,S$GLB, | Performed by: INTERNAL MEDICINE

## 2019-12-12 PROCEDURE — 1159F PR MEDICATION LIST DOCUMENTED IN MEDICAL RECORD: ICD-10-PCS | Mod: HCNC,S$GLB,, | Performed by: INTERNAL MEDICINE

## 2019-12-12 PROCEDURE — 1101F PT FALLS ASSESS-DOCD LE1/YR: CPT | Mod: HCNC,CPTII,S$GLB, | Performed by: INTERNAL MEDICINE

## 2019-12-12 RX ORDER — TOPIRAMATE 25 MG/1
25 TABLET ORAL 2 TIMES DAILY
Qty: 60 TABLET | Refills: 2 | Status: SHIPPED | OUTPATIENT
Start: 2019-12-12 | End: 2020-07-15

## 2019-12-12 RX ORDER — NYSTATIN 100000 [USP'U]/G
POWDER TOPICAL 4 TIMES DAILY
Qty: 180 G | Refills: 2 | Status: SHIPPED | OUTPATIENT
Start: 2019-12-12 | End: 2021-05-10 | Stop reason: SDUPTHER

## 2019-12-12 NOTE — PATIENT INSTRUCTIONS
"Patient was informed that topiramate is used for migraine prevention and seizures. Weight loss is a common side effect that is well documented. S/he understands this. S/he was informed of the potential side effects such as serious and possibly fatal rash in which case the medication should be discontinued immediately. Paresthesias, forgetfulness, fatigue, kidney stones, GI symptoms, and changes in lab values such as electrolytes, blood counts and kidney function.      Continue topiramate 25 mg twice a day.       Vegetarian Meal Plan                  1000 calorie Vegetarian Meal Plan  STARCHES 80 CALORIES PER SERVING 15g CARB, 3g PROTEIN, 1g FAT  Servings per day    bread    tortilla    crackers    cooked cereals    dry cereals    pasta    rice    corn    popcorn    potato (small)    potato, mashed    sweet potato    squash, winter    cooked beans, peas, lentils (add 1 meat exchange)    1 slice    1 (6")    4-6 (3/4 oz)    1/2 cup    3/4 cup    1/2 cup    1/3 cup   1/2 cup    1 small light bag   1 (3 oz)    1/2 cup    1/3 cup    1 cup    1/2 cup  Most starches are a good source of B vitamins    Choose whole grain foods such as 100% whole wheat bread and flour, brown rice, tortillas, etc. for nutrients and fiber.    Combine beans (starch & meat) with grains (starch) for their complimentary proteins and fiber    Combine grains (starch) with milk (milk) or cheese (meat) to compliment proteins     2   FRUIT 60 CALORIES PER SERVING 15g CARB     fresh fruit    banana   melon (cubes)    berries   canned fruit    dried fruit     1 small    1/2   ½ cup    ¾ cup   ½ cup    ¼ cup     Choose whole fruits for fiber    No fruit juices   2   DAIRY  CALORIES PER SERVING 12g CARB, 8g PROTEIN, 0-8g FAT     milk    yogurt   Protein soy or almond milk 1 cup   1 cup   1 cup  Use unsweetened almond or soy milk with added protein   Avoid chocolate or flavored milk   Avoid yogurt " with more than 8 gms of sugar. Gms of protein should be higher than grams of sugar               1   MEAT AND SUBSTITUES  CALORIES PER SERVING 7g Protein, 0-13g FAT     Seafood and fish    cheese    cottage cheese    egg    peanut butter    tofu or tempeh   cooked beans, peas, lentils (add 1 starch)   Quinoa (add 1 starch)    Nuts and seeds (½ serving protein + 1 fat)   Nutritional yeast   [employer]  1 oz      1 oz   1/4 cup      1    1.5 Tbsp    4 oz (1/2 cup)    1/2 cup               ¼ cup             2 tablespoons     1 nina or ½ cup       Choose fish, seafood and lower fat cheeses   Limit frying or adding fat.      8   FATS 45 CALORIES PER SERVING      oil    mayonnaise    cream cheese    salad dressing    peanuts    avocado    butter or margarine     1 tsp    1 tsp    1 Tbsp    1 Tbsp    10    1/8    1 tsp  Eat less fat.    Eat less saturated fat such as animal fat found in fatter meat, cheese, and butter. Also eat less hydrogenated fat.      2-3   VEGETABLES 25 CALORIES PER SERVING 5 g CARB, 2g PROTEIN     raw vegetables    cooked vegetables    tomato or vegetable juice  1 cup    1/2 cup      1/2 cup  Choose dark green leafy and deep yellow vegetables such as spinach, zuchinni, squash, mushrooms, cauliflower ,broccoli, carrots, and peppers.   Unlimited       VEGETARIAN 1000 CALORIE MEAL PLAN   Eat 3 small meals per day with 1-2 small snacks to keep you full throughout the day   Aim for at least 15 gms of protein at breakfast, at least 25 grams at lunch and at least 25 grams of protein at dinner.  Snacks should contain at least 5 grams of protein   Limit starches to 1 serving per meal or snack.  Keep your carbs whole grain or whole wheat   Limit your intake of refined sugar including sugary beverages ie sweet tea, lemonade, fruit punch             Fruit Protein Dairy Starch Fats Calories   Breakfast 1 2  1  370   Lunch  3  1 1 290   Dinner 1 3   1 1 315   Snack   1   120   Total 2 8 1 3 2 1085     Sample breakfasts:   2 scrambled eggs (use spray), 1 cup Protein Silk soy milk, 1 slice whole wheat toast, 1 small orange   Omelet made with 1 egg, 1-ounce low fat cheese and non-starchy veggies, 1 low fat plain yogurt with ½ banana   Plain yogurt with ¾ cup unsweetened cold cereal and ½ cup fresh fruit salad, 2 hardboiled eggs  Sample lunches:   ½ whole wheat bagel topped with 3 ounces of low fat cheese melted in oven with a wild green salad with 1 TBSP dressing   ¾ cup cooked beans with 6 whole grain crackers, 10 roasted peanuts   ½ medium baked potato with 3 ounces of low fat cheddar cheese and 1 TBSP  sour cream   1 small wheat tortilla brushed with 1 tsp olive oil then brushed with pizza sauce and 4 ounces low fat cheese, sliced mushrooms and green peppers broiled until cheese is melted.  ½ cup chopped melon    Sample Dinners:   4 ounces of tuna pan seared in 1 tsp olive oil, ½ baked small sweet potato and 2 small plums   ½ cup chick peas, 1 cup cauliflower sauteed with 1 tbsp chopped onion, 1 tsp oil, and 2 tsp rothman powder. Add low sodium vegetable stock to desired consistency. Serve with ½ cup brown rice   Red beans seasoned with onions and bell peppers served over cauliflower rice   1 cup cooked green or brown lentils served with ½ cup cooked quinoa and chopped tomatoes and cucumbers and 1 tbsp feta cheese  Sample Snacks:   1 cup of Protein Silk Soy milk with 3 squares rishabh crackers   3/4 ounce Triscuits with 1 ounce cubed cheese   Chobani Triple Zero yogurt with ¾ cup unsweetened cereal   ½ cup edamame (shelled)                          VEGETARIAN BREAKFAST RECIPE      Delicious and Healthy Breakfast Egg Muffins. Simple recipe, great taste. Low carb and high in protein. Perfect as a full meal or filling snack.    Ready in: 25 minutes  Recipe by: [email or web address]    Low Carb Egg Breakfast Muffins (25 Minutes, Vegetarian)  Delicious and  Healthy Breakfast Egg Muffins. Simple recipe, great taste. Low carb and high in protein. Perfect as a full meal or filling snack.   Course Breakfast   Cuisine Vegetarian   Prep Time 5 minutes   Cook Time 20 minutes   Total Time 25 minutes   Servings 3 muffins   Calories 259 kcal   Author [email or web address]  Ingredients   1 bell pepper (your favorite color)   3 spring onions   4 little cherry tomatoes/one normal tomato   6 eggs   1 handful spinach/ green leaves   2 slices cheddar (2 slices = around 50g; you can use different cheese too)   ½-1 tsp salt   4-5 splashes hot sauce (or rothman powder)  Equipment   6 slot muffin tin   Baking paper or muffin cups (only if you don't have a nonstick muffin tin)  Instructions  1. Preheat the oven to 400°F.  2. Wash and dice the pepper, onions and tomatoes. and put them in a large mixing bowl.  3. Wash the spinach, lightly chop it and add it to the bowl as well.  4. Add the eggs and salt. Mix well. Pro tip - crack the eggs separately before adding. That way if you get a dodgy one, it wont ruin the whole meal.  5. Optionally add some hot sauce, rothman powder...whatever you like. Hot sauce is great!  6. Grease the muffin tin with oil and kitchen paper/baking brush and pour the egg mixture evenly into the muffin slots. (If you think they might still stick to the pan use some muffin cups or cut out some baking paper and to use as cups - definitely saves time on doing the washing up   7.   8. If youre so inclined then layering some cheese over the top of each muffin before they go into the oven is a delicious addition! You can also mix in the cheese to the batter.  9. Pop the tray into the oven for 15-18 minutes or until the tops are firm to the touch.    Helpful tips to survive the holidays:  - Dont save yourself for the meal: Make sure you continue to eat high protein small meals every 3-4 hours to ensure to do not become over-hungry. Avoid temptation by not showing up to a  holiday party or gathering hungry.   - Plan ahead. Bring a dish to a party if you know there may not be an appropriate option.   - Choose sugar-free drinks: Stick to water or other sugar-free beverages and make sure you are getting 6-8 cups of fluid each day (but not with meals!). Avoid alcohol, carbonated beverages, and high-fat/high-sugar beverages like hot chocolate and eggnog. Try sugar-free hot cocoa made with skim milk or water, or sugar-free spiced tea to add some holiday flair to your beverage (see sugar-free mulled cider recipe below)  - Take your time: Eat mindfully. Dont graze on food throughout the day. Sit down to enjoy your small meals. Chew slowly and thoroughly. Cut your food into small pieces before eating.  - Listen to your body: Stop eating as soon as you feel full. Do not feel pressured to try certain (or all) foods or to eat all of the food on your plate. Listen to your hunger cues.   - REMEMBER: Make your holidays about spending time with family and friends instead of focusing gatherings around food.  - Keep up your exercise routine: Make sure you continue to get regular exercise throughout the holiday season. Encourage friends and family to be active by taking a walk together after a meal, to look at holiday lights, or to window-shop.    Good Holiday Meal Options:  - Roasted Turkey, NO skin. Use low sodium broth instead of gravy.   - Stuffed Bell Peppers made WITHOUT bread crumbs or Rice. Try using parmesan cheese instead  - Gumbo, NO rice. Try picking out mostly the meat/seafood and vegetables with little broth.   - Green Bean Casserole made with 98% fat free cream of mushroom soup and crushed almonds/pecans instead of fried onions  - Side salad w/ low fat dressing. Try a different kind of salad maybe use Kale or spinach.   - Roasted non-starchy vegetables like brussel sprouts, broccoli, green beans, zucchini, butternut squash, cauliflower  - Cauliflower Mash (steam or roast cauliflower,  puree w/ low fat cheese, dash of fat free milk and 2-3 sprays of I cant believe its not butter spray. Add garlic powder and black pepper to season). Use Low sodium broth instead of gravy.   - Try Loaded Cauliflower Mash (Make cauliflower like above cauliflower mash. Top with diced turkey lara, ¼ cup low fat cheddar cheese and bake @ 350* F for 5-10 minutes, until cheese is melted. Top with minced chives, black pepper and garlic to taste).   - Homemade cranberry sauce using Splenda or another alternative sweetener. Boil fresh cranberries and add splenda to taste. Boil until cranberries break open and then simmer until it reaches the consistency you want (less time for more watery sauce and simmer for longer to create a thicker sauce).   - Deviled eggs: make using low fat pineda, mustard, DILL relish (not sweet relish).   - Vegetable tray w/ Greek yogurt Ranch Dip. Mix 1 packet of [place] ranch dip mix w/ 16 oz low fat plain greek yogurt.     Good Holiday Dessert Options:  - High protein Pumpkin Cheesecake (see recipe below)  - Pumpkin Whip (see recipe below)  - Quest Apple Pie or Cinnamon Roll flavored protein bar (warm in microwave for 10-15 seconds)  - Eggnog Protein shake (see recipe below)  - Fresh fruit w/ low fat cheese  - Sugar-free Jello Parfaits. Layer Red and Green sugar-free jello in cups and top w/ 2 tbsp Sugar-free cool-whip    Pumpkin Cheesecake    8 ounces fat free cream cheese, softened   2 scoops of vanilla protein powder (<4 g sugar per serving)   ¼ tsp Fine salt   2 eggs, at room temperature   1/3 cup fat free sour cream  1/3 cup fat free half and half  1 15 -ounce can pure pumpkin puree   1 tablespoon pumpkin pie spice, plus more for dusting   Unsalted nuts, crushed  *Add splenda to taste    Directions     1. Preheat the oven to 300 degrees F. Line 18 muffin cups with paper liners. Sprinkle 1 tsp crushed unsalted nuts at the bottom of each of muffin cup liner.     2. In a large bowl, beat  the cream cheese, vanilla protein powder and 1/4 teaspoon fine salt on medium-high speed until smooth and creamy, 2 to 3 minutes. Scrape down the sides, reduce speed to low and beat in the eggs, 1 at a time, until combined. Beat in 1/3 cup fat free sour cream and fat free half and half. Stir in the pumpkin puree and pumpkin pie spice until smooth. Divide evenly among cookie-lined paper cups, filling almost all the way to the top.     3. Bake until the filling is just set, 40 to 45 minutes. A sharp knife inserted into the center will come out moist, but clean. Cool completely in tins on a wire rack. Refrigerate until cold, 4 hours, or overnight. Top with a dusting of pumpkin pie spice.    Recipe altered from the following recipe: http://www.[employer]/recipes/food-network-deny/mini-pumpkin-cheesecakes-recipe.print.html?oc=linkback    Pumpkin Whip    Box of sugar-free vanilla pudding  Can of pumpkin puree  Pumpkin Pie spice (sprinkle to taste)  ½ cup of sugar-free Cool Whip    Directions:  Make sugar-free pudding according to package directions using fat free or 1% milk. Stir in pumpkin and cool whip. Add pumpkin pie spice to taste.     Egg Nog Protein shake    8 oz skim or 1% milk  1 scoop vanilla protein powder  1 tbsp sugar-free vanilla pudding mix  ½ tsp butter flavor extract  ½ tsp rum extract  ½ tsp cinnamon     Shake together or blend with ice and serve.     Sugar-Free Mulled Cider    3 oz diet cran-apple juice  6 oz water  1 packet sugar-free apple cider mix  ½ tsp apple pie spice  ½ tsp butter flavor extract  1 tbsp Sugar-free Syrup    Mix together. Warm if needed and serve w/ orange wedge and cinnamon stick.

## 2019-12-12 NOTE — PROGRESS NOTES
"Subjective:       Patient ID: Linnea Renae is a 69 y.o. female.    Chief Complaint: No chief complaint on file.    Pt here today for follow-up. Has lost 2 lbs. Started [id number] piyush diet and topiramate. States she has lost her taste for a lot foods. Also states she is tired and some upset stomach. Has started to take it with food. That has helped. Feels her mood and bowel movements are better.   One of her primary complaints today is rash and skin break down under pannus. States this is very painful and interferes with ADL. Frequently hs broken areas under skin with odor and drainiage Has tried OTC and Rx topical meds for this. Has pictures with her of skin breakdown.     Review of Systems   Constitutional: Negative for chills and fever.   Respiratory: Negative for shortness of breath.         Denies snoring   Cardiovascular: Positive for leg swelling. Negative for chest pain.   Gastrointestinal: Positive for constipation. Negative for diarrhea.        + GERD   Genitourinary: Negative for difficulty urinating and dysuria.   Musculoskeletal: Positive for arthralgias and back pain.   Skin: Positive for rash.   Neurological: Positive for dizziness. Negative for light-headedness.   Psychiatric/Behavioral: Positive for dysphoric mood. The patient is nervous/anxious.        Objective:     /60   Pulse 70   Ht 5' 6" (1.676 m)   Wt 128.6 kg (283 lb 8.2 oz)   BMI 45.76 kg/m²     Physical Exam   Constitutional: She is oriented to person, place, and time. She appears well-developed. No distress.   obese   HENT:   Head: Normocephalic and atraumatic.   Eyes: Pupils are equal, round, and reactive to light. EOM are normal. No scleral icterus.   Neck: Normal range of motion. Neck supple.   Cardiovascular: Normal rate.   Pulmonary/Chest: Effort normal.   Musculoskeletal: Normal range of motion. She exhibits no edema.   Neurological: She is alert and oriented to person, place, and time. No cranial nerve deficit.   Skin: " Skin is warm and dry. No erythema.   Psychiatric: She has a normal mood and affect. Her behavior is normal. Judgment normal.   Vitals reviewed.      Assessment:       1. Class 3 severe obesity due to excess calories without serious comorbidity with body mass index (BMI) of 45.0 to 49.9 in adult    2. Excess skin of abdomen        Plan:         Diagnoses and all orders for this visit:    Class 3 severe obesity due to excess calories without serious comorbidity with body mass index (BMI) of 45.0 to 49.9 in adult    Excess skin of abdomen    Other orders  -     topiramate (TOPAMAX) 25 MG tablet; Take 1 tablet (25 mg total) by mouth 2 (two) times daily.  -     nystatin (MYCOSTATIN) powder; Apply topically 4 (four) times daily. Under pannus as needed       Medical and surgical options discussed again today.     Patient was informed that topiramate is used for migraine prevention and seizures. Weight loss is a common side effect that is well documented. S/he understands this. S/he was informed of the potential side effects such as serious and possibly fatal rash in which case the medication should be discontinued immediately. Paresthesias, forgetfulness, fatigue, kidney stones, GI symptoms, and changes in lab values such as electrolytes, blood counts and kidney function.    Start topiramate  in the evening for 1 week, then morning and evening.       No soda, sweet tea, juices or lemonade. All drinks should be 5 calories or less.     Exercise - Start exercise program.        Limit starchy carbohydrates (bread, rice, pasta, potatoes, corn, peas, oatmeal, grits, tortillas, crackers, chips)    Holiday tips given.     25 min spent today face to face. Expressed to pt that I understand her concerns with the rashes, and we could refer her to plastics now, but I suspect the suggestion will be to get her BMI down first. It is a significant amount of weight, and she could consider sleeve as well. Validated her concerns that the  hanging of the skin will get worse before getting better. She may want to wear a compression/spanx type garmet in the meanwhile to minimize movement along with local skin care.

## 2019-12-15 ENCOUNTER — PATIENT MESSAGE (OUTPATIENT)
Dept: PRIMARY CARE CLINIC | Facility: CLINIC | Age: 69
End: 2019-12-15

## 2019-12-16 ENCOUNTER — PATIENT MESSAGE (OUTPATIENT)
Dept: PAIN MEDICINE | Facility: OTHER | Age: 69
End: 2019-12-16

## 2019-12-18 ENCOUNTER — HOSPITAL ENCOUNTER (OUTPATIENT)
Facility: OTHER | Age: 69
Discharge: HOME | End: 2019-12-18
Attending: ANESTHESIOLOGY | Admitting: ANESTHESIOLOGY
Payer: MEDICARE

## 2019-12-18 VITALS
TEMPERATURE: 98 F | DIASTOLIC BLOOD PRESSURE: 55 MMHG | HEIGHT: 67 IN | WEIGHT: 283 LBS | OXYGEN SATURATION: 98 % | BODY MASS INDEX: 44.42 KG/M2 | HEART RATE: 71 BPM | SYSTOLIC BLOOD PRESSURE: 104 MMHG | RESPIRATION RATE: 18 BRPM

## 2019-12-18 DIAGNOSIS — M47.816 LUMBAR SPONDYLOSIS: Primary | ICD-10-CM

## 2019-12-18 LAB — POCT GLUCOSE: 82 MG/DL (ref 70–110)

## 2019-12-18 PROCEDURE — 64636 DESTROY L/S FACET JNT ADDL: CPT | Mod: HCNC,LT,, | Performed by: ANESTHESIOLOGY

## 2019-12-18 PROCEDURE — 99152 PR MOD CONSCIOUS SEDATION, SAME PHYS, 5+ YRS, FIRST 15 MIN: ICD-10-PCS | Mod: HCNC,,, | Performed by: ANESTHESIOLOGY

## 2019-12-18 PROCEDURE — 63600175 PHARM REV CODE 636 W HCPCS: Mod: HCNC | Performed by: ANESTHESIOLOGY

## 2019-12-18 PROCEDURE — 25000003 PHARM REV CODE 250: Mod: HCNC | Performed by: ANESTHESIOLOGY

## 2019-12-18 PROCEDURE — 64635 DESTROY LUMB/SAC FACET JNT: CPT | Mod: HCNC,LT,, | Performed by: ANESTHESIOLOGY

## 2019-12-18 PROCEDURE — 64636 DESTROY L/S FACET JNT ADDL: CPT | Mod: HCNC | Performed by: ANESTHESIOLOGY

## 2019-12-18 PROCEDURE — 64635 DESTROY LUMB/SAC FACET JNT: CPT | Mod: HCNC | Performed by: ANESTHESIOLOGY

## 2019-12-18 PROCEDURE — 63600175 PHARM REV CODE 636 W HCPCS: Mod: HCNC | Performed by: STUDENT IN AN ORGANIZED HEALTH CARE EDUCATION/TRAINING PROGRAM

## 2019-12-18 PROCEDURE — 64635 PR DESTROY LUMB/SAC FACET JNT: ICD-10-PCS | Mod: HCNC,LT,, | Performed by: ANESTHESIOLOGY

## 2019-12-18 PROCEDURE — 99152 MOD SED SAME PHYS/QHP 5/>YRS: CPT | Mod: HCNC,,, | Performed by: ANESTHESIOLOGY

## 2019-12-18 PROCEDURE — 64636 PR DESTROY L/S FACET JNT ADDL: ICD-10-PCS | Mod: HCNC,LT,, | Performed by: ANESTHESIOLOGY

## 2019-12-18 RX ORDER — DEXAMETHASONE SODIUM PHOSPHATE 4 MG/ML
INJECTION, SOLUTION INTRA-ARTICULAR; INTRALESIONAL; INTRAMUSCULAR; INTRAVENOUS; SOFT TISSUE
Status: DISCONTINUED | OUTPATIENT
Start: 2019-12-18 | End: 2019-12-18 | Stop reason: HOSPADM

## 2019-12-18 RX ORDER — BUPIVACAINE HYDROCHLORIDE 2.5 MG/ML
INJECTION, SOLUTION EPIDURAL; INFILTRATION; INTRACAUDAL
Status: DISCONTINUED | OUTPATIENT
Start: 2019-12-18 | End: 2019-12-18 | Stop reason: HOSPADM

## 2019-12-18 RX ORDER — SODIUM CHLORIDE 9 MG/ML
500 INJECTION, SOLUTION INTRAVENOUS CONTINUOUS
Status: DISCONTINUED | OUTPATIENT
Start: 2019-12-18 | End: 2019-12-18 | Stop reason: HOSPADM

## 2019-12-18 RX ORDER — FENTANYL CITRATE 50 UG/ML
INJECTION, SOLUTION INTRAMUSCULAR; INTRAVENOUS
Status: DISCONTINUED | OUTPATIENT
Start: 2019-12-18 | End: 2019-12-18 | Stop reason: HOSPADM

## 2019-12-18 RX ORDER — MIDAZOLAM HYDROCHLORIDE 1 MG/ML
INJECTION INTRAMUSCULAR; INTRAVENOUS
Status: DISCONTINUED | OUTPATIENT
Start: 2019-12-18 | End: 2019-12-18 | Stop reason: HOSPADM

## 2019-12-18 RX ORDER — LIDOCAINE HYDROCHLORIDE 10 MG/ML
INJECTION INFILTRATION; PERINEURAL
Status: DISCONTINUED | OUTPATIENT
Start: 2019-12-18 | End: 2019-12-18 | Stop reason: HOSPADM

## 2019-12-18 RX ADMIN — SODIUM CHLORIDE 500 ML: 0.9 INJECTION, SOLUTION INTRAVENOUS at 03:12

## 2019-12-18 NOTE — DISCHARGE INSTRUCTIONS

## 2019-12-18 NOTE — OP NOTE
Patient Name: Linnea Renae  MRN: [id number]    INFORMED CONSENT: The procedure, risks, benefits and options were discussed with patient. There are no contraindications to the procedure. The patient expressed understanding and agreed to proceed. The personnel performing the procedure was discussed. I verify that I personally obtained Linnea's consent prior to the start of the procedure and the signed consent can be found on the patient's chart.    Procedure Date: 12/18/2019    Anesthesia: Topical    Pre Procedure diagnosis: Lumbar spondylosis [M47.816]  1. Lumbar spondylosis      Post-Procedure diagnosis: SAME      Moderate Sedation: Yes - Fentanyl 100 mcg and Midazolam 2 mg    PROCEDURE: Left L2,3,4,5  FACET MEDIAL BRANCH NERVE RADIOFREQUENCY NEUROTOMY (lumbar)          DESCRIPTION OF PROCEDURE: The patient was brought to the procedure room.  After performing time out IV access was obtained prior to the procedure. The patient was positioned prone on the fluoroscopy table. Continuous hemodynamic monitoring was initiated including blood pressure and pulse oximetry. IV sedation was administered incrementally to allow the patient to remain comfortable and conversant throughout the procedure. The area of the lumbar spine was prepped chlorhexidine three times and draped into a sterile field.  Fluoroscopy was used to identify the location of the LEFT side L2, L3, L4, and L5 medial branch nerves at the junctions of the superior articular process and the transverse processes of L3, L4, L5, and the sacral ala respectively.  Skin anesthesia was achieved using 3 cc of Lidocaine 1% over the injection sites. A 20 gauge, 100mm (10mm active tip) curved RF needle was slowly inserted at each level using AP, lateral and oblique fluoroscopic imaging. Negative aspiration for blood or CSF was confirmed.  Sensory stimulation at 50Hz below 0.5V was achieved at every level. Motor stimulation at 2Hz up to 1.5V did not cause any radicular  symptoms at any level. Each level was anesthetized with 1.5 cc of lidocaine 1%.  Radiofrequency lesioning was performed for 90 seconds at 80 degrees in two different positions at each level.  Total of 4 cc of bupivacaine 0.25% and 10 mg of Decadron was injected was injected at all levels.. The needles were removed and bleeding was nil.  A sterile dressing was applied. Linnea was taken back to the recovery room for further observation.     Stimulation Results:  L2 = Sensory positive @ 0.4, Motor negative @ 1.5  L3 = Sensory positive @ 0.2, Motor negative @ 1.5  L4 = Sensory positive @ 0.4, Motor negative @ 1.5  L5 = Sensory positive @ 0.5, Motor negative @ 1.5    Blood Loss: Nill  Specimen: None    Viet Wilson MD

## 2019-12-18 NOTE — DISCHARGE SUMMARY
Discharge Note  Short Stay      SUMMARY     Admit Date: 12/18/2019    Attending Physician: Viet Wilson      Discharge Physician: Viet Wilson      Discharge Date: 12/18/2019 4:59 PM    Procedure(s) (LRB):  RADIOFREQUENCY ABLATION, LEFT L2-L3-L4-L5  1 OF 2 (Left)    Final Diagnosis: Lumbar spondylosis [M47.816]    Disposition: Home or self care    Patient Instructions:   Current Discharge Medication List      CONTINUE these medications which have NOT CHANGED    Details   alcohol antiseptic pads (ALCOHOL PREP PADS TOP)       aspirin 81 MG Chew Take 81 mg by mouth once daily.      atenolol (TENORMIN) 50 MG tablet Take 1 tablet (50 mg total) by mouth 2 (two) times daily.  Qty: 180 tablet, Refills: 2    Associated Diagnoses: Essential hypertension      atorvastatin (LIPITOR) 20 MG tablet Take 1 tablet (20 mg total) by mouth once daily.  Qty: 90 tablet, Refills: 2    Associated Diagnoses: Hyperlipidemia LDL goal <100; Atherosclerosis of aorta      blood sugar diagnostic Strp 1 strip by Misc.(Non-Drug; Combo Route) route once daily.  Qty: 100 strip, Refills: 3    Comments: TRUE METRIX STRIPS  Associated Diagnoses: Type 2 diabetes mellitus without complication, without long-term current use of insulin      blood-glucose meter kit Use as instructed  Qty: 1 each, Refills: 0    Comments: TRUE METRIX METER  Associated Diagnoses: Type 2 diabetes mellitus with stage 2 chronic kidney disease, without long-term current use of insulin      calcium-vitamin D3 500 mg(1,250mg) -200 unit per tablet Take 1 tablet by mouth 2 (two) times daily with meals.       clotrimazole (LOTRIMIN) 1 % cream Apply topically 2 (two) times daily.  Qty: 60 g, Refills: 5    Associated Diagnoses: Candidal intertrigo      cycloSPORINE (RESTASIS) 0.05 % ophthalmic emulsion 1 drop 2 (two) times daily.      diclofenac sodium (VOLTAREN) 1 % Gel Apply 2 g topically 4 (four) times daily.  Qty: 1 Tube, Refills: 2      fluticasone (FLONASE) 50 mcg/actuation  nasal spray 2 sprays (100 mcg total) by Each Nare route once daily.  Qty: 3 Bottle, Refills: 1    Associated Diagnoses: Perennial allergic rhinitis      hydroCHLOROthiazide (HYDRODIURIL) 25 MG tablet Take 1 tablet (25 mg total) by mouth once daily.  Qty: 90 tablet, Refills: 2    Associated Diagnoses: Essential hypertension      ibuprofen (ADVIL,MOTRIN) 800 MG tablet TAKE 1 TABLET BY MOUTH THREE TIMES DAILY AS NEEDED  Qty: 60 tablet, Refills: 0    Associated Diagnoses: Primary osteoarthritis involving multiple joints      ketoconazole (NIZORAL) 2 % cream Apply topically once daily.  Qty: 60 g, Refills: 3    Associated Diagnoses: Candidal intertrigo      ketoconazole (NIZORAL) 2 % shampoo       lancets Misc 1 lancet by Misc.(Non-Drug; Combo Route) route once daily.  Qty: 100 each, Refills: 3    Comments: TRUE PLUS LANCET 32 G  Associated Diagnoses: Type 2 diabetes mellitus with stage 2 chronic kidney disease, without long-term current use of insulin      loratadine 10 mg Cap       metFORMIN (GLUCOPHAGE-XR) 500 MG 24 hr tablet TAKE TWO TABLETS BY MOUTH ONCE DAILY WITH BREAKFAST  Qty: 180 tablet, Refills: 2    Associated Diagnoses: Type 2 diabetes mellitus without complication, without long-term current use of insulin      minoxidil (LONITEN) 2.5 MG tablet       MINOXIDIL TOP Apply topically.      multivitamin (ONE DAILY MULTIVITAMIN) per tablet Take 1 tablet by mouth once daily.      nystatin (MYCOSTATIN) powder Apply topically 4 (four) times daily. Under pannus as needed  Qty: 180 g, Refills: 2      olmesartan (BENICAR) 20 MG tablet Take 1 tablet (20 mg total) by mouth once daily.  Qty: 30 tablet, Refills: 3    Comments: Cancel Valsartan.  Associated Diagnoses: Essential hypertension      omeprazole (PRILOSEC) 20 MG capsule TAKE 1 CAPSULE BY MOUTH ONCE DAILY  Qty: 90 capsule, Refills: 1    Associated Diagnoses: Gastroesophageal reflux disease, esophagitis presence not specified      tiZANidine (ZANAFLEX) 4 MG tablet  Take 1 tablet (4 mg total) by mouth 2 (two) times daily as needed.  Qty: 60 tablet, Refills: 2    Comments: Please consider 90 day supplies to promote better adherence  Associated Diagnoses: Degenerative lumbar spinal stenosis      topiramate (TOPAMAX) 25 MG tablet Take 1 tablet (25 mg total) by mouth 2 (two) times daily.  Qty: 60 tablet, Refills: 2      traMADol (ULTRAM) 50 mg tablet TAKE 1 TABLET BY MOUTH EVERY 6 HOURS AS NEEDED  Qty: 120 tablet, Refills: 2    Comments: Quantity greater than 7 days supply medically necessary.  Associated Diagnoses: Degenerative lumbar spinal stenosis      varicella-zoster [id number], PF, (SHINGRIX, PF,) 50 mcg/0.5 mL injection Inject into the muscle.  Qty: 1 each, Refills: 0                 Discharge Diagnosis: Lumbar spondylosis [M47.816]  Condition on Discharge: Stable with no complications to procedure   Diet on Discharge: Same as before.  Activity: as per instruction sheet.  Discharge to: Home with a responsible adult.  Follow up: 2-4 weeks       Please call my office or pager at [phone] if experienced any weakness or loss of sensation, fever > 101.5, pain uncontrolled with oral medications, persistent nausea/vomiting/or diarrhea, redness or drainage from the incisions, or any other worrisome concerns. If physician on call was not reached or could not communicate with our office for any reason please go to the nearest emergency department

## 2019-12-23 DIAGNOSIS — I10 ESSENTIAL HYPERTENSION: ICD-10-CM

## 2019-12-23 DIAGNOSIS — I70.0 ATHEROSCLEROSIS OF AORTA: ICD-10-CM

## 2019-12-23 DIAGNOSIS — E78.5 HYPERLIPIDEMIA LDL GOAL <100: ICD-10-CM

## 2019-12-23 RX ORDER — ATORVASTATIN CALCIUM 20 MG/1
TABLET, FILM COATED ORAL
Qty: 90 TABLET | Refills: 2 | Status: SHIPPED | OUTPATIENT
Start: 2019-12-23 | End: 2020-07-16 | Stop reason: SDUPTHER

## 2019-12-23 RX ORDER — ATENOLOL 50 MG/1
TABLET ORAL
Qty: 180 TABLET | Refills: 2 | Status: SHIPPED | OUTPATIENT
Start: 2019-12-23 | End: 2020-07-16 | Stop reason: SDUPTHER

## 2019-12-30 DIAGNOSIS — M48.061 DEGENERATIVE LUMBAR SPINAL STENOSIS: ICD-10-CM

## 2019-12-30 RX ORDER — TRAMADOL HYDROCHLORIDE 50 MG/1
50 TABLET ORAL EVERY 6 HOURS PRN
Qty: 120 TABLET | Refills: 0 | Status: SHIPPED | OUTPATIENT
Start: 2019-12-30 | End: 2020-01-16 | Stop reason: SDUPTHER

## 2019-12-31 DIAGNOSIS — I10 ESSENTIAL HYPERTENSION: ICD-10-CM

## 2020-01-01 RX ORDER — OLMESARTAN MEDOXOMIL 20 MG/1
20 TABLET ORAL DAILY
Qty: 90 TABLET | Refills: 0 | Status: SHIPPED | OUTPATIENT
Start: 2020-01-01 | End: 2020-01-16 | Stop reason: SDUPTHER

## 2020-01-08 ENCOUNTER — HOSPITAL ENCOUNTER (OUTPATIENT)
Facility: OTHER | Age: 70
Discharge: HOME | End: 2020-01-08
Attending: ANESTHESIOLOGY | Admitting: ANESTHESIOLOGY
Payer: MEDICARE

## 2020-01-08 VITALS
DIASTOLIC BLOOD PRESSURE: 58 MMHG | SYSTOLIC BLOOD PRESSURE: 118 MMHG | TEMPERATURE: 99 F | HEART RATE: 67 BPM | RESPIRATION RATE: 18 BRPM | OXYGEN SATURATION: 100 %

## 2020-01-08 DIAGNOSIS — M47.9 SPONDYLOSIS: ICD-10-CM

## 2020-01-08 DIAGNOSIS — G89.4 CHRONIC PAIN SYNDROME: Primary | ICD-10-CM

## 2020-01-08 DIAGNOSIS — M47.816 OSTEOARTHRITIS OF LUMBAR SPINE, UNSPECIFIED SPINAL OSTEOARTHRITIS COMPLICATION STATUS: ICD-10-CM

## 2020-01-08 DIAGNOSIS — G89.29 CHRONIC PAIN: ICD-10-CM

## 2020-01-08 LAB — POCT GLUCOSE: 89 MG/DL (ref 70–110)

## 2020-01-08 PROCEDURE — 64635 DESTROY LUMB/SAC FACET JNT: CPT | Mod: HCNC | Performed by: ANESTHESIOLOGY

## 2020-01-08 PROCEDURE — 64636 DESTROY L/S FACET JNT ADDL: CPT | Mod: HCNC | Performed by: ANESTHESIOLOGY

## 2020-01-08 PROCEDURE — 99152 PR MOD CONSCIOUS SEDATION, SAME PHYS, 5+ YRS, FIRST 15 MIN: ICD-10-PCS | Mod: HCNC,,, | Performed by: ANESTHESIOLOGY

## 2020-01-08 PROCEDURE — 25000003 PHARM REV CODE 250: Mod: HCNC | Performed by: ANESTHESIOLOGY

## 2020-01-08 PROCEDURE — 64635 PR DESTROY LUMB/SAC FACET JNT: ICD-10-PCS | Mod: HCNC,RT,, | Performed by: ANESTHESIOLOGY

## 2020-01-08 PROCEDURE — 99152 MOD SED SAME PHYS/QHP 5/>YRS: CPT | Mod: HCNC,,, | Performed by: ANESTHESIOLOGY

## 2020-01-08 PROCEDURE — 63600175 PHARM REV CODE 636 W HCPCS: Mod: HCNC | Performed by: STUDENT IN AN ORGANIZED HEALTH CARE EDUCATION/TRAINING PROGRAM

## 2020-01-08 PROCEDURE — 64636 DESTROY L/S FACET JNT ADDL: CPT | Mod: HCNC,RT,, | Performed by: ANESTHESIOLOGY

## 2020-01-08 PROCEDURE — 64636 PR DESTROY L/S FACET JNT ADDL: ICD-10-PCS | Mod: HCNC,RT,, | Performed by: ANESTHESIOLOGY

## 2020-01-08 PROCEDURE — 63600175 PHARM REV CODE 636 W HCPCS: Mod: HCNC | Performed by: ANESTHESIOLOGY

## 2020-01-08 PROCEDURE — 64635 DESTROY LUMB/SAC FACET JNT: CPT | Mod: HCNC,RT,, | Performed by: ANESTHESIOLOGY

## 2020-01-08 RX ORDER — BUPIVACAINE HYDROCHLORIDE 2.5 MG/ML
INJECTION, SOLUTION EPIDURAL; INFILTRATION; INTRACAUDAL
Status: DISCONTINUED | OUTPATIENT
Start: 2020-01-08 | End: 2020-01-08 | Stop reason: HOSPADM

## 2020-01-08 RX ORDER — FENTANYL CITRATE 50 UG/ML
INJECTION, SOLUTION INTRAMUSCULAR; INTRAVENOUS
Status: DISCONTINUED | OUTPATIENT
Start: 2020-01-08 | End: 2020-01-08 | Stop reason: HOSPADM

## 2020-01-08 RX ORDER — LIDOCAINE HYDROCHLORIDE 10 MG/ML
INJECTION INFILTRATION; PERINEURAL
Status: DISCONTINUED | OUTPATIENT
Start: 2020-01-08 | End: 2020-01-08 | Stop reason: HOSPADM

## 2020-01-08 RX ORDER — DEXAMETHASONE SODIUM PHOSPHATE 4 MG/ML
INJECTION, SOLUTION INTRA-ARTICULAR; INTRALESIONAL; INTRAMUSCULAR; INTRAVENOUS; SOFT TISSUE
Status: DISCONTINUED | OUTPATIENT
Start: 2020-01-08 | End: 2020-01-08 | Stop reason: HOSPADM

## 2020-01-08 RX ORDER — MIDAZOLAM HYDROCHLORIDE 1 MG/ML
INJECTION INTRAMUSCULAR; INTRAVENOUS
Status: DISCONTINUED | OUTPATIENT
Start: 2020-01-08 | End: 2020-01-08 | Stop reason: HOSPADM

## 2020-01-08 RX ORDER — SODIUM CHLORIDE 9 MG/ML
500 INJECTION, SOLUTION INTRAVENOUS CONTINUOUS
Status: DISCONTINUED | OUTPATIENT
Start: 2020-01-08 | End: 2020-01-08 | Stop reason: HOSPADM

## 2020-01-08 RX ADMIN — SODIUM CHLORIDE 500 ML: 0.9 INJECTION, SOLUTION INTRAVENOUS at 12:01

## 2020-01-08 NOTE — DISCHARGE INSTRUCTIONS

## 2020-01-08 NOTE — OP NOTE
Patient Name: Linnea Renae  MRN: [id number]    INFORMED CONSENT: The procedure, risks, benefits and options were discussed with patient. There are no contraindications to the procedure. The patient expressed understanding and agreed to proceed. The personnel performing the procedure was discussed. I verify that I personally obtained Linnea's consent prior to the start of the procedure and the signed consent can be found on the patient's chart.    Procedure Date: 01/08/2020    Anesthesia: Topical    Pre Procedure diagnosis: Lumbar spondylosis [M47.816]  1. Chronic pain syndrome    2. Osteoarthritis of lumbar spine, unspecified spinal osteoarthritis complication status    3. Spondylosis    4. Chronic pain      Post-Procedure diagnosis: SAME    Moderate Sedation: Yes - Fentanyl 100 mcg and Midazolam 2 mg    PROCEDURE: RIGHT L2,3,4,5 FACET MEDIAL BRANCH NERVE RADIOFREQUENCY NEUROTOMY (lumbar)          DESCRIPTION OF PROCEDURE: The patient was brought to the procedure room.  After performing time out IV access was obtained prior to the procedure. The patient was positioned prone on the fluoroscopy table. Continuous hemodynamic monitoring was initiated including blood pressure and pulse oximetry. IV sedation was administered incrementally to allow the patient to remain comfortable and conversant throughout the procedure. The area of the lumbar spine was prepped chlorhexidine three times and draped into a sterile field.  Fluoroscopy was used to identify the location of the RT side L2, L3, L4, and L5 medial branch nerves at the junctions of the superior articular process and the transverse processes of L3, L4, L5, and the sacral ala respectively.  Skin anesthesia was achieved using 3 cc of Lidocaine 1% over the injection sites. A 20 gauge, 100mm (10mm active tip) curved RF needle was slowly inserted at each level using AP, lateral and oblique fluoroscopic imaging. Negative aspiration for blood or CSF was confirmed.  Sensory  stimulation at 50Hz below 0.5V was achieved at every level. Motor stimulation at 2Hz up to 1.5V did not cause any radicular symptoms at any level. Each level was anesthetized with 1.5 cc of lidocaine 1%.  Radiofrequency lesioning was performed for 90 seconds at 80 degrees in two different positions at each level.  Total of 4 cc of bupivacaine 0.25% and 10 mg of Decadron was injected was injected at all levels.. The needles were removed and bleeding was nil.  A sterile dressing was applied. Linnea was taken back to the recovery room for further observation.     Stimulation Results:  L2 = Sensory positive @ 0.5, Motor negative @ 1.5  L3 = Sensory positive @ 0.7, Motor negative @ 1.5  L4 = Sensory positive @ 0.4, Motor negative @ 1.5  L5 = Sensory positive @ 0.7, Motor negative @ 1.5    Blood Loss: Nill  Specimen: None    Viet Wilson MD

## 2020-01-08 NOTE — DISCHARGE SUMMARY
Discharge Note  Short Stay      SUMMARY     Admit Date: 1/8/2020    Attending Physician: Viet Wilson      Discharge Physician: Viet Wilson      Discharge Date: 1/8/2020 3:33 PM    Procedure(s) (LRB):  RADIOFREQUENCY ABLATION, RIGHT L2-L3-L4-L5 MEDIAL BRANCH 2 OF (Right)    Final Diagnosis: Lumbar spondylosis [M47.816]    Disposition: Home or self care    Patient Instructions:   Discharge Medication List as of 1/8/2020  2:09 PM      CONTINUE these medications which have NOT CHANGED    Details   alcohol antiseptic pads (ALCOHOL PREP PADS TOP) Starting Wed 9/11/2019, Historical Med      aspirin 81 MG Chew Take 81 mg by mouth once daily., Until Discontinued, Historical Med      atenolol (TENORMIN) 50 MG tablet TAKE 1 TABLET BY MOUTH TWICE DAILY, Normal      atorvastatin (LIPITOR) 20 MG tablet TAKE 1 TABLET BY MOUTH ONCE DAILY, Normal      blood sugar diagnostic Strp 1 strip by Misc.(Non-Drug; Combo Route) route once daily., Starting Thu 2/28/2019, Normal      blood-glucose meter kit Use as instructed, Normal      calcium-vitamin D3 500 mg(1,250mg) -200 unit per tablet Take 1 tablet by mouth 2 (two) times daily with meals. , Until Discontinued, Historical Med      clotrimazole (LOTRIMIN) 1 % cream Apply topically 2 (two) times daily., Starting Thu 3/8/2018, Normal      cycloSPORINE (RESTASIS) 0.05 % ophthalmic emulsion 1 drop 2 (two) times daily., Until Discontinued, Historical Med      diclofenac sodium (VOLTAREN) 1 % Gel Apply 2 g topically 4 (four) times daily., Starting Wed 4/17/2019, Normal      fluticasone (FLONASE) 50 mcg/actuation nasal spray 2 sprays (100 mcg total) by Each Nare route once daily., Starting Mon 4/1/2019, Normal      hydroCHLOROthiazide (HYDRODIURIL) 25 MG tablet Take 1 tablet (25 mg total) by mouth once daily., Starting Tue 3/19/2019, Normal      ibuprofen (ADVIL,MOTRIN) 800 MG tablet TAKE 1 TABLET BY MOUTH THREE TIMES DAILY AS NEEDED, Normal      ketoconazole (NIZORAL) 2 % cream Apply  topically once daily., Starting Mon 4/1/2019, Normal      ketoconazole (NIZORAL) 2 % shampoo Starting Mon 12/3/2018, Historical Med      lancets Misc 1 lancet by Misc.(Non-Drug; Combo Route) route once daily., Starting 1/31/2017, Until Discontinued, Normal      loratadine 10 mg Cap Starting Sat 3/2/2019, Historical Med      metFORMIN (GLUCOPHAGE-XR) 500 MG 24 hr tablet TAKE TWO TABLETS BY MOUTH ONCE DAILY WITH BREAKFAST, Normal      minoxidil (LONITEN) 2.5 MG tablet Starting Tue 2/20/2018, Historical Med      MINOXIDIL TOP Apply topically., Historical Med      multivitamin (ONE DAILY MULTIVITAMIN) per tablet Take 1 tablet by mouth once daily., Historical Med      nystatin (MYCOSTATIN) powder Apply topically 4 (four) times daily. Under pannus as needed, Starting Thu 12/12/2019, Normal      olmesartan (BENICAR) 20 MG tablet Take 1 tablet (20 mg total) by mouth once daily., Starting Wed 1/1/2020, Normal      omeprazole (PRILOSEC) 20 MG capsule TAKE 1 CAPSULE BY MOUTH ONCE DAILY, Normal      tiZANidine (ZANAFLEX) 4 MG tablet Take 1 tablet (4 mg total) by mouth 2 (two) times daily as needed., Starting Thu 2/28/2019, Normal      topiramate (TOPAMAX) 25 MG tablet Take 1 tablet (25 mg total) by mouth 2 (two) times daily., Starting Thu 12/12/2019, Until Fri 12/11/2020, Normal      traMADol (ULTRAM) 50 mg tablet Take 1 tablet (50 mg total) by mouth every 6 (six) hours as needed for Pain., Starting Mon 12/30/2019, Normal      varicella-zoster [id number], PF, (SHINGRIX, PF,) 50 mcg/0.5 mL injection Inject into the muscle., Starting Thu 7/18/2019, Normal                 Discharge Diagnosis: Lumbar spondylosis [M47.816]  Condition on Discharge: Stable with no complications to procedure   Diet on Discharge: Same as before.  Activity: as per instruction sheet.  Discharge to: Home with a responsible adult.  Follow up: 2-4 weeks       Please call my office or pager at [phone] if experienced any weakness or loss of sensation, fever  > 101.5, pain uncontrolled with oral medications, persistent nausea/vomiting/or diarrhea, redness or drainage from the incisions, or any other worrisome concerns. If physician on call was not reached or could not communicate with our office for any reason please go to the nearest emergency department

## 2020-01-08 NOTE — H&P
HPI  Patient presenting for Procedure(s) (LRB):  RADIOFREQUENCY ABLATION, RIGHT L2-L3-L4-L5 MEDIAL BRANCH 2 OF (Right)     Patient on Anti-coagulation No    No health changes since previous encounter    Past Medical History:   Diagnosis Date    Allergy     Breast cancer     Lumpectomy 10/15.    Chronic constipation     Colon polyps     Diabetes mellitus, type 2     Dry eyes     GERD (gastroesophageal reflux disease)     Hyperlipidemia     Hypertension     Lumbar disc disease     Type 2 diabetes mellitus      Past Surgical History:   Procedure Laterality Date    BREAST BIOPSY      BREAST LUMPECTOMY Right         CATARACT EXTRACTION, BILATERAL       SECTION      x3    CHOLECYSTECTOMY      COLONOSCOPY N/A 10/11/2018    Procedure: COLONOSCOPY;  Surgeon: Lorenzo Tanner MD;  [facility]  Service: Endoscopy;  Laterality: N/A;    COLONOSCOPY W/ POLYPECTOMY      HYSTERECTOMY      and USO    INJECTION OF FACET JOINT Bilateral 2018    Procedure: INJECTION, FACET JOINT;  Surgeon: Viet Wilson MD;  Location: [facility] PAIN MGT;  Service: Pain Management;  Laterality: Bilateral;  LUMBAR BILATERAL L3-L4 AND L4-L5 FACET STEROID INJECTION    NEED CONSENT    RADIOFREQUENCY ABLATION Left 3/20/2019    Procedure: RADIOFREQUENCY ABLATION LEFT L2,3,4,5;  Surgeon: Viet Wilson MD;  Location: [facility] PAIN MGT;  Service: Pain Management;  Laterality: Left;  LEFT RFA L2,3,4,5    NEEDS CONSENT    RADIOFREQUENCY ABLATION Left 2019    Procedure: RADIOFREQUENCY ABLATION, LEFT L2-L3-L4-L5  1 OF 2;  Surgeon: Viet Wilson MD;  Location: [facility] PAIN MGT;  Service: Pain Management;  Laterality: Left;    TRANSFORAMINAL EPIDURAL INJECTION OF STEROID Left 10/2/2019    Procedure: INJECTION, STEROID, EPIDURAL, TRANSFORAMINAL APPROACH, L4 AND L5;  Surgeon: Viet Wilson MD;  Location: [facility] PAIN MGT;  Service: Pain Management;  Laterality: Left;    TUBAL LIGATION        Review of patient's allergies indicates:   Allergen Reactions    Codeine Itching      Current Facility-Administered Medications   Medication    0.9%  NaCl infusion       PMHx, PSHx, Allergies, Medications reviewed in epic    ROS negative except pain complaints in HPI    OBJECTIVE:    BP (!) 178/75 (BP Location: Right arm, Patient Position: Sitting)   Pulse 88   Temp 98.5 °F (36.9 °C) (Oral)   Resp 16   SpO2 97%   Breastfeeding? No     PHYSICAL EXAMINATION:    GENERAL: Well appearing, in no acute distress, alert and oriented x3.  PSYCH:  Mood and affect appropriate.  SKIN: Skin color, texture, turgor normal, no rashes or lesions which will impact the procedure.  CV: RRR with palpation of the radial artery.  PULM: No evidence of respiratory difficulty, symmetric chest rise. Clear to auscultation.  NEURO: Cranial nerves grossly intact.    Plan:    Proceed with procedure as planned Procedure(s) (LRB):  RADIOFREQUENCY ABLATION, RIGHT L2-L3-L4-L5 MEDIAL BRANCH 2 OF (Right)    Yamila Proctor  01/08/2020

## 2020-01-16 ENCOUNTER — OFFICE VISIT (OUTPATIENT)
Dept: PRIMARY CARE CLINIC | Facility: CLINIC | Age: 70
End: 2020-01-16
Payer: MEDICARE

## 2020-01-16 ENCOUNTER — LAB VISIT (OUTPATIENT)
Dept: LAB | Facility: HOSPITAL | Age: 70
End: 2020-01-16
Attending: INTERNAL MEDICINE
Payer: MEDICARE

## 2020-01-16 VITALS
HEART RATE: 67 BPM | BODY MASS INDEX: 44.81 KG/M2 | TEMPERATURE: 98 F | DIASTOLIC BLOOD PRESSURE: 58 MMHG | WEIGHT: 285.5 LBS | HEIGHT: 67 IN | SYSTOLIC BLOOD PRESSURE: 115 MMHG | OXYGEN SATURATION: 98 % | RESPIRATION RATE: 18 BRPM

## 2020-01-16 DIAGNOSIS — E11.40 TYPE 2 DIABETES MELLITUS WITH DIABETIC NEUROPATHY, WITHOUT LONG-TERM CURRENT USE OF INSULIN: ICD-10-CM

## 2020-01-16 DIAGNOSIS — E78.5 HYPERLIPIDEMIA, UNSPECIFIED HYPERLIPIDEMIA TYPE: ICD-10-CM

## 2020-01-16 DIAGNOSIS — E66.01 MORBID OBESITY WITH BMI OF 40.0-44.9, ADULT: ICD-10-CM

## 2020-01-16 DIAGNOSIS — M48.061 DEGENERATIVE LUMBAR SPINAL STENOSIS: ICD-10-CM

## 2020-01-16 DIAGNOSIS — I10 ESSENTIAL HYPERTENSION: ICD-10-CM

## 2020-01-16 DIAGNOSIS — I70.0 ATHEROSCLEROSIS OF AORTA: ICD-10-CM

## 2020-01-16 DIAGNOSIS — E11.40 TYPE 2 DIABETES MELLITUS WITH DIABETIC NEUROPATHY, WITHOUT LONG-TERM CURRENT USE OF INSULIN: Primary | ICD-10-CM

## 2020-01-16 DIAGNOSIS — R30.0 DYSURIA: ICD-10-CM

## 2020-01-16 DIAGNOSIS — J30.89 PERENNIAL ALLERGIC RHINITIS: ICD-10-CM

## 2020-01-16 LAB
BASOPHILS # BLD AUTO: 0.05 K/UL (ref 0–0.2)
BASOPHILS NFR BLD: 0.5 % (ref 0–1.9)
BILIRUB UR QL STRIP: NEGATIVE
CLARITY UR REFRACT.AUTO: CLEAR
COLOR UR AUTO: YELLOW
DIFFERENTIAL METHOD: ABNORMAL
EOSINOPHIL # BLD AUTO: 0.3 K/UL (ref 0–0.5)
EOSINOPHIL NFR BLD: 2.8 % (ref 0–8)
ERYTHROCYTE [DISTWIDTH] IN BLOOD BY AUTOMATED COUNT: 14 % (ref 11.5–14.5)
GLUCOSE UR QL STRIP: NEGATIVE
HCT VFR BLD AUTO: 38.6 % (ref 37–48.5)
HGB BLD-MCNC: 11.8 G/DL (ref 12–16)
HGB UR QL STRIP: ABNORMAL
IMM GRANULOCYTES # BLD AUTO: 0.04 K/UL (ref 0–0.04)
IMM GRANULOCYTES NFR BLD AUTO: 0.4 % (ref 0–0.5)
KETONES UR QL STRIP: NEGATIVE
LEUKOCYTE ESTERASE UR QL STRIP: NEGATIVE
LYMPHOCYTES # BLD AUTO: 2.4 K/UL (ref 1–4.8)
LYMPHOCYTES NFR BLD: 25.1 % (ref 18–48)
MCH RBC QN AUTO: 27.4 PG (ref 27–31)
MCHC RBC AUTO-ENTMCNC: 30.6 G/DL (ref 32–36)
MCV RBC AUTO: 90 FL (ref 82–98)
MICROSCOPIC COMMENT: NORMAL
MONOCYTES # BLD AUTO: 0.7 K/UL (ref 0.3–1)
MONOCYTES NFR BLD: 7.4 % (ref 4–15)
NEUTROPHILS # BLD AUTO: 6 K/UL (ref 1.8–7.7)
NEUTROPHILS NFR BLD: 63.8 % (ref 38–73)
NITRITE UR QL STRIP: NEGATIVE
NRBC BLD-RTO: 0 /100 WBC
PH UR STRIP: 5 [PH] (ref 5–8)
PLATELET # BLD AUTO: 388 K/UL (ref 150–350)
PMV BLD AUTO: 10.2 FL (ref 9.2–12.9)
PROT UR QL STRIP: NEGATIVE
RBC # BLD AUTO: 4.31 M/UL (ref 4–5.4)
RBC #/AREA URNS AUTO: 1 /HPF (ref 0–4)
SP GR UR STRIP: 1.01 (ref 1–1.03)
SQUAMOUS #/AREA URNS AUTO: 1 /HPF
URN SPEC COLLECT METH UR: ABNORMAL
WBC # BLD AUTO: 9.4 K/UL (ref 3.9–12.7)
WBC #/AREA URNS AUTO: 0 /HPF (ref 0–5)

## 2020-01-16 PROCEDURE — 1159F MED LIST DOCD IN RCRD: CPT | Mod: HCNC,S$GLB,, | Performed by: INTERNAL MEDICINE

## 2020-01-16 PROCEDURE — 1101F PR PT FALLS ASSESS DOC 0-1 FALLS W/OUT INJ PAST YR: ICD-10-PCS | Mod: HCNC,CPTII,S$GLB, | Performed by: INTERNAL MEDICINE

## 2020-01-16 PROCEDURE — 3078F DIAST BP <80 MM HG: CPT | Mod: HCNC,CPTII,S$GLB, | Performed by: INTERNAL MEDICINE

## 2020-01-16 PROCEDURE — 99999 PR PBB SHADOW E&M-EST. PATIENT-LVL III: ICD-10-PCS | Mod: PBBFAC,HCNC,, | Performed by: INTERNAL MEDICINE

## 2020-01-16 PROCEDURE — 1125F AMNT PAIN NOTED PAIN PRSNT: CPT | Mod: HCNC,S$GLB,, | Performed by: INTERNAL MEDICINE

## 2020-01-16 PROCEDURE — 82043 UR ALBUMIN QUANTITATIVE: CPT | Mod: HCNC

## 2020-01-16 PROCEDURE — 80053 COMPREHEN METABOLIC PANEL: CPT | Mod: HCNC

## 2020-01-16 PROCEDURE — 1101F PT FALLS ASSESS-DOCD LE1/YR: CPT | Mod: HCNC,CPTII,S$GLB, | Performed by: INTERNAL MEDICINE

## 2020-01-16 PROCEDURE — 80061 LIPID PANEL: CPT | Mod: HCNC

## 2020-01-16 PROCEDURE — 99499 UNLISTED E&M SERVICE: CPT | Mod: HCNC,S$GLB,, | Performed by: INTERNAL MEDICINE

## 2020-01-16 PROCEDURE — 85025 COMPLETE CBC W/AUTO DIFF WBC: CPT | Mod: HCNC

## 2020-01-16 PROCEDURE — 3044F PR MOST RECENT HEMOGLOBIN A1C LEVEL <7.0%: ICD-10-PCS | Mod: HCNC,CPTII,S$GLB, | Performed by: INTERNAL MEDICINE

## 2020-01-16 PROCEDURE — 81001 URINALYSIS AUTO W/SCOPE: CPT | Mod: HCNC

## 2020-01-16 PROCEDURE — 99999 PR PBB SHADOW E&M-EST. PATIENT-LVL III: CPT | Mod: PBBFAC,HCNC,, | Performed by: INTERNAL MEDICINE

## 2020-01-16 PROCEDURE — 3074F SYST BP LT 130 MM HG: CPT | Mod: HCNC,CPTII,S$GLB, | Performed by: INTERNAL MEDICINE

## 2020-01-16 PROCEDURE — 99214 OFFICE O/P EST MOD 30 MIN: CPT | Mod: HCNC,S$GLB,, | Performed by: INTERNAL MEDICINE

## 2020-01-16 PROCEDURE — 1159F PR MEDICATION LIST DOCUMENTED IN MEDICAL RECORD: ICD-10-PCS | Mod: HCNC,S$GLB,, | Performed by: INTERNAL MEDICINE

## 2020-01-16 PROCEDURE — 99214 PR OFFICE/OUTPT VISIT, EST, LEVL IV, 30-39 MIN: ICD-10-PCS | Mod: HCNC,S$GLB,, | Performed by: INTERNAL MEDICINE

## 2020-01-16 PROCEDURE — 1125F PR PAIN SEVERITY QUANTIFIED, PAIN PRESENT: ICD-10-PCS | Mod: HCNC,S$GLB,, | Performed by: INTERNAL MEDICINE

## 2020-01-16 PROCEDURE — 3074F PR MOST RECENT SYSTOLIC BLOOD PRESSURE < 130 MM HG: ICD-10-PCS | Mod: HCNC,CPTII,S$GLB, | Performed by: INTERNAL MEDICINE

## 2020-01-16 PROCEDURE — 3044F HG A1C LEVEL LT 7.0%: CPT | Mod: HCNC,CPTII,S$GLB, | Performed by: INTERNAL MEDICINE

## 2020-01-16 PROCEDURE — 99499 RISK ADDL DX/OHS AUDIT: ICD-10-PCS | Mod: HCNC,S$GLB,, | Performed by: INTERNAL MEDICINE

## 2020-01-16 PROCEDURE — 36415 COLL VENOUS BLD VENIPUNCTURE: CPT | Mod: HCNC,PN

## 2020-01-16 PROCEDURE — 3078F PR MOST RECENT DIASTOLIC BLOOD PRESSURE < 80 MM HG: ICD-10-PCS | Mod: HCNC,CPTII,S$GLB, | Performed by: INTERNAL MEDICINE

## 2020-01-16 RX ORDER — FLUTICASONE PROPIONATE 50 MCG
2 SPRAY, SUSPENSION (ML) NASAL DAILY
Qty: 3 BOTTLE | Refills: 2 | Status: SHIPPED | OUTPATIENT
Start: 2020-01-16 | End: 2021-05-10 | Stop reason: SDUPTHER

## 2020-01-16 RX ORDER — OLMESARTAN MEDOXOMIL 20 MG/1
20 TABLET ORAL DAILY
Qty: 90 TABLET | Refills: 2 | Status: SHIPPED | OUTPATIENT
Start: 2020-01-16 | End: 2020-12-07 | Stop reason: SDUPTHER

## 2020-01-16 RX ORDER — HYDROCHLOROTHIAZIDE 25 MG/1
25 TABLET ORAL DAILY
Qty: 90 TABLET | Refills: 2 | Status: SHIPPED | OUTPATIENT
Start: 2020-01-16 | End: 2021-01-15

## 2020-01-16 RX ORDER — METFORMIN HYDROCHLORIDE 500 MG/1
TABLET, EXTENDED RELEASE ORAL
Qty: 180 TABLET | Refills: 2 | Status: SHIPPED | OUTPATIENT
Start: 2020-01-16 | End: 2020-06-19

## 2020-01-16 RX ORDER — TRAMADOL HYDROCHLORIDE 50 MG/1
50 TABLET ORAL EVERY 6 HOURS PRN
Qty: 120 TABLET | Refills: 1 | Status: SHIPPED | OUTPATIENT
Start: 2020-01-16 | End: 2020-03-29

## 2020-01-16 NOTE — PROGRESS NOTES
Subjective:       Patient ID: Linnea Renae is a 69 y.o. female.    Chief Complaint: Follow-up    Last seen 6 months ago. Returns as scheduled for f/u chronic medical conditions. Just had radiofrequency ablation by Pain Management for her lumbar spine disease, has used Tramadol less in the past month. Compliant with daily meds as prescribed, diabetes is consistently well controlled. Mostly compliant with Topiramate prescribed by Bariatric Medicine, it upsets her stomach.     PMH: .   Hypertension.  Diabetes Type 2, last HbA1c 5.9% Dec. '19.  Hyperlipidemia. LDL 78 .  GERD.  Lumbar Spinal Stenosis [facility].   Chronic Dry Eyes.   Perennial Allergic Rhinitis.  Morbid Obesity, BMI up to 46. TSH normal 1.13 .   Right Breast Cancer diagnosed 10/15.  H/O Colon Polyps.     PSH: C/S x 3, BTL, Hysterectomy and USO, Bilateral Cataracts extracted, Cholecystectomy. Right breast excisional biopsy .    Mammogram normal , no recent Pelvic exam. BMD normal 8/15. Colonoscopy 10/18 - sigmoid diverticulosis, otherwise normal. Eye exam 3/28/19. Podiatry . Prevnar 8/15. Zostavax 3/16. Td . Pneumovax 3/19. Flu shot . Shingrix , .     Social: Remote tobacco use, quit over 30 years ago. No alcohol.  with three daughters and three grandchildren. Homemaker.     FMH: Father living age 83 in good health. Mother  young, cause unknown. CHF in PGM, Thyroid disease in PGF.     NKDA.     Medications: list reviewed and reconciled. Uses Ibuprofen intermittently, not every day.             Review of Systems   Constitutional: Negative for activity change, appetite change, fatigue, fever and unexpected weight change.   HENT: Positive for congestion. Negative for ear pain, hearing loss, rhinorrhea, sneezing, sore throat, trouble swallowing and voice change.    Eyes: Negative for pain and visual disturbance.   Respiratory: Negative for cough, chest tightness,  "shortness of breath and wheezing.    Cardiovascular: Negative for chest pain, palpitations and leg swelling.   Gastrointestinal: Negative for abdominal pain, blood in stool, constipation, diarrhea, nausea and vomiting.   Genitourinary: Positive for dysuria. Negative for difficulty urinating, flank pain, frequency, hematuria, urgency and vaginal bleeding.        Mild dysuria in past two days.    Musculoskeletal: Positive for back pain. Negative for arthralgias, joint swelling, myalgias and neck pain.   Skin: Negative for color change.        Rash under panniculus of abdomen is much better on Nystatin powder.    Neurological: Negative for dizziness, syncope, facial asymmetry, speech difficulty, weakness, numbness and headaches.   Hematological: Negative for adenopathy. Does not bruise/bleed easily.   Psychiatric/Behavioral: Negative for agitation, dysphoric mood and sleep disturbance. The patient is not nervous/anxious.        Objective:    /58, Pulse 67, Temp 98.3, O2 Sat 98%, Ht 5' 7", Wt 285.5 lbs, BMI=44.7  Physical Exam   Constitutional: She is oriented to person, place, and time. She appears well-developed and well-nourished. No distress.   HENT:   Nose: Nose normal.   Mouth/Throat: Oropharynx is clear and moist.   Eyes: Conjunctivae and EOM are normal.   Cardiovascular: Normal rate, regular rhythm and normal heart sounds.   Pulmonary/Chest: Effort normal and breath sounds normal. No respiratory distress. She has no wheezes. She has no rales.   Musculoskeletal: Normal range of motion. She exhibits no edema.   Protective Sensation:  Right: Intact  Left: Intact    Visual Inspection:  Normal -  Bilateral    Pedal Pulses:   Right: Present  Left: Present    Posterior tibialis:   Right:Present  Left: Present     Neurological: She is alert and oriented to person, place, and time. No cranial nerve deficit. Coordination normal.   Skin: Skin is warm and dry.   Psychiatric: She has a normal mood and affect. Her " behavior is normal. Judgment and thought content normal.       Assessment:       1. Type 2 diabetes mellitus with diabetic neuropathy, without long-term current use of insulin    2. Essential hypertension    3. Hyperlipidemia, unspecified hyperlipidemia type    4. Atherosclerosis of aorta    5. Degenerative lumbar spinal stenosis    6. Morbid obesity with BMI of 40.0-44.9, adult    7. Dysuria    8. Perennial allergic rhinitis        Plan:       Type 2 diabetes mellitus with diabetic neuropathy, without long-term current use of insulin - controlled.  -     CBC auto differential; Future; Expected date: 01/16/2020  -     Comprehensive metabolic panel; Future; Expected date: 01/16/2020  -     Microalbumin/creatinine urine ratio  -     metFORMIN (GLUCOPHAGE-XR) 500 MG 24 hr tablet; TAKE TWO TABLETS BY MOUTH ONCE DAILY WITH BREAKFAST  Dispense: 180 tablet; Refill: 2    Essential hypertension - controlled.  -     hydroCHLOROthiazide (HYDRODIURIL) 25 MG tablet; Take 1 tablet (25 mg total) by mouth once daily.  Dispense: 90 tablet; Refill: 2  -     olmesartan (BENICAR) 20 MG tablet; Take 1 tablet (20 mg total) by mouth once daily.  Dispense: 90 tablet; Refill: 2    Hyperlipidemia, unspecified hyperlipidemia type  -     Lipid panel; Future; Expected date: 01/16/2020    Atherosclerosis of aorta - continue Atorvastatin.    Degenerative lumbar spinal stenosis  -     traMADol (ULTRAM) 50 mg tablet; Take 1 tablet (50 mg total) by mouth every 6 (six) hours as needed for Pain.  Dispense: 120 tablet; Refill: 1    Morbid obesity with BMI of 40.0-44.9, adult        -     Counseled on diet for weight reduction.    Dysuria  -     Urinalysis, Reflex to Urine Culture Urine, Clean Catch    Perennial allergic rhinitis  -     fluticasone propionate (FLONASE) 50 mcg/actuation nasal spray; 2 sprays (100 mcg total) by Each Nostril route once daily.  Dispense: 3 Bottle; Refill: 2

## 2020-01-17 LAB
ALBUMIN SERPL BCP-MCNC: 3.8 G/DL (ref 3.5–5.2)
ALBUMIN/CREAT UR: 5.5 UG/MG (ref 0–30)
ALP SERPL-CCNC: 93 U/L (ref 55–135)
ALT SERPL W/O P-5'-P-CCNC: 12 U/L (ref 10–44)
ANION GAP SERPL CALC-SCNC: 10 MMOL/L (ref 8–16)
AST SERPL-CCNC: 17 U/L (ref 10–40)
BILIRUB SERPL-MCNC: 0.4 MG/DL (ref 0.1–1)
BUN SERPL-MCNC: 21 MG/DL (ref 8–23)
CALCIUM SERPL-MCNC: 9.2 MG/DL (ref 8.7–10.5)
CHLORIDE SERPL-SCNC: 102 MMOL/L (ref 95–110)
CHOLEST SERPL-MCNC: 150 MG/DL (ref 120–199)
CHOLEST/HDLC SERPL: 3.3 {RATIO} (ref 2–5)
CO2 SERPL-SCNC: 27 MMOL/L (ref 23–29)
CREAT SERPL-MCNC: 0.8 MG/DL (ref 0.5–1.4)
CREAT UR-MCNC: 110 MG/DL (ref 15–325)
EST. GFR  (AFRICAN AMERICAN): >60 ML/MIN/1.73 M^2
EST. GFR  (NON AFRICAN AMERICAN): >60 ML/MIN/1.73 M^2
GLUCOSE SERPL-MCNC: 100 MG/DL (ref 70–110)
HDLC SERPL-MCNC: 46 MG/DL (ref 40–75)
HDLC SERPL: 30.7 % (ref 20–50)
LDLC SERPL CALC-MCNC: 77.2 MG/DL (ref 63–159)
MICROALBUMIN UR DL<=1MG/L-MCNC: 6 UG/ML
NONHDLC SERPL-MCNC: 104 MG/DL
POTASSIUM SERPL-SCNC: 3.8 MMOL/L (ref 3.5–5.1)
PROT SERPL-MCNC: 7.5 G/DL (ref 6–8.4)
SODIUM SERPL-SCNC: 139 MMOL/L (ref 136–145)
TRIGL SERPL-MCNC: 134 MG/DL (ref 30–150)

## 2020-01-21 ENCOUNTER — OFFICE VISIT (OUTPATIENT)
Dept: PODIATRY | Facility: CLINIC | Age: 70
End: 2020-01-21
Payer: MEDICARE

## 2020-01-21 ENCOUNTER — OFFICE VISIT (OUTPATIENT)
Dept: ORTHOPEDICS | Facility: CLINIC | Age: 70
End: 2020-01-21
Payer: MEDICARE

## 2020-01-21 ENCOUNTER — HOSPITAL ENCOUNTER (OUTPATIENT)
Dept: RADIOLOGY | Facility: HOSPITAL | Age: 70
Discharge: HOME | End: 2020-01-21
Attending: PHYSICIAN ASSISTANT
Payer: MEDICARE

## 2020-01-21 VITALS
HEART RATE: 74 BPM | WEIGHT: 285 LBS | BODY MASS INDEX: 44.73 KG/M2 | SYSTOLIC BLOOD PRESSURE: 119 MMHG | HEIGHT: 67 IN | DIASTOLIC BLOOD PRESSURE: 57 MMHG

## 2020-01-21 VITALS — BODY MASS INDEX: 45.12 KG/M2 | HEIGHT: 67 IN | WEIGHT: 287.5 LBS

## 2020-01-21 DIAGNOSIS — G57.53 TARSAL TUNNEL SYNDROME, BILATERAL LOWER LIMBS: Primary | ICD-10-CM

## 2020-01-21 DIAGNOSIS — M25.552 PAIN OF BOTH HIP JOINTS: ICD-10-CM

## 2020-01-21 DIAGNOSIS — E11.42 DIABETIC PERIPHERAL NEUROPATHY ASSOCIATED WITH TYPE 2 DIABETES MELLITUS: ICD-10-CM

## 2020-01-21 DIAGNOSIS — M70.61 TROCHANTERIC BURSITIS OF BOTH HIPS: Primary | ICD-10-CM

## 2020-01-21 DIAGNOSIS — M70.62 TROCHANTERIC BURSITIS OF BOTH HIPS: Primary | ICD-10-CM

## 2020-01-21 DIAGNOSIS — M25.551 PAIN OF BOTH HIP JOINTS: ICD-10-CM

## 2020-01-21 PROCEDURE — 1101F PR PT FALLS ASSESS DOC 0-1 FALLS W/OUT INJ PAST YR: ICD-10-PCS | Mod: HCNC,CPTII,S$GLB, | Performed by: PODIATRIST

## 2020-01-21 PROCEDURE — 1101F PT FALLS ASSESS-DOCD LE1/YR: CPT | Mod: HCNC,CPTII,S$GLB, | Performed by: PODIATRIST

## 2020-01-21 PROCEDURE — 99999 PR PBB SHADOW E&M-EST. PATIENT-LVL IV: ICD-10-PCS | Mod: PBBFAC,HCNC,, | Performed by: PHYSICIAN ASSISTANT

## 2020-01-21 PROCEDURE — 73521 XR HIPS BILATERAL 2 VIEW INCL AP PELVIS: ICD-10-PCS | Mod: 26,HCNC,, | Performed by: RADIOLOGY

## 2020-01-21 PROCEDURE — 3074F SYST BP LT 130 MM HG: CPT | Mod: HCNC,CPTII,S$GLB, | Performed by: PODIATRIST

## 2020-01-21 PROCEDURE — 99999 PR PBB SHADOW E&M-EST. PATIENT-LVL IV: CPT | Mod: PBBFAC,HCNC,, | Performed by: PHYSICIAN ASSISTANT

## 2020-01-21 PROCEDURE — 1159F PR MEDICATION LIST DOCUMENTED IN MEDICAL RECORD: ICD-10-PCS | Mod: HCNC,S$GLB,, | Performed by: PODIATRIST

## 2020-01-21 PROCEDURE — 1101F PT FALLS ASSESS-DOCD LE1/YR: CPT | Mod: HCNC,CPTII,S$GLB, | Performed by: PHYSICIAN ASSISTANT

## 2020-01-21 PROCEDURE — 3078F PR MOST RECENT DIASTOLIC BLOOD PRESSURE < 80 MM HG: ICD-10-PCS | Mod: HCNC,CPTII,S$GLB, | Performed by: PHYSICIAN ASSISTANT

## 2020-01-21 PROCEDURE — 99213 OFFICE O/P EST LOW 20 MIN: CPT | Mod: 25,HCNC,S$GLB, | Performed by: PHYSICIAN ASSISTANT

## 2020-01-21 PROCEDURE — 3074F SYST BP LT 130 MM HG: CPT | Mod: HCNC,CPTII,S$GLB, | Performed by: PHYSICIAN ASSISTANT

## 2020-01-21 PROCEDURE — 1125F PR PAIN SEVERITY QUANTIFIED, PAIN PRESENT: ICD-10-PCS | Mod: HCNC,S$GLB,, | Performed by: PODIATRIST

## 2020-01-21 PROCEDURE — 20610 PR DRAIN/INJECT LARGE JOINT/BURSA: ICD-10-PCS | Mod: 50,HCNC,S$GLB, | Performed by: PHYSICIAN ASSISTANT

## 2020-01-21 PROCEDURE — 3078F DIAST BP <80 MM HG: CPT | Mod: HCNC,CPTII,S$GLB, | Performed by: PHYSICIAN ASSISTANT

## 2020-01-21 PROCEDURE — 73521 X-RAY EXAM HIPS BI 2 VIEWS: CPT | Mod: TC,HCNC

## 2020-01-21 PROCEDURE — 1159F MED LIST DOCD IN RCRD: CPT | Mod: HCNC,S$GLB,, | Performed by: PODIATRIST

## 2020-01-21 PROCEDURE — 99999 PR PBB SHADOW E&M-EST. PATIENT-LVL III: CPT | Mod: PBBFAC,HCNC,, | Performed by: PODIATRIST

## 2020-01-21 PROCEDURE — 1159F MED LIST DOCD IN RCRD: CPT | Mod: HCNC,S$GLB,, | Performed by: PHYSICIAN ASSISTANT

## 2020-01-21 PROCEDURE — 3078F PR MOST RECENT DIASTOLIC BLOOD PRESSURE < 80 MM HG: ICD-10-PCS | Mod: HCNC,CPTII,S$GLB, | Performed by: PODIATRIST

## 2020-01-21 PROCEDURE — 73521 X-RAY EXAM HIPS BI 2 VIEWS: CPT | Mod: 26,HCNC,, | Performed by: RADIOLOGY

## 2020-01-21 PROCEDURE — 1125F AMNT PAIN NOTED PAIN PRSNT: CPT | Mod: HCNC,S$GLB,, | Performed by: PHYSICIAN ASSISTANT

## 2020-01-21 PROCEDURE — 3044F PR MOST RECENT HEMOGLOBIN A1C LEVEL <7.0%: ICD-10-PCS | Mod: HCNC,CPTII,S$GLB, | Performed by: PODIATRIST

## 2020-01-21 PROCEDURE — 1101F PR PT FALLS ASSESS DOC 0-1 FALLS W/OUT INJ PAST YR: ICD-10-PCS | Mod: HCNC,CPTII,S$GLB, | Performed by: PHYSICIAN ASSISTANT

## 2020-01-21 PROCEDURE — 20610 DRAIN/INJ JOINT/BURSA W/O US: CPT | Mod: 50,HCNC,S$GLB, | Performed by: PHYSICIAN ASSISTANT

## 2020-01-21 PROCEDURE — 99213 PR OFFICE/OUTPT VISIT, EST, LEVL III, 20-29 MIN: ICD-10-PCS | Mod: HCNC,S$GLB,, | Performed by: PODIATRIST

## 2020-01-21 PROCEDURE — 1125F PR PAIN SEVERITY QUANTIFIED, PAIN PRESENT: ICD-10-PCS | Mod: HCNC,S$GLB,, | Performed by: PHYSICIAN ASSISTANT

## 2020-01-21 PROCEDURE — 99999 PR PBB SHADOW E&M-EST. PATIENT-LVL III: ICD-10-PCS | Mod: PBBFAC,HCNC,, | Performed by: PODIATRIST

## 2020-01-21 PROCEDURE — 3078F DIAST BP <80 MM HG: CPT | Mod: HCNC,CPTII,S$GLB, | Performed by: PODIATRIST

## 2020-01-21 PROCEDURE — 1159F PR MEDICATION LIST DOCUMENTED IN MEDICAL RECORD: ICD-10-PCS | Mod: HCNC,S$GLB,, | Performed by: PHYSICIAN ASSISTANT

## 2020-01-21 PROCEDURE — 1125F AMNT PAIN NOTED PAIN PRSNT: CPT | Mod: HCNC,S$GLB,, | Performed by: PODIATRIST

## 2020-01-21 PROCEDURE — 99213 PR OFFICE/OUTPT VISIT, EST, LEVL III, 20-29 MIN: ICD-10-PCS | Mod: 25,HCNC,S$GLB, | Performed by: PHYSICIAN ASSISTANT

## 2020-01-21 PROCEDURE — 3074F PR MOST RECENT SYSTOLIC BLOOD PRESSURE < 130 MM HG: ICD-10-PCS | Mod: HCNC,CPTII,S$GLB, | Performed by: PODIATRIST

## 2020-01-21 PROCEDURE — 3044F HG A1C LEVEL LT 7.0%: CPT | Mod: HCNC,CPTII,S$GLB, | Performed by: PODIATRIST

## 2020-01-21 PROCEDURE — 99213 OFFICE O/P EST LOW 20 MIN: CPT | Mod: HCNC,S$GLB,, | Performed by: PODIATRIST

## 2020-01-21 PROCEDURE — 3074F PR MOST RECENT SYSTOLIC BLOOD PRESSURE < 130 MM HG: ICD-10-PCS | Mod: HCNC,CPTII,S$GLB, | Performed by: PHYSICIAN ASSISTANT

## 2020-01-21 RX ORDER — METHYLPREDNISOLONE ACETATE 80 MG/ML
80 INJECTION, SUSPENSION INTRA-ARTICULAR; INTRALESIONAL; INTRAMUSCULAR; SOFT TISSUE
Status: COMPLETED | OUTPATIENT
Start: 2020-01-21 | End: 2020-01-21

## 2020-01-21 RX ADMIN — METHYLPREDNISOLONE ACETATE 80 MG: 80 INJECTION, SUSPENSION INTRA-ARTICULAR; INTRALESIONAL; INTRAMUSCULAR; SOFT TISSUE at 03:01

## 2020-01-21 NOTE — PROGRESS NOTES
Subjective:      Patient ID: Linnea Renae is a 69 y.o. female.    Chief Complaint: Foot Pain (both feet)    Linnea is a 69 y.o. female who presents to the clinic upon referral from Dr. Nat marsh. provider found  for evaluation and treatment of diabetic feet. Linnea has a past medical history of Allergy, Breast cancer, Chronic constipation, Colon polyps, Diabetes mellitus, type 2, Dry eyes, GERD (gastroesophageal reflux disease), Hyperlipidemia, Hypertension, Lumbar disc disease, and Type 2 diabetes mellitus. Patient relates no major problem with feet. Only complaints today consist of routine foot evaluation and painful nerve symptoms to both feet.  She is here for routine evaluation/diabetic foot check.  She is also here to discuss her neuropathic symptoms which have significantly improved injections.    History of Present Illness Update:    Location:  The site of the condition consists of bilateral tarsal tunnel/plantar feet  Quality:  The characteristics and description of the present illness consists of tightness with no burning or pain at this point  Severity:  Pain scale of the current condition consist of a 1/10  Duration:  The length of time regarding this current condition consists of multiple months  Timing:  In terms of symptoms throughout the day they tend to consist of stable throughout the day  Context:  Other circumstances surrounding this condition's events consist of underlying hyperglycemia  Modifying:  Treatments administered thus far by the patient consist of appropriate diabetic shoe gear and orthotics as well as posterior tibial nerve injection therapy  Associated signs and symptoms:  Other signs and symptoms regarding this current condition consist of none      PCP: Bailee Moss MD    Date Last Seen by PCP:   Chief Complaint   Patient presents with    Foot Pain     both feet         Current shoe gear: Casual shoes    Hemoglobin A1C   Date Value Ref Range Status   12/05/2019 5.9 (H) 4.0 -  5.6 % Final     Comment:     ADA Screening Guidelines:  5.7-6.4%  Consistent with prediabetes  >or=6.5%  Consistent with diabetes  High levels of fetal hemoglobin interfere with the HbA1C  assay. Heterozygous hemoglobin variants (HbS, HgC, etc)do  not significantly interfere with this assay.   However, presence of multiple variants may affect accuracy.     03/19/2019 6.0 (H) 4.0 - 5.6 % Final     Comment:     ADA Screening Guidelines:  5.7-6.4%  Consistent with prediabetes  >or=6.5%  Consistent with diabetes  High levels of fetal hemoglobin interfere with the HbA1C  assay. Heterozygous hemoglobin variants (HbS, HgC, etc)do  not significantly interfere with this assay.   However, presence of multiple variants may affect accuracy.     09/17/2018 6.1 (H) 4.0 - 5.6 % Final     Comment:     ADA Screening Guidelines:  5.7-6.4%  Consistent with prediabetes  >or=6.5%  Consistent with diabetes  High levels of fetal hemoglobin interfere with the HbA1C  assay. Heterozygous hemoglobin variants (HbS, HgC, etc)do  not significantly interfere with this assay.   However, presence of multiple variants may affect accuracy.             Review of Systems   Constitution: Negative for chills and fever.   HENT: Negative for congestion and tinnitus.    Eyes: Negative for double vision and visual disturbance.   Cardiovascular: Negative for chest pain and claudication.   Respiratory: Negative for hemoptysis and shortness of breath.    Endocrine: Negative for cold intolerance and heat intolerance.   Hematologic/Lymphatic: Negative for adenopathy and bleeding problem.   Skin: Positive for color change, dry skin and nail changes.   Musculoskeletal: Positive for stiffness. Negative for myalgias.   Gastrointestinal: Negative for nausea and vomiting.   Genitourinary: Negative for dysuria and hematuria.   Neurological: Positive for numbness.   Psychiatric/Behavioral: Negative for altered mental status and suicidal ideas.   Allergic/Immunologic:  Negative for environmental allergies and persistent infections.           Objective:      Physical Exam   Constitutional: She is oriented to person, place, and time. She appears well-developed and well-nourished.   Cardiovascular:   Pulses:       Dorsalis pedis pulses are 1+ on the right side, and 1+ on the left side.        Posterior tibial pulses are 1+ on the right side, and 1+ on the left side.   Pulmonary/Chest: Effort normal.   Musculoskeletal: Normal range of motion.   Anterior, lateral, and posterior muscle groups bilateral lower extremities show strength 4 over 5 symmetrically. Inspection and palpation of the joints and bones reveal no crepitus or joint effusion. No tenderness upon palpation. Mild plantar flexor contractures noted to digits 2 through 5 bilaterally.  Angle and base of gait are normal.   Feet:   Right Foot:   Skin Integrity: Positive for callus and dry skin.   Left Foot:   Skin Integrity: Positive for callus and dry skin.   Neurological: She is alert and oriented to person, place, and time. She displays atrophy and abnormal reflex. A sensory deficit is present.   Reflex Scores:       Patellar reflexes are 1+ on the right side and 1+ on the left side.       Achilles reflexes are 1+ on the right side and 1+ on the left side.  Sharp, dull, light touch, and vibratory sensation are diminished bilaterally. Proprioceptive sensation is intact to both lower extremities. Hagan Jackie monofilament exam shows loss of protective sensation to plantar toes 1 through 5 bilaterally. Deep tendon reflexes to the patellar tendons is 1 over 4 bilaterally symmetrical. Deep tendon reflexes to the Achilles tendon is 0 over 4 bilaterally symmetrical. No ankle clonus or Babinski reflex noted bilaterally. Coordination is fair to both lower extremities.  Patient admits to intermittent burning and tingling in the feet.      Negative Tinel sign bilateral tibial nerves with no tenderness and electrical sensation  distally at this point.   Skin: Skin is warm and dry. Capillary refill takes 2 to 3 seconds. There is pallor.   Skin bilateral lower extremities noted to be thin, dry, and atrophic.  Toenails normal.   Psychiatric: She has a normal mood and affect.   Vitals reviewed.            Assessment:       Encounter Diagnoses   Name Primary?    Tarsal tunnel syndrome, bilateral lower limbs Yes    Diabetic peripheral neuropathy associated with type 2 diabetes mellitus          Plan:       Linnea was seen today for foot pain.    Diagnoses and all orders for this visit:    Tarsal tunnel syndrome, bilateral lower limbs    Diabetic peripheral neuropathy associated with type 2 diabetes mellitus      I counseled the patient on her conditions, their implications and medical management.      Shoe inspection. Diabetic Foot Education. Patient reminded of the importance of good nutrition and blood sugar control to help prevent podiatric complications of diabetes. Patient instructed on proper foot hygeine. We discussed wearing proper shoe gear, daily foot inspections and Diabetic foot education in detail.    Follow-up in 3-6 months to discuss possible nerve decompression bilateral tarsal tunnel.  .  Follow-up in 3 months.

## 2020-01-21 NOTE — PROGRESS NOTES
Subjective:      Patient ID: Linnea Renae is a 69 y.o. female.    Chief Complaint: Pain of the Right Hip and Pain of the Left Hip    HPI  Patient returns with chief complaint of bilateral hip pain. No trauma. She has h/o trochanteric bursitis. Pain is lateral. It is worse with walking. She takes ibuprofen, tramadol, and muscle relaxer without much relief. She had cortisone injection in 2018 that gave her relief for about 6 months. She did PT but says it caused her more pain. She denies groin pain. She sees pain mgmt for LBP. She does not use assistive devices.   Review of Systems   Constitution: Negative for chills, fever and night sweats.   Cardiovascular: Negative for chest pain.   Respiratory: Negative for cough and shortness of breath.    Hematologic/Lymphatic: Does not bruise/bleed easily.   Skin: Negative for color change.   Gastrointestinal: Negative for heartburn.   Genitourinary: Negative for dysuria.   Neurological: Negative for numbness and paresthesias.   Psychiatric/Behavioral: Negative for altered mental status.   Allergic/Immunologic: Negative for persistent infections.         Objective:            General    Vitals reviewed.  Constitutional: She is oriented to person, place, and time. She appears well-developed and well-nourished.   Cardiovascular: Normal rate.    Neurological: She is alert and oriented to person, place, and time.             Right Hip Exam     Tenderness   The patient tender to palpation of the trochanteric bursa.    Range of Motion   The patient has normal right hip ROM.    Tests   Stinchfield test: negative  Log Roll: negative    Other   Sensation: normal    Comments:  Hip ROM elicits lateral hip pain.  Left Hip Exam     Tenderness   The patient tender to palpation of the trochanteric bursa.    Range of Motion   The patient has normal left hip ROM.    Tests   Stinchfield test: negative  Log Roll: negative    Other   Sensation: normal    Comments:  Hip ROM elicits lateral hip  pain.             X-ray: ordered and reviewed by myself. No radiographic evidence of AVN or acute abnormality of the hips.        Assessment:       Encounter Diagnosis   Name Primary?    Trochanteric bursitis of both hips Yes          Plan:       Discussed treatment options with patient. She would like to repeat bilateral cortisone injections. RTC prn.     PROCEDURE:  I have explained the risks, benefits, and alternatives of the procedure in detail.  The patient voices understanding and all questions have been answered.  The patient agrees to proceed as planned. So after I performed a sterile pre of the skin in the normal fashion the bilateral greater trochanteric area is injected from the lateral approach using an 2 inch 22 gauge needle with a combination of 4cc 1% lidocaine and 80 mg of depo medrol.  The patient is cautioned and immediate relief of pain is secondary to the local anesthetic and will be temporary.  After the anesthetic wears off there may be a increase in pain that may last for a few hours or a few days and they should use ice to help alleviate this flair up of pain.

## 2020-01-29 ENCOUNTER — PATIENT MESSAGE (OUTPATIENT)
Dept: PRIMARY CARE CLINIC | Facility: CLINIC | Age: 70
End: 2020-01-29

## 2020-02-29 DIAGNOSIS — M15.9 PRIMARY OSTEOARTHRITIS INVOLVING MULTIPLE JOINTS: ICD-10-CM

## 2020-03-02 RX ORDER — IBUPROFEN 800 MG/1
TABLET ORAL
Qty: 60 TABLET | Refills: 2 | Status: SHIPPED | OUTPATIENT
Start: 2020-03-02 | End: 2021-05-19 | Stop reason: SDUPTHER

## 2020-03-20 ENCOUNTER — PATIENT MESSAGE (OUTPATIENT)
Dept: ADMINISTRATIVE | Facility: OTHER | Age: 70
End: 2020-03-20

## 2020-03-21 ENCOUNTER — PATIENT MESSAGE (OUTPATIENT)
Dept: SPINE | Facility: CLINIC | Age: 70
End: 2020-03-21

## 2020-03-23 ENCOUNTER — TELEPHONE (OUTPATIENT)
Dept: PAIN MEDICINE | Facility: CLINIC | Age: 70
End: 2020-03-23

## 2020-03-23 ENCOUNTER — PATIENT MESSAGE (OUTPATIENT)
Dept: SURGERY | Facility: CLINIC | Age: 70
End: 2020-03-23

## 2020-03-23 ENCOUNTER — PATIENT MESSAGE (OUTPATIENT)
Dept: PRIMARY CARE CLINIC | Facility: CLINIC | Age: 70
End: 2020-03-23

## 2020-03-23 DIAGNOSIS — E11.9 TYPE 2 DIABETES MELLITUS: ICD-10-CM

## 2020-03-23 NOTE — TELEPHONE ENCOUNTER
My name is Staff, I am contacting you from [facility] pain [facility] regarding your appointment scheduled for 03.26.20, with JESSICA, just confirming you will be able to make it.    If you feel you need to reschedule or canceled please give our office a call so we can better assist you.      Staff requesting patient to arrive 15 mins ahead of schedule appointment time.    Pt verbalized understanding and has agreed to have a telephone call conference with Amara Hawthorne.

## 2020-03-25 ENCOUNTER — DOCUMENTATION ONLY (OUTPATIENT)
Dept: REHABILITATION | Facility: HOSPITAL | Age: 70
End: 2020-03-25

## 2020-03-25 NOTE — PROGRESS NOTES
Outpatient Therapy Discharge Summary     Name: Linnea Renae  [facility] Number: [id number]    Therapy Diagnosis:   Decreased strength of lower extremity       Decreased range of motion of lumbar spine        Physician: Amara Hawthorne,*    Physician Orders: eval and treat  Medical Diagnosis:   M47.9 (ICD-10-CM) - Osteoarthritis of spine, unspecified spinal osteoarthritis complication status, unspecified spinal region   M79.10 (ICD-10-CM) - Myalgia   M47.816 (ICD-10-CM) - Lumbar spondylosis   M51.36 (ICD-10-CM) - DDD (degenerative disc disease), lumbar   M46.1 (ICD-10-CM) - Sacroiliitis   M47.819 (ICD-10-CM) - Spondylosis without myelopathy       Evaluation Date: 6/3/19      Date of Last visit: 6/19/19  Total Visits Received: 2      Assessment    Goals: unable to assess due to pt not returning to PT    Discharge reason: Patient has not attended therapy since 6/19/19    Plan   This patient is discharged from Physical Therapy    Mariza Galaviz PT

## 2020-03-29 DIAGNOSIS — M48.061 DEGENERATIVE LUMBAR SPINAL STENOSIS: ICD-10-CM

## 2020-03-29 RX ORDER — TRAMADOL HYDROCHLORIDE 50 MG/1
TABLET ORAL
Qty: 120 TABLET | Refills: 0 | Status: SHIPPED | OUTPATIENT
Start: 2020-03-31 | End: 2020-04-30

## 2020-03-30 ENCOUNTER — TELEPHONE (OUTPATIENT)
Dept: PAIN MEDICINE | Facility: CLINIC | Age: 70
End: 2020-03-30

## 2020-03-30 NOTE — TELEPHONE ENCOUNTER
Pain Medicine Clinic Telephone Note:     **This encounter was carried out via telephone to avoid COVID-19 exposure. Patient opted for a telephone call per his/her preference. This telephone encounter is not associated to an office visit.**      Contacted Ms. Renae today for follow-up of her low back pain. She is s/p staged (left then right) L2-3-4-5 RFA where she states she attained 90% relief of her low back pain. She states she continues to experience intermittent, dull, aching bilateral lower lumbar pain with prolonged flexing/extending, prolonged standing, and with strenuous activity, however, it has decreased significantly in severity since prior. She does continue to take Tramadol PRN and Zanaflex PRN with added benefit. She denies any other acute changes at this time.   Plan:  - Pt endorses 90% relief with recent bilateral RFA. Can repeat in future as needed.    - Continue Zanaflex 4 mg BID PRN.     - Encouraged Tylenol 1000 mg TID PRN for added relief of her inflammatory pain.    - Continue Tramadol 50 mg PRN as pt reports benefit with this medication and denies negative side-effects.      - Encouraged home exercise for core strenghtening, stretching, and overall improved function.    - Follow-up in 4 weeks.    Jonn Bennett MD  [facility], PGY V   I have personally reviewed the phone encounter with resident/fellow/NP and personally spoke with patient for over 5 min after addressing all questions and concerns   The phone call was initiated by patient who consented and verbalized understanding to the type of encounter not related to any office visit or other encounter in the past 7 days    Viet Wilson MD

## 2020-04-19 ENCOUNTER — PATIENT OUTREACH (OUTPATIENT)
Dept: ADMINISTRATIVE | Facility: OTHER | Age: 70
End: 2020-04-19

## 2020-04-20 ENCOUNTER — TELEPHONE (OUTPATIENT)
Dept: PAIN MEDICINE | Facility: CLINIC | Age: 70
End: 2020-04-20

## 2020-04-20 ENCOUNTER — PATIENT MESSAGE (OUTPATIENT)
Dept: PRIMARY CARE CLINIC | Facility: CLINIC | Age: 70
End: 2020-04-20

## 2020-04-20 ENCOUNTER — OFFICE VISIT (OUTPATIENT)
Dept: PAIN MEDICINE | Facility: CLINIC | Age: 70
End: 2020-04-20
Attending: ANESTHESIOLOGY
Payer: MEDICARE

## 2020-04-20 DIAGNOSIS — M47.9 OSTEOARTHRITIS OF SPINE, UNSPECIFIED SPINAL OSTEOARTHRITIS COMPLICATION STATUS, UNSPECIFIED SPINAL REGION: ICD-10-CM

## 2020-04-20 DIAGNOSIS — M47.819 SPONDYLOSIS WITHOUT MYELOPATHY: Primary | ICD-10-CM

## 2020-04-20 DIAGNOSIS — M48.061 SPINAL STENOSIS OF LUMBAR REGION WITHOUT NEUROGENIC CLAUDICATION: ICD-10-CM

## 2020-04-20 DIAGNOSIS — M51.36 DDD (DEGENERATIVE DISC DISEASE), LUMBAR: ICD-10-CM

## 2020-04-20 PROBLEM — M25.60 DECREASED RANGE OF MOTION: Status: RESOLVED | Noted: 2018-09-14 | Resolved: 2020-04-20

## 2020-04-20 PROBLEM — R53.1 DECREASED STRENGTH: Status: RESOLVED | Noted: 2018-06-14 | Resolved: 2020-04-20

## 2020-04-20 PROBLEM — R29.898 DECREASED STRENGTH OF LOWER EXTREMITY: Status: RESOLVED | Noted: 2019-06-03 | Resolved: 2020-04-20

## 2020-04-20 PROBLEM — M54.50 PAIN IN LOWER BACK: Status: RESOLVED | Noted: 2018-09-14 | Resolved: 2020-04-20

## 2020-04-20 PROBLEM — M47.816 LUMBAR SPONDYLOSIS: Status: RESOLVED | Noted: 2018-07-25 | Resolved: 2020-04-20

## 2020-04-20 PROBLEM — R26.89 DECREASED SPINAL MOBILITY: Status: RESOLVED | Noted: 2018-06-14 | Resolved: 2020-04-20

## 2020-04-20 PROBLEM — M53.86 DECREASED RANGE OF MOTION OF LUMBAR SPINE: Status: RESOLVED | Noted: 2019-06-03 | Resolved: 2020-04-20

## 2020-04-20 PROCEDURE — 99214 PR OFFICE/OUTPT VISIT, EST, LEVL IV, 30-39 MIN: ICD-10-PCS | Mod: HCNC,95,GC, | Performed by: ANESTHESIOLOGY

## 2020-04-20 PROCEDURE — 1159F MED LIST DOCD IN RCRD: CPT | Mod: HCNC,95,, | Performed by: ANESTHESIOLOGY

## 2020-04-20 PROCEDURE — 1159F PR MEDICATION LIST DOCUMENTED IN MEDICAL RECORD: ICD-10-PCS | Mod: HCNC,95,, | Performed by: ANESTHESIOLOGY

## 2020-04-20 PROCEDURE — 99214 OFFICE O/P EST MOD 30 MIN: CPT | Mod: HCNC,95,GC, | Performed by: ANESTHESIOLOGY

## 2020-04-20 PROCEDURE — 1101F PT FALLS ASSESS-DOCD LE1/YR: CPT | Mod: HCNC,CPTII,95, | Performed by: ANESTHESIOLOGY

## 2020-04-20 PROCEDURE — 1101F PR PT FALLS ASSESS DOC 0-1 FALLS W/OUT INJ PAST YR: ICD-10-PCS | Mod: HCNC,CPTII,95, | Performed by: ANESTHESIOLOGY

## 2020-04-20 NOTE — TELEPHONE ENCOUNTER
----- Message from Ivy Knutson sent at 4/20/2020  8:53 AM CDT -----  Contact: CIARA ZIEGLER [[id number]]  Name of Who is Calling : CIARA ZIEGLER [[id number]]    Patient is requesting a call from staff in regards to virtual appointment. Patient states something was going wrong calls wouldn't go through would like a call back    .....Please contact to further discuss and advise.    Can the clinic reply by [facility] : No    What Number to Call Back : [phone]

## 2020-04-20 NOTE — TELEPHONE ENCOUNTER
Attempted to contact Ms. Renae at home for follow-up, however, no answer received after multiple attempts. Left voicemail informing patient to contact [facility] when available for follow-up or to reschedule for future appointment, if needed.     Jonn Bennett MD  [facility] Fellow, PGY V

## 2020-04-20 NOTE — TELEPHONE ENCOUNTER
----- Message from Ivy Knutson sent at 4/20/2020  8:53 AM CDT -----  Contact: CIARA ZIEGLER [[id number]]  Name of Who is Calling : CIARA ZIEGLER [[id number]]    Patient is requesting a call from staff in regards to virtual appointment. Patient states something was going wrong calls wouldn't go through would like a call back    .....Please contact to further discuss and advise.    Can the clinic reply by [facility] : No    What Number to Call Back : [phone]

## 2020-04-20 NOTE — PROGRESS NOTES
Chronic Pain-Tele-Medicine-New Consult      The patient location is: home  The chief complaint leading to consultation is: back pain  Visit type: Virtual visit with synchronous audio and video  Total time spent with patient: 25 min  Each patient to whom he or she provides medical services by telemedicine is:  (1) informed of the relationship between the physician and patient and the respective role of any other health care provider with respect to management of the patient; and (2) notified that he or she may decline to receive medical services by telemedicine and may withdraw from such care at any time.        Notes:     Chief Complaint: back pain       SUBJECTIVE:    Linnea Renae presents to tele-medicine appointment for the evaluation of low back pain. The pain started is still improved and symptoms have been improving with current medication regimen     On average pain is rated as 4/10.   Today the pain is rated as 2/10  Brief history:    Interval HPI 3/30/20  Contacted Ms. Renae today for follow-up of her low back pain. She is s/p staged (left then right) L2-3-4-5 RFA where she states she attained 90% relief of her low back pain. She states she continues to experience intermittent, dull, aching bilateral lower lumbar pain with prolonged flexing/extending, prolonged standing, and with strenuous activity, however, it has decreased significantly in severity since prior. She does continue to take Tramadol PRN and Zanaflex PRN with added benefit. She denies any other acute changes at this time.     Interval HPI 7/9/19  Linnea Renae presents to the clinic for follow up of lower back pain.  She reports doing well since last visit.  Her pain has been tolerable.  She had benefit with previous RFAs.  She has been doing PT but feels like it worsens her pain.  She would like to stop and go to the gym on her own.  She is a member at [employer] in [place].  She is not having any leg pain or numbness today.  Her  pain today is 2/10.    Interval HPI 5/9/19    Linnea Renae presents to the clinic for follow up of lower back pain.  She reports significant improvement since her previous encounter.  Her left sided back pain has resolved.  She is a slight ache to right lower back and buttocks which is tolerable.  She is interested in PT for strengthening.  She has done aquatic PT in the past but would like land therapy.  She is a member at [employer] in [place].  She denies any radicular symptoms at this time.  Her pain today is 1/10.      Pain Medications:     - Opioids: Ultram (Tramadol HCL)  - Adjuvant Medications: Advil,Motrin ( Ibuprofen) and zanaflex  - Anti-Coagulants: Aspirin  - Others: see med list      Physical Therapy/Home Exercise: yes       report:  Reviewed and consistent with medication use as prescribed.      Pain Procedures:   7/29/16 Right L3-4 TF CHRISTIAN  11/3/17 Bilateral L3-4 and L4-5 facet injections  5/9/18 Bilateral L3-4 and L4-5 facet injections  7/25/18 Bilateral L3-4 and L4-5 facet injections  12/5/18 Right L2,3,4,5 RFA- 80% relief  3/20/19 Left L2,3,4,5 RFA- 100% relief  10/2/19 left L4/5 TFESI  12/18/19 LEFT L2,3,4,5 RFA- 90% relief  1/8/20 RIGHT L2,3,4,5 RFA- 90% relief     Imaging:  Narrative       EXAMINATION:  MRI LUMBAR SPINE W WO CONTRAST    CLINICAL HISTORY:  Low back pain, >6wks conservative tx, persistent-progressive sx, surgical candidate; Spondylosis without myelopathy or radiculopathy, site unspecified    TECHNIQUE:  Multiplanar, multisequence MR images were acquired from the thoracolumbar junction to the sacrum prior to and following administration of 10 cc IV Gadavist.    COMPARISON:  Lumbar spine radiograph 10/11/2017; MRI lumbar spine with/without contrast 09/20/2016    FINDINGS:  Sagittal alignment demonstrates grade 1 anterolisthesis of L3 on L4 with slight uncovering of the intervertebral disc.  Vertebral body heights are satisfactorily maintained.  Intervertebral disc spaces are  preserved.  Marrow signal is within normal limits with no evidence of fracture or marrow replacement process noting a small vertebral body hemangioma at L1.    Conus appears within normal limits terminating at the level of the L1 level.  Cauda equina appears normal.    Paraspinous soft tissues demonstrate mild prominence of the common bile duct and small left renal cysts..    T12-L1:   No spinal canal or neuroforaminal narrowing.    L1-2:  No spinal canal or neuroforaminal narrowing.    L2-3:  Mild posterior disc bulge and moderate bilateral facet arthropathy without spinal canal stenosis or neural foraminal narrowing.    L3-4:  The diffuse posterior disc bulge, buckling of the ligamentum flavum, and moderate bilateral facet arthropathy results in mild spinal canal stenosis and no neural foraminal narrowing.    L4-5:  Diffuse posterior disc bulge with superimposed central disc protrusion, buckling of the ligamentum flavum, and moderate bilateral facet arthropathy result in moderate spinal canal stenosis and no significant neural foraminal narrowing.    L5-S1:  Mild posterior disc bulge and mild facet arthropathy results in mild right/moderate left neural foraminal narrowing.    No abnormal enhancement.       Impression         Multilevel lumbar spondylosis worst at L4-5 resulting in moderate spinal canal stenosis.    Mild prominence of the common bile duct which is nonspecific and may represent sequela of cholecystectomy.            X-Ray Lumbar Spine Ap Lateral w/Flex Ext     Narrative       10/11/17 10:17:19    Accession: [id number]    CLINICAL INDICATION: 67 year old F with  low back pain    COMPARISON: Lumbar spine x-rays, 09/20/2016.    TECHNIQUE: AP, lateral, flexion, extension, and coned down lateral radiographs of the lumbar spine.    FINDINGS:     Vertebral body heights are maintained.  No evidence of fracture.      Normal sagittal alignment is preserved.No significant translation with  flexion-extension.    Moderate multilevel degenerative changes are again present. Mild anterolisthesis of L3 with respect to L4 is again noted. Multilevel facet arthropathy is present, most pronounced in the lower lumbar spine.  No detrimental change is identified when compared with the examination performed one year prior.       Impression           Moderate multilevel degenerative changes in the lumbar spine, similar in appearance to the prior exam.    No evidence of fracture or malalignment.      Electronically signed by: Heber Mayfrancine  Date: 10/11/17  Time: 10:37          Past Medical History:   Diagnosis Date    Allergy     Breast cancer     Lumpectomy 10/15.    Chronic constipation     Colon polyps     Diabetes mellitus, type 2     Dry eyes     GERD (gastroesophageal reflux disease)     Hyperlipidemia     Hypertension     Lumbar disc disease     Type 2 diabetes mellitus      Past Surgical History:   Procedure Laterality Date    BREAST BIOPSY      BREAST LUMPECTOMY Right         CATARACT EXTRACTION, BILATERAL       SECTION      x3    CHOLECYSTECTOMY      COLONOSCOPY N/A 10/11/2018    Procedure: COLONOSCOPY;  Surgeon: Lorenzo Tanner MD;  Location: [address]);  Service: Endoscopy;  Laterality: N/A;    COLONOSCOPY W/ POLYPECTOMY      HYSTERECTOMY      and USO    INJECTION OF FACET JOINT Bilateral 2018    Procedure: INJECTION, FACET JOINT;  Surgeon: Viet Wilson MD;  Location: [facility] PAIN MGT;  Service: Pain Management;  Laterality: Bilateral;  LUMBAR BILATERAL L3-L4 AND L4-L5 FACET STEROID INJECTION    NEED CONSENT    RADIOFREQUENCY ABLATION Left 3/20/2019    Procedure: RADIOFREQUENCY ABLATION LEFT L2,3,4,5;  Surgeon: Viet Wilson MD;  Location: [facility] PAIN [facility]GT;  Service: Pain Management;  Laterality: Left;  LEFT RFA L2,3,4,5    NEEDS CONSENT    RADIOFREQUENCY ABLATION Left 2019    Procedure: RADIOFREQUENCY ABLATION, LEFT L2-L3-L4-L5  1 OF 2;   Surgeon: Viet Wilson MD;  Location: [facility] PAIN T;  Service: Pain Management;  Laterality: Left;    RADIOFREQUENCY ABLATION Right 2020    Procedure: RADIOFREQUENCY ABLATION, RIGHT L2-L3-L4-L5 MEDIAL BRANCH 2 OF;  Surgeon: Viet Wilson MD;  Location: [facility] PAIN MGT;  Service: Pain Management;  Laterality: Right;    TRANSFORAMINAL EPIDURAL INJECTION OF STEROID Left 10/2/2019    Procedure: INJECTION, STEROID, EPIDURAL, TRANSFORAMINAL APPROACH, L4 AND L5;  Surgeon: Viet Wilson MD;  Location: [facility] PAIN MGT;  Service: Pain Management;  Laterality: Left;    TUBAL LIGATION       Social History     Socioeconomic History    Marital status:      Spouse name: Not on file    Number of children: 3    Years of education: Not on file    Highest education level: Not on file   Occupational History    Not on file   Social Needs    Financial resource strain: Not on file    Food insecurity:     Worry: Not on file     Inability: Not on file    Transportation needs:     Medical: Not on file     Non-medical: Not on file   Tobacco Use    Smoking status: Former Smoker     Packs/day: 0.25     Years: 20.00     Pack years: 5.00     Last attempt to quit: 1985     Years since quittin.3    Smokeless tobacco: Never Used    Tobacco comment: Quit mid .   Substance and Sexual Activity    Alcohol use: No     Alcohol/week: 0.0 standard drinks    Drug use: No    Sexual activity: Yes   Lifestyle    Physical activity:     Days per week: Not on file     Minutes per session: Not on file    Stress: Not on file   Relationships    Social connections:     Talks on phone: Not on file     Gets together: Not on file     Attends Synagogue service: Not on file     Active member of club or organization: Not on file     Attends meetings of clubs or organizations: Not on file     Relationship status: Not on file   Other Topics Concern    Not on file   Social History Narrative    Not on file     Family  History   Problem Relation Age of Onset    No Known Problems Mother     No Known Problems Father     Heart disease Paternal Grandmother     Thyroid disease Paternal Grandfather     Hypertension Sister     Hypertension Brother     Glaucoma Brother     No Known Problems Daughter     No Known Problems Daughter     No Known Problems Daughter        Review of patient's allergies indicates:   Allergen Reactions    Codeine Itching       Current Outpatient Medications   Medication Sig    alcohol antiseptic pads (ALCOHOL PREP PADS TOP)     aspirin 81 MG Chew Take 81 mg by mouth once daily.    atenolol (TENORMIN) 50 MG tablet TAKE 1 TABLET BY MOUTH TWICE DAILY    atorvastatin (LIPITOR) 20 MG tablet TAKE 1 TABLET BY MOUTH ONCE DAILY    blood sugar diagnostic Strp 1 strip by Misc.(Non-Drug; Combo Route) route once daily.    calcium-vitamin D3 500 mg(1,250mg) -200 unit per tablet Take 1 tablet by mouth 2 (two) times daily with meals.     cycloSPORINE (RESTASIS) 0.05 % ophthalmic emulsion 1 drop 2 (two) times daily.    diclofenac sodium (VOLTAREN) 1 % Gel Apply 2 g topically 4 (four) times daily.    fluticasone propionate (FLONASE) 50 mcg/actuation nasal spray 2 sprays (100 mcg total) by Each Nostril route once daily.    hydroCHLOROthiazide (HYDRODIURIL) 25 MG tablet Take 1 tablet (25 mg total) by mouth once daily.    ibuprofen (ADVIL,MOTRIN) 800 MG tablet Take 1 tablet by mouth three times daily as needed    ketoconazole (NIZORAL) 2 % cream Apply topically once daily.    ketoconazole (NIZORAL) 2 % shampoo     lancets Misc 1 lancet by Misc.(Non-Drug; Combo Route) route once daily.    loratadine 10 mg Cap     metFORMIN (GLUCOPHAGE-XR) 500 MG 24 hr tablet TAKE TWO TABLETS BY MOUTH ONCE DAILY WITH BREAKFAST    minoxidil (LONITEN) 2.5 MG tablet     MINOXIDIL TOP Apply topically.    multivitamin (ONE DAILY MULTIVITAMIN) per tablet Take 1 tablet by mouth once daily.    nystatin (MYCOSTATIN) powder Apply  topically 4 (four) times daily. Under pannus as needed    olmesartan (BENICAR) 20 MG tablet Take 1 tablet (20 mg total) by mouth once daily.    omeprazole (PRILOSEC) 20 MG capsule TAKE 1 CAPSULE BY MOUTH ONCE DAILY    tiZANidine (ZANAFLEX) 4 MG tablet Take 1 tablet (4 mg total) by mouth 2 (two) times daily as needed.    topiramate (TOPAMAX) 25 MG tablet Take 1 tablet (25 mg total) by mouth 2 (two) times daily.    traMADoL (ULTRAM) 50 mg tablet TAKE 1 TABLET BY MOUTH EVERY 6 HOURS AS NEEDED FOR PAIN     No current facility-administered medications for this visit.        REVIEW OF SYSTEMS:    GENERAL:  No weight loss, malaise or fevers.  HEENT:   No recent changes in vision or hearing  NECK:  Negative for lumps, no difficulty with swallowing.  RESPIRATORY:  Negative for cough, wheezing or shortness of breath, patient denies any recent URI.  CARDIOVASCULAR:  Negative for chest pain, leg swelling or palpitations.  GI:  Negative for abdominal discomfort, blood in stools or black stools or change in bowel habits.  MUSCULOSKELETAL:  See HPI.  SKIN:  Negative for lesions, rash, and itching.  PSYCH:  No mood disorder or recent psychosocial stressors.  Patients sleep is not disturbed secondary to pain.  HEMATOLOGY/LYMPHOLOGY:  Negative for prolonged bleeding, bruising easily or swollen nodes.  Patient is not currently taking any anti-coagulants  NEURO:   No history of headaches, syncope, paralysis, seizures or tremors.  All other reviewed and negative other than HPI.    OBJECTIVE:    General appearance: Well appearing, in no acute distress, alert and oriented x3.  Psych:  Mood and affect appropriate.      ASSESSMENT: 69 y.o. year old female with LOW BACK pain, consistent with     1. Spondylosis without myelopathy     2. Osteoarthritis of spine, unspecified spinal osteoarthritis complication status, unspecified spinal region     3. DDD (degenerative disc disease), lumbar     4. Spinal stenosis of lumbar region without  neurogenic claudication           PLAN:     - I have stressed the importance of physical activity and a home exercise plan to help with pain and improve health.  - Patient can continue with medications for now since they are providing benefits, using them appropriately, and without side effects.  - RTC 4-6 weeks for f/u  - Counseled patient regarding the importance of activity modification, constant sleeping habits and physical therapy.    The above plan and management options were discussed at length with patient. Patient is in agreement with the above and verbalized understanding. It will be communicated with the referring physician via electronic record, fax, or mail.        Viet Wilson MD    04/20/2020

## 2020-04-21 ENCOUNTER — PATIENT MESSAGE (OUTPATIENT)
Dept: PAIN MEDICINE | Facility: CLINIC | Age: 70
End: 2020-04-21

## 2020-04-21 NOTE — TELEPHONE ENCOUNTER
----- Message from Yanique Benz sent at 4/21/2020 10:20 AM CDT -----  Contact: pt  Name of Who is Calling: Linnea Renae    What is the request in detail: pt states that the exercises that was sent to her didn't go through and would like to have it resent to her. Please contact to further discuss and advise.       Can the clinic reply by [facility]: n    [facility]: [phone]

## 2020-04-30 DIAGNOSIS — M48.061 DEGENERATIVE LUMBAR SPINAL STENOSIS: ICD-10-CM

## 2020-04-30 RX ORDER — TRAMADOL HYDROCHLORIDE 50 MG/1
TABLET ORAL
Qty: 120 TABLET | Refills: 0 | Status: SHIPPED | OUTPATIENT
Start: 2020-04-30 | End: 2020-05-31

## 2020-05-14 ENCOUNTER — PATIENT OUTREACH (OUTPATIENT)
Dept: ADMINISTRATIVE | Facility: OTHER | Age: 70
End: 2020-05-14

## 2020-05-14 DIAGNOSIS — E11.9 TYPE 2 DIABETES MELLITUS WITHOUT COMPLICATION, WITHOUT LONG-TERM CURRENT USE OF INSULIN: Primary | ICD-10-CM

## 2020-05-14 NOTE — PROGRESS NOTES
Chart reviewed.   Immunizations: Triggered Imm Registry     Orders placed: eye exam  Upcoming appts to satisfy [facility] topics: n/a

## 2020-05-18 ENCOUNTER — TELEPHONE (OUTPATIENT)
Dept: PAIN MEDICINE | Facility: CLINIC | Age: 70
End: 2020-05-18

## 2020-05-18 NOTE — TELEPHONE ENCOUNTER
Staff spoke with patient to confirm appointment scheduled 5/19/20 at 10 am as a mychart virtual visit with  and also patient is informed of e-pre check process if any issues occur logging in my [facility] portal patient was given tech support number

## 2020-05-19 ENCOUNTER — OFFICE VISIT (OUTPATIENT)
Dept: PAIN MEDICINE | Facility: CLINIC | Age: 70
End: 2020-05-19
Attending: ANESTHESIOLOGY
Payer: MEDICARE

## 2020-05-19 DIAGNOSIS — M46.1 SACROILIITIS: Primary | ICD-10-CM

## 2020-05-19 DIAGNOSIS — G89.4 CHRONIC PAIN DISORDER: ICD-10-CM

## 2020-05-19 DIAGNOSIS — M47.819 SPONDYLOSIS WITHOUT MYELOPATHY: ICD-10-CM

## 2020-05-19 DIAGNOSIS — M47.816 OSTEOARTHRITIS OF LUMBAR SPINE, UNSPECIFIED SPINAL OSTEOARTHRITIS COMPLICATION STATUS: ICD-10-CM

## 2020-05-19 DIAGNOSIS — M70.60 GREATER TROCHANTERIC BURSITIS, UNSPECIFIED LATERALITY: ICD-10-CM

## 2020-05-19 PROCEDURE — 1101F PR PT FALLS ASSESS DOC 0-1 FALLS W/OUT INJ PAST YR: ICD-10-PCS | Mod: HCNC,CPTII,95, | Performed by: ANESTHESIOLOGY

## 2020-05-19 PROCEDURE — 1101F PT FALLS ASSESS-DOCD LE1/YR: CPT | Mod: HCNC,CPTII,95, | Performed by: ANESTHESIOLOGY

## 2020-05-19 PROCEDURE — 99214 OFFICE O/P EST MOD 30 MIN: CPT | Mod: HCNC,95,GC, | Performed by: ANESTHESIOLOGY

## 2020-05-19 PROCEDURE — 1159F PR MEDICATION LIST DOCUMENTED IN MEDICAL RECORD: ICD-10-PCS | Mod: HCNC,95,, | Performed by: ANESTHESIOLOGY

## 2020-05-19 PROCEDURE — 1159F MED LIST DOCD IN RCRD: CPT | Mod: HCNC,95,, | Performed by: ANESTHESIOLOGY

## 2020-05-19 PROCEDURE — 99214 PR OFFICE/OUTPT VISIT, EST, LEVL IV, 30-39 MIN: ICD-10-PCS | Mod: HCNC,95,GC, | Performed by: ANESTHESIOLOGY

## 2020-05-19 NOTE — PROGRESS NOTES
Chronic Pain-Tele-Medicine-Follow-Up Visit      The patient location is: home  The chief complaint leading to consultation is: back pain  Visit type: Virtual visit with synchronous audio and video  Total time spent with patient: 25 min  Each patient to whom he or she provides medical services by telemedicine is:  (1) informed of the relationship between the physician and patient and the respective role of any other health care provider with respect to management of the patient; and (2) notified that he or she may decline to receive medical services by telemedicine and may withdraw from such care at any time.    Notes:         Chief Complaint: back pain       SUBJECTIVE:      Interval HPI 5/19/20:    Ms. Renae was contacted at home for follow-up of her chronic low back pain. She is s/p staged RFA in December 2019 and January 2020 with significant relief. She states that she was doing well until 4 weeks ago where she started home exercises which she believes has exacerbated her symptoms. She currently endorses 10/10 dull, aching low back and buttock pain similar in character and severity has prior to her RFA procedures. She continues to take Ibuprofen, Zanaflex, Tramadol, and uses Voltaren gel with some relief.     Interval HPI 4/20/20  Linnea Renae presents to tele-medicine appointment for the evaluation of low back pain. The pain started is still improved and symptoms have been improving with current medication regimen     On average pain is rated as 4/10.   Today the pain is rated as 2/10  Brief history:    Interval HPI 3/30/20  Contacted Ms. Renae today for follow-up of her low back pain. She is s/p staged (left then right) L2-3-4-5 RFA where she states she attained 90% relief of her low back pain. She states she continues to experience intermittent, dull, aching bilateral lower lumbar pain with prolonged flexing/extending, prolonged standing, and with strenuous activity, however, it has decreased  significantly in severity since prior. She does continue to take Tramadol PRN and Zanaflex PRN with added benefit. She denies any other acute changes at this time.     Interval HPI 7/9/19  Linnea Renae presents to the clinic for follow up of lower back pain.  She reports doing well since last visit.  Her pain has been tolerable.  She had benefit with previous RFAs.  She has been doing PT but feels like it worsens her pain.  She would like to stop and go to the gym on her own.  She is a member at [employer] in [place].  She is not having any leg pain or numbness today.  Her pain today is 2/10.    Interval HPI 5/9/19    Linnea Renae presents to the clinic for follow up of lower back pain.  She reports significant improvement since her previous encounter.  Her left sided back pain has resolved.  She is a slight ache to right lower back and buttocks which is tolerable.  She is interested in PT for strengthening.  She has done aquatic PT in the past but would like land therapy.  She is a member at [employer] in [place].  She denies any radicular symptoms at this time.  Her pain today is 1/10.      Pain Medications:     - Opioids: Ultram (Tramadol HCL)  - Adjuvant Medications: Advil,Motrin ( Ibuprofen) and zanaflex  - Anti-Coagulants: Aspirin  - Others: see med list      Physical Therapy/Home Exercise: yes       report:  Reviewed and consistent with medication use as prescribed.      Pain Procedures:   7/29/16 Right L3-4 TF CHRISTIAN  11/3/17 Bilateral L3-4 and L4-5 facet injections  5/9/18 Bilateral L3-4 and L4-5 facet injections  7/25/18 Bilateral L3-4 and L4-5 facet injections  12/5/18 Right L2,3,4,5 RFA- 80% relief  3/20/19 Left L2,3,4,5 RFA- 100% relief  10/2/19 left L4/5 TFESI  12/18/19 LEFT L2,3,4,5 RFA- 90% relief  1/8/20 RIGHT L2,3,4,5 RFA- 90% relief     Imaging:  Narrative       EXAMINATION:  MRI LUMBAR SPINE W WO CONTRAST    CLINICAL HISTORY:  Low back pain, >6wks conservative tx,  persistent-progressive sx, surgical candidate; Spondylosis without myelopathy or radiculopathy, site unspecified    TECHNIQUE:  Multiplanar, multisequence MR images were acquired from the thoracolumbar junction to the sacrum prior to and following administration of 10 cc IV Gadavist.    COMPARISON:  Lumbar spine radiograph 10/11/2017; MRI lumbar spine with/without contrast 09/20/2016    FINDINGS:  Sagittal alignment demonstrates grade 1 anterolisthesis of L3 on L4 with slight uncovering of the intervertebral disc.  Vertebral body heights are satisfactorily maintained.  Intervertebral disc spaces are preserved.  Marrow signal is within normal limits with no evidence of fracture or marrow replacement process noting a small vertebral body hemangioma at L1.    Conus appears within normal limits terminating at the level of the L1 level.  Cauda equina appears normal.    Paraspinous soft tissues demonstrate mild prominence of the common bile duct and small left renal cysts..    T12-L1:   No spinal canal or neuroforaminal narrowing.    L1-2:  No spinal canal or neuroforaminal narrowing.    L2-3:  Mild posterior disc bulge and moderate bilateral facet arthropathy without spinal canal stenosis or neural foraminal narrowing.    L3-4:  The diffuse posterior disc bulge, buckling of the ligamentum flavum, and moderate bilateral facet arthropathy results in mild spinal canal stenosis and no neural foraminal narrowing.    L4-5:  Diffuse posterior disc bulge with superimposed central disc protrusion, buckling of the ligamentum flavum, and moderate bilateral facet arthropathy result in moderate spinal canal stenosis and no significant neural foraminal narrowing.    L5-S1:  Mild posterior disc bulge and mild facet arthropathy results in mild right/moderate left neural foraminal narrowing.    No abnormal enhancement.       Impression         Multilevel lumbar spondylosis worst at L4-5 resulting in moderate spinal canal  stenosis.    Mild prominence of the common bile duct which is nonspecific and may represent sequela of cholecystectomy.            X-Ray Lumbar Spine Ap Lateral w/Flex Ext     Narrative       10/11/17 10:17:19    Accession: [id number]    CLINICAL INDICATION: 67 year old F with  low back pain    COMPARISON: Lumbar spine x-rays, 2016.    TECHNIQUE: AP, lateral, flexion, extension, and coned down lateral radiographs of the lumbar spine.    FINDINGS:     Vertebral body heights are maintained.  No evidence of fracture.      Normal sagittal alignment is preserved.No significant translation with flexion-extension.    Moderate multilevel degenerative changes are again present. Mild anterolisthesis of L3 with respect to L4 is again noted. Multilevel facet arthropathy is present, most pronounced in the lower lumbar spine.  No detrimental change is identified when compared with the examination performed one year prior.       Impression           Moderate multilevel degenerative changes in the lumbar spine, similar in appearance to the prior exam.    No evidence of fracture or malalignment.      Electronically signed by: Heber Frost  Date: 10/11/17  Time: 10:37          Past Medical History:   Diagnosis Date    Allergy     Breast cancer     Lumpectomy 10/15.    Chronic constipation     Colon polyps     Diabetes mellitus, type 2     Dry eyes     GERD (gastroesophageal reflux disease)     Hyperlipidemia     Hypertension     Lumbar disc disease     Type 2 diabetes mellitus      Past Surgical History:   Procedure Laterality Date    BREAST BIOPSY      BREAST LUMPECTOMY Right     2015    CATARACT EXTRACTION, BILATERAL       SECTION      x3    CHOLECYSTECTOMY      COLONOSCOPY N/A 10/11/2018    Procedure: COLONOSCOPY;  Surgeon: Lorenzo Tanner MD;  Location: [facility] ([address] [place] [address]);  Service: Endoscopy;  Laterality: N/A;    COLONOSCOPY W/ POLYPECTOMY      HYSTERECTOMY      and USO    INJECTION OF  FACET JOINT Bilateral 2018    Procedure: INJECTION, FACET JOINT;  Surgeon: Viet Wilson MD;  Location: [facility];  Service: Pain Management;  Laterality: Bilateral;  LUMBAR BILATERAL L3-L4 AND L4-L5 FACET STEROID INJECTION    NEED CONSENT    RADIOFREQUENCY ABLATION Left 3/20/2019    Procedure: RADIOFREQUENCY ABLATION LEFT L2,3,4,5;  Surgeon: Viet Wilson MD;  Location: [facility];  Service: Pain Management;  Laterality: Left;  LEFT RFA L2,3,4,5    NEEDS CONSENT    RADIOFREQUENCY ABLATION Left 2019    Procedure: RADIOFREQUENCY ABLATION, LEFT L2-L3-L4-L5  1 OF 2;  Surgeon: Viet Wilson MD;  Location: [facility];  Service: Pain Management;  Laterality: Left;    RADIOFREQUENCY ABLATION Right 2020    Procedure: RADIOFREQUENCY ABLATION, RIGHT L2-L3-L4-L5 MEDIAL BRANCH 2 OF;  Surgeon: Viet Wilson MD;  Location: [facility];  Service: Pain Management;  Laterality: Right;    TRANSFORAMINAL EPIDURAL INJECTION OF STEROID Left 10/2/2019    Procedure: INJECTION, STEROID, EPIDURAL, TRANSFORAMINAL APPROACH, L4 AND L5;  Surgeon: Viet Wilson MD;  Location: [facility];  Service: Pain Management;  Laterality: Left;    TUBAL LIGATION       Social History     Socioeconomic History    Marital status:      Spouse name: Not on file    Number of children: 3    Years of education: Not on file    Highest education level: Not on file   Occupational History    Not on file   Social Needs    Financial resource strain: Not on file    Food insecurity:     Worry: Not on file     Inability: Not on file    Transportation needs:     Medical: Not on file     Non-medical: Not on file   Tobacco Use    Smoking status: Former Smoker     Packs/day: 0.25     Years: 20.00     Pack years: 5.00     Last attempt to quit: 1985     Years since quittin.4    Smokeless tobacco: Never Used    Tobacco comment: Quit mid .   Substance and Sexual Activity    Alcohol use: No      Alcohol/week: 0.0 standard drinks    Drug use: No    Sexual activity: Yes   Lifestyle    Physical activity:     Days per week: Not on file     Minutes per session: Not on file    Stress: Not on file   Relationships    Social connections:     Talks on phone: Not on file     Gets together: Not on file     Attends Orthodox service: Not on file     Active member of club or organization: Not on file     Attends meetings of clubs or organizations: Not on file     Relationship status: Not on file   Other Topics Concern    Not on file   Social History Narrative    Not on file     Family History   Problem Relation Age of Onset    No Known Problems Mother     No Known Problems Father     Heart disease Paternal Grandmother     Thyroid disease Paternal Grandfather     Hypertension Sister     Hypertension Brother     Glaucoma Brother     No Known Problems Daughter     No Known Problems Daughter     No Known Problems Daughter        Review of patient's allergies indicates:   Allergen Reactions    Codeine Itching       Current Outpatient Medications   Medication Sig    alcohol antiseptic pads (ALCOHOL PREP PADS TOP)     aspirin 81 MG Chew Take 81 mg by mouth once daily.    atenolol (TENORMIN) 50 MG tablet TAKE 1 TABLET BY MOUTH TWICE DAILY    atorvastatin (LIPITOR) 20 MG tablet TAKE 1 TABLET BY MOUTH ONCE DAILY    blood sugar diagnostic Strp 1 strip by Misc.(Non-Drug; Combo Route) route once daily.    calcium-vitamin D3 500 mg(1,250mg) -200 unit per tablet Take 1 tablet by mouth 2 (two) times daily with meals.     cycloSPORINE (RESTASIS) 0.05 % ophthalmic emulsion 1 drop 2 (two) times daily.    diclofenac sodium (VOLTAREN) 1 % Gel Apply 2 g topically 4 (four) times daily.    fluticasone propionate (FLONASE) 50 mcg/actuation nasal spray 2 sprays (100 mcg total) by Each Nostril route once daily.    hydroCHLOROthiazide (HYDRODIURIL) 25 MG tablet Take 1 tablet (25 mg total) by mouth once daily.     ibuprofen (ADVIL,MOTRIN) 800 MG tablet Take 1 tablet by mouth three times daily as needed    ketoconazole (NIZORAL) 2 % cream Apply topically once daily.    ketoconazole (NIZORAL) 2 % shampoo     lancets Misc 1 lancet by Misc.(Non-Drug; Combo Route) route once daily.    loratadine 10 mg Cap     metFORMIN (GLUCOPHAGE-XR) 500 MG 24 hr tablet TAKE TWO TABLETS BY MOUTH ONCE DAILY WITH BREAKFAST    minoxidil (LONITEN) 2.5 MG tablet     MINOXIDIL TOP Apply topically.    multivitamin (ONE DAILY MULTIVITAMIN) per tablet Take 1 tablet by mouth once daily.    nystatin (MYCOSTATIN) powder Apply topically 4 (four) times daily. Under pannus as needed    olmesartan (BENICAR) 20 MG tablet Take 1 tablet (20 mg total) by mouth once daily.    omeprazole (PRILOSEC) 20 MG capsule TAKE 1 CAPSULE BY MOUTH ONCE DAILY    tiZANidine (ZANAFLEX) 4 MG tablet Take 1 tablet (4 mg total) by mouth 2 (two) times daily as needed.    topiramate (TOPAMAX) 25 MG tablet Take 1 tablet (25 mg total) by mouth 2 (two) times daily.    traMADoL (ULTRAM) 50 mg tablet TAKE 1 TABLET BY MOUTH EVERY 6 HOURS AS NEEDED FOR PAIN     No current facility-administered medications for this visit.        REVIEW OF SYSTEMS:    GENERAL:  No weight loss, malaise or fevers.  HEENT:   No recent changes in vision or hearing  NECK:  Negative for lumps, no difficulty with swallowing.  RESPIRATORY:  Negative for cough, wheezing or shortness of breath, patient denies any recent URI.  CARDIOVASCULAR:  Negative for chest pain, leg swelling or palpitations.  GI:  Negative for abdominal discomfort, blood in stools or black stools or change in bowel habits.  MUSCULOSKELETAL:  See HPI.  SKIN:  Negative for lesions, rash, and itching.  PSYCH:  No mood disorder or recent psychosocial stressors.  Patients sleep is not disturbed secondary to pain.  HEMATOLOGY/LYMPHOLOGY:  Negative for prolonged bleeding, bruising easily or swollen nodes.  Patient is not currently taking any  anti-coagulants  NEURO:   No history of headaches, syncope, paralysis, seizures or tremors.  All other reviewed and negative other than HPI.    OBJECTIVE:    General appearance: Well appearing, in no acute distress, alert and oriented x3.  Psych:  Mood and affect appropriate.      ASSESSMENT: 69 y.o. year old female with LOW BACK pain, consistent with below:    1. Lumbar spondylosis     2. Osteoarthritis of lumbar spine, unspecified spinal osteoarthritis complication status     3. Chronic pain disorder           PLAN:   - Will schedule for bilateral SI Joint injections and bilateral GT bursa injections under fluoroscopy.   - Continue Ibuprofen PRN.  - Continue Zanaflex 4 mg TID PRN  - Cont Tramadol 50 mg per PCP. (last filled 5/1/20 #120 tabs)  - I have stressed the importance of physical activity and a home exercise plan to help with pain and improve health.  - Patient can continue with medications for now since they are providing benefits, using them appropriately, and without side effects.  - RTC 4 weeks after injections.  - Counseled patient regarding the importance of activity modification, constant sleeping habits and physical therapy.    The above plan and management options were discussed at length with patient. Patient is in agreement with the above and verbalized understanding. It will be communicated with the referring physician via electronic record, fax, or mail.        Jonn Bennett MD  [facility], PGY V  I have personally reviewed the history and personally discussed the plan with patient through video visit and agree with the resident/fellow/NPs note as stated above.    Viet Wilson MD      05/19/2020

## 2020-05-22 PROBLEM — M46.1 SACROILIITIS: Status: ACTIVE | Noted: 2020-05-22

## 2020-05-22 PROBLEM — M70.60 GREATER TROCHANTERIC BURSITIS: Status: ACTIVE | Noted: 2020-05-22

## 2020-05-22 PROBLEM — G89.4 CHRONIC PAIN DISORDER: Status: ACTIVE | Noted: 2017-11-03

## 2020-05-26 DIAGNOSIS — K21.9 GASTROESOPHAGEAL REFLUX DISEASE, ESOPHAGITIS PRESENCE NOT SPECIFIED: ICD-10-CM

## 2020-05-26 RX ORDER — OMEPRAZOLE 20 MG/1
CAPSULE, DELAYED RELEASE ORAL
Qty: 90 CAPSULE | Refills: 0 | Status: SHIPPED | OUTPATIENT
Start: 2020-05-26 | End: 2020-05-29

## 2020-05-29 DIAGNOSIS — K21.9 GASTROESOPHAGEAL REFLUX DISEASE, ESOPHAGITIS PRESENCE NOT SPECIFIED: ICD-10-CM

## 2020-05-29 RX ORDER — OMEPRAZOLE 20 MG/1
CAPSULE, DELAYED RELEASE ORAL
Qty: 90 CAPSULE | Refills: 0 | Status: SHIPPED | OUTPATIENT
Start: 2020-05-29 | End: 2020-12-09 | Stop reason: SDUPTHER

## 2020-05-31 DIAGNOSIS — M48.061 DEGENERATIVE LUMBAR SPINAL STENOSIS: ICD-10-CM

## 2020-05-31 RX ORDER — TRAMADOL HYDROCHLORIDE 50 MG/1
TABLET ORAL
Qty: 120 TABLET | Refills: 0 | Status: SHIPPED | OUTPATIENT
Start: 2020-05-31 | End: 2020-07-01

## 2020-06-03 ENCOUNTER — TELEPHONE (OUTPATIENT)
Dept: PRIMARY CARE CLINIC | Facility: CLINIC | Age: 70
End: 2020-06-03

## 2020-06-03 DIAGNOSIS — E11.40 TYPE 2 DIABETES MELLITUS WITH DIABETIC NEUROPATHY, WITHOUT LONG-TERM CURRENT USE OF INSULIN: Primary | ICD-10-CM

## 2020-06-04 NOTE — TELEPHONE ENCOUNTER
INR stable.  Goal is 2-2.5 Pt never started taking the Tramadol 100 mg because it was not covered under her insurance. So she is still taking  the tramadol 50 mg pl adv.

## 2020-06-17 ENCOUNTER — TELEPHONE (OUTPATIENT)
Dept: PRIMARY CARE CLINIC | Facility: CLINIC | Age: 70
End: 2020-06-17

## 2020-06-17 DIAGNOSIS — E11.40 TYPE 2 DIABETES MELLITUS WITH DIABETIC NEUROPATHY, WITHOUT LONG-TERM CURRENT USE OF INSULIN: Primary | ICD-10-CM

## 2020-06-17 NOTE — TELEPHONE ENCOUNTER
----- Message from Marilu Richardson sent at 6/17/2020  3:05 PM CDT -----  Regarding: self/[phone]  Patient called in regards needing to talk with Dr Stringer medical assistant about diabetic medicine. Patient want a courtesy call ASAP. Thank you

## 2020-06-18 ENCOUNTER — PATIENT MESSAGE (OUTPATIENT)
Dept: PRIMARY CARE CLINIC | Facility: CLINIC | Age: 70
End: 2020-06-18

## 2020-06-19 RX ORDER — METFORMIN HYDROCHLORIDE 500 MG/1
1000 TABLET ORAL 2 TIMES DAILY WITH MEALS
Qty: 360 TABLET | Refills: 1 | Status: SHIPPED | OUTPATIENT
Start: 2020-06-19 | End: 2020-12-02 | Stop reason: SDUPTHER

## 2020-06-19 NOTE — TELEPHONE ENCOUNTER
Only extended release Metformin has been recalled. Order changed to plain Metformin 500mg two tabs twice daily at [facility].

## 2020-06-30 DIAGNOSIS — M48.061 DEGENERATIVE LUMBAR SPINAL STENOSIS: ICD-10-CM

## 2020-07-01 RX ORDER — TRAMADOL HYDROCHLORIDE 50 MG/1
TABLET ORAL
Qty: 120 TABLET | Refills: 0 | Status: SHIPPED | OUTPATIENT
Start: 2020-07-02 | End: 2020-08-03 | Stop reason: SDUPTHER

## 2020-07-08 ENCOUNTER — HOSPITAL ENCOUNTER (OUTPATIENT)
Facility: OTHER | Age: 70
Discharge: HOME | End: 2020-07-08
Attending: ANESTHESIOLOGY | Admitting: ANESTHESIOLOGY
Payer: MEDICARE

## 2020-07-08 VITALS
HEIGHT: 67 IN | OXYGEN SATURATION: 95 % | DIASTOLIC BLOOD PRESSURE: 49 MMHG | BODY MASS INDEX: 43.95 KG/M2 | TEMPERATURE: 99 F | RESPIRATION RATE: 18 BRPM | WEIGHT: 280 LBS | SYSTOLIC BLOOD PRESSURE: 99 MMHG | HEART RATE: 74 BPM

## 2020-07-08 DIAGNOSIS — M46.1 SACROILIITIS: Primary | ICD-10-CM

## 2020-07-08 LAB — POCT GLUCOSE: 110 MG/DL (ref 70–110)

## 2020-07-08 PROCEDURE — 25000003 PHARM REV CODE 250: Mod: HCNC | Performed by: ANESTHESIOLOGY

## 2020-07-08 PROCEDURE — 20610 PR DRAIN/INJECT LARGE JOINT/BURSA: ICD-10-PCS | Mod: 50,59,HCNC, | Performed by: ANESTHESIOLOGY

## 2020-07-08 PROCEDURE — 25000003 PHARM REV CODE 250: Mod: HCNC | Performed by: PHYSICAL MEDICINE & REHABILITATION

## 2020-07-08 PROCEDURE — 20610 DRAIN/INJ JOINT/BURSA W/O US: CPT | Mod: 50,59,HCNC, | Performed by: ANESTHESIOLOGY

## 2020-07-08 PROCEDURE — 25500020 PHARM REV CODE 255: Mod: HCNC | Performed by: ANESTHESIOLOGY

## 2020-07-08 PROCEDURE — 82947 ASSAY GLUCOSE BLOOD QUANT: CPT | Mod: HCNC | Performed by: ANESTHESIOLOGY

## 2020-07-08 PROCEDURE — 63600175 PHARM REV CODE 636 W HCPCS: Mod: HCNC | Performed by: ANESTHESIOLOGY

## 2020-07-08 PROCEDURE — 27096 INJECT SACROILIAC JOINT: CPT | Mod: 50

## 2020-07-08 PROCEDURE — 27096 INJECT SACROILIAC JOINT: CPT | Mod: 50,HCNC,, | Performed by: ANESTHESIOLOGY

## 2020-07-08 PROCEDURE — 20610 DRAIN/INJ JOINT/BURSA W/O US: CPT | Mod: 50

## 2020-07-08 PROCEDURE — 27096 PR INJECTION,SACROILIAC JOINT: ICD-10-PCS | Mod: 50,HCNC,, | Performed by: ANESTHESIOLOGY

## 2020-07-08 RX ORDER — ALPRAZOLAM 0.5 MG/1
0.5 TABLET ORAL ONCE
Status: DISCONTINUED | OUTPATIENT
Start: 2020-07-08 | End: 2020-07-08 | Stop reason: HOSPADM

## 2020-07-08 RX ORDER — SODIUM CHLORIDE 9 MG/ML
500 INJECTION, SOLUTION INTRAVENOUS CONTINUOUS
Status: DISCONTINUED | OUTPATIENT
Start: 2020-07-08 | End: 2020-07-08 | Stop reason: HOSPADM

## 2020-07-08 RX ORDER — BUPIVACAINE HYDROCHLORIDE 2.5 MG/ML
INJECTION, SOLUTION EPIDURAL; INFILTRATION; INTRACAUDAL
Status: DISCONTINUED | OUTPATIENT
Start: 2020-07-08 | End: 2020-07-08 | Stop reason: HOSPADM

## 2020-07-08 RX ORDER — MIDAZOLAM HYDROCHLORIDE 1 MG/ML
INJECTION INTRAMUSCULAR; INTRAVENOUS
Status: DISCONTINUED | OUTPATIENT
Start: 2020-07-08 | End: 2020-07-08 | Stop reason: HOSPADM

## 2020-07-08 RX ORDER — TRIAMCINOLONE ACETONIDE 40 MG/ML
INJECTION, SUSPENSION INTRA-ARTICULAR; INTRAMUSCULAR
Status: DISCONTINUED | OUTPATIENT
Start: 2020-07-08 | End: 2020-07-08 | Stop reason: HOSPADM

## 2020-07-08 RX ORDER — LIDOCAINE HYDROCHLORIDE 10 MG/ML
INJECTION INFILTRATION; PERINEURAL
Status: DISCONTINUED | OUTPATIENT
Start: 2020-07-08 | End: 2020-07-08 | Stop reason: HOSPADM

## 2020-07-08 RX ADMIN — SODIUM CHLORIDE 500 ML: 0.9 INJECTION, SOLUTION INTRAVENOUS at 03:07

## 2020-07-08 NOTE — DISCHARGE INSTRUCTIONS

## 2020-07-08 NOTE — OP NOTE
Patient Name: Linnea Renae  MRN: [id number]    INFORMED CONSENT: The procedure, risks, benefits and options were discussed with patient. There are no contraindications to the procedure. The patient expressed understanding and agreed to proceed. The personnel performing the procedure was discussed. I verify that I personally obtained Linnea's consent prior to the start of the procedure and the signed consent can be found on the patient's chart.    Procedure Date: 07/08/2020    Anesthesia: Topical    Pre Procedure diagnosis: Lumbar spondylosis [M47.816]  1. Sacroiliitis     2    trochanteric bursitis   Post-Procedure diagnosis: SAME      Sedation: None    PROCEDURE: bilateral SACROILIAC JOINT INJECTION  DESCRIPTION OF PROCEDURE: The patient was brought to the fluoroscopy suite. After performing time out  IV access was obtained prior to the procedure. The patient was positioned prone on the fluoroscopy table. Continuous hemodynamic monitoring was initiated including blood pressure, EKG, and pulse oximetry.  The lumbosacral area was prepped with chlorhexidine three times and draped into a sterile field. The skin overlying the bilateral side sacroiliac joint was anesthetized using 3cc of lidocaine 1%. The inferior pole of the sacroiliac joint was identified by cephalo-oblique fluoroscopy. A 22 gauge, 3 1/2 inch spinal needle was slowly advanced through the sacroiliac joint capsule under fluoroscopic guidance. The needle position was confirmed using oblique, AP and lateral fluoroscopic imaging.  Negative aspiration was confirmed. 0.5 cc of Omnipaque 300 was injected confirming intra-articular contrast spread. A combination of 2 cc of Bupivacaine 0.25% and 20 mg kenalog was easily injected on each side. The needle was removed and bleeding was nil.  A sterile dressing was applied.     PROCEDURE: bilateral GREATER TROCHANTERIC BURSA INJECTION      DESCRIPTION OF PROCEDURE:  The skin overlying the greater trochanter was prepped  "with povidone-iodine three times and draped in a sterile fashion. The skin and subcutaneous tissue was anesthetized using 3 cc of lidocaine 1%. A  22 gauge 5" spinal needle was slowly advanced through under fluoroscopic guidance into the greater trochanteric bursa. The needle position was confirmed using oblique, AP and lateral fluoroscopic imaging. Negative aspiration was confirmed. 2 cc of Omnipaque 300 was injected confirming appropriate contrast spread. A combination of 4 cc of Bupivacaine 0.25% and 10 mg kenalog was easily injected on each side. The needle was removed and bleeding was nil. A sterile dressing was applied. Linnea was taken back to the recovery room for further observation.     Blood Loss: Nill  Specimen: None    Viet Wilson MD    "

## 2020-07-08 NOTE — H&P
HPI  Patient presenting for Procedure(s) (LRB):  INJECTION, JOINT, Bilateral SI (Bilateral)  INJECTION bilateral GT bursa injections (Bilateral)     Patient on Anti-coagulation Yes Aspirin 81mg     No health changes since previous encounter    Past Medical History:   Diagnosis Date    Allergy     Breast cancer     Lumpectomy 10/15.    Chronic constipation     Colon polyps     Diabetes mellitus, type 2     Dry eyes     GERD (gastroesophageal reflux disease)     Hyperlipidemia     Hypertension     Lumbar disc disease     Type 2 diabetes mellitus      Past Surgical History:   Procedure Laterality Date    BREAST BIOPSY      BREAST LUMPECTOMY Right     2015    CATARACT EXTRACTION, BILATERAL       SECTION      x3    CHOLECYSTECTOMY      COLONOSCOPY N/A 10/11/2018    Procedure: COLONOSCOPY;  Surgeon: Lorenzo Tanner MD;  [facility]  Service: Endoscopy;  Laterality: N/A;    COLONOSCOPY W/ POLYPECTOMY      HYSTERECTOMY      and USO    INJECTION OF FACET JOINT Bilateral 2018    Procedure: INJECTION, FACET JOINT;  Surgeon: Viet Wilson MD;  Location: [facility];  Service: Pain Management;  Laterality: Bilateral;  LUMBAR BILATERAL L3-L4 AND L4-L5 FACET STEROID INJECTION    NEED CONSENT    RADIOFREQUENCY ABLATION Left 3/20/2019    Procedure: RADIOFREQUENCY ABLATION LEFT L2,3,4,5;  Surgeon: Viet Wilson MD;  Location: [facility];  Service: Pain Management;  Laterality: Left;  LEFT RFA L2,3,4,5    NEEDS CONSENT    RADIOFREQUENCY ABLATION Left 2019    Procedure: RADIOFREQUENCY ABLATION, LEFT L2-L3-L4-L5  1 OF 2;  Surgeon: Viet Wilson MD;  Location: [facility];  Service: Pain Management;  Laterality: Left;    RADIOFREQUENCY ABLATION Right 2020    Procedure: RADIOFREQUENCY ABLATION, RIGHT L2-L3-L4-L5 MEDIAL BRANCH 2 OF;  Surgeon: Viet Wilson MD;  Location: [facility];  Service: Pain Management;  Laterality: Right;     "TRANSFORAMINAL EPIDURAL INJECTION OF STEROID Left 10/2/2019    Procedure: INJECTION, STEROID, EPIDURAL, TRANSFORAMINAL APPROACH, L4 AND L5;  Surgeon: Viet Wilson MD;  Location: [facility];  Service: Pain Management;  Laterality: Left;    TUBAL LIGATION       Review of patient's allergies indicates:   Allergen Reactions    Codeine Itching      Current Facility-Administered Medications   Medication    0.9%  NaCl infusion    0.9%  NaCl infusion    ALPRAZolam tablet 0.5 mg       PMHx, PSHx, Allergies, Medications reviewed in epic    ROS negative except pain complaints in HPI    OBJECTIVE:    /60   Pulse 75   Temp 98.7 °F (37.1 °C) (Oral)   Resp 20   Ht 5' 7" (1.702 m)   Wt 127 kg (280 lb)   SpO2 97%   BMI 43.85 kg/m²     PHYSICAL EXAMINATION:    GENERAL: Well appearing, in no acute distress, alert and oriented x3.  PSYCH:  Mood and affect appropriate.  SKIN: Skin color, texture, turgor normal, no rashes or lesions which will impact the procedure.  CV: RRR with palpation of the radial artery.  PULM: No evidence of respiratory difficulty, symmetric chest rise. Clear to auscultation.  NEURO: Cranial nerves grossly intact.    Plan:    Proceed with procedure as planned Procedure(s) (LRB):  INJECTION, JOINT, Bilateral SI (Bilateral)  INJECTION bilateral GT bursa injections (Bilateral)    Vance Borges MD  PGY3, CA2 Anethesiology   [facility] [place] [facility]  07/08/2020          "

## 2020-07-08 NOTE — DISCHARGE SUMMARY
Discharge Note  Short Stay      SUMMARY     Admit Date: 7/8/2020    Attending Physician: Viet Wilson      Discharge Physician: Viet Wilson      Discharge Date: 7/8/2020 4:20 PM    Procedure(s) (LRB):  INJECTION, JOINT, Bilateral SI (Bilateral)  INJECTION bilateral GT bursa injections (Bilateral)    Final Diagnosis: Lumbar spondylosis [M47.816]    Disposition: Home or self care    Patient Instructions:   Current Discharge Medication List      CONTINUE these medications which have NOT CHANGED    Details   alcohol antiseptic pads (ALCOHOL PREP PADS TOP)       aspirin 81 MG Chew Take 81 mg by mouth once daily.      atenolol (TENORMIN) 50 MG tablet TAKE 1 TABLET BY MOUTH TWICE DAILY  Qty: 180 tablet, Refills: 2    Associated Diagnoses: Essential hypertension      atorvastatin (LIPITOR) 20 MG tablet TAKE 1 TABLET BY MOUTH ONCE DAILY  Qty: 90 tablet, Refills: 2    Associated Diagnoses: Hyperlipidemia LDL goal <100; Atherosclerosis of aorta      blood sugar diagnostic Strp 1 strip by Misc.(Non-Drug; Combo Route) route once daily.  Qty: 100 strip, Refills: 3    Comments: TRUE METRIX STRIPS  Associated Diagnoses: Type 2 diabetes mellitus without complication, without long-term current use of insulin      calcium-vitamin D3 500 mg(1,250mg) -200 unit per tablet Take 1 tablet by mouth 2 (two) times daily with meals.       cycloSPORINE (RESTASIS) 0.05 % ophthalmic emulsion 1 drop 2 (two) times daily.      diclofenac sodium (VOLTAREN) 1 % Gel Apply 2 g topically 4 (four) times daily.  Qty: 1 Tube, Refills: 2      fluticasone propionate (FLONASE) 50 mcg/actuation nasal spray 2 sprays (100 mcg total) by Each Nostril route once daily.  Qty: 3 Bottle, Refills: 2    Associated Diagnoses: Perennial allergic rhinitis      hydroCHLOROthiazide (HYDRODIURIL) 25 MG tablet Take 1 tablet (25 mg total) by mouth once daily.  Qty: 90 tablet, Refills: 2    Associated Diagnoses: Essential hypertension      ibuprofen (ADVIL,MOTRIN) 800 MG  tablet Take 1 tablet by mouth three times daily as needed  Qty: 60 tablet, Refills: 2    Associated Diagnoses: Primary osteoarthritis involving multiple joints      ketoconazole (NIZORAL) 2 % cream Apply topically once daily.  Qty: 60 g, Refills: 3    Associated Diagnoses: Candidal intertrigo      ketoconazole (NIZORAL) 2 % shampoo       lancets Misc 1 lancet by Misc.(Non-Drug; Combo Route) route once daily.  Qty: 100 each, Refills: 3    Comments: TRUE PLUS LANCET 32 G  Associated Diagnoses: Type 2 diabetes mellitus with stage 2 chronic kidney disease, without long-term current use of insulin      loratadine 10 mg Cap       metFORMIN (GLUCOPHAGE) 500 MG tablet Take 2 tablets (1,000 mg total) by mouth 2 (two) times daily with meals.  Qty: 360 tablet, Refills: 1    Comments: Cancel Metformin XR.  Associated Diagnoses: Type 2 diabetes mellitus with diabetic neuropathy, without long-term current use of insulin      minoxidil (LONITEN) 2.5 MG tablet       MINOXIDIL TOP Apply topically.      multivitamin (ONE DAILY MULTIVITAMIN) per tablet Take 1 tablet by mouth once daily.      nystatin (MYCOSTATIN) powder Apply topically 4 (four) times daily. Under pannus as needed  Qty: 180 g, Refills: 2      olmesartan (BENICAR) 20 MG tablet Take 1 tablet (20 mg total) by mouth once daily.  Qty: 90 tablet, Refills: 2    Associated Diagnoses: Essential hypertension      omeprazole (PRILOSEC) 20 MG capsule Take 1 capsule by mouth once daily  Qty: 90 capsule, Refills: 0    Associated Diagnoses: Gastroesophageal reflux disease, esophagitis presence not specified      tiZANidine (ZANAFLEX) 4 MG tablet Take 1 tablet (4 mg total) by mouth 2 (two) times daily as needed.  Qty: 60 tablet, Refills: 2    Comments: Please consider 90 day supplies to promote better adherence  Associated Diagnoses: Degenerative lumbar spinal stenosis      topiramate (TOPAMAX) 25 MG tablet Take 1 tablet (25 mg total) by mouth 2 (two) times daily.  Qty: 60 tablet,  Refills: 2      traMADoL (ULTRAM) 50 mg tablet TAKE 1 TABLET BY MOUTH EVERY 6 HOURS AS NEEDED FOR PAIN  Qty: 120 tablet, Refills: 0    Associated Diagnoses: Degenerative lumbar spinal stenosis                 Discharge Diagnosis: Lumbar spondylosis [M47.816]  Condition on Discharge: Stable with no complications to procedure   Diet on Discharge: Same as before.  Activity: as per instruction sheet.  Discharge to: Home with a responsible adult.  Follow up: 2-4 weeks       Please call my office or pager at [phone] if experienced any weakness or loss of sensation, fever > 101.5, pain uncontrolled with oral medications, persistent nausea/vomiting/or diarrhea, redness or drainage from the incisions, or any other worrisome concerns. If physician on call was not reached or could not communicate with our office for any reason please go to the nearest emergency department

## 2020-07-10 ENCOUNTER — LAB VISIT (OUTPATIENT)
Dept: LAB | Facility: HOSPITAL | Age: 70
End: 2020-07-10
Attending: INTERNAL MEDICINE
Payer: MEDICARE

## 2020-07-10 ENCOUNTER — OFFICE VISIT (OUTPATIENT)
Dept: PRIMARY CARE CLINIC | Facility: CLINIC | Age: 70
End: 2020-07-10
Payer: MEDICARE

## 2020-07-10 VITALS
OXYGEN SATURATION: 99 % | RESPIRATION RATE: 18 BRPM | BODY MASS INDEX: 44.25 KG/M2 | TEMPERATURE: 99 F | SYSTOLIC BLOOD PRESSURE: 123 MMHG | DIASTOLIC BLOOD PRESSURE: 70 MMHG | HEIGHT: 67 IN | HEART RATE: 65 BPM | WEIGHT: 281.94 LBS

## 2020-07-10 DIAGNOSIS — E11.40 TYPE 2 DIABETES MELLITUS WITH DIABETIC NEUROPATHY, WITHOUT LONG-TERM CURRENT USE OF INSULIN: ICD-10-CM

## 2020-07-10 DIAGNOSIS — E11.40 TYPE 2 DIABETES MELLITUS WITH DIABETIC NEUROPATHY, WITHOUT LONG-TERM CURRENT USE OF INSULIN: Primary | ICD-10-CM

## 2020-07-10 DIAGNOSIS — I10 ESSENTIAL HYPERTENSION: ICD-10-CM

## 2020-07-10 DIAGNOSIS — Z11.59 SCREENING FOR VIRAL DISEASE: ICD-10-CM

## 2020-07-10 DIAGNOSIS — E78.5 HYPERLIPIDEMIA, UNSPECIFIED HYPERLIPIDEMIA TYPE: ICD-10-CM

## 2020-07-10 LAB
ANION GAP SERPL CALC-SCNC: 12 MMOL/L (ref 8–16)
BUN SERPL-MCNC: 22 MG/DL (ref 8–23)
CALCIUM SERPL-MCNC: 10.2 MG/DL (ref 8.7–10.5)
CHLORIDE SERPL-SCNC: 105 MMOL/L (ref 95–110)
CO2 SERPL-SCNC: 22 MMOL/L (ref 23–29)
CREAT SERPL-MCNC: 0.9 MG/DL (ref 0.5–1.4)
EST. GFR  (AFRICAN AMERICAN): >60 ML/MIN/1.73 M^2
EST. GFR  (NON AFRICAN AMERICAN): >60 ML/MIN/1.73 M^2
ESTIMATED AVG GLUCOSE: 131 MG/DL (ref 68–131)
GLUCOSE SERPL-MCNC: 156 MG/DL (ref 70–110)
HBA1C MFR BLD HPLC: 6.2 % (ref 4–5.6)
POTASSIUM SERPL-SCNC: 4 MMOL/L (ref 3.5–5.1)
SARS-COV-2 IGG SERPLBLD QL IA.RAPID: NEGATIVE
SODIUM SERPL-SCNC: 139 MMOL/L (ref 136–145)

## 2020-07-10 PROCEDURE — 36415 COLL VENOUS BLD VENIPUNCTURE: CPT | Mod: HCNC,PN

## 2020-07-10 PROCEDURE — 3074F SYST BP LT 130 MM HG: CPT | Mod: HCNC,CPTII,S$GLB, | Performed by: INTERNAL MEDICINE

## 2020-07-10 PROCEDURE — 1126F PR PAIN SEVERITY QUANTIFIED, NO PAIN PRESENT: ICD-10-PCS | Mod: HCNC,S$GLB,, | Performed by: INTERNAL MEDICINE

## 2020-07-10 PROCEDURE — 80048 BASIC METABOLIC PNL TOTAL CA: CPT | Mod: HCNC

## 2020-07-10 PROCEDURE — 99499 UNLISTED E&M SERVICE: CPT | Mod: HCNC,S$GLB,, | Performed by: INTERNAL MEDICINE

## 2020-07-10 PROCEDURE — 3044F PR MOST RECENT HEMOGLOBIN A1C LEVEL <7.0%: ICD-10-PCS | Mod: HCNC,CPTII,S$GLB, | Performed by: INTERNAL MEDICINE

## 2020-07-10 PROCEDURE — 3078F DIAST BP <80 MM HG: CPT | Mod: HCNC,CPTII,S$GLB, | Performed by: INTERNAL MEDICINE

## 2020-07-10 PROCEDURE — 1159F PR MEDICATION LIST DOCUMENTED IN MEDICAL RECORD: ICD-10-PCS | Mod: HCNC,S$GLB,, | Performed by: INTERNAL MEDICINE

## 2020-07-10 PROCEDURE — 99999 PR PBB SHADOW E&M-EST. PATIENT-LVL V: ICD-10-PCS | Mod: PBBFAC,HCNC,, | Performed by: INTERNAL MEDICINE

## 2020-07-10 PROCEDURE — 1101F PT FALLS ASSESS-DOCD LE1/YR: CPT | Mod: HCNC,CPTII,S$GLB, | Performed by: INTERNAL MEDICINE

## 2020-07-10 PROCEDURE — 99999 PR PBB SHADOW E&M-EST. PATIENT-LVL V: CPT | Mod: PBBFAC,HCNC,, | Performed by: INTERNAL MEDICINE

## 2020-07-10 PROCEDURE — 1159F MED LIST DOCD IN RCRD: CPT | Mod: HCNC,S$GLB,, | Performed by: INTERNAL MEDICINE

## 2020-07-10 PROCEDURE — 3044F HG A1C LEVEL LT 7.0%: CPT | Mod: HCNC,CPTII,S$GLB, | Performed by: INTERNAL MEDICINE

## 2020-07-10 PROCEDURE — 1126F AMNT PAIN NOTED NONE PRSNT: CPT | Mod: HCNC,S$GLB,, | Performed by: INTERNAL MEDICINE

## 2020-07-10 PROCEDURE — 3074F PR MOST RECENT SYSTOLIC BLOOD PRESSURE < 130 MM HG: ICD-10-PCS | Mod: HCNC,CPTII,S$GLB, | Performed by: INTERNAL MEDICINE

## 2020-07-10 PROCEDURE — 86769 SARS-COV-2 COVID-19 ANTIBODY: CPT | Mod: HCNC

## 2020-07-10 PROCEDURE — 3008F BODY MASS INDEX DOCD: CPT | Mod: HCNC,CPTII,S$GLB, | Performed by: INTERNAL MEDICINE

## 2020-07-10 PROCEDURE — 3008F PR BODY MASS INDEX (BMI) DOCUMENTED: ICD-10-PCS | Mod: HCNC,CPTII,S$GLB, | Performed by: INTERNAL MEDICINE

## 2020-07-10 PROCEDURE — 1101F PR PT FALLS ASSESS DOC 0-1 FALLS W/OUT INJ PAST YR: ICD-10-PCS | Mod: HCNC,CPTII,S$GLB, | Performed by: INTERNAL MEDICINE

## 2020-07-10 PROCEDURE — 99214 OFFICE O/P EST MOD 30 MIN: CPT | Mod: HCNC,S$GLB,, | Performed by: INTERNAL MEDICINE

## 2020-07-10 PROCEDURE — 83036 HEMOGLOBIN GLYCOSYLATED A1C: CPT | Mod: HCNC

## 2020-07-10 PROCEDURE — 99214 PR OFFICE/OUTPT VISIT, EST, LEVL IV, 30-39 MIN: ICD-10-PCS | Mod: HCNC,S$GLB,, | Performed by: INTERNAL MEDICINE

## 2020-07-10 PROCEDURE — 99499 RISK ADDL DX/OHS AUDIT: ICD-10-PCS | Mod: HCNC,S$GLB,, | Performed by: INTERNAL MEDICINE

## 2020-07-10 PROCEDURE — 3078F PR MOST RECENT DIASTOLIC BLOOD PRESSURE < 80 MM HG: ICD-10-PCS | Mod: HCNC,CPTII,S$GLB, | Performed by: INTERNAL MEDICINE

## 2020-07-10 NOTE — PROGRESS NOTES
Subjective:       Patient ID: Linnea Renae is a 69 y.o. female.    Chief Complaint: Follow-up and Medication Refill    Last seen 6 months ago. Returns as scheduled for f/u chronic medical conditions. Just had SI injections by Pain Mgmt two days ago - beginning to get some relief. Uses Tramadol regularly with relief of pain, Ibuprofen sparingly. Recently switched Metformin XR to regular release Metformin due to recall. Home glucose stable  as usual, just having some diarrhea getting used to new med. Denies any Coronavirus symptoms, no known contact with anyone sick. Social distancing, with regular exposure to three adult daughters who are also distancing, furloughed from work.     PMH: .   Hypertension.  Diabetes Type 2, last HbA1c 5.9% Dec. '19.  Hyperlipidemia. LDL 77 .  GERD.  Lumbar Spinal Stenosis [facility].   Chronic Dry Eyes.   Perennial Allergic Rhinitis.  Morbid Obesity.   Right Breast Cancer diagnosed 10/15.  H/O Colon Polyps.     PSH: C/S x 3, BTL, Hysterectomy and USO, Bilateral Cataracts extracted, Cholecystectomy. Right breast excisional biopsy .    Mammogram normal , no recent Pelvic exam. BMD normal 8/15. Colonoscopy 10/18 - sigmoid diverticulosis, otherwise normal. Eye exam  outside [facility]. Podiatry . Prevnar 8/15. Zostavax 3/16. Td . Pneumovax 3/19. Flu shot . Shingrix , .     Social: Remote tobacco use, quit over 30 years ago. No alcohol.  with three daughters and three grandchildren. Homemaker.     FMH: Father living age 83 in good health. Mother  young, cause unknown. CHF in PGM, Thyroid disease in PGF.     NKDA.     Medications: list reviewed and reconciled.              Review of Systems   Constitutional: Negative for chills, diaphoresis and fever.   Respiratory: Negative for cough, chest tightness and shortness of breath.    Cardiovascular: Negative for chest pain, palpitations and leg swelling.  "  Gastrointestinal: Negative for abdominal pain, nausea and vomiting.   Genitourinary: Negative for dysuria and frequency.   Musculoskeletal: Positive for back pain. Negative for gait problem.   Neurological: Negative for dizziness, syncope, weakness and headaches.         Objective:    /70, Pulse 65, Temp 98.7, O2 Sat 99%, Ht 5' 7", Wt 282 lbs, BMI=44  Physical Exam  Constitutional:       Appearance: Normal appearance. She is obese. She is not ill-appearing.   Cardiovascular:      Rate and Rhythm: Normal rate and regular rhythm.      Heart sounds: Normal heart sounds.   Pulmonary:      Effort: Pulmonary effort is normal. No respiratory distress.      Breath sounds: Normal breath sounds. No wheezing, rhonchi or rales.   Musculoskeletal: Normal range of motion.      Right lower leg: No edema.      Left lower leg: No edema.   Skin:     General: Skin is warm and dry.      Findings: No rash.   Neurological:      General: No focal deficit present.      Mental Status: She is alert and oriented to person, place, and time.      Cranial Nerves: No cranial nerve deficit.      Coordination: Coordination normal.   Psychiatric:         Mood and Affect: Mood normal.         Behavior: Behavior normal.         Thought Content: Thought content normal.         Judgment: Judgment normal.         Assessment:       1. Type 2 diabetes mellitus with diabetic neuropathy, without long-term current use of insulin    2. Essential hypertension    3. Hyperlipidemia, unspecified hyperlipidemia type    4. Screening for viral disease        Plan:       Type 2 diabetes mellitus with diabetic neuropathy, without long-term current use of insulin  -     Hemoglobin A1C; Future; Expected date: 07/10/2020 - today.    Essential hypertension controlled.  -     Basic metabolic panel; Future; Expected date: 07/10/2020 - today.     Hyperlipidemia, unspecified hyperlipidemia type - controlled, continue same.     Screening for viral disease  -     " COVID-19 (SARS CoV-2) IgG Antibody; Future; Expected date: 07/10/2020    Lumbar spondylosis - Tramadol recently refilled.    Flu shot when available.

## 2020-07-16 DIAGNOSIS — I10 ESSENTIAL HYPERTENSION: ICD-10-CM

## 2020-07-16 DIAGNOSIS — I70.0 ATHEROSCLEROSIS OF AORTA: ICD-10-CM

## 2020-07-16 DIAGNOSIS — E78.5 HYPERLIPIDEMIA LDL GOAL <100: ICD-10-CM

## 2020-07-16 RX ORDER — ATENOLOL 50 MG/1
50 TABLET ORAL 2 TIMES DAILY
Qty: 180 TABLET | Refills: 2 | Status: SHIPPED | OUTPATIENT
Start: 2020-07-16 | End: 2021-07-16 | Stop reason: SDUPTHER

## 2020-07-16 RX ORDER — ATORVASTATIN CALCIUM 20 MG/1
20 TABLET, FILM COATED ORAL DAILY
Qty: 90 TABLET | Refills: 2 | Status: SHIPPED | OUTPATIENT
Start: 2020-07-16 | End: 2021-06-14 | Stop reason: SDUPTHER

## 2020-07-16 RX ORDER — TOPIRAMATE 25 MG/1
25 TABLET ORAL 2 TIMES DAILY
Qty: 60 TABLET | Refills: 0 | OUTPATIENT
Start: 2020-07-16

## 2020-07-30 ENCOUNTER — TELEPHONE (OUTPATIENT)
Dept: PRIMARY CARE CLINIC | Facility: CLINIC | Age: 70
End: 2020-07-30

## 2020-07-30 NOTE — TELEPHONE ENCOUNTER
Pt called stating Dr Gibbs never received the referral for  I  Re-faxed the order to  [phone]

## 2020-07-30 NOTE — TELEPHONE ENCOUNTER
----- Message from Amara May sent at 7/30/2020 10:24 AM CDT -----  Contact: Pt [phone]  Patient is still waiting on the referral for the eye doctor. Patient is waiting at the office. Requesting a call asap.    Please call and advise.    Thank You

## 2020-08-03 DIAGNOSIS — M48.061 DEGENERATIVE LUMBAR SPINAL STENOSIS: ICD-10-CM

## 2020-08-03 RX ORDER — TRAMADOL HYDROCHLORIDE 50 MG/1
50 TABLET ORAL EVERY 6 HOURS PRN
Qty: 120 TABLET | Refills: 0 | Status: SHIPPED | OUTPATIENT
Start: 2020-08-03 | End: 2020-09-07 | Stop reason: SDUPTHER

## 2020-08-06 ENCOUNTER — OFFICE VISIT (OUTPATIENT)
Dept: PAIN MEDICINE | Facility: CLINIC | Age: 70
End: 2020-08-06
Attending: ANESTHESIOLOGY
Payer: MEDICARE

## 2020-08-06 ENCOUNTER — PATIENT OUTREACH (OUTPATIENT)
Dept: ADMINISTRATIVE | Facility: OTHER | Age: 70
End: 2020-08-06

## 2020-08-06 VITALS
SYSTOLIC BLOOD PRESSURE: 114 MMHG | DIASTOLIC BLOOD PRESSURE: 54 MMHG | WEIGHT: 275.56 LBS | OXYGEN SATURATION: 100 % | TEMPERATURE: 99 F | BODY MASS INDEX: 43.25 KG/M2 | RESPIRATION RATE: 18 BRPM | HEIGHT: 67 IN | HEART RATE: 74 BPM

## 2020-08-06 DIAGNOSIS — M47.9 OSTEOARTHRITIS OF SPINE, UNSPECIFIED SPINAL OSTEOARTHRITIS COMPLICATION STATUS, UNSPECIFIED SPINAL REGION: Primary | ICD-10-CM

## 2020-08-06 DIAGNOSIS — M70.60 GREATER TROCHANTERIC BURSITIS, UNSPECIFIED LATERALITY: ICD-10-CM

## 2020-08-06 DIAGNOSIS — M46.1 SACROILIITIS: ICD-10-CM

## 2020-08-06 DIAGNOSIS — M47.819 SPONDYLOSIS WITHOUT MYELOPATHY: ICD-10-CM

## 2020-08-06 PROCEDURE — 1125F PR PAIN SEVERITY QUANTIFIED, PAIN PRESENT: ICD-10-PCS | Mod: HCNC,S$GLB,, | Performed by: ANESTHESIOLOGY

## 2020-08-06 PROCEDURE — 1125F AMNT PAIN NOTED PAIN PRSNT: CPT | Mod: HCNC,S$GLB,, | Performed by: ANESTHESIOLOGY

## 2020-08-06 PROCEDURE — 99999 PR PBB SHADOW E&M-EST. PATIENT-LVL V: CPT | Mod: PBBFAC,HCNC,, | Performed by: ANESTHESIOLOGY

## 2020-08-06 PROCEDURE — 1101F PR PT FALLS ASSESS DOC 0-1 FALLS W/OUT INJ PAST YR: ICD-10-PCS | Mod: HCNC,CPTII,S$GLB, | Performed by: ANESTHESIOLOGY

## 2020-08-06 PROCEDURE — 3074F SYST BP LT 130 MM HG: CPT | Mod: HCNC,CPTII,S$GLB, | Performed by: ANESTHESIOLOGY

## 2020-08-06 PROCEDURE — 99499 UNLISTED E&M SERVICE: CPT | Mod: HCNC,S$GLB,, | Performed by: ANESTHESIOLOGY

## 2020-08-06 PROCEDURE — 3078F PR MOST RECENT DIASTOLIC BLOOD PRESSURE < 80 MM HG: ICD-10-PCS | Mod: HCNC,CPTII,S$GLB, | Performed by: ANESTHESIOLOGY

## 2020-08-06 PROCEDURE — 1159F PR MEDICATION LIST DOCUMENTED IN MEDICAL RECORD: ICD-10-PCS | Mod: HCNC,S$GLB,, | Performed by: ANESTHESIOLOGY

## 2020-08-06 PROCEDURE — 99214 PR OFFICE/OUTPT VISIT, EST, LEVL IV, 30-39 MIN: ICD-10-PCS | Mod: HCNC,S$GLB,, | Performed by: ANESTHESIOLOGY

## 2020-08-06 PROCEDURE — 1159F MED LIST DOCD IN RCRD: CPT | Mod: HCNC,S$GLB,, | Performed by: ANESTHESIOLOGY

## 2020-08-06 PROCEDURE — 3008F PR BODY MASS INDEX (BMI) DOCUMENTED: ICD-10-PCS | Mod: HCNC,CPTII,S$GLB, | Performed by: ANESTHESIOLOGY

## 2020-08-06 PROCEDURE — 99214 OFFICE O/P EST MOD 30 MIN: CPT | Mod: HCNC,S$GLB,, | Performed by: ANESTHESIOLOGY

## 2020-08-06 PROCEDURE — 3078F DIAST BP <80 MM HG: CPT | Mod: HCNC,CPTII,S$GLB, | Performed by: ANESTHESIOLOGY

## 2020-08-06 PROCEDURE — 3008F BODY MASS INDEX DOCD: CPT | Mod: HCNC,CPTII,S$GLB, | Performed by: ANESTHESIOLOGY

## 2020-08-06 PROCEDURE — 99999 PR PBB SHADOW E&M-EST. PATIENT-LVL V: ICD-10-PCS | Mod: PBBFAC,HCNC,, | Performed by: ANESTHESIOLOGY

## 2020-08-06 PROCEDURE — 99499 RISK ADDL DX/OHS AUDIT: ICD-10-PCS | Mod: HCNC,S$GLB,, | Performed by: ANESTHESIOLOGY

## 2020-08-06 PROCEDURE — 3074F PR MOST RECENT SYSTOLIC BLOOD PRESSURE < 130 MM HG: ICD-10-PCS | Mod: HCNC,CPTII,S$GLB, | Performed by: ANESTHESIOLOGY

## 2020-08-06 PROCEDURE — 1101F PT FALLS ASSESS-DOCD LE1/YR: CPT | Mod: HCNC,CPTII,S$GLB, | Performed by: ANESTHESIOLOGY

## 2020-08-06 NOTE — PROGRESS NOTES
Chronic patient Established Note (Follow up visit)      SUBJECTIVE:    Linnea Renae presents to the clinic for a follow-up appointment after injection bilateral SIJ and GTB injections performed on 7/8/20. She tolerated the procedure very well. Since the last visit, Linnea Renae states the pain has been improving. She reports 85% of her pain remains gone. The remaining pain has not been worsening. She is now able to do more around the house and yard than ever in the past 15 years. She is very happy with the results of this intervention. She has no questions about herself. She inquires about whether a friend could come see us in clinic.     She continues using the same pain medications as at her last visit. There have been no changes in her medication regimen or medical history.       Interval HPI 5/19/20:    Ms. Renae was contacted at home for follow-up of her chronic low back pain. She is s/p staged RFA in December 2019 and January 2020 with significant relief. She states that she was doing well until 4 weeks ago where she started home exercises which she believes has exacerbated her symptoms. She currently endorses 10/10 dull, aching low back and buttock pain similar in character and severity has prior to her RFA procedures. She continues to take Ibuprofen, Zanaflex, Tramadol, and uses Voltaren gel with some relief.      Interval HPI 4/20/20  Linnea Renae presents to tele-medicine appointment for the evaluation of low back pain. The pain started is still improved and symptoms have been improving with current medication regimen      On average pain is rated as 4/10.   Today the pain is rated as 2/10     Interval HPI 3/30/20  Contacted Ms. Renae today for follow-up of her low back pain. She is s/p staged (left then right) L2-3-4-5 RFA where she states she attained 90% relief of her low back pain. She states she continues to experience intermittent, dull, aching bilateral lower lumbar pain with prolonged  flexing/extending, prolonged standing, and with strenuous activity, however, it has decreased significantly in severity since prior. She does continue to take Tramadol PRN and Zanaflex PRN with added benefit. She denies any other acute changes at this time.      Interval HPI 7/9/19  Linnea Renae presents to the clinic for follow up of lower back pain.  She reports doing well since last visit.  Her pain has been tolerable.  She had benefit with previous RFAs.  She has been doing PT but feels like it worsens her pain.  She would like to stop and go to the gym on her own.  She is a member at [facility] [employer] in [place].  She is not having any leg pain or numbness today.  Her pain today is 2/10.     Interval HPI 5/9/19     Linnea Renae presents to the clinic for follow up of lower back pain.  She reports significant improvement since her previous encounter.  Her left sided back pain has resolved.  She is a slight ache to right lower back and buttocks which is tolerable.  She is interested in PT for strengthening.  She has done aquatic PT in the past but would like land therapy.  She is a member at [facility] [employer] in [place].  She denies any radicular symptoms at this time.  Her pain today is 1/10.        Pain Disability Index Review:  Last 3 PDI Scores 12/5/2019 9/12/2019 9/5/2019   Pain Disability Index (PDI) 0 50 10       Pain Medications:  - Opioids: Ultram (Tramadol HCL)  - Adjuvant Medications: Advil, Motrin (Ibuprofen) and zanaflex  - Anti-Coagulants: Aspirin  - Others: see med list    Opioid Contract:       report:  Reviewed and consistent with medication use as prescribed.    Pain Procedures:  7/29/16 Right L3-4 TF CHRISTIAN  11/3/17 Bilateral L3-4 and L4-5 facet injections  5/9/18 Bilateral L3-4 and L4-5 facet injections  7/25/18 Bilateral L3-4 and L4-5 facet injections  12/5/18 Right L2,3,4,5 RFA- 80% relief  3/20/19 Left L2,3,4,5 RFA- 100% relief  10/2/19 left L4/5 TFESI  12/18/19 LEFT L2,3,4,5 RFA- 90%  relief  1/8/20 RIGHT L2,3,4,5 RFA- 90% relief  7/8/20 B/L SIJ, GTB - 85% relief      Physical Therapy/Home Exercise: yes    Imaging: Imaging:  Narrative       EXAMINATION:  MRI LUMBAR SPINE W WO CONTRAST    CLINICAL HISTORY:  Low back pain, >6wks conservative tx, persistent-progressive sx, surgical candidate; Spondylosis without myelopathy or radiculopathy, site unspecified    TECHNIQUE:  Multiplanar, multisequence MR images were acquired from the thoracolumbar junction to the sacrum prior to and following administration of 10 cc IV Gadavist.    COMPARISON:  Lumbar spine radiograph 10/11/2017; MRI lumbar spine with/without contrast 09/20/2016    FINDINGS:  Sagittal alignment demonstrates grade 1 anterolisthesis of L3 on L4 with slight uncovering of the intervertebral disc.  Vertebral body heights are satisfactorily maintained.  Intervertebral disc spaces are preserved.  Marrow signal is within normal limits with no evidence of fracture or marrow replacement process noting a small vertebral body hemangioma at L1.    Conus appears within normal limits terminating at the level of the L1 level.  Cauda equina appears normal.    Paraspinous soft tissues demonstrate mild prominence of the common bile duct and small left renal cysts..    T12-L1:   No spinal canal or neuroforaminal narrowing.    L1-2:  No spinal canal or neuroforaminal narrowing.    L2-3:  Mild posterior disc bulge and moderate bilateral facet arthropathy without spinal canal stenosis or neural foraminal narrowing.    L3-4:  The diffuse posterior disc bulge, buckling of the ligamentum flavum, and moderate bilateral facet arthropathy results in mild spinal canal stenosis and no neural foraminal narrowing.    L4-5:  Diffuse posterior disc bulge with superimposed central disc protrusion, buckling of the ligamentum flavum, and moderate bilateral facet arthropathy result in moderate spinal canal stenosis and no significant neural foraminal narrowing.    L5-S1:   Mild posterior disc bulge and mild facet arthropathy results in mild right/moderate left neural foraminal narrowing.    No abnormal enhancement.       Impression         Multilevel lumbar spondylosis worst at L4-5 resulting in moderate spinal canal stenosis.    Mild prominence of the common bile duct which is nonspecific and may represent sequela of cholecystectomy.            X-Ray Lumbar Spine Ap Lateral w/Flex Ext     Narrative       10/11/17 10:17:19    Accession: [id number]    CLINICAL INDICATION: 67 year old F with  low back pain    COMPARISON: Lumbar spine x-rays, 09/20/2016.    TECHNIQUE: AP, lateral, flexion, extension, and coned down lateral radiographs of the lumbar spine.    FINDINGS:     Vertebral body heights are maintained.  No evidence of fracture.      Normal sagittal alignment is preserved.No significant translation with flexion-extension.    Moderate multilevel degenerative changes are again present. Mild anterolisthesis of L3 with respect to L4 is again noted. Multilevel facet arthropathy is present, most pronounced in the lower lumbar spine.  No detrimental change is identified when compared with the examination performed one year prior.       Impression           Moderate multilevel degenerative changes in the lumbar spine, similar in appearance to the prior exam.    No evidence of fracture or malalignment.      Electronically signed by: Heber Frost  Date: 10/11/17  Time: 10:37       Allergies:   Review of patient's allergies indicates:   Allergen Reactions    Codeine Itching       Current Medications:   Current Outpatient Medications   Medication Sig Dispense Refill    alcohol antiseptic pads (ALCOHOL PREP PADS TOP)       aspirin 81 MG Chew Take 81 mg by mouth once daily.      atenoloL (TENORMIN) 50 MG tablet TAKE 1 TABLET BY MOUTH TWICE DAILY 180 tablet 2    atorvastatin (LIPITOR) 20 MG tablet TAKE 1 TABLET BY MOUTH ONCE DAILY 90 tablet 2    blood sugar diagnostic Strp 1 strip by  Misc.(Non-Drug; Combo Route) route once daily. 100 strip 3    calcium-vitamin D3 500 mg(1,250mg) -200 unit per tablet Take 1 tablet by mouth 2 (two) times daily with meals.       cycloSPORINE (RESTASIS) 0.05 % ophthalmic emulsion 1 drop 2 (two) times daily.      diclofenac sodium (VOLTAREN) 1 % Gel Apply 2 g topically 4 (four) times daily. 1 Tube 2    fluticasone propionate (FLONASE) 50 mcg/actuation nasal spray 2 sprays (100 mcg total) by Each Nostril route once daily. 3 Bottle 2    hydroCHLOROthiazide (HYDRODIURIL) 25 MG tablet Take 1 tablet (25 mg total) by mouth once daily. 90 tablet 2    ibuprofen (ADVIL,MOTRIN) 800 MG tablet Take 1 tablet by mouth three times daily as needed 60 tablet 2    ketoconazole (NIZORAL) 2 % cream Apply topically once daily. 60 g 3    ketoconazole (NIZORAL) 2 % shampoo       ketoconazole (NIZORAL) 2 % shampoo Apply topically to scalp in place of regular shampoo 2 to 3 times a week 120 mL 9    lancets Misc 1 lancet by Misc.(Non-Drug; Combo Route) route once daily. 100 each 3    loratadine 10 mg Cap       metFORMIN (GLUCOPHAGE) 500 MG tablet Take 2 tablets (1,000 mg total) by mouth 2 (two) times daily with meals. 360 tablet 1    minoxidil (LONITEN) 2.5 MG tablet       MINOXIDIL TOP Apply topically.      multivitamin (ONE DAILY MULTIVITAMIN) per tablet Take 1 tablet by mouth once daily.      nystatin (MYCOSTATIN) powder Apply topically 4 (four) times daily Under pannus as needed 180 g 2    olmesartan (BENICAR) 20 MG tablet Take 1 tablet (20 mg total) by mouth once daily. 90 tablet 2    omeprazole (PRILOSEC) 20 MG capsule Take 1 capsule by mouth once daily 90 capsule 0    tiZANidine (ZANAFLEX) 4 MG tablet Take 1 tablet (4 mg total) by mouth 2 (two) times daily as needed. 60 tablet 2    topiramate (TOPAMAX) 25 MG tablet Take 1 tablet by mouth twice daily 60 tablet 0    traMADoL (ULTRAM) 50 mg tablet TAKE 1 TABLET BY MOUTH EVERY 6 HOURS AS NEEDED FOR PAIN 120 tablet 0      No current facility-administered medications for this visit.        REVIEW OF SYSTEMS:    GENERAL:  No weight loss, malaise or fevers.  HEENT:  Negative for frequent or significant headaches.  NECK:  Negative for lumps, goiter, pain and significant neck swelling.  RESPIRATORY:  Negative for cough, wheezing or shortness of breath.  CARDIOVASCULAR:  Negative for chest pain, leg swelling or palpitations.  GI:  Negative for abdominal discomfort, blood in stools or black stools or change in bowel habits.  MUSCULOSKELETAL:  See HPI.  SKIN:  Negative for lesions, rash, and itching.  PSYCH:  Negative for sleep disturbance, mood disorder and recent psychosocial stressors.  HEMATOLOGY/LYMPHOLOGY:  Negative for prolonged bleeding, bruising easily or swollen nodes.  NEURO:   No history of headaches, syncope, paralysis, seizures or tremors.  All other reviewed and negative other than HPI.    Past Medical History:  Past Medical History:   Diagnosis Date    Allergy     Breast cancer     Lumpectomy 10/15.    Chronic constipation     Colon polyps     Diabetes mellitus, type 2     Dry eyes     GERD (gastroesophageal reflux disease)     Hyperlipidemia     Hypertension     Lumbar disc disease     Type 2 diabetes mellitus        Past Surgical History:  Past Surgical History:   Procedure Laterality Date    BREAST BIOPSY      BREAST LUMPECTOMY Right     2015    CATARACT EXTRACTION, BILATERAL       SECTION      x3    CHOLECYSTECTOMY      COLONOSCOPY N/A 10/11/2018    Procedure: COLONOSCOPY;  Surgeon: Lorenzo Tanner MD;  Location: [address]);  Service: Endoscopy;  Laterality: N/A;    COLONOSCOPY W/ POLYPECTOMY      HYSTERECTOMY      and USO    INJECTION OF FACET JOINT Bilateral 2018    Procedure: INJECTION, FACET JOINT;  Surgeon: Viet Wilson MD;  Location: [facility];  Service: Pain Management;  Laterality: Bilateral;  LUMBAR BILATERAL L3-L4 AND L4-L5 FACET STEROID  INJECTION    NEED CONSENT    INJECTION OF JOINT Bilateral 7/8/2020    Procedure: INJECTION, JOINT, Bilateral SI;  Surgeon: Viet Wilson MD;  Location: [facility];  Service: Pain Management;  Laterality: Bilateral;    RADIOFREQUENCY ABLATION Left 3/20/2019    Procedure: RADIOFREQUENCY ABLATION LEFT L2,3,4,5;  Surgeon: Viet Wilson MD;  Location: [facility];  Service: Pain Management;  Laterality: Left;  LEFT RFA L2,3,4,5    NEEDS CONSENT    RADIOFREQUENCY ABLATION Left 12/18/2019    Procedure: RADIOFREQUENCY ABLATION, LEFT L2-L3-L4-L5  1 OF 2;  Surgeon: Viet Wilson MD;  Location: [facility];  Service: Pain Management;  Laterality: Left;    RADIOFREQUENCY ABLATION Right 1/8/2020    Procedure: RADIOFREQUENCY ABLATION, RIGHT L2-L3-L4-L5 MEDIAL BRANCH 2 OF;  Surgeon: Viet Wilson MD;  Location: [facility];  Service: Pain Management;  Laterality: Right;    TRANSFORAMINAL EPIDURAL INJECTION OF STEROID Left 10/2/2019    Procedure: INJECTION, STEROID, EPIDURAL, TRANSFORAMINAL APPROACH, L4 AND L5;  Surgeon: Viet Wilson MD;  Location: [facility];  Service: Pain Management;  Laterality: Left;    TUBAL LIGATION         Family History:  Family History   Problem Relation Age of Onset    No Known Problems Mother     No Known Problems Father     Heart disease Paternal Grandmother     Thyroid disease Paternal Grandfather     Hypertension Sister     Hypertension Brother     Glaucoma Brother     No Known Problems Daughter     No Known Problems Daughter     No Known Problems Daughter        Social History:  Social History     Socioeconomic History    Marital status:      Spouse name: Not on file    Number of children: 3    Years of education: Not on file    Highest education level: Not on file   Occupational History    Not on file   Social Needs    Financial resource strain: Not on file    Food insecurity     Worry: Not on file     Inability: Not on file     "Transportation needs     Medical: Not on file     Non-medical: Not on file   Tobacco Use    Smoking status: Former Smoker     Packs/day: 0.25     Years: 20.00     Pack years: 5.00     Quit date: 1985     Years since quittin.6    Smokeless tobacco: Never Used    Tobacco comment: Quit mid .   Substance and Sexual Activity    Alcohol use: No     Alcohol/week: 0.0 standard drinks    Drug use: No    Sexual activity: Yes   Lifestyle    Physical activity     Days per week: Not on file     Minutes per session: Not on file    Stress: Not on file   Relationships    Social connections     Talks on phone: Not on file     Gets together: Not on file     Attends L[employer]-day service: Not on file     Active member of club or organization: Not on file     Attends meetings of clubs or organizations: Not on file     Relationship status: Not on file   Other Topics Concern    Not on file   Social History Narrative    Not on file       OBJECTIVE:    BP (!) 114/54   Pulse 74   Temp 98.5 °F (36.9 °C)   Resp 18   Ht 5' 7" (1.702 m)   Wt 125 kg (275 lb 9.2 oz)   SpO2 100%   BMI 43.16 kg/m²     PHYSICAL EXAMINATION:    General appearance: Well appearing, in no acute distress, alert and oriented x3.  Psych:  Mood and affect appropriate.  Skin: Skin color, texture, turgor normal, no rashes or lesions, in both upper and lower body.  Head/face:  Atraumatic, normocephalic  Neck: No pain to palpation over the cervical paraspinous muscles. Spurling Negative. No pain with neck flexion, extension, or lateral flexion. .  Cor: Regular rate  Pulm: Normal WOB  GI: Abdomen soft and non-tender.  Back: Straight leg raising in the sitting and supine positions is negative to radicular pain. Mild pain to palpation over the spine or costovertebral angles. Normal range of motion without pain reproduction.Positive axial loading test bilateral. -ve FABERE,Ganselin and Yeoman's test on the both side.negative FADIR  Extremities: No " deformities, edema, or skin discoloration. Good capillary refill.  Musculoskeletal: Sacroiliac provocative maneuvers are negative. Bilateral upper and lower extremity strength is normal and symmetric.  No atrophy or tone abnormalities are noted.  Neuro: Bilateral upper and lower extremity coordination and muscle stretch reflexes are physiologic and symmetric.   Gait: Normal.    ASSESSMENT: 69 y.o. year old female with lumbosacral pain, consistent with       1. Osteoarthritis of spine, unspecified spinal osteoarthritis complication status, unspecified spinal region     2. Sacroiliitis     3. Greater trochanteric bursitis, unspecified laterality     4. Spondylosis without myelopathy           PLAN:     - I have stressed the importance of physical activity and a home exercise plan to help with pain and improve health.  - We had a discussion about the future of her pain interventions depending on which area of her pain returned. At future follow ups, we will plan to repeat the lumbar RFAs if her pain returns in the low back at and above the belt line. If her pain returns more inferior to the belt line and in the buttocks, we will plan to repeat the SIJ injections. We discussed that it is likely that these pains will return, but we hope the interventions continue to provide sustained relief.  - Continue Ibuprofen PRN.  - Continue Zanaflex 4 mg TID PRN  - Cont Tramadol 50 mg per PCP  - I have stressed the importance of physical activity and a home exercise plan to help with pain and improve health.  - Patient can continue with medications for now since they are providing benefits, using them appropriately, and without side effects.  - RTC 6-8 weeks for a virtual visit with Dr. Wilson.   - Counseled patient regarding the importance of activity modification.    The above plan and management options were discussed at length with patient. Patient is in agreement with the above and verbalized understanding.    Viet BARRIENTOS  MD Steve    08/06/2020

## 2020-08-06 NOTE — PROGRESS NOTES
Chart reviewed.   Immunizations: Triggered Imm Registry     Orders placed: n/a  Upcoming appts to satisfy [facility] topics: n/a

## 2020-08-17 RX ORDER — TOPIRAMATE 25 MG/1
25 TABLET ORAL 2 TIMES DAILY
Qty: 60 TABLET | Refills: 0 | Status: CANCELLED | OUTPATIENT
Start: 2020-08-17

## 2020-08-18 RX ORDER — TOPIRAMATE 25 MG/1
25 TABLET ORAL 2 TIMES DAILY
Qty: 60 TABLET | Refills: 0 | Status: SHIPPED | OUTPATIENT
Start: 2020-08-18 | End: 2020-10-21 | Stop reason: SDUPTHER

## 2020-08-26 DIAGNOSIS — Z12.31 ENCOUNTER FOR SCREENING MAMMOGRAM FOR BREAST CANCER: Primary | ICD-10-CM

## 2020-08-27 ENCOUNTER — TELEPHONE (OUTPATIENT)
Dept: SURGERY | Facility: CLINIC | Age: 70
End: 2020-08-27

## 2020-08-27 NOTE — TELEPHONE ENCOUNTER
As requested by Dr. Russell nurse Juliane message was left for Ms.Rita Renae to call Sidra shai @[phone] ext: [phone] regarding scheduling her follow up mammogram & visit with .

## 2020-08-27 NOTE — TELEPHONE ENCOUNTER
Patient has been scheduled to have her Mammogram & see  on 09/28/20 @ 1:30 pm. Patient has been informed of this apt date & time . Mailed out patient's apt slip.

## 2020-09-06 ENCOUNTER — PATIENT OUTREACH (OUTPATIENT)
Dept: ADMINISTRATIVE | Facility: OTHER | Age: 70
End: 2020-09-06

## 2020-09-06 NOTE — PROGRESS NOTES
LINKS immunization registry updated  Care Everywhere updated  Health Maintenance updated  Chart reviewed for overdue Proactive [facility] ([facility]) health maintenance testing (CRS, Breast Ca, Diabetic Eye Exam)   Orders entered:N/A

## 2020-09-07 DIAGNOSIS — M48.061 DEGENERATIVE LUMBAR SPINAL STENOSIS: ICD-10-CM

## 2020-09-08 ENCOUNTER — TELEPHONE (OUTPATIENT)
Dept: PODIATRY | Facility: CLINIC | Age: 70
End: 2020-09-08

## 2020-09-08 ENCOUNTER — OFFICE VISIT (OUTPATIENT)
Dept: PODIATRY | Facility: CLINIC | Age: 70
End: 2020-09-08
Payer: MEDICARE

## 2020-09-08 ENCOUNTER — IMMUNIZATION (OUTPATIENT)
Dept: PHARMACY | Facility: CLINIC | Age: 70
End: 2020-09-08
Payer: MEDICARE

## 2020-09-08 VITALS
HEART RATE: 80 BPM | WEIGHT: 275.56 LBS | SYSTOLIC BLOOD PRESSURE: 135 MMHG | DIASTOLIC BLOOD PRESSURE: 72 MMHG | BODY MASS INDEX: 43.16 KG/M2

## 2020-09-08 DIAGNOSIS — G57.53 TARSAL TUNNEL SYNDROME OF BOTH LOWER EXTREMITIES: ICD-10-CM

## 2020-09-08 PROCEDURE — 99999 PR PBB SHADOW E&M-EST. PATIENT-LVL IV: CPT | Mod: PBBFAC,HCNC,, | Performed by: PODIATRIST

## 2020-09-08 PROCEDURE — 99214 PR OFFICE/OUTPT VISIT, EST, LEVL IV, 30-39 MIN: ICD-10-PCS | Mod: 25,HCNC,S$GLB, | Performed by: PODIATRIST

## 2020-09-08 PROCEDURE — 1101F PT FALLS ASSESS-DOCD LE1/YR: CPT | Mod: HCNC,CPTII,S$GLB, | Performed by: PODIATRIST

## 2020-09-08 PROCEDURE — 1125F PR PAIN SEVERITY QUANTIFIED, PAIN PRESENT: ICD-10-PCS | Mod: HCNC,S$GLB,, | Performed by: PODIATRIST

## 2020-09-08 PROCEDURE — 3075F SYST BP GE 130 - 139MM HG: CPT | Mod: HCNC,CPTII,S$GLB, | Performed by: PODIATRIST

## 2020-09-08 PROCEDURE — 1159F PR MEDICATION LIST DOCUMENTED IN MEDICAL RECORD: ICD-10-PCS | Mod: HCNC,S$GLB,, | Performed by: PODIATRIST

## 2020-09-08 PROCEDURE — 3078F DIAST BP <80 MM HG: CPT | Mod: HCNC,CPTII,S$GLB, | Performed by: PODIATRIST

## 2020-09-08 PROCEDURE — 1101F PR PT FALLS ASSESS DOC 0-1 FALLS W/OUT INJ PAST YR: ICD-10-PCS | Mod: HCNC,CPTII,S$GLB, | Performed by: PODIATRIST

## 2020-09-08 PROCEDURE — 3078F PR MOST RECENT DIASTOLIC BLOOD PRESSURE < 80 MM HG: ICD-10-PCS | Mod: HCNC,CPTII,S$GLB, | Performed by: PODIATRIST

## 2020-09-08 PROCEDURE — 3008F PR BODY MASS INDEX (BMI) DOCUMENTED: ICD-10-PCS | Mod: HCNC,CPTII,S$GLB, | Performed by: PODIATRIST

## 2020-09-08 PROCEDURE — 64450 NJX AA&/STRD OTHER PN/BRANCH: CPT | Mod: 50,HCNC,S$GLB, | Performed by: PODIATRIST

## 2020-09-08 PROCEDURE — 1125F AMNT PAIN NOTED PAIN PRSNT: CPT | Mod: HCNC,S$GLB,, | Performed by: PODIATRIST

## 2020-09-08 PROCEDURE — 1159F MED LIST DOCD IN RCRD: CPT | Mod: HCNC,S$GLB,, | Performed by: PODIATRIST

## 2020-09-08 PROCEDURE — 3008F BODY MASS INDEX DOCD: CPT | Mod: HCNC,CPTII,S$GLB, | Performed by: PODIATRIST

## 2020-09-08 PROCEDURE — 99999 PR PBB SHADOW E&M-EST. PATIENT-LVL IV: ICD-10-PCS | Mod: PBBFAC,HCNC,, | Performed by: PODIATRIST

## 2020-09-08 PROCEDURE — 99214 OFFICE O/P EST MOD 30 MIN: CPT | Mod: 25,HCNC,S$GLB, | Performed by: PODIATRIST

## 2020-09-08 PROCEDURE — 3075F PR MOST RECENT SYSTOLIC BLOOD PRESS GE 130-139MM HG: ICD-10-PCS | Mod: HCNC,CPTII,S$GLB, | Performed by: PODIATRIST

## 2020-09-08 PROCEDURE — 64450 PR NERVE BLOCK INJ, ANES/STEROID, OTHER PERIPHERAL: ICD-10-PCS | Mod: 50,HCNC,S$GLB, | Performed by: PODIATRIST

## 2020-09-08 RX ORDER — DICLOFENAC SODIUM 10 MG/G
2 GEL TOPICAL 4 TIMES DAILY
Qty: 100 G | Refills: 2 | Status: SHIPPED | OUTPATIENT
Start: 2020-09-08 | End: 2023-04-19

## 2020-09-08 RX ORDER — ALCOHOL 2.38 KG/3.79L
1 GEL TOPICAL 2 TIMES DAILY
Qty: 180 CAPSULE | Refills: 0 | Status: SHIPPED | OUTPATIENT
Start: 2020-09-08 | End: 2021-04-18 | Stop reason: SDUPTHER

## 2020-09-08 RX ORDER — TRAMADOL HYDROCHLORIDE 50 MG/1
50 TABLET ORAL EVERY 6 HOURS PRN
Qty: 120 TABLET | Refills: 0 | Status: SHIPPED | OUTPATIENT
Start: 2020-09-08 | End: 2020-10-07 | Stop reason: SDUPTHER

## 2020-09-08 RX ORDER — TOPIRAMATE 25 MG/1
25 TABLET ORAL 2 TIMES DAILY
Qty: 60 TABLET | Refills: 0 | OUTPATIENT
Start: 2020-09-08

## 2020-09-08 NOTE — TELEPHONE ENCOUNTER
Patient will like to know if she has to apply 2g to each foot or 2g for both feet and also wanted to let you know that the Metanx/foltanx is to expensive if there is a substitute, please advice

## 2020-09-08 NOTE — TELEPHONE ENCOUNTER
----- Message from Emma Hurtado sent at 9/8/2020  1:42 PM CDT -----  Pt would like to receive a call back regarding her medication. Please contact the pt to advise.    Contact info [phone]

## 2020-09-14 NOTE — PROGRESS NOTES
Subjective:      Patient ID: Linnea Renae is a 70 y.o. female.    Chief Complaint: Foot Pain (both feet )    Linnea is a 70 y.o. female who presents to the clinic upon referral from Dr. Nat marsh. provider found  for evaluation and treatment of diabetic feet. Linnea has a past medical history of Allergy, Breast cancer, Chronic constipation, Colon polyps, Diabetes mellitus, type 2, Dry eyes, GERD (gastroesophageal reflux disease), Hyperlipidemia, Hypertension, Lumbar disc disease, and Type 2 diabetes mellitus. Patient relates no major problem with feet. Only complaints today consist of routine foot evaluation and painful nerve symptoms to both feet.  She is here for routine evaluation/diabetic foot check.  She is also here to discuss her neuropathic symptoms which have significantly improved injections.  She has great relief with bilateral tarsal tunnel injections.  She is not interested in surgical decompression at this point.        PCP: Bailee Moss MD    Date Last Seen by PCP:   Chief Complaint   Patient presents with    Foot Pain     both feet          Current shoe gear: Casual shoes    Hemoglobin A1C   Date Value Ref Range Status   07/10/2020 6.2 (H) 4.0 - 5.6 % Final     Comment:     ADA Screening Guidelines:  5.7-6.4%  Consistent with prediabetes  >or=6.5%  Consistent with diabetes  High levels of fetal hemoglobin interfere with the HbA1C  assay. Heterozygous hemoglobin variants (HbS, HgC, etc)do  not significantly interfere with this assay.   However, presence of multiple variants may affect accuracy.     12/05/2019 5.9 (H) 4.0 - 5.6 % Final     Comment:     ADA Screening Guidelines:  5.7-6.4%  Consistent with prediabetes  >or=6.5%  Consistent with diabetes  High levels of fetal hemoglobin interfere with the HbA1C  assay. Heterozygous hemoglobin variants (HbS, HgC, etc)do  not significantly interfere with this assay.   However, presence of multiple variants may affect accuracy.     03/19/2019 6.0 (H)  4.0 - 5.6 % Final     Comment:     ADA Screening Guidelines:  5.7-6.4%  Consistent with prediabetes  >or=6.5%  Consistent with diabetes  High levels of fetal hemoglobin interfere with the HbA1C  assay. Heterozygous hemoglobin variants (HbS, HgC, etc)do  not significantly interfere with this assay.   However, presence of multiple variants may affect accuracy.             Review of Systems   Constitution: Negative for chills and fever.   HENT: Negative for congestion and tinnitus.    Eyes: Negative for double vision and visual disturbance.   Cardiovascular: Negative for chest pain and claudication.   Respiratory: Negative for hemoptysis and shortness of breath.    Endocrine: Negative for cold intolerance and heat intolerance.   Hematologic/Lymphatic: Negative for adenopathy and bleeding problem.   Skin: Positive for color change, dry skin and nail changes.   Musculoskeletal: Positive for myalgias and stiffness.   Gastrointestinal: Negative for nausea and vomiting.   Genitourinary: Negative for dysuria and hematuria.   Neurological: Positive for numbness.   Psychiatric/Behavioral: Negative for altered mental status and suicidal ideas.   Allergic/Immunologic: Negative for environmental allergies and persistent infections.           Objective:      Physical Exam  Vitals signs reviewed.   Constitutional:       Appearance: She is well-developed.   Cardiovascular:      Pulses:           Dorsalis pedis pulses are 1+ on the right side and 1+ on the left side.        Posterior tibial pulses are 1+ on the right side and 1+ on the left side.   Pulmonary:      Effort: Pulmonary effort is normal.   Musculoskeletal: Normal range of motion.      Comments: Anterior, lateral, and posterior muscle groups bilateral lower extremities show strength 4 over 5 symmetrically. Inspection and palpation of the joints and bones reveal no crepitus or joint effusion. No tenderness upon palpation. Mild plantar flexor contractures noted to digits 2  through 5 bilaterally.  Angle and base of gait are normal.   Feet:      Right foot:      Skin integrity: Callus and dry skin present.      Left foot:      Skin integrity: Callus and dry skin present.   Skin:     General: Skin is warm and dry.      Capillary Refill: Capillary refill takes 2 to 3 seconds.      Coloration: Skin is pale.      Comments: Skin bilateral lower extremities noted to be thin, dry, and atrophic.  Toenails normal.   Neurological:      Mental Status: She is alert and oriented to person, place, and time.      Sensory: Sensory deficit present.      Motor: Atrophy present.      Deep Tendon Reflexes: Reflexes abnormal.      Reflex Scores:       Patellar reflexes are 1+ on the right side and 1+ on the left side.       Achilles reflexes are 1+ on the right side and 1+ on the left side.     Comments: Sharp, dull, light touch, and vibratory sensation are diminished bilaterally. Proprioceptive sensation is intact to both lower extremities. [place] Jackie monofilament exam shows loss of protective sensation to plantar toes 1 through 5 bilaterally. Deep tendon reflexes to the patellar tendons is 1 over 4 bilaterally symmetrical. Deep tendon reflexes to the Achilles tendon is 0 over 4 bilaterally symmetrical. No ankle clonus or Babinski reflex noted bilaterally. Coordination is fair to both lower extremities.  Patient admits to intermittent burning and tingling in the feet.      Negative Tinel sign bilateral tibial nerves with no tenderness and electrical sensation distally at this point.               Assessment:       Encounter Diagnosis   Name Primary?    Tarsal tunnel syndrome of both lower extremities          Plan:       Linnea was seen today for foot pain.    Diagnoses and all orders for this visit:    Tarsal tunnel syndrome of both lower extremities    Other orders  -     diclofenac sodium (VOLTAREN) 1 % Gel; Apply 2 g topically 4 (four) times daily.  -     [id number] oil  (METANX/FOLTANX RF) 3 mg-35 mg-2 mg -90.314 mg Cap; Take 1 tablet by mouth 2 (two) times daily.      I counseled the patient on her conditions, their implications and medical management.    The area overlying the bilateral tarsal tunnel nerve(s) was sterilely prepped, verbal consent was obtained, 2 cc's of 0.5% Marcaine plain was infiltrated around the affected nerve(s) for a diagnostic nerve block.  This was well tolerated with no complications.  Shoe inspection. Diabetic Foot Education. Patient reminded of the importance of good nutrition and blood sugar control to help prevent podiatric complications of diabetes. Patient instructed on proper foot hygeine. We discussed wearing proper shoe gear, daily foot inspections and Diabetic foot education in detail.    Follow-up in 3-6 months to discuss possible nerve decompression bilateral tarsal tunnel.  .  Follow-up in 3 months.

## 2020-09-28 ENCOUNTER — PATIENT MESSAGE (OUTPATIENT)
Dept: RESEARCH | Facility: OTHER | Age: 70
End: 2020-09-28

## 2020-09-28 ENCOUNTER — OFFICE VISIT (OUTPATIENT)
Dept: SURGERY | Facility: CLINIC | Age: 70
End: 2020-09-28
Payer: MEDICARE

## 2020-09-28 ENCOUNTER — HOSPITAL ENCOUNTER (OUTPATIENT)
Dept: RADIOLOGY | Facility: HOSPITAL | Age: 70
Discharge: HOME | End: 2020-09-28
Attending: SURGERY
Payer: MEDICARE

## 2020-09-28 VITALS
BODY MASS INDEX: 43.47 KG/M2 | WEIGHT: 277 LBS | HEART RATE: 80 BPM | HEIGHT: 67 IN | SYSTOLIC BLOOD PRESSURE: 119 MMHG | DIASTOLIC BLOOD PRESSURE: 60 MMHG | TEMPERATURE: 98 F

## 2020-09-28 DIAGNOSIS — Z85.3 PERSONAL HISTORY OF BREAST CANCER: ICD-10-CM

## 2020-09-28 DIAGNOSIS — Z12.31 SCREENING MAMMOGRAM, ENCOUNTER FOR: Primary | ICD-10-CM

## 2020-09-28 DIAGNOSIS — Z12.31 ENCOUNTER FOR SCREENING MAMMOGRAM FOR BREAST CANCER: ICD-10-CM

## 2020-09-28 PROCEDURE — 99213 PR OFFICE/OUTPT VISIT, EST, LEVL III, 20-29 MIN: ICD-10-PCS | Mod: HCNC,S$GLB,, | Performed by: SURGERY

## 2020-09-28 PROCEDURE — 99999 PR PBB SHADOW E&M-EST. PATIENT-LVL III: CPT | Mod: PBBFAC,HCNC,, | Performed by: SURGERY

## 2020-09-28 PROCEDURE — 3008F PR BODY MASS INDEX (BMI) DOCUMENTED: ICD-10-PCS | Mod: HCNC,CPTII,S$GLB, | Performed by: SURGERY

## 2020-09-28 PROCEDURE — 77063 BREAST TOMOSYNTHESIS BI: CPT | Mod: 26,HCNC,, | Performed by: RADIOLOGY

## 2020-09-28 PROCEDURE — 77063 MAMMO DIGITAL SCREENING BILAT WITH TOMOSYNTHESIS_CAD: ICD-10-PCS | Mod: 26,HCNC,, | Performed by: RADIOLOGY

## 2020-09-28 PROCEDURE — 3074F PR MOST RECENT SYSTOLIC BLOOD PRESSURE < 130 MM HG: ICD-10-PCS | Mod: HCNC,CPTII,S$GLB, | Performed by: SURGERY

## 2020-09-28 PROCEDURE — 1159F MED LIST DOCD IN RCRD: CPT | Mod: HCNC,S$GLB,, | Performed by: SURGERY

## 2020-09-28 PROCEDURE — 3078F PR MOST RECENT DIASTOLIC BLOOD PRESSURE < 80 MM HG: ICD-10-PCS | Mod: HCNC,CPTII,S$GLB, | Performed by: SURGERY

## 2020-09-28 PROCEDURE — 1159F PR MEDICATION LIST DOCUMENTED IN MEDICAL RECORD: ICD-10-PCS | Mod: HCNC,S$GLB,, | Performed by: SURGERY

## 2020-09-28 PROCEDURE — 1126F PR PAIN SEVERITY QUANTIFIED, NO PAIN PRESENT: ICD-10-PCS | Mod: HCNC,S$GLB,, | Performed by: SURGERY

## 2020-09-28 PROCEDURE — 3074F SYST BP LT 130 MM HG: CPT | Mod: HCNC,CPTII,S$GLB, | Performed by: SURGERY

## 2020-09-28 PROCEDURE — 99999 PR PBB SHADOW E&M-EST. PATIENT-LVL III: ICD-10-PCS | Mod: PBBFAC,HCNC,, | Performed by: SURGERY

## 2020-09-28 PROCEDURE — 99213 OFFICE O/P EST LOW 20 MIN: CPT | Mod: HCNC,S$GLB,, | Performed by: SURGERY

## 2020-09-28 PROCEDURE — 1101F PT FALLS ASSESS-DOCD LE1/YR: CPT | Mod: HCNC,CPTII,S$GLB, | Performed by: SURGERY

## 2020-09-28 PROCEDURE — 1101F PR PT FALLS ASSESS DOC 0-1 FALLS W/OUT INJ PAST YR: ICD-10-PCS | Mod: HCNC,CPTII,S$GLB, | Performed by: SURGERY

## 2020-09-28 PROCEDURE — 77067 SCR MAMMO BI INCL CAD: CPT | Mod: TC,HCNC

## 2020-09-28 PROCEDURE — 3078F DIAST BP <80 MM HG: CPT | Mod: HCNC,CPTII,S$GLB, | Performed by: SURGERY

## 2020-09-28 PROCEDURE — 3008F BODY MASS INDEX DOCD: CPT | Mod: HCNC,CPTII,S$GLB, | Performed by: SURGERY

## 2020-09-28 PROCEDURE — 77067 SCR MAMMO BI INCL CAD: CPT | Mod: 26,HCNC,, | Performed by: RADIOLOGY

## 2020-09-28 PROCEDURE — 1126F AMNT PAIN NOTED NONE PRSNT: CPT | Mod: HCNC,S$GLB,, | Performed by: SURGERY

## 2020-09-28 PROCEDURE — 77067 MAMMO DIGITAL SCREENING BILAT WITH TOMOSYNTHESIS_CAD: ICD-10-PCS | Mod: 26,HCNC,, | Performed by: RADIOLOGY

## 2020-09-28 NOTE — PROGRESS NOTES
Date of Service: 9/28/2020    MEDICAL ONCOLOGIST:    Sean Woody MD  RADIATION ONCOLOGIST:   Jerica Stanford MD      DIAGNOSIS:   This is a 70 y.o. 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    TREATMENT:   1. Right partial mastectomy without sentinel node biopsy on 10/7/2015. Radha Russell M.D. Surgical Oncology. Initially underwent excisional biopsy for atypia upstaged to DCIS at lumpectomy. Final path DICS, negative margins.   2. Declined Radiation therapy. Jerica Stanford M.D. Radiation Oncology   3. Adjuvant endocrine therapy started on 11/2015. Since discontinued 2/2 hair loss. Sean Woody M.D. Medical Oncology     HISTORY OF PRESENT ILLNESS:   Linnea Renae is a 70 y.o. female comes in for 6 month clinical breast exam.  She denies change in her breast self-exam specifically denying new masses, skin or nipple changes, or nipple discharge. Past medical and surgical history is updated without new changes. There have been no changes to family history. The patient denies constitutional symptoms of night sweats, weight loss, new headaches, visual changes, new back or bony pain, chest pain, or shortness of breath.      Interval History:  She has done well since her last clinic visit (9/23/2019). Reports no interval changes. No new breast mass or complaints. Followed by Dr. Kent. Previously on anastrazole but stopped after appx 1 year secondary to side effects (hair loss).    IMAGING:   Mammo Digital Screening Bilat w/ [facility]en 9/24/2020     Films Compared:  Prior images (if available) were compared.     Findings:  This procedure was performed using tomosynthesis. Computer-aided detection was utilized in the interpretation of this examination.  The breasts are almost entirely fatty.      Right  There are post-surgical findings from a previous lumpectomy seen in the upper outer quadrant of the right breast. There has been no interval development of a  "suspicious mass, microcalcification, or architectural distortion.       Left  There is no evidence of suspicious masses, calcifications, or other abnormal findings in the left breast.       MEDICATIONS/ALLERGIES:     Review of patient's allergies indicates:   Allergen Reactions    Codeine Itching     Review of Systems   Constitutional: Negative for chills, fever and weight loss.   Eyes: Negative for double vision.   Respiratory: Negative for shortness of breath.    Cardiovascular: Negative for chest pain.   Gastrointestinal: Negative for nausea and vomiting.   Genitourinary: Negative for urgency.   Musculoskeletal: Positive for back pain. Negative for myalgias.   Skin: Negative for rash.   Neurological: Negative for dizziness and headaches.     PHYSICAL EXAM:     /60 (BP Location: Left arm, Patient Position: Sitting, BP Method: Large (Automatic))   Pulse 80   Temp 97.6 °F (36.4 °C) (Oral)   Ht 5' 7" (1.702 m)   Wt 125.6 kg (277 lb)   BMI 43.38 kg/m²     General: The patient appears well and is in no acute distress.   Neuro: Alert & oriented x3.  Cardiovascular: RR  Breasts: The exam was done with the patient seated and supine. Left breast - within normal limits. No palpable masses and no abnormal skin or nipple findings. No supraclavicular or axillary lymphadenopathy on the left side.   Right breast - within normal limits. No palpable masses and no abnormal skin or nipple findings. No supraclavicular or axillary lymphadenopathy on the right side. Postsurgical changes palpated under scar from partial mastectomy; incision well healed.  Pulmonary: nonlabored breathing bilaterally   Abdomen: soft, nontender, nondistended. No masses.    Extremities: No clubbing or cyanosis. Bilateral full arm range of motion without  lymphedema.     ASSESSMENT:   This is a 70 y.o. female without evidence of recurrence by exam, history or imaging.       PLAN:   1. Continue to follow up with Dr. Woody (no longer on " anastrozole therapy).  2. Continue monthly self breast exams and call the clinic with any changes or problems.  3. Mammogram due 1 year.  4. Return to clinic in 6 months for clinical breast exam. The patient is in agreement with the plan. Questions were encouraged and answered to patient's satisfaction. Linnea will call our office with any questions or concerns.

## 2020-09-29 ENCOUNTER — PATIENT MESSAGE (OUTPATIENT)
Dept: OTHER | Facility: OTHER | Age: 70
End: 2020-09-29

## 2020-10-01 ENCOUNTER — OFFICE VISIT (OUTPATIENT)
Dept: PAIN MEDICINE | Facility: CLINIC | Age: 70
End: 2020-10-01
Attending: ANESTHESIOLOGY
Payer: MEDICARE

## 2020-10-01 DIAGNOSIS — M48.061 SPINAL STENOSIS OF LUMBAR REGION WITHOUT NEUROGENIC CLAUDICATION: ICD-10-CM

## 2020-10-01 DIAGNOSIS — M47.816 SPONDYLOSIS OF LUMBAR REGION WITHOUT MYELOPATHY OR RADICULOPATHY: ICD-10-CM

## 2020-10-01 DIAGNOSIS — M46.1 SACROILIITIS: ICD-10-CM

## 2020-10-01 DIAGNOSIS — M47.816 OSTEOARTHRITIS OF LUMBAR SPINE, UNSPECIFIED SPINAL OSTEOARTHRITIS COMPLICATION STATUS: Primary | ICD-10-CM

## 2020-10-01 PROCEDURE — 99214 PR OFFICE/OUTPT VISIT, EST, LEVL IV, 30-39 MIN: ICD-10-PCS | Mod: 95,,, | Performed by: ANESTHESIOLOGY

## 2020-10-01 PROCEDURE — 99214 OFFICE O/P EST MOD 30 MIN: CPT | Mod: 95,,, | Performed by: ANESTHESIOLOGY

## 2020-10-01 NOTE — PROGRESS NOTES
Chronic Pain-Tele-Medicine-Established Note (Follow up visit)      The patient location is: home   The chief complaint leading to consultation is: back pain  Visit type: Virtual visit with synchronous audio and video  Total time spent with patient: 15 min  Each patient to whom he or she provides medical services by telemedicine is:  (1) informed of the relationship between the physician and patient and the respective role of any other health care provider with respect to management of the patient; and (2) notified that he or she may decline to receive medical services by telemedicine and may withdraw from such care at any time.    Notes:     Chronic patient Established Note (Follow up visit) - virtual audio      SUBJECTIVE:  Interval History 10/1/2020  71 yo F presents to the pain clinic w/ CC of LBP radiating into the back of the thigh. Patient states that the pain is similar in intensity to the pain she experienced beforehand which responded well to Lumbar RFA. She states that she is not having pain on the left side of her back though.     Interval History 8/9/2020  Linnea Renae presents to the clinic for a follow-up appointment after injection bilateral SIJ and GTB injections performed on 7/8/20. She tolerated the procedure very well. Since the last visit, Linnea Renae states the pain has been improving. She reports 85% of her pain remains gone. The remaining pain has not been worsening. She is now able to do more around the house and yard than ever in the past 15 years. She is very happy with the results of this intervention. She has no questions about herself. She inquires about whether a friend could come see us in clinic.      She continues using the same pain medications as at her last visit. There have been no changes in her medication regimen or medical history.         Interval HPI 5/19/20:    Ms. Renae was contacted at home for follow-up of her chronic low back pain. She is s/p staged RFA in  December 2019 and January 2020 with significant relief. She states that she was doing well until 4 weeks ago where she started home exercises which she believes has exacerbated her symptoms. She currently endorses 10/10 dull, aching low back and buttock pain similar in character and severity has prior to her RFA procedures. She continues to take Ibuprofen, Zanaflex, Tramadol, and uses Voltaren gel with some relief.      Interval HPI 4/20/20  Linnea Renae presents to tele-medicine appointment for the evaluation of low back pain. The pain started is still improved and symptoms have been improving with current medication regimen      On average pain is rated as 4/10.   Today the pain is rated as 2/10     Interval HPI 3/30/20  Contacted Ms. Renae today for follow-up of her low back pain. She is s/p staged (left then right) L2-3-4-5 RFA where she states she attained 90% relief of her low back pain. She states she continues to experience intermittent, dull, aching bilateral lower lumbar pain with prolonged flexing/extending, prolonged standing, and with strenuous activity, however, it has decreased significantly in severity since prior. She does continue to take Tramadol PRN and Zanaflex PRN with added benefit. She denies any other acute changes at this time.      Interval HPI 7/9/19  Linnea Renae presents to the clinic for follow up of lower back pain.  She reports doing well since last visit.  Her pain has been tolerable.  She had benefit with previous RFAs.  She has been doing PT but feels like it worsens her pain.  She would like to stop and go to the gym on her own.  She is a member at [facility] [employer] in [place].  She is not having any leg pain or numbness today.  Her pain today is 2/10.     Interval HPI 5/9/19     Linnea Renae presents to the clinic for follow up of lower back pain.  She reports significant improvement since her previous encounter.  Her left sided back pain has resolved.  She is a slight ache to  right lower back and buttocks which is tolerable.  She is interested in PT for strengthening.  She has done aquatic PT in the past but would like land therapy.  She is a member at [facility] in [place].  She denies any radicular symptoms at this time.  Her pain today is 1/10.        Pain Disability Index Review:  Last 3 PDI Scores 12/5/2019 9/12/2019 9/5/2019   Pain Disability Index (PDI) 0 50 10         Pain Medications:  - Opioids: Ultram (Tramadol HCL)  - Adjuvant Medications: Advil, Motrin (Ibuprofen) and zanaflex  - Anti-Coagulants: Aspirin  - Others: see med list     Opioid Contract:        report:  Reviewed and consistent with medication use as prescribed.     Pain Procedures:  7/29/16 Right L3-4 TF CHRISTIAN  11/3/17 Bilateral L3-4 and L4-5 facet injections  5/9/18 Bilateral L3-4 and L4-5 facet injections  7/25/18 Bilateral L3-4 and L4-5 facet injections  12/5/18 Right L2,3,4,5 RFA- 80% relief  3/20/19 Left L2,3,4,5 RFA- 100% relief  10/2/19 left L4/5 TFESI  12/18/19 LEFT L2,3,4,5 RFA- 90% relief  1/8/20 RIGHT L2,3,4,5 RFA- 90% relief  7/8/20 B/L SIJ, GTB - 85% relief        Physical Therapy/Home Exercise: yes     Imaging: Imaging:  Narrative       EXAMINATION:  MRI LUMBAR SPINE W WO CONTRAST    CLINICAL HISTORY:  Low back pain, >6wks conservative tx, persistent-progressive sx, surgical candidate; Spondylosis without myelopathy or radiculopathy, site unspecified    TECHNIQUE:  Multiplanar, multisequence MR images were acquired from the thoracolumbar junction to the sacrum prior to and following administration of 10 cc IV Gadavist.    COMPARISON:  Lumbar spine radiograph 10/11/2017; MRI lumbar spine with/without contrast 09/20/2016    FINDINGS:  Sagittal alignment demonstrates grade 1 anterolisthesis of L3 on L4 with slight uncovering of the intervertebral disc.  Vertebral body heights are satisfactorily maintained.  Intervertebral disc spaces are preserved.  Marrow signal is within normal limits with no  evidence of fracture or marrow replacement process noting a small vertebral body hemangioma at L1.    Conus appears within normal limits terminating at the level of the L1 level.  Cauda equina appears normal.    Paraspinous soft tissues demonstrate mild prominence of the common bile duct and small left renal cysts..    T12-L1:   No spinal canal or neuroforaminal narrowing.    L1-2:  No spinal canal or neuroforaminal narrowing.    L2-3:  Mild posterior disc bulge and moderate bilateral facet arthropathy without spinal canal stenosis or neural foraminal narrowing.    L3-4:  The diffuse posterior disc bulge, buckling of the ligamentum flavum, and moderate bilateral facet arthropathy results in mild spinal canal stenosis and no neural foraminal narrowing.    L4-5:  Diffuse posterior disc bulge with superimposed central disc protrusion, buckling of the ligamentum flavum, and moderate bilateral facet arthropathy result in moderate spinal canal stenosis and no significant neural foraminal narrowing.    L5-S1:  Mild posterior disc bulge and mild facet arthropathy results in mild right/moderate left neural foraminal narrowing.    No abnormal enhancement.       Impression         Multilevel lumbar spondylosis worst at L4-5 resulting in moderate spinal canal stenosis.    Mild prominence of the common bile duct which is nonspecific and may represent sequela of cholecystectomy.            X-Ray Lumbar Spine Ap Lateral w/Flex Ext     Narrative       10/11/17 10:17:19    Accession: [id number]    CLINICAL INDICATION: 67 year old F with  low back pain    COMPARISON: Lumbar spine x-rays, 09/20/2016.    TECHNIQUE: AP, lateral, flexion, extension, and coned down lateral radiographs of the lumbar spine.    FINDINGS:     Vertebral body heights are maintained.  No evidence of fracture.      Normal sagittal alignment is preserved.No significant translation with flexion-extension.    Moderate multilevel degenerative changes are again present.  Mild anterolisthesis of L3 with respect to L4 is again noted. Multilevel facet arthropathy is present, most pronounced in the lower lumbar spine.  No detrimental change is identified when compared with the examination performed one year prior.       Impression           Moderate multilevel degenerative changes in the lumbar spine, similar in appearance to the prior exam.    No evidence of fracture or malalignment.         Allergies:   Review of patient's allergies indicates:   Allergen Reactions    Codeine Itching       Current Medications:   Current Outpatient Medications   Medication Sig Dispense Refill    alcohol antiseptic pads (ALCOHOL PREP PADS TOP)       aspirin 81 MG Chew Take 81 mg by mouth once daily.      atenoloL (TENORMIN) 50 MG tablet TAKE 1 TABLET BY MOUTH TWICE DAILY 180 tablet 2    atorvastatin (LIPITOR) 20 MG tablet TAKE 1 TABLET BY MOUTH ONCE DAILY 90 tablet 2    blood sugar diagnostic Strp 1 strip by Misc.(Non-Drug; Combo Route) route once daily. 100 strip 3    calcium-vitamin D3 500 mg(1,250mg) -200 unit per tablet Take 1 tablet by mouth 2 (two) times daily with meals.       cycloSPORINE (RESTASIS) 0.05 % ophthalmic emulsion 1 drop 2 (two) times daily.      diclofenac sodium (VOLTAREN) 1 % Gel Apply 2 g topically 4 (four) times daily. 100 g 2    flu vac 2020 [id number]-[id number],PF, (FLUAD QUAD 2020-21,65Y UP,,PF,) 60 mcg (15 mcg x 4)/0.5 mL Syrg inject 0.5 ml into the muscle for one dose 0.5 mL 0    fluticasone propionate (FLONASE) 50 mcg/actuation nasal spray 2 sprays (100 mcg total) by Each Nostril route once daily. 3 Bottle 2    hydroCHLOROthiazide (HYDRODIURIL) 25 MG tablet Take 1 tablet (25 mg total) by mouth once daily. 90 tablet 2    ibuprofen (ADVIL,MOTRIN) 800 MG tablet Take 1 tablet by mouth three times daily as needed 60 tablet 2    ketoconazole (NIZORAL) 2 % cream Apply topically once daily. (Patient not taking: Reported on 9/28/2020) 60 g 3    ketoconazole (NIZORAL) 2 %  shampoo       ketoconazole (NIZORAL) 2 % shampoo Apply topically to scalp in place of regular shampoo 2 to 3 times a week 120 mL 9    lancets Misc 1 lancet by Misc.(Non-Drug; Combo Route) route once daily. 100 each 3    [id number]-algal oil (METANX/FOLTANX RF) 3 mg-35 mg-2 mg -90.314 mg Cap Take 1 tablet by mouth 2 (two) times daily. 180 capsule 0    loratadine 10 mg Cap       metFORMIN (GLUCOPHAGE) 500 MG tablet Take 2 tablets (1,000 mg total) by mouth 2 (two) times daily with meals. 360 tablet 1    minoxidil (LONITEN) 2.5 MG tablet       MINOXIDIL TOP Apply topically.      multivitamin (ONE DAILY MULTIVITAMIN) per tablet Take 1 tablet by mouth once daily.      nystatin (MYCOSTATIN) powder Apply topically 4 (four) times daily Under pannus as needed 180 g 2    olmesartan (BENICAR) 20 MG tablet Take 1 tablet (20 mg total) by mouth once daily. 90 tablet 2    omeprazole (PRILOSEC) 20 MG capsule Take 1 capsule by mouth once daily 90 capsule 0    tiZANidine (ZANAFLEX) 4 MG tablet Take 1 tablet (4 mg total) by mouth 2 (two) times daily as needed. 60 tablet 2    topiramate (TOPAMAX) 25 MG tablet Take 1 tablet by mouth twice daily 60 tablet 0    traMADoL (ULTRAM) 50 mg tablet TAKE 1 TABLET BY MOUTH EVERY 6 HOURS AS NEEDED FOR PAIN 120 tablet 0     No current facility-administered medications for this visit.        REVIEW OF SYSTEMS:    GENERAL:  No weight loss, malaise or fevers.  HEENT:  Negative for frequent or significant headaches.  NECK:  Negative for lumps, goiter, pain and significant neck swelling.  RESPIRATORY:  Negative for cough, wheezing or shortness of breath.  CARDIOVASCULAR:  Negative for chest pain, leg swelling or palpitations.  GI:  Negative for abdominal discomfort, blood in stools or black stools or change in bowel habits.  MUSCULOSKELETAL:  See HPI.  SKIN:  Negative for lesions, rash, and itching.  PSYCH:  +ve for sleep disturbance, mood disorder and recent psychosocial  stressors.  HEMATOLOGY/LYMPHOLOGY:  Negative for prolonged bleeding, bruising easily or swollen nodes.  NEURO:   No history of headaches, syncope, paralysis, seizures or tremors.  All other reviewed and negative other than HPI.    Past Medical History:  Past Medical History:   Diagnosis Date    Allergy     Breast cancer     Lumpectomy 10/15.    Chronic constipation     Colon polyps     Diabetes mellitus, type 2     Dry eyes     GERD (gastroesophageal reflux disease)     Hyperlipidemia     Hypertension     Lumbar disc disease     Type 2 diabetes mellitus        Past Surgical History:  Past Surgical History:   Procedure Laterality Date    BREAST BIOPSY      BREAST LUMPECTOMY Right     2015    CATARACT EXTRACTION, BILATERAL       SECTION      x3    CHOLECYSTECTOMY      COLONOSCOPY N/A 10/11/2018    Procedure: COLONOSCOPY;  Surgeon: Lorenzo Tanner MD;  Location: [address]);  Service: Endoscopy;  Laterality: N/A;    COLONOSCOPY W/ POLYPECTOMY      HYSTERECTOMY      and USO    INJECTION OF FACET JOINT Bilateral 2018    Procedure: INJECTION, FACET JOINT;  Surgeon: Viet Wilson MD;  Location: [facility];  Service: Pain Management;  Laterality: Bilateral;  LUMBAR BILATERAL L3-L4 AND L4-L5 FACET STEROID INJECTION    NEED CONSENT    INJECTION OF JOINT Bilateral 2020    Procedure: INJECTION, JOINT, Bilateral SI;  Surgeon: Viet Wilson MD;  Location: [facility];  Service: Pain Management;  Laterality: Bilateral;    RADIOFREQUENCY ABLATION Left 3/20/2019    Procedure: RADIOFREQUENCY ABLATION LEFT L2,3,4,5;  Surgeon: Viet Wilson MD;  Location: [facility];  Service: Pain Management;  Laterality: Left;  LEFT RFA L2,3,4,5    NEEDS CONSENT    RADIOFREQUENCY ABLATION Left 2019    Procedure: RADIOFREQUENCY ABLATION, LEFT L2-L3-L4-L5  1 OF 2;  Surgeon: Viet Wilson MD;  Location: [facility];  Service: Pain Management;  Laterality: Left;     RADIOFREQUENCY ABLATION Right 2020    Procedure: RADIOFREQUENCY ABLATION, RIGHT L2-L3-L4-L5 MEDIAL BRANCH 2 OF;  Surgeon: Viet Wilson MD;  Location: [facility];  Service: Pain Management;  Laterality: Right;    TRANSFORAMINAL EPIDURAL INJECTION OF STEROID Left 10/2/2019    Procedure: INJECTION, STEROID, EPIDURAL, TRANSFORAMINAL APPROACH, L4 AND L5;  Surgeon: Viet Wilson MD;  Location: [facility] PAIN T;  Service: Pain Management;  Laterality: Left;    TUBAL LIGATION         Family History:  Family History   Problem Relation Age of Onset    No Known Problems Mother     No Known Problems Father     Heart disease Paternal Grandmother     Thyroid disease Paternal Grandfather     Hypertension Sister     Hypertension Brother     Glaucoma Brother     No Known Problems Daughter     No Known Problems Daughter     No Known Problems Daughter        Social History:  Social History     Socioeconomic History    Marital status:      Spouse name: Not on file    Number of children: 3    Years of education: Not on file    Highest education level: Not on file   Occupational History    Not on file   Social Needs    Financial resource strain: Not on file    Food insecurity     Worry: Not on file     Inability: Not on file    Transportation needs     Medical: Not on file     Non-medical: Not on file   Tobacco Use    Smoking status: Former Smoker     Packs/day: 0.25     Years: 20.00     Pack years: 5.00     Quit date: 1985     Years since quittin.7    Smokeless tobacco: Never Used    Tobacco comment: Quit mid .   Substance and Sexual Activity    Alcohol use: No     Alcohol/week: 0.0 standard drinks    Drug use: No    Sexual activity: Yes   Lifestyle    Physical activity     Days per week: Not on file     Minutes per session: Not on file    Stress: Not on file   Relationships    Social connections     Talks on phone: Not on file     Gets together: Not on file      Attends Mandaen service: Not on file     Active member of club or organization: Not on file     Attends meetings of clubs or organizations: Not on file     Relationship status: Not on file   Other Topics Concern    Not on file   Social History Narrative    Not on file       OBJECTIVE:    There were no vitals taken for this visit.    PHYSICAL EXAMINATION deferred as phone visit      ASSESSMENT: 70 y.o. year old female with lower back pain, consistent with lumbar spondylosis w/o myelopathy     1. Osteoarthritis of lumbar spine, unspecified spinal osteoarthritis complication status     2. Spondylosis of lumbar region without myelopathy or radiculopathy     3. Sacroiliitis     4. Spinal stenosis of lumbar region without neurogenic claudication           PLAN:     - I have stressed the importance of physical activity and a home exercise plan to help with pain and improve health.  - Patient can continue with medications for now since they are providing benefits, using them appropriately, and without side effects.  - we will schedule patient for Right L2, L3, L4, L5 RFA only for now as patient stated her left side feels much better   - RTC 2 weeks post procedure  - Counseled patient regarding the importance of activity modification, constant sleeping habits and physical therapy.    The above plan and management options were discussed at length with patient. Patient is in agreement with the above and verbalized understanding.    Eliza Car   I have personally reviewed the history and personally discussed the plan with patient through video visit and agree with the resident/fellow/NPs note as stated above.    Viet Wilson MD      10/01/2020

## 2020-10-05 DIAGNOSIS — M48.061 DEGENERATIVE LUMBAR SPINAL STENOSIS: ICD-10-CM

## 2020-10-05 RX ORDER — TRAMADOL HYDROCHLORIDE 50 MG/1
50 TABLET ORAL EVERY 6 HOURS PRN
Qty: 120 TABLET | Refills: 0 | OUTPATIENT
Start: 2020-10-05

## 2020-10-07 DIAGNOSIS — M48.061 DEGENERATIVE LUMBAR SPINAL STENOSIS: ICD-10-CM

## 2020-10-07 RX ORDER — TRAMADOL HYDROCHLORIDE 50 MG/1
50 TABLET ORAL EVERY 6 HOURS PRN
Qty: 120 TABLET | Refills: 0 | Status: SHIPPED | OUTPATIENT
Start: 2020-10-07 | End: 2020-11-05 | Stop reason: SDUPTHER

## 2020-10-12 PROBLEM — M54.9 BACK PAIN: Status: RESOLVED | Noted: 2017-03-02 | Resolved: 2020-10-12

## 2020-10-12 PROBLEM — M25.552 HIP PAIN, BILATERAL: Status: RESOLVED | Noted: 2018-06-14 | Resolved: 2020-10-12

## 2020-10-12 PROBLEM — M25.551 HIP PAIN, BILATERAL: Status: RESOLVED | Noted: 2018-06-14 | Resolved: 2020-10-12

## 2020-10-13 ENCOUNTER — PATIENT OUTREACH (OUTPATIENT)
Dept: ADMINISTRATIVE | Facility: OTHER | Age: 70
End: 2020-10-13

## 2020-10-14 ENCOUNTER — TELEPHONE (OUTPATIENT)
Dept: PAIN MEDICINE | Facility: CLINIC | Age: 70
End: 2020-10-14

## 2020-10-14 NOTE — TELEPHONE ENCOUNTER
Staff called to confirm 10/15/20 11:40 am in office visit with Amara Hawthorne Np. Patient informed of instructions.     Patient confirmed and verbalized understanding and expressed thanks.

## 2020-10-14 NOTE — PROGRESS NOTES
Health Maintenance Due   Topic Date Due    Sign Pain Contract  08/12/1968    Complete Opioid Risk Tool  08/12/1968    Eye Exam  03/28/2020     Updates were requested from care everywhere.  Chart was reviewed for overdue Proactive [facility] ([facility]) topics (CRS, Breast Cancer Screening, Eye exam)  Health Maintenance has been updated.  LINKS immunization registry triggered.  Immunizations were reconciled.

## 2020-10-15 ENCOUNTER — OFFICE VISIT (OUTPATIENT)
Dept: PAIN MEDICINE | Facility: CLINIC | Age: 70
End: 2020-10-15
Payer: MEDICARE

## 2020-10-15 VITALS
BODY MASS INDEX: 43.95 KG/M2 | HEIGHT: 67 IN | DIASTOLIC BLOOD PRESSURE: 62 MMHG | RESPIRATION RATE: 18 BRPM | TEMPERATURE: 98 F | HEART RATE: 73 BPM | WEIGHT: 280 LBS | SYSTOLIC BLOOD PRESSURE: 110 MMHG | OXYGEN SATURATION: 100 %

## 2020-10-15 DIAGNOSIS — M47.816 OSTEOARTHRITIS OF LUMBAR SPINE, UNSPECIFIED SPINAL OSTEOARTHRITIS COMPLICATION STATUS: Primary | ICD-10-CM

## 2020-10-15 PROCEDURE — 99499 NO LOS: ICD-10-PCS | Mod: HCNC,S$GLB,, | Performed by: NURSE PRACTITIONER

## 2020-10-15 PROCEDURE — 99499 UNLISTED E&M SERVICE: CPT | Mod: HCNC,S$GLB,, | Performed by: NURSE PRACTITIONER

## 2020-10-21 ENCOUNTER — OFFICE VISIT (OUTPATIENT)
Dept: BARIATRICS | Facility: CLINIC | Age: 70
End: 2020-10-21
Payer: MEDICARE

## 2020-10-21 VITALS
WEIGHT: 281.94 LBS | HEART RATE: 78 BPM | BODY MASS INDEX: 44.25 KG/M2 | HEIGHT: 67 IN | SYSTOLIC BLOOD PRESSURE: 132 MMHG | DIASTOLIC BLOOD PRESSURE: 78 MMHG

## 2020-10-21 DIAGNOSIS — E66.01 CLASS 3 SEVERE OBESITY DUE TO EXCESS CALORIES WITH SERIOUS COMORBIDITY AND BODY MASS INDEX (BMI) OF 40.0 TO 44.9 IN ADULT: Primary | ICD-10-CM

## 2020-10-21 PROCEDURE — 1125F AMNT PAIN NOTED PAIN PRSNT: CPT | Mod: HCNC,S$GLB,, | Performed by: INTERNAL MEDICINE

## 2020-10-21 PROCEDURE — 3078F DIAST BP <80 MM HG: CPT | Mod: HCNC,CPTII,S$GLB, | Performed by: INTERNAL MEDICINE

## 2020-10-21 PROCEDURE — 3078F PR MOST RECENT DIASTOLIC BLOOD PRESSURE < 80 MM HG: ICD-10-PCS | Mod: HCNC,CPTII,S$GLB, | Performed by: INTERNAL MEDICINE

## 2020-10-21 PROCEDURE — 99999 PR PBB SHADOW E&M-EST. PATIENT-LVL V: CPT | Mod: PBBFAC,HCNC,, | Performed by: INTERNAL MEDICINE

## 2020-10-21 PROCEDURE — 99999 PR PBB SHADOW E&M-EST. PATIENT-LVL V: ICD-10-PCS | Mod: PBBFAC,HCNC,, | Performed by: INTERNAL MEDICINE

## 2020-10-21 PROCEDURE — 3008F BODY MASS INDEX DOCD: CPT | Mod: HCNC,CPTII,S$GLB, | Performed by: INTERNAL MEDICINE

## 2020-10-21 PROCEDURE — 99213 OFFICE O/P EST LOW 20 MIN: CPT | Mod: HCNC,S$GLB,, | Performed by: INTERNAL MEDICINE

## 2020-10-21 PROCEDURE — 1101F PT FALLS ASSESS-DOCD LE1/YR: CPT | Mod: HCNC,CPTII,S$GLB, | Performed by: INTERNAL MEDICINE

## 2020-10-21 PROCEDURE — 1101F PR PT FALLS ASSESS DOC 0-1 FALLS W/OUT INJ PAST YR: ICD-10-PCS | Mod: HCNC,CPTII,S$GLB, | Performed by: INTERNAL MEDICINE

## 2020-10-21 PROCEDURE — 1125F PR PAIN SEVERITY QUANTIFIED, PAIN PRESENT: ICD-10-PCS | Mod: HCNC,S$GLB,, | Performed by: INTERNAL MEDICINE

## 2020-10-21 PROCEDURE — 1159F MED LIST DOCD IN RCRD: CPT | Mod: HCNC,S$GLB,, | Performed by: INTERNAL MEDICINE

## 2020-10-21 PROCEDURE — 3075F PR MOST RECENT SYSTOLIC BLOOD PRESS GE 130-139MM HG: ICD-10-PCS | Mod: HCNC,CPTII,S$GLB, | Performed by: INTERNAL MEDICINE

## 2020-10-21 PROCEDURE — 3075F SYST BP GE 130 - 139MM HG: CPT | Mod: HCNC,CPTII,S$GLB, | Performed by: INTERNAL MEDICINE

## 2020-10-21 PROCEDURE — 1159F PR MEDICATION LIST DOCUMENTED IN MEDICAL RECORD: ICD-10-PCS | Mod: HCNC,S$GLB,, | Performed by: INTERNAL MEDICINE

## 2020-10-21 PROCEDURE — 3008F PR BODY MASS INDEX (BMI) DOCUMENTED: ICD-10-PCS | Mod: HCNC,CPTII,S$GLB, | Performed by: INTERNAL MEDICINE

## 2020-10-21 PROCEDURE — 99213 PR OFFICE/OUTPT VISIT, EST, LEVL III, 20-29 MIN: ICD-10-PCS | Mod: HCNC,S$GLB,, | Performed by: INTERNAL MEDICINE

## 2020-10-21 RX ORDER — TOPIRAMATE 50 MG/1
50 TABLET, FILM COATED ORAL 2 TIMES DAILY
Qty: 60 TABLET | Refills: 3 | Status: SHIPPED | OUTPATIENT
Start: 2020-10-21 | End: 2021-05-11 | Stop reason: SDUPTHER

## 2020-10-21 NOTE — PATIENT INSTRUCTIONS
"    Patient was informed that topiramate is used for migraine prevention and seizures. Weight loss is a common side effect that is well documented. S/he understands this. S/he was informed of the potential side effects such as serious and possibly fatal rash in which case the medication should be discontinued immediately. Paresthesias, forgetfulness, fatigue, kidney stones, GI symptoms, and changes in lab values such as electrolytes, blood counts and kidney function.    Start topiramate 50 mg in the evening for 2 weeks, then morning and evening.       No soda, sweet tea, juices or lemonade. All drinks should be 5 calories or less.     Exercise -Exercise is one of those "bite the bullet" propositions. Sometimes you just have to get started. What I suggest is setting a timer for 10 minutes. Do whatever activity you plan to start for the 10 minutes. If the timer goes off and you want to stop, fine. You can stop. If you feel like you want to go on a little longer, you can go on. Do this a few times a week. Eventually you will likely decide to keep going. Every 2 weeks, add 5 more minutes before you give yourself permission to stop.           Limit starchy carbohydrates (bread, rice, pasta, potatoes, corn, peas, oatmeal, grits, tortillas, crackers, chips)    SEATED RESISTANCE BAND EXERCISES     If you do not have a resistance band, or do not feel comfortable using a resistance band, these exercises can also be done holding a light hand weight or water bottle.  If you are just starting to exercise, you may want to go through the motions without any weights or resistance till you become comfortable with the movements.     Do each of the movements shown 10 times (10 repetitions). You can repeat the exercises a second or  third time as well for greater benefit. The amount of tension on the resistance bands should be adjusted so  you can complete one set of 10 repetitions with effort. Increase the tension every few weeks. " Do these  exercises 2 or 3 times a week.     * If an exercise hurts your back or joints, stop doing that particular exercise, but keep doing all the others *        CHEST EXERCISE          Start Position:   Sit tall, with feet shoulder width apart and feet in front of knees.   Belly pulled in.   Place the band around your upper back, grasp band in each hand, knuckles (rings) facing front. Arms  should be bent so that knuckles are in front of elbows   Slide shoulder blades down your back and slightly together (as if making a V).   Relax your neck.     To Perform This Exercise:   Press hands forward to lengthen arms using chest muscles (not arms!).   Dont arch your back!)   Return to start position and repeat 10 times.   Breathe!           BACK EXERCISE          Start Position:   Sit tall, with feet shoulder width apart and feet in front of knees.   Belly pulled in.   Grasp band in each hand and raise overhead. Arms should be slightly wider than shoulder width and no  slack in the band.   Slide shoulder blades down your back and slightly together (as if making a V).   Relax your neck.     To Perform This Exercise:   Open arms pulling down towards chest using upper back muscles (not arms!).   Squeeze through shoulder blades at the bottom of the movement (dont arch your back!).   Return to start position and repeat 10 times.   Breathe!           SHOULDER EXERCISE          Start Position:   Sit tall, with feet shoulder width apart and feet in front of knees.   Belly pulled in.   Sit on band so that you can grasp band in one hand with tension on the band, but not so much tension that  you cannot straighten the arm. It may take a few tries to find the right amount of tension.   Slide shoulder blades down your back and slightly together (as if making a V).   Relax your neck.     To Perform This Exercise:   Press fist to the ceiling, slightly in front of the body.   SLOWLY return to start  position and repeat 10 times.   Switch sides and repeat on the other side.   Breathe!           TRICEPS EXERCISE          Start Position:   Sit tall, with feet shoulder width apart and feet in front of knees.   Belly pulled in.   Sit on one end of the band. Grasp other end of band in one hand.   Point elbow directly toward the ceiling (if this is difficult, you may support the upper arm with the opposite  hand)   Be sure there is no slack in the band in the starting position.   Slide shoulder blades down your back and slightly together (as if making a V).   Relax your neck.     To Perform This Exercise:   Extend your arm up to the ceiling, as shown.   Squeeze through shoulder blades at the bottom of the movement (dont arch your back!).   SLOWLY return to start position and repeat 10 times.   Repeat on the other side.   Breathe!           BICEP EXERCISE          Start Position:   Sit tall, with feet shoulder width apart and feet in front of knees.   Belly pulled in.   Place center of exercise band under one foot and step the end of the band under the other foot.   Grasp each end of the band with one hand. Make sure there is no slack between your foot and the hand  that holds the band.   Hold your elbows to your sides.   Pull abdominals in, lift chest, press shoulders down and back.     To Perform This Exercise:   As you curl up, keep your wrist from changing position in relation to your forearm and your arm stable  from the shoulder to the elbow.   Bend and straighten your elbow in a slow and controlled movement. Repeat this motion 10 times.   Repeat on the other side.   Breathe!

## 2020-10-21 NOTE — PROGRESS NOTES
"Subjective:       Patient ID: Linnea Renae is a 70 y.o. female.    Chief Complaint: Follow-up    Pt here today for follow-up. Has lost 2 more lbs, net neg 4. Started 2119-1775 piyush diet and topiramate. States she has lost her taste for a lot foods. Also states she is tired and some upset stomach. Has started to take it with food. That has helped. Does state she is not adhering with eating plan. Did see some effect with appetite and cravings, but less effect lately. She ran out of it. NO exercise. States too much pain and no energy.   One of her primary complaint has  rash and skin break down under pannus. States this is very painful and interferes with ADL. Frequently hs broken areas under skin with odor and drainiage Has tried OTC and Rx topical meds for this. Has pictures with her of skin breakdown.         Lab Results   Component Value Date    HGBA1C 6.2 (H) 07/10/2020    HGBA1C 5.9 (H) 12/05/2019    HGBA1C 6.0 (H) 03/19/2019     Lab Results   Component Value Date    LDLCALC 77.2 01/16/2020    CREATININE 0.9 07/10/2020        Review of Systems   Constitutional: Negative for chills and fever.   Respiratory: Negative for shortness of breath.         Denies snoring   Cardiovascular: Positive for leg swelling. Negative for chest pain.   Gastrointestinal: Positive for constipation. Negative for diarrhea.        + GERD   Genitourinary: Negative for difficulty urinating and dysuria.   Musculoskeletal: Positive for arthralgias and back pain.   Skin: Positive for rash.   Neurological: Positive for dizziness. Negative for light-headedness.   Psychiatric/Behavioral: Positive for dysphoric mood. The patient is nervous/anxious.        Objective:     /78   Pulse 78   Ht 5' 7" (1.702 m)   Wt 127.9 kg (281 lb 15.5 oz)   BMI 44.16 kg/m²     Physical Exam   Constitutional: She is oriented to person, place, and time. She appears well-developed. No distress.   obese   HENT:   Head: Normocephalic and atraumatic.   Eyes: " "Pupils are equal, round, and reactive to light. EOM are normal. No scleral icterus.   Neck: Normal range of motion. Neck supple.   Cardiovascular: Normal rate.   Pulmonary/Chest: Effort normal.   Musculoskeletal: Normal range of motion. She exhibits no edema.   Neurological: She is alert and oriented to person, place, and time. No cranial nerve deficit.   Skin: Skin is warm and dry. No erythema.   Psychiatric: She has a normal mood and affect. Her behavior is normal. Judgment normal.   Vitals reviewed.      Assessment:       1. Class 3 severe obesity due to excess calories with serious comorbidity and body mass index (BMI) of 40.0 to 44.9 in adult        Plan:         Linnea was seen today for follow-up.    Diagnoses and all orders for this visit:    Class 3 severe obesity due to excess calories with serious comorbidity and body mass index (BMI) of 40.0 to 44.9 in adult    Other orders  -     topiramate (TOPAMAX) 50 MG tablet; Take 1 tablet (50 mg total) by mouth 2 (two) times daily.       Medical and surgical options discussed again today.     Patient was informed that topiramate is used for migraine prevention and seizures. Weight loss is a common side effect that is well documented. S/he understands this. S/he was informed of the potential side effects such as serious and possibly fatal rash in which case the medication should be discontinued immediately. Paresthesias, forgetfulness, fatigue, kidney stones, GI symptoms, and changes in lab values such as electrolytes, blood counts and kidney function.    Start topiramate 50 mg in the evening for 2 weeks, then morning and evening.       No soda, sweet tea, juices or lemonade. All drinks should be 5 calories or less.     Exercise -Exercise is one of those "bite the bullet" propositions. Sometimes you just have to get started. What I suggest is setting a timer for 10 minutes. Do whatever activity you plan to start for the 10 minutes. If the timer goes off and you want " to stop, fine. You can stop. If you feel like you want to go on a little longer, you can go on. Do this a few times a week. Eventually you will likely decide to keep going. Every 2 weeks, add 5 more minutes before you give yourself permission to stop.           Limit starchy carbohydrates (bread, rice, pasta, potatoes, corn, peas, oatmeal, grits, tortillas, crackers, chips)    Seated exercise handout given.

## 2020-11-04 ENCOUNTER — HOSPITAL ENCOUNTER (OUTPATIENT)
Facility: OTHER | Age: 70
Discharge: HOME | End: 2020-11-04
Attending: ANESTHESIOLOGY | Admitting: ANESTHESIOLOGY
Payer: MEDICARE

## 2020-11-04 VITALS
TEMPERATURE: 98 F | HEIGHT: 68 IN | HEART RATE: 68 BPM | WEIGHT: 278 LBS | DIASTOLIC BLOOD PRESSURE: 58 MMHG | RESPIRATION RATE: 18 BRPM | OXYGEN SATURATION: 97 % | SYSTOLIC BLOOD PRESSURE: 107 MMHG | BODY MASS INDEX: 42.13 KG/M2

## 2020-11-04 DIAGNOSIS — M47.816 OSTEOARTHRITIS OF LUMBAR SPINE, UNSPECIFIED SPINAL OSTEOARTHRITIS COMPLICATION STATUS: Primary | ICD-10-CM

## 2020-11-04 DIAGNOSIS — M47.819 SPONDYLOSIS WITHOUT MYELOPATHY: ICD-10-CM

## 2020-11-04 DIAGNOSIS — G89.29 CHRONIC PAIN: ICD-10-CM

## 2020-11-04 LAB — POCT GLUCOSE: 100 MG/DL (ref 70–110)

## 2020-11-04 PROCEDURE — 99152 MOD SED SAME PHYS/QHP 5/>YRS: CPT | Mod: HCNC,,, | Performed by: ANESTHESIOLOGY

## 2020-11-04 PROCEDURE — 25000003 PHARM REV CODE 250: Mod: HCNC | Performed by: ANESTHESIOLOGY

## 2020-11-04 PROCEDURE — 64635 PR DESTROY LUMB/SAC FACET JNT: ICD-10-PCS | Mod: HCNC,RT,, | Performed by: ANESTHESIOLOGY

## 2020-11-04 PROCEDURE — 63600175 PHARM REV CODE 636 W HCPCS: Mod: HCNC | Performed by: ANESTHESIOLOGY

## 2020-11-04 PROCEDURE — 64636 PR DESTROY L/S FACET JNT ADDL: ICD-10-PCS | Mod: HCNC,RT,, | Performed by: ANESTHESIOLOGY

## 2020-11-04 PROCEDURE — 64635 DESTROY LUMB/SAC FACET JNT: CPT | Mod: HCNC,RT,, | Performed by: ANESTHESIOLOGY

## 2020-11-04 PROCEDURE — 64636 DESTROY L/S FACET JNT ADDL: CPT | Mod: HCNC,RT | Performed by: ANESTHESIOLOGY

## 2020-11-04 PROCEDURE — 82947 ASSAY GLUCOSE BLOOD QUANT: CPT | Mod: HCNC | Performed by: ANESTHESIOLOGY

## 2020-11-04 PROCEDURE — 99152 PR MOD CONSCIOUS SEDATION, SAME PHYS, 5+ YRS, FIRST 15 MIN: ICD-10-PCS | Mod: HCNC,,, | Performed by: ANESTHESIOLOGY

## 2020-11-04 PROCEDURE — 99152 MOD SED SAME PHYS/QHP 5/>YRS: CPT | Mod: HCNC | Performed by: ANESTHESIOLOGY

## 2020-11-04 PROCEDURE — 64636 DESTROY L/S FACET JNT ADDL: CPT | Mod: HCNC,RT,, | Performed by: ANESTHESIOLOGY

## 2020-11-04 PROCEDURE — 64635 DESTROY LUMB/SAC FACET JNT: CPT | Mod: HCNC,RT | Performed by: ANESTHESIOLOGY

## 2020-11-04 RX ORDER — BUPIVACAINE HYDROCHLORIDE 2.5 MG/ML
INJECTION, SOLUTION EPIDURAL; INFILTRATION; INTRACAUDAL
Status: DISCONTINUED | OUTPATIENT
Start: 2020-11-04 | End: 2020-11-04 | Stop reason: HOSPADM

## 2020-11-04 RX ORDER — FENTANYL CITRATE 50 UG/ML
INJECTION, SOLUTION INTRAMUSCULAR; INTRAVENOUS
Status: DISCONTINUED | OUTPATIENT
Start: 2020-11-04 | End: 2020-11-04 | Stop reason: HOSPADM

## 2020-11-04 RX ORDER — LIDOCAINE HYDROCHLORIDE 10 MG/ML
INJECTION INFILTRATION; PERINEURAL
Status: DISCONTINUED | OUTPATIENT
Start: 2020-11-04 | End: 2020-11-04 | Stop reason: HOSPADM

## 2020-11-04 RX ORDER — SODIUM CHLORIDE 9 MG/ML
500 INJECTION, SOLUTION INTRAVENOUS CONTINUOUS
Status: DISCONTINUED | OUTPATIENT
Start: 2020-11-04 | End: 2020-11-04 | Stop reason: HOSPADM

## 2020-11-04 RX ORDER — DEXAMETHASONE SODIUM PHOSPHATE 4 MG/ML
INJECTION, SOLUTION INTRA-ARTICULAR; INTRALESIONAL; INTRAMUSCULAR; INTRAVENOUS; SOFT TISSUE
Status: DISCONTINUED | OUTPATIENT
Start: 2020-11-04 | End: 2020-11-04 | Stop reason: HOSPADM

## 2020-11-04 RX ORDER — MIDAZOLAM HYDROCHLORIDE 1 MG/ML
INJECTION INTRAMUSCULAR; INTRAVENOUS
Status: DISCONTINUED | OUTPATIENT
Start: 2020-11-04 | End: 2020-11-04 | Stop reason: HOSPADM

## 2020-11-04 RX ADMIN — SODIUM CHLORIDE 500 ML: 0.9 INJECTION, SOLUTION INTRAVENOUS at 09:11

## 2020-11-04 NOTE — OP NOTE
Patient Name: Linnea Renae  MRN: [id number]    INFORMED CONSENT: The procedure, risks, benefits and options were discussed with patient. There are no contraindications to the procedure. The patient expressed understanding and agreed to proceed. The personnel performing the procedure was discussed. I verify that I personally obtained Linnea's consent prior to the start of the procedure and the signed consent can be found on the patient's chart.    Procedure Date: 11/04/2020    Anesthesia: Topical    Pre Procedure diagnosis: Lumbar spondylosis [M47.816]  1. Osteoarthritis of lumbar spine, unspecified spinal osteoarthritis complication status    2. Spondylosis without myelopathy    3. Chronic pain      Post-Procedure diagnosis: SAME      Moderate Sedation: Yes - Fentanyl 50 mcg and Midazolam 2 mg   Conscious sedation ordered by MD.  Patient reevaluated and sedation administered by MD and monitored by RN.  Total sedation time was lmore than 15 min. (See nurse documentation and case log for sedation time)     PROCEDURE: RIGHT L2,3,4,5 MEDIAL BRANCH NERVE RADIOFREQUENCY NEUROTOMY (lumbar)          DESCRIPTION OF PROCEDURE: The patient was brought to the procedure room.  After performing time out IV access was obtained prior to the procedure. The patient was positioned prone on the fluoroscopy table. Continuous hemodynamic monitoring was initiated including blood pressure and pulse oximetry. IV sedation was administered incrementally to allow the patient to remain comfortable and conversant throughout the procedure. The area of the lumbar spine was prepped chlorhexidine three times and draped into a sterile field.  Fluoroscopy was used to identify the location of the right side L2, L3, L4, and L5 medial branch nerves at the junctions of the superior articular process and the transverse processes of L3, L4, L5, and the sacral ala respectively.  Skin anesthesia was achieved using 3 cc of Lidocaine 1% over the injection sites. A  20 gauge, 100mm (10mm active tip) curved RF needle was slowly inserted at each level using AP, lateral and oblique fluoroscopic imaging. Negative aspiration for blood or CSF was confirmed.  Sensory stimulation at 50Hz below 0.5V was achieved at every level. Motor stimulation at 2Hz up to 1.5V did not cause any radicular symptoms at any level. Each level was anesthetized with 1.5 cc of lidocaine 1%.  Radiofrequency lesioning was performed for 90 seconds at 80 degrees in two different positions at each level.  Total of 4 cc of bupivacaine 0.25% and 10 mg of Decadron was injected was injected at all levels.. The needles were removed and bleeding was nil.  A sterile dressing was applied. Linnea was taken back to the recovery room for further observation.     Stimulation Results:  L2 = Sensory positive @ 0.3, Motor negative @ 1.5  L3 = Sensory positive @ 0.4, Motor negative @ 1.5  L4 = Sensory positive @ 0.5, Motor negative @ 1.5  L5 = Sensory positive @ 1.0, Motor negative @ 1.5    Blood Loss: Nill  Specimen: None    Johan Billingsley MD

## 2020-11-04 NOTE — H&P
HPI  Patient presenting for Procedure(s) (LRB):  RADIOFREQUENCY ABLATION Right L2, L3, L4, L5 (Right)     Patient on Anti-coagulation No    No health changes since previous encounter    Past Medical History:   Diagnosis Date    Allergy     Breast cancer     Lumpectomy 10/15.    Chronic constipation     Colon polyps     Diabetes mellitus, type 2     Dry eyes     GERD (gastroesophageal reflux disease)     Hyperlipidemia     Hypertension     Lumbar disc disease     Type 2 diabetes mellitus      Past Surgical History:   Procedure Laterality Date    BREAST BIOPSY      BREAST LUMPECTOMY Right     2015    CATARACT EXTRACTION, BILATERAL       SECTION      x3    CHOLECYSTECTOMY      COLONOSCOPY N/A 10/11/2018    Procedure: COLONOSCOPY;  Surgeon: Lorenzo Tanner MD;  [facility]  Service: Endoscopy;  Laterality: N/A;    COLONOSCOPY W/ POLYPECTOMY      HYSTERECTOMY      and USO    INJECTION OF FACET JOINT Bilateral 2018    Procedure: INJECTION, FACET JOINT;  Surgeon: Viet Wilson MD;  Location: [facility], [place], operated by [facility]IN [facility]GT;  Service: Pain Management;  Laterality: Bilateral;  LUMBAR BILATERAL L3-L4 AND L4-L5 FACET STEROID INJECTION    NEED CONSENT    INJECTION OF JOINT Bilateral 2020    Procedure: INJECTION, JOINT, Bilateral SI;  Surgeon: Viet Wilson MD;  Location: [facility], [place], operated by [facility]IN [facility]GT;  Service: Pain Management;  Laterality: Bilateral;    RADIOFREQUENCY ABLATION Left 3/20/2019    Procedure: RADIOFREQUENCY ABLATION LEFT L2,3,4,5;  Surgeon: Viet Wilson MD;  Location: [facility], [place], operated by [facility]IN [facility]GT;  Service: Pain Management;  Laterality: Left;  LEFT RFA L2,3,4,5    NEEDS CONSENT    RADIOFREQUENCY ABLATION Left 2019    Procedure: RADIOFREQUENCY ABLATION, LEFT L2-L3-L4-L5  1 OF 2;  Surgeon: Viet Wilson MD;  Location: [facility], [place], operated by [facility]IN [facility];  Service: Pain Management;  Laterality: Left;    RADIOFREQUENCY ABLATION Right 2020    Procedure: RADIOFREQUENCY ABLATION, RIGHT  "L2-L3-L4-L5 MEDIAL BRANCH 2 OF;  Surgeon: Viet Wilson MD;  Location: [facility] PAIN MGT;  Service: Pain Management;  Laterality: Right;    TRANSFORAMINAL EPIDURAL INJECTION OF STEROID Left 10/2/2019    Procedure: INJECTION, STEROID, EPIDURAL, TRANSFORAMINAL APPROACH, L4 AND L5;  Surgeon: Viet Wilson MD;  Location: [facility] PAIN MGT;  Service: Pain Management;  Laterality: Left;    TUBAL LIGATION       Review of patient's allergies indicates:   Allergen Reactions    Codeine Itching      No current facility-administered medications for this encounter.        PMHx, PSHx, Allergies, Medications reviewed in epic    ROS negative except pain complaints in HPI    OBJECTIVE:    Ht 5' 8" (1.727 m)   Wt 126.1 kg (278 lb)   BMI 42.27 kg/m²     PHYSICAL EXAMINATION:    GENERAL: Well appearing, in no acute distress, alert and oriented x3.  PSYCH:  Mood and affect appropriate.  SKIN: Skin color, texture, turgor normal, no rashes or lesions which will impact the procedure.  CV: RRR with palpation of the radial artery.  PULM: No evidence of respiratory difficulty, symmetric chest rise. Clear to auscultation.  NEURO: Cranial nerves grossly intact.    Plan:    Proceed with procedure as planned Procedure(s) (LRB):  RADIOFREQUENCY ABLATION Right L2, L3, L4, L5 (Right)    Ann Fajardo  11/04/2020            "

## 2020-11-04 NOTE — DISCHARGE SUMMARY
Discharge Note  Short Stay      SUMMARY     Admit Date: 11/4/2020    Attending Physician: Johan Billingsley      Discharge Physician: Johan Billingsley      Discharge Date: 11/4/2020 10:40 AM    Procedure(s) (LRB):  RADIOFREQUENCY ABLATION Right L2, L3, L4, L5 (Right)    Final Diagnosis: Lumbar spondylosis [M47.816]    Disposition: Home or self care    Patient Instructions:   Current Discharge Medication List      CONTINUE these medications which have NOT CHANGED    Details   alcohol antiseptic pads (ALCOHOL PREP PADS TOP)       aspirin 81 MG Chew Take 81 mg by mouth once daily.      atenoloL (TENORMIN) 50 MG tablet TAKE 1 TABLET BY MOUTH TWICE DAILY  Qty: 180 tablet, Refills: 2    Comments: .  Associated Diagnoses: Essential hypertension      atorvastatin (LIPITOR) 20 MG tablet TAKE 1 TABLET BY MOUTH ONCE DAILY  Qty: 90 tablet, Refills: 2    Associated Diagnoses: Hyperlipidemia LDL goal <100; Atherosclerosis of aorta      blood sugar diagnostic Strp 1 strip by Misc.(Non-Drug; Combo Route) route once daily.  Qty: 100 strip, Refills: 3    Comments: TRUE METRIX STRIPS  Associated Diagnoses: Type 2 diabetes mellitus without complication, without long-term current use of insulin      calcium-vitamin D3 500 mg(1,250mg) -200 unit per tablet Take 1 tablet by mouth 2 (two) times daily with meals.       cycloSPORINE (RESTASIS) 0.05 % ophthalmic emulsion 1 drop 2 (two) times daily.      diclofenac sodium (VOLTAREN) 1 % Gel Apply 2 g topically 4 (four) times daily.  Qty: 100 g, Refills: 2      flu vac 2020 [id number]-[id number],PF, (FLUAD QUAD 2020-21,65Y UP,,PF,) 60 mcg (15 mcg x 4)/0.5 mL Syrg inject 0.5 ml into the muscle for one dose  Qty: 0.5 mL, Refills: 0      fluticasone propionate (FLONASE) 50 mcg/actuation nasal spray 2 sprays (100 mcg total) by Each Nostril route once daily.  Qty: 3 Bottle, Refills: 2    Associated Diagnoses: Perennial allergic rhinitis      hydroCHLOROthiazide (HYDRODIURIL) 25 MG tablet Take 1 tablet (25  mg total) by mouth once daily.  Qty: 90 tablet, Refills: 2    Associated Diagnoses: Essential hypertension      ibuprofen (ADVIL,MOTRIN) 800 MG tablet Take 1 tablet by mouth three times daily as needed  Qty: 60 tablet, Refills: 2    Associated Diagnoses: Primary osteoarthritis involving multiple joints      ketoconazole (NIZORAL) 2 % cream Apply topically once daily.  Qty: 60 g, Refills: 3    Associated Diagnoses: Candidal intertrigo      !! ketoconazole (NIZORAL) 2 % shampoo       !! ketoconazole (NIZORAL) 2 % shampoo Apply topically to scalp in place of regular shampoo 2 to 3 times a week  Qty: 120 mL, Refills: 9      lancets Misc 1 lancet by Misc.(Non-Drug; Combo Route) route once daily.  Qty: 100 each, Refills: 3    Comments: TRUE PLUS LANCET 32 G  Associated Diagnoses: Type 2 diabetes mellitus with stage 2 chronic kidney disease, without long-term current use of insulin      pchlvdyye-X2-[id number]-algal oil (METANX/FOLTANX RF) 3 mg-35 mg-2 mg -90.314 mg Cap Take 1 tablet by mouth 2 (two) times daily.  Qty: 180 capsule, Refills: 0      loratadine 10 mg Cap       metFORMIN (GLUCOPHAGE) 500 MG tablet Take 2 tablets (1,000 mg total) by mouth 2 (two) times daily with meals.  Qty: 360 tablet, Refills: 1    Comments: Cancel Metformin XR.  Associated Diagnoses: Type 2 diabetes mellitus with diabetic neuropathy, without long-term current use of insulin      minoxidil (LONITEN) 2.5 MG tablet       MINOXIDIL TOP Apply topically.      multivitamin (ONE DAILY MULTIVITAMIN) per tablet Take 1 tablet by mouth once daily.      nystatin (MYCOSTATIN) powder Apply topically 4 (four) times daily Under pannus as needed  Qty: 180 g, Refills: 2      olmesartan (BENICAR) 20 MG tablet Take 1 tablet (20 mg total) by mouth once daily.  Qty: 90 tablet, Refills: 2    Associated Diagnoses: Essential hypertension      omeprazole (PRILOSEC) 20 MG capsule Take 1 capsule by mouth once daily  Qty: 90 capsule, Refills: 0    Associated Diagnoses:  Gastroesophageal reflux disease, esophagitis presence not specified      tiZANidine (ZANAFLEX) 4 MG tablet Take 1 tablet (4 mg total) by mouth 2 (two) times daily as needed.  Qty: 60 tablet, Refills: 2    Comments: Please consider 90 day supplies to promote better adherence  Associated Diagnoses: Degenerative lumbar spinal stenosis      topiramate (TOPAMAX) 50 MG tablet Take 1 tablet (50 mg total) by mouth 2 (two) times daily.  Qty: 60 tablet, Refills: 3      traMADoL (ULTRAM) 50 mg tablet TAKE 1 TABLET BY MOUTH EVERY 6 HOURS AS NEEDED FOR PAIN  Qty: 120 tablet, Refills: 0    Comments: Quantity prescribed more than 7 day supply? Yes, quantity medically necessary  Associated Diagnoses: Degenerative lumbar spinal stenosis       !! - Potential duplicate medications found. Please discuss with provider.              Discharge Diagnosis: Lumbar spondylosis [M47.816]  Condition on Discharge: Stable with no complications to procedure   Diet on Discharge: Same as before.  Activity: as per instruction sheet.  Discharge to: Home with a responsible adult.  Follow up: 2-4 weeks       Please call my office or pager at [phone] if experienced any weakness or loss of sensation, fever > 101.5, pain uncontrolled with oral medications, persistent nausea/vomiting/or diarrhea, redness or drainage from the incisions, or any other worrisome concerns. If physician on call was not reached or could not communicate with our office for any reason please go to the nearest emergency department

## 2020-11-04 NOTE — DISCHARGE INSTRUCTIONS
Thank you for allowing us to care for you today. You may receive a survey about the care we provided. Your feedback is valuable and helps us provide excellent care throughout the community.     Home Care Instructions for Pain Management:    1. DIET:   You may resume your normal diet today.   2. BATHING:   You may shower with luke warm water. No tub baths or anything that will soak injection sites under water for the next 24 hours.  3. DRESSING:   You may remove your bandage today.   4. ACTIVITY LEVEL:   You may resume your normal activities 24 hrs after your procedure. Nothing strenuous today.  5. MEDICATIONS:   You may resume your normal medications today. To restart blood thinners, ask your doctor.  6. DRIVING    If you have received any sedatives by mouth today, you may not drive for 12 hours.    If you have received any sedation through your IV, you may not drive for 24 hrs.   7. SPECIAL INSTRUCTIONS:   No heat to the injection site for 24 hrs including, hot bath or shower, heating pad, moist heat, or hot tubs.    Use ice pack to injection site for any pain or discomfort.  Apply ice packs for 20 minute intervals as needed.    IF you have diabetes, be sure to monitor your blood sugar more closely. IF your injection contained steroids your blood sugar levels may become higher than normal.    If you are still having pain upon discharge:  Your pain may improve over the next 48 hours. The anesthetic (numbing medication) works immediately to 48 hours. IF your injection contained a steroid (anti-inflammatory medication), it takes approximately 3 days to start feeling relief and 7-10 days to see your greatest results from the medication. It is possible you may need subsequent injections. This would be discussed at your follow up appointment with pain management or your referring doctor.    Please call the PAIN MANAGEMENT office at [phone] or ON CALL pager at [phone] if you experienced any:   -Weakness or  loss of sensation  -Fever > 101.5  -Pain uncontrolled with oral medications   -Persistent nausea, vomiting, or diarrhea  -Redness or drainage from the injection sites, or any other worrisome concerns.   If physician on call was not reached or could not communicate with our office for any reason please go to the nearest emergency department.  Adult Procedural Sedation Instructions    Recovery After Procedural Sedation (Adult)  You have been given medicine by vein to make you sleep during your surgery. This may have included both a pain medicine and sleeping medicine. Most of the effects have worn off. But you may still have some drowsiness for the next 6 to 8 hours.  Home care  Follow these guidelines when you get home:  · For the next 8 hours, you should be watched by a responsible adult. This person should make sure your condition is not getting worse.  · Don't drink any alcohol for the next 24 hours.  · Don't drive, operate dangerous machinery, or make important business or personal decisions during the next 24 hours.  Note: Your healthcare provider may tell you not to take any medicine by mouth for pain or sleep in the next 4 hours. These medicines may react with the medicines you were given in the hospital. This could cause a much stronger response than usual.  Follow-up care  Follow up with your healthcare provider if you are not alert and back to your usual level of activity within 12 hours.  When to seek medical advice  Call your healthcare provider right away if any of these occur:  · Drowsiness gets worse  · Weakness or dizziness gets worse  · Repeated vomiting  · You can't be awakened   Date Last Reviewed: 10/18/2016  © 6165-3292 [employer] [address] [place] [address], [place] [address]. All rights reserved. This information is not intended as a substitute for professional medical care. Always follow your healthcare professional's instructions.

## 2020-11-05 DIAGNOSIS — M48.061 DEGENERATIVE LUMBAR SPINAL STENOSIS: ICD-10-CM

## 2020-11-06 RX ORDER — TRAMADOL HYDROCHLORIDE 50 MG/1
50 TABLET ORAL EVERY 6 HOURS PRN
Qty: 120 TABLET | Refills: 0 | Status: SHIPPED | OUTPATIENT
Start: 2020-11-06 | End: 2020-12-05 | Stop reason: SDUPTHER

## 2020-11-10 DIAGNOSIS — N18.2 TYPE 2 DIABETES MELLITUS WITH STAGE 2 CHRONIC KIDNEY DISEASE, WITHOUT LONG-TERM CURRENT USE OF INSULIN: ICD-10-CM

## 2020-11-10 DIAGNOSIS — E11.9 TYPE 2 DIABETES MELLITUS WITHOUT COMPLICATION, WITHOUT LONG-TERM CURRENT USE OF INSULIN: ICD-10-CM

## 2020-11-10 DIAGNOSIS — E11.22 TYPE 2 DIABETES MELLITUS WITH STAGE 2 CHRONIC KIDNEY DISEASE, WITHOUT LONG-TERM CURRENT USE OF INSULIN: ICD-10-CM

## 2020-11-11 RX ORDER — LANCETS 33 GAUGE
1 EACH MISCELLANEOUS DAILY
Qty: 100 EACH | Refills: 3 | Status: SHIPPED | OUTPATIENT
Start: 2020-11-11

## 2020-11-16 ENCOUNTER — TELEPHONE (OUTPATIENT)
Dept: PRIMARY CARE CLINIC | Facility: CLINIC | Age: 70
End: 2020-11-16

## 2020-11-16 NOTE — TELEPHONE ENCOUNTER
Patient has been to the ER twice in the past 3 days for a toe problem. She is diabetic. I thought she had a Podiatrist outside [facility], please offer follow up with Podiatry here  -- Never mind, Podiatry appt scheduled 1/8/19, referral order placed on chart.    Patient is ready to schedule surgery for left dupuytren contracture.  Please place surgery order.     Marta

## 2020-11-16 NOTE — TELEPHONE ENCOUNTER
Pt is considering having the foot procedure done for her neuropathy ( opening tunnels on her feet) pt is asking your input on this

## 2020-11-18 NOTE — TELEPHONE ENCOUNTER
If it's anything like carpal tunnel surgery, it's a fairly simple procedure. Her diabetes is well controlled, she should heal well.

## 2020-11-23 ENCOUNTER — OFFICE VISIT (OUTPATIENT)
Dept: PAIN MEDICINE | Facility: CLINIC | Age: 70
End: 2020-11-23
Attending: ANESTHESIOLOGY
Payer: MEDICARE

## 2020-11-23 VITALS
HEIGHT: 68 IN | OXYGEN SATURATION: 100 % | SYSTOLIC BLOOD PRESSURE: 115 MMHG | DIASTOLIC BLOOD PRESSURE: 64 MMHG | RESPIRATION RATE: 18 BRPM | BODY MASS INDEX: 42.2 KG/M2 | WEIGHT: 278.44 LBS | HEART RATE: 81 BPM

## 2020-11-23 DIAGNOSIS — M47.816 OSTEOARTHRITIS OF LUMBAR SPINE, UNSPECIFIED SPINAL OSTEOARTHRITIS COMPLICATION STATUS: ICD-10-CM

## 2020-11-23 DIAGNOSIS — M46.1 SACROILIITIS: ICD-10-CM

## 2020-11-23 DIAGNOSIS — M79.18 MYOFASCIAL PAIN: Primary | ICD-10-CM

## 2020-11-23 DIAGNOSIS — M47.819 SPONDYLOSIS WITHOUT MYELOPATHY: ICD-10-CM

## 2020-11-23 PROCEDURE — 3288F PR FALLS RISK ASSESSMENT DOCUMENTED: ICD-10-PCS | Mod: HCNC,CPTII,S$GLB, | Performed by: ANESTHESIOLOGY

## 2020-11-23 PROCEDURE — 1125F AMNT PAIN NOTED PAIN PRSNT: CPT | Mod: HCNC,S$GLB,, | Performed by: ANESTHESIOLOGY

## 2020-11-23 PROCEDURE — 20553 NJX 1/MLT TRIGGER POINTS 3/>: CPT | Mod: HCNC,S$GLB,, | Performed by: ANESTHESIOLOGY

## 2020-11-23 PROCEDURE — 99213 PR OFFICE/OUTPT VISIT, EST, LEVL III, 20-29 MIN: ICD-10-PCS | Mod: HCNC,25,GC,S$GLB | Performed by: ANESTHESIOLOGY

## 2020-11-23 PROCEDURE — 3288F FALL RISK ASSESSMENT DOCD: CPT | Mod: HCNC,CPTII,S$GLB, | Performed by: ANESTHESIOLOGY

## 2020-11-23 PROCEDURE — 20553 PR INJECT TRIGGER POINTS, > 3: ICD-10-PCS | Mod: HCNC,S$GLB,, | Performed by: ANESTHESIOLOGY

## 2020-11-23 PROCEDURE — 99213 OFFICE O/P EST LOW 20 MIN: CPT | Mod: HCNC,25,GC,S$GLB | Performed by: ANESTHESIOLOGY

## 2020-11-23 PROCEDURE — 3078F DIAST BP <80 MM HG: CPT | Mod: HCNC,CPTII,S$GLB, | Performed by: ANESTHESIOLOGY

## 2020-11-23 PROCEDURE — 3078F PR MOST RECENT DIASTOLIC BLOOD PRESSURE < 80 MM HG: ICD-10-PCS | Mod: HCNC,CPTII,S$GLB, | Performed by: ANESTHESIOLOGY

## 2020-11-23 PROCEDURE — 99999 PR PBB SHADOW E&M-EST. PATIENT-LVL IV: ICD-10-PCS | Mod: PBBFAC,HCNC,, | Performed by: ANESTHESIOLOGY

## 2020-11-23 PROCEDURE — 1159F MED LIST DOCD IN RCRD: CPT | Mod: HCNC,S$GLB,, | Performed by: ANESTHESIOLOGY

## 2020-11-23 PROCEDURE — 3074F SYST BP LT 130 MM HG: CPT | Mod: HCNC,CPTII,S$GLB, | Performed by: ANESTHESIOLOGY

## 2020-11-23 PROCEDURE — 1159F PR MEDICATION LIST DOCUMENTED IN MEDICAL RECORD: ICD-10-PCS | Mod: HCNC,S$GLB,, | Performed by: ANESTHESIOLOGY

## 2020-11-23 PROCEDURE — 3008F PR BODY MASS INDEX (BMI) DOCUMENTED: ICD-10-PCS | Mod: HCNC,CPTII,S$GLB, | Performed by: ANESTHESIOLOGY

## 2020-11-23 PROCEDURE — 1101F PR PT FALLS ASSESS DOC 0-1 FALLS W/OUT INJ PAST YR: ICD-10-PCS | Mod: HCNC,CPTII,S$GLB, | Performed by: ANESTHESIOLOGY

## 2020-11-23 PROCEDURE — 1125F PR PAIN SEVERITY QUANTIFIED, PAIN PRESENT: ICD-10-PCS | Mod: HCNC,S$GLB,, | Performed by: ANESTHESIOLOGY

## 2020-11-23 PROCEDURE — 1101F PT FALLS ASSESS-DOCD LE1/YR: CPT | Mod: HCNC,CPTII,S$GLB, | Performed by: ANESTHESIOLOGY

## 2020-11-23 PROCEDURE — 3008F BODY MASS INDEX DOCD: CPT | Mod: HCNC,CPTII,S$GLB, | Performed by: ANESTHESIOLOGY

## 2020-11-23 PROCEDURE — 99999 PR PBB SHADOW E&M-EST. PATIENT-LVL IV: CPT | Mod: PBBFAC,HCNC,, | Performed by: ANESTHESIOLOGY

## 2020-11-23 PROCEDURE — 3074F PR MOST RECENT SYSTOLIC BLOOD PRESSURE < 130 MM HG: ICD-10-PCS | Mod: HCNC,CPTII,S$GLB, | Performed by: ANESTHESIOLOGY

## 2020-11-23 RX ORDER — METHOCARBAMOL 500 MG/1
500 TABLET, FILM COATED ORAL 4 TIMES DAILY
Qty: 120 TABLET | Refills: 0 | Status: SHIPPED | OUTPATIENT
Start: 2020-11-23 | End: 2020-12-23

## 2020-11-23 NOTE — PROGRESS NOTES
Chronic patient Established Note (Follow up visit)      SUBJECTIVE:    Linnea Renae presents to the clinic for a follow-up appointment fromRight L2, L3, L4, L5 RFA . Since the last visit, Linnea Renae states the pain has been worsening. Current pain intensity is 10/10.    Mr. Renae presents s/p R L2-L5 RFA. She has worsened pain since the procedure. She noticed the pain while on the table and it has been very intense since. Pain localized to right lower back with radiation around right back to right buttocks, but not down the leg. No urgent symptoms. She has tried ice packs, tramadol, zanaflex without benefit. Ibuprofen 800 mg is the only thing that takes the edge off. She cannot tell if the injection took away her original pain due to intensity of this pain.    Interval History 10/1/2020  69 yo F presents to the pain clinic w/ CC of LBP radiating into the back of the thigh. Patient states that the pain is similar in intensity to the pain she experienced beforehand which responded well to Lumbar RFA. She states that she is not having pain on the left side of her back though.      Interval History 8/9/2020  Linnea Renae presents to the clinic for a follow-up appointment after injection bilateral SIJ and GTB injections performed on 7/8/20. She tolerated the procedure very well. Since the last visit, Linnea Renae states the pain has been improving. She reports 85% of her pain remains gone. The remaining pain has not been worsening. She is now able to do more around the house and yard than ever in the past 15 years. She is very happy with the results of this intervention. She has no questions about herself. She inquires about whether a friend could come see us in clinic.      She continues using the same pain medications as at her last visit. There have been no changes in her medication regimen or medical history.         Interval HPI 5/19/20:    Ms. Renae was contacted at home for follow-up of her chronic low  back pain. She is s/p staged RFA in December 2019 and January 2020 with significant relief. She states that she was doing well until 4 weeks ago where she started home exercises which she believes has exacerbated her symptoms. She currently endorses 10/10 dull, aching low back and buttock pain similar in character and severity has prior to her RFA procedures. She continues to take Ibuprofen, Zanaflex, Tramadol, and uses Voltaren gel with some relief.      Interval HPI 4/20/20  Linnea Renae presents to tele-medicine appointment for the evaluation of low back pain. The pain started is still improved and symptoms have been improving with current medication regimen      On average pain is rated as 4/10.   Today the pain is rated as 2/10     Interval HPI 3/30/20  Contacted Ms. Renae today for follow-up of her low back pain. She is s/p staged (left then right) L2-3-4-5 RFA where she states she attained 90% relief of her low back pain. She states she continues to experience intermittent, dull, aching bilateral lower lumbar pain with prolonged flexing/extending, prolonged standing, and with strenuous activity, however, it has decreased significantly in severity since prior. She does continue to take Tramadol PRN and Zanaflex PRN with added benefit. She denies any other acute changes at this time.      Interval HPI 7/9/19  Linnea Renae presents to the clinic for follow up of lower back pain.  She reports doing well since last visit.  Her pain has been tolerable.  She had benefit with previous RFAs.  She has been doing PT but feels like it worsens her pain.  She would like to stop and go to the gym on her own.  She is a member at [employer] in [place].  She is not having any leg pain or numbness today.  Her pain today is 2/10.     Interval HPI 5/9/19     Linnea Renae presents to the clinic for follow up of lower back pain.  She reports significant improvement since her previous encounter.  Her left sided back pain  has resolved.  She is a slight ache to right lower back and buttocks which is tolerable.  She is interested in PT for strengthening.  She has done aquatic PT in the past but would like land therapy.  She is a member at [employer] in [place].  She denies any radicular symptoms at this time.  Her pain today is 1/10.    Pain Disability Index Review:  Last 3 PDI Scores 8/6/2020 12/5/2019 9/12/2019   Pain Disability Index (PDI) 12 0 50       Pain Medications:  - Opioids: Ultram (Tramadol HCL)  - Adjuvant Medications: Advil, Motrin (Ibuprofen) and zanaflex  - Anti-Coagulants: Aspirin  - Others: see med list      Opioid Contract: not applicable     report:  Reviewed and consistent with medication use as prescribed.    Pain Procedures:   7/29/16 Right L3-4 TF CHRISTIAN  11/3/17 Bilateral L3-4 and L4-5 facet injections  5/9/18 Bilateral L3-4 and L4-5 facet injections  7/25/18 Bilateral L3-4 and L4-5 facet injections  12/5/18 Right L2,3,4,5 RFA- 80% relief  3/20/19 Left L2,3,4,5 RFA- 100% relief  10/2/19 left L4/5 TFESI  12/18/19 LEFT L2,3,4,5 RFA- 90% relief  1/8/20 RIGHT L2,3,4,5 RFA- 90% relief  7/8/20 B/L SIJ, GTB - 85% relief  11/04/20 Right L2, L3, L4, L5 RFA -0% relief    Physical Therapy/Home Exercise: yes    Imaging:   Narrative       EXAMINATION:  MRI LUMBAR SPINE W WO CONTRAST    CLINICAL HISTORY:  Low back pain, >6wks conservative tx, persistent-progressive sx, surgical candidate; Spondylosis without myelopathy or radiculopathy, site unspecified    TECHNIQUE:  Multiplanar, multisequence MR images were acquired from the thoracolumbar junction to the sacrum prior to and following administration of 10 cc IV Gadavist.    COMPARISON:  Lumbar spine radiograph 10/11/2017; MRI lumbar spine with/without contrast 09/20/2016    FINDINGS:  Sagittal alignment demonstrates grade 1 anterolisthesis of L3 on L4 with slight uncovering of the intervertebral disc.  Vertebral body heights are satisfactorily maintained.   Intervertebral disc spaces are preserved.  Marrow signal is within normal limits with no evidence of fracture or marrow replacement process noting a small vertebral body hemangioma at L1.    Conus appears within normal limits terminating at the level of the L1 level.  Cauda equina appears normal.    Paraspinous soft tissues demonstrate mild prominence of the common bile duct and small left renal cysts..    T12-L1:   No spinal canal or neuroforaminal narrowing.    L1-2:  No spinal canal or neuroforaminal narrowing.    L2-3:  Mild posterior disc bulge and moderate bilateral facet arthropathy without spinal canal stenosis or neural foraminal narrowing.    L3-4:  The diffuse posterior disc bulge, buckling of the ligamentum flavum, and moderate bilateral facet arthropathy results in mild spinal canal stenosis and no neural foraminal narrowing.    L4-5:  Diffuse posterior disc bulge with superimposed central disc protrusion, buckling of the ligamentum flavum, and moderate bilateral facet arthropathy result in moderate spinal canal stenosis and no significant neural foraminal narrowing.    L5-S1:  Mild posterior disc bulge and mild facet arthropathy results in mild right/moderate left neural foraminal narrowing.    No abnormal enhancement.       Impression         Multilevel lumbar spondylosis worst at L4-5 resulting in moderate spinal canal stenosis.    Mild prominence of the common bile duct which is nonspecific and may represent sequela of cholecystectomy.            X-Ray Lumbar Spine Ap Lateral w/Flex Ext     Narrative       10/11/17 10:17:19    Accession: [id number]    CLINICAL INDICATION: 67 year old F with  low back pain    COMPARISON: Lumbar spine x-rays, 09/20/2016.    TECHNIQUE: AP, lateral, flexion, extension, and coned down lateral radiographs of the lumbar spine.    FINDINGS:     Vertebral body heights are maintained.  No evidence of fracture.      Normal sagittal alignment is preserved.No significant  translation with flexion-extension.    Moderate multilevel degenerative changes are again present. Mild anterolisthesis of L3 with respect to L4 is again noted. Multilevel facet arthropathy is present, most pronounced in the lower lumbar spine.  No detrimental change is identified when compared with the examination performed one year prior.       Impression           Moderate multilevel degenerative changes in the lumbar spine, similar in appearance to the prior exam.    No evidence of fracture or malalignment.         Allergies:   Review of patient's allergies indicates:   Allergen Reactions    Codeine Itching       Current Medications:   Current Outpatient Medications   Medication Sig Dispense Refill    alcohol antiseptic pads (ALCOHOL PREP PADS TOP)       aspirin 81 MG Chew Take 81 mg by mouth once daily.      atenoloL (TENORMIN) 50 MG tablet TAKE 1 TABLET BY MOUTH TWICE DAILY 180 tablet 2    atorvastatin (LIPITOR) 20 MG tablet TAKE 1 TABLET BY MOUTH ONCE DAILY 90 tablet 2    blood sugar diagnostic Strp 1 strip by Misc.(Non-Drug; Combo Route) route once daily. 100 each 3    calcium-vitamin D3 500 mg(1,250mg) -200 unit per tablet Take 1 tablet by mouth 2 (two) times daily with meals.       cycloSPORINE (RESTASIS) 0.05 % ophthalmic emulsion 1 drop 2 (two) times daily.      diclofenac sodium (VOLTAREN) 1 % Gel Apply 2 g topically 4 (four) times daily. 100 g 2    flu vac 2020 65up-afkXX75M,PF, (FLUAD QUAD 2020-21,65Y UP,,PF,) 60 mcg (15 mcg x 4)/0.5 mL Syrg inject 0.5 ml into the muscle for one dose 0.5 mL 0    fluticasone propionate (FLONASE) 50 mcg/actuation nasal spray 2 sprays (100 mcg total) by Each Nostril route once daily. 3 Bottle 2    hydroCHLOROthiazide (HYDRODIURIL) 25 MG tablet Take 1 tablet (25 mg total) by mouth once daily. 90 tablet 2    ibuprofen (ADVIL,MOTRIN) 800 MG tablet Take 1 tablet by mouth three times daily as needed 60 tablet 2    ketoconazole (NIZORAL) 2 % cream Apply topically  once daily. 60 g 3    ketoconazole (NIZORAL) 2 % shampoo       ketoconazole (NIZORAL) 2 % shampoo Apply topically to scalp in place of regular shampoo 2 to 3 times a week 120 mL 9    lancets 33 gauge Misc 1 lancet by Misc.(Non-Drug; Combo Route) route once daily. 100 each 3    phvhunyhl-[id number]-algal oil (METANX/FOLTANX RF) 3 mg-35 mg-2 mg -90.314 mg Cap Take 1 tablet by mouth 2 (two) times daily. 180 capsule 0    loratadine 10 mg Cap       metFORMIN (GLUCOPHAGE) 500 MG tablet Take 2 tablets (1,000 mg total) by mouth 2 (two) times daily with meals. 360 tablet 1    minoxidil (LONITEN) 2.5 MG tablet       MINOXIDIL TOP Apply topically.      multivitamin (ONE DAILY MULTIVITAMIN) per tablet Take 1 tablet by mouth once daily.      nystatin (MYCOSTATIN) powder Apply topically 4 (four) times daily Under pannus as needed 180 g 2    olmesartan (BENICAR) 20 MG tablet Take 1 tablet (20 mg total) by mouth once daily. 90 tablet 2    omeprazole (PRILOSEC) 20 MG capsule Take 1 capsule by mouth once daily 90 capsule 0    tiZANidine (ZANAFLEX) 4 MG tablet Take 1 tablet (4 mg total) by mouth 2 (two) times daily as needed. 60 tablet 2    topiramate (TOPAMAX) 50 MG tablet Take 1 tablet (50 mg total) by mouth 2 (two) times daily. 60 tablet 3    traMADoL (ULTRAM) 50 mg tablet TAKE 1 TABLET BY MOUTH EVERY 6 HOURS AS NEEDED FOR PAIN 120 tablet 0     No current facility-administered medications for this visit.        REVIEW OF SYSTEMS:    GENERAL:  No weight loss, malaise or fevers.  HEENT:  Negative for frequent or significant headaches.  NECK:  Negative for lumps, goiter, pain and significant neck swelling.  RESPIRATORY:  Negative for cough, wheezing or shortness of breath.  CARDIOVASCULAR:  Negative for chest pain, leg swelling or palpitations.  GI:  Negative for abdominal discomfort, blood in stools or black stools or change in bowel habits.  MUSCULOSKELETAL:  See HPI.  SKIN:  Negative for lesions, rash, and  itching.  PSYCH:  +ve for sleep disturbance, mood disorder and recent psychosocial stressors.  HEMATOLOGY/LYMPHOLOGY:  Negative for prolonged bleeding, bruising easily or swollen nodes.  NEURO:   No history of headaches, syncope, paralysis, seizures or tremors.  All other reviewed and negative other than HPI.    Past Medical History:  Past Medical History:   Diagnosis Date    Allergy     Breast cancer     Lumpectomy 10/15.    Chronic constipation     Colon polyps     Diabetes mellitus, type 2     Dry eyes     GERD (gastroesophageal reflux disease)     Hyperlipidemia     Hypertension     Lumbar disc disease     Type 2 diabetes mellitus        Past Surgical History:  Past Surgical History:   Procedure Laterality Date    BREAST BIOPSY      BREAST LUMPECTOMY Right     2015    CATARACT EXTRACTION, BILATERAL       SECTION      x3    CHOLECYSTECTOMY      COLONOSCOPY N/A 10/11/2018    Procedure: COLONOSCOPY;  Surgeon: Lorenzo Tanner MD;  Location: [address]);  Service: Endoscopy;  Laterality: N/A;    COLONOSCOPY W/ POLYPECTOMY      HYSTERECTOMY      and USO    INJECTION OF FACET JOINT Bilateral 2018    Procedure: INJECTION, FACET JOINT;  Surgeon: Viet Wilson MD;  Location: [facility];  Service: Pain Management;  Laterality: Bilateral;  LUMBAR BILATERAL L3-L4 AND L4-L5 FACET STEROID INJECTION    NEED CONSENT    INJECTION OF JOINT Bilateral 2020    Procedure: INJECTION, JOINT, Bilateral SI;  Surgeon: Viet Wilson MD;  [facility];  Service: Pain Management;  Laterality: Bilateral;    RADIOFREQUENCY ABLATION Left 3/20/2019    Procedure: RADIOFREQUENCY ABLATION LEFT L2,3,4,5;  Surgeon: Viet Wilson MD;  Location: [facility];  Service: Pain Management;  Laterality: Left;  LEFT RFA L2,3,4,5    NEEDS CONSENT    RADIOFREQUENCY ABLATION Left 2019    Procedure: RADIOFREQUENCY ABLATION, LEFT L2-L3-L4-L5  1 OF 2;  Surgeon: Viet BENNETT  MD Steve;  Location: [facility];  Service: Pain Management;  Laterality: Left;    RADIOFREQUENCY ABLATION Right 2020    Procedure: RADIOFREQUENCY ABLATION, RIGHT L2-L3-L4-L5 MEDIAL BRANCH 2 OF;  Surgeon: Viet Wilson MD;  Location: [facility];  Service: Pain Management;  Laterality: Right;    RADIOFREQUENCY ABLATION Right 2020    Procedure: RADIOFREQUENCY ABLATION Right L2, L3, L4, L5;  Surgeon: Viet Wilson MD;  Location: [facility];  Service: Pain Management;  Laterality: Right;  Right L2, L3, L4, L5 RFA    TRANSFORAMINAL EPIDURAL INJECTION OF STEROID Left 10/2/2019    Procedure: INJECTION, STEROID, EPIDURAL, TRANSFORAMINAL APPROACH, L4 AND L5;  Surgeon: Viet Wilson MD;  [facility];  Service: Pain Management;  Laterality: Left;    TUBAL LIGATION         Family History:  Family History   Problem Relation Age of Onset    No Known Problems Mother     No Known Problems Father     Heart disease Paternal Grandmother     Thyroid disease Paternal Grandfather     Hypertension Sister     Hypertension Brother     Glaucoma Brother     No Known Problems Daughter     No Known Problems Daughter     No Known Problems Daughter        Social History:  Social History     Socioeconomic History    Marital status:      Spouse name: Not on file    Number of children: 3    Years of education: Not on file    Highest education level: Not on file   Occupational History    Not on file   Social Needs    Financial resource strain: Not on file    Food insecurity     Worry: Not on file     Inability: Not on file    Transportation needs     Medical: Not on file     Non-medical: Not on file   Tobacco Use    Smoking status: Former Smoker     Packs/day: 0.25     Years: 20.00     Pack years: 5.00     Quit date: 1985     Years since quittin.9    Smokeless tobacco: Never Used    Tobacco comment: Quit mid .   Substance and Sexual Activity    Alcohol  "use: No     Alcohol/week: 0.0 standard drinks    Drug use: No    Sexual activity: Yes   Lifestyle    Physical activity     Days per week: Not on file     Minutes per session: Not on file    Stress: Not on file   Relationships    Social connections     Talks on phone: Not on file     Gets together: Not on file     Attends Sikh service: Not on file     Active member of club or organization: Not on file     Attends meetings of clubs or organizations: Not on file     Relationship status: Not on file   Other Topics Concern    Not on file   Social History Narrative    Not on file       OBJECTIVE:    /64   Pulse 81   Resp 18   Ht 5' 8" (1.727 m)   Wt 126.3 kg (278 lb 7.1 oz)   SpO2 100%   BMI 42.34 kg/m²     PHYSICAL EXAMINATION:    General appearance: Well appearing, in no acute distress, alert and oriented x3.  Psych:  Mood and affect appropriate.  Skin: Skin color, texture, turgor normal, no rashes or lesions visible, in both upper and lower body.  Head/face:  Atraumatic, normocephalic. No palpable lymph nodes  Cor: RRR  Pulm: non-labored  GI: Abdomen soft and non-tender.  Back: Straight leg raising in the sitting and supine positions is negative to radicular pain. + ttp over right >>> left lumbar paraspinals. Pain with extension > flexion.  Extremities: Peripheral joint ROM is full and pain free without obvious instability or laxity in all four extremities. No deformities, edema, or skin discoloration. Good capillary refill.  Musculoskeletal: + facet loading bilaterally worse on right >>> left. Shoulder, hip, sacroiliac and knee provocative maneuvers are negative. Bilateral upper and lower extremity strength is normal and symmetric.  No atrophy or tone abnormalities are noted.  Neuro: Bilateral lower extremity coordination and muscle stretch reflexes are physiologic and symmetric.  Plantar response are downgoing. No loss of sensation is noted.  Gait: Normal.    ASSESSMENT: 70 y.o. year old " female with low back pain, consistent with      1. Myofascial pain  methocarbamoL (ROBAXIN) 500 MG Tab   2. Osteoarthritis of lumbar spine, unspecified spinal osteoarthritis complication status  methocarbamoL (ROBAXIN) 500 MG Tab   3. Spondylosis without myelopathy  methocarbamoL (ROBAXIN) 500 MG Tab   4. Sacroiliitis  methocarbamoL (ROBAXIN) 500 MG Tab         PLAN:     - I have stressed the importance of physical activity and a home exercise plan to help with pain and improve health.  - Patient can continue with medications for now since they are providing benefits, using them appropriately, and without side effects, except change tizanidine to robaxin 500 mg qid  -  trigger point injection today. See procedure note.  -Rx Robaxin 500 mg qid prn  - RTC 4-6 weeks, consider further imaging if still not resolved at that time  - Counseled patient regarding the importance of activity modification, constant sleeping habits and physical therapy.    The above plan and management options were discussed at length with patient. Patient is in agreement with the above and verbalized understanding.    Basil Ewing    I have personally reviewed the history and exam of this patient and agree with the resident/fellow/NPs note as stated above.    Viet Wilson MD    11/23/2020     Patient Name: Linnea Renae  MRN: [id number]    INFORMED CONSENT: The procedure, risks, benefits and options were discussed with patient. There are no contraindications to the procedure. The patient expressed understanding and agreed to proceed. The personnel performing the procedure was discussed. I verify that I personally obtained Linnea's consent prior to the start of the procedure and the signed consent can be found on the patient's chart.    Anesthesia: Topical    Pre Procedure diagnosis:   1. Myofascial pain    2. Osteoarthritis of lumbar spine, unspecified spinal osteoarthritis complication status    3. Spondylosis without myelopathy    4.  Sacroiliitis        Post-Procedure diagnosis: same      Sedation: None    PROCEDURE: TRIGGER POINT INJECTION  The patient was placed in a seated position. The site of pain and procedure were confirmed with the patient prior to starting the procedure. After performing time out. The patient's  trigger points were identified and marked. The skin was prepped with chlorhexidine three times.   After performing time out A 25-gauge 1.5 inch  needle was advanced through the skin and subcutaneous tissues.  Aspiration for blood, air and CSF was negative.  A total of 10 ml of Bupivacaine 0.25% and 40 mg depomedrol  was injected at all trigger point.  No complications were evident. No specimens collected.    Blood Loss: Nill  Specimen: None    Viet Wilson MD

## 2020-11-24 ENCOUNTER — OFFICE VISIT (OUTPATIENT)
Dept: PODIATRY | Facility: CLINIC | Age: 70
End: 2020-11-24
Payer: MEDICARE

## 2020-11-24 VITALS
HEIGHT: 68 IN | BODY MASS INDEX: 42.13 KG/M2 | HEART RATE: 68 BPM | DIASTOLIC BLOOD PRESSURE: 66 MMHG | WEIGHT: 278 LBS | SYSTOLIC BLOOD PRESSURE: 119 MMHG

## 2020-11-24 DIAGNOSIS — G89.29 OTHER CHRONIC PAIN: ICD-10-CM

## 2020-11-24 DIAGNOSIS — G57.53 TARSAL TUNNEL SYNDROME OF BOTH LOWER EXTREMITIES: Primary | ICD-10-CM

## 2020-11-24 DIAGNOSIS — G58.9 NERVE ENTRAPMENT: ICD-10-CM

## 2020-11-24 DIAGNOSIS — D36.10 NEUROMA: ICD-10-CM

## 2020-11-24 PROCEDURE — 99214 OFFICE O/P EST MOD 30 MIN: CPT | Mod: 25,HCNC,S$GLB, | Performed by: PODIATRIST

## 2020-11-24 PROCEDURE — 3074F SYST BP LT 130 MM HG: CPT | Mod: HCNC,CPTII,S$GLB, | Performed by: PODIATRIST

## 2020-11-24 PROCEDURE — 3008F BODY MASS INDEX DOCD: CPT | Mod: HCNC,CPTII,S$GLB, | Performed by: PODIATRIST

## 2020-11-24 PROCEDURE — 99214 PR OFFICE/OUTPT VISIT, EST, LEVL IV, 30-39 MIN: ICD-10-PCS | Mod: 25,HCNC,S$GLB, | Performed by: PODIATRIST

## 2020-11-24 PROCEDURE — 99999 PR PBB SHADOW E&M-EST. PATIENT-LVL III: ICD-10-PCS | Mod: PBBFAC,HCNC,, | Performed by: PODIATRIST

## 2020-11-24 PROCEDURE — 3078F PR MOST RECENT DIASTOLIC BLOOD PRESSURE < 80 MM HG: ICD-10-PCS | Mod: HCNC,CPTII,S$GLB, | Performed by: PODIATRIST

## 2020-11-24 PROCEDURE — 99999 PR PBB SHADOW E&M-EST. PATIENT-LVL III: CPT | Mod: PBBFAC,HCNC,, | Performed by: PODIATRIST

## 2020-11-24 PROCEDURE — 64450 PR NERVE BLOCK INJ, ANES/STEROID, OTHER PERIPHERAL: ICD-10-PCS | Mod: 50,HCNC,S$GLB, | Performed by: PODIATRIST

## 2020-11-24 PROCEDURE — 1159F PR MEDICATION LIST DOCUMENTED IN MEDICAL RECORD: ICD-10-PCS | Mod: HCNC,S$GLB,, | Performed by: PODIATRIST

## 2020-11-24 PROCEDURE — 64455 PR INJECT ANES/STEROID PLANTAR COMMON DIGITAL NERVE: ICD-10-PCS | Mod: 59,50,HCNC,S$GLB | Performed by: PODIATRIST

## 2020-11-24 PROCEDURE — 64455 NJX AA&/STRD PLTR COM DG NRV: CPT | Mod: 59,50,HCNC,S$GLB | Performed by: PODIATRIST

## 2020-11-24 PROCEDURE — 1159F MED LIST DOCD IN RCRD: CPT | Mod: HCNC,S$GLB,, | Performed by: PODIATRIST

## 2020-11-24 PROCEDURE — 1125F PR PAIN SEVERITY QUANTIFIED, PAIN PRESENT: ICD-10-PCS | Mod: HCNC,S$GLB,, | Performed by: PODIATRIST

## 2020-11-24 PROCEDURE — 3078F DIAST BP <80 MM HG: CPT | Mod: HCNC,CPTII,S$GLB, | Performed by: PODIATRIST

## 2020-11-24 PROCEDURE — 3008F PR BODY MASS INDEX (BMI) DOCUMENTED: ICD-10-PCS | Mod: HCNC,CPTII,S$GLB, | Performed by: PODIATRIST

## 2020-11-24 PROCEDURE — 64450 NJX AA&/STRD OTHER PN/BRANCH: CPT | Mod: 50,HCNC,S$GLB, | Performed by: PODIATRIST

## 2020-11-24 PROCEDURE — 3074F PR MOST RECENT SYSTOLIC BLOOD PRESSURE < 130 MM HG: ICD-10-PCS | Mod: HCNC,CPTII,S$GLB, | Performed by: PODIATRIST

## 2020-11-24 PROCEDURE — 1125F AMNT PAIN NOTED PAIN PRSNT: CPT | Mod: HCNC,S$GLB,, | Performed by: PODIATRIST

## 2020-11-24 RX ORDER — TRIAMCINOLONE ACETONIDE 40 MG/ML
40 INJECTION, SUSPENSION INTRA-ARTICULAR; INTRAMUSCULAR
Status: COMPLETED | OUTPATIENT
Start: 2020-11-24 | End: 2020-11-24

## 2020-11-24 RX ADMIN — TRIAMCINOLONE ACETONIDE 40 MG: 40 INJECTION, SUSPENSION INTRA-ARTICULAR; INTRAMUSCULAR at 04:11

## 2020-11-29 NOTE — PROGRESS NOTES
Subjective:      Patient ID: Linnea Renae is a 70 y.o. female.    Chief Complaint: Diabetes Mellitus and Foot Pain (b/l)    Linena is a 70 y.o. female who presents to the clinic upon referral from Dr. Nat marsh. provider found  for evaluation and treatment of diabetic feet. Linena has a past medical history of Allergy, Breast cancer, Chronic constipation, Colon polyps, Diabetes mellitus, type 2, Dry eyes, GERD (gastroesophageal reflux disease), Hyperlipidemia, Hypertension, Lumbar disc disease, and Type 2 diabetes mellitus. Patient relates no major problem with feet. Only complaints today consist of routine foot evaluation and painful nerve symptoms to both feet.  She is here for routine evaluation/diabetic foot check.  She is also here to discuss her neuropathic symptoms which have significantly improved injections.  She has great relief with bilateral tarsal tunnel injections.  She continues to want to discuss surgical intervention however she is here for nerve injections today as she gets great relief from them for approximately 3 months at a time.        PCP: Bailee Moss MD    Date Last Seen by PCP:   Chief Complaint   Patient presents with    Diabetes Mellitus    Foot Pain     b/l         Current shoe gear: Casual shoes    Hemoglobin A1C   Date Value Ref Range Status   07/10/2020 6.2 (H) 4.0 - 5.6 % Final     Comment:     ADA Screening Guidelines:  5.7-6.4%  Consistent with prediabetes  >or=6.5%  Consistent with diabetes  High levels of fetal hemoglobin interfere with the HbA1C  assay. Heterozygous hemoglobin variants (HbS, HgC, etc)do  not significantly interfere with this assay.   However, presence of multiple variants may affect accuracy.     12/05/2019 5.9 (H) 4.0 - 5.6 % Final     Comment:     ADA Screening Guidelines:  5.7-6.4%  Consistent with prediabetes  >or=6.5%  Consistent with diabetes  High levels of fetal hemoglobin interfere with the HbA1C  assay. Heterozygous hemoglobin variants (HbS,  HgC, etc)do  not significantly interfere with this assay.   However, presence of multiple variants may affect accuracy.     03/19/2019 6.0 (H) 4.0 - 5.6 % Final     Comment:     ADA Screening Guidelines:  5.7-6.4%  Consistent with prediabetes  >or=6.5%  Consistent with diabetes  High levels of fetal hemoglobin interfere with the HbA1C  assay. Heterozygous hemoglobin variants (HbS, HgC, etc)do  not significantly interfere with this assay.   However, presence of multiple variants may affect accuracy.             Review of Systems   Constitution: Negative for chills and fever.   HENT: Negative for congestion and tinnitus.    Eyes: Negative for double vision and visual disturbance.   Cardiovascular: Negative for chest pain and claudication.   Respiratory: Negative for hemoptysis and shortness of breath.    Endocrine: Negative for cold intolerance and heat intolerance.   Hematologic/Lymphatic: Negative for adenopathy and bleeding problem.   Skin: Positive for color change, dry skin and nail changes.   Musculoskeletal: Positive for myalgias and stiffness.   Gastrointestinal: Negative for nausea and vomiting.   Genitourinary: Negative for dysuria and hematuria.   Neurological: Positive for numbness.   Psychiatric/Behavioral: Negative for altered mental status and suicidal ideas.   Allergic/Immunologic: Negative for environmental allergies and persistent infections.           Objective:      Physical Exam  Vitals signs reviewed.   Constitutional:       Appearance: She is well-developed.   Cardiovascular:      Pulses:           Dorsalis pedis pulses are 1+ on the right side and 1+ on the left side.        Posterior tibial pulses are 1+ on the right side and 1+ on the left side.   Pulmonary:      Effort: Pulmonary effort is normal.   Musculoskeletal: Normal range of motion.      Comments: Anterior, lateral, and posterior muscle groups bilateral lower extremities show strength 4 over 5 symmetrically. Inspection and palpation  of the joints and bones reveal no crepitus or joint effusion. No tenderness upon palpation. Mild plantar flexor contractures noted to digits 2 through 5 bilaterally.  Angle and base of gait are normal.   Feet:      Right foot:      Skin integrity: Callus and dry skin present.      Left foot:      Skin integrity: Callus and dry skin present.   Skin:     General: Skin is warm and dry.      Capillary Refill: Capillary refill takes 2 to 3 seconds.      Coloration: Skin is pale.      Comments: Skin bilateral lower extremities noted to be thin, dry, and atrophic.  Toenails normal.   Neurological:      Mental Status: She is alert and oriented to person, place, and time.      Sensory: Sensory deficit present.      Motor: Atrophy present.      Deep Tendon Reflexes: Reflexes abnormal.      Reflex Scores:       Patellar reflexes are 1+ on the right side and 1+ on the left side.       Achilles reflexes are 1+ on the right side and 1+ on the left side.     Comments: Sharp, dull, light touch, and vibratory sensation are diminished bilaterally. Proprioceptive sensation is intact to both lower extremities. [place] Jackie monofilament exam shows loss of protective sensation to plantar toes 1 through 5 bilaterally. Deep tendon reflexes to the patellar tendons is 1 over 4 bilaterally symmetrical. Deep tendon reflexes to the Achilles tendon is 0 over 4 bilaterally symmetrical. No ankle clonus or Babinski reflex noted bilaterally. Coordination is fair to both lower extremities.  Patient admits to intermittent burning and tingling in the feet.      Positive Tinel sign positive provocation sign noted to bilateral tibial nerves as well as bilateral deep peroneal nerves.               Assessment:       Encounter Diagnoses   Name Primary?    Tarsal tunnel syndrome of both lower extremities Yes    Nerve entrapment     Other chronic pain     Neuroma          Plan:       Linnea was seen today for diabetes mellitus and foot  pain.    Diagnoses and all orders for this visit:    Tarsal tunnel syndrome of both lower extremities    Nerve entrapment    Other chronic pain    Neuroma    Other orders  -     triamcinolone acetonide injection 40 mg      I counseled the patient on her conditions, their implications and medical management.    The area overlying the bilateral tarsal tunnel nerve(s) in bilateral deep peroneal nerves was sterilely prepped, verbal consent was obtained, 2 cc's of 0.5% Marcaine plain was infiltrated around the affected nerve(s) for a diagnostic nerve block.  This was well tolerated with no complications.    A steroid injection was performed at bilateral 3rd metatarsal spaces using 1% plain Lidocaine and 1 mL/40 mg of Kenalog. This was well tolerated.      Shoe inspection. Diabetic Foot Education. Patient reminded of the importance of good nutrition and blood sugar control to help prevent podiatric complications of diabetes. Patient instructed on proper foot hygeine. We discussed wearing proper shoe gear, daily foot inspections and Diabetic foot education in detail.    Follow-up in 1-2 months.    Follow-up in 3-6 months to discuss possible nerve decompression bilateral tarsal tunnel.  .  Follow-up in 3 months.

## 2020-12-02 DIAGNOSIS — E11.40 TYPE 2 DIABETES MELLITUS WITH DIABETIC NEUROPATHY, WITHOUT LONG-TERM CURRENT USE OF INSULIN: ICD-10-CM

## 2020-12-02 RX ORDER — METFORMIN HYDROCHLORIDE 500 MG/1
1000 TABLET ORAL 2 TIMES DAILY WITH MEALS
Qty: 360 TABLET | Refills: 1 | Status: SHIPPED | OUTPATIENT
Start: 2020-12-02 | End: 2021-05-31 | Stop reason: SDUPTHER

## 2020-12-03 ENCOUNTER — TELEPHONE (OUTPATIENT)
Dept: PRIMARY CARE CLINIC | Facility: CLINIC | Age: 70
End: 2020-12-03

## 2020-12-03 ENCOUNTER — HOSPITAL ENCOUNTER (EMERGENCY)
Facility: OTHER | Age: 70
Discharge: HOME | End: 2020-12-03
Attending: EMERGENCY MEDICINE
Payer: MEDICARE

## 2020-12-03 VITALS
BODY MASS INDEX: 41.98 KG/M2 | SYSTOLIC BLOOD PRESSURE: 128 MMHG | HEIGHT: 68 IN | OXYGEN SATURATION: 98 % | RESPIRATION RATE: 15 BRPM | TEMPERATURE: 98 F | DIASTOLIC BLOOD PRESSURE: 62 MMHG | HEART RATE: 64 BPM | WEIGHT: 277 LBS

## 2020-12-03 DIAGNOSIS — F41.9 ANXIETY: ICD-10-CM

## 2020-12-03 LAB
ALBUMIN SERPL BCP-MCNC: 3.6 G/DL (ref 3.5–5.2)
ALP SERPL-CCNC: 89 U/L (ref 55–135)
ALT SERPL W/O P-5'-P-CCNC: 12 U/L (ref 10–44)
ANION GAP SERPL CALC-SCNC: 10 MMOL/L (ref 8–16)
AST SERPL-CCNC: 21 U/L (ref 10–40)
BASOPHILS # BLD AUTO: 0.06 K/UL (ref 0–0.2)
BASOPHILS NFR BLD: 0.4 % (ref 0–1.9)
BILIRUB SERPL-MCNC: 0.2 MG/DL (ref 0.1–1)
BILIRUB UR QL STRIP: NEGATIVE
BUN SERPL-MCNC: 20 MG/DL (ref 8–23)
CALCIUM SERPL-MCNC: 9.8 MG/DL (ref 8.7–10.5)
CHLORIDE SERPL-SCNC: 102 MMOL/L (ref 95–110)
CLARITY UR: CLEAR
CO2 SERPL-SCNC: 25 MMOL/L (ref 23–29)
COLOR UR: YELLOW
CREAT SERPL-MCNC: 0.9 MG/DL (ref 0.5–1.4)
DIFFERENTIAL METHOD: ABNORMAL
EOSINOPHIL # BLD AUTO: 0 K/UL (ref 0–0.5)
EOSINOPHIL NFR BLD: 0.3 % (ref 0–8)
ERYTHROCYTE [DISTWIDTH] IN BLOOD BY AUTOMATED COUNT: 13 % (ref 11.5–14.5)
EST. GFR  (AFRICAN AMERICAN): >60 ML/MIN/1.73 M^2
EST. GFR  (NON AFRICAN AMERICAN): >60 ML/MIN/1.73 M^2
GLUCOSE SERPL-MCNC: 92 MG/DL (ref 70–110)
GLUCOSE UR QL STRIP: NEGATIVE
HCT VFR BLD AUTO: 41.8 % (ref 37–48.5)
HCV AB SERPL QL IA: NEGATIVE
HGB BLD-MCNC: 13.5 G/DL (ref 12–16)
HGB UR QL STRIP: ABNORMAL
IMM GRANULOCYTES # BLD AUTO: 0.06 K/UL (ref 0–0.04)
IMM GRANULOCYTES NFR BLD AUTO: 0.4 % (ref 0–0.5)
KETONES UR QL STRIP: NEGATIVE
LEUKOCYTE ESTERASE UR QL STRIP: NEGATIVE
LYMPHOCYTES # BLD AUTO: 2.1 K/UL (ref 1–4.8)
LYMPHOCYTES NFR BLD: 14.7 % (ref 18–48)
MCH RBC QN AUTO: 27.6 PG (ref 27–31)
MCHC RBC AUTO-ENTMCNC: 32.3 G/DL (ref 32–36)
MCV RBC AUTO: 86 FL (ref 82–98)
MONOCYTES # BLD AUTO: 0.8 K/UL (ref 0.3–1)
MONOCYTES NFR BLD: 5.3 % (ref 4–15)
NEUTROPHILS # BLD AUTO: 11.4 K/UL (ref 1.8–7.7)
NEUTROPHILS NFR BLD: 78.9 % (ref 38–73)
NITRITE UR QL STRIP: NEGATIVE
NRBC BLD-RTO: 0 /100 WBC
PH UR STRIP: 5 [PH] (ref 5–8)
PLATELET # BLD AUTO: 436 K/UL (ref 150–350)
PMV BLD AUTO: 9.1 FL (ref 9.2–12.9)
POTASSIUM SERPL-SCNC: 4.3 MMOL/L (ref 3.5–5.1)
PROT SERPL-MCNC: 7.4 G/DL (ref 6–8.4)
PROT UR QL STRIP: NEGATIVE
RBC # BLD AUTO: 4.89 M/UL (ref 4–5.4)
SODIUM SERPL-SCNC: 137 MMOL/L (ref 136–145)
SP GR UR STRIP: <=1.005 (ref 1–1.03)
TROPONIN I SERPL DL<=0.01 NG/ML-MCNC: <0.006 NG/ML (ref 0–0.03)
URN SPEC COLLECT METH UR: ABNORMAL
UROBILINOGEN UR STRIP-ACNC: NEGATIVE EU/DL
WBC # BLD AUTO: 14.38 K/UL (ref 3.9–12.7)

## 2020-12-03 PROCEDURE — 80053 COMPREHEN METABOLIC PANEL: CPT | Mod: HCNC

## 2020-12-03 PROCEDURE — 93010 ELECTROCARDIOGRAM REPORT: CPT | Mod: HCNC,,, | Performed by: INTERNAL MEDICINE

## 2020-12-03 PROCEDURE — 93005 ELECTROCARDIOGRAM TRACING: CPT | Mod: HCNC

## 2020-12-03 PROCEDURE — 81003 URINALYSIS AUTO W/O SCOPE: CPT | Mod: HCNC

## 2020-12-03 PROCEDURE — 99284 EMERGENCY DEPT VISIT MOD MDM: CPT | Mod: HCNC,25

## 2020-12-03 PROCEDURE — 86803 HEPATITIS C AB TEST: CPT | Mod: HCNC

## 2020-12-03 PROCEDURE — 25000003 PHARM REV CODE 250: Mod: HCNC | Performed by: EMERGENCY MEDICINE

## 2020-12-03 PROCEDURE — 84484 ASSAY OF TROPONIN QUANT: CPT | Mod: HCNC

## 2020-12-03 PROCEDURE — 93010 EKG 12-LEAD: ICD-10-PCS | Mod: HCNC,,, | Performed by: INTERNAL MEDICINE

## 2020-12-03 PROCEDURE — 85025 COMPLETE CBC W/AUTO DIFF WBC: CPT | Mod: HCNC

## 2020-12-03 RX ORDER — ALPRAZOLAM 0.5 MG/1
0.5 TABLET ORAL
Status: COMPLETED | OUTPATIENT
Start: 2020-12-03 | End: 2020-12-03

## 2020-12-03 RX ORDER — ALPRAZOLAM 0.5 MG/1
0.5 TABLET ORAL 2 TIMES DAILY PRN
Qty: 15 TABLET | Refills: 0 | Status: SHIPPED | OUTPATIENT
Start: 2020-12-03 | End: 2020-12-17 | Stop reason: SDUPTHER

## 2020-12-03 RX ADMIN — ALPRAZOLAM 0.5 MG: 0.5 TABLET ORAL at 12:12

## 2020-12-03 NOTE — TELEPHONE ENCOUNTER
----- Message from Claudia Mao sent at 12/3/2020  8:23 AM CST -----  Contact: [phone]  Caller is requesting an earlier appt than we can schedule.  Caller declined first available appointment listed below. Caller will not accept being placed on the wait list and is requesting a message be sent to the provider.  When is the next available appointment:  1/15  Reason for the appointment:  ANXIETY  Patient preference of timeframe to be scheduled:    Comments:  Patient would like to be seen asap!!

## 2020-12-03 NOTE — ED NOTES
Pt rounding complete. Pain 2/10, no follow up necessary. Respirations even and unlabored, in no apparent distress. Restroom and comfort needs addressed. Pt updated on plan of care. Call light within reach, side rails up x2.  Pt attached to cardiac monitor, continuous pulse ox and automatic BP cuff q30, will continue to monitor.

## 2020-12-03 NOTE — ED TRIAGE NOTES
"Pt to ED c/o daily "panic attacks" after receiving steroid injections 1 week ago. Pt states getting 3 steroid injections in bilateral feet for peripheral neuropathy. Pt reports this is first time getting injections. Mild swelling/bruising noted to injection sites - dorsum of feet, between 1st and 2nd toes, and near inner malleolus. Pt reports having hives to wrists a few hours after injections, after which "panic attacks" began - pt states "it starts in my feet, goes up my legs, and when it hits my stomach, everything goes to shaking and I get anxious". Pt denies SOB or CP during episodes, reports hands begin to shake but pt is able to "calm herself down". Pt reports taking Benadryl with relief of both hives and shaking episodes. Pt also reports taking daughter's Cymbalta 2x per day for anxiety relief. Pt denies fever, chills, headache, CP, SOB, or weakness. TAMARA, AAOx4.  "

## 2020-12-03 NOTE — TELEPHONE ENCOUNTER
Spoke with patient, she is having an anxiety attack and requesting an appointment today.  Advised provider is booked the rest of week and offered a virtual appointment for next Thursday.  Patient refused and will go to urgent care.

## 2020-12-03 NOTE — ED NOTES
Pt rounding complete. Pt reports feeling better, reports anxiety was relieved by medications, denies pain at this time. Respirations even and unlabored, in no apparent distress. Restroom and comfort needs addressed. Pt ambulated to bathroom independently with steady gait. Pt updated on plan of care. Call light within reach, side rails up x2.  Pt attached to cardiac monitor, continuous pulse ox and automatic BP cuff q30, will continue to monitor.

## 2020-12-03 NOTE — ED PROVIDER NOTES
"Encounter Date: 12/3/2020    SCRIBE #1 NOTE: ILida, thien scribing for, and in the presence of, Dr. Gunter.       History     Chief Complaint   Patient presents with    Anxiety     Pt reports feeling anxious, reports rx steroid injections to feet     Time seen by provider: 11:55 AM    This is a 70 y.o. female with history of HTN, DM, chronic low back pain, who presents with complaint of increased sensation of anxiety for over week. Patient received steroid injections in both feet 9 days ago by podiatry for tarsal tunnel syndrome. She states after receiving the injections she felt "awkward." Reports she began to experienced bilateral feet numbness and a hot sensation that travel from her feet up to her abdomen. She also reports feeling jittery and anxious. She notes she developed hives on her arms and neck the 1st day but states that has resolved after taking benadryl and has not returned since. Patient states since initial onset she has had intermittent episodes of the hot sensation radiating up from her feet to her body and anxiety that last for a few minutes. She has been taking benadryl since initial onset with temporary improvement but states the episode today happened prior to her taking the benadryl. She states she took 3 benadryl tablets today but it did not help. She denies chest pain, or SOB. She noted having difficulty walking after having the injections but states that has resolved. She does report chronic bilateral leg tingling secondary to neuropathy.     The history is provided by the patient.     Review of patient's allergies indicates:   Allergen Reactions    Codeine Itching     Past Medical History:   Diagnosis Date    Allergy     Breast cancer     Lumpectomy 10/15.    Chronic constipation     Colon polyps     Diabetes mellitus, type 2     Dry eyes     GERD (gastroesophageal reflux disease)     Hyperlipidemia     Hypertension     Lumbar disc disease     Type 2 diabetes " mellitus      Past Surgical History:   Procedure Laterality Date    BREAST BIOPSY      BREAST LUMPECTOMY Right     2015    CATARACT EXTRACTION, BILATERAL       SECTION      x3    CHOLECYSTECTOMY      COLONOSCOPY N/A 10/11/2018    Procedure: COLONOSCOPY;  Surgeon: Lorenzo Tanner MD;  Location: [address]);  Service: Endoscopy;  Laterality: N/A;    COLONOSCOPY W/ POLYPECTOMY      HYSTERECTOMY      and USO    INJECTION OF FACET JOINT Bilateral 2018    Procedure: INJECTION, FACET JOINT;  Surgeon: Viet Wilson MD;  Location: [facility];  Service: Pain Management;  Laterality: Bilateral;  LUMBAR BILATERAL L3-L4 AND L4-L5 FACET STEROID INJECTION    NEED CONSENT    INJECTION OF JOINT Bilateral 2020    Procedure: INJECTION, JOINT, Bilateral SI;  Surgeon: Viet Wilson MD;  Location: [facility];  Service: Pain Management;  Laterality: Bilateral;    RADIOFREQUENCY ABLATION Left 3/20/2019    Procedure: RADIOFREQUENCY ABLATION LEFT L2,3,4,5;  Surgeon: Viet Wilson MD;  Location: [facility];  Service: Pain Management;  Laterality: Left;  LEFT RFA L2,3,4,5    NEEDS CONSENT    RADIOFREQUENCY ABLATION Left 2019    Procedure: RADIOFREQUENCY ABLATION, LEFT L2-L3-L4-L5  1 OF 2;  Surgeon: Viet Wilson MD;  Location: [facility];  Service: Pain Management;  Laterality: Left;    RADIOFREQUENCY ABLATION Right 2020    Procedure: RADIOFREQUENCY ABLATION, RIGHT L2-L3-L4-L5 MEDIAL BRANCH 2 OF;  Surgeon: Viet Wilson MD;  Location: [facility];  Service: Pain Management;  Laterality: Right;    RADIOFREQUENCY ABLATION Right 2020    Procedure: RADIOFREQUENCY ABLATION Right L2, L3, L4, L5;  Surgeon: Viet Wilson MD;  Location: [facility];  Service: Pain Management;  Laterality: Right;  Right L2, L3, L4, L5 RFA    TRANSFORAMINAL EPIDURAL INJECTION OF STEROID Left 10/2/2019    Procedure: INJECTION, STEROID, EPIDURAL, TRANSFORAMINAL  APPROACH, L4 AND L5;  Surgeon: Viet Wilson MD;  Location: [facility];  Service: Pain Management;  Laterality: Left;    TUBAL LIGATION       Family History   Problem Relation Age of Onset    No Known Problems Mother     No Known Problems Father     Heart disease Paternal Grandmother     Thyroid disease Paternal Grandfather     Hypertension Sister     Hypertension Brother     Glaucoma Brother     No Known Problems Daughter     No Known Problems Daughter     No Known Problems Daughter      Social History     Tobacco Use    Smoking status: Former Smoker     Packs/day: 0.25     Years: 20.00     Pack years: 5.00     Quit date: 1985     Years since quittin.9    Smokeless tobacco: Never Used    Tobacco comment: Quit mid .   Substance Use Topics    Alcohol use: No     Alcohol/week: 0.0 standard drinks    Drug use: No     Review of Systems   Constitutional: Negative for chills and fever.   HENT: Negative for congestion, sore throat and trouble swallowing.    Eyes: Negative for photophobia and visual disturbance.   Respiratory: Negative for cough, chest tightness and shortness of breath.    Cardiovascular: Negative for chest pain.   Gastrointestinal: Negative for abdominal pain, diarrhea, nausea and vomiting.   Genitourinary: Negative for dysuria and hematuria.   Musculoskeletal: Positive for back pain (chronic). Negative for neck pain.   Skin: Negative for rash and wound.   Neurological: Negative for weakness and headaches.        Positive for chronic bilateral leg tingling.   Psychiatric/Behavioral: The patient is nervous/anxious.        Physical Exam     Initial Vitals [20 1058]   BP Pulse Resp Temp SpO2   (!) 164/83 90 18 97.8 °F (36.6 °C) 98 %      MAP       --         Physical Exam    Nursing note and vitals reviewed.  Constitutional: She appears well-developed and well-nourished. No distress.   HENT:   Head: Normocephalic and atraumatic.   Mouth/Throat: Oropharynx is clear  and moist.   Eyes: Conjunctivae and EOM are normal. Pupils are equal, round, and reactive to light. No scleral icterus.   Neck: Normal range of motion. Neck supple.   Cardiovascular: Normal rate, regular rhythm, normal heart sounds and intact distal pulses.   No murmur heard.  Pulmonary/Chest: Breath sounds normal. No respiratory distress. She has no wheezes. She has no rhonchi. She has no rales.   Abdominal: Soft. There is no abdominal tenderness. There is no rebound and no guarding.   Musculoskeletal: Normal range of motion. No edema.   Neurological: She is alert and oriented to person, place, and time. She has normal strength. No cranial nerve deficit.   Skin: Skin is warm and dry. No rash noted.   No sign of infection at injection sites on bilateral feet.   Psychiatric: She has a normal mood and affect. Her behavior is normal. Judgment and thought content normal.         ED Course   Procedures  Labs Reviewed   CBC W/ AUTO DIFFERENTIAL - Abnormal; Notable for the following components:       Result Value    WBC 14.38 (*)     Platelets 436 (*)     MPV 9.1 (*)     Gran # (ANC) 11.4 (*)     Immature Grans (Abs) 0.06 (*)     Gran % 78.9 (*)     Lymph % 14.7 (*)     All other components within normal limits   URINALYSIS, REFLEX TO URINE CULTURE - Abnormal; Notable for the following components:    Specific Gravity, UA <=1.005 (*)     Occult Blood UA Trace (*)     All other components within normal limits    Narrative:     Specimen Source->Urine   HEPATITIS C ANTIBODY   TROPONIN I   COMPREHENSIVE METABOLIC PANEL     EKG Readings: (Independently Interpreted)   Initial Reading: No STEMI.   Normal sinus rhythm at a rate of 76 bpm. No acute ischemia or arrhythmia. No change compared to previous EKG.     ECG Results          EKG 12-lead (Final result)  Result time 12/07/20 18:31:20    Final result by Interface, Lab In [facility] (12/07/20 18:31:20)                 Narrative:    Test Reason : F41.9,    Vent. Rate : 076 BPM      Atrial Rate : 076 BPM     P-R Int : 142 ms          QRS Dur : 082 ms      QT Int : 358 ms       P-R-T Axes : 040 071 043 degrees     QTc Int : 402 ms    Normal sinus rhythm  Cannot rule out Anterior infarct ,age undetermined  Abnormal ECG    Confirmed by Ender Ruiz MD ([id number]) on 12/7/2020 6:31:11 PM    Referred By: LUL   SELF           Confirmed By:Ender Ruiz MD                            Imaging Results    None          Medical Decision Making:   History:   Old Medical Records: I decided to obtain old medical records.  Initial Assessment:       70-year-old female with history of hypertension, diabetes, chronic low back pain presents for evaluation of persistent anxiety type sensation for the past 9 days.  She states she got steroid injections to both feet for the 1st time for tarsal tunnel syndrome by Podiatry 9 days ago.  A few hours after this she developed warm sensation in her feet that traveled upper body with associated anxious feeling; at onset she had associated hives for which she took Benadryl with resolution.  Since then she has had recurrent episodes of this warm sensation and anxious feeling, but no hives since onset.  It has been improved with Benadryl but has had persistent episodes after taking 3 doses today so came to ED for evaluation.  She has received steroid injections to her back without any issues before, and no known history of anxiety, though she reports some increased stress recently.  No history of panic attacks, and no associated chest tightness, SOB, palpitations, paresthesias now.  She reports difficulty walking after ft injections that resolved soon afterwards, and chronic peripheral neuropathy that is unchanged now.  On exam patient well-appearing with no concerning exam findings, no sign of infection at injection sites.     Warm sensation could be related to steroid injection initially but unclear why it is been recurring since steroid should be out of her system now.   She could have had some a mild allergic reaction given hives initially, but no sign of this now.  Initial symptoms, either mild allergy or side effects to steroid injection, could have triggered new onset anxiety from these symptoms.  Given no known history of previous anxiety and age and risk factors, will have to consider other etiologies such as atypical ACS; will check basic labs and monitor the ED.  Since Benadryl has been ineffective for anxiety symptoms, will give low-dose benzo to see if this helps prevent recurrent episodes while in ED.    Basic labs including troponin unremarkable except for mildly elevated WBC.  No infectious symptoms to suggest pneumonia and no sign UTI now.  After low-dose Xanax, patient states she feels much better and has not had any recurrent episodes.  Will give small amount of Xanax for suspected anxiety reaction related to recent steroid injection and side effects, and patient is started take Benadryl 1st and only Xanax for persistent or worsening symptoms.  She is also advised of potential sedative side effects of Xanax and will take cautiously, and that it is not a long-term solution to symptoms.  She he is instructed to return to the ED for any new or concerning symptoms such as chest pain or palpitations and to follow-up with PCP for reassessment and further management.      Independently Interpreted Test(s):   I have ordered and independently interpreted EKG Reading(s) - see prior notes  Clinical Tests:   Lab Tests: Ordered and Reviewed  Medical Tests: Ordered and Reviewed            Scribe Attestation:   Scribe #1: I performed the above scribed service and the documentation accurately describes the services I performed. I attest to the accuracy of the note.    Attending Attestation:           Physician Attestation for Scribe:  Physician Attestation Statement for Scribe #1: I, Dr. Gunter, reviewed documentation, as scribed by Lida Stacy in my presence, and it is both  accurate and complete.                           Clinical Impression:       ICD-10-CM ICD-9-CM   1. Anxiety  F41.9 300.00                          ED Disposition Condition    Discharge Stable        ED Prescriptions     Medication Sig Dispense Start Date End Date Auth. Provider    ALPRAZolam (XANAX) 0.5 MG tablet Take 1 tablet (0.5 mg total) by mouth 2 (two) times daily as needed for Anxiety. 15 tablet 12/3/2020 1/2/2021 Gary Gunter MD        Follow-up Information     Follow up With Specialties Details Why Contact Info Additional Information    [facility] Schedule an appointment as soon as possible for a visit in 1 week If symptoms worsen [address], [address]  [facility] [address] [address]                                       Gary Gunter MD  12/08/20 0802

## 2020-12-03 NOTE — ED NOTES
"Pt rounding complete. Pain 0/10 but reporting she "feels another episode coming on". Provider notified, to put in orders. Respirations even and unlabored. Restroom and comfort needs addressed. Pt updated on plan of care. Call light within reach, side rails up x2.  Pt attached to cardiac monitor, continuous pulse ox and automatic BP cuff q30, will continue to monitor.  "

## 2020-12-05 DIAGNOSIS — M48.061 DEGENERATIVE LUMBAR SPINAL STENOSIS: ICD-10-CM

## 2020-12-07 DIAGNOSIS — I10 ESSENTIAL HYPERTENSION: ICD-10-CM

## 2020-12-08 RX ORDER — TRAMADOL HYDROCHLORIDE 50 MG/1
TABLET ORAL
Qty: 120 TABLET | Refills: 0 | Status: SHIPPED | OUTPATIENT
Start: 2020-12-08 | End: 2021-01-04 | Stop reason: SDUPTHER

## 2020-12-08 RX ORDER — OLMESARTAN MEDOXOMIL 20 MG/1
20 TABLET ORAL DAILY
Qty: 90 TABLET | Refills: 0 | Status: SHIPPED | OUTPATIENT
Start: 2020-12-08 | End: 2021-03-02 | Stop reason: SDUPTHER

## 2020-12-09 DIAGNOSIS — K21.9 GERD WITHOUT ESOPHAGITIS: ICD-10-CM

## 2020-12-09 RX ORDER — OMEPRAZOLE 20 MG/1
CAPSULE, DELAYED RELEASE ORAL
Qty: 90 CAPSULE | Refills: 0 | Status: SHIPPED | OUTPATIENT
Start: 2020-12-09 | End: 2021-03-08 | Stop reason: SDUPTHER

## 2020-12-11 ENCOUNTER — PATIENT MESSAGE (OUTPATIENT)
Dept: OTHER | Facility: OTHER | Age: 70
End: 2020-12-11

## 2020-12-14 RX ORDER — ALPRAZOLAM 0.5 MG/1
0.5 TABLET ORAL 2 TIMES DAILY PRN
Qty: 15 TABLET | Refills: 0 | OUTPATIENT
Start: 2020-12-14 | End: 2021-01-13

## 2020-12-14 NOTE — TELEPHONE ENCOUNTER
----- Message from Amy Lunsford sent at 12/14/2020  9:05 AM CST -----  Contact: Pt- [phone]  Requesting an RX refill or new RX.    Is this a refill or new RX: refill    RX name and strength: ALPRAZolam (XANAX) 0.5 MG tablet    Pharmacy name and phone # (copy/paste from chart):    [facility] [address] [place] [address]  Phone: [phone] Fax: [phone]    Comments:

## 2020-12-15 RX ORDER — ALPRAZOLAM 0.5 MG/1
0.5 TABLET ORAL 2 TIMES DAILY PRN
Qty: 15 TABLET | Refills: 0 | Status: CANCELLED | OUTPATIENT
Start: 2020-12-15 | End: 2021-01-14

## 2020-12-15 NOTE — TELEPHONE ENCOUNTER
Pt is scheduled for a F/u on 12/28/20, pt state she is doing better on the medication. Can she get a refill until she see you. Thank you

## 2020-12-15 NOTE — TELEPHONE ENCOUNTER
No, she can not stay on sedative long term. Refill denied. We can discuss more appropriate long term management of anxiety when she is seen.

## 2020-12-15 NOTE — TELEPHONE ENCOUNTER
Pt is scheduled for 12/28/20 to discuss anxiety med but she would like to come in sooner. appt scheduled for Thursday pt is aware of date and time.

## 2020-12-17 ENCOUNTER — OFFICE VISIT (OUTPATIENT)
Dept: PRIMARY CARE CLINIC | Facility: CLINIC | Age: 70
End: 2020-12-17
Payer: MEDICARE

## 2020-12-17 VITALS
HEART RATE: 72 BPM | SYSTOLIC BLOOD PRESSURE: 121 MMHG | TEMPERATURE: 98 F | DIASTOLIC BLOOD PRESSURE: 72 MMHG | WEIGHT: 272.94 LBS | RESPIRATION RATE: 19 BRPM | BODY MASS INDEX: 41.36 KG/M2 | HEIGHT: 68 IN | OXYGEN SATURATION: 98 %

## 2020-12-17 DIAGNOSIS — I10 ESSENTIAL HYPERTENSION: ICD-10-CM

## 2020-12-17 DIAGNOSIS — E11.40 TYPE 2 DIABETES MELLITUS WITH DIABETIC NEUROPATHY, WITHOUT LONG-TERM CURRENT USE OF INSULIN: ICD-10-CM

## 2020-12-17 DIAGNOSIS — F43.22 ADJUSTMENT DISORDER WITH ANXIETY: Primary | ICD-10-CM

## 2020-12-17 PROCEDURE — 3288F FALL RISK ASSESSMENT DOCD: CPT | Mod: HCNC,CPTII,S$GLB, | Performed by: INTERNAL MEDICINE

## 2020-12-17 PROCEDURE — 99999 PR PBB SHADOW E&M-EST. PATIENT-LVL V: ICD-10-PCS | Mod: PBBFAC,HCNC,, | Performed by: INTERNAL MEDICINE

## 2020-12-17 PROCEDURE — 1101F PR PT FALLS ASSESS DOC 0-1 FALLS W/OUT INJ PAST YR: ICD-10-PCS | Mod: HCNC,CPTII,S$GLB, | Performed by: INTERNAL MEDICINE

## 2020-12-17 PROCEDURE — 3288F PR FALLS RISK ASSESSMENT DOCUMENTED: ICD-10-PCS | Mod: HCNC,CPTII,S$GLB, | Performed by: INTERNAL MEDICINE

## 2020-12-17 PROCEDURE — 3008F PR BODY MASS INDEX (BMI) DOCUMENTED: ICD-10-PCS | Mod: HCNC,CPTII,S$GLB, | Performed by: INTERNAL MEDICINE

## 2020-12-17 PROCEDURE — 99213 PR OFFICE/OUTPT VISIT, EST, LEVL III, 20-29 MIN: ICD-10-PCS | Mod: HCNC,S$GLB,, | Performed by: INTERNAL MEDICINE

## 2020-12-17 PROCEDURE — 3074F PR MOST RECENT SYSTOLIC BLOOD PRESSURE < 130 MM HG: ICD-10-PCS | Mod: HCNC,CPTII,S$GLB, | Performed by: INTERNAL MEDICINE

## 2020-12-17 PROCEDURE — 3044F PR MOST RECENT HEMOGLOBIN A1C LEVEL <7.0%: ICD-10-PCS | Mod: HCNC,CPTII,S$GLB, | Performed by: INTERNAL MEDICINE

## 2020-12-17 PROCEDURE — 1159F PR MEDICATION LIST DOCUMENTED IN MEDICAL RECORD: ICD-10-PCS | Mod: HCNC,S$GLB,, | Performed by: INTERNAL MEDICINE

## 2020-12-17 PROCEDURE — 3008F BODY MASS INDEX DOCD: CPT | Mod: HCNC,CPTII,S$GLB, | Performed by: INTERNAL MEDICINE

## 2020-12-17 PROCEDURE — 1159F MED LIST DOCD IN RCRD: CPT | Mod: HCNC,S$GLB,, | Performed by: INTERNAL MEDICINE

## 2020-12-17 PROCEDURE — 99499 UNLISTED E&M SERVICE: CPT | Mod: S$GLB,,, | Performed by: INTERNAL MEDICINE

## 2020-12-17 PROCEDURE — 3078F PR MOST RECENT DIASTOLIC BLOOD PRESSURE < 80 MM HG: ICD-10-PCS | Mod: HCNC,CPTII,S$GLB, | Performed by: INTERNAL MEDICINE

## 2020-12-17 PROCEDURE — 99499 RISK ADDL DX/OHS AUDIT: ICD-10-PCS | Mod: S$GLB,,, | Performed by: INTERNAL MEDICINE

## 2020-12-17 PROCEDURE — 1101F PT FALLS ASSESS-DOCD LE1/YR: CPT | Mod: HCNC,CPTII,S$GLB, | Performed by: INTERNAL MEDICINE

## 2020-12-17 PROCEDURE — 99999 PR PBB SHADOW E&M-EST. PATIENT-LVL V: CPT | Mod: PBBFAC,HCNC,, | Performed by: INTERNAL MEDICINE

## 2020-12-17 PROCEDURE — 3044F HG A1C LEVEL LT 7.0%: CPT | Mod: HCNC,CPTII,S$GLB, | Performed by: INTERNAL MEDICINE

## 2020-12-17 PROCEDURE — 3074F SYST BP LT 130 MM HG: CPT | Mod: HCNC,CPTII,S$GLB, | Performed by: INTERNAL MEDICINE

## 2020-12-17 PROCEDURE — 99213 OFFICE O/P EST LOW 20 MIN: CPT | Mod: HCNC,S$GLB,, | Performed by: INTERNAL MEDICINE

## 2020-12-17 PROCEDURE — 3078F DIAST BP <80 MM HG: CPT | Mod: HCNC,CPTII,S$GLB, | Performed by: INTERNAL MEDICINE

## 2020-12-17 RX ORDER — ALPRAZOLAM 0.5 MG/1
0.5 TABLET ORAL 2 TIMES DAILY PRN
Qty: 20 TABLET | Refills: 0 | Status: SHIPPED | OUTPATIENT
Start: 2020-12-17 | End: 2021-10-11

## 2020-12-17 RX ORDER — SERTRALINE HYDROCHLORIDE 25 MG/1
25 TABLET, FILM COATED ORAL DAILY
Qty: 30 TABLET | Refills: 2 | Status: SHIPPED | OUTPATIENT
Start: 2020-12-17 | End: 2021-01-15 | Stop reason: SDUPTHER

## 2020-12-20 NOTE — PROGRESS NOTES
Subjective:       Patient ID: Linnea Renae is a 70 y.o. female.    Chief Complaint: Follow-up (ER 12/3 for Anxiety)    Last seen 5 months ago. Returns urgently for f/u ER visit two weeks ago for acute anxiety. Treated with Xanax, small quantity prescribed for outpatient use. She was advised office visit for further evaluation upon calling for refills. Describes episodic acute debilitating anxiety in times of extreme stress. Much stress lately due to the pandemic, her three daughters who all work in the [employer] industry have been out of work. Financial strain due to helping support them. Looking after her grandchildren. And  who has grown increasingly irritable and difficult to live with in recent years. This in addition to her chronic back pain. Manages to keep blood pressure and diabetes under control. But she is concerned this episode of anxiety will not subside without intervention. Not generally depressed, no suicidal ideations.      PMH: .   Hypertension.  Diabetes Type 2, last HbA1c 6.2% .  Hyperlipidemia. LDL 77 .  GERD.  Lumbar Spinal Stenosis seen regularly by [facility].   Chronic Dry Eyes.   Perennial Allergic Rhinitis.  Morbid Obesity.   Right Breast Cancer diagnosed 10/15.  H/O Colon Polyps.     PSH: C/S x 3, BTL, Hysterectomy and USO, Bilateral Cataracts extracted, Cholecystectomy. Right breast excisional biopsy .    Mammogram normal , no recent Pelvic exam. BMD normal 8/15. Colonoscopy 10/18 - sigmoid diverticulosis, otherwise normal. Eye exam  outside [facility]. Podiatry . Prevnar 8/15. Zostavax 3/16. Td . Pneumovax 3/19. Flu shot . Shingrix , .     Social: Remote tobacco use, quit over 30 years ago. No alcohol.  with three daughters and three grandchildren. Homemaker.     FMH: Father living age 83 in good health. Mother  young, cause unknown. CHF in PGM, Thyroid disease in PGF.     NKDA.     Medications: list reviewed  "and reconciled.              Review of Systems   Constitutional: Negative for appetite change, fatigue, fever and unexpected weight change.   Respiratory: Negative for cough and shortness of breath.    Cardiovascular: Negative for chest pain and palpitations.   Gastrointestinal: Negative for abdominal pain, diarrhea, nausea and vomiting.   Neurological: Negative for dizziness, syncope, weakness and headaches.   Psychiatric/Behavioral: Negative for behavioral problems, confusion, decreased concentration and hallucinations.         Objective:    /72, Pulse 72, Temp 98, O2 Sat 98%, Ht 5' 8", Wt 273 lbs   Physical Exam  Constitutional:       General: She is not in acute distress.     Appearance: She is not ill-appearing.   Cardiovascular:      Rate and Rhythm: Normal rate and regular rhythm.   Pulmonary:      Effort: Pulmonary effort is normal. No respiratory distress.      Breath sounds: Normal breath sounds.   Skin:     General: Skin is warm and dry.   Neurological:      Mental Status: She is alert and oriented to person, place, and time.      Cranial Nerves: No cranial nerve deficit.      Gait: Gait normal.   Psychiatric:         Attention and Perception: Attention normal.         Mood and Affect: Mood normal.         Speech: Speech normal.         Behavior: Behavior normal.         Thought Content: Thought content normal.         Judgment: Judgment normal.         Assessment:       1. Adjustment disorder with anxiety    2. Type 2 diabetes mellitus with diabetic neuropathy, without long-term current use of insulin    3. Essential hypertension        Plan:       Adjustment disorder with anxiety  -     Start Sertraline (ZOLOFT) 25 MG tablet; Take 1 tablet (25 mg total) by mouth once daily.  Dispense: 30 tablet; Refill: 2  -     ALPRAZolam (XANAX) 0.5 MG tablet; Take 1 tablet (0.5 mg total) by mouth 2 (two) times daily as needed for Anxiety.  Dispense: 20 tablet; Refill: 0    Type 2 diabetes mellitus with diabetic " neuropathy, without long-term current use of insulin        -     Labs prior to annual physical scheduled next month.     Essential hypertension - controlled, continue same.

## 2021-01-04 ENCOUNTER — TELEPHONE (OUTPATIENT)
Dept: PAIN MEDICINE | Facility: CLINIC | Age: 71
End: 2021-01-04

## 2021-01-04 DIAGNOSIS — M48.061 DEGENERATIVE LUMBAR SPINAL STENOSIS: ICD-10-CM

## 2021-01-05 ENCOUNTER — OFFICE VISIT (OUTPATIENT)
Dept: PAIN MEDICINE | Facility: CLINIC | Age: 71
End: 2021-01-05
Payer: MEDICARE

## 2021-01-05 VITALS
HEART RATE: 60 BPM | HEIGHT: 68 IN | WEIGHT: 274.94 LBS | DIASTOLIC BLOOD PRESSURE: 69 MMHG | TEMPERATURE: 98 F | SYSTOLIC BLOOD PRESSURE: 129 MMHG | BODY MASS INDEX: 41.67 KG/M2

## 2021-01-05 DIAGNOSIS — G89.29 OTHER CHRONIC PAIN: ICD-10-CM

## 2021-01-05 DIAGNOSIS — M47.816 OSTEOARTHRITIS OF LUMBAR SPINE, UNSPECIFIED SPINAL OSTEOARTHRITIS COMPLICATION STATUS: ICD-10-CM

## 2021-01-05 DIAGNOSIS — M46.1 SACROILIITIS: ICD-10-CM

## 2021-01-05 DIAGNOSIS — M47.819 SPONDYLOSIS WITHOUT MYELOPATHY: ICD-10-CM

## 2021-01-05 DIAGNOSIS — M79.18 MYOFASCIAL PAIN: Primary | ICD-10-CM

## 2021-01-05 PROCEDURE — 1125F AMNT PAIN NOTED PAIN PRSNT: CPT | Mod: S$GLB,,, | Performed by: NURSE PRACTITIONER

## 2021-01-05 PROCEDURE — 3074F PR MOST RECENT SYSTOLIC BLOOD PRESSURE < 130 MM HG: ICD-10-PCS | Mod: CPTII,S$GLB,, | Performed by: NURSE PRACTITIONER

## 2021-01-05 PROCEDURE — 99499 UNLISTED E&M SERVICE: CPT | Mod: S$GLB,,, | Performed by: NURSE PRACTITIONER

## 2021-01-05 PROCEDURE — 3008F BODY MASS INDEX DOCD: CPT | Mod: CPTII,S$GLB,, | Performed by: NURSE PRACTITIONER

## 2021-01-05 PROCEDURE — 3288F PR FALLS RISK ASSESSMENT DOCUMENTED: ICD-10-PCS | Mod: CPTII,S$GLB,, | Performed by: NURSE PRACTITIONER

## 2021-01-05 PROCEDURE — 3078F PR MOST RECENT DIASTOLIC BLOOD PRESSURE < 80 MM HG: ICD-10-PCS | Mod: CPTII,S$GLB,, | Performed by: NURSE PRACTITIONER

## 2021-01-05 PROCEDURE — 3008F PR BODY MASS INDEX (BMI) DOCUMENTED: ICD-10-PCS | Mod: CPTII,S$GLB,, | Performed by: NURSE PRACTITIONER

## 2021-01-05 PROCEDURE — 99213 PR OFFICE/OUTPT VISIT, EST, LEVL III, 20-29 MIN: ICD-10-PCS | Mod: 25,S$GLB,, | Performed by: NURSE PRACTITIONER

## 2021-01-05 PROCEDURE — 99499 RISK ADDL DX/OHS AUDIT: ICD-10-PCS | Mod: S$GLB,,, | Performed by: NURSE PRACTITIONER

## 2021-01-05 PROCEDURE — 1159F MED LIST DOCD IN RCRD: CPT | Mod: S$GLB,,, | Performed by: NURSE PRACTITIONER

## 2021-01-05 PROCEDURE — 20553 PR INJECT TRIGGER POINTS, > 3: ICD-10-PCS | Mod: S$GLB,,, | Performed by: NURSE PRACTITIONER

## 2021-01-05 PROCEDURE — 1125F PR PAIN SEVERITY QUANTIFIED, PAIN PRESENT: ICD-10-PCS | Mod: S$GLB,,, | Performed by: NURSE PRACTITIONER

## 2021-01-05 PROCEDURE — 3078F DIAST BP <80 MM HG: CPT | Mod: CPTII,S$GLB,, | Performed by: NURSE PRACTITIONER

## 2021-01-05 PROCEDURE — 3288F FALL RISK ASSESSMENT DOCD: CPT | Mod: CPTII,S$GLB,, | Performed by: NURSE PRACTITIONER

## 2021-01-05 PROCEDURE — 1159F PR MEDICATION LIST DOCUMENTED IN MEDICAL RECORD: ICD-10-PCS | Mod: S$GLB,,, | Performed by: NURSE PRACTITIONER

## 2021-01-05 PROCEDURE — 99999 PR PBB SHADOW E&M-EST. PATIENT-LVL III: ICD-10-PCS | Mod: PBBFAC,,, | Performed by: NURSE PRACTITIONER

## 2021-01-05 PROCEDURE — 3074F SYST BP LT 130 MM HG: CPT | Mod: CPTII,S$GLB,, | Performed by: NURSE PRACTITIONER

## 2021-01-05 PROCEDURE — 1101F PT FALLS ASSESS-DOCD LE1/YR: CPT | Mod: CPTII,S$GLB,, | Performed by: NURSE PRACTITIONER

## 2021-01-05 PROCEDURE — 20553 NJX 1/MLT TRIGGER POINTS 3/>: CPT | Mod: S$GLB,,, | Performed by: NURSE PRACTITIONER

## 2021-01-05 PROCEDURE — 99999 PR PBB SHADOW E&M-EST. PATIENT-LVL III: CPT | Mod: PBBFAC,,, | Performed by: NURSE PRACTITIONER

## 2021-01-05 PROCEDURE — 99213 OFFICE O/P EST LOW 20 MIN: CPT | Mod: 25,S$GLB,, | Performed by: NURSE PRACTITIONER

## 2021-01-05 PROCEDURE — 1101F PR PT FALLS ASSESS DOC 0-1 FALLS W/OUT INJ PAST YR: ICD-10-PCS | Mod: CPTII,S$GLB,, | Performed by: NURSE PRACTITIONER

## 2021-01-05 RX ORDER — BUPIVACAINE HYDROCHLORIDE 2.5 MG/ML
9 INJECTION, SOLUTION EPIDURAL; INFILTRATION; INTRACAUDAL
Status: COMPLETED | OUTPATIENT
Start: 2021-01-05 | End: 2021-01-05

## 2021-01-05 RX ORDER — METHYLPREDNISOLONE ACETATE 40 MG/ML
40 INJECTION, SUSPENSION INTRA-ARTICULAR; INTRALESIONAL; INTRAMUSCULAR; SOFT TISSUE
Status: COMPLETED | OUTPATIENT
Start: 2021-01-05 | End: 2021-01-05

## 2021-01-05 RX ADMIN — METHYLPREDNISOLONE ACETATE 40 MG: 40 INJECTION, SUSPENSION INTRA-ARTICULAR; INTRALESIONAL; INTRAMUSCULAR; SOFT TISSUE at 11:01

## 2021-01-05 RX ADMIN — BUPIVACAINE HYDROCHLORIDE 22.5 MG: 2.5 INJECTION, SOLUTION EPIDURAL; INFILTRATION; INTRACAUDAL at 11:01

## 2021-01-06 ENCOUNTER — TELEPHONE (OUTPATIENT)
Dept: PAIN MEDICINE | Facility: CLINIC | Age: 71
End: 2021-01-06

## 2021-01-06 RX ORDER — TRAMADOL HYDROCHLORIDE 50 MG/1
TABLET ORAL
Qty: 120 TABLET | Refills: 0 | Status: SHIPPED | OUTPATIENT
Start: 2021-01-06 | End: 2021-02-04 | Stop reason: SDUPTHER

## 2021-01-13 ENCOUNTER — OFFICE VISIT (OUTPATIENT)
Dept: PODIATRY | Facility: CLINIC | Age: 71
End: 2021-01-13
Payer: MEDICARE

## 2021-01-13 VITALS
HEART RATE: 84 BPM | BODY MASS INDEX: 42.2 KG/M2 | WEIGHT: 277.56 LBS | SYSTOLIC BLOOD PRESSURE: 123 MMHG | DIASTOLIC BLOOD PRESSURE: 73 MMHG

## 2021-01-13 DIAGNOSIS — G57.53 TARSAL TUNNEL SYNDROME, BILATERAL LOWER LIMBS: ICD-10-CM

## 2021-01-13 DIAGNOSIS — E11.42 DIABETIC PERIPHERAL NEUROPATHY ASSOCIATED WITH TYPE 2 DIABETES MELLITUS: Primary | ICD-10-CM

## 2021-01-13 PROCEDURE — 3008F BODY MASS INDEX DOCD: CPT | Mod: CPTII,S$GLB,, | Performed by: PODIATRIST

## 2021-01-13 PROCEDURE — 1125F PR PAIN SEVERITY QUANTIFIED, PAIN PRESENT: ICD-10-PCS | Mod: S$GLB,,, | Performed by: PODIATRIST

## 2021-01-13 PROCEDURE — 3044F HG A1C LEVEL LT 7.0%: CPT | Mod: CPTII,S$GLB,, | Performed by: PODIATRIST

## 2021-01-13 PROCEDURE — 3078F DIAST BP <80 MM HG: CPT | Mod: CPTII,S$GLB,, | Performed by: PODIATRIST

## 2021-01-13 PROCEDURE — 99214 PR OFFICE/OUTPT VISIT, EST, LEVL IV, 30-39 MIN: ICD-10-PCS | Mod: S$GLB,,, | Performed by: PODIATRIST

## 2021-01-13 PROCEDURE — 99999 PR PBB SHADOW E&M-EST. PATIENT-LVL II: CPT | Mod: PBBFAC,,, | Performed by: PODIATRIST

## 2021-01-13 PROCEDURE — 3078F PR MOST RECENT DIASTOLIC BLOOD PRESSURE < 80 MM HG: ICD-10-PCS | Mod: CPTII,S$GLB,, | Performed by: PODIATRIST

## 2021-01-13 PROCEDURE — 3074F SYST BP LT 130 MM HG: CPT | Mod: CPTII,S$GLB,, | Performed by: PODIATRIST

## 2021-01-13 PROCEDURE — 99214 OFFICE O/P EST MOD 30 MIN: CPT | Mod: S$GLB,,, | Performed by: PODIATRIST

## 2021-01-13 PROCEDURE — 99999 PR PBB SHADOW E&M-EST. PATIENT-LVL II: ICD-10-PCS | Mod: PBBFAC,,, | Performed by: PODIATRIST

## 2021-01-13 PROCEDURE — 1159F PR MEDICATION LIST DOCUMENTED IN MEDICAL RECORD: ICD-10-PCS | Mod: S$GLB,,, | Performed by: PODIATRIST

## 2021-01-13 PROCEDURE — 1159F MED LIST DOCD IN RCRD: CPT | Mod: S$GLB,,, | Performed by: PODIATRIST

## 2021-01-13 PROCEDURE — 3074F PR MOST RECENT SYSTOLIC BLOOD PRESSURE < 130 MM HG: ICD-10-PCS | Mod: CPTII,S$GLB,, | Performed by: PODIATRIST

## 2021-01-13 PROCEDURE — 3008F PR BODY MASS INDEX (BMI) DOCUMENTED: ICD-10-PCS | Mod: CPTII,S$GLB,, | Performed by: PODIATRIST

## 2021-01-13 PROCEDURE — 3044F PR MOST RECENT HEMOGLOBIN A1C LEVEL <7.0%: ICD-10-PCS | Mod: CPTII,S$GLB,, | Performed by: PODIATRIST

## 2021-01-13 PROCEDURE — 1125F AMNT PAIN NOTED PAIN PRSNT: CPT | Mod: S$GLB,,, | Performed by: PODIATRIST

## 2021-01-13 RX ORDER — CLOBETASOL PROPIONATE 0.5 MG/G
CREAM TOPICAL 2 TIMES DAILY
Qty: 45 G | Refills: 2 | Status: SHIPPED | OUTPATIENT
Start: 2021-01-13 | End: 2023-04-19

## 2021-01-13 RX ORDER — AMMONIUM LACTATE 12 G/100G
CREAM TOPICAL
Qty: 140 G | Refills: 11 | Status: SHIPPED | OUTPATIENT
Start: 2021-01-13 | End: 2022-08-12 | Stop reason: CLARIF

## 2021-01-13 RX ORDER — LIDOCAINE HYDROCHLORIDE 20 MG/ML
JELLY TOPICAL
Qty: 30 ML | Refills: 2 | Status: SHIPPED | OUTPATIENT
Start: 2021-01-13 | End: 2021-03-31 | Stop reason: SDUPTHER

## 2021-01-15 ENCOUNTER — OFFICE VISIT (OUTPATIENT)
Dept: PRIMARY CARE CLINIC | Facility: CLINIC | Age: 71
End: 2021-01-15
Payer: MEDICARE

## 2021-01-15 ENCOUNTER — LAB VISIT (OUTPATIENT)
Dept: LAB | Facility: HOSPITAL | Age: 71
End: 2021-01-15
Attending: INTERNAL MEDICINE
Payer: MEDICARE

## 2021-01-15 VITALS
DIASTOLIC BLOOD PRESSURE: 77 MMHG | HEART RATE: 85 BPM | WEIGHT: 271.63 LBS | OXYGEN SATURATION: 99 % | TEMPERATURE: 98 F | BODY MASS INDEX: 41.17 KG/M2 | SYSTOLIC BLOOD PRESSURE: 117 MMHG | RESPIRATION RATE: 17 BRPM | HEIGHT: 68 IN

## 2021-01-15 DIAGNOSIS — E66.01 MORBID OBESITY WITH BMI OF 40.0-44.9, ADULT: ICD-10-CM

## 2021-01-15 DIAGNOSIS — E78.5 HYPERLIPIDEMIA, UNSPECIFIED HYPERLIPIDEMIA TYPE: ICD-10-CM

## 2021-01-15 DIAGNOSIS — I10 ESSENTIAL HYPERTENSION: ICD-10-CM

## 2021-01-15 DIAGNOSIS — E11.40 TYPE 2 DIABETES MELLITUS WITH DIABETIC NEUROPATHY, WITHOUT LONG-TERM CURRENT USE OF INSULIN: Primary | ICD-10-CM

## 2021-01-15 DIAGNOSIS — J30.89 PERENNIAL ALLERGIC RHINITIS: ICD-10-CM

## 2021-01-15 DIAGNOSIS — E11.40 TYPE 2 DIABETES MELLITUS WITH DIABETIC NEUROPATHY, WITHOUT LONG-TERM CURRENT USE OF INSULIN: ICD-10-CM

## 2021-01-15 DIAGNOSIS — M48.061 DEGENERATIVE LUMBAR SPINAL STENOSIS: ICD-10-CM

## 2021-01-15 DIAGNOSIS — I70.0 ATHEROSCLEROSIS OF AORTA: ICD-10-CM

## 2021-01-15 DIAGNOSIS — F43.22 ADJUSTMENT DISORDER WITH ANXIETY: ICD-10-CM

## 2021-01-15 LAB
BASOPHILS # BLD AUTO: 0.06 K/UL (ref 0–0.2)
BASOPHILS NFR BLD: 0.6 % (ref 0–1.9)
DIFFERENTIAL METHOD: ABNORMAL
EOSINOPHIL # BLD AUTO: 0.1 K/UL (ref 0–0.5)
EOSINOPHIL NFR BLD: 1.3 % (ref 0–8)
ERYTHROCYTE [DISTWIDTH] IN BLOOD BY AUTOMATED COUNT: 13.9 % (ref 11.5–14.5)
HCT VFR BLD AUTO: 40.5 % (ref 37–48.5)
HGB BLD-MCNC: 12.4 G/DL (ref 12–16)
IMM GRANULOCYTES # BLD AUTO: 0.03 K/UL (ref 0–0.04)
IMM GRANULOCYTES NFR BLD AUTO: 0.3 % (ref 0–0.5)
LYMPHOCYTES # BLD AUTO: 2.2 K/UL (ref 1–4.8)
LYMPHOCYTES NFR BLD: 21.5 % (ref 18–48)
MCH RBC QN AUTO: 27.7 PG (ref 27–31)
MCHC RBC AUTO-ENTMCNC: 30.6 G/DL (ref 32–36)
MCV RBC AUTO: 91 FL (ref 82–98)
MONOCYTES # BLD AUTO: 0.7 K/UL (ref 0.3–1)
MONOCYTES NFR BLD: 7.2 % (ref 4–15)
NEUTROPHILS # BLD AUTO: 7 K/UL (ref 1.8–7.7)
NEUTROPHILS NFR BLD: 69.1 % (ref 38–73)
NRBC BLD-RTO: 0 /100 WBC
PLATELET # BLD AUTO: 404 K/UL (ref 150–350)
PMV BLD AUTO: 10.1 FL (ref 9.2–12.9)
RBC # BLD AUTO: 4.47 M/UL (ref 4–5.4)
WBC # BLD AUTO: 10.1 K/UL (ref 3.9–12.7)

## 2021-01-15 PROCEDURE — 82043 UR ALBUMIN QUANTITATIVE: CPT

## 2021-01-15 PROCEDURE — 3074F PR MOST RECENT SYSTOLIC BLOOD PRESSURE < 130 MM HG: ICD-10-PCS | Mod: CPTII,S$GLB,, | Performed by: INTERNAL MEDICINE

## 2021-01-15 PROCEDURE — 83036 HEMOGLOBIN GLYCOSYLATED A1C: CPT

## 2021-01-15 PROCEDURE — 1125F AMNT PAIN NOTED PAIN PRSNT: CPT | Mod: S$GLB,,, | Performed by: INTERNAL MEDICINE

## 2021-01-15 PROCEDURE — 99999 PR PBB SHADOW E&M-EST. PATIENT-LVL V: CPT | Mod: PBBFAC,,, | Performed by: INTERNAL MEDICINE

## 2021-01-15 PROCEDURE — 1159F PR MEDICATION LIST DOCUMENTED IN MEDICAL RECORD: ICD-10-PCS | Mod: S$GLB,,, | Performed by: INTERNAL MEDICINE

## 2021-01-15 PROCEDURE — 80048 BASIC METABOLIC PNL TOTAL CA: CPT

## 2021-01-15 PROCEDURE — 99999 PR PBB SHADOW E&M-EST. PATIENT-LVL V: ICD-10-PCS | Mod: PBBFAC,,, | Performed by: INTERNAL MEDICINE

## 2021-01-15 PROCEDURE — 3074F SYST BP LT 130 MM HG: CPT | Mod: CPTII,S$GLB,, | Performed by: INTERNAL MEDICINE

## 2021-01-15 PROCEDURE — 3008F PR BODY MASS INDEX (BMI) DOCUMENTED: ICD-10-PCS | Mod: CPTII,S$GLB,, | Performed by: INTERNAL MEDICINE

## 2021-01-15 PROCEDURE — 3288F FALL RISK ASSESSMENT DOCD: CPT | Mod: CPTII,S$GLB,, | Performed by: INTERNAL MEDICINE

## 2021-01-15 PROCEDURE — 3078F DIAST BP <80 MM HG: CPT | Mod: CPTII,S$GLB,, | Performed by: INTERNAL MEDICINE

## 2021-01-15 PROCEDURE — 3008F BODY MASS INDEX DOCD: CPT | Mod: CPTII,S$GLB,, | Performed by: INTERNAL MEDICINE

## 2021-01-15 PROCEDURE — 36415 COLL VENOUS BLD VENIPUNCTURE: CPT | Mod: PN

## 2021-01-15 PROCEDURE — 3044F HG A1C LEVEL LT 7.0%: CPT | Mod: CPTII,S$GLB,, | Performed by: INTERNAL MEDICINE

## 2021-01-15 PROCEDURE — 85025 COMPLETE CBC W/AUTO DIFF WBC: CPT

## 2021-01-15 PROCEDURE — 1101F PT FALLS ASSESS-DOCD LE1/YR: CPT | Mod: CPTII,S$GLB,, | Performed by: INTERNAL MEDICINE

## 2021-01-15 PROCEDURE — 3044F PR MOST RECENT HEMOGLOBIN A1C LEVEL <7.0%: ICD-10-PCS | Mod: CPTII,S$GLB,, | Performed by: INTERNAL MEDICINE

## 2021-01-15 PROCEDURE — 1125F PR PAIN SEVERITY QUANTIFIED, PAIN PRESENT: ICD-10-PCS | Mod: S$GLB,,, | Performed by: INTERNAL MEDICINE

## 2021-01-15 PROCEDURE — 99499 RISK ADDL DX/OHS AUDIT: ICD-10-PCS | Mod: S$GLB,,, | Performed by: INTERNAL MEDICINE

## 2021-01-15 PROCEDURE — 99214 PR OFFICE/OUTPT VISIT, EST, LEVL IV, 30-39 MIN: ICD-10-PCS | Mod: S$GLB,,, | Performed by: INTERNAL MEDICINE

## 2021-01-15 PROCEDURE — 1101F PR PT FALLS ASSESS DOC 0-1 FALLS W/OUT INJ PAST YR: ICD-10-PCS | Mod: CPTII,S$GLB,, | Performed by: INTERNAL MEDICINE

## 2021-01-15 PROCEDURE — 1159F MED LIST DOCD IN RCRD: CPT | Mod: S$GLB,,, | Performed by: INTERNAL MEDICINE

## 2021-01-15 PROCEDURE — 3078F PR MOST RECENT DIASTOLIC BLOOD PRESSURE < 80 MM HG: ICD-10-PCS | Mod: CPTII,S$GLB,, | Performed by: INTERNAL MEDICINE

## 2021-01-15 PROCEDURE — 80061 LIPID PANEL: CPT

## 2021-01-15 PROCEDURE — 99214 OFFICE O/P EST MOD 30 MIN: CPT | Mod: S$GLB,,, | Performed by: INTERNAL MEDICINE

## 2021-01-15 PROCEDURE — 3288F PR FALLS RISK ASSESSMENT DOCUMENTED: ICD-10-PCS | Mod: CPTII,S$GLB,, | Performed by: INTERNAL MEDICINE

## 2021-01-15 PROCEDURE — 82570 ASSAY OF URINE CREATININE: CPT

## 2021-01-15 PROCEDURE — 99499 UNLISTED E&M SERVICE: CPT | Mod: S$GLB,,, | Performed by: INTERNAL MEDICINE

## 2021-01-15 RX ORDER — SERTRALINE HYDROCHLORIDE 50 MG/1
50 TABLET, FILM COATED ORAL DAILY
Qty: 90 TABLET | Refills: 1 | Status: SHIPPED | OUTPATIENT
Start: 2021-01-15 | End: 2021-07-16 | Stop reason: SDUPTHER

## 2021-01-15 RX ORDER — HYDROCHLOROTHIAZIDE 12.5 MG/1
12.5 TABLET ORAL DAILY
Qty: 90 TABLET | Refills: 1 | Status: SHIPPED | OUTPATIENT
Start: 2021-01-15 | End: 2021-06-11 | Stop reason: SDUPTHER

## 2021-01-16 LAB
ALBUMIN/CREAT UR: NORMAL UG/MG (ref 0–30)
ANION GAP SERPL CALC-SCNC: 12 MMOL/L (ref 8–16)
BUN SERPL-MCNC: 18 MG/DL (ref 8–23)
CALCIUM SERPL-MCNC: 9.8 MG/DL (ref 8.7–10.5)
CHLORIDE SERPL-SCNC: 102 MMOL/L (ref 95–110)
CHOLEST SERPL-MCNC: 144 MG/DL (ref 120–199)
CHOLEST/HDLC SERPL: 2.9 {RATIO} (ref 2–5)
CO2 SERPL-SCNC: 26 MMOL/L (ref 23–29)
CREAT SERPL-MCNC: 0.8 MG/DL (ref 0.5–1.4)
CREAT UR-MCNC: 87 MG/DL (ref 15–325)
EST. GFR  (AFRICAN AMERICAN): >60 ML/MIN/1.73 M^2
EST. GFR  (NON AFRICAN AMERICAN): >60 ML/MIN/1.73 M^2
ESTIMATED AVG GLUCOSE: 123 MG/DL (ref 68–131)
GLUCOSE SERPL-MCNC: 98 MG/DL (ref 70–110)
HBA1C MFR BLD HPLC: 5.9 % (ref 4–5.6)
HDLC SERPL-MCNC: 50 MG/DL (ref 40–75)
HDLC SERPL: 34.7 % (ref 20–50)
LDLC SERPL CALC-MCNC: 77.6 MG/DL (ref 63–159)
MICROALBUMIN UR DL<=1MG/L-MCNC: <2.5 UG/ML
NONHDLC SERPL-MCNC: 94 MG/DL
POTASSIUM SERPL-SCNC: 3.7 MMOL/L (ref 3.5–5.1)
SODIUM SERPL-SCNC: 140 MMOL/L (ref 136–145)
TRIGL SERPL-MCNC: 82 MG/DL (ref 30–150)

## 2021-01-18 ENCOUNTER — IMMUNIZATION (OUTPATIENT)
Dept: INTERNAL MEDICINE | Facility: CLINIC | Age: 71
End: 2021-01-18
Payer: MEDICARE

## 2021-01-18 DIAGNOSIS — Z23 NEED FOR VACCINATION: Primary | ICD-10-CM

## 2021-01-18 PROCEDURE — 91300 COVID-19, MRNA, LNP-S, PF, 30 MCG/0.3 ML DOSE VACCINE: CPT | Mod: PBBFAC | Performed by: INTERNAL MEDICINE

## 2021-01-28 ENCOUNTER — TELEPHONE (OUTPATIENT)
Dept: PODIATRY | Facility: CLINIC | Age: 71
End: 2021-01-28

## 2021-01-29 ENCOUNTER — TELEPHONE (OUTPATIENT)
Dept: PODIATRY | Facility: CLINIC | Age: 71
End: 2021-01-29

## 2021-02-04 DIAGNOSIS — M48.061 DEGENERATIVE LUMBAR SPINAL STENOSIS: ICD-10-CM

## 2021-02-05 RX ORDER — TRAMADOL HYDROCHLORIDE 50 MG/1
TABLET ORAL
Qty: 120 TABLET | Refills: 1 | Status: SHIPPED | OUTPATIENT
Start: 2021-02-05 | End: 2021-04-05 | Stop reason: SDUPTHER

## 2021-02-08 ENCOUNTER — IMMUNIZATION (OUTPATIENT)
Dept: INTERNAL MEDICINE | Facility: CLINIC | Age: 71
End: 2021-02-08
Payer: MEDICARE

## 2021-02-08 DIAGNOSIS — Z23 NEED FOR VACCINATION: Primary | ICD-10-CM

## 2021-02-08 PROCEDURE — 91300 COVID-19, MRNA, LNP-S, PF, 30 MCG/0.3 ML DOSE VACCINE: CPT | Mod: PBBFAC | Performed by: INTERNAL MEDICINE

## 2021-02-08 PROCEDURE — 0002A COVID-19, MRNA, LNP-S, PF, 30 MCG/0.3 ML DOSE VACCINE: CPT | Mod: PBBFAC | Performed by: INTERNAL MEDICINE

## 2021-03-02 DIAGNOSIS — I10 ESSENTIAL HYPERTENSION: ICD-10-CM

## 2021-03-02 RX ORDER — OLMESARTAN MEDOXOMIL 20 MG/1
20 TABLET ORAL DAILY
Qty: 90 TABLET | Refills: 2 | Status: SHIPPED | OUTPATIENT
Start: 2021-03-02 | End: 2021-12-31 | Stop reason: SDUPTHER

## 2021-03-04 ENCOUNTER — TELEPHONE (OUTPATIENT)
Dept: PAIN MEDICINE | Facility: CLINIC | Age: 71
End: 2021-03-04

## 2021-03-08 DIAGNOSIS — K21.9 GERD WITHOUT ESOPHAGITIS: ICD-10-CM

## 2021-03-08 RX ORDER — OMEPRAZOLE 20 MG/1
CAPSULE, DELAYED RELEASE ORAL
Qty: 90 CAPSULE | Refills: 1 | Status: SHIPPED | OUTPATIENT
Start: 2021-03-08 | End: 2022-01-10 | Stop reason: SDUPTHER

## 2021-03-18 ENCOUNTER — PATIENT MESSAGE (OUTPATIENT)
Dept: RESEARCH | Facility: HOSPITAL | Age: 71
End: 2021-03-18

## 2021-03-26 ENCOUNTER — PATIENT MESSAGE (OUTPATIENT)
Dept: RESEARCH | Facility: HOSPITAL | Age: 71
End: 2021-03-26

## 2021-03-31 RX ORDER — LIDOCAINE HYDROCHLORIDE 20 MG/ML
JELLY TOPICAL
Qty: 30 ML | Refills: 2 | Status: SHIPPED | OUTPATIENT
Start: 2021-03-31 | End: 2021-10-11 | Stop reason: SDUPTHER

## 2021-04-05 DIAGNOSIS — M48.061 DEGENERATIVE LUMBAR SPINAL STENOSIS: ICD-10-CM

## 2021-04-05 RX ORDER — TRAMADOL HYDROCHLORIDE 50 MG/1
TABLET ORAL
Qty: 120 TABLET | Refills: 1 | Status: SHIPPED | OUTPATIENT
Start: 2021-04-05 | End: 2021-06-03 | Stop reason: SDUPTHER

## 2021-04-19 RX ORDER — ALCOHOL 2.38 KG/3.79L
1 GEL TOPICAL 2 TIMES DAILY
Qty: 180 CAPSULE | Refills: 0 | Status: SHIPPED | OUTPATIENT
Start: 2021-04-19 | End: 2022-04-04 | Stop reason: CLARIF

## 2021-05-11 ENCOUNTER — TELEPHONE (OUTPATIENT)
Dept: PHARMACY | Facility: CLINIC | Age: 71
End: 2021-05-11

## 2021-05-11 RX ORDER — NYSTATIN 100000 [USP'U]/G
POWDER TOPICAL 4 TIMES DAILY
Qty: 180 G | Refills: 2 | Status: SHIPPED | OUTPATIENT
Start: 2021-05-11

## 2021-05-13 RX ORDER — TOPIRAMATE 50 MG/1
50 TABLET, FILM COATED ORAL 2 TIMES DAILY
Qty: 180 TABLET | Refills: 0 | Status: SHIPPED | OUTPATIENT
Start: 2021-05-13 | End: 2021-08-19 | Stop reason: SDUPTHER

## 2021-05-19 DIAGNOSIS — M15.9 PRIMARY OSTEOARTHRITIS INVOLVING MULTIPLE JOINTS: ICD-10-CM

## 2021-05-19 RX ORDER — IBUPROFEN 800 MG/1
TABLET ORAL
Qty: 60 TABLET | Refills: 2 | OUTPATIENT
Start: 2021-05-19

## 2021-05-21 DIAGNOSIS — M15.9 PRIMARY OSTEOARTHRITIS INVOLVING MULTIPLE JOINTS: ICD-10-CM

## 2021-05-21 RX ORDER — IBUPROFEN 800 MG/1
TABLET ORAL
Qty: 60 TABLET | Refills: 0 | Status: SHIPPED | OUTPATIENT
Start: 2021-05-21 | End: 2021-09-14 | Stop reason: SDUPTHER

## 2021-05-31 DIAGNOSIS — E11.40 TYPE 2 DIABETES MELLITUS WITH DIABETIC NEUROPATHY, WITHOUT LONG-TERM CURRENT USE OF INSULIN: ICD-10-CM

## 2021-05-31 RX ORDER — METFORMIN HYDROCHLORIDE 500 MG/1
1000 TABLET ORAL 2 TIMES DAILY WITH MEALS
Qty: 360 TABLET | Refills: 1 | Status: SHIPPED | OUTPATIENT
Start: 2021-05-31 | End: 2022-03-31 | Stop reason: SDUPTHER

## 2021-06-03 DIAGNOSIS — M48.061 DEGENERATIVE LUMBAR SPINAL STENOSIS: ICD-10-CM

## 2021-06-04 RX ORDER — TRAMADOL HYDROCHLORIDE 50 MG/1
TABLET ORAL
Qty: 120 TABLET | Refills: 1 | Status: SHIPPED | OUTPATIENT
Start: 2021-06-04 | End: 2021-08-10 | Stop reason: SDUPTHER

## 2021-06-11 DIAGNOSIS — I10 ESSENTIAL HYPERTENSION: ICD-10-CM

## 2021-06-11 RX ORDER — HYDROCHLOROTHIAZIDE 12.5 MG/1
12.5 TABLET ORAL DAILY
Qty: 90 TABLET | Refills: 1 | Status: SHIPPED | OUTPATIENT
Start: 2021-06-11 | End: 2021-12-30 | Stop reason: SDUPTHER

## 2021-06-14 DIAGNOSIS — I70.0 ATHEROSCLEROSIS OF AORTA: ICD-10-CM

## 2021-06-14 DIAGNOSIS — E78.5 HYPERLIPIDEMIA LDL GOAL <100: ICD-10-CM

## 2021-06-14 RX ORDER — ATORVASTATIN CALCIUM 20 MG/1
20 TABLET, FILM COATED ORAL DAILY
Qty: 90 TABLET | Refills: 2 | Status: SHIPPED | OUTPATIENT
Start: 2021-06-14 | End: 2022-01-10 | Stop reason: SDUPTHER

## 2021-07-16 ENCOUNTER — HOSPITAL ENCOUNTER (OUTPATIENT)
Dept: RADIOLOGY | Facility: HOSPITAL | Age: 71
Discharge: HOME | End: 2021-07-16
Attending: INTERNAL MEDICINE
Payer: MEDICARE

## 2021-07-16 ENCOUNTER — OFFICE VISIT (OUTPATIENT)
Dept: PRIMARY CARE CLINIC | Facility: CLINIC | Age: 71
End: 2021-07-16
Payer: MEDICARE

## 2021-07-16 ENCOUNTER — PATIENT MESSAGE (OUTPATIENT)
Dept: PRIMARY CARE CLINIC | Facility: CLINIC | Age: 71
End: 2021-07-16

## 2021-07-16 VITALS
BODY MASS INDEX: 41.93 KG/M2 | DIASTOLIC BLOOD PRESSURE: 74 MMHG | HEIGHT: 68 IN | WEIGHT: 276.69 LBS | TEMPERATURE: 98 F | HEART RATE: 75 BPM | RESPIRATION RATE: 18 BRPM | SYSTOLIC BLOOD PRESSURE: 124 MMHG | OXYGEN SATURATION: 99 %

## 2021-07-16 DIAGNOSIS — K59.09 CHRONIC CONSTIPATION: ICD-10-CM

## 2021-07-16 DIAGNOSIS — E11.40 TYPE 2 DIABETES MELLITUS WITH DIABETIC NEUROPATHY, WITHOUT LONG-TERM CURRENT USE OF INSULIN: ICD-10-CM

## 2021-07-16 DIAGNOSIS — D75.839 THROMBOCYTOSIS: ICD-10-CM

## 2021-07-16 DIAGNOSIS — F43.22 ADJUSTMENT DISORDER WITH ANXIETY: ICD-10-CM

## 2021-07-16 DIAGNOSIS — L30.4 INTERTRIGO: ICD-10-CM

## 2021-07-16 DIAGNOSIS — K59.09 CHRONIC CONSTIPATION: Primary | ICD-10-CM

## 2021-07-16 DIAGNOSIS — E78.5 HYPERLIPIDEMIA, UNSPECIFIED HYPERLIPIDEMIA TYPE: ICD-10-CM

## 2021-07-16 DIAGNOSIS — I10 ESSENTIAL HYPERTENSION: ICD-10-CM

## 2021-07-16 PROCEDURE — 3074F PR MOST RECENT SYSTOLIC BLOOD PRESSURE < 130 MM HG: ICD-10-PCS | Mod: CPTII,S$GLB,, | Performed by: INTERNAL MEDICINE

## 2021-07-16 PROCEDURE — 3078F PR MOST RECENT DIASTOLIC BLOOD PRESSURE < 80 MM HG: ICD-10-PCS | Mod: CPTII,S$GLB,, | Performed by: INTERNAL MEDICINE

## 2021-07-16 PROCEDURE — 1101F PR PT FALLS ASSESS DOC 0-1 FALLS W/OUT INJ PAST YR: ICD-10-PCS | Mod: CPTII,S$GLB,, | Performed by: INTERNAL MEDICINE

## 2021-07-16 PROCEDURE — 1126F PR PAIN SEVERITY QUANTIFIED, NO PAIN PRESENT: ICD-10-PCS | Mod: S$GLB,,, | Performed by: INTERNAL MEDICINE

## 2021-07-16 PROCEDURE — 3074F SYST BP LT 130 MM HG: CPT | Mod: CPTII,S$GLB,, | Performed by: INTERNAL MEDICINE

## 2021-07-16 PROCEDURE — 3288F FALL RISK ASSESSMENT DOCD: CPT | Mod: CPTII,S$GLB,, | Performed by: INTERNAL MEDICINE

## 2021-07-16 PROCEDURE — 3008F BODY MASS INDEX DOCD: CPT | Mod: CPTII,S$GLB,, | Performed by: INTERNAL MEDICINE

## 2021-07-16 PROCEDURE — 1159F MED LIST DOCD IN RCRD: CPT | Mod: S$GLB,,, | Performed by: INTERNAL MEDICINE

## 2021-07-16 PROCEDURE — 74018 XR ABDOMEN AP 1 VIEW: ICD-10-PCS | Mod: 26,,, | Performed by: RADIOLOGY

## 2021-07-16 PROCEDURE — 3044F HG A1C LEVEL LT 7.0%: CPT | Mod: CPTII,S$GLB,, | Performed by: INTERNAL MEDICINE

## 2021-07-16 PROCEDURE — 1101F PT FALLS ASSESS-DOCD LE1/YR: CPT | Mod: CPTII,S$GLB,, | Performed by: INTERNAL MEDICINE

## 2021-07-16 PROCEDURE — 3078F DIAST BP <80 MM HG: CPT | Mod: CPTII,S$GLB,, | Performed by: INTERNAL MEDICINE

## 2021-07-16 PROCEDURE — 3288F PR FALLS RISK ASSESSMENT DOCUMENTED: ICD-10-PCS | Mod: CPTII,S$GLB,, | Performed by: INTERNAL MEDICINE

## 2021-07-16 PROCEDURE — 74018 RADEX ABDOMEN 1 VIEW: CPT | Mod: TC,PN

## 2021-07-16 PROCEDURE — 99999 PR PBB SHADOW E&M-EST. PATIENT-LVL V: CPT | Mod: PBBFAC,,, | Performed by: INTERNAL MEDICINE

## 2021-07-16 PROCEDURE — 1159F PR MEDICATION LIST DOCUMENTED IN MEDICAL RECORD: ICD-10-PCS | Mod: S$GLB,,, | Performed by: INTERNAL MEDICINE

## 2021-07-16 PROCEDURE — 99499 RISK ADDL DX/OHS AUDIT: ICD-10-PCS | Mod: S$GLB,,, | Performed by: INTERNAL MEDICINE

## 2021-07-16 PROCEDURE — 99214 OFFICE O/P EST MOD 30 MIN: CPT | Mod: S$GLB,,, | Performed by: INTERNAL MEDICINE

## 2021-07-16 PROCEDURE — 99499 UNLISTED E&M SERVICE: CPT | Mod: S$GLB,,, | Performed by: INTERNAL MEDICINE

## 2021-07-16 PROCEDURE — 1126F AMNT PAIN NOTED NONE PRSNT: CPT | Mod: S$GLB,,, | Performed by: INTERNAL MEDICINE

## 2021-07-16 PROCEDURE — 3044F PR MOST RECENT HEMOGLOBIN A1C LEVEL <7.0%: ICD-10-PCS | Mod: CPTII,S$GLB,, | Performed by: INTERNAL MEDICINE

## 2021-07-16 PROCEDURE — 99999 PR PBB SHADOW E&M-EST. PATIENT-LVL V: ICD-10-PCS | Mod: PBBFAC,,, | Performed by: INTERNAL MEDICINE

## 2021-07-16 PROCEDURE — 74018 RADEX ABDOMEN 1 VIEW: CPT | Mod: 26,,, | Performed by: RADIOLOGY

## 2021-07-16 PROCEDURE — 99214 PR OFFICE/OUTPT VISIT, EST, LEVL IV, 30-39 MIN: ICD-10-PCS | Mod: S$GLB,,, | Performed by: INTERNAL MEDICINE

## 2021-07-16 PROCEDURE — 3008F PR BODY MASS INDEX (BMI) DOCUMENTED: ICD-10-PCS | Mod: CPTII,S$GLB,, | Performed by: INTERNAL MEDICINE

## 2021-07-16 RX ORDER — ATENOLOL 50 MG/1
50 TABLET ORAL 2 TIMES DAILY
Qty: 180 TABLET | Refills: 1 | Status: SHIPPED | OUTPATIENT
Start: 2021-07-16 | End: 2022-01-07 | Stop reason: SDUPTHER

## 2021-07-16 RX ORDER — SERTRALINE HYDROCHLORIDE 50 MG/1
50 TABLET, FILM COATED ORAL DAILY
Qty: 90 TABLET | Refills: 1 | Status: SHIPPED | OUTPATIENT
Start: 2021-07-16 | End: 2022-01-10 | Stop reason: SDUPTHER

## 2021-07-18 ENCOUNTER — PATIENT MESSAGE (OUTPATIENT)
Dept: PRIMARY CARE CLINIC | Facility: CLINIC | Age: 71
End: 2021-07-18

## 2021-07-22 ENCOUNTER — OFFICE VISIT (OUTPATIENT)
Dept: PODIATRY | Facility: CLINIC | Age: 71
End: 2021-07-22
Payer: MEDICARE

## 2021-07-22 VITALS
HEART RATE: 74 BPM | BODY MASS INDEX: 42.07 KG/M2 | WEIGHT: 276.69 LBS | SYSTOLIC BLOOD PRESSURE: 119 MMHG | DIASTOLIC BLOOD PRESSURE: 61 MMHG

## 2021-07-22 DIAGNOSIS — E11.42 DIABETIC PERIPHERAL NEUROPATHY ASSOCIATED WITH TYPE 2 DIABETES MELLITUS: Primary | ICD-10-CM

## 2021-07-22 DIAGNOSIS — G58.9 NERVE ENTRAPMENT: ICD-10-CM

## 2021-07-22 DIAGNOSIS — G57.53 TARSAL TUNNEL SYNDROME, BILATERAL LOWER LIMBS: ICD-10-CM

## 2021-07-22 PROCEDURE — 3074F SYST BP LT 130 MM HG: CPT | Mod: CPTII,S$GLB,, | Performed by: PODIATRIST

## 2021-07-22 PROCEDURE — 3008F BODY MASS INDEX DOCD: CPT | Mod: CPTII,S$GLB,, | Performed by: PODIATRIST

## 2021-07-22 PROCEDURE — 1126F AMNT PAIN NOTED NONE PRSNT: CPT | Mod: CPTII,S$GLB,, | Performed by: PODIATRIST

## 2021-07-22 PROCEDURE — 1160F RVW MEDS BY RX/DR IN RCRD: CPT | Mod: CPTII,S$GLB,, | Performed by: PODIATRIST

## 2021-07-22 PROCEDURE — 99999 PR PBB SHADOW E&M-EST. PATIENT-LVL II: CPT | Mod: PBBFAC,,, | Performed by: PODIATRIST

## 2021-07-22 PROCEDURE — 99999 PR PBB SHADOW E&M-EST. PATIENT-LVL II: ICD-10-PCS | Mod: PBBFAC,,, | Performed by: PODIATRIST

## 2021-07-22 PROCEDURE — 3078F DIAST BP <80 MM HG: CPT | Mod: CPTII,S$GLB,, | Performed by: PODIATRIST

## 2021-07-22 PROCEDURE — 1159F MED LIST DOCD IN RCRD: CPT | Mod: CPTII,S$GLB,, | Performed by: PODIATRIST

## 2021-07-22 PROCEDURE — 1160F PR REVIEW ALL MEDS BY PRESCRIBER/CLIN PHARMACIST DOCUMENTED: ICD-10-PCS | Mod: CPTII,S$GLB,, | Performed by: PODIATRIST

## 2021-07-22 PROCEDURE — 3044F HG A1C LEVEL LT 7.0%: CPT | Mod: CPTII,S$GLB,, | Performed by: PODIATRIST

## 2021-07-22 PROCEDURE — 1126F PR PAIN SEVERITY QUANTIFIED, NO PAIN PRESENT: ICD-10-PCS | Mod: CPTII,S$GLB,, | Performed by: PODIATRIST

## 2021-07-22 PROCEDURE — 3044F PR MOST RECENT HEMOGLOBIN A1C LEVEL <7.0%: ICD-10-PCS | Mod: CPTII,S$GLB,, | Performed by: PODIATRIST

## 2021-07-22 PROCEDURE — 3074F PR MOST RECENT SYSTOLIC BLOOD PRESSURE < 130 MM HG: ICD-10-PCS | Mod: CPTII,S$GLB,, | Performed by: PODIATRIST

## 2021-07-22 PROCEDURE — 1159F PR MEDICATION LIST DOCUMENTED IN MEDICAL RECORD: ICD-10-PCS | Mod: CPTII,S$GLB,, | Performed by: PODIATRIST

## 2021-07-22 PROCEDURE — 99214 OFFICE O/P EST MOD 30 MIN: CPT | Mod: S$GLB,,, | Performed by: PODIATRIST

## 2021-07-22 PROCEDURE — 3008F PR BODY MASS INDEX (BMI) DOCUMENTED: ICD-10-PCS | Mod: CPTII,S$GLB,, | Performed by: PODIATRIST

## 2021-07-22 PROCEDURE — 3078F PR MOST RECENT DIASTOLIC BLOOD PRESSURE < 80 MM HG: ICD-10-PCS | Mod: CPTII,S$GLB,, | Performed by: PODIATRIST

## 2021-07-22 PROCEDURE — 99214 PR OFFICE/OUTPT VISIT, EST, LEVL IV, 30-39 MIN: ICD-10-PCS | Mod: S$GLB,,, | Performed by: PODIATRIST

## 2021-08-06 DIAGNOSIS — M48.061 DEGENERATIVE LUMBAR SPINAL STENOSIS: ICD-10-CM

## 2021-08-06 RX ORDER — TRAMADOL HYDROCHLORIDE 50 MG/1
TABLET ORAL
Qty: 120 TABLET | Refills: 1 | Status: CANCELLED | OUTPATIENT
Start: 2021-08-06

## 2021-08-09 DIAGNOSIS — M48.061 DEGENERATIVE LUMBAR SPINAL STENOSIS: ICD-10-CM

## 2021-08-09 RX ORDER — TRAMADOL HYDROCHLORIDE 50 MG/1
TABLET ORAL
Qty: 120 TABLET | Refills: 1 | Status: CANCELLED | OUTPATIENT
Start: 2021-08-06

## 2021-08-10 DIAGNOSIS — M48.061 DEGENERATIVE LUMBAR SPINAL STENOSIS: ICD-10-CM

## 2021-08-10 RX ORDER — TRAMADOL HYDROCHLORIDE 50 MG/1
TABLET ORAL
Qty: 120 TABLET | Refills: 1 | Status: SHIPPED | OUTPATIENT
Start: 2021-08-10 | End: 2021-10-11 | Stop reason: SDUPTHER

## 2021-08-19 ENCOUNTER — PES CALL (OUTPATIENT)
Dept: ADMINISTRATIVE | Facility: CLINIC | Age: 71
End: 2021-08-19

## 2021-08-23 RX ORDER — TOPIRAMATE 50 MG/1
50 TABLET, FILM COATED ORAL 2 TIMES DAILY
Qty: 180 TABLET | Refills: 0 | Status: SHIPPED | OUTPATIENT
Start: 2021-08-23 | End: 2021-11-28 | Stop reason: SDUPTHER

## 2021-08-26 DIAGNOSIS — M48.061 DEGENERATIVE LUMBAR SPINAL STENOSIS: ICD-10-CM

## 2021-08-26 RX ORDER — TIZANIDINE 4 MG/1
4 TABLET ORAL EVERY 8 HOURS PRN
Qty: 90 TABLET | Refills: 2 | Status: SHIPPED | OUTPATIENT
Start: 2021-08-26 | End: 2022-03-31 | Stop reason: SDUPTHER

## 2021-08-26 RX ORDER — MINOXIDIL 2.5 MG/1
TABLET ORAL
Status: ON HOLD | COMMUNITY
Start: 2021-07-27 | End: 2021-12-01 | Stop reason: HOSPADM

## 2021-09-14 DIAGNOSIS — M15.9 PRIMARY OSTEOARTHRITIS INVOLVING MULTIPLE JOINTS: ICD-10-CM

## 2021-09-15 RX ORDER — IBUPROFEN 800 MG/1
TABLET ORAL
Qty: 60 TABLET | Refills: 0 | Status: SHIPPED | OUTPATIENT
Start: 2021-09-15 | End: 2021-12-06 | Stop reason: SDUPTHER

## 2021-10-01 ENCOUNTER — TELEPHONE (OUTPATIENT)
Dept: RADIOLOGY | Facility: HOSPITAL | Age: 71
End: 2021-10-01

## 2021-10-01 ENCOUNTER — HOSPITAL ENCOUNTER (OUTPATIENT)
Dept: RADIOLOGY | Facility: HOSPITAL | Age: 71
Discharge: HOME | End: 2021-10-01
Attending: SURGERY
Payer: MEDICARE

## 2021-10-01 ENCOUNTER — HOSPITAL ENCOUNTER (OUTPATIENT)
Dept: RADIOLOGY | Facility: HOSPITAL | Age: 71
Discharge: HOME | End: 2021-10-01
Attending: PHYSICIAN ASSISTANT
Payer: MEDICARE

## 2021-10-01 ENCOUNTER — OFFICE VISIT (OUTPATIENT)
Dept: SURGERY | Facility: CLINIC | Age: 71
End: 2021-10-01
Payer: MEDICARE

## 2021-10-01 VITALS
WEIGHT: 275 LBS | BODY MASS INDEX: 41.68 KG/M2 | HEART RATE: 76 BPM | DIASTOLIC BLOOD PRESSURE: 58 MMHG | SYSTOLIC BLOOD PRESSURE: 119 MMHG | HEIGHT: 68 IN

## 2021-10-01 DIAGNOSIS — Z85.3 PERSONAL HISTORY OF BREAST CANCER: ICD-10-CM

## 2021-10-01 DIAGNOSIS — R92.8 ABNORMAL MAMMOGRAM: Primary | ICD-10-CM

## 2021-10-01 DIAGNOSIS — Z12.31 SCREENING MAMMOGRAM, ENCOUNTER FOR: ICD-10-CM

## 2021-10-01 DIAGNOSIS — R92.8 ABNORMAL MAMMOGRAM: ICD-10-CM

## 2021-10-01 PROCEDURE — 1160F RVW MEDS BY RX/DR IN RCRD: CPT | Mod: CPTII,S$GLB,, | Performed by: PHYSICIAN ASSISTANT

## 2021-10-01 PROCEDURE — 77063 MAMMO DIGITAL SCREENING BILAT WITH TOMO: ICD-10-PCS | Mod: 26,,, | Performed by: RADIOLOGY

## 2021-10-01 PROCEDURE — 99999 PR PBB SHADOW E&M-EST. PATIENT-LVL V: CPT | Mod: PBBFAC,,, | Performed by: PHYSICIAN ASSISTANT

## 2021-10-01 PROCEDURE — 77067 MAMMO DIGITAL SCREENING BILAT WITH TOMO: ICD-10-PCS | Mod: 26,,, | Performed by: RADIOLOGY

## 2021-10-01 PROCEDURE — 4010F PR ACE/ARB THEARPY RXD/TAKEN: ICD-10-PCS | Mod: CPTII,S$GLB,, | Performed by: PHYSICIAN ASSISTANT

## 2021-10-01 PROCEDURE — 99213 PR OFFICE/OUTPT VISIT, EST, LEVL III, 20-29 MIN: ICD-10-PCS | Mod: S$GLB,,, | Performed by: PHYSICIAN ASSISTANT

## 2021-10-01 PROCEDURE — 3288F FALL RISK ASSESSMENT DOCD: CPT | Mod: CPTII,S$GLB,, | Performed by: PHYSICIAN ASSISTANT

## 2021-10-01 PROCEDURE — 3074F PR MOST RECENT SYSTOLIC BLOOD PRESSURE < 130 MM HG: ICD-10-PCS | Mod: CPTII,S$GLB,, | Performed by: PHYSICIAN ASSISTANT

## 2021-10-01 PROCEDURE — 3044F PR MOST RECENT HEMOGLOBIN A1C LEVEL <7.0%: ICD-10-PCS | Mod: CPTII,S$GLB,, | Performed by: PHYSICIAN ASSISTANT

## 2021-10-01 PROCEDURE — 77061 BREAST TOMOSYNTHESIS UNI: ICD-10-PCS | Mod: 26,RT,, | Performed by: RADIOLOGY

## 2021-10-01 PROCEDURE — 3008F PR BODY MASS INDEX (BMI) DOCUMENTED: ICD-10-PCS | Mod: CPTII,S$GLB,, | Performed by: PHYSICIAN ASSISTANT

## 2021-10-01 PROCEDURE — 3074F SYST BP LT 130 MM HG: CPT | Mod: CPTII,S$GLB,, | Performed by: PHYSICIAN ASSISTANT

## 2021-10-01 PROCEDURE — 77067 SCR MAMMO BI INCL CAD: CPT | Mod: 26,,, | Performed by: RADIOLOGY

## 2021-10-01 PROCEDURE — 3061F NEG MICROALBUMINURIA REV: CPT | Mod: CPTII,S$GLB,, | Performed by: PHYSICIAN ASSISTANT

## 2021-10-01 PROCEDURE — 3078F DIAST BP <80 MM HG: CPT | Mod: CPTII,S$GLB,, | Performed by: PHYSICIAN ASSISTANT

## 2021-10-01 PROCEDURE — 3061F PR NEG MICROALBUMINURIA RESULT DOCUMENTED/REVIEW: ICD-10-PCS | Mod: CPTII,S$GLB,, | Performed by: PHYSICIAN ASSISTANT

## 2021-10-01 PROCEDURE — 1159F MED LIST DOCD IN RCRD: CPT | Mod: CPTII,S$GLB,, | Performed by: PHYSICIAN ASSISTANT

## 2021-10-01 PROCEDURE — 3288F PR FALLS RISK ASSESSMENT DOCUMENTED: ICD-10-PCS | Mod: CPTII,S$GLB,, | Performed by: PHYSICIAN ASSISTANT

## 2021-10-01 PROCEDURE — 1126F AMNT PAIN NOTED NONE PRSNT: CPT | Mod: CPTII,S$GLB,, | Performed by: PHYSICIAN ASSISTANT

## 2021-10-01 PROCEDURE — 3066F PR DOCUMENTATION OF TREATMENT FOR NEPHROPATHY: ICD-10-PCS | Mod: CPTII,S$GLB,, | Performed by: PHYSICIAN ASSISTANT

## 2021-10-01 PROCEDURE — 1159F PR MEDICATION LIST DOCUMENTED IN MEDICAL RECORD: ICD-10-PCS | Mod: CPTII,S$GLB,, | Performed by: PHYSICIAN ASSISTANT

## 2021-10-01 PROCEDURE — 99213 OFFICE O/P EST LOW 20 MIN: CPT | Mod: S$GLB,,, | Performed by: PHYSICIAN ASSISTANT

## 2021-10-01 PROCEDURE — 77061 BREAST TOMOSYNTHESIS UNI: CPT | Mod: TC,RT

## 2021-10-01 PROCEDURE — 77061 BREAST TOMOSYNTHESIS UNI: CPT | Mod: 26,RT,, | Performed by: RADIOLOGY

## 2021-10-01 PROCEDURE — 3008F BODY MASS INDEX DOCD: CPT | Mod: CPTII,S$GLB,, | Performed by: PHYSICIAN ASSISTANT

## 2021-10-01 PROCEDURE — 1126F PR PAIN SEVERITY QUANTIFIED, NO PAIN PRESENT: ICD-10-PCS | Mod: CPTII,S$GLB,, | Performed by: PHYSICIAN ASSISTANT

## 2021-10-01 PROCEDURE — 1101F PT FALLS ASSESS-DOCD LE1/YR: CPT | Mod: CPTII,S$GLB,, | Performed by: PHYSICIAN ASSISTANT

## 2021-10-01 PROCEDURE — 77067 SCR MAMMO BI INCL CAD: CPT | Mod: TC

## 2021-10-01 PROCEDURE — 3066F NEPHROPATHY DOC TX: CPT | Mod: CPTII,S$GLB,, | Performed by: PHYSICIAN ASSISTANT

## 2021-10-01 PROCEDURE — 99999 PR PBB SHADOW E&M-EST. PATIENT-LVL V: ICD-10-PCS | Mod: PBBFAC,,, | Performed by: PHYSICIAN ASSISTANT

## 2021-10-01 PROCEDURE — 77065 MAMMO DIGITAL DIAGNOSTIC RIGHT WITH TOMO: ICD-10-PCS | Mod: 26,RT,, | Performed by: RADIOLOGY

## 2021-10-01 PROCEDURE — 1101F PR PT FALLS ASSESS DOC 0-1 FALLS W/OUT INJ PAST YR: ICD-10-PCS | Mod: CPTII,S$GLB,, | Performed by: PHYSICIAN ASSISTANT

## 2021-10-01 PROCEDURE — 77065 DX MAMMO INCL CAD UNI: CPT | Mod: 26,RT,, | Performed by: RADIOLOGY

## 2021-10-01 PROCEDURE — 3078F PR MOST RECENT DIASTOLIC BLOOD PRESSURE < 80 MM HG: ICD-10-PCS | Mod: CPTII,S$GLB,, | Performed by: PHYSICIAN ASSISTANT

## 2021-10-01 PROCEDURE — 3044F HG A1C LEVEL LT 7.0%: CPT | Mod: CPTII,S$GLB,, | Performed by: PHYSICIAN ASSISTANT

## 2021-10-01 PROCEDURE — 1160F PR REVIEW ALL MEDS BY PRESCRIBER/CLIN PHARMACIST DOCUMENTED: ICD-10-PCS | Mod: CPTII,S$GLB,, | Performed by: PHYSICIAN ASSISTANT

## 2021-10-01 PROCEDURE — 4010F ACE/ARB THERAPY RXD/TAKEN: CPT | Mod: CPTII,S$GLB,, | Performed by: PHYSICIAN ASSISTANT

## 2021-10-01 PROCEDURE — 77063 BREAST TOMOSYNTHESIS BI: CPT | Mod: 26,,, | Performed by: RADIOLOGY

## 2021-10-06 ENCOUNTER — HOSPITAL ENCOUNTER (OUTPATIENT)
Dept: RADIOLOGY | Facility: HOSPITAL | Age: 71
Discharge: HOME | End: 2021-10-06
Attending: PHYSICIAN ASSISTANT
Payer: MEDICARE

## 2021-10-06 DIAGNOSIS — R92.8 ABNORMAL FINDING ON BREAST IMAGING: Primary | ICD-10-CM

## 2021-10-06 PROCEDURE — 88360 TUMOR IMMUNOHISTOCHEM/MANUAL: CPT | Mod: 59 | Performed by: PATHOLOGY

## 2021-10-06 PROCEDURE — 77065 MAMMO DIGITAL DIAGNOSTIC RIGHT: ICD-10-PCS | Mod: 26,RT,, | Performed by: RADIOLOGY

## 2021-10-06 PROCEDURE — 77065 DX MAMMO INCL CAD UNI: CPT | Mod: 26,RT,, | Performed by: RADIOLOGY

## 2021-10-06 PROCEDURE — 19081 MAMMO BREAST STEREOTACTIC BREAST BIOPSY RIGHT: ICD-10-PCS | Mod: RT,,, | Performed by: RADIOLOGY

## 2021-10-06 PROCEDURE — 27200939 MAMMO BREAST STEREOTACTIC BREAST BIOPSY RIGHT

## 2021-10-06 PROCEDURE — 19081 BX BREAST 1ST LESION STRTCTC: CPT | Mod: RT

## 2021-10-06 PROCEDURE — 88342 CHG IMMUNOCYTOCHEMISTRY: ICD-10-PCS | Mod: 26,59,, | Performed by: PATHOLOGY

## 2021-10-06 PROCEDURE — 88341 PR IHC OR ICC EACH ADD'L SINGLE ANTIBODY  STAINPR: ICD-10-PCS | Mod: 26,59,, | Performed by: PATHOLOGY

## 2021-10-06 PROCEDURE — 88305 TISSUE EXAM BY PATHOLOGIST: ICD-10-PCS | Mod: 26,,, | Performed by: PATHOLOGY

## 2021-10-06 PROCEDURE — 88342 IMHCHEM/IMCYTCHM 1ST ANTB: CPT | Mod: 26,59,, | Performed by: PATHOLOGY

## 2021-10-06 PROCEDURE — 19081 BX BREAST 1ST LESION STRTCTC: CPT | Mod: RT,,, | Performed by: RADIOLOGY

## 2021-10-06 PROCEDURE — 88341 IMHCHEM/IMCYTCHM EA ADD ANTB: CPT | Mod: 26,59,, | Performed by: PATHOLOGY

## 2021-10-06 PROCEDURE — 88360 TUMOR IMMUNOHISTOCHEM/MANUAL: CPT | Mod: 26,,, | Performed by: PATHOLOGY

## 2021-10-06 PROCEDURE — 77065 DX MAMMO INCL CAD UNI: CPT | Mod: TC,RT

## 2021-10-06 PROCEDURE — 88305 TISSUE EXAM BY PATHOLOGIST: CPT | Performed by: PATHOLOGY

## 2021-10-06 PROCEDURE — 88360 PR  TUMOR IMMUNOHISTOCHEM/MANUAL: ICD-10-PCS | Mod: 26,,, | Performed by: PATHOLOGY

## 2021-10-06 PROCEDURE — 25000003 PHARM REV CODE 250: Performed by: PHYSICIAN ASSISTANT

## 2021-10-06 PROCEDURE — 88305 TISSUE EXAM BY PATHOLOGIST: CPT | Mod: 26,,, | Performed by: PATHOLOGY

## 2021-10-06 PROCEDURE — 88342 IMHCHEM/IMCYTCHM 1ST ANTB: CPT | Performed by: PATHOLOGY

## 2021-10-06 PROCEDURE — 88341 IMHCHEM/IMCYTCHM EA ADD ANTB: CPT | Mod: 59 | Performed by: PATHOLOGY

## 2021-10-06 RX ORDER — LIDOCAINE HYDROCHLORIDE 10 MG/ML
3 INJECTION, SOLUTION EPIDURAL; INFILTRATION; INTRACAUDAL; PERINEURAL ONCE
Status: COMPLETED | OUTPATIENT
Start: 2021-10-06 | End: 2021-10-06

## 2021-10-06 RX ORDER — LIDOCAINE HYDROCHLORIDE AND EPINEPHRINE 10; 10 MG/ML; UG/ML
20 INJECTION, SOLUTION INFILTRATION; PERINEURAL ONCE
Status: COMPLETED | OUTPATIENT
Start: 2021-10-06 | End: 2021-10-06

## 2021-10-06 RX ADMIN — LIDOCAINE HYDROCHLORIDE,EPINEPHRINE BITARTRATE 20 ML: 10; .01 INJECTION, SOLUTION INFILTRATION; PERINEURAL at 02:10

## 2021-10-06 RX ADMIN — LIDOCAINE HYDROCHLORIDE 3 ML: 10 INJECTION, SOLUTION EPIDURAL; INFILTRATION; INTRACAUDAL; PERINEURAL at 02:10

## 2021-10-07 ENCOUNTER — TELEPHONE (OUTPATIENT)
Dept: PAIN MEDICINE | Facility: CLINIC | Age: 71
End: 2021-10-07

## 2021-10-08 ENCOUNTER — TELEPHONE (OUTPATIENT)
Dept: SURGERY | Facility: CLINIC | Age: 71
End: 2021-10-08

## 2021-10-08 LAB
COMMENT: NORMAL
FINAL PATHOLOGIC DIAGNOSIS: NORMAL
GROSS: NORMAL
Lab: NORMAL

## 2021-10-11 DIAGNOSIS — Z79.891 LONG TERM CURRENT USE OF OPIATE ANALGESIC: Primary | ICD-10-CM

## 2021-10-11 DIAGNOSIS — M48.061 DEGENERATIVE LUMBAR SPINAL STENOSIS: ICD-10-CM

## 2021-10-11 RX ORDER — TRAMADOL HYDROCHLORIDE 50 MG/1
TABLET ORAL
Qty: 120 TABLET | Refills: 2 | Status: SHIPPED | OUTPATIENT
Start: 2021-10-11 | End: 2022-01-10 | Stop reason: SDUPTHER

## 2021-10-11 RX ORDER — NALOXONE HYDROCHLORIDE 4 MG/.1ML
SPRAY NASAL
Qty: 2 EACH | Refills: 11 | Status: SHIPPED | OUTPATIENT
Start: 2021-10-11

## 2021-10-11 RX ORDER — LIDOCAINE HYDROCHLORIDE 20 MG/ML
JELLY TOPICAL
Qty: 30 ML | Refills: 2 | Status: SHIPPED | OUTPATIENT
Start: 2021-10-11 | End: 2022-08-12 | Stop reason: CLARIF

## 2021-10-14 ENCOUNTER — TELEPHONE (OUTPATIENT)
Dept: PAIN MEDICINE | Facility: CLINIC | Age: 71
End: 2021-10-14

## 2021-10-14 ENCOUNTER — OFFICE VISIT (OUTPATIENT)
Dept: SURGERY | Facility: CLINIC | Age: 71
End: 2021-10-14
Payer: MEDICARE

## 2021-10-14 VITALS
DIASTOLIC BLOOD PRESSURE: 61 MMHG | WEIGHT: 272.38 LBS | HEIGHT: 68 IN | BODY MASS INDEX: 41.28 KG/M2 | OXYGEN SATURATION: 97 % | RESPIRATION RATE: 18 BRPM | HEART RATE: 74 BPM | SYSTOLIC BLOOD PRESSURE: 121 MMHG

## 2021-10-14 DIAGNOSIS — D05.11 DUCTAL CARCINOMA IN SITU (DCIS) OF RIGHT BREAST: Primary | ICD-10-CM

## 2021-10-14 PROCEDURE — 1126F PR PAIN SEVERITY QUANTIFIED, NO PAIN PRESENT: ICD-10-PCS | Mod: CPTII,S$GLB,, | Performed by: SURGERY

## 2021-10-14 PROCEDURE — 99999 PR PBB SHADOW E&M-EST. PATIENT-LVL V: CPT | Mod: PBBFAC,,, | Performed by: SURGERY

## 2021-10-14 PROCEDURE — 1160F RVW MEDS BY RX/DR IN RCRD: CPT | Mod: CPTII,S$GLB,, | Performed by: SURGERY

## 2021-10-14 PROCEDURE — 3061F PR NEG MICROALBUMINURIA RESULT DOCUMENTED/REVIEW: ICD-10-PCS | Mod: CPTII,S$GLB,, | Performed by: SURGERY

## 2021-10-14 PROCEDURE — 1159F MED LIST DOCD IN RCRD: CPT | Mod: CPTII,S$GLB,, | Performed by: SURGERY

## 2021-10-14 PROCEDURE — 3066F PR DOCUMENTATION OF TREATMENT FOR NEPHROPATHY: ICD-10-PCS | Mod: CPTII,S$GLB,, | Performed by: SURGERY

## 2021-10-14 PROCEDURE — 3288F PR FALLS RISK ASSESSMENT DOCUMENTED: ICD-10-PCS | Mod: CPTII,S$GLB,, | Performed by: SURGERY

## 2021-10-14 PROCEDURE — 3078F PR MOST RECENT DIASTOLIC BLOOD PRESSURE < 80 MM HG: ICD-10-PCS | Mod: CPTII,S$GLB,, | Performed by: SURGERY

## 2021-10-14 PROCEDURE — 3044F PR MOST RECENT HEMOGLOBIN A1C LEVEL <7.0%: ICD-10-PCS | Mod: CPTII,S$GLB,, | Performed by: SURGERY

## 2021-10-14 PROCEDURE — 3288F FALL RISK ASSESSMENT DOCD: CPT | Mod: CPTII,S$GLB,, | Performed by: SURGERY

## 2021-10-14 PROCEDURE — 1126F AMNT PAIN NOTED NONE PRSNT: CPT | Mod: CPTII,S$GLB,, | Performed by: SURGERY

## 2021-10-14 PROCEDURE — 1160F PR REVIEW ALL MEDS BY PRESCRIBER/CLIN PHARMACIST DOCUMENTED: ICD-10-PCS | Mod: CPTII,S$GLB,, | Performed by: SURGERY

## 2021-10-14 PROCEDURE — 4010F PR ACE/ARB THEARPY RXD/TAKEN: ICD-10-PCS | Mod: CPTII,S$GLB,, | Performed by: SURGERY

## 2021-10-14 PROCEDURE — 3066F NEPHROPATHY DOC TX: CPT | Mod: CPTII,S$GLB,, | Performed by: SURGERY

## 2021-10-14 PROCEDURE — 3074F PR MOST RECENT SYSTOLIC BLOOD PRESSURE < 130 MM HG: ICD-10-PCS | Mod: CPTII,S$GLB,, | Performed by: SURGERY

## 2021-10-14 PROCEDURE — 3008F BODY MASS INDEX DOCD: CPT | Mod: CPTII,S$GLB,, | Performed by: SURGERY

## 2021-10-14 PROCEDURE — 3074F SYST BP LT 130 MM HG: CPT | Mod: CPTII,S$GLB,, | Performed by: SURGERY

## 2021-10-14 PROCEDURE — 99214 PR OFFICE/OUTPT VISIT, EST, LEVL IV, 30-39 MIN: ICD-10-PCS | Mod: S$GLB,,, | Performed by: SURGERY

## 2021-10-14 PROCEDURE — 1101F PT FALLS ASSESS-DOCD LE1/YR: CPT | Mod: CPTII,S$GLB,, | Performed by: SURGERY

## 2021-10-14 PROCEDURE — 4010F ACE/ARB THERAPY RXD/TAKEN: CPT | Mod: CPTII,S$GLB,, | Performed by: SURGERY

## 2021-10-14 PROCEDURE — 3061F NEG MICROALBUMINURIA REV: CPT | Mod: CPTII,S$GLB,, | Performed by: SURGERY

## 2021-10-14 PROCEDURE — 3078F DIAST BP <80 MM HG: CPT | Mod: CPTII,S$GLB,, | Performed by: SURGERY

## 2021-10-14 PROCEDURE — 99214 OFFICE O/P EST MOD 30 MIN: CPT | Mod: S$GLB,,, | Performed by: SURGERY

## 2021-10-14 PROCEDURE — 1159F PR MEDICATION LIST DOCUMENTED IN MEDICAL RECORD: ICD-10-PCS | Mod: CPTII,S$GLB,, | Performed by: SURGERY

## 2021-10-14 PROCEDURE — 1101F PR PT FALLS ASSESS DOC 0-1 FALLS W/OUT INJ PAST YR: ICD-10-PCS | Mod: CPTII,S$GLB,, | Performed by: SURGERY

## 2021-10-14 PROCEDURE — 3044F HG A1C LEVEL LT 7.0%: CPT | Mod: CPTII,S$GLB,, | Performed by: SURGERY

## 2021-10-14 PROCEDURE — 3008F PR BODY MASS INDEX (BMI) DOCUMENTED: ICD-10-PCS | Mod: CPTII,S$GLB,, | Performed by: SURGERY

## 2021-10-14 PROCEDURE — 99999 PR PBB SHADOW E&M-EST. PATIENT-LVL V: ICD-10-PCS | Mod: PBBFAC,,, | Performed by: SURGERY

## 2021-10-15 DIAGNOSIS — D05.11 DUCTAL CARCINOMA IN SITU (DCIS) OF RIGHT BREAST: Primary | ICD-10-CM

## 2021-10-21 ENCOUNTER — OFFICE VISIT (OUTPATIENT)
Dept: SURGERY | Facility: CLINIC | Age: 71
End: 2021-10-21
Payer: MEDICARE

## 2021-10-21 VITALS
HEIGHT: 68 IN | SYSTOLIC BLOOD PRESSURE: 140 MMHG | WEIGHT: 272 LBS | RESPIRATION RATE: 18 BRPM | BODY MASS INDEX: 41.22 KG/M2 | DIASTOLIC BLOOD PRESSURE: 69 MMHG | OXYGEN SATURATION: 97 % | HEART RATE: 77 BPM

## 2021-10-21 DIAGNOSIS — D05.11 BREAST NEOPLASM, TIS (DCIS), RIGHT: Primary | ICD-10-CM

## 2021-10-21 PROCEDURE — 3077F PR MOST RECENT SYSTOLIC BLOOD PRESSURE >= 140 MM HG: ICD-10-PCS | Mod: CPTII,S$GLB,, | Performed by: SURGERY

## 2021-10-21 PROCEDURE — 1101F PT FALLS ASSESS-DOCD LE1/YR: CPT | Mod: CPTII,S$GLB,, | Performed by: SURGERY

## 2021-10-21 PROCEDURE — 99211 OFF/OP EST MAY X REQ PHY/QHP: CPT | Mod: S$GLB,,, | Performed by: SURGERY

## 2021-10-21 PROCEDURE — 3061F NEG MICROALBUMINURIA REV: CPT | Mod: CPTII,S$GLB,, | Performed by: SURGERY

## 2021-10-21 PROCEDURE — 1126F AMNT PAIN NOTED NONE PRSNT: CPT | Mod: CPTII,S$GLB,, | Performed by: SURGERY

## 2021-10-21 PROCEDURE — 4010F ACE/ARB THERAPY RXD/TAKEN: CPT | Mod: CPTII,S$GLB,, | Performed by: SURGERY

## 2021-10-21 PROCEDURE — 3066F NEPHROPATHY DOC TX: CPT | Mod: CPTII,S$GLB,, | Performed by: SURGERY

## 2021-10-21 PROCEDURE — 3078F PR MOST RECENT DIASTOLIC BLOOD PRESSURE < 80 MM HG: ICD-10-PCS | Mod: CPTII,S$GLB,, | Performed by: SURGERY

## 2021-10-21 PROCEDURE — 99999 PR PBB SHADOW E&M-EST. PATIENT-LVL III: CPT | Mod: PBBFAC,,, | Performed by: SURGERY

## 2021-10-21 PROCEDURE — 1159F PR MEDICATION LIST DOCUMENTED IN MEDICAL RECORD: ICD-10-PCS | Mod: CPTII,S$GLB,, | Performed by: SURGERY

## 2021-10-21 PROCEDURE — 4010F PR ACE/ARB THEARPY RXD/TAKEN: ICD-10-PCS | Mod: CPTII,S$GLB,, | Performed by: SURGERY

## 2021-10-21 PROCEDURE — 3061F PR NEG MICROALBUMINURIA RESULT DOCUMENTED/REVIEW: ICD-10-PCS | Mod: CPTII,S$GLB,, | Performed by: SURGERY

## 2021-10-21 PROCEDURE — 1101F PR PT FALLS ASSESS DOC 0-1 FALLS W/OUT INJ PAST YR: ICD-10-PCS | Mod: CPTII,S$GLB,, | Performed by: SURGERY

## 2021-10-21 PROCEDURE — 1126F PR PAIN SEVERITY QUANTIFIED, NO PAIN PRESENT: ICD-10-PCS | Mod: CPTII,S$GLB,, | Performed by: SURGERY

## 2021-10-21 PROCEDURE — 3066F PR DOCUMENTATION OF TREATMENT FOR NEPHROPATHY: ICD-10-PCS | Mod: CPTII,S$GLB,, | Performed by: SURGERY

## 2021-10-21 PROCEDURE — 1160F PR REVIEW ALL MEDS BY PRESCRIBER/CLIN PHARMACIST DOCUMENTED: ICD-10-PCS | Mod: CPTII,S$GLB,, | Performed by: SURGERY

## 2021-10-21 PROCEDURE — 1160F RVW MEDS BY RX/DR IN RCRD: CPT | Mod: CPTII,S$GLB,, | Performed by: SURGERY

## 2021-10-21 PROCEDURE — 1159F MED LIST DOCD IN RCRD: CPT | Mod: CPTII,S$GLB,, | Performed by: SURGERY

## 2021-10-21 PROCEDURE — 99999 PR PBB SHADOW E&M-EST. PATIENT-LVL III: ICD-10-PCS | Mod: PBBFAC,,, | Performed by: SURGERY

## 2021-10-21 PROCEDURE — 99211 PR OFFICE/OUTPT VISIT, EST, LEVL I: ICD-10-PCS | Mod: S$GLB,,, | Performed by: SURGERY

## 2021-10-21 PROCEDURE — 3008F PR BODY MASS INDEX (BMI) DOCUMENTED: ICD-10-PCS | Mod: CPTII,S$GLB,, | Performed by: SURGERY

## 2021-10-21 PROCEDURE — 3077F SYST BP >= 140 MM HG: CPT | Mod: CPTII,S$GLB,, | Performed by: SURGERY

## 2021-10-21 PROCEDURE — 3288F PR FALLS RISK ASSESSMENT DOCUMENTED: ICD-10-PCS | Mod: CPTII,S$GLB,, | Performed by: SURGERY

## 2021-10-21 PROCEDURE — 3044F PR MOST RECENT HEMOGLOBIN A1C LEVEL <7.0%: ICD-10-PCS | Mod: CPTII,S$GLB,, | Performed by: SURGERY

## 2021-10-21 PROCEDURE — 3008F BODY MASS INDEX DOCD: CPT | Mod: CPTII,S$GLB,, | Performed by: SURGERY

## 2021-10-21 PROCEDURE — 3044F HG A1C LEVEL LT 7.0%: CPT | Mod: CPTII,S$GLB,, | Performed by: SURGERY

## 2021-10-21 PROCEDURE — 3288F FALL RISK ASSESSMENT DOCD: CPT | Mod: CPTII,S$GLB,, | Performed by: SURGERY

## 2021-10-21 PROCEDURE — 3078F DIAST BP <80 MM HG: CPT | Mod: CPTII,S$GLB,, | Performed by: SURGERY

## 2021-10-27 ENCOUNTER — OFFICE VISIT (OUTPATIENT)
Dept: PLASTIC SURGERY | Facility: CLINIC | Age: 71
End: 2021-10-27
Payer: MEDICARE

## 2021-10-27 VITALS
BODY MASS INDEX: 41.22 KG/M2 | HEART RATE: 80 BPM | WEIGHT: 272 LBS | HEIGHT: 68 IN | SYSTOLIC BLOOD PRESSURE: 152 MMHG | DIASTOLIC BLOOD PRESSURE: 60 MMHG

## 2021-10-27 DIAGNOSIS — Z85.3 PERSONAL HISTORY OF BREAST CANCER: Primary | ICD-10-CM

## 2021-10-27 PROCEDURE — 1101F PR PT FALLS ASSESS DOC 0-1 FALLS W/OUT INJ PAST YR: ICD-10-PCS | Mod: CPTII,S$GLB,, | Performed by: SURGERY

## 2021-10-27 PROCEDURE — 3008F PR BODY MASS INDEX (BMI) DOCUMENTED: ICD-10-PCS | Mod: CPTII,S$GLB,, | Performed by: SURGERY

## 2021-10-27 PROCEDURE — 3066F NEPHROPATHY DOC TX: CPT | Mod: CPTII,S$GLB,, | Performed by: SURGERY

## 2021-10-27 PROCEDURE — 1126F AMNT PAIN NOTED NONE PRSNT: CPT | Mod: CPTII,S$GLB,, | Performed by: SURGERY

## 2021-10-27 PROCEDURE — 99203 OFFICE O/P NEW LOW 30 MIN: CPT | Mod: S$GLB,,, | Performed by: SURGERY

## 2021-10-27 PROCEDURE — 1160F RVW MEDS BY RX/DR IN RCRD: CPT | Mod: CPTII,S$GLB,, | Performed by: SURGERY

## 2021-10-27 PROCEDURE — 3077F PR MOST RECENT SYSTOLIC BLOOD PRESSURE >= 140 MM HG: ICD-10-PCS | Mod: CPTII,S$GLB,, | Performed by: SURGERY

## 2021-10-27 PROCEDURE — 3061F NEG MICROALBUMINURIA REV: CPT | Mod: CPTII,S$GLB,, | Performed by: SURGERY

## 2021-10-27 PROCEDURE — 3044F PR MOST RECENT HEMOGLOBIN A1C LEVEL <7.0%: ICD-10-PCS | Mod: CPTII,S$GLB,, | Performed by: SURGERY

## 2021-10-27 PROCEDURE — 3077F SYST BP >= 140 MM HG: CPT | Mod: CPTII,S$GLB,, | Performed by: SURGERY

## 2021-10-27 PROCEDURE — 1126F PR PAIN SEVERITY QUANTIFIED, NO PAIN PRESENT: ICD-10-PCS | Mod: CPTII,S$GLB,, | Performed by: SURGERY

## 2021-10-27 PROCEDURE — 3288F FALL RISK ASSESSMENT DOCD: CPT | Mod: CPTII,S$GLB,, | Performed by: SURGERY

## 2021-10-27 PROCEDURE — 1159F MED LIST DOCD IN RCRD: CPT | Mod: CPTII,S$GLB,, | Performed by: SURGERY

## 2021-10-27 PROCEDURE — 4010F PR ACE/ARB THEARPY RXD/TAKEN: ICD-10-PCS | Mod: CPTII,S$GLB,, | Performed by: SURGERY

## 2021-10-27 PROCEDURE — 3044F HG A1C LEVEL LT 7.0%: CPT | Mod: CPTII,S$GLB,, | Performed by: SURGERY

## 2021-10-27 PROCEDURE — 99203 PR OFFICE/OUTPT VISIT, NEW, LEVL III, 30-44 MIN: ICD-10-PCS | Mod: S$GLB,,, | Performed by: SURGERY

## 2021-10-27 PROCEDURE — 99999 PR PBB SHADOW E&M-EST. PATIENT-LVL IV: ICD-10-PCS | Mod: PBBFAC,,, | Performed by: SURGERY

## 2021-10-27 PROCEDURE — 3066F PR DOCUMENTATION OF TREATMENT FOR NEPHROPATHY: ICD-10-PCS | Mod: CPTII,S$GLB,, | Performed by: SURGERY

## 2021-10-27 PROCEDURE — 3061F PR NEG MICROALBUMINURIA RESULT DOCUMENTED/REVIEW: ICD-10-PCS | Mod: CPTII,S$GLB,, | Performed by: SURGERY

## 2021-10-27 PROCEDURE — 1160F PR REVIEW ALL MEDS BY PRESCRIBER/CLIN PHARMACIST DOCUMENTED: ICD-10-PCS | Mod: CPTII,S$GLB,, | Performed by: SURGERY

## 2021-10-27 PROCEDURE — 3008F BODY MASS INDEX DOCD: CPT | Mod: CPTII,S$GLB,, | Performed by: SURGERY

## 2021-10-27 PROCEDURE — 1101F PT FALLS ASSESS-DOCD LE1/YR: CPT | Mod: CPTII,S$GLB,, | Performed by: SURGERY

## 2021-10-27 PROCEDURE — 4010F ACE/ARB THERAPY RXD/TAKEN: CPT | Mod: CPTII,S$GLB,, | Performed by: SURGERY

## 2021-10-27 PROCEDURE — 3288F PR FALLS RISK ASSESSMENT DOCUMENTED: ICD-10-PCS | Mod: CPTII,S$GLB,, | Performed by: SURGERY

## 2021-10-27 PROCEDURE — 3078F PR MOST RECENT DIASTOLIC BLOOD PRESSURE < 80 MM HG: ICD-10-PCS | Mod: CPTII,S$GLB,, | Performed by: SURGERY

## 2021-10-27 PROCEDURE — 3078F DIAST BP <80 MM HG: CPT | Mod: CPTII,S$GLB,, | Performed by: SURGERY

## 2021-10-27 PROCEDURE — 99999 PR PBB SHADOW E&M-EST. PATIENT-LVL IV: CPT | Mod: PBBFAC,,, | Performed by: SURGERY

## 2021-10-27 PROCEDURE — 1159F PR MEDICATION LIST DOCUMENTED IN MEDICAL RECORD: ICD-10-PCS | Mod: CPTII,S$GLB,, | Performed by: SURGERY

## 2021-11-02 DIAGNOSIS — Z85.3 HISTORY OF BREAST CANCER: Primary | ICD-10-CM

## 2021-11-04 ENCOUNTER — OFFICE VISIT (OUTPATIENT)
Dept: PODIATRY | Facility: CLINIC | Age: 71
End: 2021-11-04
Payer: MEDICARE

## 2021-11-04 VITALS
SYSTOLIC BLOOD PRESSURE: 111 MMHG | WEIGHT: 272.06 LBS | HEART RATE: 75 BPM | BODY MASS INDEX: 41.36 KG/M2 | DIASTOLIC BLOOD PRESSURE: 62 MMHG

## 2021-11-04 DIAGNOSIS — G58.9 NERVE ENTRAPMENT: ICD-10-CM

## 2021-11-04 DIAGNOSIS — G57.53 TARSAL TUNNEL SYNDROME, BILATERAL LOWER LIMBS: ICD-10-CM

## 2021-11-04 DIAGNOSIS — E11.42 DIABETIC PERIPHERAL NEUROPATHY ASSOCIATED WITH TYPE 2 DIABETES MELLITUS: Primary | ICD-10-CM

## 2021-11-04 PROCEDURE — 3074F PR MOST RECENT SYSTOLIC BLOOD PRESSURE < 130 MM HG: ICD-10-PCS | Mod: CPTII,S$GLB,, | Performed by: PODIATRIST

## 2021-11-04 PROCEDURE — 3066F PR DOCUMENTATION OF TREATMENT FOR NEPHROPATHY: ICD-10-PCS | Mod: CPTII,S$GLB,, | Performed by: PODIATRIST

## 2021-11-04 PROCEDURE — 3008F PR BODY MASS INDEX (BMI) DOCUMENTED: ICD-10-PCS | Mod: CPTII,S$GLB,, | Performed by: PODIATRIST

## 2021-11-04 PROCEDURE — 3044F HG A1C LEVEL LT 7.0%: CPT | Mod: CPTII,S$GLB,, | Performed by: PODIATRIST

## 2021-11-04 PROCEDURE — 3066F NEPHROPATHY DOC TX: CPT | Mod: CPTII,S$GLB,, | Performed by: PODIATRIST

## 2021-11-04 PROCEDURE — 1159F MED LIST DOCD IN RCRD: CPT | Mod: CPTII,S$GLB,, | Performed by: PODIATRIST

## 2021-11-04 PROCEDURE — 99999 PR PBB SHADOW E&M-EST. PATIENT-LVL IV: ICD-10-PCS | Mod: PBBFAC,,, | Performed by: PODIATRIST

## 2021-11-04 PROCEDURE — 1159F PR MEDICATION LIST DOCUMENTED IN MEDICAL RECORD: ICD-10-PCS | Mod: CPTII,S$GLB,, | Performed by: PODIATRIST

## 2021-11-04 PROCEDURE — 3074F SYST BP LT 130 MM HG: CPT | Mod: CPTII,S$GLB,, | Performed by: PODIATRIST

## 2021-11-04 PROCEDURE — 99999 PR PBB SHADOW E&M-EST. PATIENT-LVL IV: CPT | Mod: PBBFAC,,, | Performed by: PODIATRIST

## 2021-11-04 PROCEDURE — 3044F PR MOST RECENT HEMOGLOBIN A1C LEVEL <7.0%: ICD-10-PCS | Mod: CPTII,S$GLB,, | Performed by: PODIATRIST

## 2021-11-04 PROCEDURE — 4010F ACE/ARB THERAPY RXD/TAKEN: CPT | Mod: CPTII,S$GLB,, | Performed by: PODIATRIST

## 2021-11-04 PROCEDURE — 3061F NEG MICROALBUMINURIA REV: CPT | Mod: CPTII,S$GLB,, | Performed by: PODIATRIST

## 2021-11-04 PROCEDURE — 3061F PR NEG MICROALBUMINURIA RESULT DOCUMENTED/REVIEW: ICD-10-PCS | Mod: CPTII,S$GLB,, | Performed by: PODIATRIST

## 2021-11-04 PROCEDURE — 1125F PR PAIN SEVERITY QUANTIFIED, PAIN PRESENT: ICD-10-PCS | Mod: CPTII,S$GLB,, | Performed by: PODIATRIST

## 2021-11-04 PROCEDURE — 99214 PR OFFICE/OUTPT VISIT, EST, LEVL IV, 30-39 MIN: ICD-10-PCS | Mod: S$GLB,,, | Performed by: PODIATRIST

## 2021-11-04 PROCEDURE — 3078F DIAST BP <80 MM HG: CPT | Mod: CPTII,S$GLB,, | Performed by: PODIATRIST

## 2021-11-04 PROCEDURE — 99214 OFFICE O/P EST MOD 30 MIN: CPT | Mod: S$GLB,,, | Performed by: PODIATRIST

## 2021-11-04 PROCEDURE — 4010F PR ACE/ARB THEARPY RXD/TAKEN: ICD-10-PCS | Mod: CPTII,S$GLB,, | Performed by: PODIATRIST

## 2021-11-04 PROCEDURE — 1125F AMNT PAIN NOTED PAIN PRSNT: CPT | Mod: CPTII,S$GLB,, | Performed by: PODIATRIST

## 2021-11-04 PROCEDURE — 3078F PR MOST RECENT DIASTOLIC BLOOD PRESSURE < 80 MM HG: ICD-10-PCS | Mod: CPTII,S$GLB,, | Performed by: PODIATRIST

## 2021-11-04 PROCEDURE — 3008F BODY MASS INDEX DOCD: CPT | Mod: CPTII,S$GLB,, | Performed by: PODIATRIST

## 2021-11-04 RX ORDER — AMITRIPTYLINE HYDROCHLORIDE 10 MG/1
10 TABLET, FILM COATED ORAL NIGHTLY PRN
Qty: 30 TABLET | Refills: 2 | Status: SHIPPED | OUTPATIENT
Start: 2021-11-04 | End: 2022-05-09

## 2021-11-05 ENCOUNTER — PATIENT MESSAGE (OUTPATIENT)
Dept: SURGERY | Facility: CLINIC | Age: 71
End: 2021-11-05
Payer: MEDICARE

## 2021-11-08 ENCOUNTER — OFFICE VISIT (OUTPATIENT)
Dept: DERMATOLOGY | Facility: CLINIC | Age: 71
End: 2021-11-08
Payer: MEDICARE

## 2021-11-08 DIAGNOSIS — Z76.89 ENCOUNTER FOR SKIN CARE: ICD-10-CM

## 2021-11-08 DIAGNOSIS — L30.4 INTERTRIGO: Primary | ICD-10-CM

## 2021-11-08 DIAGNOSIS — L81.9 HYPERPIGMENTATION: ICD-10-CM

## 2021-11-08 PROCEDURE — 3061F NEG MICROALBUMINURIA REV: CPT | Mod: CPTII,S$GLB,, | Performed by: DERMATOLOGY

## 2021-11-08 PROCEDURE — 99204 OFFICE O/P NEW MOD 45 MIN: CPT | Mod: S$GLB,,, | Performed by: DERMATOLOGY

## 2021-11-08 PROCEDURE — 1159F PR MEDICATION LIST DOCUMENTED IN MEDICAL RECORD: ICD-10-PCS | Mod: CPTII,S$GLB,, | Performed by: DERMATOLOGY

## 2021-11-08 PROCEDURE — 3044F HG A1C LEVEL LT 7.0%: CPT | Mod: CPTII,S$GLB,, | Performed by: DERMATOLOGY

## 2021-11-08 PROCEDURE — 1160F PR REVIEW ALL MEDS BY PRESCRIBER/CLIN PHARMACIST DOCUMENTED: ICD-10-PCS | Mod: CPTII,S$GLB,, | Performed by: DERMATOLOGY

## 2021-11-08 PROCEDURE — 3288F FALL RISK ASSESSMENT DOCD: CPT | Mod: CPTII,S$GLB,, | Performed by: DERMATOLOGY

## 2021-11-08 PROCEDURE — 4010F PR ACE/ARB THEARPY RXD/TAKEN: ICD-10-PCS | Mod: CPTII,S$GLB,, | Performed by: DERMATOLOGY

## 2021-11-08 PROCEDURE — 3044F PR MOST RECENT HEMOGLOBIN A1C LEVEL <7.0%: ICD-10-PCS | Mod: CPTII,S$GLB,, | Performed by: DERMATOLOGY

## 2021-11-08 PROCEDURE — 1126F AMNT PAIN NOTED NONE PRSNT: CPT | Mod: CPTII,S$GLB,, | Performed by: DERMATOLOGY

## 2021-11-08 PROCEDURE — 4010F ACE/ARB THERAPY RXD/TAKEN: CPT | Mod: CPTII,S$GLB,, | Performed by: DERMATOLOGY

## 2021-11-08 PROCEDURE — 99999 PR PBB SHADOW E&M-EST. PATIENT-LVL IV: ICD-10-PCS | Mod: PBBFAC,,, | Performed by: DERMATOLOGY

## 2021-11-08 PROCEDURE — 1159F MED LIST DOCD IN RCRD: CPT | Mod: CPTII,S$GLB,, | Performed by: DERMATOLOGY

## 2021-11-08 PROCEDURE — 1126F PR PAIN SEVERITY QUANTIFIED, NO PAIN PRESENT: ICD-10-PCS | Mod: CPTII,S$GLB,, | Performed by: DERMATOLOGY

## 2021-11-08 PROCEDURE — 1101F PR PT FALLS ASSESS DOC 0-1 FALLS W/OUT INJ PAST YR: ICD-10-PCS | Mod: CPTII,S$GLB,, | Performed by: DERMATOLOGY

## 2021-11-08 PROCEDURE — 99204 PR OFFICE/OUTPT VISIT, NEW, LEVL IV, 45-59 MIN: ICD-10-PCS | Mod: S$GLB,,, | Performed by: DERMATOLOGY

## 2021-11-08 PROCEDURE — 3061F PR NEG MICROALBUMINURIA RESULT DOCUMENTED/REVIEW: ICD-10-PCS | Mod: CPTII,S$GLB,, | Performed by: DERMATOLOGY

## 2021-11-08 PROCEDURE — 3066F PR DOCUMENTATION OF TREATMENT FOR NEPHROPATHY: ICD-10-PCS | Mod: CPTII,S$GLB,, | Performed by: DERMATOLOGY

## 2021-11-08 PROCEDURE — 1101F PT FALLS ASSESS-DOCD LE1/YR: CPT | Mod: CPTII,S$GLB,, | Performed by: DERMATOLOGY

## 2021-11-08 PROCEDURE — 99999 PR PBB SHADOW E&M-EST. PATIENT-LVL IV: CPT | Mod: PBBFAC,,, | Performed by: DERMATOLOGY

## 2021-11-08 PROCEDURE — 3066F NEPHROPATHY DOC TX: CPT | Mod: CPTII,S$GLB,, | Performed by: DERMATOLOGY

## 2021-11-08 PROCEDURE — 1160F RVW MEDS BY RX/DR IN RCRD: CPT | Mod: CPTII,S$GLB,, | Performed by: DERMATOLOGY

## 2021-11-08 PROCEDURE — 3288F PR FALLS RISK ASSESSMENT DOCUMENTED: ICD-10-PCS | Mod: CPTII,S$GLB,, | Performed by: DERMATOLOGY

## 2021-11-08 RX ORDER — HYDROQUINONE 40 MG/G
CREAM TOPICAL NIGHTLY
Qty: 28.35 G | Refills: 2 | Status: SHIPPED | OUTPATIENT
Start: 2021-11-08 | End: 2021-12-09

## 2021-11-08 RX ORDER — KETOCONAZOLE 20 MG/G
CREAM TOPICAL 2 TIMES DAILY
Qty: 60 G | Refills: 2 | Status: SHIPPED | OUTPATIENT
Start: 2021-11-08 | End: 2022-05-09 | Stop reason: SDUPTHER

## 2021-11-26 ENCOUNTER — TELEPHONE (OUTPATIENT)
Dept: SURGERY | Facility: CLINIC | Age: 71
End: 2021-11-26
Payer: MEDICARE

## 2021-11-28 ENCOUNTER — ANESTHESIA EVENT (OUTPATIENT)
Dept: SURGERY | Facility: HOSPITAL | Age: 71
End: 2021-11-28
Payer: MEDICARE

## 2021-11-29 ENCOUNTER — ANESTHESIA (OUTPATIENT)
Dept: SURGERY | Facility: HOSPITAL | Age: 71
End: 2021-11-29
Payer: MEDICARE

## 2021-11-29 ENCOUNTER — HOSPITAL ENCOUNTER (OUTPATIENT)
Facility: HOSPITAL | Age: 71
Discharge: HOME | End: 2021-12-01
Attending: SURGERY | Admitting: SURGERY
Payer: MEDICARE

## 2021-11-29 ENCOUNTER — HOSPITAL ENCOUNTER (OUTPATIENT)
Dept: RADIOLOGY | Facility: HOSPITAL | Age: 71
Discharge: HOME | End: 2021-11-29
Attending: SURGERY | Admitting: SURGERY
Payer: MEDICARE

## 2021-11-29 DIAGNOSIS — D05.10 DCIS (DUCTAL CARCINOMA IN SITU): ICD-10-CM

## 2021-11-29 DIAGNOSIS — D05.10 DUCTAL CARCINOMA IN SITU (DCIS) OF BREAST, UNSPECIFIED LATERALITY: Primary | ICD-10-CM

## 2021-11-29 DIAGNOSIS — D05.11 DUCTAL CARCINOMA IN SITU (DCIS) OF RIGHT BREAST: ICD-10-CM

## 2021-11-29 LAB
ESTIMATED AVG GLUCOSE: 117 MG/DL (ref 68–131)
HBA1C MFR BLD: 5.7 % (ref 4–5.6)
POCT GLUCOSE: 127 MG/DL (ref 70–110)
POCT GLUCOSE: 133 MG/DL (ref 70–110)

## 2021-11-29 PROCEDURE — 19357 TISS XPNDR PLMT BRST RCNSTJ: CPT | Mod: RT,,, | Performed by: SURGERY

## 2021-11-29 PROCEDURE — 19357 PR BREAST RECONSTRUC W TISS EXPANDR: ICD-10-PCS | Mod: RT,,, | Performed by: SURGERY

## 2021-11-29 PROCEDURE — 88307 TISSUE EXAM BY PATHOLOGIST: CPT | Mod: 26,,, | Performed by: PATHOLOGY

## 2021-11-29 PROCEDURE — 38792 RA TRACER ID OF SENTINL NODE: CPT | Mod: 59,RT,, | Performed by: SURGERY

## 2021-11-29 PROCEDURE — 63600175 PHARM REV CODE 636 W HCPCS: Performed by: STUDENT IN AN ORGANIZED HEALTH CARE EDUCATION/TRAINING PROGRAM

## 2021-11-29 PROCEDURE — 19303 PR MASTECTOMY, SIMPLE, COMPLETE: ICD-10-PCS | Mod: RT,,, | Performed by: SURGERY

## 2021-11-29 PROCEDURE — 27201423 OPTIME MED/SURG SUP & DEVICES STERILE SUPPLY: Performed by: SURGERY

## 2021-11-29 PROCEDURE — 88307 PR  SURG PATH,LEVEL V: ICD-10-PCS | Mod: 26,,, | Performed by: PATHOLOGY

## 2021-11-29 PROCEDURE — 38792 PR IDENTIFY SENTINEL 2DE: ICD-10-PCS | Mod: 59,RT,, | Performed by: SURGERY

## 2021-11-29 PROCEDURE — 63600175 PHARM REV CODE 636 W HCPCS: Performed by: ANESTHESIOLOGY

## 2021-11-29 PROCEDURE — 88341 IMHCHEM/IMCYTCHM EA ADD ANTB: CPT | Mod: 59 | Performed by: PATHOLOGY

## 2021-11-29 PROCEDURE — 19318 PR REDUCTION OF LARGE BREAST: ICD-10-PCS | Mod: 51,LT,, | Performed by: SURGERY

## 2021-11-29 PROCEDURE — 82962 GLUCOSE BLOOD TEST: CPT | Performed by: SURGERY

## 2021-11-29 PROCEDURE — 88307 TISSUE EXAM BY PATHOLOGIST: CPT | Performed by: PATHOLOGY

## 2021-11-29 PROCEDURE — 38525 BIOPSY/REMOVAL LYMPH NODES: CPT | Mod: 51,RT,, | Performed by: SURGERY

## 2021-11-29 PROCEDURE — 63600175 PHARM REV CODE 636 W HCPCS: Performed by: SURGERY

## 2021-11-29 PROCEDURE — 88305 TISSUE EXAM BY PATHOLOGIST: ICD-10-PCS | Mod: 26,,, | Performed by: PATHOLOGY

## 2021-11-29 PROCEDURE — D9220A PRA ANESTHESIA: ICD-10-PCS | Mod: CRNA,,, | Performed by: NURSE ANESTHETIST, CERTIFIED REGISTERED

## 2021-11-29 PROCEDURE — 82962 GLUCOSE BLOOD TEST: CPT | Mod: 91 | Performed by: SURGERY

## 2021-11-29 PROCEDURE — 36000707: Performed by: SURGERY

## 2021-11-29 PROCEDURE — 71000033 HC RECOVERY, INTIAL HOUR: Performed by: SURGERY

## 2021-11-29 PROCEDURE — 36415 COLL VENOUS BLD VENIPUNCTURE: CPT

## 2021-11-29 PROCEDURE — 25000003 PHARM REV CODE 250: Performed by: NURSE ANESTHETIST, CERTIFIED REGISTERED

## 2021-11-29 PROCEDURE — A9520 TC99 TILMANOCEPT DIAG 0.5MCI: HCPCS

## 2021-11-29 PROCEDURE — 38525 PR BIOPSY/REM LYMPH NODES, AXILLARY: ICD-10-PCS | Mod: 51,RT,, | Performed by: SURGERY

## 2021-11-29 PROCEDURE — 88305 TISSUE EXAM BY PATHOLOGIST: CPT | Mod: 26,,, | Performed by: PATHOLOGY

## 2021-11-29 PROCEDURE — 36000706: Performed by: SURGERY

## 2021-11-29 PROCEDURE — 37000009 HC ANESTHESIA EA ADD 15 MINS: Performed by: SURGERY

## 2021-11-29 PROCEDURE — 25000003 PHARM REV CODE 250

## 2021-11-29 PROCEDURE — 38900 PR INTRAOPERATIVE SENTINEL LYMPH NODE ID W DYE INJECTION: ICD-10-PCS | Mod: RT,,, | Performed by: SURGERY

## 2021-11-29 PROCEDURE — C1789 PROSTHESIS, BREAST, IMP: HCPCS | Performed by: SURGERY

## 2021-11-29 PROCEDURE — 83036 HEMOGLOBIN GLYCOSYLATED A1C: CPT

## 2021-11-29 PROCEDURE — 88342 CHG IMMUNOCYTOCHEMISTRY: ICD-10-PCS | Mod: 26,,, | Performed by: PATHOLOGY

## 2021-11-29 PROCEDURE — 37000008 HC ANESTHESIA 1ST 15 MINUTES: Performed by: SURGERY

## 2021-11-29 PROCEDURE — 88305 TISSUE EXAM BY PATHOLOGIST: CPT | Performed by: PATHOLOGY

## 2021-11-29 PROCEDURE — 63600175 PHARM REV CODE 636 W HCPCS

## 2021-11-29 PROCEDURE — 19303 MAST SIMPLE COMPLETE: CPT | Mod: RT,,, | Performed by: SURGERY

## 2021-11-29 PROCEDURE — 88341 IMHCHEM/IMCYTCHM EA ADD ANTB: CPT | Mod: 26,,, | Performed by: PATHOLOGY

## 2021-11-29 PROCEDURE — 25000003 PHARM REV CODE 250: Performed by: STUDENT IN AN ORGANIZED HEALTH CARE EDUCATION/TRAINING PROGRAM

## 2021-11-29 PROCEDURE — 88342 IMHCHEM/IMCYTCHM 1ST ANTB: CPT | Performed by: PATHOLOGY

## 2021-11-29 PROCEDURE — D9220A PRA ANESTHESIA: Mod: CRNA,,, | Performed by: NURSE ANESTHETIST, CERTIFIED REGISTERED

## 2021-11-29 PROCEDURE — 63600175 PHARM REV CODE 636 W HCPCS: Performed by: NURSE ANESTHETIST, CERTIFIED REGISTERED

## 2021-11-29 PROCEDURE — C1729 CATH, DRAINAGE: HCPCS | Performed by: SURGERY

## 2021-11-29 PROCEDURE — 88341 PR IHC OR ICC EACH ADD'L SINGLE ANTIBODY  STAINPR: ICD-10-PCS | Mod: 26,,, | Performed by: PATHOLOGY

## 2021-11-29 PROCEDURE — 38900 IO MAP OF SENT LYMPH NODE: CPT | Mod: RT,,, | Performed by: SURGERY

## 2021-11-29 PROCEDURE — D9220A PRA ANESTHESIA: ICD-10-PCS | Mod: ANES,,, | Performed by: ANESTHESIOLOGY

## 2021-11-29 PROCEDURE — 88342 IMHCHEM/IMCYTCHM 1ST ANTB: CPT | Mod: 26,,, | Performed by: PATHOLOGY

## 2021-11-29 PROCEDURE — 25000003 PHARM REV CODE 250: Performed by: ANESTHESIOLOGY

## 2021-11-29 PROCEDURE — D9220A PRA ANESTHESIA: Mod: ANES,,, | Performed by: ANESTHESIOLOGY

## 2021-11-29 PROCEDURE — 25000003 PHARM REV CODE 250: Performed by: SURGERY

## 2021-11-29 PROCEDURE — 19318 BREAST REDUCTION: CPT | Mod: 51,LT,, | Performed by: SURGERY

## 2021-11-29 PROCEDURE — 71000015 HC POSTOP RECOV 1ST HR: Performed by: SURGERY

## 2021-11-29 DEVICE — EXPANDER NATRELLE FH EP 550CC: Type: IMPLANTABLE DEVICE | Site: BREAST | Status: FUNCTIONAL

## 2021-11-29 RX ORDER — ACETAMINOPHEN 325 MG/1
650 TABLET ORAL EVERY 6 HOURS
Status: DISCONTINUED | OUTPATIENT
Start: 2021-11-29 | End: 2021-11-30

## 2021-11-29 RX ORDER — HYDROMORPHONE HYDROCHLORIDE 2 MG/ML
INJECTION, SOLUTION INTRAMUSCULAR; INTRAVENOUS; SUBCUTANEOUS
Status: DISCONTINUED | OUTPATIENT
Start: 2021-11-29 | End: 2021-11-29

## 2021-11-29 RX ORDER — SODIUM CHLORIDE 9 MG/ML
INJECTION, SOLUTION INTRAVENOUS CONTINUOUS
Status: DISCONTINUED | OUTPATIENT
Start: 2021-11-29 | End: 2021-11-30

## 2021-11-29 RX ORDER — SODIUM CHLORIDE 0.9 % (FLUSH) 0.9 %
3 SYRINGE (ML) INJECTION
Status: DISCONTINUED | OUTPATIENT
Start: 2021-11-29 | End: 2021-11-29 | Stop reason: HOSPADM

## 2021-11-29 RX ORDER — HYDROMORPHONE HYDROCHLORIDE 1 MG/ML
0.5 INJECTION, SOLUTION INTRAMUSCULAR; INTRAVENOUS; SUBCUTANEOUS EVERY 6 HOURS PRN
Status: DISCONTINUED | OUTPATIENT
Start: 2021-11-29 | End: 2021-12-01 | Stop reason: HOSPADM

## 2021-11-29 RX ORDER — FAMOTIDINE 10 MG/ML
INJECTION INTRAVENOUS
Status: DISCONTINUED | OUTPATIENT
Start: 2021-11-29 | End: 2021-11-29

## 2021-11-29 RX ORDER — HEPARIN SODIUM 5000 [USP'U]/ML
INJECTION, SOLUTION INTRAVENOUS; SUBCUTANEOUS
Status: DISCONTINUED | OUTPATIENT
Start: 2021-11-29 | End: 2021-11-29

## 2021-11-29 RX ORDER — TALC
6 POWDER (GRAM) TOPICAL NIGHTLY PRN
Status: DISCONTINUED | OUTPATIENT
Start: 2021-11-29 | End: 2021-11-30

## 2021-11-29 RX ORDER — FENTANYL CITRATE 50 UG/ML
INJECTION, SOLUTION INTRAMUSCULAR; INTRAVENOUS
Status: DISCONTINUED | OUTPATIENT
Start: 2021-11-29 | End: 2021-11-29

## 2021-11-29 RX ORDER — ONDANSETRON 2 MG/ML
4 INJECTION INTRAMUSCULAR; INTRAVENOUS EVERY 6 HOURS PRN
Status: DISCONTINUED | OUTPATIENT
Start: 2021-11-29 | End: 2021-12-01 | Stop reason: HOSPADM

## 2021-11-29 RX ORDER — CEFAZOLIN SODIUM 1 G/3ML
2 INJECTION, POWDER, FOR SOLUTION INTRAMUSCULAR; INTRAVENOUS
Status: COMPLETED | OUTPATIENT
Start: 2021-11-29 | End: 2021-11-29

## 2021-11-29 RX ORDER — ISOSULFAN BLUE 50 MG/5ML
INJECTION, SOLUTION SUBCUTANEOUS
Status: DISCONTINUED | OUTPATIENT
Start: 2021-11-29 | End: 2021-11-29 | Stop reason: HOSPADM

## 2021-11-29 RX ORDER — ACETAMINOPHEN 10 MG/ML
INJECTION, SOLUTION INTRAVENOUS
Status: DISCONTINUED | OUTPATIENT
Start: 2021-11-29 | End: 2021-11-29

## 2021-11-29 RX ORDER — MEPERIDINE HYDROCHLORIDE 50 MG/ML
12.5 INJECTION INTRAMUSCULAR; INTRAVENOUS; SUBCUTANEOUS ONCE AS NEEDED
Status: DISCONTINUED | OUTPATIENT
Start: 2021-11-29 | End: 2021-11-29 | Stop reason: HOSPADM

## 2021-11-29 RX ORDER — NEOSTIGMINE METHYLSULFATE 0.5 MG/ML
INJECTION, SOLUTION INTRAVENOUS
Status: DISCONTINUED | OUTPATIENT
Start: 2021-11-29 | End: 2021-11-29

## 2021-11-29 RX ORDER — CLINDAMYCIN HYDROCHLORIDE 150 MG/1
300 CAPSULE ORAL EVERY 8 HOURS
Status: DISCONTINUED | OUTPATIENT
Start: 2021-11-29 | End: 2021-12-01 | Stop reason: HOSPADM

## 2021-11-29 RX ORDER — CIPROFLOXACIN 2 MG/ML
INJECTION, SOLUTION INTRAVENOUS
Status: COMPLETED | OUTPATIENT
Start: 2021-11-29 | End: 2021-11-29

## 2021-11-29 RX ORDER — OXYCODONE AND ACETAMINOPHEN 5; 325 MG/1; MG/1
1 TABLET ORAL EVERY 4 HOURS PRN
Status: DISCONTINUED | OUTPATIENT
Start: 2021-11-29 | End: 2021-11-29

## 2021-11-29 RX ORDER — CLINDAMYCIN PHOSPHATE 150 MG/ML
INJECTION, SOLUTION INTRAVENOUS
Status: DISCONTINUED | OUTPATIENT
Start: 2021-11-29 | End: 2021-11-29 | Stop reason: HOSPADM

## 2021-11-29 RX ORDER — OXYCODONE HYDROCHLORIDE 10 MG/1
10 TABLET ORAL EVERY 6 HOURS PRN
Status: DISCONTINUED | OUTPATIENT
Start: 2021-11-29 | End: 2021-12-01 | Stop reason: HOSPADM

## 2021-11-29 RX ORDER — OXYCODONE AND ACETAMINOPHEN 5; 325 MG/1; MG/1
1 TABLET ORAL EVERY 4 HOURS PRN
Qty: 28 TABLET | Refills: 0 | Status: CANCELLED | OUTPATIENT
Start: 2021-11-29

## 2021-11-29 RX ORDER — PHENYLEPHRINE HCL IN 0.9% NACL 1 MG/10 ML
SYRINGE (ML) INTRAVENOUS
Status: DISCONTINUED | OUTPATIENT
Start: 2021-11-29 | End: 2021-11-29

## 2021-11-29 RX ORDER — PROPOFOL 10 MG/ML
VIAL (ML) INTRAVENOUS
Status: DISCONTINUED | OUTPATIENT
Start: 2021-11-29 | End: 2021-11-29

## 2021-11-29 RX ORDER — KETAMINE HCL IN 0.9 % NACL 50 MG/5 ML
SYRINGE (ML) INTRAVENOUS
Status: DISCONTINUED | OUTPATIENT
Start: 2021-11-29 | End: 2021-11-29

## 2021-11-29 RX ORDER — ONDANSETRON 2 MG/ML
4 INJECTION INTRAMUSCULAR; INTRAVENOUS DAILY PRN
Status: DISCONTINUED | OUTPATIENT
Start: 2021-11-29 | End: 2021-11-29 | Stop reason: HOSPADM

## 2021-11-29 RX ORDER — FENTANYL CITRATE 50 UG/ML
25 INJECTION, SOLUTION INTRAMUSCULAR; INTRAVENOUS EVERY 5 MIN PRN
Status: COMPLETED | OUTPATIENT
Start: 2021-11-29 | End: 2021-11-29

## 2021-11-29 RX ORDER — TOPIRAMATE 50 MG/1
50 TABLET, FILM COATED ORAL 2 TIMES DAILY
Qty: 180 TABLET | Refills: 0 | Status: SHIPPED | OUTPATIENT
Start: 2021-11-29 | End: 2022-02-07 | Stop reason: SDUPTHER

## 2021-11-29 RX ORDER — LIDOCAINE HYDROCHLORIDE 20 MG/ML
INJECTION, SOLUTION EPIDURAL; INFILTRATION; INTRACAUDAL; PERINEURAL
Status: DISCONTINUED | OUTPATIENT
Start: 2021-11-29 | End: 2021-11-29

## 2021-11-29 RX ORDER — DEXMEDETOMIDINE HYDROCHLORIDE 100 UG/ML
INJECTION, SOLUTION INTRAVENOUS
Status: DISCONTINUED | OUTPATIENT
Start: 2021-11-29 | End: 2021-11-29

## 2021-11-29 RX ORDER — MIDAZOLAM HYDROCHLORIDE 1 MG/ML
INJECTION, SOLUTION INTRAMUSCULAR; INTRAVENOUS
Status: DISCONTINUED | OUTPATIENT
Start: 2021-11-29 | End: 2021-11-29

## 2021-11-29 RX ORDER — OXYCODONE HYDROCHLORIDE 5 MG/1
5 TABLET ORAL EVERY 4 HOURS PRN
Status: DISCONTINUED | OUTPATIENT
Start: 2021-11-29 | End: 2021-12-01 | Stop reason: HOSPADM

## 2021-11-29 RX ORDER — CEFAZOLIN SODIUM 1 G/3ML
INJECTION, POWDER, FOR SOLUTION INTRAMUSCULAR; INTRAVENOUS
Status: DISCONTINUED | OUTPATIENT
Start: 2021-11-29 | End: 2021-11-29 | Stop reason: HOSPADM

## 2021-11-29 RX ORDER — ONDANSETRON 2 MG/ML
INJECTION INTRAMUSCULAR; INTRAVENOUS
Status: DISCONTINUED | OUTPATIENT
Start: 2021-11-29 | End: 2021-11-29

## 2021-11-29 RX ORDER — ROCURONIUM BROMIDE 10 MG/ML
INJECTION, SOLUTION INTRAVENOUS
Status: DISCONTINUED | OUTPATIENT
Start: 2021-11-29 | End: 2021-11-29

## 2021-11-29 RX ORDER — DEXAMETHASONE SODIUM PHOSPHATE 4 MG/ML
INJECTION, SOLUTION INTRA-ARTICULAR; INTRALESIONAL; INTRAMUSCULAR; INTRAVENOUS; SOFT TISSUE
Status: DISCONTINUED | OUTPATIENT
Start: 2021-11-29 | End: 2021-11-29

## 2021-11-29 RX ADMIN — DEXMEDETOMIDINE HYDROCHLORIDE 4 MCG: 100 INJECTION, SOLUTION INTRAVENOUS at 05:11

## 2021-11-29 RX ADMIN — FENTANYL CITRATE 100 MCG: 50 INJECTION INTRAMUSCULAR; INTRAVENOUS at 03:11

## 2021-11-29 RX ADMIN — FAMOTIDINE 20 MG: 10 INJECTION INTRAVENOUS at 04:11

## 2021-11-29 RX ADMIN — HEPARIN SODIUM 5000 UNITS: 5000 INJECTION INTRAVENOUS; SUBCUTANEOUS at 05:11

## 2021-11-29 RX ADMIN — Medication 100 MCG: at 06:11

## 2021-11-29 RX ADMIN — Medication 100 MCG: at 05:11

## 2021-11-29 RX ADMIN — ROCURONIUM BROMIDE 10 MG: 10 INJECTION, SOLUTION INTRAVENOUS at 04:11

## 2021-11-29 RX ADMIN — MIDAZOLAM 2 MG: 1 INJECTION INTRAMUSCULAR; INTRAVENOUS at 03:11

## 2021-11-29 RX ADMIN — PROPOFOL 160 MG: 10 INJECTION, EMULSION INTRAVENOUS at 03:11

## 2021-11-29 RX ADMIN — Medication 10 MG: at 05:11

## 2021-11-29 RX ADMIN — DEXMEDETOMIDINE HYDROCHLORIDE 12 MCG: 100 INJECTION, SOLUTION INTRAVENOUS at 04:11

## 2021-11-29 RX ADMIN — CLINDAMYCIN HYDROCHLORIDE 300 MG: 150 CAPSULE ORAL at 10:11

## 2021-11-29 RX ADMIN — ONDANSETRON 4 MG: 2 INJECTION INTRAMUSCULAR; INTRAVENOUS at 07:11

## 2021-11-29 RX ADMIN — ACETAMINOPHEN 1000 MG: 10 INJECTION INTRAVENOUS at 04:11

## 2021-11-29 RX ADMIN — FENTANYL CITRATE 25 MCG: 50 INJECTION INTRAMUSCULAR; INTRAVENOUS at 08:11

## 2021-11-29 RX ADMIN — HYDROMORPHONE HYDROCHLORIDE 0.4 MG: 2 INJECTION INTRAMUSCULAR; INTRAVENOUS; SUBCUTANEOUS at 06:11

## 2021-11-29 RX ADMIN — DEXMEDETOMIDINE HYDROCHLORIDE 8 MCG: 100 INJECTION, SOLUTION INTRAVENOUS at 04:11

## 2021-11-29 RX ADMIN — Medication 100 MCG: at 07:11

## 2021-11-29 RX ADMIN — Medication 20 MG: at 04:11

## 2021-11-29 RX ADMIN — GLYCOPYRROLATE 0.6 MG: 0.2 INJECTION, SOLUTION INTRAMUSCULAR; INTRAVITREAL at 07:11

## 2021-11-29 RX ADMIN — Medication 10 MG: at 04:11

## 2021-11-29 RX ADMIN — LIDOCAINE HYDROCHLORIDE 100 MG: 20 INJECTION, SOLUTION EPIDURAL; INFILTRATION; INTRACAUDAL at 03:11

## 2021-11-29 RX ADMIN — OXYCODONE 5 MG: 5 TABLET ORAL at 08:11

## 2021-11-29 RX ADMIN — ROCURONIUM BROMIDE 50 MG: 10 INJECTION, SOLUTION INTRAVENOUS at 03:11

## 2021-11-29 RX ADMIN — HYDROMORPHONE HYDROCHLORIDE 0.2 MG: 2 INJECTION INTRAMUSCULAR; INTRAVENOUS; SUBCUTANEOUS at 07:11

## 2021-11-29 RX ADMIN — CEFAZOLIN 2 G: 330 INJECTION, POWDER, FOR SOLUTION INTRAMUSCULAR; INTRAVENOUS at 04:11

## 2021-11-29 RX ADMIN — NEOSTIGMINE METHYLSULFATE 5 MG: 0.5 INJECTION INTRAVENOUS at 07:11

## 2021-11-29 RX ADMIN — HYDROMORPHONE HYDROCHLORIDE 0.5 MG: 1 INJECTION, SOLUTION INTRAMUSCULAR; INTRAVENOUS; SUBCUTANEOUS at 09:11

## 2021-11-29 RX ADMIN — ROCURONIUM BROMIDE 10 MG: 10 INJECTION, SOLUTION INTRAVENOUS at 06:11

## 2021-11-29 RX ADMIN — DEXAMETHASONE SODIUM PHOSPHATE 8 MG: 4 INJECTION INTRA-ARTICULAR; INTRALESIONAL; INTRAMUSCULAR; INTRAVENOUS; SOFT TISSUE at 04:11

## 2021-11-29 RX ADMIN — SODIUM CHLORIDE: 0.9 INJECTION, SOLUTION INTRAVENOUS at 03:11

## 2021-11-30 PROCEDURE — 63600175 PHARM REV CODE 636 W HCPCS

## 2021-11-30 PROCEDURE — 25000003 PHARM REV CODE 250: Performed by: STUDENT IN AN ORGANIZED HEALTH CARE EDUCATION/TRAINING PROGRAM

## 2021-11-30 PROCEDURE — 25000003 PHARM REV CODE 250

## 2021-11-30 PROCEDURE — 94761 N-INVAS EAR/PLS OXIMETRY MLT: CPT

## 2021-11-30 RX ORDER — IBUPROFEN 600 MG/1
600 TABLET ORAL EVERY 8 HOURS
Status: DISCONTINUED | OUTPATIENT
Start: 2021-11-30 | End: 2021-12-01 | Stop reason: HOSPADM

## 2021-11-30 RX ORDER — DIPHENHYDRAMINE HYDROCHLORIDE 50 MG/ML
INJECTION INTRAMUSCULAR; INTRAVENOUS
Status: COMPLETED
Start: 2021-11-30 | End: 2021-11-30

## 2021-11-30 RX ORDER — ACETAMINOPHEN 500 MG
1000 TABLET ORAL EVERY 8 HOURS
Status: DISCONTINUED | OUTPATIENT
Start: 2021-11-30 | End: 2021-12-01 | Stop reason: HOSPADM

## 2021-11-30 RX ORDER — BACITRACIN 500 [USP'U]/G
OINTMENT TOPICAL 3 TIMES DAILY
Status: DISCONTINUED | OUTPATIENT
Start: 2021-11-30 | End: 2021-12-01 | Stop reason: HOSPADM

## 2021-11-30 RX ORDER — DIPHENHYDRAMINE HYDROCHLORIDE 50 MG/ML
25 INJECTION INTRAMUSCULAR; INTRAVENOUS EVERY 6 HOURS PRN
Status: DISCONTINUED | OUTPATIENT
Start: 2021-11-30 | End: 2021-12-01 | Stop reason: HOSPADM

## 2021-11-30 RX ORDER — METHOCARBAMOL 500 MG/1
500 TABLET, FILM COATED ORAL 3 TIMES DAILY
Status: DISCONTINUED | OUTPATIENT
Start: 2021-11-30 | End: 2021-12-01 | Stop reason: HOSPADM

## 2021-11-30 RX ORDER — CLINDAMYCIN HYDROCHLORIDE 300 MG/1
300 CAPSULE ORAL EVERY 8 HOURS
Qty: 21 CAPSULE | Refills: 0 | Status: SHIPPED | OUTPATIENT
Start: 2021-11-30 | End: 2022-01-10

## 2021-11-30 RX ADMIN — IBUPROFEN 600 MG: 600 TABLET, FILM COATED ORAL at 02:11

## 2021-11-30 RX ADMIN — HYDROMORPHONE HYDROCHLORIDE 0.5 MG: 1 INJECTION, SOLUTION INTRAMUSCULAR; INTRAVENOUS; SUBCUTANEOUS at 12:11

## 2021-11-30 RX ADMIN — OXYCODONE HYDROCHLORIDE 10 MG: 10 TABLET ORAL at 05:11

## 2021-11-30 RX ADMIN — METHOCARBAMOL 500 MG: 500 TABLET ORAL at 02:11

## 2021-11-30 RX ADMIN — CLINDAMYCIN HYDROCHLORIDE 300 MG: 150 CAPSULE ORAL at 02:11

## 2021-11-30 RX ADMIN — ACETAMINOPHEN 1000 MG: 500 TABLET ORAL at 02:11

## 2021-11-30 RX ADMIN — OXYCODONE HYDROCHLORIDE 10 MG: 10 TABLET ORAL at 12:11

## 2021-11-30 RX ADMIN — Medication 6 MG: at 01:11

## 2021-11-30 RX ADMIN — DIPHENHYDRAMINE HYDROCHLORIDE 25 MG: 50 INJECTION, SOLUTION INTRAMUSCULAR; INTRAVENOUS at 10:11

## 2021-11-30 RX ADMIN — BACITRACIN: 500 OINTMENT TOPICAL at 02:11

## 2021-11-30 RX ADMIN — ACETAMINOPHEN 650 MG: 325 TABLET ORAL at 12:11

## 2021-11-30 RX ADMIN — BACITRACIN: 500 OINTMENT TOPICAL at 09:11

## 2021-11-30 RX ADMIN — IBUPROFEN 600 MG: 600 TABLET, FILM COATED ORAL at 09:11

## 2021-11-30 RX ADMIN — METHOCARBAMOL 500 MG: 500 TABLET ORAL at 09:11

## 2021-11-30 RX ADMIN — CLINDAMYCIN HYDROCHLORIDE 300 MG: 150 CAPSULE ORAL at 06:11

## 2021-11-30 RX ADMIN — HYDROMORPHONE HYDROCHLORIDE 0.5 MG: 1 INJECTION, SOLUTION INTRAMUSCULAR; INTRAVENOUS; SUBCUTANEOUS at 02:11

## 2021-11-30 RX ADMIN — OXYCODONE HYDROCHLORIDE 10 MG: 10 TABLET ORAL at 11:11

## 2021-11-30 RX ADMIN — ACETAMINOPHEN 650 MG: 325 TABLET ORAL at 06:11

## 2021-11-30 RX ADMIN — ONDANSETRON 4 MG: 2 INJECTION INTRAMUSCULAR; INTRAVENOUS at 02:11

## 2021-11-30 RX ADMIN — CLINDAMYCIN HYDROCHLORIDE 300 MG: 150 CAPSULE ORAL at 09:11

## 2021-11-30 RX ADMIN — OXYCODONE HYDROCHLORIDE 10 MG: 10 TABLET ORAL at 09:11

## 2021-11-30 RX ADMIN — ACETAMINOPHEN 1000 MG: 500 TABLET ORAL at 09:11

## 2021-11-30 RX ADMIN — DIPHENHYDRAMINE HYDROCHLORIDE 25 MG: 50 INJECTION, SOLUTION INTRAMUSCULAR; INTRAVENOUS at 09:11

## 2021-12-01 VITALS
DIASTOLIC BLOOD PRESSURE: 60 MMHG | HEIGHT: 68 IN | TEMPERATURE: 98 F | SYSTOLIC BLOOD PRESSURE: 139 MMHG | HEART RATE: 80 BPM | OXYGEN SATURATION: 96 % | WEIGHT: 272 LBS | BODY MASS INDEX: 41.22 KG/M2 | RESPIRATION RATE: 18 BRPM

## 2021-12-01 PROCEDURE — 63600175 PHARM REV CODE 636 W HCPCS

## 2021-12-01 PROCEDURE — 94761 N-INVAS EAR/PLS OXIMETRY MLT: CPT

## 2021-12-01 PROCEDURE — 25000003 PHARM REV CODE 250

## 2021-12-01 PROCEDURE — 25000003 PHARM REV CODE 250: Performed by: STUDENT IN AN ORGANIZED HEALTH CARE EDUCATION/TRAINING PROGRAM

## 2021-12-01 RX ORDER — BACITRACIN 500 [USP'U]/G
OINTMENT TOPICAL 3 TIMES DAILY
Qty: 28 G | Refills: 0 | Status: SHIPPED | OUTPATIENT
Start: 2021-12-01 | End: 2022-04-04 | Stop reason: CLARIF

## 2021-12-01 RX ORDER — OXYCODONE HYDROCHLORIDE 5 MG/1
5 TABLET ORAL EVERY 4 HOURS PRN
Qty: 10 TABLET | Refills: 0 | Status: SHIPPED | OUTPATIENT
Start: 2021-12-01 | End: 2022-01-10

## 2021-12-01 RX ADMIN — BACITRACIN: 500 OINTMENT TOPICAL at 08:12

## 2021-12-01 RX ADMIN — OXYCODONE HYDROCHLORIDE 10 MG: 10 TABLET ORAL at 06:12

## 2021-12-01 RX ADMIN — CLINDAMYCIN HYDROCHLORIDE 300 MG: 150 CAPSULE ORAL at 05:12

## 2021-12-01 RX ADMIN — ACETAMINOPHEN 1000 MG: 500 TABLET ORAL at 01:12

## 2021-12-01 RX ADMIN — IBUPROFEN 600 MG: 600 TABLET, FILM COATED ORAL at 05:12

## 2021-12-01 RX ADMIN — METHOCARBAMOL 500 MG: 500 TABLET ORAL at 08:12

## 2021-12-01 RX ADMIN — IBUPROFEN 600 MG: 600 TABLET, FILM COATED ORAL at 01:12

## 2021-12-01 RX ADMIN — OXYCODONE HYDROCHLORIDE 10 MG: 10 TABLET ORAL at 01:12

## 2021-12-01 RX ADMIN — ACETAMINOPHEN 1000 MG: 500 TABLET ORAL at 05:12

## 2021-12-01 RX ADMIN — CLINDAMYCIN HYDROCHLORIDE 300 MG: 150 CAPSULE ORAL at 01:12

## 2021-12-01 RX ADMIN — DIPHENHYDRAMINE HYDROCHLORIDE 25 MG: 50 INJECTION, SOLUTION INTRAMUSCULAR; INTRAVENOUS at 05:12

## 2021-12-06 DIAGNOSIS — M15.9 PRIMARY OSTEOARTHRITIS INVOLVING MULTIPLE JOINTS: ICD-10-CM

## 2021-12-07 LAB
COMMENT: NORMAL
FINAL PATHOLOGIC DIAGNOSIS: NORMAL
GROSS: NORMAL
Lab: NORMAL

## 2021-12-07 RX ORDER — IBUPROFEN 800 MG/1
TABLET ORAL
Qty: 60 TABLET | Refills: 0 | Status: SHIPPED | OUTPATIENT
Start: 2021-12-07 | End: 2022-05-09 | Stop reason: SDUPTHER

## 2021-12-08 ENCOUNTER — OFFICE VISIT (OUTPATIENT)
Dept: PLASTIC SURGERY | Facility: CLINIC | Age: 71
End: 2021-12-08
Payer: MEDICARE

## 2021-12-08 VITALS
DIASTOLIC BLOOD PRESSURE: 46 MMHG | SYSTOLIC BLOOD PRESSURE: 97 MMHG | HEART RATE: 79 BPM | BODY MASS INDEX: 41.22 KG/M2 | WEIGHT: 272 LBS | HEIGHT: 68 IN

## 2021-12-08 DIAGNOSIS — Z09 SURGERY FOLLOW-UP EXAMINATION: Primary | ICD-10-CM

## 2021-12-08 PROCEDURE — 3066F PR DOCUMENTATION OF TREATMENT FOR NEPHROPATHY: ICD-10-PCS | Mod: CPTII,S$GLB,, | Performed by: SURGERY

## 2021-12-08 PROCEDURE — 99024 POSTOP FOLLOW-UP VISIT: CPT | Mod: S$GLB,,, | Performed by: SURGERY

## 2021-12-08 PROCEDURE — 99999 PR PBB SHADOW E&M-EST. PATIENT-LVL IV: CPT | Mod: PBBFAC,,, | Performed by: SURGERY

## 2021-12-08 PROCEDURE — 99999 PR PBB SHADOW E&M-EST. PATIENT-LVL IV: ICD-10-PCS | Mod: PBBFAC,,, | Performed by: SURGERY

## 2021-12-08 PROCEDURE — 4010F ACE/ARB THERAPY RXD/TAKEN: CPT | Mod: CPTII,S$GLB,, | Performed by: SURGERY

## 2021-12-08 PROCEDURE — 3061F PR NEG MICROALBUMINURIA RESULT DOCUMENTED/REVIEW: ICD-10-PCS | Mod: CPTII,S$GLB,, | Performed by: SURGERY

## 2021-12-08 PROCEDURE — 99024 PR POST-OP FOLLOW-UP VISIT: ICD-10-PCS | Mod: S$GLB,,, | Performed by: SURGERY

## 2021-12-08 PROCEDURE — 3061F NEG MICROALBUMINURIA REV: CPT | Mod: CPTII,S$GLB,, | Performed by: SURGERY

## 2021-12-08 PROCEDURE — 4010F PR ACE/ARB THEARPY RXD/TAKEN: ICD-10-PCS | Mod: CPTII,S$GLB,, | Performed by: SURGERY

## 2021-12-08 PROCEDURE — 3066F NEPHROPATHY DOC TX: CPT | Mod: CPTII,S$GLB,, | Performed by: SURGERY

## 2021-12-12 ENCOUNTER — HOSPITAL ENCOUNTER (EMERGENCY)
Facility: OTHER | Age: 71
Discharge: HOME | End: 2021-12-13
Attending: EMERGENCY MEDICINE
Payer: MEDICARE

## 2021-12-12 DIAGNOSIS — N89.8 VAGINAL DISCHARGE: Primary | ICD-10-CM

## 2021-12-12 DIAGNOSIS — R03.1 LOW BLOOD PRESSURE READING: ICD-10-CM

## 2021-12-12 DIAGNOSIS — E86.0 DEHYDRATION: ICD-10-CM

## 2021-12-12 PROCEDURE — 99284 EMERGENCY DEPT VISIT MOD MDM: CPT | Mod: 25

## 2021-12-13 VITALS
TEMPERATURE: 98 F | BODY MASS INDEX: 41.22 KG/M2 | OXYGEN SATURATION: 100 % | SYSTOLIC BLOOD PRESSURE: 109 MMHG | HEART RATE: 81 BPM | DIASTOLIC BLOOD PRESSURE: 52 MMHG | WEIGHT: 272 LBS | HEIGHT: 68 IN | RESPIRATION RATE: 21 BRPM

## 2021-12-13 LAB
ALBUMIN SERPL BCP-MCNC: 3.4 G/DL (ref 3.5–5.2)
ALP SERPL-CCNC: 93 U/L (ref 55–135)
ALT SERPL W/O P-5'-P-CCNC: 12 U/L (ref 10–44)
ANION GAP SERPL CALC-SCNC: 13 MMOL/L (ref 8–16)
AST SERPL-CCNC: 17 U/L (ref 10–40)
BACTERIA GENITAL QL WET PREP: ABNORMAL
BASOPHILS # BLD AUTO: 0.06 K/UL (ref 0–0.2)
BASOPHILS NFR BLD: 0.4 % (ref 0–1.9)
BILIRUB SERPL-MCNC: 0.4 MG/DL (ref 0.1–1)
BILIRUB UR QL STRIP: NEGATIVE
BUN SERPL-MCNC: 16 MG/DL (ref 8–23)
CALCIUM SERPL-MCNC: 9.7 MG/DL (ref 8.7–10.5)
CHLORIDE SERPL-SCNC: 104 MMOL/L (ref 95–110)
CLARITY UR: CLEAR
CLUE CELLS VAG QL WET PREP: ABNORMAL
CO2 SERPL-SCNC: 22 MMOL/L (ref 23–29)
COLOR UR: YELLOW
CREAT SERPL-MCNC: 1.1 MG/DL (ref 0.5–1.4)
DIFFERENTIAL METHOD: ABNORMAL
EOSINOPHIL # BLD AUTO: 0.3 K/UL (ref 0–0.5)
EOSINOPHIL NFR BLD: 1.8 % (ref 0–8)
ERYTHROCYTE [DISTWIDTH] IN BLOOD BY AUTOMATED COUNT: 13.5 % (ref 11.5–14.5)
EST. GFR  (AFRICAN AMERICAN): 58 ML/MIN/1.73 M^2
EST. GFR  (NON AFRICAN AMERICAN): 50 ML/MIN/1.73 M^2
FILAMENT FUNGI VAG WET PREP-#/AREA: ABNORMAL
GLUCOSE SERPL-MCNC: 102 MG/DL (ref 70–110)
GLUCOSE UR QL STRIP: NEGATIVE
HCT VFR BLD AUTO: 33.8 % (ref 37–48.5)
HGB BLD-MCNC: 10.7 G/DL (ref 12–16)
HGB UR QL STRIP: ABNORMAL
IMM GRANULOCYTES # BLD AUTO: 0.04 K/UL (ref 0–0.04)
IMM GRANULOCYTES NFR BLD AUTO: 0.3 % (ref 0–0.5)
KETONES UR QL STRIP: NEGATIVE
LEUKOCYTE ESTERASE UR QL STRIP: ABNORMAL
LYMPHOCYTES # BLD AUTO: 2 K/UL (ref 1–4.8)
LYMPHOCYTES NFR BLD: 14.2 % (ref 18–48)
MCH RBC QN AUTO: 27.9 PG (ref 27–31)
MCHC RBC AUTO-ENTMCNC: 31.7 G/DL (ref 32–36)
MCV RBC AUTO: 88 FL (ref 82–98)
MICROSCOPIC COMMENT: ABNORMAL
MONOCYTES # BLD AUTO: 0.8 K/UL (ref 0.3–1)
MONOCYTES NFR BLD: 5.8 % (ref 4–15)
NEUTROPHILS # BLD AUTO: 10.8 K/UL (ref 1.8–7.7)
NEUTROPHILS NFR BLD: 77.5 % (ref 38–73)
NITRITE UR QL STRIP: NEGATIVE
NRBC BLD-RTO: 0 /100 WBC
PH UR STRIP: 6 [PH] (ref 5–8)
PLATELET # BLD AUTO: 441 K/UL (ref 150–450)
PMV BLD AUTO: 9.2 FL (ref 9.2–12.9)
POTASSIUM SERPL-SCNC: 4.8 MMOL/L (ref 3.5–5.1)
PROT SERPL-MCNC: 6.7 G/DL (ref 6–8.4)
PROT UR QL STRIP: NEGATIVE
RBC # BLD AUTO: 3.84 M/UL (ref 4–5.4)
RBC #/AREA URNS HPF: 2 /HPF (ref 0–4)
SODIUM SERPL-SCNC: 139 MMOL/L (ref 136–145)
SP GR UR STRIP: <=1.005 (ref 1–1.03)
SPECIMEN SOURCE: ABNORMAL
T VAGINALIS GENITAL QL WET PREP: ABNORMAL
URN SPEC COLLECT METH UR: ABNORMAL
UROBILINOGEN UR STRIP-ACNC: NEGATIVE EU/DL
WBC # BLD AUTO: 13.99 K/UL (ref 3.9–12.7)
WBC #/AREA URNS HPF: 6 /HPF (ref 0–5)
WBC #/AREA VAG WET PREP: ABNORMAL
YEAST GENITAL QL WET PREP: ABNORMAL

## 2021-12-13 PROCEDURE — 87210 SMEAR WET MOUNT SALINE/INK: CPT | Performed by: EMERGENCY MEDICINE

## 2021-12-13 PROCEDURE — 80053 COMPREHEN METABOLIC PANEL: CPT | Performed by: EMERGENCY MEDICINE

## 2021-12-13 PROCEDURE — 85025 COMPLETE CBC W/AUTO DIFF WBC: CPT | Performed by: EMERGENCY MEDICINE

## 2021-12-13 PROCEDURE — 81000 URINALYSIS NONAUTO W/SCOPE: CPT | Performed by: EMERGENCY MEDICINE

## 2021-12-13 PROCEDURE — 96360 HYDRATION IV INFUSION INIT: CPT

## 2021-12-13 PROCEDURE — 25000003 PHARM REV CODE 250: Performed by: EMERGENCY MEDICINE

## 2021-12-13 RX ORDER — METRONIDAZOLE 500 MG/1
500 TABLET ORAL EVERY 12 HOURS
Qty: 14 TABLET | Refills: 0 | Status: SHIPPED | OUTPATIENT
Start: 2021-12-13 | End: 2021-12-20

## 2021-12-13 RX ADMIN — SODIUM CHLORIDE 500 ML: 0.9 INJECTION, SOLUTION INTRAVENOUS at 12:12

## 2021-12-15 ENCOUNTER — OFFICE VISIT (OUTPATIENT)
Dept: PLASTIC SURGERY | Facility: CLINIC | Age: 71
End: 2021-12-15
Payer: MEDICARE

## 2021-12-15 VITALS
DIASTOLIC BLOOD PRESSURE: 77 MMHG | BODY MASS INDEX: 41.22 KG/M2 | SYSTOLIC BLOOD PRESSURE: 160 MMHG | HEIGHT: 68 IN | WEIGHT: 272 LBS

## 2021-12-15 DIAGNOSIS — Z09 SURGERY FOLLOW-UP EXAMINATION: Primary | ICD-10-CM

## 2021-12-15 PROCEDURE — 4010F ACE/ARB THERAPY RXD/TAKEN: CPT | Mod: CPTII,S$GLB,, | Performed by: PHYSICIAN ASSISTANT

## 2021-12-15 PROCEDURE — 4010F PR ACE/ARB THEARPY RXD/TAKEN: ICD-10-PCS | Mod: CPTII,S$GLB,, | Performed by: PHYSICIAN ASSISTANT

## 2021-12-15 PROCEDURE — 99999 PR PBB SHADOW E&M-EST. PATIENT-LVL IV: CPT | Mod: PBBFAC,,, | Performed by: PHYSICIAN ASSISTANT

## 2021-12-15 PROCEDURE — 99024 PR POST-OP FOLLOW-UP VISIT: ICD-10-PCS | Mod: S$GLB,,, | Performed by: PHYSICIAN ASSISTANT

## 2021-12-15 PROCEDURE — 3066F NEPHROPATHY DOC TX: CPT | Mod: CPTII,S$GLB,, | Performed by: PHYSICIAN ASSISTANT

## 2021-12-15 PROCEDURE — 3066F PR DOCUMENTATION OF TREATMENT FOR NEPHROPATHY: ICD-10-PCS | Mod: CPTII,S$GLB,, | Performed by: PHYSICIAN ASSISTANT

## 2021-12-15 PROCEDURE — 99024 POSTOP FOLLOW-UP VISIT: CPT | Mod: S$GLB,,, | Performed by: PHYSICIAN ASSISTANT

## 2021-12-15 PROCEDURE — 3061F PR NEG MICROALBUMINURIA RESULT DOCUMENTED/REVIEW: ICD-10-PCS | Mod: CPTII,S$GLB,, | Performed by: PHYSICIAN ASSISTANT

## 2021-12-15 PROCEDURE — 99999 PR PBB SHADOW E&M-EST. PATIENT-LVL IV: ICD-10-PCS | Mod: PBBFAC,,, | Performed by: PHYSICIAN ASSISTANT

## 2021-12-15 PROCEDURE — 3061F NEG MICROALBUMINURIA REV: CPT | Mod: CPTII,S$GLB,, | Performed by: PHYSICIAN ASSISTANT

## 2021-12-29 ENCOUNTER — OFFICE VISIT (OUTPATIENT)
Dept: PLASTIC SURGERY | Facility: CLINIC | Age: 71
End: 2021-12-29
Payer: MEDICARE

## 2021-12-29 VITALS
HEART RATE: 79 BPM | SYSTOLIC BLOOD PRESSURE: 120 MMHG | HEIGHT: 68 IN | BODY MASS INDEX: 41.22 KG/M2 | DIASTOLIC BLOOD PRESSURE: 57 MMHG | WEIGHT: 272 LBS

## 2021-12-29 DIAGNOSIS — Z09 SURGERY FOLLOW-UP EXAMINATION: Primary | ICD-10-CM

## 2021-12-29 PROCEDURE — 3074F SYST BP LT 130 MM HG: CPT | Mod: CPTII,S$GLB,, | Performed by: SURGERY

## 2021-12-29 PROCEDURE — 3044F PR MOST RECENT HEMOGLOBIN A1C LEVEL <7.0%: ICD-10-PCS | Mod: CPTII,S$GLB,, | Performed by: SURGERY

## 2021-12-29 PROCEDURE — 99999 PR PBB SHADOW E&M-EST. PATIENT-LVL IV: ICD-10-PCS | Mod: PBBFAC,,, | Performed by: SURGERY

## 2021-12-29 PROCEDURE — 99024 POSTOP FOLLOW-UP VISIT: CPT | Mod: S$GLB,,, | Performed by: SURGERY

## 2021-12-29 PROCEDURE — 1159F MED LIST DOCD IN RCRD: CPT | Mod: CPTII,S$GLB,, | Performed by: SURGERY

## 2021-12-29 PROCEDURE — 3008F PR BODY MASS INDEX (BMI) DOCUMENTED: ICD-10-PCS | Mod: CPTII,S$GLB,, | Performed by: SURGERY

## 2021-12-29 PROCEDURE — 1126F AMNT PAIN NOTED NONE PRSNT: CPT | Mod: CPTII,S$GLB,, | Performed by: SURGERY

## 2021-12-29 PROCEDURE — 4010F ACE/ARB THERAPY RXD/TAKEN: CPT | Mod: CPTII,S$GLB,, | Performed by: SURGERY

## 2021-12-29 PROCEDURE — 4010F PR ACE/ARB THEARPY RXD/TAKEN: ICD-10-PCS | Mod: CPTII,S$GLB,, | Performed by: SURGERY

## 2021-12-29 PROCEDURE — 3008F BODY MASS INDEX DOCD: CPT | Mod: CPTII,S$GLB,, | Performed by: SURGERY

## 2021-12-29 PROCEDURE — 3074F PR MOST RECENT SYSTOLIC BLOOD PRESSURE < 130 MM HG: ICD-10-PCS | Mod: CPTII,S$GLB,, | Performed by: SURGERY

## 2021-12-29 PROCEDURE — 99024 PR POST-OP FOLLOW-UP VISIT: ICD-10-PCS | Mod: S$GLB,,, | Performed by: SURGERY

## 2021-12-29 PROCEDURE — 3066F NEPHROPATHY DOC TX: CPT | Mod: CPTII,S$GLB,, | Performed by: SURGERY

## 2021-12-29 PROCEDURE — 1159F PR MEDICATION LIST DOCUMENTED IN MEDICAL RECORD: ICD-10-PCS | Mod: CPTII,S$GLB,, | Performed by: SURGERY

## 2021-12-29 PROCEDURE — 1126F PR PAIN SEVERITY QUANTIFIED, NO PAIN PRESENT: ICD-10-PCS | Mod: CPTII,S$GLB,, | Performed by: SURGERY

## 2021-12-29 PROCEDURE — 3066F PR DOCUMENTATION OF TREATMENT FOR NEPHROPATHY: ICD-10-PCS | Mod: CPTII,S$GLB,, | Performed by: SURGERY

## 2021-12-29 PROCEDURE — 99999 PR PBB SHADOW E&M-EST. PATIENT-LVL IV: CPT | Mod: PBBFAC,,, | Performed by: SURGERY

## 2021-12-29 PROCEDURE — 3288F FALL RISK ASSESSMENT DOCD: CPT | Mod: CPTII,S$GLB,, | Performed by: SURGERY

## 2021-12-29 PROCEDURE — 3061F PR NEG MICROALBUMINURIA RESULT DOCUMENTED/REVIEW: ICD-10-PCS | Mod: CPTII,S$GLB,, | Performed by: SURGERY

## 2021-12-29 PROCEDURE — 1101F PT FALLS ASSESS-DOCD LE1/YR: CPT | Mod: CPTII,S$GLB,, | Performed by: SURGERY

## 2021-12-29 PROCEDURE — 3078F PR MOST RECENT DIASTOLIC BLOOD PRESSURE < 80 MM HG: ICD-10-PCS | Mod: CPTII,S$GLB,, | Performed by: SURGERY

## 2021-12-29 PROCEDURE — 3078F DIAST BP <80 MM HG: CPT | Mod: CPTII,S$GLB,, | Performed by: SURGERY

## 2021-12-29 PROCEDURE — 1160F RVW MEDS BY RX/DR IN RCRD: CPT | Mod: CPTII,S$GLB,, | Performed by: SURGERY

## 2021-12-29 PROCEDURE — 3061F NEG MICROALBUMINURIA REV: CPT | Mod: CPTII,S$GLB,, | Performed by: SURGERY

## 2021-12-29 PROCEDURE — 3288F PR FALLS RISK ASSESSMENT DOCUMENTED: ICD-10-PCS | Mod: CPTII,S$GLB,, | Performed by: SURGERY

## 2021-12-29 PROCEDURE — 3044F HG A1C LEVEL LT 7.0%: CPT | Mod: CPTII,S$GLB,, | Performed by: SURGERY

## 2021-12-29 PROCEDURE — 1160F PR REVIEW ALL MEDS BY PRESCRIBER/CLIN PHARMACIST DOCUMENTED: ICD-10-PCS | Mod: CPTII,S$GLB,, | Performed by: SURGERY

## 2021-12-29 PROCEDURE — 1101F PR PT FALLS ASSESS DOC 0-1 FALLS W/OUT INJ PAST YR: ICD-10-PCS | Mod: CPTII,S$GLB,, | Performed by: SURGERY

## 2021-12-30 DIAGNOSIS — I10 ESSENTIAL HYPERTENSION: ICD-10-CM

## 2021-12-30 RX ORDER — HYDROCHLOROTHIAZIDE 12.5 MG/1
12.5 TABLET ORAL DAILY
Qty: 90 TABLET | Refills: 1 | Status: SHIPPED | OUTPATIENT
Start: 2021-12-30 | End: 2022-03-31 | Stop reason: SDUPTHER

## 2021-12-31 DIAGNOSIS — I10 ESSENTIAL HYPERTENSION: ICD-10-CM

## 2021-12-31 RX ORDER — OLMESARTAN MEDOXOMIL 20 MG/1
20 TABLET ORAL DAILY
Qty: 90 TABLET | Refills: 1 | Status: SHIPPED | OUTPATIENT
Start: 2021-12-31 | End: 2022-03-31 | Stop reason: SDUPTHER

## 2022-01-03 DIAGNOSIS — F43.22 ADJUSTMENT DISORDER WITH ANXIETY: ICD-10-CM

## 2022-01-03 RX ORDER — ALPRAZOLAM 0.5 MG/1
0.5 TABLET ORAL 2 TIMES DAILY PRN
Qty: 20 TABLET | Refills: 0 | OUTPATIENT
Start: 2022-01-03

## 2022-01-07 DIAGNOSIS — M48.061 DEGENERATIVE LUMBAR SPINAL STENOSIS: ICD-10-CM

## 2022-01-07 DIAGNOSIS — I10 ESSENTIAL HYPERTENSION: ICD-10-CM

## 2022-01-07 RX ORDER — TRAMADOL HYDROCHLORIDE 50 MG/1
TABLET ORAL
Qty: 120 TABLET | Refills: 2 | OUTPATIENT
Start: 2022-01-07

## 2022-01-07 RX ORDER — ATENOLOL 50 MG/1
50 TABLET ORAL 2 TIMES DAILY
Qty: 180 TABLET | Refills: 1 | Status: SHIPPED | OUTPATIENT
Start: 2022-01-07 | End: 2022-07-13 | Stop reason: SDUPTHER

## 2022-01-07 NOTE — TELEPHONE ENCOUNTER
No new care gaps identified.  Powered by [employer]. Reference number: [id number].   1/07/2022 9:42:43 AM CST

## 2022-01-07 NOTE — TELEPHONE ENCOUNTER
No new care gaps identified.  Powered by [employer]. Reference number: [id number].   1/07/2022 9:43:42 AM CST

## 2022-01-10 ENCOUNTER — OFFICE VISIT (OUTPATIENT)
Dept: PRIMARY CARE CLINIC | Facility: CLINIC | Age: 72
End: 2022-01-10
Payer: MEDICARE

## 2022-01-10 DIAGNOSIS — M48.061 DEGENERATIVE LUMBAR SPINAL STENOSIS: ICD-10-CM

## 2022-01-10 DIAGNOSIS — K21.9 GERD WITHOUT ESOPHAGITIS: ICD-10-CM

## 2022-01-10 DIAGNOSIS — F43.22 ADJUSTMENT DISORDER WITH ANXIETY: ICD-10-CM

## 2022-01-10 DIAGNOSIS — E78.5 HYPERLIPIDEMIA LDL GOAL <100: ICD-10-CM

## 2022-01-10 DIAGNOSIS — E11.42 DIABETIC PERIPHERAL NEUROPATHY ASSOCIATED WITH TYPE 2 DIABETES MELLITUS: ICD-10-CM

## 2022-01-10 DIAGNOSIS — M46.1 SACROILIITIS: ICD-10-CM

## 2022-01-10 DIAGNOSIS — F39 UNSPECIFIED MOOD (AFFECTIVE) DISORDER: ICD-10-CM

## 2022-01-10 DIAGNOSIS — I70.0 ATHEROSCLEROSIS OF AORTA: ICD-10-CM

## 2022-01-10 DIAGNOSIS — D47.3 ESSENTIAL (HEMORRHAGIC) THROMBOCYTHEMIA: Primary | ICD-10-CM

## 2022-01-10 PROCEDURE — 4010F PR ACE/ARB THEARPY RXD/TAKEN: ICD-10-PCS | Mod: CPTII,95,, | Performed by: FAMILY MEDICINE

## 2022-01-10 PROCEDURE — 99441 PR PHYSICIAN TELEPHONE EVALUATION 5-10 MIN: ICD-10-PCS | Mod: 95,,, | Performed by: FAMILY MEDICINE

## 2022-01-10 PROCEDURE — 4010F ACE/ARB THERAPY RXD/TAKEN: CPT | Mod: CPTII,95,, | Performed by: FAMILY MEDICINE

## 2022-01-10 PROCEDURE — 99441 PR PHYSICIAN TELEPHONE EVALUATION 5-10 MIN: CPT | Mod: 95,,, | Performed by: FAMILY MEDICINE

## 2022-01-10 RX ORDER — TRAMADOL HYDROCHLORIDE 50 MG/1
TABLET ORAL
Qty: 120 TABLET | Refills: 0 | Status: SHIPPED | OUTPATIENT
Start: 2022-01-10 | End: 2022-02-10 | Stop reason: SDUPTHER

## 2022-01-10 RX ORDER — ATORVASTATIN CALCIUM 20 MG/1
20 TABLET, FILM COATED ORAL NIGHTLY
Qty: 90 TABLET | Refills: 0 | Status: SHIPPED | OUTPATIENT
Start: 2022-01-10 | End: 2022-03-31 | Stop reason: SDUPTHER

## 2022-01-10 RX ORDER — ALPRAZOLAM 0.5 MG/1
0.5 TABLET ORAL 3 TIMES DAILY
Qty: 30 TABLET | Refills: 0 | Status: SHIPPED | OUTPATIENT
Start: 2022-01-10 | End: 2023-10-04 | Stop reason: SDUPTHER

## 2022-01-10 RX ORDER — SERTRALINE HYDROCHLORIDE 50 MG/1
50 TABLET, FILM COATED ORAL DAILY
Qty: 90 TABLET | Refills: 0 | Status: SHIPPED | OUTPATIENT
Start: 2022-01-10 | End: 2022-08-04

## 2022-01-10 RX ORDER — OMEPRAZOLE 20 MG/1
CAPSULE, DELAYED RELEASE ORAL
Qty: 90 CAPSULE | Refills: 1 | Status: SHIPPED | OUTPATIENT
Start: 2022-01-10 | End: 2023-03-31 | Stop reason: SDUPTHER

## 2022-01-12 ENCOUNTER — OFFICE VISIT (OUTPATIENT)
Dept: PLASTIC SURGERY | Facility: CLINIC | Age: 72
End: 2022-01-12
Payer: MEDICARE

## 2022-01-12 VITALS — DIASTOLIC BLOOD PRESSURE: 53 MMHG | HEART RATE: 95 BPM | SYSTOLIC BLOOD PRESSURE: 126 MMHG

## 2022-01-12 DIAGNOSIS — Z09 SURGERY FOLLOW-UP EXAMINATION: Primary | ICD-10-CM

## 2022-01-12 PROCEDURE — 3078F PR MOST RECENT DIASTOLIC BLOOD PRESSURE < 80 MM HG: ICD-10-PCS | Mod: CPTII,S$GLB,, | Performed by: SURGERY

## 2022-01-12 PROCEDURE — 99024 POSTOP FOLLOW-UP VISIT: CPT | Mod: S$GLB,,, | Performed by: SURGERY

## 2022-01-12 PROCEDURE — 99999 PR PBB SHADOW E&M-EST. PATIENT-LVL IV: CPT | Mod: PBBFAC,,, | Performed by: SURGERY

## 2022-01-12 PROCEDURE — 1160F RVW MEDS BY RX/DR IN RCRD: CPT | Mod: CPTII,S$GLB,, | Performed by: SURGERY

## 2022-01-12 PROCEDURE — 1159F MED LIST DOCD IN RCRD: CPT | Mod: CPTII,S$GLB,, | Performed by: SURGERY

## 2022-01-12 PROCEDURE — 1126F PR PAIN SEVERITY QUANTIFIED, NO PAIN PRESENT: ICD-10-PCS | Mod: CPTII,S$GLB,, | Performed by: SURGERY

## 2022-01-12 PROCEDURE — 3078F DIAST BP <80 MM HG: CPT | Mod: CPTII,S$GLB,, | Performed by: SURGERY

## 2022-01-12 PROCEDURE — 99024 PR POST-OP FOLLOW-UP VISIT: ICD-10-PCS | Mod: S$GLB,,, | Performed by: SURGERY

## 2022-01-12 PROCEDURE — 1126F AMNT PAIN NOTED NONE PRSNT: CPT | Mod: CPTII,S$GLB,, | Performed by: SURGERY

## 2022-01-12 PROCEDURE — 99999 PR PBB SHADOW E&M-EST. PATIENT-LVL IV: ICD-10-PCS | Mod: PBBFAC,,, | Performed by: SURGERY

## 2022-01-12 PROCEDURE — 4010F ACE/ARB THERAPY RXD/TAKEN: CPT | Mod: CPTII,S$GLB,, | Performed by: SURGERY

## 2022-01-12 PROCEDURE — 1159F PR MEDICATION LIST DOCUMENTED IN MEDICAL RECORD: ICD-10-PCS | Mod: CPTII,S$GLB,, | Performed by: SURGERY

## 2022-01-12 PROCEDURE — 3074F PR MOST RECENT SYSTOLIC BLOOD PRESSURE < 130 MM HG: ICD-10-PCS | Mod: CPTII,S$GLB,, | Performed by: SURGERY

## 2022-01-12 PROCEDURE — 4010F PR ACE/ARB THEARPY RXD/TAKEN: ICD-10-PCS | Mod: CPTII,S$GLB,, | Performed by: SURGERY

## 2022-01-12 PROCEDURE — 1160F PR REVIEW ALL MEDS BY PRESCRIBER/CLIN PHARMACIST DOCUMENTED: ICD-10-PCS | Mod: CPTII,S$GLB,, | Performed by: SURGERY

## 2022-01-12 PROCEDURE — 3074F SYST BP LT 130 MM HG: CPT | Mod: CPTII,S$GLB,, | Performed by: SURGERY

## 2022-01-12 NOTE — PROGRESS NOTES
History & Physical  Plastic Surgery Clinic    SUBJECTIVE:       Linnea Renae is a 71 y.o. year old female who presents to the Plastic Surgery Clinic on 2022 for follow up visit status post RIGHT breast reconstruction with TE placement and LEFT breast reduction by Dr. Nolberto Eng  On 2021. She is here today for routine follow up, Was expanded last visit, no issues. She c/o of left lateral incision open wound.      Denies fever, chills, nausea, vomiting, or other systemic signs of infection.    Past Medical History:  Past Medical History:   Diagnosis Date    Allergy     Breast cancer     Lumpectomy 10/15.    Chronic constipation     Colon polyps     Diabetes mellitus, type 2     Dry eyes     GERD (gastroesophageal reflux disease)     Hyperlipidemia     Hypertension     Lumbar disc disease     Type 2 diabetes mellitus        Past Surgical History:  Past Surgical History:   Procedure Laterality Date    BREAST BIOPSY      BREAST LUMPECTOMY Right         BREAST RECONSTRUCTION Right 2021    Procedure: RECONSTRUCTION, BREAST;  Surgeon: Nolberto Eng MD;  Location: [facility] OR [address] [place] [address];  Service: Plastics;  Laterality: Right;    CATARACT EXTRACTION, BILATERAL       SECTION      x3    CHOLECYSTECTOMY      COLONOSCOPY N/A 10/11/2018    Procedure: COLONOSCOPY;  Surgeon: Lorenzo Tanner MD;  Location: [facility] (4TH FLR);  Service: Endoscopy;  Laterality: N/A;    COLONOSCOPY W/ POLYPECTOMY      HYSTERECTOMY      and USO    INJECTION OF FACET JOINT Bilateral 2018    Procedure: INJECTION, FACET JOINT;  Surgeon: Viet Wilosn MD;  Location: [facility];  Service: Pain Management;  Laterality: Bilateral;  LUMBAR BILATERAL L3-L4 AND L4-L5 FACET STEROID INJECTION    NEED CONSENT    INJECTION OF JOINT Bilateral 2020    Procedure: INJECTION, JOINT, Bilateral SI;  Surgeon: Viet Wilson MD;  Location: [facility] PAIN T;  Service: Pain Management;   Laterality: Bilateral;    MASTECTOMY Right 11/29/2021    Procedure: MASTECTOMY, total, right breast with right sentinel lymph node biospy;  Surgeon: Heber Evans MD;  [facility];  Service: General;  Laterality: Right;    RADIOFREQUENCY ABLATION Left 3/20/2019    Procedure: RADIOFREQUENCY ABLATION LEFT L2,3,4,5;  Surgeon: Viet Wilson MD;  Location: [facility] PAIN MGT;  Service: Pain Management;  Laterality: Left;  LEFT RFA L2,3,4,5    NEEDS CONSENT    RADIOFREQUENCY ABLATION Left 12/18/2019    Procedure: RADIOFREQUENCY ABLATION, LEFT L2-L3-L4-L5  1 OF 2;  Surgeon: Viet Wilson MD;  Location: [facility] PAIN MGT;  Service: Pain Management;  Laterality: Left;    RADIOFREQUENCY ABLATION Right 1/8/2020    Procedure: RADIOFREQUENCY ABLATION, RIGHT L2-L3-L4-L5 MEDIAL BRANCH 2 OF;  Surgeon: Viet Wilson MD;  Location: [facility] PAIN MGT;  Service: Pain Management;  Laterality: Right;    RADIOFREQUENCY ABLATION Right 11/4/2020    Procedure: RADIOFREQUENCY ABLATION Right L2, L3, L4, L5;  Surgeon: Viet Wilson MD;  Location: [facility] PAIN MGT;  Service: Pain Management;  Laterality: Right;  Right L2, L3, L4, L5 RFA    TOTAL REDUCTION MAMMOPLASTY Left 11/29/2021    Procedure: MAMMOPLASTY, REDUCTION;  Surgeon: Nolberto Eng MD;  [facility];  Service: Plastics;  Laterality: Left;    TRANSFORAMINAL EPIDURAL INJECTION OF STEROID Left 10/2/2019    Procedure: INJECTION, STEROID, EPIDURAL, TRANSFORAMINAL APPROACH, L4 AND L5;  Surgeon: Viet Wilson MD;  Location: [facility] PAIN MGT;  Service: Pain Management;  Laterality: Left;    TUBAL LIGATION         Family History:  Family History   Problem Relation Age of Onset    No Known Problems Mother     No Known Problems Father     Heart disease Paternal Grandmother     Thyroid disease Paternal Grandfather     Hypertension Sister     Hypertension Brother     Glaucoma Brother     No Known Problems Daughter     No Known  Problems Daughter     No Known Problems Daughter        Social History:  Social History     Socioeconomic History    Marital status:     Number of children: 3   Tobacco Use    Smoking status: Former Smoker     Packs/day: 0.25     Years: 20.00     Pack years: 5.00     Quit date: 1985     Years since quittin.0    Smokeless tobacco: Never Used    Tobacco comment: Quit mid .   Substance and Sexual Activity    Alcohol use: No     Alcohol/week: 0.0 standard drinks    Drug use: No    Sexual activity: Yes       Medications:  Current Outpatient Medications   Medication Sig Dispense Refill    alcohol antiseptic pads (ALCOHOL PREP PADS TOP)       ALPRAZolam (XANAX) 0.5 MG tablet Take 1 tablet (0.5 mg total) by mouth 3 (three) times daily. 30 tablet 0    amitriptyline (ELAVIL) 10 MG tablet Take 1 tablet (10 mg total) by mouth nightly as needed for Insomnia. 30 tablet 2    ammonium lactate 12 % Crea Apply twice daily to affected parts both feet as needed. 140 g 11    aspirin 81 MG Chew Take 81 mg by mouth once daily.      atenoloL (TENORMIN) 50 MG tablet TAKE 1 TABLET BY MOUTH TWICE DAILY 180 tablet 1    atorvastatin (LIPITOR) 20 MG tablet Take 1 tablet (20 mg total) by mouth every evening. 90 tablet 0    bacitracin 500 unit/gram ointment Apply three times daily to the left nipple. 28 g 0    blood sugar diagnostic Strp 1 strip by Misc.(Non-Drug; Combo Route) route once daily. 100 each 3    calcium-vitamin D3 500 mg(1,250mg) -200 unit per tablet Take 1 tablet by mouth 2 (two) times daily with meals.       clobetasoL (TEMOVATE) 0.05 % cream Apply topically 2 (two) times daily. Top of both feet 45 g 2    cycloSPORINE (RESTASIS) 0.05 % ophthalmic emulsion Instill 1 drop into both eyes twice a day 180 each 3    diclofenac sodium (VOLTAREN) 1 % Gel Apply 2 g topically 4 (four) times daily. 100 g 2    flu vac  [id number],PF, (FLUAD QUAD ,65Y UP,,PF,) 60 mcg (15 mcg x 4)/0.5 mL  Syrg inject 0.5 ml intramuscular 0.5 mL 0    fluticasone propionate (FLONASE) 50 mcg/actuation nasal spray 2 sprays (100 mcg total) by Each Nostril route once daily. 48 g 1    hydroCHLOROthiazide (HYDRODIURIL) 12.5 MG Tab Take 1 tablet (12.5 mg total) by mouth once daily. 90 tablet 1    ibuprofen (ADVIL,MOTRIN) 800 MG tablet Take 1 tablet by mouth three times daily as needed 60 tablet 0    ketoconazole (NIZORAL) 2 % cream Apply topically 2 (two) times daily. Prn irritation body folds 60 g 2    ketoconazole (NIZORAL) 2 % shampoo Apply topically to scalp in place of regular shampoo 2 to 3 times a week 120 mL 9    lancets 33 gauge Misc 1 lancet by Misc.(Non-Drug; Combo Route) route once daily. 100 each 3    iomkmbeyk-[id number]-algal oil (FOLTANX RF) 3 mg-35 mg-2 mg -90.314 mg Cap Take 1 tablet by mouth 2 (two) times daily. 180 capsule 0    LIDOcaine HCL 2% (XYLOCAINE) 2 % jelly Apply topically as needed. Apply topically once nightly to affected part of foot/feet. 30 mL 2    linaCLOtide (LINZESS) 145 mcg Cap capsule Take 1 capsule (145 mcg total) by mouth once daily. 90 capsule 1    loratadine 10 mg Cap       metFORMIN (GLUCOPHAGE) 500 MG tablet Take 2 tablets (1,000 mg total) by mouth 2 (two) times daily with meals. (Patient taking differently: Take 500 mg by mouth 2 (two) times daily with meals.) 360 tablet 1    metroNIDAZOLE (FLAGYL) 500 MG tablet Take 1 tablet (500 mg total) by mouth every 12 (twelve) hours fors 7 days 14 tablet 0    minoxidiL (LONITEN) 2.5 MG tablet Take 1/4  to 1/2 tablet by mouth nightly as tolerated (Patient taking differently: Take 2.5 mg by mouth once daily.) 45 tablet 3    multivitamin (THERAGRAN) per tablet Take 1 tablet by mouth once daily.      naloxone (NARCAN) 4 mg/actuation Spry 4mg by nasal route as needed for opioid overdose; may repeat every 2-3 minutes in alternating nostrils until medical help arrives. Call 911 (Patient taking differently: 4mg by nasal route as needed  for opioid overdose; may repeat every 2-3 minutes in alternating nostrils until medical help arrives. Call 911-prn) 2 each 11    nystatin (MYCOSTATIN) powder Apply topically 4 (four) times daily Under pannus as needed 180 g 2    olmesartan (BENICAR) 20 MG tablet Take 1 tablet (20 mg total) by mouth once daily. 90 tablet 1    omeprazole (PRILOSEC) 20 MG capsule Take 1 capsule by mouth once daily 90 capsule 1    sertraline (ZOLOFT) 50 MG tablet Take 1 tablet (50 mg total) by mouth once daily. 90 tablet 0    tiZANidine (ZANAFLEX) 4 MG tablet Take 1 tablet (4 mg total) by mouth every 8 (eight) hours as needed (Muscle spasm.). 90 tablet 2    topiramate (TOPAMAX) 50 MG tablet Take 1 tablet (50 mg total) by mouth 2 (two) times daily. 180 tablet 0    traMADoL (ULTRAM) 50 mg tablet Take 1 tablet by mouth every 6 hours as needed for pain 120 tablet 0     No current facility-administered medications for this visit.       Allergies:  Review of patient's allergies indicates:   Allergen Reactions    Codeine Itching       Review of Systems:  Negative except for HPI.      OBJECTIVE:     BP (!) 126/53   Pulse 95     Physical Exam:  Constitutional: Oriented to person, place, and time. Appears well-developed and well-nourished.   WD WN NAD  VSS  Normal resp effort  R breast - incision appears CDI, no erythema or drainage.  L breast - incision appears CDI except for 3 cm breakdown lateral to the trifurcation , serous drainage      ASSESSMENT/PLAN:     71 y.o. female status post B breast reconstruction, left incision breakdown without infection    - R breast  150 cc NS injected for total of 430cc. Tolerated well   - L breast bacitracin and telfa applied, allow serous drainage to drain into absorptive pads  - Return to clinic in 2 weeks, appointment scheduled.      Basim Mullins  Plastic Surgery Fellow

## 2022-01-18 PROBLEM — F39 UNSPECIFIED MOOD (AFFECTIVE) DISORDER: Status: ACTIVE | Noted: 2022-01-18

## 2022-01-18 PROBLEM — D47.3 ESSENTIAL (HEMORRHAGIC) THROMBOCYTHEMIA: Status: ACTIVE | Noted: 2022-01-18

## 2022-01-18 NOTE — PROGRESS NOTES
Established Patient - Audio Only Telehealth Visit     The patient location is:  [place]  The chief complaint leading to consultation is:  Pain med refilled  Visit type: Virtual visit with audio only (telephone)  Total time spent with patient:  Ten minute       The reason for the audio only service rather than synchronous audio and video virtual visit was related to technical difficulties or patient preference/necessity.     Each patient to whom I provide medical services by telemedicine is:  (1) informed of the relationship between the physician and patient and the respective role of any other health care provider with respect to management of the patient; and (2) notified that they may decline to receive medical services by telemedicine and may withdraw from such care at any time. Patient verbally consented to receive this service via voice-only telephone call.       71-year-old female whose PCP is presently unavailable here for follow-up for tramadol.  She does not take this every day.    No complaints other than the chronic pain.  Med list reviewed and updated.  Meds reviewed.  Labs reviewed.  Refill med.  Follow up with PCP in 3 months                     This service was not originating from a related E/M service provided within the previous 7 days nor will  to an E/M service or procedure within the next 24 hours or my soonest available appointment.  Prevailing standard of care was able to be met in this audio-only visit.        Answers for HPI/ROS submitted by the patient on 1/10/2022  Chronicity: chronic  Onset: more than 1 year ago  Frequency: constantly  Progression since onset: unchanged  Pain location: gluteal, sacro-iliac  Pain quality: aching, burning, cramping, shooting, stabbing  Radiates to: right thigh  Pain - numeric: 7/10  Pain is: the same all the time  Aggravated by: bending, sitting  Stiffness is present: all day  abdominal pain: No  bladder incontinence: No  bowel incontinence:  No  chest pain: No  dysuria: No  fever: No  headaches: No  leg pain: No  numbness: No  paresis: No  paresthesias: No  pelvic pain: No  perianal numbness: No  tingling: No  weakness: No  weight loss: No  genital pain: No  hematuria: No  Pain severity: moderate  Improvement on treatment: significant

## 2022-01-20 ENCOUNTER — IMMUNIZATION (OUTPATIENT)
Dept: INTERNAL MEDICINE | Facility: CLINIC | Age: 72
End: 2022-01-20
Payer: MEDICARE

## 2022-01-20 DIAGNOSIS — Z23 NEED FOR VACCINATION: Primary | ICD-10-CM

## 2022-01-20 PROCEDURE — 0004A COVID-19, MRNA, LNP-S, PF, 30 MCG/0.3 ML DOSE VACCINE: CPT | Mod: PBBFAC | Performed by: INTERNAL MEDICINE

## 2022-01-26 ENCOUNTER — OFFICE VISIT (OUTPATIENT)
Dept: PLASTIC SURGERY | Facility: CLINIC | Age: 72
End: 2022-01-26
Payer: MEDICARE

## 2022-01-26 VITALS
HEIGHT: 68 IN | SYSTOLIC BLOOD PRESSURE: 128 MMHG | BODY MASS INDEX: 41.22 KG/M2 | DIASTOLIC BLOOD PRESSURE: 60 MMHG | HEART RATE: 77 BPM | WEIGHT: 272 LBS

## 2022-01-26 DIAGNOSIS — Z09 SURGERY FOLLOW-UP EXAMINATION: Primary | ICD-10-CM

## 2022-01-26 PROCEDURE — 4010F ACE/ARB THERAPY RXD/TAKEN: CPT | Mod: CPTII,S$GLB,, | Performed by: PHYSICIAN ASSISTANT

## 2022-01-26 PROCEDURE — 1160F PR REVIEW ALL MEDS BY PRESCRIBER/CLIN PHARMACIST DOCUMENTED: ICD-10-PCS | Mod: CPTII,S$GLB,, | Performed by: PHYSICIAN ASSISTANT

## 2022-01-26 PROCEDURE — 3288F FALL RISK ASSESSMENT DOCD: CPT | Mod: CPTII,S$GLB,, | Performed by: PHYSICIAN ASSISTANT

## 2022-01-26 PROCEDURE — 1159F MED LIST DOCD IN RCRD: CPT | Mod: CPTII,S$GLB,, | Performed by: PHYSICIAN ASSISTANT

## 2022-01-26 PROCEDURE — 4010F PR ACE/ARB THEARPY RXD/TAKEN: ICD-10-PCS | Mod: CPTII,S$GLB,, | Performed by: PHYSICIAN ASSISTANT

## 2022-01-26 PROCEDURE — 99024 POSTOP FOLLOW-UP VISIT: CPT | Mod: S$GLB,,, | Performed by: PHYSICIAN ASSISTANT

## 2022-01-26 PROCEDURE — 1160F RVW MEDS BY RX/DR IN RCRD: CPT | Mod: CPTII,S$GLB,, | Performed by: PHYSICIAN ASSISTANT

## 2022-01-26 PROCEDURE — 99999 PR PBB SHADOW E&M-EST. PATIENT-LVL IV: ICD-10-PCS | Mod: PBBFAC,,, | Performed by: PHYSICIAN ASSISTANT

## 2022-01-26 PROCEDURE — 1101F PT FALLS ASSESS-DOCD LE1/YR: CPT | Mod: CPTII,S$GLB,, | Performed by: PHYSICIAN ASSISTANT

## 2022-01-26 PROCEDURE — 99999 PR PBB SHADOW E&M-EST. PATIENT-LVL IV: CPT | Mod: PBBFAC,,, | Performed by: PHYSICIAN ASSISTANT

## 2022-01-26 PROCEDURE — 3008F BODY MASS INDEX DOCD: CPT | Mod: CPTII,S$GLB,, | Performed by: PHYSICIAN ASSISTANT

## 2022-01-26 PROCEDURE — 3074F PR MOST RECENT SYSTOLIC BLOOD PRESSURE < 130 MM HG: ICD-10-PCS | Mod: CPTII,S$GLB,, | Performed by: PHYSICIAN ASSISTANT

## 2022-01-26 PROCEDURE — 3288F PR FALLS RISK ASSESSMENT DOCUMENTED: ICD-10-PCS | Mod: CPTII,S$GLB,, | Performed by: PHYSICIAN ASSISTANT

## 2022-01-26 PROCEDURE — 99024 PR POST-OP FOLLOW-UP VISIT: ICD-10-PCS | Mod: S$GLB,,, | Performed by: PHYSICIAN ASSISTANT

## 2022-01-26 PROCEDURE — 1101F PR PT FALLS ASSESS DOC 0-1 FALLS W/OUT INJ PAST YR: ICD-10-PCS | Mod: CPTII,S$GLB,, | Performed by: PHYSICIAN ASSISTANT

## 2022-01-26 PROCEDURE — 3074F SYST BP LT 130 MM HG: CPT | Mod: CPTII,S$GLB,, | Performed by: PHYSICIAN ASSISTANT

## 2022-01-26 PROCEDURE — 3078F DIAST BP <80 MM HG: CPT | Mod: CPTII,S$GLB,, | Performed by: PHYSICIAN ASSISTANT

## 2022-01-26 PROCEDURE — 3008F PR BODY MASS INDEX (BMI) DOCUMENTED: ICD-10-PCS | Mod: CPTII,S$GLB,, | Performed by: PHYSICIAN ASSISTANT

## 2022-01-26 PROCEDURE — 3078F PR MOST RECENT DIASTOLIC BLOOD PRESSURE < 80 MM HG: ICD-10-PCS | Mod: CPTII,S$GLB,, | Performed by: PHYSICIAN ASSISTANT

## 2022-01-26 PROCEDURE — 1159F PR MEDICATION LIST DOCUMENTED IN MEDICAL RECORD: ICD-10-PCS | Mod: CPTII,S$GLB,, | Performed by: PHYSICIAN ASSISTANT

## 2022-01-26 NOTE — PROGRESS NOTES
Subjective:      Linnea Renae is a 71 y.o. year old female who presents to the [facility] on 01/26/2022 for follow up visit status post RIGHT breast reconstruction with TE placement and LEFT breast reduction by Dr. Nolberto Eng On 11/29/2021. She is here today for routine follow up and expansion, she has no complaints or concerns and reports that she tolerated her last expansion well.     Denies fever, chills, nausea, vomiting, or other systemic signs of infection.    Vitals:    01/26/22 1206   BP: 128/60   Pulse: 77        Review of patient's allergies indicates:   Allergen Reactions    Codeine Itching       Current Outpatient Medications on File Prior to Visit   Medication Sig Dispense Refill    alcohol antiseptic pads (ALCOHOL PREP PADS TOP)       ALPRAZolam (XANAX) 0.5 MG tablet Take 1 tablet (0.5 mg total) by mouth 3 (three) times daily. 30 tablet 0    amitriptyline (ELAVIL) 10 MG tablet Take 1 tablet (10 mg total) by mouth nightly as needed for Insomnia. 30 tablet 2    ammonium lactate 12 % Crea Apply twice daily to affected parts both feet as needed. 140 g 11    aspirin 81 MG Chew Take 81 mg by mouth once daily.      atenoloL (TENORMIN) 50 MG tablet TAKE 1 TABLET BY MOUTH TWICE DAILY 180 tablet 1    atorvastatin (LIPITOR) 20 MG tablet Take 1 tablet (20 mg total) by mouth every evening. 90 tablet 0    bacitracin 500 unit/gram ointment Apply three times daily to the left nipple. 28 g 0    blood sugar diagnostic Strp 1 strip by Misc.(Non-Drug; Combo Route) route once daily. 100 each 3    calcium-vitamin D3 500 mg(1,250mg) -200 unit per tablet Take 1 tablet by mouth 2 (two) times daily with meals.       clobetasoL (TEMOVATE) 0.05 % cream Apply topically 2 (two) times daily. Top of both feet 45 g 2    cycloSPORINE (RESTASIS) 0.05 % ophthalmic emulsion Instill 1 drop into both eyes twice a day 180 each 3    diclofenac sodium (VOLTAREN) 1 % Gel Apply 2 g topically 4 (four) times  daily. 100 g 2    flu vac 2021 [id number]-[id number],PF, (FLUAD QUAD 2021-22,65Y UP,,PF,) 60 mcg (15 mcg x 4)/0.5 mL Syrg inject 0.5 ml intramuscular 0.5 mL 0    fluticasone propionate (FLONASE) 50 mcg/actuation nasal spray 2 sprays (100 mcg total) by Each Nostril route once daily. 48 g 1    hydroCHLOROthiazide (HYDRODIURIL) 12.5 MG Tab Take 1 tablet (12.5 mg total) by mouth once daily. 90 tablet 1    ibuprofen (ADVIL,MOTRIN) 800 MG tablet Take 1 tablet by mouth three times daily as needed 60 tablet 0    ketoconazole (NIZORAL) 2 % cream Apply topically 2 (two) times daily. Prn irritation body folds 60 g 2    ketoconazole (NIZORAL) 2 % shampoo Apply topically to scalp in place of regular shampoo 2 to 3 times a week 120 mL 9    lancets 33 gauge Misc 1 lancet by Misc.(Non-Drug; Combo Route) route once daily. 100 each 3    bduxbeyav-I3-rbR59-algal oil (FOLTANX RF) 3 mg-35 mg-2 mg -90.314 mg Cap Take 1 tablet by mouth 2 (two) times daily. 180 capsule 0    LIDOcaine HCL 2% (XYLOCAINE) 2 % jelly Apply topically as needed. Apply topically once nightly to affected part of foot/feet. 30 mL 2    linaCLOtide (LINZESS) 145 mcg Cap capsule Take 1 capsule (145 mcg total) by mouth once daily. 90 capsule 1    loratadine 10 mg Cap       metFORMIN (GLUCOPHAGE) 500 MG tablet Take 2 tablets (1,000 mg total) by mouth 2 (two) times daily with meals. (Patient taking differently: Take 500 mg by mouth 2 (two) times daily with meals.) 360 tablet 1    metroNIDAZOLE (FLAGYL) 500 MG tablet Take 1 tablet (500 mg total) by mouth every 12 (twelve) hours fors 7 days 14 tablet 0    minoxidiL (LONITEN) 2.5 MG tablet Take 1/4  to 1/2 tablet by mouth nightly as tolerated (Patient taking differently: Take 2.5 mg by mouth once daily.) 45 tablet 3    multivitamin (THERAGRAN) per tablet Take 1 tablet by mouth once daily.      naloxone (NARCAN) 4 mg/actuation Spry 4mg by nasal route as needed for opioid overdose; may repeat every 2-3 minutes in  alternating nostrils until medical help arrives. Call 911 (Patient taking differently: 4mg by nasal route as needed for opioid overdose; may repeat every 2-3 minutes in alternating nostrils until medical help arrives. Call 911-prn) 2 each 11    nystatin (MYCOSTATIN) powder Apply topically 4 (four) times daily Under pannus as needed 180 g 2    olmesartan (BENICAR) 20 MG tablet Take 1 tablet (20 mg total) by mouth once daily. 90 tablet 1    omeprazole (PRILOSEC) 20 MG capsule Take 1 capsule by mouth once daily 90 capsule 1    sertraline (ZOLOFT) 50 MG tablet Take 1 tablet (50 mg total) by mouth once daily. 90 tablet 0    tiZANidine (ZANAFLEX) 4 MG tablet Take 1 tablet (4 mg total) by mouth every 8 (eight) hours as needed (Muscle spasm.). 90 tablet 2    topiramate (TOPAMAX) 50 MG tablet Take 1 tablet (50 mg total) by mouth 2 (two) times daily. 180 tablet 0    traMADoL (ULTRAM) 50 mg tablet Take 1 tablet by mouth every 6 hours as needed for pain 120 tablet 0     No current facility-administered medications on file prior to visit.       Patient Active Problem List   Diagnosis    Hypertension    Type 2 diabetes mellitus without complication, without long-term current use of insulin    Hyperlipidemia    DDD (degenerative disc disease), lumbar    Perennial allergic rhinitis    Dry eyes    Morbid obesity with BMI of 40.0-44.9, adult    GERD (gastroesophageal reflux disease)    Mazoplasia    DCIS (ductal carcinoma in situ) of breast    Anal skin tag    Spinal stenosis, lumbar    Atherosclerosis of aorta    Chronic kidney disease, stage II (mild)    Positive depression screening    Chronic pain disorder    Impaired gait and mobility    Decreased functional activity tolerance    Osteoarthritis of lumbar spine    Diabetic peripheral neuropathy associated with type 2 diabetes mellitus    Spondylosis without myelopathy    Sacroiliitis    Greater trochanteric bursitis    Chronic pain    Essential  (hemorrhagic) thrombocythemia    Unspecified mood (affective) disorder       Past Surgical History:   Procedure Laterality Date    BREAST BIOPSY      BREAST LUMPECTOMY Right     2015    BREAST RECONSTRUCTION Right 2021    Procedure: RECONSTRUCTION, BREAST;  Surgeon: Nolberto Eng MD;  Location: [facility] OR 2ND FLR;  Service: Plastics;  Laterality: Right;    CATARACT EXTRACTION, BILATERAL       SECTION      x3    CHOLECYSTECTOMY      COLONOSCOPY N/A 10/11/2018    Procedure: COLONOSCOPY;  Surgeon: Lorenzo Tanner MD;  Location: [facility] ENDO (4TH FLR);  Service: Endoscopy;  Laterality: N/A;    COLONOSCOPY W/ POLYPECTOMY      HYSTERECTOMY      and USO    INJECTION OF FACET JOINT Bilateral 2018    Procedure: INJECTION, FACET JOINT;  Surgeon: Viet Wilson MD;  Location: [facility] PAIN MGT;  Service: Pain Management;  Laterality: Bilateral;  LUMBAR BILATERAL L3-L4 AND L4-L5 FACET STEROID INJECTION    NEED CONSENT    INJECTION OF JOINT Bilateral 2020    Procedure: INJECTION, JOINT, Bilateral SI;  Surgeon: Viet Wilson MD;  Location: [facility] PAIN MGT;  Service: Pain Management;  Laterality: Bilateral;    MASTECTOMY Right 2021    Procedure: MASTECTOMY, total, right breast with right sentinel lymph node biospy;  Surgeon: Heber Evans MD;  Location: [facility] OR [address] [place] [address];  Service: General;  Laterality: Right;    RADIOFREQUENCY ABLATION Left 3/20/2019    Procedure: RADIOFREQUENCY ABLATION LEFT L2,3,4,5;  Surgeon: Viet Wilson MD;  Location: [facility] PAIN MGT;  Service: Pain Management;  Laterality: Left;  LEFT RFA L2,3,4,5    NEEDS CONSENT    RADIOFREQUENCY ABLATION Left 2019    Procedure: RADIOFREQUENCY ABLATION, LEFT L2-L3-L4-L5  1 OF 2;  Surgeon: Viet Wilson MD;  Location: [facility] PAIN MGT;  Service: Pain Management;  Laterality: Left;    RADIOFREQUENCY ABLATION Right 2020    Procedure: RADIOFREQUENCY ABLATION, RIGHT L2-L3-L4-L5 MEDIAL BRANCH 2 OF;   Surgeon: Viet Wilson MD;  Location: [facility] PAIN MGT;  Service: Pain Management;  Laterality: Right;    RADIOFREQUENCY ABLATION Right 2020    Procedure: RADIOFREQUENCY ABLATION Right L2, L3, L4, L5;  Surgeon: Viet Wilson MD;  Location: [facility] PAIN MGT;  Service: Pain Management;  Laterality: Right;  Right L2, L3, L4, L5 RFA    TOTAL REDUCTION MAMMOPLASTY Left 2021    Procedure: MAMMOPLASTY, REDUCTION;  Surgeon: Nolberto Eng MD;  [facility];  Service: Plastics;  Laterality: Left;    TRANSFORAMINAL EPIDURAL INJECTION OF STEROID Left 10/2/2019    Procedure: INJECTION, STEROID, EPIDURAL, TRANSFORAMINAL APPROACH, L4 AND L5;  Surgeon: Viet Wilson MD;  Location: [facility] PAIN MGT;  Service: Pain Management;  Laterality: Left;    TUBAL LIGATION         Social History     Socioeconomic History    Marital status:     Number of children: 3   Tobacco Use    Smoking status: Former Smoker     Packs/day: 0.25     Years: 20.00     Pack years: 5.00     Quit date: 1985     Years since quittin.0    Smokeless tobacco: Never Used    Tobacco comment: Quit mid .   Substance and Sexual Activity    Alcohol use: No     Alcohol/week: 0.0 standard drinks    Drug use: No    Sexual activity: Yes       Date of surgery 2021  Preoperative diagnosis breast cancer new  Postoperative diagnosis the same  Procedure performed right breast reconstruction using tissue expander  2. Serrated this anterior muscle flap to the lateral aspect of the reconstruction  3. Left breast reduction.  Surgeon Velasquez  Anesthesia general  Complications none  Blood loss 150 cc  Drains x3    Review of Systems: negative    Objective:     Physical Exam:  Vitals:    22 1206   BP: 128/60   Pulse: 77       WD WN NAD  VSS  Normal resp effort  R breast - incision CDI, no erythema or drainage, nipple viable  L breast -  incision CDI, no erythema or drainage, surgically absent  nipple    Assessment:       1. Surgery follow-up examination        Plan:   71 y.o. female status post B breast reconstruction  - Doing well, no issues. Patient was seen and evaluated by myself and Dr. Nolberto Eng    - 120 cc NS injected to R breast TE, tolerated well.   - Return to clinic in 2 weeks, appointment scheduled.        All questions were answered. The patient was advised to call the clinic with any questions or concerns prior to their next visit.           Laila Baeza PA-C  Plastic and Reconstruction Surgery Department  [phone] office

## 2022-02-07 ENCOUNTER — OFFICE VISIT (OUTPATIENT)
Dept: BARIATRICS | Facility: CLINIC | Age: 72
End: 2022-02-07
Payer: MEDICARE

## 2022-02-07 VITALS
OXYGEN SATURATION: 98 % | HEART RATE: 73 BPM | WEIGHT: 259.5 LBS | SYSTOLIC BLOOD PRESSURE: 128 MMHG | BODY MASS INDEX: 39.45 KG/M2 | DIASTOLIC BLOOD PRESSURE: 78 MMHG

## 2022-02-07 DIAGNOSIS — E66.01 CLASS 3 SEVERE OBESITY DUE TO EXCESS CALORIES WITH SERIOUS COMORBIDITY AND BODY MASS INDEX (BMI) OF 40.0 TO 44.9 IN ADULT: Primary | ICD-10-CM

## 2022-02-07 DIAGNOSIS — D05.10 DUCTAL CARCINOMA IN SITU (DCIS) OF BREAST, UNSPECIFIED LATERALITY: ICD-10-CM

## 2022-02-07 PROCEDURE — 99999 PR PBB SHADOW E&M-EST. PATIENT-LVL III: ICD-10-PCS | Mod: PBBFAC,,, | Performed by: INTERNAL MEDICINE

## 2022-02-07 PROCEDURE — 4010F PR ACE/ARB THEARPY RXD/TAKEN: ICD-10-PCS | Mod: CPTII,S$GLB,, | Performed by: INTERNAL MEDICINE

## 2022-02-07 PROCEDURE — 3078F DIAST BP <80 MM HG: CPT | Mod: CPTII,S$GLB,, | Performed by: INTERNAL MEDICINE

## 2022-02-07 PROCEDURE — 3008F BODY MASS INDEX DOCD: CPT | Mod: CPTII,S$GLB,, | Performed by: INTERNAL MEDICINE

## 2022-02-07 PROCEDURE — 99999 PR PBB SHADOW E&M-EST. PATIENT-LVL III: CPT | Mod: PBBFAC,,, | Performed by: INTERNAL MEDICINE

## 2022-02-07 PROCEDURE — 3074F SYST BP LT 130 MM HG: CPT | Mod: CPTII,S$GLB,, | Performed by: INTERNAL MEDICINE

## 2022-02-07 PROCEDURE — 1159F PR MEDICATION LIST DOCUMENTED IN MEDICAL RECORD: ICD-10-PCS | Mod: CPTII,S$GLB,, | Performed by: INTERNAL MEDICINE

## 2022-02-07 PROCEDURE — 1159F MED LIST DOCD IN RCRD: CPT | Mod: CPTII,S$GLB,, | Performed by: INTERNAL MEDICINE

## 2022-02-07 PROCEDURE — 1160F PR REVIEW ALL MEDS BY PRESCRIBER/CLIN PHARMACIST DOCUMENTED: ICD-10-PCS | Mod: CPTII,S$GLB,, | Performed by: INTERNAL MEDICINE

## 2022-02-07 PROCEDURE — 1101F PT FALLS ASSESS-DOCD LE1/YR: CPT | Mod: CPTII,S$GLB,, | Performed by: INTERNAL MEDICINE

## 2022-02-07 PROCEDURE — 99499 RISK ADDL DX/OHS AUDIT: ICD-10-PCS | Mod: S$GLB,,, | Performed by: INTERNAL MEDICINE

## 2022-02-07 PROCEDURE — 1126F PR PAIN SEVERITY QUANTIFIED, NO PAIN PRESENT: ICD-10-PCS | Mod: CPTII,S$GLB,, | Performed by: INTERNAL MEDICINE

## 2022-02-07 PROCEDURE — 4010F ACE/ARB THERAPY RXD/TAKEN: CPT | Mod: CPTII,S$GLB,, | Performed by: INTERNAL MEDICINE

## 2022-02-07 PROCEDURE — 3288F PR FALLS RISK ASSESSMENT DOCUMENTED: ICD-10-PCS | Mod: CPTII,S$GLB,, | Performed by: INTERNAL MEDICINE

## 2022-02-07 PROCEDURE — 99499 UNLISTED E&M SERVICE: CPT | Mod: S$GLB,,, | Performed by: INTERNAL MEDICINE

## 2022-02-07 PROCEDURE — 1160F RVW MEDS BY RX/DR IN RCRD: CPT | Mod: CPTII,S$GLB,, | Performed by: INTERNAL MEDICINE

## 2022-02-07 PROCEDURE — 3288F FALL RISK ASSESSMENT DOCD: CPT | Mod: CPTII,S$GLB,, | Performed by: INTERNAL MEDICINE

## 2022-02-07 PROCEDURE — 3008F PR BODY MASS INDEX (BMI) DOCUMENTED: ICD-10-PCS | Mod: CPTII,S$GLB,, | Performed by: INTERNAL MEDICINE

## 2022-02-07 PROCEDURE — 99212 OFFICE O/P EST SF 10 MIN: CPT | Mod: S$GLB,,, | Performed by: INTERNAL MEDICINE

## 2022-02-07 PROCEDURE — 99212 PR OFFICE/OUTPT VISIT, EST, LEVL II, 10-19 MIN: ICD-10-PCS | Mod: S$GLB,,, | Performed by: INTERNAL MEDICINE

## 2022-02-07 PROCEDURE — 1126F AMNT PAIN NOTED NONE PRSNT: CPT | Mod: CPTII,S$GLB,, | Performed by: INTERNAL MEDICINE

## 2022-02-07 PROCEDURE — 3074F PR MOST RECENT SYSTOLIC BLOOD PRESSURE < 130 MM HG: ICD-10-PCS | Mod: CPTII,S$GLB,, | Performed by: INTERNAL MEDICINE

## 2022-02-07 PROCEDURE — 1101F PR PT FALLS ASSESS DOC 0-1 FALLS W/OUT INJ PAST YR: ICD-10-PCS | Mod: CPTII,S$GLB,, | Performed by: INTERNAL MEDICINE

## 2022-02-07 PROCEDURE — 3078F PR MOST RECENT DIASTOLIC BLOOD PRESSURE < 80 MM HG: ICD-10-PCS | Mod: CPTII,S$GLB,, | Performed by: INTERNAL MEDICINE

## 2022-02-07 RX ORDER — TOPIRAMATE 50 MG/1
50 TABLET, FILM COATED ORAL 2 TIMES DAILY
Qty: 180 TABLET | Refills: 0 | Status: SHIPPED | OUTPATIENT
Start: 2022-02-07 | End: 2022-05-09 | Stop reason: SDUPTHER

## 2022-02-07 NOTE — PATIENT INSTRUCTIONS
"    Patient was informed that topiramate is used for migraine prevention and seizures. Weight loss is a common side effect that is well documented. S/he understands this. S/he was informed of the potential side effects such as serious and possibly fatal rash in which case the medication should be discontinued immediately. Paresthesias, forgetfulness, fatigue, kidney stones, GI symptoms, and changes in lab values such as electrolytes, blood counts and kidney function.    topiramate 50 mg morning and evening.       No soda, sweet tea, juices or lemonade. All drinks should be 5 calories or less.     Exercise -Exercise is one of those "bite the bullet" propositions. Sometimes you just have to get started. What I suggest is setting a timer for 10 minutes. Do whatever activity you plan to start for the 10 minutes. If the timer goes off and you want to stop, fine. You can stop. If you feel like you want to go on a little longer, you can go on. Do this a few times a week. Eventually you will likely decide to keep going. Every 2 weeks, add 5 more minutes before you give yourself permission to stop.       Sit and Be Fit exercise program on [employer], [employer] or [employer] 3-4 times a week this month         Limit starchy carbohydrates (bread, rice, pasta, potatoes, corn, peas, oatmeal, grits, tortillas, crackers, chips)    Snack ideas     Savory  · Avocado with chili powder, garlic powder and black pepper  · String cheese or cheese cubes/slices  · Tuna, chicken or egg salad lettuce wraps  · Ham/turkey and cheese roll-up  · Turkey jerky  · Hard boiled egg  · Steamed edamame  · Olives, pickles (watch sodium)  · Baked zucchini chips with salsa  · Cottage cheese with tomatoes & dill  · Salad with light dressing & veggies  Nuts and Seeds: Pistachios, almonds, cashews, pecans, sunflower seeds, peanuts, walnuts, pumpkin seeds, hazelnuts, brazil nuts (salt free)     Sweet  · Plain yogurt: Triple Zero Oikos, Fage or Powerful with fruit, nuts, " seeds, cinnamon  · Sugar free jello  · Sugar free popsicles  · Homemade protein shake  · Atkins bars/shakes  · Premier protein shakes  · Power crunch bar  · Emerald cocoa dusted almonds (1/4 cup)     Sweet and Savory  · Grilled pineapple and ham skewers  · Cottage Cheese with fruit, nuts, seeds, cinnamon  · Homemade smoothie with spinach, avocado, frozen fruit & unsweetened vanilla almond milk           Peanut Butter/Hancock Butter  Witwith apples, ½ banana, celery, carrots, strawberries        Hummus/Guacamole/Light Cream Cheese/Greek yogurt + Ranch powder mix        cucumber, carrots, celery, bell pepper, tomato, cauliflower, broccoli, snap peas, green        beans, hard boiled egg, turkey pepperoni slices, salami slices, ham, grilled chicken                           Tips when Cravings Hit:  · Drink water or sugar free Crystal Light when a craving begins.  · Avoid eating blind: pre-portion foods instead of leaving them in their original package.  · Eat without distractions: phone, tv, laptop etc.    · Eat slowly, chew foods well (30 times).  · Find snacks high in protein to keep you full longer.

## 2022-02-07 NOTE — PROGRESS NOTES
Subjective:       Patient ID: Linnea Renae is a 71 y.o. female.    Chief Complaint: Follow-up    Pt here today for follow-up. Last seen Oct 2020. Has lost 23 more lbs, net neg 27. Started [id number] piyush diet and topiramate 50 mg BID. Has been on the medication. SHe has been dx with breast CA and had mastectomy and reconstruction. Currently has expanders.  She accounts most of her weight gain to stress. States she was not eating much 2/2 to poor appetite. Does state it is coming back. . Not much activity.        Had discussed weight loss surgery in the past.        Ophtho exam 8/6/21- Open angle with borderline findings, low risk, bilateral      checked today. Has been on tramadol chronically.        Lab Results   Component Value Date    HGBA1C 5.7 (H) 11/29/2021    HGBA1C 5.7 (H) 07/16/2021    HGBA1C 5.9 (H) 01/15/2021     Lab Results   Component Value Date    LDLCALC 77.6 01/15/2021    CREATININE 1.1 12/13/2021        Review of Systems   Constitutional: Negative for chills and fever.   Respiratory: Negative for shortness of breath.         Denies snoring   Cardiovascular: Positive for leg swelling. Negative for chest pain.   Gastrointestinal: Positive for constipation. Negative for diarrhea.        + GERD   Genitourinary: Negative for difficulty urinating and dysuria.   Musculoskeletal: Positive for arthralgias and back pain.   Skin: Positive for rash.   Neurological: Positive for dizziness. Negative for light-headedness.   Psychiatric/Behavioral: Positive for dysphoric mood. The patient is nervous/anxious.        Objective:     /78   Pulse 73   Wt 117.7 kg (259 lb 7.7 oz)   SpO2 98%   BMI 39.45 kg/m²     Physical Exam   Constitutional: She is oriented to person, place, and time. She appears well-developed. No distress.   obese   HENT:   Head: Normocephalic and atraumatic.   Eyes: Pupils are equal, round, and reactive to light. EOM are normal. No scleral icterus.   Neck: Normal range of motion. Neck  "supple.   Cardiovascular: Normal rate.   Pulmonary/Chest: Effort normal.   Musculoskeletal: Normal range of motion. She exhibits no edema.   Neurological: She is alert and oriented to person, place, and time. No cranial nerve deficit.   Skin: Skin is warm and dry. No erythema.   Psychiatric: She has a normal mood and affect. Her behavior is normal. Judgment normal.   Vitals reviewed.      Assessment:       1. Class 3 severe obesity due to excess calories with serious comorbidity and body mass index (BMI) of 40.0 to 44.9 in adult    2. Ductal carcinoma in situ (DCIS) of breast, unspecified laterality        Plan:         Linnea was seen today for follow-up.    Diagnoses and all orders for this visit:    Class 3 severe obesity due to excess calories with serious comorbidity and body mass index (BMI) of 40.0 to 44.9 in adult    Ductal carcinoma in situ (DCIS) of breast, unspecified laterality  Optimize BMI    Other orders  -     topiramate (TOPAMAX) 50 MG tablet; Take 1 tablet (50 mg total) by mouth 2 (two) times daily.      .     Patient was informed that topiramate is used for migraine prevention and seizures. Weight loss is a common side effect that is well documented. S/he understands this. S/he was informed of the potential side effects such as serious and possibly fatal rash in which case the medication should be discontinued immediately. Paresthesias, forgetfulness, fatigue, kidney stones, GI symptoms, and changes in lab values such as electrolytes, blood counts and kidney function.    topiramate 50 mg morning and evening.       No soda, sweet tea, juices or lemonade. All drinks should be 5 calories or less.     Exercise -Exercise is one of those "bite the bullet" propositions. Sometimes you just have to get started. What I suggest is setting a timer for 10 minutes. Do whatever activity you plan to start for the 10 minutes. If the timer goes off and you want to stop, fine. You can stop. If you feel like you want " to go on a little longer, you can go on. Do this a few times a week. Eventually you will likely decide to keep going. Every 2 weeks, add 5 more minutes before you give yourself permission to stop.           Limit starchy carbohydrates (bread, rice, pasta, potatoes, corn, peas, oatmeal, grits, tortillas, crackers, chips)      Snack ideas given.

## 2022-02-09 ENCOUNTER — TELEPHONE (OUTPATIENT)
Dept: PLASTIC SURGERY | Facility: CLINIC | Age: 72
End: 2022-02-09

## 2022-02-09 ENCOUNTER — OFFICE VISIT (OUTPATIENT)
Dept: PLASTIC SURGERY | Facility: CLINIC | Age: 72
End: 2022-02-09
Payer: MEDICARE

## 2022-02-09 VITALS — SYSTOLIC BLOOD PRESSURE: 104 MMHG | HEART RATE: 72 BPM | DIASTOLIC BLOOD PRESSURE: 51 MMHG

## 2022-02-09 DIAGNOSIS — Z09 SURGERY FOLLOW-UP EXAMINATION: Primary | ICD-10-CM

## 2022-02-09 PROCEDURE — 3074F SYST BP LT 130 MM HG: CPT | Mod: CPTII,S$GLB,, | Performed by: SURGERY

## 2022-02-09 PROCEDURE — 1160F RVW MEDS BY RX/DR IN RCRD: CPT | Mod: CPTII,S$GLB,, | Performed by: SURGERY

## 2022-02-09 PROCEDURE — 99024 POSTOP FOLLOW-UP VISIT: CPT | Mod: S$GLB,,, | Performed by: SURGERY

## 2022-02-09 PROCEDURE — 4010F ACE/ARB THERAPY RXD/TAKEN: CPT | Mod: CPTII,S$GLB,, | Performed by: SURGERY

## 2022-02-09 PROCEDURE — 3078F DIAST BP <80 MM HG: CPT | Mod: CPTII,S$GLB,, | Performed by: SURGERY

## 2022-02-09 PROCEDURE — 1126F AMNT PAIN NOTED NONE PRSNT: CPT | Mod: CPTII,S$GLB,, | Performed by: SURGERY

## 2022-02-09 PROCEDURE — 99024 PR POST-OP FOLLOW-UP VISIT: ICD-10-PCS | Mod: S$GLB,,, | Performed by: SURGERY

## 2022-02-09 PROCEDURE — 99999 PR PBB SHADOW E&M-EST. PATIENT-LVL IV: ICD-10-PCS | Mod: PBBFAC,,, | Performed by: SURGERY

## 2022-02-09 PROCEDURE — 3074F PR MOST RECENT SYSTOLIC BLOOD PRESSURE < 130 MM HG: ICD-10-PCS | Mod: CPTII,S$GLB,, | Performed by: SURGERY

## 2022-02-09 PROCEDURE — 3078F PR MOST RECENT DIASTOLIC BLOOD PRESSURE < 80 MM HG: ICD-10-PCS | Mod: CPTII,S$GLB,, | Performed by: SURGERY

## 2022-02-09 PROCEDURE — 1159F PR MEDICATION LIST DOCUMENTED IN MEDICAL RECORD: ICD-10-PCS | Mod: CPTII,S$GLB,, | Performed by: SURGERY

## 2022-02-09 PROCEDURE — 1126F PR PAIN SEVERITY QUANTIFIED, NO PAIN PRESENT: ICD-10-PCS | Mod: CPTII,S$GLB,, | Performed by: SURGERY

## 2022-02-09 PROCEDURE — 99999 PR PBB SHADOW E&M-EST. PATIENT-LVL IV: CPT | Mod: PBBFAC,,, | Performed by: SURGERY

## 2022-02-09 PROCEDURE — 4010F PR ACE/ARB THEARPY RXD/TAKEN: ICD-10-PCS | Mod: CPTII,S$GLB,, | Performed by: SURGERY

## 2022-02-09 PROCEDURE — 1159F MED LIST DOCD IN RCRD: CPT | Mod: CPTII,S$GLB,, | Performed by: SURGERY

## 2022-02-09 PROCEDURE — 1160F PR REVIEW ALL MEDS BY PRESCRIBER/CLIN PHARMACIST DOCUMENTED: ICD-10-PCS | Mod: CPTII,S$GLB,, | Performed by: SURGERY

## 2022-02-09 NOTE — TELEPHONE ENCOUNTER
Returned pts phone call. Pt states that she is feeling tight. I advised that the pt give her chest a couple of days to adjust. I also advised for her to take her flexeril with her spasms. Pt told to call back if she is not feeling better. Pt verbalized understading,

## 2022-02-09 NOTE — PROGRESS NOTES
Patient presents Plastic surgery Clinic the after having a breast reconstruction using tissue expander.  She had 150 cc placed today.  She will return in 3 weeks.  She

## 2022-02-10 DIAGNOSIS — M48.061 DEGENERATIVE LUMBAR SPINAL STENOSIS: ICD-10-CM

## 2022-02-11 RX ORDER — TRAMADOL HYDROCHLORIDE 50 MG/1
TABLET ORAL
Qty: 120 TABLET | Refills: 0 | Status: SHIPPED | OUTPATIENT
Start: 2022-02-11 | End: 2022-03-15 | Stop reason: SDUPTHER

## 2022-03-02 ENCOUNTER — OFFICE VISIT (OUTPATIENT)
Dept: PLASTIC SURGERY | Facility: CLINIC | Age: 72
End: 2022-03-02
Payer: MEDICARE

## 2022-03-02 VITALS
WEIGHT: 259 LBS | SYSTOLIC BLOOD PRESSURE: 124 MMHG | DIASTOLIC BLOOD PRESSURE: 58 MMHG | HEART RATE: 70 BPM | BODY MASS INDEX: 39.25 KG/M2 | HEIGHT: 68 IN

## 2022-03-02 DIAGNOSIS — Z09 SURGERY FOLLOW-UP EXAMINATION: Primary | ICD-10-CM

## 2022-03-02 PROCEDURE — 1159F MED LIST DOCD IN RCRD: CPT | Mod: CPTII,S$GLB,, | Performed by: PHYSICIAN ASSISTANT

## 2022-03-02 PROCEDURE — 99999 PR PBB SHADOW E&M-EST. PATIENT-LVL IV: ICD-10-PCS | Mod: PBBFAC,,, | Performed by: PHYSICIAN ASSISTANT

## 2022-03-02 PROCEDURE — 1160F PR REVIEW ALL MEDS BY PRESCRIBER/CLIN PHARMACIST DOCUMENTED: ICD-10-PCS | Mod: CPTII,S$GLB,, | Performed by: PHYSICIAN ASSISTANT

## 2022-03-02 PROCEDURE — 1101F PT FALLS ASSESS-DOCD LE1/YR: CPT | Mod: CPTII,S$GLB,, | Performed by: PHYSICIAN ASSISTANT

## 2022-03-02 PROCEDURE — 4010F PR ACE/ARB THEARPY RXD/TAKEN: ICD-10-PCS | Mod: CPTII,S$GLB,, | Performed by: PHYSICIAN ASSISTANT

## 2022-03-02 PROCEDURE — 3008F BODY MASS INDEX DOCD: CPT | Mod: CPTII,S$GLB,, | Performed by: PHYSICIAN ASSISTANT

## 2022-03-02 PROCEDURE — 3074F SYST BP LT 130 MM HG: CPT | Mod: CPTII,S$GLB,, | Performed by: PHYSICIAN ASSISTANT

## 2022-03-02 PROCEDURE — 4010F ACE/ARB THERAPY RXD/TAKEN: CPT | Mod: CPTII,S$GLB,, | Performed by: PHYSICIAN ASSISTANT

## 2022-03-02 PROCEDURE — 3288F PR FALLS RISK ASSESSMENT DOCUMENTED: ICD-10-PCS | Mod: CPTII,S$GLB,, | Performed by: PHYSICIAN ASSISTANT

## 2022-03-02 PROCEDURE — 1101F PR PT FALLS ASSESS DOC 0-1 FALLS W/OUT INJ PAST YR: ICD-10-PCS | Mod: CPTII,S$GLB,, | Performed by: PHYSICIAN ASSISTANT

## 2022-03-02 PROCEDURE — 3074F PR MOST RECENT SYSTOLIC BLOOD PRESSURE < 130 MM HG: ICD-10-PCS | Mod: CPTII,S$GLB,, | Performed by: PHYSICIAN ASSISTANT

## 2022-03-02 PROCEDURE — 1126F PR PAIN SEVERITY QUANTIFIED, NO PAIN PRESENT: ICD-10-PCS | Mod: CPTII,S$GLB,, | Performed by: PHYSICIAN ASSISTANT

## 2022-03-02 PROCEDURE — 99999 PR PBB SHADOW E&M-EST. PATIENT-LVL IV: CPT | Mod: PBBFAC,,, | Performed by: PHYSICIAN ASSISTANT

## 2022-03-02 PROCEDURE — 3078F PR MOST RECENT DIASTOLIC BLOOD PRESSURE < 80 MM HG: ICD-10-PCS | Mod: CPTII,S$GLB,, | Performed by: PHYSICIAN ASSISTANT

## 2022-03-02 PROCEDURE — 3008F PR BODY MASS INDEX (BMI) DOCUMENTED: ICD-10-PCS | Mod: CPTII,S$GLB,, | Performed by: PHYSICIAN ASSISTANT

## 2022-03-02 PROCEDURE — 1159F PR MEDICATION LIST DOCUMENTED IN MEDICAL RECORD: ICD-10-PCS | Mod: CPTII,S$GLB,, | Performed by: PHYSICIAN ASSISTANT

## 2022-03-02 PROCEDURE — 1160F RVW MEDS BY RX/DR IN RCRD: CPT | Mod: CPTII,S$GLB,, | Performed by: PHYSICIAN ASSISTANT

## 2022-03-02 PROCEDURE — 3078F DIAST BP <80 MM HG: CPT | Mod: CPTII,S$GLB,, | Performed by: PHYSICIAN ASSISTANT

## 2022-03-02 PROCEDURE — 1126F AMNT PAIN NOTED NONE PRSNT: CPT | Mod: CPTII,S$GLB,, | Performed by: PHYSICIAN ASSISTANT

## 2022-03-02 PROCEDURE — 99024 POSTOP FOLLOW-UP VISIT: CPT | Mod: S$GLB,,, | Performed by: PHYSICIAN ASSISTANT

## 2022-03-02 PROCEDURE — 3288F FALL RISK ASSESSMENT DOCD: CPT | Mod: CPTII,S$GLB,, | Performed by: PHYSICIAN ASSISTANT

## 2022-03-02 PROCEDURE — 99024 PR POST-OP FOLLOW-UP VISIT: ICD-10-PCS | Mod: S$GLB,,, | Performed by: PHYSICIAN ASSISTANT

## 2022-03-02 NOTE — PROGRESS NOTES
Subjective:      Linnea Renae is a 71 y.o. year old female who presents to the [facility] on 03/02/2022 for follow up visit status post RIGHT breast reconstruction with TE placement and LEFT breast reduction by Dr. Nolberto Eng On 11/29/2021. She is here today for routine follow up and expansion, she has no complaints or concerns and reports that she tolerated her last expansion well.     Denies fever, chills, nausea, vomiting, or other systemic signs of infection.    Vitals:    03/02/22 1021   BP: (!) 124/58   Pulse: 70        Review of patient's allergies indicates:   Allergen Reactions    Codeine Itching       Current Outpatient Medications on File Prior to Visit   Medication Sig Dispense Refill    alcohol antiseptic pads (ALCOHOL PREP PADS TOP)       ALPRAZolam (XANAX) 0.5 MG tablet Take 1 tablet (0.5 mg total) by mouth 3 (three) times daily. 30 tablet 0    amitriptyline (ELAVIL) 10 MG tablet Take 1 tablet (10 mg total) by mouth nightly as needed for Insomnia. 30 tablet 2    ammonium lactate 12 % Crea Apply twice daily to affected parts both feet as needed. 140 g 11    aspirin 81 MG Chew Take 81 mg by mouth once daily.      atenoloL (TENORMIN) 50 MG tablet TAKE 1 TABLET BY MOUTH TWICE DAILY 180 tablet 1    atorvastatin (LIPITOR) 20 MG tablet Take 1 tablet (20 mg total) by mouth every evening. 90 tablet 0    bacitracin 500 unit/gram ointment Apply three times daily to the left nipple. 28 g 0    blood sugar diagnostic Strp 1 strip by Misc.(Non-Drug; Combo Route) route once daily. 100 each 3    calcium-vitamin D3 500 mg(1,250mg) -200 unit per tablet Take 1 tablet by mouth 2 (two) times daily with meals.       clobetasoL (TEMOVATE) 0.05 % cream Apply topically 2 (two) times daily. Top of both feet 45 g 2    cycloSPORINE (RESTASIS) 0.05 % ophthalmic emulsion Instill 1 drop into both eyes twice a day 180 each 3    diclofenac sodium (VOLTAREN) 1 % Gel Apply 2 g topically 4 (four)  times daily. 100 g 2    flu vac 2021 [id number],PF, (FLUAD QUAD 2021-22,65Y UP,,PF,) 60 mcg (15 mcg x 4)/0.5 mL Syrg inject 0.5 ml intramuscular 0.5 mL 0    fluticasone propionate (FLONASE) 50 mcg/actuation nasal spray 2 sprays (100 mcg total) by Each Nostril route once daily. 48 g 1    hydroCHLOROthiazide (HYDRODIURIL) 12.5 MG Tab Take 1 tablet (12.5 mg total) by mouth once daily. 90 tablet 1    ibuprofen (ADVIL,MOTRIN) 800 MG tablet Take 1 tablet by mouth three times daily as needed 60 tablet 0    ketoconazole (NIZORAL) 2 % cream Apply topically 2 (two) times daily. Prn irritation body folds 60 g 2    ketoconazole (NIZORAL) 2 % shampoo Apply topically to scalp in place of regular shampoo 2 to 3 times a week 120 mL 9    lancets 33 gauge Misc 1 lancet by Misc.(Non-Drug; Combo Route) route once daily. 100 each 3    xjkutevzq-[id number]-algal oil (FOLTANX RF) 3 mg-35 mg-2 mg -90.314 mg Cap Take 1 tablet by mouth 2 (two) times daily. 180 capsule 0    LIDOcaine HCL 2% (XYLOCAINE) 2 % jelly Apply topically as needed. Apply topically once nightly to affected part of foot/feet. 30 mL 2    linaCLOtide (LINZESS) 145 mcg Cap capsule Take 1 capsule (145 mcg total) by mouth once daily. 90 capsule 1    loratadine 10 mg Cap       metFORMIN (GLUCOPHAGE) 500 MG tablet Take 2 tablets (1,000 mg total) by mouth 2 (two) times daily with meals. (Patient taking differently: Take 500 mg by mouth 2 (two) times daily with meals.) 360 tablet 1    metroNIDAZOLE (FLAGYL) 500 MG tablet Take 1 tablet (500 mg total) by mouth every 12 (twelve) hours fors 7 days 14 tablet 0    minoxidiL (LONITEN) 2.5 MG tablet Take 1/4  to 1/2 tablet by mouth nightly as tolerated (Patient taking differently: Take 2.5 mg by mouth once daily.) 45 tablet 3    multivitamin (THERAGRAN) per tablet Take 1 tablet by mouth once daily.      naloxone (NARCAN) 4 mg/actuation Spry 4mg by nasal route as needed for opioid overdose; may repeat every 2-3 minutes in  alternating nostrils until medical help arrives. Call 911 (Patient taking differently: 4mg by nasal route as needed for opioid overdose; may repeat every 2-3 minutes in alternating nostrils until medical help arrives. Call 911-prn) 2 each 11    nystatin (MYCOSTATIN) powder Apply topically 4 (four) times daily Under pannus as needed 180 g 2    olmesartan (BENICAR) 20 MG tablet Take 1 tablet (20 mg total) by mouth once daily. 90 tablet 1    omeprazole (PRILOSEC) 20 MG capsule Take 1 capsule by mouth once daily 90 capsule 1    sertraline (ZOLOFT) 50 MG tablet Take 1 tablet (50 mg total) by mouth once daily. 90 tablet 0    tiZANidine (ZANAFLEX) 4 MG tablet Take 1 tablet (4 mg total) by mouth every 8 (eight) hours as needed (Muscle spasm.). 90 tablet 2    topiramate (TOPAMAX) 50 MG tablet Take 1 tablet (50 mg total) by mouth 2 (two) times daily. 180 tablet 0    traMADoL (ULTRAM) 50 mg tablet Take 1 tablet by mouth every 6 hours as needed for pain 120 tablet 0     No current facility-administered medications on file prior to visit.       Patient Active Problem List   Diagnosis    Hypertension    Type 2 diabetes mellitus without complication, without long-term current use of insulin    Hyperlipidemia    DDD (degenerative disc disease), lumbar    Perennial allergic rhinitis    Dry eyes    Morbid obesity with BMI of 40.0-44.9, adult    GERD (gastroesophageal reflux disease)    Mazoplasia    DCIS (ductal carcinoma in situ) of breast    Anal skin tag    Spinal stenosis, lumbar    Atherosclerosis of aorta    Chronic kidney disease, stage II (mild)    Positive depression screening    Chronic pain disorder    Impaired gait and mobility    Decreased functional activity tolerance    Osteoarthritis of lumbar spine    Diabetic peripheral neuropathy associated with type 2 diabetes mellitus    Spondylosis without myelopathy    Sacroiliitis    Greater trochanteric bursitis    Chronic pain    Essential  (hemorrhagic) thrombocythemia    Unspecified mood (affective) disorder       Past Surgical History:   Procedure Laterality Date    BREAST BIOPSY      BREAST LUMPECTOMY Right     2015    BREAST RECONSTRUCTION Right 2021    Procedure: RECONSTRUCTION, BREAST;  Surgeon: Nolberto Eng MD;  Location: [facility] OR 2ND FLR;  Service: Plastics;  Laterality: Right;    CATARACT EXTRACTION, BILATERAL       SECTION      x3    CHOLECYSTECTOMY      COLONOSCOPY N/A 10/11/2018    Procedure: COLONOSCOPY;  Surgeon: Lorenzo Tanner MD;  Location: [facility] ENDO (4TH FLR);  Service: Endoscopy;  Laterality: N/A;    COLONOSCOPY W/ POLYPECTOMY      HYSTERECTOMY      and USO    INJECTION OF FACET JOINT Bilateral 2018    Procedure: INJECTION, FACET JOINT;  Surgeon: Viet Wilson MD;  Location: [facility] PAIN MGT;  Service: Pain Management;  Laterality: Bilateral;  LUMBAR BILATERAL L3-L4 AND L4-L5 FACET STEROID INJECTION    NEED CONSENT    INJECTION OF JOINT Bilateral 2020    Procedure: INJECTION, JOINT, Bilateral SI;  Surgeon: Viet Wilosn MD;  Location: [facility] PAIN MGT;  Service: Pain Management;  Laterality: Bilateral;    MASTECTOMY Right 2021    Procedure: MASTECTOMY, total, right breast with right sentinel lymph node biospy;  Surgeon: Heber Evans MD;  Location: [facility] OR [address] [place] [address];  Service: General;  Laterality: Right;    RADIOFREQUENCY ABLATION Left 3/20/2019    Procedure: RADIOFREQUENCY ABLATION LEFT L2,3,4,5;  Surgeon: Viet Wilson MD;  Location: [facility] PAIN MGT;  Service: Pain Management;  Laterality: Left;  LEFT RFA L2,3,4,5    NEEDS CONSENT    RADIOFREQUENCY ABLATION Left 2019    Procedure: RADIOFREQUENCY ABLATION, LEFT L2-L3-L4-L5  1 OF 2;  Surgeon: Viet Wilson MD;  Location: [facility] PAIN MGT;  Service: Pain Management;  Laterality: Left;    RADIOFREQUENCY ABLATION Right 2020    Procedure: RADIOFREQUENCY ABLATION, RIGHT L2-L3-L4-L5 MEDIAL BRANCH 2 OF;   Surgeon: Viet Wilson MD;  Location: [facility] PAIN MGT;  Service: Pain Management;  Laterality: Right;    RADIOFREQUENCY ABLATION Right 2020    Procedure: RADIOFREQUENCY ABLATION Right L2, L3, L4, L5;  Surgeon: Viet Wilson MD;  Location: [facility] PAIN MGT;  Service: Pain Management;  Laterality: Right;  Right L2, L3, L4, L5 RFA    TOTAL REDUCTION MAMMOPLASTY Left 2021    Procedure: MAMMOPLASTY, REDUCTION;  Surgeon: Nolberto Eng MD;  Location: [facility] OR [address] [place] [address];  Service: Plastics;  Laterality: Left;    TRANSFORAMINAL EPIDURAL INJECTION OF STEROID Left 10/2/2019    Procedure: INJECTION, STEROID, EPIDURAL, TRANSFORAMINAL APPROACH, L4 AND L5;  Surgeon: Viet Wilson MD;  Location: [facility] PAIN MGT;  Service: Pain Management;  Laterality: Left;    TUBAL LIGATION         Social History     Socioeconomic History    Marital status:     Number of children: 3   Tobacco Use    Smoking status: Former Smoker     Packs/day: 0.25     Years: 20.00     Pack years: 5.00     Quit date: 1985     Years since quittin.1    Smokeless tobacco: Never Used    Tobacco comment: Quit mid .   Substance and Sexual Activity    Alcohol use: No     Alcohol/week: 0.0 standard drinks    Drug use: No    Sexual activity: Yes       Date of surgery 2021  Preoperative diagnosis breast cancer new  Postoperative diagnosis the same  Procedure performed right breast reconstruction using tissue expander  2. Serrated this anterior muscle flap to the lateral aspect of the reconstruction  3. Left breast reduction.  Surgeon Velasquez  Anesthesia general  Complications none  Blood loss 150 cc  Drains x3    Review of Systems: negative    Objective:     Physical Exam:  Vitals:    22 1021   BP: (!) 124/58   Pulse: 70       WD WN NAD  VSS  Normal resp effort  R breast - incision CDI, no erythema or drainage, nipple viable  L breast -  incision CDI, no erythema or drainage, surgically absent  nipple    Assessment:       1. Surgery follow-up examination        Plan:   71 y.o. female status post B breast reconstruction  - Doing well, no issues. Patient was seen and evaluated by myself and Dr. Nolberto Eng    - 100 cc NS injected to R breast TE, tolerated well. This will be her last expansion  - Will schedule for R breast implant exchange and possible L breast mastopexy  - Return to clinic in 2 weeks, appointment scheduled.        All questions were answered. The patient was advised to call the clinic with any questions or concerns prior to their next visit.           Laila Baeza PA-C  Plastic and Reconstruction Surgery Department  [phone] office

## 2022-03-13 DIAGNOSIS — E11.9 TYPE 2 DIABETES MELLITUS WITHOUT COMPLICATION: ICD-10-CM

## 2022-03-14 ENCOUNTER — TELEPHONE (OUTPATIENT)
Dept: PLASTIC SURGERY | Facility: CLINIC | Age: 72
End: 2022-03-14
Payer: MEDICARE

## 2022-03-15 DIAGNOSIS — M48.061 DEGENERATIVE LUMBAR SPINAL STENOSIS: ICD-10-CM

## 2022-03-15 RX ORDER — TRAMADOL HYDROCHLORIDE 50 MG/1
TABLET ORAL
Qty: 120 TABLET | Refills: 0 | Status: SHIPPED | OUTPATIENT
Start: 2022-03-15 | End: 2022-04-12 | Stop reason: SDUPTHER

## 2022-03-15 NOTE — TELEPHONE ENCOUNTER
Care Due:                  Date            Visit Type   Department     Provider  --------------------------------------------------------------------------------                                VIRTUAL      [facility]  Last Visit: 01-      AUDIO ONLY   CARE           Herrera GABRIEL -                              PRIMARY      [facility] PR[facility]   Bailee Zee  Next Visit: 04-      CARE ([facility])   CARE           Eliud                                                            Last  Test          Frequency    Reason                     Performed    Due Date  --------------------------------------------------------------------------------    HBA1C.......  6 months...  metFORMIN................  11- 05-    Powered by [employer]. Reference number: [id number].   3/15/2022 10:06:11 AM CDT

## 2022-03-23 ENCOUNTER — OFFICE VISIT (OUTPATIENT)
Dept: PLASTIC SURGERY | Facility: CLINIC | Age: 72
End: 2022-03-23
Payer: MEDICARE

## 2022-03-23 VITALS — DIASTOLIC BLOOD PRESSURE: 58 MMHG | HEART RATE: 68 BPM | SYSTOLIC BLOOD PRESSURE: 120 MMHG

## 2022-03-23 DIAGNOSIS — Z09 SURGERY FOLLOW-UP EXAMINATION: ICD-10-CM

## 2022-03-23 DIAGNOSIS — Z85.3 PERSONAL HISTORY OF BREAST CANCER: Primary | ICD-10-CM

## 2022-03-23 PROCEDURE — 1126F AMNT PAIN NOTED NONE PRSNT: CPT | Mod: CPTII,S$GLB,, | Performed by: PHYSICIAN ASSISTANT

## 2022-03-23 PROCEDURE — 99212 OFFICE O/P EST SF 10 MIN: CPT | Mod: S$GLB,,, | Performed by: PHYSICIAN ASSISTANT

## 2022-03-23 PROCEDURE — 1159F PR MEDICATION LIST DOCUMENTED IN MEDICAL RECORD: ICD-10-PCS | Mod: CPTII,S$GLB,, | Performed by: PHYSICIAN ASSISTANT

## 2022-03-23 PROCEDURE — 1101F PR PT FALLS ASSESS DOC 0-1 FALLS W/OUT INJ PAST YR: ICD-10-PCS | Mod: CPTII,S$GLB,, | Performed by: PHYSICIAN ASSISTANT

## 2022-03-23 PROCEDURE — 3078F DIAST BP <80 MM HG: CPT | Mod: CPTII,S$GLB,, | Performed by: PHYSICIAN ASSISTANT

## 2022-03-23 PROCEDURE — 3074F SYST BP LT 130 MM HG: CPT | Mod: CPTII,S$GLB,, | Performed by: PHYSICIAN ASSISTANT

## 2022-03-23 PROCEDURE — 99999 PR PBB SHADOW E&M-EST. PATIENT-LVL IV: ICD-10-PCS | Mod: PBBFAC,,, | Performed by: PHYSICIAN ASSISTANT

## 2022-03-23 PROCEDURE — 3288F FALL RISK ASSESSMENT DOCD: CPT | Mod: CPTII,S$GLB,, | Performed by: PHYSICIAN ASSISTANT

## 2022-03-23 PROCEDURE — 4010F ACE/ARB THERAPY RXD/TAKEN: CPT | Mod: CPTII,S$GLB,, | Performed by: PHYSICIAN ASSISTANT

## 2022-03-23 PROCEDURE — 4010F PR ACE/ARB THEARPY RXD/TAKEN: ICD-10-PCS | Mod: CPTII,S$GLB,, | Performed by: PHYSICIAN ASSISTANT

## 2022-03-23 PROCEDURE — 1126F PR PAIN SEVERITY QUANTIFIED, NO PAIN PRESENT: ICD-10-PCS | Mod: CPTII,S$GLB,, | Performed by: PHYSICIAN ASSISTANT

## 2022-03-23 PROCEDURE — 3074F PR MOST RECENT SYSTOLIC BLOOD PRESSURE < 130 MM HG: ICD-10-PCS | Mod: CPTII,S$GLB,, | Performed by: PHYSICIAN ASSISTANT

## 2022-03-23 PROCEDURE — 3078F PR MOST RECENT DIASTOLIC BLOOD PRESSURE < 80 MM HG: ICD-10-PCS | Mod: CPTII,S$GLB,, | Performed by: PHYSICIAN ASSISTANT

## 2022-03-23 PROCEDURE — 1101F PT FALLS ASSESS-DOCD LE1/YR: CPT | Mod: CPTII,S$GLB,, | Performed by: PHYSICIAN ASSISTANT

## 2022-03-23 PROCEDURE — 1160F RVW MEDS BY RX/DR IN RCRD: CPT | Mod: CPTII,S$GLB,, | Performed by: PHYSICIAN ASSISTANT

## 2022-03-23 PROCEDURE — 3288F PR FALLS RISK ASSESSMENT DOCUMENTED: ICD-10-PCS | Mod: CPTII,S$GLB,, | Performed by: PHYSICIAN ASSISTANT

## 2022-03-23 PROCEDURE — 99212 PR OFFICE/OUTPT VISIT, EST, LEVL II, 10-19 MIN: ICD-10-PCS | Mod: S$GLB,,, | Performed by: PHYSICIAN ASSISTANT

## 2022-03-23 PROCEDURE — 1160F PR REVIEW ALL MEDS BY PRESCRIBER/CLIN PHARMACIST DOCUMENTED: ICD-10-PCS | Mod: CPTII,S$GLB,, | Performed by: PHYSICIAN ASSISTANT

## 2022-03-23 PROCEDURE — 1159F MED LIST DOCD IN RCRD: CPT | Mod: CPTII,S$GLB,, | Performed by: PHYSICIAN ASSISTANT

## 2022-03-23 PROCEDURE — 99999 PR PBB SHADOW E&M-EST. PATIENT-LVL IV: CPT | Mod: PBBFAC,,, | Performed by: PHYSICIAN ASSISTANT

## 2022-03-23 NOTE — PROGRESS NOTES
Subjective:      Linnea Renae is a 71 y.o. year old female who presents to the [facility] on 03/23/2022 for follow up visit status post RIGHT breast reconstruction with TE placement and LEFT breast reduction by Dr. Nolberto Eng On 11/29/2021. She is here today for follow up. She has completed her expansion. I believe this appt was booked in error as she does not need any further expansion.     She was seen and evaluated by myself and Dr. Nolberto Eng      Denies fever, chills, nausea, vomiting, or other systemic signs of infection.    Vitals:    03/23/22 1050   BP: (!) 120/58   Pulse: 68        Review of patient's allergies indicates:   Allergen Reactions    Codeine Itching       Current Outpatient Medications on File Prior to Visit   Medication Sig Dispense Refill    alcohol antiseptic pads (ALCOHOL PREP PADS TOP)       ALPRAZolam (XANAX) 0.5 MG tablet Take 1 tablet (0.5 mg total) by mouth 3 (three) times daily. 30 tablet 0    amitriptyline (ELAVIL) 10 MG tablet Take 1 tablet (10 mg total) by mouth nightly as needed for Insomnia. 30 tablet 2    ammonium lactate 12 % Crea Apply twice daily to affected parts both feet as needed. 140 g 11    aspirin 81 MG Chew Take 81 mg by mouth once daily.      atenoloL (TENORMIN) 50 MG tablet TAKE 1 TABLET BY MOUTH TWICE DAILY 180 tablet 1    atorvastatin (LIPITOR) 20 MG tablet Take 1 tablet (20 mg total) by mouth every evening. 90 tablet 0    bacitracin 500 unit/gram ointment Apply three times daily to the left nipple. 28 g 0    blood sugar diagnostic Strp 1 strip by Misc.(Non-Drug; Combo Route) route once daily. 100 each 3    calcium-vitamin D3 500 mg(1,250mg) -200 unit per tablet Take 1 tablet by mouth 2 (two) times daily with meals.       clobetasoL (TEMOVATE) 0.05 % cream Apply topically 2 (two) times daily. Top of both feet 45 g 2    cycloSPORINE (RESTASIS) 0.05 % ophthalmic emulsion Instill 1 drop into both eyes twice a day 180  each 3    diclofenac sodium (VOLTAREN) 1 % Gel Apply 2 g topically 4 (four) times daily. 100 g 2    flu vac 2021 [id number],PF, (FLUAD QUAD 2021-22,65Y UP,,PF,) 60 mcg (15 mcg x 4)/0.5 mL Syrg inject 0.5 ml intramuscular 0.5 mL 0    fluticasone propionate (FLONASE) 50 mcg/actuation nasal spray 2 sprays (100 mcg total) by Each Nostril route once daily. 48 g 1    hydroCHLOROthiazide (HYDRODIURIL) 12.5 MG Tab Take 1 tablet (12.5 mg total) by mouth once daily. 90 tablet 1    ibuprofen (ADVIL,MOTRIN) 800 MG tablet Take 1 tablet by mouth three times daily as needed 60 tablet 0    ketoconazole (NIZORAL) 2 % cream Apply topically 2 (two) times daily. Prn irritation body folds 60 g 2    ketoconazole (NIZORAL) 2 % shampoo Apply topically to scalp in place of regular shampoo 2 to 3 times a week 120 mL 9    lancets 33 gauge Misc 1 lancet by Misc.(Non-Drug; Combo Route) route once daily. 100 each 3    jlgtuspsr-[id number]-algal oil (FOLTANX RF) 3 mg-35 mg-2 mg -90.314 mg Cap Take 1 tablet by mouth 2 (two) times daily. 180 capsule 0    LIDOcaine HCL 2% (XYLOCAINE) 2 % jelly Apply topically as needed. Apply topically once nightly to affected part of foot/feet. 30 mL 2    linaCLOtide (LINZESS) 145 mcg Cap capsule Take 1 capsule (145 mcg total) by mouth once daily. 90 capsule 1    loratadine 10 mg Cap       metFORMIN (GLUCOPHAGE) 500 MG tablet Take 2 tablets (1,000 mg total) by mouth 2 (two) times daily with meals. (Patient taking differently: Take 500 mg by mouth 2 (two) times daily with meals.) 360 tablet 1    metroNIDAZOLE (FLAGYL) 500 MG tablet Take 1 tablet (500 mg total) by mouth every 12 (twelve) hours fors 7 days 14 tablet 0    minoxidiL (LONITEN) 2.5 MG tablet Take 1/4  to 1/2 tablet by mouth nightly as tolerated (Patient taking differently: Take 2.5 mg by mouth once daily.) 45 tablet 3    multivitamin (THERAGRAN) per tablet Take 1 tablet by mouth once daily.      naloxone (NARCAN) 4 mg/actuation Spry 4mg  by nasal route as needed for opioid overdose; may repeat every 2-3 minutes in alternating nostrils until medical help arrives. Call 911 (Patient taking differently: 4mg by nasal route as needed for opioid overdose; may repeat every 2-3 minutes in alternating nostrils until medical help arrives. Call 911-prn) 2 each 11    nystatin (MYCOSTATIN) powder Apply topically 4 (four) times daily Under pannus as needed 180 g 2    olmesartan (BENICAR) 20 MG tablet Take 1 tablet (20 mg total) by mouth once daily. 90 tablet 1    omeprazole (PRILOSEC) 20 MG capsule Take 1 capsule by mouth once daily 90 capsule 1    sertraline (ZOLOFT) 50 MG tablet Take 1 tablet (50 mg total) by mouth once daily. 90 tablet 0    tiZANidine (ZANAFLEX) 4 MG tablet Take 1 tablet (4 mg total) by mouth every 8 (eight) hours as needed (Muscle spasm.). 90 tablet 2    topiramate (TOPAMAX) 50 MG tablet Take 1 tablet (50 mg total) by mouth 2 (two) times daily. 180 tablet 0    traMADoL (ULTRAM) 50 mg tablet Take 1 tablet by mouth every 6 hours as needed for pain 120 tablet 0     No current facility-administered medications on file prior to visit.       Patient Active Problem List   Diagnosis    Hypertension    Type 2 diabetes mellitus without complication, without long-term current use of insulin    Hyperlipidemia    DDD (degenerative disc disease), lumbar    Perennial allergic rhinitis    Dry eyes    Morbid obesity with BMI of 40.0-44.9, adult    GERD (gastroesophageal reflux disease)    Mazoplasia    DCIS (ductal carcinoma in situ) of breast    Anal skin tag    Spinal stenosis, lumbar    Atherosclerosis of aorta    Chronic kidney disease, stage II (mild)    Positive depression screening    Chronic pain disorder    Impaired gait and mobility    Decreased functional activity tolerance    Osteoarthritis of lumbar spine    Diabetic peripheral neuropathy associated with type 2 diabetes mellitus    Spondylosis without myelopathy     Sacroiliitis    Greater trochanteric bursitis    Chronic pain    Essential (hemorrhagic) thrombocythemia    Unspecified mood (affective) disorder       Past Surgical History:   Procedure Laterality Date    BREAST BIOPSY      BREAST LUMPECTOMY Right         BREAST RECONSTRUCTION Right 2021    Procedure: RECONSTRUCTION, BREAST;  Surgeon: Nolberto nEg MD;  Location: [facility] OR [address] [place] [address];  Service: Plastics;  Laterality: Right;    CATARACT EXTRACTION, BILATERAL       SECTION      x3    CHOLECYSTECTOMY      COLONOSCOPY N/A 10/11/2018    Procedure: COLONOSCOPY;  Surgeon: Lorenzo Tanner MD;  Location: [facility] ENDO (4TH FLR);  Service: Endoscopy;  Laterality: N/A;    COLONOSCOPY W/ POLYPECTOMY      HYSTERECTOMY      and USO    INJECTION OF FACET JOINT Bilateral 2018    Procedure: INJECTION, FACET JOINT;  Surgeon: Viet Wilson MD;  Location: [facility] [place] PAIN T;  Service: Pain Management;  Laterality: Bilateral;  LUMBAR BILATERAL L3-L4 AND L4-L5 FACET STEROID INJECTION    NEED CONSENT    INJECTION OF JOINT Bilateral 2020    Procedure: INJECTION, JOINT, Bilateral SI;  Surgeon: Viet Wilson MD;  Location: [facility] [place] PAIN T;  Service: Pain Management;  Laterality: Bilateral;    MASTECTOMY Right 2021    Procedure: MASTECTOMY, total, right breast with right sentinel lymph node biospy;  Surgeon: Heber Evans MD;  Location: [address];  Service: General;  Laterality: Right;    RADIOFREQUENCY ABLATION Left 3/20/2019    Procedure: RADIOFREQUENCY ABLATION LEFT L2,3,4,5;  Surgeon: Viet Wilson MD;  Location: [facility] [place] PAIN MGT;  Service: Pain Management;  Laterality: Left;  LEFT RFA L2,3,4,5    NEEDS CONSENT    RADIOFREQUENCY ABLATION Left 2019    Procedure: RADIOFREQUENCY ABLATION, LEFT L2-L3-L4-L5  1 OF 2;  Surgeon: Viet Wilson MD;  Location: [facility] [place] PAIN MGT;  Service: Pain Management;  Laterality: Left;    RADIOFREQUENCY ABLATION Right 2020     Procedure: RADIOFREQUENCY ABLATION, RIGHT L2-L3-L4-L5 MEDIAL BRANCH 2 OF;  Surgeon: Viet Wilson MD;  Location: [facility] [place] PAIN MGT;  Service: Pain Management;  Laterality: Right;    RADIOFREQUENCY ABLATION Right 2020    Procedure: RADIOFREQUENCY ABLATION Right L2, L3, L4, L5;  Surgeon: Viet Wilson MD;  Location: [facility] [place] PAIN MGT;  Service: Pain Management;  Laterality: Right;  Right L2, L3, L4, L5 RFA    TOTAL REDUCTION MAMMOPLASTY Left 2021    Procedure: MAMMOPLASTY, REDUCTION;  Surgeon: Nolberto Eng MD;  Location: [facility] OR 2ND FLR;  Service: Plastics;  Laterality: Left;    TRANSFORAMINAL EPIDURAL INJECTION OF STEROID Left 10/2/2019    Procedure: INJECTION, STEROID, EPIDURAL, TRANSFORAMINAL APPROACH, L4 AND L5;  Surgeon: Viet Wilson MD;  Location: [facility] [place] PAIN MGT;  Service: Pain Management;  Laterality: Left;    TUBAL LIGATION         Social History     Socioeconomic History    Marital status:     Number of children: 3   Tobacco Use    Smoking status: Former Smoker     Packs/day: 0.25     Years: 20.00     Pack years: 5.00     Quit date: 1985     Years since quittin.2    Smokeless tobacco: Never Used    Tobacco comment: Quit mid .   Substance and Sexual Activity    Alcohol use: No     Alcohol/week: 0.0 standard drinks    Drug use: No    Sexual activity: Yes       Date of surgery 2021  Preoperative diagnosis breast cancer new  Postoperative diagnosis the same  Procedure performed right breast reconstruction using tissue expander  2. Serrated this anterior muscle flap to the lateral aspect of the reconstruction  3. Left breast reduction.  Surgeon Velasquez  Anesthesia general  Complications none  Blood loss 150 cc  Drains x3    Review of Systems: negative    Objective:     Physical Exam:  Vitals:    22 1050   BP: (!) 120/58   Pulse: 68       WD WN NAD  VSS  Normal resp effort  R breast - incision CDI, no erythema or drainage, nipple  viable  L breast -  incision CDI, no erythema or drainage, surgically absent nipple    Assessment:       1. Surgery follow-up examination        Plan:   71 y.o. female status post B breast reconstruction  - Doing well, no issues. Patient was seen and evaluated by myself and Dr. Nolberto Eng    - Will schedule for R breast implant exchange and possible L breast mastopexy  - Office staff to call and coordinate surgery date once ins auth obtained.         All questions were answered. The patient was advised to call the clinic with any questions or concerns prior to their next visit.           Laila Baeza PA-C  Plastic and Reconstruction Surgery Department  [phone] office

## 2022-03-23 NOTE — H&P (VIEW-ONLY)
Subjective:      Linnea Renae is a 71 y.o. year old female who presents [facility] on 03/23/2022 for follow up visit status post RIGHT breast reconstruction with TE placement and LEFT breast reduction by Dr. Nolberto Eng On 11/29/2021. She is here today for follow up. She has completed her expansion. I believe this appt was booked in error as she does not need any further expansion.     She was seen and evaluated by myself and Dr. Nolberto Eng      Denies fever, chills, nausea, vomiting, or other systemic signs of infection.    Vitals:    03/23/22 1050   BP: (!) 120/58   Pulse: 68        Review of patient's allergies indicates:   Allergen Reactions    Codeine Itching       Current Outpatient Medications on File Prior to Visit   Medication Sig Dispense Refill    alcohol antiseptic pads (ALCOHOL PREP PADS TOP)       ALPRAZolam (XANAX) 0.5 MG tablet Take 1 tablet (0.5 mg total) by mouth 3 (three) times daily. 30 tablet 0    amitriptyline (ELAVIL) 10 MG tablet Take 1 tablet (10 mg total) by mouth nightly as needed for Insomnia. 30 tablet 2    ammonium lactate 12 % Crea Apply twice daily to affected parts both feet as needed. 140 g 11    aspirin 81 MG Chew Take 81 mg by mouth once daily.      atenoloL (TENORMIN) 50 MG tablet TAKE 1 TABLET BY MOUTH TWICE DAILY 180 tablet 1    atorvastatin (LIPITOR) 20 MG tablet Take 1 tablet (20 mg total) by mouth every evening. 90 tablet 0    bacitracin 500 unit/gram ointment Apply three times daily to the left nipple. 28 g 0    blood sugar diagnostic Strp 1 strip by Misc.(Non-Drug; Combo Route) route once daily. 100 each 3    calcium-vitamin D3 500 mg(1,250mg) -200 unit per tablet Take 1 tablet by mouth 2 (two) times daily with meals.       clobetasoL (TEMOVATE) 0.05 % cream Apply topically 2 (two) times daily. Top of both feet 45 g 2    cycloSPORINE (RESTASIS) 0.05 % ophthalmic emulsion Instill 1 drop into both eyes twice a day 180  each 3    diclofenac sodium (VOLTAREN) 1 % Gel Apply 2 g topically 4 (four) times daily. 100 g 2    flu vac 2021 [id number],PF, (FLUAD QUAD 2021-22,65Y UP,,PF,) 60 mcg (15 mcg x 4)/0.5 mL Syrg inject 0.5 ml intramuscular 0.5 mL 0    fluticasone propionate (FLONASE) 50 mcg/actuation nasal spray 2 sprays (100 mcg total) by Each Nostril route once daily. 48 g 1    hydroCHLOROthiazide (HYDRODIURIL) 12.5 MG Tab Take 1 tablet (12.5 mg total) by mouth once daily. 90 tablet 1    ibuprofen (ADVIL,MOTRIN) 800 MG tablet Take 1 tablet by mouth three times daily as needed 60 tablet 0    ketoconazole (NIZORAL) 2 % cream Apply topically 2 (two) times daily. Prn irritation body folds 60 g 2    ketoconazole (NIZORAL) 2 % shampoo Apply topically to scalp in place of regular shampoo 2 to 3 times a week 120 mL 9    lancets 33 gauge Misc 1 lancet by Misc.(Non-Drug; Combo Route) route once daily. 100 each 3    txpiubuot-C9-czN21-algal oil (FOLTANX RF) 3 mg-35 mg-2 mg -90.314 mg Cap Take 1 tablet by mouth 2 (two) times daily. 180 capsule 0    LIDOcaine HCL 2% (XYLOCAINE) 2 % jelly Apply topically as needed. Apply topically once nightly to affected part of foot/feet. 30 mL 2    linaCLOtide (LINZESS) 145 mcg Cap capsule Take 1 capsule (145 mcg total) by mouth once daily. 90 capsule 1    loratadine 10 mg Cap       metFORMIN (GLUCOPHAGE) 500 MG tablet Take 2 tablets (1,000 mg total) by mouth 2 (two) times daily with meals. (Patient taking differently: Take 500 mg by mouth 2 (two) times daily with meals.) 360 tablet 1    metroNIDAZOLE (FLAGYL) 500 MG tablet Take 1 tablet (500 mg total) by mouth every 12 (twelve) hours fors 7 days 14 tablet 0    minoxidiL (LONITEN) 2.5 MG tablet Take 1/4  to 1/2 tablet by mouth nightly as tolerated (Patient taking differently: Take 2.5 mg by mouth once daily.) 45 tablet 3    multivitamin (THERAGRAN) per tablet Take 1 tablet by mouth once daily.      naloxone (NARCAN) 4 mg/actuation Spry 4mg  by nasal route as needed for opioid overdose; may repeat every 2-3 minutes in alternating nostrils until medical help arrives. Call 911 (Patient taking differently: 4mg by nasal route as needed for opioid overdose; may repeat every 2-3 minutes in alternating nostrils until medical help arrives. Call 911-prn) 2 each 11    nystatin (MYCOSTATIN) powder Apply topically 4 (four) times daily Under pannus as needed 180 g 2    olmesartan (BENICAR) 20 MG tablet Take 1 tablet (20 mg total) by mouth once daily. 90 tablet 1    omeprazole (PRILOSEC) 20 MG capsule Take 1 capsule by mouth once daily 90 capsule 1    sertraline (ZOLOFT) 50 MG tablet Take 1 tablet (50 mg total) by mouth once daily. 90 tablet 0    tiZANidine (ZANAFLEX) 4 MG tablet Take 1 tablet (4 mg total) by mouth every 8 (eight) hours as needed (Muscle spasm.). 90 tablet 2    topiramate (TOPAMAX) 50 MG tablet Take 1 tablet (50 mg total) by mouth 2 (two) times daily. 180 tablet 0    traMADoL (ULTRAM) 50 mg tablet Take 1 tablet by mouth every 6 hours as needed for pain 120 tablet 0     No current facility-administered medications on file prior to visit.       Patient Active Problem List   Diagnosis    Hypertension    Type 2 diabetes mellitus without complication, without long-term current use of insulin    Hyperlipidemia    DDD (degenerative disc disease), lumbar    Perennial allergic rhinitis    Dry eyes    Morbid obesity with BMI of 40.0-44.9, adult    GERD (gastroesophageal reflux disease)    Mazoplasia    DCIS (ductal carcinoma in situ) of breast    Anal skin tag    Spinal stenosis, lumbar    Atherosclerosis of aorta    Chronic kidney disease, stage II (mild)    Positive depression screening    Chronic pain disorder    Impaired gait and mobility    Decreased functional activity tolerance    Osteoarthritis of lumbar spine    Diabetic peripheral neuropathy associated with type 2 diabetes mellitus    Spondylosis without myelopathy     Sacroiliitis    Greater trochanteric bursitis    Chronic pain    Essential (hemorrhagic) thrombocythemia    Unspecified mood (affective) disorder       Past Surgical History:   Procedure Laterality Date    BREAST BIOPSY      BREAST LUMPECTOMY Right         BREAST RECONSTRUCTION Right 2021    Procedure: RECONSTRUCTION, BREAST;  Surgeon: Nolberto Eng MD;  Location: [facility] OR [address] [place] [address];  Service: Plastics;  Laterality: Right;    CATARACT EXTRACTION, BILATERAL       SECTION      x3    CHOLECYSTECTOMY      COLONOSCOPY N/A 10/11/2018    Procedure: COLONOSCOPY;  Surgeon: Lorenzo Tanner MD;  Location: [facility] ENDO (4TH FLR);  Service: Endoscopy;  Laterality: N/A;    COLONOSCOPY W/ POLYPECTOMY      HYSTERECTOMY      and USO    INJECTION OF FACET JOINT Bilateral 2018    Procedure: INJECTION, FACET JOINT;  Surgeon: Viet Wilson MD;  Location: [facility] PAIN T;  Service: Pain Management;  Laterality: Bilateral;  LUMBAR BILATERAL L3-L4 AND L4-L5 FACET STEROID INJECTION    NEED CONSENT    INJECTION OF JOINT Bilateral 2020    Procedure: INJECTION, JOINT, Bilateral SI;  Surgeon: Viet Wilson MD;  Location: [facility] PAIN T;  Service: Pain Management;  Laterality: Bilateral;    MASTECTOMY Right 2021    Procedure: MASTECTOMY, total, right breast with right sentinel lymph node biospy;  Surgeon: Heber Evans MD;  Location: [address];  Service: General;  Laterality: Right;    RADIOFREQUENCY ABLATION Left 3/20/2019    Procedure: RADIOFREQUENCY ABLATION LEFT L2,3,4,5;  Surgeon: Viet Wilson MD;  Location: [facility] PAIN MGT;  Service: Pain Management;  Laterality: Left;  LEFT RFA L2,3,4,5    NEEDS CONSENT    RADIOFREQUENCY ABLATION Left 2019    Procedure: RADIOFREQUENCY ABLATION, LEFT L2-L3-L4-L5  1 OF 2;  Surgeon: Viet Wilson MD;  Location: [facility] PAIN MGT;  Service: Pain Management;  Laterality: Left;    RADIOFREQUENCY ABLATION Right 2020     Procedure: RADIOFREQUENCY ABLATION, RIGHT L2-L3-L4-L5 MEDIAL BRANCH 2 OF;  Surgeon: Viet Wilson MD;  Location: [facility] PAIN MGT;  Service: Pain Management;  Laterality: Right;    RADIOFREQUENCY ABLATION Right 2020    Procedure: RADIOFREQUENCY ABLATION Right L2, L3, L4, L5;  Surgeon: Viet Wilson MD;  Location: [facility] PAIN MGT;  Service: Pain Management;  Laterality: Right;  Right L2, L3, L4, L5 RFA    TOTAL REDUCTION MAMMOPLASTY Left 2021    Procedure: MAMMOPLASTY, REDUCTION;  Surgeon: Nolberto Eng MD;  Location: [facility] OR 2ND FLR;  Service: Plastics;  Laterality: Left;    TRANSFORAMINAL EPIDURAL INJECTION OF STEROID Left 10/2/2019    Procedure: INJECTION, STEROID, EPIDURAL, TRANSFORAMINAL APPROACH, L4 AND L5;  Surgeon: Viet Wilson MD;  Location: [facility] PAIN MGT;  Service: Pain Management;  Laterality: Left;    TUBAL LIGATION         Social History     Socioeconomic History    Marital status:     Number of children: 3   Tobacco Use    Smoking status: Former Smoker     Packs/day: 0.25     Years: 20.00     Pack years: 5.00     Quit date: 1985     Years since quittin.2    Smokeless tobacco: Never Used    Tobacco comment: Quit mid .   Substance and Sexual Activity    Alcohol use: No     Alcohol/week: 0.0 standard drinks    Drug use: No    Sexual activity: Yes       Date of surgery 2021  Preoperative diagnosis breast cancer new  Postoperative diagnosis the same  Procedure performed right breast reconstruction using tissue expander  2. Serrated this anterior muscle flap to the lateral aspect of the reconstruction  3. Left breast reduction.  Surgeon Velasquez  Anesthesia general  Complications none  Blood loss 150 cc  Drains x3    Review of Systems: negative    Objective:     Physical Exam:  Vitals:    22 1050   BP: (!) 120/58   Pulse: 68       WD WN NAD  VSS  Normal resp effort  R breast - incision CDI, no erythema or drainage, nipple  viable  L breast -  incision CDI, no erythema or drainage, surgically absent nipple    Assessment:       1. Surgery follow-up examination        Plan:   71 y.o. female status post B breast reconstruction  - Doing well, no issues. Patient was seen and evaluated by myself and Dr. Nolberto Eng    - Will schedule for R breast implant exchange and possible L breast mastopexy  - Office staff to call and coordinate surgery date once ins auth obtained.         All questions were answered. The patient was advised to call the clinic with any questions or concerns prior to their next visit.           Laila Baeza PA-C  Plastic and Reconstruction Surgery Department  [phone] office

## 2022-03-24 ENCOUNTER — TELEPHONE (OUTPATIENT)
Dept: PRIMARY CARE CLINIC | Facility: CLINIC | Age: 72
End: 2022-03-24
Payer: MEDICARE

## 2022-03-24 NOTE — TELEPHONE ENCOUNTER
----- Message from Lesli Stacy sent at 3/24/2022 10:23 AM CDT -----  Regarding: chong call back  Contact: patient  [phone]  Patient is requesting a call back concerning a surgical procedure on 4-.  Patient would not give the type of surgery.

## 2022-03-31 ENCOUNTER — OFFICE VISIT (OUTPATIENT)
Dept: PRIMARY CARE CLINIC | Facility: CLINIC | Age: 72
End: 2022-03-31
Payer: MEDICARE

## 2022-03-31 ENCOUNTER — LAB VISIT (OUTPATIENT)
Dept: LAB | Facility: HOSPITAL | Age: 72
End: 2022-03-31
Attending: INTERNAL MEDICINE
Payer: MEDICARE

## 2022-03-31 VITALS
HEIGHT: 68 IN | HEART RATE: 76 BPM | DIASTOLIC BLOOD PRESSURE: 70 MMHG | OXYGEN SATURATION: 96 % | SYSTOLIC BLOOD PRESSURE: 130 MMHG | TEMPERATURE: 98 F | BODY MASS INDEX: 39.33 KG/M2 | RESPIRATION RATE: 18 BRPM | WEIGHT: 259.5 LBS

## 2022-03-31 DIAGNOSIS — E78.5 HYPERLIPIDEMIA, UNSPECIFIED HYPERLIPIDEMIA TYPE: ICD-10-CM

## 2022-03-31 DIAGNOSIS — Z01.818 PRE-OP EXAMINATION: ICD-10-CM

## 2022-03-31 DIAGNOSIS — Z85.3 PERSONAL HISTORY OF BREAST CANCER: ICD-10-CM

## 2022-03-31 DIAGNOSIS — I10 ESSENTIAL HYPERTENSION: Primary | ICD-10-CM

## 2022-03-31 DIAGNOSIS — M48.061 DEGENERATIVE LUMBAR SPINAL STENOSIS: ICD-10-CM

## 2022-03-31 DIAGNOSIS — I10 ESSENTIAL HYPERTENSION: ICD-10-CM

## 2022-03-31 DIAGNOSIS — E11.40 TYPE 2 DIABETES MELLITUS WITH DIABETIC NEUROPATHY, WITHOUT LONG-TERM CURRENT USE OF INSULIN: ICD-10-CM

## 2022-03-31 DIAGNOSIS — N65.1 BREAST RECONSTRUCTION DISPROPORTION: ICD-10-CM

## 2022-03-31 LAB
ALBUMIN SERPL BCP-MCNC: 3.9 G/DL (ref 3.5–5.2)
ALBUMIN/CREAT UR: NORMAL UG/MG (ref 0–30)
ALP SERPL-CCNC: 97 U/L (ref 55–135)
ALT SERPL W/O P-5'-P-CCNC: 10 U/L (ref 10–44)
ANION GAP SERPL CALC-SCNC: 14 MMOL/L (ref 8–16)
AST SERPL-CCNC: 19 U/L (ref 10–40)
BILIRUB SERPL-MCNC: 0.5 MG/DL (ref 0.1–1)
BUN SERPL-MCNC: 22 MG/DL (ref 8–23)
CALCIUM SERPL-MCNC: 10.8 MG/DL (ref 8.7–10.5)
CHLORIDE SERPL-SCNC: 107 MMOL/L (ref 95–110)
CHOLEST SERPL-MCNC: 138 MG/DL (ref 120–199)
CHOLEST/HDLC SERPL: 3.1 {RATIO} (ref 2–5)
CO2 SERPL-SCNC: 26 MMOL/L (ref 23–29)
CREAT SERPL-MCNC: 0.9 MG/DL (ref 0.5–1.4)
CREAT UR-MCNC: 76 MG/DL (ref 15–325)
ERYTHROCYTE [DISTWIDTH] IN BLOOD BY AUTOMATED COUNT: 14 % (ref 11.5–14.5)
EST. GFR  (AFRICAN AMERICAN): >60 ML/MIN/1.73 M^2
EST. GFR  (NON AFRICAN AMERICAN): >60 ML/MIN/1.73 M^2
ESTIMATED AVG GLUCOSE: 117 MG/DL (ref 68–131)
GLUCOSE SERPL-MCNC: 109 MG/DL (ref 70–110)
HBA1C MFR BLD: 5.7 % (ref 4–5.6)
HCT VFR BLD AUTO: 38.3 % (ref 37–48.5)
HDLC SERPL-MCNC: 44 MG/DL (ref 40–75)
HDLC SERPL: 31.9 % (ref 20–50)
HGB BLD-MCNC: 11.9 G/DL (ref 12–16)
LDLC SERPL CALC-MCNC: 78.8 MG/DL (ref 63–159)
MCH RBC QN AUTO: 26.5 PG (ref 27–31)
MCHC RBC AUTO-ENTMCNC: 31.1 G/DL (ref 32–36)
MCV RBC AUTO: 85 FL (ref 82–98)
MICROALBUMIN UR DL<=1MG/L-MCNC: <5 UG/ML
NONHDLC SERPL-MCNC: 94 MG/DL
PLATELET # BLD AUTO: 343 K/UL (ref 150–450)
PMV BLD AUTO: 10.3 FL (ref 9.2–12.9)
POTASSIUM SERPL-SCNC: 5 MMOL/L (ref 3.5–5.1)
PROT SERPL-MCNC: 7.6 G/DL (ref 6–8.4)
RBC # BLD AUTO: 4.49 M/UL (ref 4–5.4)
SODIUM SERPL-SCNC: 147 MMOL/L (ref 136–145)
TRIGL SERPL-MCNC: 76 MG/DL (ref 30–150)
WBC # BLD AUTO: 7.53 K/UL (ref 3.9–12.7)

## 2022-03-31 PROCEDURE — 3078F DIAST BP <80 MM HG: CPT | Mod: CPTII,S$GLB,, | Performed by: INTERNAL MEDICINE

## 2022-03-31 PROCEDURE — 3288F PR FALLS RISK ASSESSMENT DOCUMENTED: ICD-10-PCS | Mod: CPTII,S$GLB,, | Performed by: INTERNAL MEDICINE

## 2022-03-31 PROCEDURE — 3075F SYST BP GE 130 - 139MM HG: CPT | Mod: CPTII,S$GLB,, | Performed by: INTERNAL MEDICINE

## 2022-03-31 PROCEDURE — 82043 UR ALBUMIN QUANTITATIVE: CPT | Performed by: INTERNAL MEDICINE

## 2022-03-31 PROCEDURE — 80061 LIPID PANEL: CPT | Performed by: INTERNAL MEDICINE

## 2022-03-31 PROCEDURE — 3008F BODY MASS INDEX DOCD: CPT | Mod: CPTII,S$GLB,, | Performed by: INTERNAL MEDICINE

## 2022-03-31 PROCEDURE — 85027 COMPLETE CBC AUTOMATED: CPT | Performed by: INTERNAL MEDICINE

## 2022-03-31 PROCEDURE — 99999 PR PBB SHADOW E&M-EST. PATIENT-LVL III: ICD-10-PCS | Mod: PBBFAC,,, | Performed by: INTERNAL MEDICINE

## 2022-03-31 PROCEDURE — 1159F PR MEDICATION LIST DOCUMENTED IN MEDICAL RECORD: ICD-10-PCS | Mod: CPTII,S$GLB,, | Performed by: INTERNAL MEDICINE

## 2022-03-31 PROCEDURE — 80053 COMPREHEN METABOLIC PANEL: CPT | Performed by: INTERNAL MEDICINE

## 2022-03-31 PROCEDURE — 93010 ELECTROCARDIOGRAM REPORT: CPT | Mod: S$GLB,,, | Performed by: INTERNAL MEDICINE

## 2022-03-31 PROCEDURE — 93005 EKG 12-LEAD: ICD-10-PCS | Mod: S$GLB,,, | Performed by: INTERNAL MEDICINE

## 2022-03-31 PROCEDURE — 1160F RVW MEDS BY RX/DR IN RCRD: CPT | Mod: CPTII,S$GLB,, | Performed by: INTERNAL MEDICINE

## 2022-03-31 PROCEDURE — 4010F PR ACE/ARB THEARPY RXD/TAKEN: ICD-10-PCS | Mod: CPTII,S$GLB,, | Performed by: INTERNAL MEDICINE

## 2022-03-31 PROCEDURE — 99214 PR OFFICE/OUTPT VISIT, EST, LEVL IV, 30-39 MIN: ICD-10-PCS | Mod: S$GLB,,, | Performed by: INTERNAL MEDICINE

## 2022-03-31 PROCEDURE — 99499 RISK ADDL DX/OHS AUDIT: ICD-10-PCS | Mod: S$GLB,,, | Performed by: INTERNAL MEDICINE

## 2022-03-31 PROCEDURE — 3075F PR MOST RECENT SYSTOLIC BLOOD PRESS GE 130-139MM HG: ICD-10-PCS | Mod: CPTII,S$GLB,, | Performed by: INTERNAL MEDICINE

## 2022-03-31 PROCEDURE — 82570 ASSAY OF URINE CREATININE: CPT | Performed by: INTERNAL MEDICINE

## 2022-03-31 PROCEDURE — 3078F PR MOST RECENT DIASTOLIC BLOOD PRESSURE < 80 MM HG: ICD-10-PCS | Mod: CPTII,S$GLB,, | Performed by: INTERNAL MEDICINE

## 2022-03-31 PROCEDURE — 1101F PR PT FALLS ASSESS DOC 0-1 FALLS W/OUT INJ PAST YR: ICD-10-PCS | Mod: CPTII,S$GLB,, | Performed by: INTERNAL MEDICINE

## 2022-03-31 PROCEDURE — 1159F MED LIST DOCD IN RCRD: CPT | Mod: CPTII,S$GLB,, | Performed by: INTERNAL MEDICINE

## 2022-03-31 PROCEDURE — 99499 UNLISTED E&M SERVICE: CPT | Mod: S$GLB,,, | Performed by: INTERNAL MEDICINE

## 2022-03-31 PROCEDURE — 36415 COLL VENOUS BLD VENIPUNCTURE: CPT | Mod: PN | Performed by: INTERNAL MEDICINE

## 2022-03-31 PROCEDURE — 1160F PR REVIEW ALL MEDS BY PRESCRIBER/CLIN PHARMACIST DOCUMENTED: ICD-10-PCS | Mod: CPTII,S$GLB,, | Performed by: INTERNAL MEDICINE

## 2022-03-31 PROCEDURE — 1101F PT FALLS ASSESS-DOCD LE1/YR: CPT | Mod: CPTII,S$GLB,, | Performed by: INTERNAL MEDICINE

## 2022-03-31 PROCEDURE — 93005 ELECTROCARDIOGRAM TRACING: CPT | Mod: S$GLB,,, | Performed by: INTERNAL MEDICINE

## 2022-03-31 PROCEDURE — 83036 HEMOGLOBIN GLYCOSYLATED A1C: CPT | Performed by: INTERNAL MEDICINE

## 2022-03-31 PROCEDURE — 99214 OFFICE O/P EST MOD 30 MIN: CPT | Mod: S$GLB,,, | Performed by: INTERNAL MEDICINE

## 2022-03-31 PROCEDURE — 3288F FALL RISK ASSESSMENT DOCD: CPT | Mod: CPTII,S$GLB,, | Performed by: INTERNAL MEDICINE

## 2022-03-31 PROCEDURE — 1126F AMNT PAIN NOTED NONE PRSNT: CPT | Mod: CPTII,S$GLB,, | Performed by: INTERNAL MEDICINE

## 2022-03-31 PROCEDURE — 4010F ACE/ARB THERAPY RXD/TAKEN: CPT | Mod: CPTII,S$GLB,, | Performed by: INTERNAL MEDICINE

## 2022-03-31 PROCEDURE — 3008F PR BODY MASS INDEX (BMI) DOCUMENTED: ICD-10-PCS | Mod: CPTII,S$GLB,, | Performed by: INTERNAL MEDICINE

## 2022-03-31 PROCEDURE — 99999 PR PBB SHADOW E&M-EST. PATIENT-LVL III: CPT | Mod: PBBFAC,,, | Performed by: INTERNAL MEDICINE

## 2022-03-31 PROCEDURE — 93010 EKG 12-LEAD: ICD-10-PCS | Mod: S$GLB,,, | Performed by: INTERNAL MEDICINE

## 2022-03-31 PROCEDURE — 1126F PR PAIN SEVERITY QUANTIFIED, NO PAIN PRESENT: ICD-10-PCS | Mod: CPTII,S$GLB,, | Performed by: INTERNAL MEDICINE

## 2022-03-31 RX ORDER — OLMESARTAN MEDOXOMIL 20 MG/1
20 TABLET ORAL DAILY
Qty: 90 TABLET | Refills: 1 | Status: SHIPPED | OUTPATIENT
Start: 2022-03-31 | End: 2022-12-09 | Stop reason: SDUPTHER

## 2022-03-31 RX ORDER — METFORMIN HYDROCHLORIDE 500 MG/1
1000 TABLET ORAL 2 TIMES DAILY WITH MEALS
Qty: 360 TABLET | Refills: 1 | Status: SHIPPED | OUTPATIENT
Start: 2022-03-31 | End: 2022-11-14

## 2022-03-31 RX ORDER — ATORVASTATIN CALCIUM 20 MG/1
20 TABLET, FILM COATED ORAL NIGHTLY
Qty: 90 TABLET | Refills: 1 | Status: SHIPPED | OUTPATIENT
Start: 2022-03-31 | End: 2022-08-06 | Stop reason: SDUPTHER

## 2022-03-31 RX ORDER — TIZANIDINE 4 MG/1
4 TABLET ORAL EVERY 8 HOURS PRN
Qty: 90 TABLET | Refills: 2 | Status: SHIPPED | OUTPATIENT
Start: 2022-03-31 | End: 2023-05-16 | Stop reason: SDUPTHER

## 2022-03-31 RX ORDER — HYDROCHLOROTHIAZIDE 12.5 MG/1
12.5 TABLET ORAL DAILY
Qty: 90 TABLET | Refills: 1 | Status: SHIPPED | OUTPATIENT
Start: 2022-03-31 | End: 2022-08-06 | Stop reason: SDUPTHER

## 2022-03-31 NOTE — PROGRESS NOTES
Subjective:       Patient ID: Linnea Renae is a 71 y.o. female.    Chief Complaint: Follow-up (Pre op/)    72 yo F presents for pre-op visit. Scheduled 04/11/2022 for breast reconstruction surgery 2/2 breast CA. No medical complaints at this time.    Review of Systems   Constitutional: Negative for fever.   Respiratory: Negative for shortness of breath.    Cardiovascular: Negative for chest pain.   Gastrointestinal: Negative for abdominal pain.   Integumentary:  Negative for rash.   Neurological: Negative for headaches.         Objective:      Physical Exam  Constitutional:       Appearance: She is obese.   HENT:      Head: Normocephalic.      Right Ear: Tympanic membrane normal.      Left Ear: Tympanic membrane normal.      Nose: Nose normal.      Mouth/Throat:      Mouth: Mucous membranes are moist.      Pharynx: Oropharynx is clear.   Eyes:      Extraocular Movements: Extraocular movements intact.      Pupils: Pupils are equal, round, and reactive to light.   Cardiovascular:      Rate and Rhythm: Normal rate and regular rhythm.      Pulses: Normal pulses.      Heart sounds: Normal heart sounds. No murmur heard.  Pulmonary:      Effort: Pulmonary effort is normal.      Breath sounds: Normal breath sounds.   Abdominal:      General: Abdomen is flat. Bowel sounds are normal.   Musculoskeletal:      Cervical back: Normal range of motion.   Skin:     General: Skin is warm and dry.   Neurological:      Mental Status: She is alert.         Assessment:       Problem List Items Addressed This Visit        Cardiac/Vascular    Hyperlipidemia    Relevant Orders    Lipid Panel      Other Visit Diagnoses     Essential hypertension    -  Primary    Relevant Orders    CBC Without Differential    Comprehensive Metabolic Panel    IN OFFICE EKG 12-LEAD (to Muse)    Type 2 diabetes mellitus with diabetic neuropathy, without long-term current use of insulin        Relevant Orders    Hemoglobin A1C    Microalbumin/Creatinine Ratio,  Urine    Personal history of breast cancer        Breast reconstruction disproportion        Pre-op examination        Relevant Orders    IN OFFICE EKG 12-LEAD (to [place])          Plan:           1. Health Maintenance  - CBC, CMP, Lipids, HgbA1C    Essential hypertension controlled  -     CBC Without Differential; Future; Expected date: 03/31/2022  -     Comprehensive Metabolic Panel; Future; Expected date: 03/31/2022  -     IN OFFICE EKG 12-LEAD (to Muse)  -     hydroCHLOROthiazide (HYDRODIURIL) 12.5 MG Tab; Take 1 tablet (12.5 mg total) by mouth once daily.  Dispense: 90 tablet; Refill: 1  -     olmesartan (BENICAR) 20 MG tablet; Take 1 tablet (20 mg total) by mouth once daily.  Dispense: 90 tablet; Refill: 1    Type 2 diabetes mellitus with diabetic neuropathy, without long-term current use of insulin - typically controlled  -     Hemoglobin A1C; Future; Expected date: 03/31/2022  -     Microalbumin/Creatinine Ratio, Urine  -     metFORMIN (GLUCOPHAGE) 500 MG tablet; Take 2 tablets (1,000 mg total) by mouth 2 (two) times daily with meals.  Dispense: 360 tablet; Refill: 1    Hyperlipidemia, unspecified hyperlipidemia type  -     Lipid Panel; Future; Expected date: 03/31/2022  -     atorvastatin (LIPITOR) 20 MG tablet; Take 1 tablet (20 mg total) by mouth every evening.  Dispense: 90 tablet; Refill: 1    Personal history of breast cancer    Breast reconstruction disproportion    Pre-op examination  -     IN OFFICE EKG 12-LEAD (to Muse)    Degenerative lumbar spinal stenosis  -     tiZANidine (ZANAFLEX) 4 MG tablet; Take 1 tablet (4 mg total) by mouth every 8 (eight) hours as needed (Muscle spasm.).  Dispense: 90 tablet; Refill: 2    I hereby acknowledge that I am relying upon documentation authored by a medical student working under my supervision and further I hereby attest that I have verified the student documentation or findings by personally re-performing the physical exam and medical decision making activities  of the Evaluation and Management service to be billed.    Final clearance note upon review of results.   Bailee Moss

## 2022-04-03 ENCOUNTER — PATIENT MESSAGE (OUTPATIENT)
Dept: PRIMARY CARE CLINIC | Facility: CLINIC | Age: 72
End: 2022-04-03
Payer: MEDICARE

## 2022-04-03 DIAGNOSIS — E11.9 TYPE 2 DIABETES MELLITUS WITHOUT COMPLICATION, WITHOUT LONG-TERM CURRENT USE OF INSULIN: ICD-10-CM

## 2022-04-03 NOTE — TELEPHONE ENCOUNTER
No new care gaps identified.  Powered by [employer]. Reference number: [id number].   4/03/2022 8:42:45 AM CDT

## 2022-04-04 NOTE — ANESTHESIA PAT ROS NOTE
04/04/2022  Linnea Renae is a 71 y.o., female.      Pre-op Assessment          Review of Systems  Anesthesia Hx:  No problems with previous Anesthesia  Denies Family Hx of Anesthesia complications.   Denies Personal Hx of Anesthesia complications.   Social:  Former Smoker, Social Alcohol Use    Hematology/Oncology:  Hematology Normal      Current/Recent Cancer. Breast right surgery   EENT/Dental:   S/P-Phaco with IOL   Cardiovascular:   Exercise tolerance: poor Hypertension hyperlipidemia Walking in yard/some housework   Pulmonary:  Pulmonary Normal    Renal/:   Chronic Renal Disease CKD-Stage 2   Hepatic/GI:   GERD    Musculoskeletal:   Arthritis  Spine-Lumbar-L4-L5/ DDD-lumbar   OB/GYN/PEDS:  S/P-Hysterectomy   Neurological:   Neuromuscular Disease, Diabetic neuropathy   Endocrine:   Diabetes, type 2 A.M.--Blood sugar-runs in the 90s   Dermatological:  Skin Normal    Psych:   Psychiatric History depression Affective disorder     Krupa Jerome RN  4/4/22       Anesthesia Assessment: Preoperative EQUATION    Planned Procedure: Procedure(s) (LRB):  REPLACEMENT, IMPLANT, BREAST - right  (Right)  MASTOPEXY - left breast (Left)  Requested Anesthesia Type:General  Surgeon: Nolberto Eng MD  Service: Plastics  Known or anticipated Date of Surgery:4/11/2022    Surgeon notes: reviewed and Personal history of breast cancer     Previous anesthesia records:11/29/21-Mastectomy, total right breast with right sentinel lymph node biopsy-Right/ Mammoplasty, Reduction-left/ Reconstruction, Breast-Right-General -no apparent anesthetic complications and tolerated procedure well-no nausea/vomiting    Anesthesia Hx:  Denies Hx of Anesthetic complications  History of prior surgery of interest to airway management or planning: Denies Family Hx of Anesthesia complications.   Denies Personal Hx of Anesthesia  complications.     Airway Findings: Mouth Opening: Normal Tongue: Normal  General Airway Assessment: Adult, Average  Mallampati: III  Improves to II with phonation.  TM Distance: Normal, at least 6 cm  Jaw/Neck Findings:  Neck ROM: Normal ROM       Last PCP note: 3/31/22-Bailee Stringer MD-IM-Essential hypertension + 6 more Dx-F/U visit  Subspecialty notes: Dermatology, Ortho, Bariatrics visit/ Breast Surgery visit/ Pain Medicine visit/ Podiatry visit    Other important co-morbidities: DM2, GERD, HLD, HTN and Personal history of breast cancer   Medical History    Diagnosis Date Comment Source   Allergy      Breast cancer  Lumpectomy 10/15.    Chronic constipation      Colon polyps      Diabetes mellitus, type 2      Dry eyes      GERD (gastroesophageal reflux disease)      Hyperlipidemia      Hypertension      Lumbar disc disease      Type 2 diabetes mellitus           Tests already available:  Results have been reviewed.   Labs-3/31/22-A1c/Lipid Panel/CMP/CBC/ Microalbumin/Creatinine Ratio, urine/ EKG-3/31/22(reviewed by Dr. Stringer              Plan: Phone pending     Testing:  None Needed      Patient  has previously scheduled Medical Appointment:None    Navigation:Phone Completed                Consults scheduled.None needed at this time.                          Straight Line to surgery.               No tests, anesthesia preop clinic visit, or consult required.       Krupa Jerome RN  4/4/22

## 2022-04-05 NOTE — TELEPHONE ENCOUNTER
Refill Authorization Note   Linnea Renae  is requesting a refill authorization.  Brief Assessment and Rationale for Refill:  Approve     Medication Therapy Plan:       Medication Reconciliation Completed: No   Comments:   --->Care Gap information included below if applicable.       Requested Prescriptions   Pending Prescriptions Disp Refills    blood sugar diagnostic (TRUE METRIX GLUCOSE TEST STRIP) Strp 100 each 3     Si strip by Misc.(Non-Drug; Combo Route) route once daily.       Endocrinology: Diabetes - Supplies Passed - 4/3/2022  8:42 AM        Passed - Patient is at least 18 years old        Passed - Valid encounter within last 15 months     Recent Visits  Date Type Provider Dept   22 Office Visit Bailee Moss MD [facility]   21 Office Visit Bailee Moss MD [facility]   01/15/21 Office Visit Bailee Moss MD [facility]   20 Office Visit Bailee Moss MD [facility]   07/10/20 Office Visit Bailee Moss MD [facility]   Showing recent visits within past 720 days and meeting all other requirements  Future Appointments  No visits were found meeting these conditions.  Showing future appointments within next 150 days and meeting all other requirements      Future Appointments              In 1 month DALY Fiore [id number], [facility]    In 1 month MD [facility]ada - Bariatric Surg 2nd Fl, [facility]    In 4 months Bailee Moss MD [facility] - Primary Care, [facility]    In 6 months WILLY BLANCO [facility] - [facility], [facility]    In 6 months Yessenia Biswas PA-C [facility], [facility]                Passed - Matches previous order       Previous Authorizing Provider: Bailee Moss MD (blood sugar diagnostic (TRUE METRIX GLUCOSE TEST STRIP) Strp)  Previous Pharmacy: [facility]  [place]            Passed - No ED/Hospital visits since last PCP visit     Last PCP Visit: 3/31/2022 Last Admission: 11/29/2021 Last ED Visit: 12/12/2021          Passed - HBA1C within 1080 days     Lab Results   Component Value Date    HGBA1C 5.7 (H) 03/31/2022    HGBA1C 5.7 (H) 11/29/2021    HGBA1C 5.7 (H) 07/16/2021                  Appointments  past 12m or future 3m with PCP    Date Provider   Last Visit   3/31/2022 Bailee Moss MD   Next Visit   8/4/2022 Bailee Moss MD   ED visits in past 90 days: 0     Note composed:8:45 AM 04/05/2022

## 2022-04-10 ENCOUNTER — ANESTHESIA EVENT (OUTPATIENT)
Dept: SURGERY | Facility: HOSPITAL | Age: 72
End: 2022-04-10
Payer: MEDICARE

## 2022-04-10 RX ORDER — CEFAZOLIN SODIUM/D5W 2 G/100 ML
2 PLASTIC BAG, INJECTION (ML) INTRAVENOUS ONCE
Status: DISCONTINUED | OUTPATIENT
Start: 2022-04-11 | End: 2022-04-13 | Stop reason: HOSPADM

## 2022-04-11 ENCOUNTER — ANESTHESIA (OUTPATIENT)
Dept: SURGERY | Facility: HOSPITAL | Age: 72
End: 2022-04-11
Payer: MEDICARE

## 2022-04-11 ENCOUNTER — HOSPITAL ENCOUNTER (OUTPATIENT)
Facility: HOSPITAL | Age: 72
Discharge: HOME | End: 2022-04-11
Attending: SURGERY | Admitting: SURGERY
Payer: MEDICARE

## 2022-04-11 VITALS
DIASTOLIC BLOOD PRESSURE: 71 MMHG | HEIGHT: 68 IN | BODY MASS INDEX: 38.8 KG/M2 | WEIGHT: 256 LBS | SYSTOLIC BLOOD PRESSURE: 141 MMHG | OXYGEN SATURATION: 100 % | HEART RATE: 59 BPM | TEMPERATURE: 98 F | RESPIRATION RATE: 20 BRPM

## 2022-04-11 DIAGNOSIS — Z85.3 HISTORY OF BREAST CANCER: ICD-10-CM

## 2022-04-11 LAB
POCT GLUCOSE: 112 MG/DL (ref 70–110)
POCT GLUCOSE: 150 MG/DL (ref 70–110)

## 2022-04-11 PROCEDURE — D9220A PRA ANESTHESIA: ICD-10-PCS | Mod: ,,, | Performed by: ANESTHESIOLOGY

## 2022-04-11 PROCEDURE — 37000008 HC ANESTHESIA 1ST 15 MINUTES: Performed by: SURGERY

## 2022-04-11 PROCEDURE — 88300 PR  SURG PATH,GROSS,LEVEL I: ICD-10-PCS | Mod: 26,59,, | Performed by: STUDENT IN AN ORGANIZED HEALTH CARE EDUCATION/TRAINING PROGRAM

## 2022-04-11 PROCEDURE — 36000707: Performed by: SURGERY

## 2022-04-11 PROCEDURE — 19370 REVJ PERI-IMPLT CAPSULE BRST: CPT | Mod: 59,RT,, | Performed by: SURGERY

## 2022-04-11 PROCEDURE — 25000003 PHARM REV CODE 250: Performed by: STUDENT IN AN ORGANIZED HEALTH CARE EDUCATION/TRAINING PROGRAM

## 2022-04-11 PROCEDURE — 11970 PR REPLACE TISSUE EXPANDER: ICD-10-PCS | Mod: 59,RT,, | Performed by: SURGERY

## 2022-04-11 PROCEDURE — 19380 PR REVISE BREAST RECONSTRUCTION: ICD-10-PCS | Mod: LT,,, | Performed by: SURGERY

## 2022-04-11 PROCEDURE — 19370 PR SURGERY OF BREAST CAPSULE: ICD-10-PCS | Mod: 59,RT,, | Performed by: SURGERY

## 2022-04-11 PROCEDURE — 37000009 HC ANESTHESIA EA ADD 15 MINS: Performed by: SURGERY

## 2022-04-11 PROCEDURE — 63600175 PHARM REV CODE 636 W HCPCS: Performed by: STUDENT IN AN ORGANIZED HEALTH CARE EDUCATION/TRAINING PROGRAM

## 2022-04-11 PROCEDURE — 88300 SURGICAL PATH GROSS: CPT | Mod: 26,59,, | Performed by: STUDENT IN AN ORGANIZED HEALTH CARE EDUCATION/TRAINING PROGRAM

## 2022-04-11 PROCEDURE — 15839 EXC EXCESSIVE SKN OTHER AREA: CPT | Mod: 51,,, | Performed by: SURGERY

## 2022-04-11 PROCEDURE — C1789 PROSTHESIS, BREAST, IMP: HCPCS | Performed by: SURGERY

## 2022-04-11 PROCEDURE — 88305 TISSUE EXAM BY PATHOLOGIST: ICD-10-PCS | Mod: 26,,, | Performed by: STUDENT IN AN ORGANIZED HEALTH CARE EDUCATION/TRAINING PROGRAM

## 2022-04-11 PROCEDURE — C1729 CATH, DRAINAGE: HCPCS | Performed by: SURGERY

## 2022-04-11 PROCEDURE — 25000003 PHARM REV CODE 250: Performed by: SURGERY

## 2022-04-11 PROCEDURE — 71000044 HC DOSC ROUTINE RECOVERY FIRST HOUR: Performed by: SURGERY

## 2022-04-11 PROCEDURE — 11970 RPLCMT TISS XPNDR PERM IMPLT: CPT | Mod: 59,RT,, | Performed by: SURGERY

## 2022-04-11 PROCEDURE — 82962 GLUCOSE BLOOD TEST: CPT | Performed by: SURGERY

## 2022-04-11 PROCEDURE — 27201423 OPTIME MED/SURG SUP & DEVICES STERILE SUPPLY: Performed by: SURGERY

## 2022-04-11 PROCEDURE — 88305 TISSUE EXAM BY PATHOLOGIST: CPT | Mod: 26,,, | Performed by: STUDENT IN AN ORGANIZED HEALTH CARE EDUCATION/TRAINING PROGRAM

## 2022-04-11 PROCEDURE — 88300 SURGICAL PATH GROSS: CPT | Performed by: STUDENT IN AN ORGANIZED HEALTH CARE EDUCATION/TRAINING PROGRAM

## 2022-04-11 PROCEDURE — 63600175 PHARM REV CODE 636 W HCPCS: Performed by: SURGERY

## 2022-04-11 PROCEDURE — D9220A PRA ANESTHESIA: Mod: ,,, | Performed by: ANESTHESIOLOGY

## 2022-04-11 PROCEDURE — 36000706: Performed by: SURGERY

## 2022-04-11 PROCEDURE — 71000016 HC POSTOP RECOV ADDL HR: Performed by: SURGERY

## 2022-04-11 PROCEDURE — 19380 REVJ RECONSTRUCTED BREAST: CPT | Mod: LT,,, | Performed by: SURGERY

## 2022-04-11 PROCEDURE — 71000015 HC POSTOP RECOV 1ST HR: Performed by: SURGERY

## 2022-04-11 PROCEDURE — 88305 TISSUE EXAM BY PATHOLOGIST: CPT | Mod: 59 | Performed by: STUDENT IN AN ORGANIZED HEALTH CARE EDUCATION/TRAINING PROGRAM

## 2022-04-11 PROCEDURE — 71000045 HC DOSC ROUTINE RECOVERY EA ADD'L HR: Performed by: SURGERY

## 2022-04-11 PROCEDURE — 15839 PR EXCISE EXCESS SKIN TISSUE,OTHER: ICD-10-PCS | Mod: 51,,, | Performed by: SURGERY

## 2022-04-11 PROCEDURE — 25000003 PHARM REV CODE 250: Performed by: ANESTHESIOLOGY

## 2022-04-11 DEVICE — IMPLANTABLE DEVICE
Type: IMPLANTABLE DEVICE | Site: BREAST | Status: NON-FUNCTIONAL
Removed: 2022-08-18

## 2022-04-11 RX ORDER — MIDAZOLAM HYDROCHLORIDE 1 MG/ML
INJECTION INTRAMUSCULAR; INTRAVENOUS
Status: DISCONTINUED | OUTPATIENT
Start: 2022-04-11 | End: 2022-04-11

## 2022-04-11 RX ORDER — LIDOCAINE HCL/PF 100 MG/5ML
SYRINGE (ML) INTRAVENOUS
Status: DISCONTINUED | OUTPATIENT
Start: 2022-04-11 | End: 2022-04-11

## 2022-04-11 RX ORDER — HEPARIN SODIUM 5000 [USP'U]/ML
5000 INJECTION, SOLUTION INTRAVENOUS; SUBCUTANEOUS ONCE
Status: DISCONTINUED | OUTPATIENT
Start: 2022-04-11 | End: 2022-04-13 | Stop reason: HOSPADM

## 2022-04-11 RX ORDER — ROCURONIUM BROMIDE 10 MG/ML
INJECTION, SOLUTION INTRAVENOUS
Status: DISCONTINUED | OUTPATIENT
Start: 2022-04-11 | End: 2022-04-11

## 2022-04-11 RX ORDER — FENTANYL CITRATE 50 UG/ML
25 INJECTION, SOLUTION INTRAMUSCULAR; INTRAVENOUS EVERY 5 MIN PRN
Status: COMPLETED | OUTPATIENT
Start: 2022-04-11 | End: 2022-04-11

## 2022-04-11 RX ORDER — FENTANYL CITRATE 50 UG/ML
25 INJECTION, SOLUTION INTRAMUSCULAR; INTRAVENOUS EVERY 5 MIN PRN
Status: DISCONTINUED | OUTPATIENT
Start: 2022-04-11 | End: 2022-04-13 | Stop reason: HOSPADM

## 2022-04-11 RX ORDER — LIDOCAINE HYDROCHLORIDE 10 MG/ML
1 INJECTION, SOLUTION EPIDURAL; INFILTRATION; INTRACAUDAL; PERINEURAL ONCE
Status: DISCONTINUED | OUTPATIENT
Start: 2022-04-11 | End: 2022-04-13 | Stop reason: HOSPADM

## 2022-04-11 RX ORDER — OXYCODONE AND ACETAMINOPHEN 5; 325 MG/1; MG/1
1 TABLET ORAL EVERY 6 HOURS PRN
Qty: 15 TABLET | Refills: 0 | Status: SHIPPED | OUTPATIENT
Start: 2022-04-11 | End: 2022-04-16 | Stop reason: ALTCHOICE

## 2022-04-11 RX ORDER — NEOSTIGMINE METHYLSULFATE 1 MG/ML
INJECTION, SOLUTION INTRAVENOUS
Status: DISCONTINUED | OUTPATIENT
Start: 2022-04-11 | End: 2022-04-11

## 2022-04-11 RX ORDER — KETAMINE HCL IN 0.9 % NACL 50 MG/5 ML
SYRINGE (ML) INTRAVENOUS
Status: DISCONTINUED | OUTPATIENT
Start: 2022-04-11 | End: 2022-04-11

## 2022-04-11 RX ORDER — OXYCODONE HYDROCHLORIDE 5 MG/1
5 TABLET ORAL ONCE AS NEEDED
Status: COMPLETED | OUTPATIENT
Start: 2022-04-11 | End: 2022-04-11

## 2022-04-11 RX ORDER — FENTANYL CITRATE 50 UG/ML
INJECTION, SOLUTION INTRAMUSCULAR; INTRAVENOUS
Status: DISCONTINUED | OUTPATIENT
Start: 2022-04-11 | End: 2022-04-11

## 2022-04-11 RX ORDER — HEPARIN SODIUM 1000 [USP'U]/ML
INJECTION, SOLUTION INTRAVENOUS; SUBCUTANEOUS
Status: DISCONTINUED | OUTPATIENT
Start: 2022-04-11 | End: 2022-04-11

## 2022-04-11 RX ORDER — FAMOTIDINE 10 MG/ML
20 INJECTION INTRAVENOUS
Status: DISCONTINUED | OUTPATIENT
Start: 2022-04-11 | End: 2022-04-11

## 2022-04-11 RX ORDER — FAMOTIDINE 10 MG/ML
20 INJECTION INTRAVENOUS
Status: COMPLETED | OUTPATIENT
Start: 2022-04-11 | End: 2022-04-11

## 2022-04-11 RX ORDER — DEXAMETHASONE SODIUM PHOSPHATE 4 MG/ML
INJECTION, SOLUTION INTRA-ARTICULAR; INTRALESIONAL; INTRAMUSCULAR; INTRAVENOUS; SOFT TISSUE
Status: DISCONTINUED | OUTPATIENT
Start: 2022-04-11 | End: 2022-04-11

## 2022-04-11 RX ORDER — PROPOFOL 10 MG/ML
VIAL (ML) INTRAVENOUS
Status: DISCONTINUED | OUTPATIENT
Start: 2022-04-11 | End: 2022-04-11

## 2022-04-11 RX ORDER — ONDANSETRON 2 MG/ML
INJECTION INTRAMUSCULAR; INTRAVENOUS
Status: DISCONTINUED | OUTPATIENT
Start: 2022-04-11 | End: 2022-04-11

## 2022-04-11 RX ORDER — FAMOTIDINE 10 MG/ML
INJECTION INTRAVENOUS
Status: DISCONTINUED
Start: 2022-04-11 | End: 2022-04-11 | Stop reason: HOSPADM

## 2022-04-11 RX ORDER — ACETAMINOPHEN 10 MG/ML
INJECTION, SOLUTION INTRAVENOUS
Status: DISCONTINUED | OUTPATIENT
Start: 2022-04-11 | End: 2022-04-11

## 2022-04-11 RX ORDER — CLINDAMYCIN HYDROCHLORIDE 300 MG/1
300 CAPSULE ORAL 3 TIMES DAILY
Qty: 21 CAPSULE | Refills: 0 | Status: SHIPPED | OUTPATIENT
Start: 2022-04-11 | End: 2022-04-18

## 2022-04-11 RX ORDER — CIPROFLOXACIN 2 MG/ML
INJECTION, SOLUTION INTRAVENOUS
Status: COMPLETED | OUTPATIENT
Start: 2022-04-11 | End: 2022-04-11

## 2022-04-11 RX ORDER — PROCHLORPERAZINE EDISYLATE 5 MG/ML
5 INJECTION INTRAMUSCULAR; INTRAVENOUS EVERY 30 MIN PRN
Status: DISCONTINUED | OUTPATIENT
Start: 2022-04-11 | End: 2022-04-13 | Stop reason: HOSPADM

## 2022-04-11 RX ORDER — CEFAZOLIN SODIUM 1 G/3ML
INJECTION, POWDER, FOR SOLUTION INTRAMUSCULAR; INTRAVENOUS
Status: DISCONTINUED | OUTPATIENT
Start: 2022-04-11 | End: 2022-04-11

## 2022-04-11 RX ADMIN — ROCURONIUM BROMIDE 10 MG: 10 INJECTION, SOLUTION INTRAVENOUS at 09:04

## 2022-04-11 RX ADMIN — GLYCOPYRROLATE 0.6 MG: 0.2 INJECTION, SOLUTION INTRAMUSCULAR; INTRAVITREAL at 10:04

## 2022-04-11 RX ADMIN — FAMOTIDINE 20 MG: 10 INJECTION, SOLUTION INTRAVENOUS at 06:04

## 2022-04-11 RX ADMIN — FENTANYL CITRATE 100 MCG: 50 INJECTION, SOLUTION INTRAMUSCULAR; INTRAVENOUS at 07:04

## 2022-04-11 RX ADMIN — HEPARIN SODIUM 5000 UNITS: 1000 INJECTION, SOLUTION INTRAVENOUS; SUBCUTANEOUS at 08:04

## 2022-04-11 RX ADMIN — Medication 10 MG: at 09:04

## 2022-04-11 RX ADMIN — FENTANYL CITRATE 25 MCG: 50 INJECTION INTRAMUSCULAR; INTRAVENOUS at 11:04

## 2022-04-11 RX ADMIN — FENTANYL CITRATE 25 MCG: 50 INJECTION INTRAMUSCULAR; INTRAVENOUS at 12:04

## 2022-04-11 RX ADMIN — PROPOFOL 200 MG: 10 INJECTION, EMULSION INTRAVENOUS at 07:04

## 2022-04-11 RX ADMIN — LIDOCAINE HYDROCHLORIDE 60 MG: 20 INJECTION, SOLUTION INTRAVENOUS at 07:04

## 2022-04-11 RX ADMIN — PROPOFOL 20 MG: 10 INJECTION, EMULSION INTRAVENOUS at 10:04

## 2022-04-11 RX ADMIN — MIDAZOLAM HYDROCHLORIDE 2 MG: 1 INJECTION, SOLUTION INTRAMUSCULAR; INTRAVENOUS at 07:04

## 2022-04-11 RX ADMIN — Medication 20 MG: at 08:04

## 2022-04-11 RX ADMIN — Medication 10 MG: at 08:04

## 2022-04-11 RX ADMIN — CEFAZOLIN 2 G: 330 INJECTION, POWDER, FOR SOLUTION INTRAMUSCULAR; INTRAVENOUS at 07:04

## 2022-04-11 RX ADMIN — FENTANYL CITRATE 25 MCG: 50 INJECTION, SOLUTION INTRAMUSCULAR; INTRAVENOUS at 10:04

## 2022-04-11 RX ADMIN — DEXAMETHASONE SODIUM PHOSPHATE 4 MG: 4 INJECTION, SOLUTION INTRAMUSCULAR; INTRAVENOUS at 07:04

## 2022-04-11 RX ADMIN — FENTANYL CITRATE 25 MCG: 50 INJECTION, SOLUTION INTRAMUSCULAR; INTRAVENOUS at 08:04

## 2022-04-11 RX ADMIN — ROCURONIUM BROMIDE 50 MG: 10 INJECTION, SOLUTION INTRAVENOUS at 07:04

## 2022-04-11 RX ADMIN — ROCURONIUM BROMIDE 10 MG: 10 INJECTION, SOLUTION INTRAVENOUS at 08:04

## 2022-04-11 RX ADMIN — ACETAMINOPHEN 1000 MG: 10 INJECTION, SOLUTION INTRAVENOUS at 07:04

## 2022-04-11 RX ADMIN — OXYCODONE 5 MG: 5 TABLET ORAL at 12:04

## 2022-04-11 RX ADMIN — SODIUM CHLORIDE: 0.9 INJECTION, SOLUTION INTRAVENOUS at 07:04

## 2022-04-11 RX ADMIN — NEOSTIGMINE METHYLSULFATE 5 MG: 1 INJECTION INTRAVENOUS at 10:04

## 2022-04-11 RX ADMIN — ONDANSETRON HYDROCHLORIDE 4 MG: 2 INJECTION INTRAMUSCULAR; INTRAVENOUS at 10:04

## 2022-04-11 RX ADMIN — FENTANYL CITRATE 25 MCG: 50 INJECTION, SOLUTION INTRAMUSCULAR; INTRAVENOUS at 09:04

## 2022-04-11 NOTE — PATIENT INSTRUCTIONS
Patient Instructions:    Shower in 48 hours   Drain care, record daily and bring logs to clinic  Bacitracin to left nipple 3x/day  Wear bra at all times except for shower or to wash it   Call clinic with concerns/questions    Drain care:  Empty drains every twice daily and record output. Same time every day  Strip drains every 4 hours and as needed, ensure bulbs are collapsed.    Wound care:  You have glue on incisions.  Ok to shower and allow water to run down incisions.  No tubs or soaking incisions.  Guaze in bra for comfort only and can remove at any time.     Activity:  Need to be out of bed (walking or in a chair) for >8h per day.  No strenuous activity, No lifting >15lbs.    Driving:  No driving while on narcotics.    Follow up:  See Dr Eng in clinic in one week for follow up. Please call for appointment.       PLEASE RECORD ALL AMOUNTS IN mLS AND BRING THIS RECORD   WITH YOU TO YOUR RETURN APPOINTMENT    Date Time Drain #1 Drain #2 comment                                                                                                                                                                                                                             Date Time Drain #1 Drain #2 comment

## 2022-04-11 NOTE — TRANSFER OF CARE
"Anesthesia Transfer of Care Note    Patient: Linnea Renae    Procedure(s) Performed: Procedure(s) (LRB):  REPLACEMENT, IMPLANT, BREAST - right  (Right)  MASTOPEXY - left breast (Left)    Patient location: [facility]    Anesthesia Type: general    Transport from OR: Transported from OR on 6-10 L/min O2 by face mask with adequate spontaneous ventilation    Post pain: adequate analgesia    Post assessment: no apparent anesthetic complications and tolerated procedure well    Post vital signs: stable    Level of consciousness: awake, alert and oriented    Nausea/Vomiting: no nausea/vomiting    Complications: none    Transfer of care protocol was followed      Last vitals:   Visit Vitals  /83 (BP Location: Left arm, Patient Position: Lying)   Pulse 71   Temp 36.8 °C (98.2 °F) (Oral)   Resp 16   Ht 5' 8" (1.727 m)   Wt 116.1 kg (256 lb)   SpO2 100%   Breastfeeding No   BMI 38.92 kg/m²     "

## 2022-04-11 NOTE — ANESTHESIA PREPROCEDURE EVALUATION
[facility]  Anesthesia Pre-Operative Evaluation         Patient Name: Linnea Renae  YOB: 1950  MRN: [id number]    SUBJECTIVE:     Pre-operative evaluation for Procedure(s) (LRB):  REPLACEMENT, IMPLANT, BREAST - right  (Right)  MASTOPEXY - left breast (Left)     04/10/2022    Linnea Renae is a 71 y.o. female w/ a significant PMHx of HTN, HLD, DM, CKD, GERD, and hx of R breast CA s/p mastectomy/reconstruction.    Patient now presents for the above procedure(s).      LDA:        Closed/Suction Drain 11/29/21 1839 Inferior;Lateral;Right Breast Bulb (Active)   Number of days: 131            Closed/Suction Drain 11/29/21 1841 Lateral;Right Breast Bulb 15 Fr. (Active)   Number of days: 131            Closed/Suction Drain 11/29/21 1842 Lateral;Left Breast 15 Fr. (Active)   Number of days: 131       Prev airway: Video, easy mask, Melo 3, ETT 7.5, anterior larynx    Drips: None documented.      Patient Active Problem List   Diagnosis    Hypertension    Type 2 diabetes mellitus without complication, without long-term current use of insulin    Hyperlipidemia    DDD (degenerative disc disease), lumbar    Perennial allergic rhinitis    Dry eyes    Morbid obesity with BMI of 40.0-44.9, adult    GERD (gastroesophageal reflux disease)    Mazoplasia    DCIS (ductal carcinoma in situ) of breast    Anal skin tag    Spinal stenosis, lumbar    Atherosclerosis of aorta    Chronic kidney disease, stage II (mild)    Positive depression screening    Chronic pain disorder    Impaired gait and mobility    Decreased functional activity tolerance    Osteoarthritis of lumbar spine    Diabetic peripheral neuropathy associated with type 2 diabetes mellitus    Spondylosis without myelopathy    Sacroiliitis    Greater trochanteric bursitis    Chronic pain    Essential (hemorrhagic) thrombocythemia    Unspecified mood (affective) disorder       Review of patient's allergies indicates:    Allergen Reactions    Codeine Itching       Current Inpatient Medications:      No current facility-administered medications on file prior to encounter.     Current Outpatient Medications on File Prior to Encounter   Medication Sig Dispense Refill    alcohol antiseptic pads (ALCOHOL PREP PADS TOP)       ALPRAZolam (XANAX) 0.5 MG tablet Take 1 tablet (0.5 mg total) by mouth 3 (three) times daily. (Patient not taking: No sig reported) 30 tablet 0    amitriptyline (ELAVIL) 10 MG tablet Take 1 tablet (10 mg total) by mouth nightly as needed for Insomnia. 30 tablet 2    ammonium lactate 12 % Crea Apply twice daily to affected parts both feet as needed. 140 g 11    aspirin 81 MG Chew Take 81 mg by mouth once daily.      atenoloL (TENORMIN) 50 MG tablet TAKE 1 TABLET BY MOUTH TWICE DAILY 180 tablet 1    calcium-vitamin D3 500 mg(1,250mg) -200 unit per tablet Take 1 tablet by mouth 2 (two) times daily with meals.       clobetasoL (TEMOVATE) 0.05 % cream Apply topically 2 (two) times daily. Top of both feet 45 g 2    cycloSPORINE (RESTASIS) 0.05 % ophthalmic emulsion Instill 1 drop into both eyes twice a day 180 each 3    diclofenac sodium (VOLTAREN) 1 % Gel Apply 2 g topically 4 (four) times daily. 100 g 2    fluticasone propionate (FLONASE) 50 mcg/actuation nasal spray 2 sprays (100 mcg total) by Each Nostril route once daily. 48 g 1    ibuprofen (ADVIL,MOTRIN) 800 MG tablet Take 1 tablet by mouth three times daily as needed 60 tablet 0    ketoconazole (NIZORAL) 2 % cream Apply topically 2 (two) times daily. Prn irritation body folds 60 g 2    ketoconazole (NIZORAL) 2 % shampoo Apply topically to scalp in place of regular shampoo 2 to 3 times a week 120 mL 9    lancets 33 gauge Misc 1 lancet by Misc.(Non-Drug; Combo Route) route once daily. 100 each 3    LIDOcaine HCL 2% (XYLOCAINE) 2 % jelly Apply topically as needed. Apply topically once nightly to affected part of foot/feet. 30 mL 2    linaCLOtide  (LINZESS) 145 mcg Cap capsule Take 1 capsule (145 mcg total) by mouth once daily. 90 capsule 1    loratadine 10 mg Cap       minoxidiL (LONITEN) 2.5 MG tablet Take 1/4  to 1/2 tablet by mouth nightly as tolerated (Patient taking differently: Take 2.5 mg by mouth once daily.) 45 tablet 3    multivitamin (THERAGRAN) per tablet Take 1 tablet by mouth once daily.      naloxone (NARCAN) 4 mg/actuation Spry 4mg by nasal route as needed for opioid overdose; may repeat every 2-3 minutes in alternating nostrils until medical help arrives. Call 911 (Patient taking differently: 4mg by nasal route as needed for opioid overdose; may repeat every 2-3 minutes in alternating nostrils until medical help arrives. Call 911-prn) 2 each 11    nystatin (MYCOSTATIN) powder Apply topically 4 (four) times daily Under pannus as needed 180 g 2    omeprazole (PRILOSEC) 20 MG capsule Take 1 capsule by mouth once daily 90 capsule 1    sertraline (ZOLOFT) 50 MG tablet Take 1 tablet (50 mg total) by mouth once daily. 90 tablet 0    topiramate (TOPAMAX) 50 MG tablet Take 1 tablet (50 mg total) by mouth 2 (two) times daily. (Patient taking differently: Take 50 mg by mouth 2 (two) times daily.) 180 tablet 0    traMADoL (ULTRAM) 50 mg tablet Take 1 tablet by mouth every 6 hours as needed for pain 120 tablet 0       Past Surgical History:   Procedure Laterality Date    BREAST BIOPSY      BREAST LUMPECTOMY Right         BREAST RECONSTRUCTION Right 2021    Procedure: RECONSTRUCTION, BREAST;  Surgeon: Nolberto Eng MD;  Location: [facility] 2ND FLR;  Service: Plastics;  Laterality: Right;    CATARACT EXTRACTION, BILATERAL       SECTION      x3    CHOLECYSTECTOMY      COLONOSCOPY N/A 10/11/2018    Procedure: COLONOSCOPY;  Surgeon: Lorenzo Tanner MD;  Location: [facility] (4TH FLR);  Service: Endoscopy;  Laterality: N/A;    COLONOSCOPY W/ POLYPECTOMY      HYSTERECTOMY      and USO    INJECTION OF FACET  JOINT Bilateral 7/25/2018    Procedure: INJECTION, FACET JOINT;  Surgeon: Viet Wilson MD;  Location: [facility] PAIN MGT;  Service: Pain Management;  Laterality: Bilateral;  LUMBAR BILATERAL L3-L4 AND L4-L5 FACET STEROID INJECTION    NEED CONSENT    INJECTION OF JOINT Bilateral 7/8/2020    Procedure: INJECTION, JOINT, Bilateral SI;  Surgeon: Viet Wilson MD;  Location: [facility] PAIN MGT;  Service: Pain Management;  Laterality: Bilateral;    MASTECTOMY Right 11/29/2021    Procedure: MASTECTOMY, total, right breast with right sentinel lymph node biospy;  Surgeon: Heber Evans MD;  Location: [facility] OR 2ND FLR;  Service: General;  Laterality: Right;    RADIOFREQUENCY ABLATION Left 3/20/2019    Procedure: RADIOFREQUENCY ABLATION LEFT L2,3,4,5;  Surgeon: Viet Wilson MD;  Location: [facility] PAIN MGT;  Service: Pain Management;  Laterality: Left;  LEFT RFA L2,3,4,5    NEEDS CONSENT    RADIOFREQUENCY ABLATION Left 12/18/2019    Procedure: RADIOFREQUENCY ABLATION, LEFT L2-L3-L4-L5  1 OF 2;  Surgeon: Viet Wilson MD;  Location: [facility] PAIN MGT;  Service: Pain Management;  Laterality: Left;    RADIOFREQUENCY ABLATION Right 1/8/2020    Procedure: RADIOFREQUENCY ABLATION, RIGHT L2-L3-L4-L5 MEDIAL BRANCH 2 OF;  Surgeon: Viet Wilson MD;  Location: [facility] PAIN MGT;  Service: Pain Management;  Laterality: Right;    RADIOFREQUENCY ABLATION Right 11/4/2020    Procedure: RADIOFREQUENCY ABLATION Right L2, L3, L4, L5;  Surgeon: Viet Wilson MD;  Location: [facility] PAIN MGT;  Service: Pain Management;  Laterality: Right;  Right L2, L3, L4, L5 RFA    TOTAL REDUCTION MAMMOPLASTY Left 11/29/2021    Procedure: MAMMOPLASTY, REDUCTION;  Surgeon: Nolberto Eng MD;  Location: [facility] OR 2ND FLR;  Service: Plastics;  Laterality: Left;    TRANSFORAMINAL EPIDURAL INJECTION OF STEROID Left 10/2/2019    Procedure: INJECTION, STEROID, EPIDURAL, TRANSFORAMINAL APPROACH, L4 AND L5;  Surgeon: Viet Wilson MD;   Location: [facility];  Service: Pain Management;  Laterality: Left;    TUBAL LIGATION         Social History     Socioeconomic History    Marital status:     Number of children: 3   Tobacco Use    Smoking status: Former Smoker     Packs/day: 0.25     Years: 20.00     Pack years: 5.00     Quit date: 1985     Years since quittin.2    Smokeless tobacco: Never Used    Tobacco comment: Quit mid .   Substance and Sexual Activity    Alcohol use: No     Alcohol/week: 0.0 standard drinks    Drug use: No    Sexual activity: Yes       OBJECTIVE:     Vital Signs Range (Last 24H):         Significant Labs:  Lab Results   Component Value Date    WBC 7.53 2022    HGB 11.9 (L) 2022    HCT 38.3 2022     2022    CHOL 138 2022    TRIG 76 2022    HDL 44 2022    ALT 10 2022    AST 19 2022     (H) 2022    K 5.0 2022     2022    CREATININE 0.9 2022    BUN 22 2022    CO2 26 2022    TSH 2.441 2021    HGBA1C 5.7 (H) 2022       Diagnostic Studies: No relevant studies.    EKG:   Results for orders placed or performed in visit on 22   IN OFFICE EKG 12-LEAD (to [place])    Collection Time: 22  9:16 AM    Narrative    Test Reason : I10,Z01.818,    Vent. Rate : 076 BPM     Atrial Rate : 076 BPM     P-R Int : 192 ms          QRS Dur : 082 ms      QT Int : 368 ms       P-R-T Axes : 000 019 024 degrees     QTc Int : 414 ms    Sinus rhythm with frequent Premature ventricular complexes  Low voltage QRS  Nonspecific ST abnormality  Abnormal ECG  When compared with ECG of 03-DEC-2020 11:14,  Premature ventricular complexes are now Present  Nonspecific ST abnormality is now present  Confirmed by TAMERA CISNEROS MD ([id number]) on 3/31/2022 1:09:22 PM    Referred By: BRISEIDA ODOM           Confirmed By:TAMERA CISNEROS MD       2D ECHO:  TTE:  No results found. However, due to the size of the  patient record, not all encounters were searched. Please check Results Review for a complete set of results.    ZACHARY:  No results found. However, due to the size of the patient record, not all encounters were searched. Please check Results Review for a complete set of results.    ASSESSMENT/PLAN:                                               Pre-op Assessment    I have reviewed the Patient Summary Reports.     I have reviewed the Nursing Notes. I have reviewed the NPO Status.   I have reviewed the Medications.     Review of Systems  Anesthesia Hx:  No problems with previous Anesthesia  History of prior surgery of interest to airway management or planning: Denies Family Hx of Anesthesia complications.   Denies Personal Hx of Anesthesia complications.   Hematology/Oncology:  Hematology Normal        EENT/Dental:EENT/Dental Normal   Cardiovascular:   Exercise tolerance: good Hypertension hyperlipidemia    Pulmonary:  Pulmonary Normal    Renal/:   Chronic Renal Disease    Hepatic/GI:   GERD    Musculoskeletal:   Arthritis     Neurological:   Peripheral Neuropathy    Endocrine:   Diabetes    Psych:  Psychiatric Normal           Physical Exam  General: Well nourished    Airway:  Mallampati: I   Mouth Opening: Normal  TM Distance: 4 - 6 cm  Tongue: Large  Neck ROM: Normal ROM    Dental:  Intact        Anesthesia Plan  Type of Anesthesia, risks & benefits discussed:    Anesthesia Type: Gen ETT  Intra-op Monitoring Plan: Standard ASA Monitors  Post Op Pain Control Plan: IV/PO Opioids PRN  Induction:  IV  Airway Plan: Direct and Video  Informed Consent: Informed consent signed with the Patient and all parties understand the risks and agree with anesthesia plan.  All questions answered.   ASA Score: 3    Ready For Surgery From Anesthesia Perspective.     .

## 2022-04-11 NOTE — OP NOTE
Date of surgery 04/11/2022  Preoperative diagnosis history of breast cancer  Postoperative diagnosis same  Procedure performed  1. Left mastopexy  2. Excision fat necrosis left lateral breast  3. Right implant exchange  4. Extensive open capsulotomy right anterior breast  5. Wide soft tissue excision right axilla measuring 20 cm by 8 cm with layered closure  6. Wide soft tissue excision left axilla measuring 20 cm x 8 cm with layered closure  Surgeon Velasquez  Anesthesia general  Complications none  Blood loss minimal  Drains x2    The patient was evaluated the preoperative holding area and in the standing position markings for the procedure were made.  I was very realistic with the patient and told her we probably could get close to symmetry but it would definitely not be perfect.  The patient understands this.  Patient was taken to the operative room placed in supine position after adequate general endotracheal anesthesia was prepped and draped in a normal sterile fashion.  Using modified Pack pattern small area around the nipple-areolar complex was the de-epithelialized and also inferiorly within the Wise pattern.  Small flaps were then made.  We did encounter a large portion of fat necrosis in the lateral breast.  This was excised and sent to pathology.  The incisions were closed using interrupted 3-0 Monocryl followed by running 4-0 Monocryl subcuticular suture.  A wide soft tissue excision in the left axilla was then made.  Soft tissue was excised and closed in layers using interrupted 3-0 Monocryl followed by running 4-0 Monocryl subcuticular suture.  On the right side a wide soft tissue excision in the right axilla was made.  This eventually would be closed in layers using 3-0 Monocryl followed by running 4-0 Monocryl subcuticular suture.  Through this excision the capsule was then opened.  The tissue expanders removed.  The inframammary fold was noted to be at a good position for the majority of the fold  except medially.  An extensive capsulotomy was performed here.  Also there needed to be more rim anteriorly.  Thus in a anterior capsulotomy was also performed.  Sizer was placed and was determined that 700 cc would be adequate.  Thus the chest was then re-prepped redraped gloves were changed 700 cc smooth silicone up implant was opened on the back table.  Was soaked in triple antibiotic solution.  Was placed in the subpectoral pocket.  Capsule was closed using 2-0 Vicryl.  The incision was closed using interrupted 3-0 Monocryl followed by running 4-0 Monocryl subcuticular suture.  Should be noted that 1 drain was placed on each side.  There were no complications.

## 2022-04-11 NOTE — ANESTHESIA PROCEDURE NOTES
Intubation    Date/Time: 4/11/2022 7:50 AM  Performed by: Lois Haynes MD  Authorized by: Marcelino Carbajal MD     Intubation:     Induction:  Intravenous    Intubated:  Postinduction    Mask Ventilation:  Easy with oral airway    Attempts:  1    Attempted By:  Resident anesthesiologist    Method of Intubation:  Video laryngoscopy    Blade:  Melo 3    Laryngeal View Grade: Grade I - full view of cords      Difficult Airway Encountered?: No      Complications:  None    Airway Device:  Oral endotracheal tube    Airway Device Size:  7.0    Style/Cuff Inflation:  Cuffed    Secured at:  The lips    Placement Verified By:  Capnometry    Complicating Factors:  None    Findings Post-Intubation:  BS equal bilateral

## 2022-04-11 NOTE — ANESTHESIA POSTPROCEDURE EVALUATION
Anesthesia Post Evaluation    Patient: Linnea Renae    Procedure(s) Performed: Procedure(s) (LRB):  REPLACEMENT, IMPLANT, BREAST - right  (Right)  MASTOPEXY - left breast (Left)    Final Anesthesia Type: general      Patient location during evaluation: PACU  Patient participation: Yes- Able to Participate  Level of consciousness: awake and alert and oriented  Post-procedure vital signs: reviewed and stable  Pain management: adequate  Airway patency: patent    PONV status at discharge: No PONV  Anesthetic complications: no      Cardiovascular status: hemodynamically stable  Respiratory status: unassisted, spontaneous ventilation and room air  Hydration status: euvolemic  Follow-up not needed.    Slight 1 cm area of edema of lower lip on left-pt reassured      Vitals Value Taken Time   /65 04/11/22 1247   Temp 36.8 °C (98.2 °F) 04/11/22 1051   Pulse 56 04/11/22 1255   Resp 21 04/11/22 1255   SpO2 100 % 04/11/22 1255   Vitals shown include unvalidated device data.      No case tracking events are documented in the log.      Pain/Nneka Score: Pain Rating Prior to Med Admin: 10 (4/11/2022 12:13 PM)  Nneka Score: 10 (4/11/2022 12:02 PM)

## 2022-04-11 NOTE — BRIEF OP NOTE
[facility] - Surgery ([facility])  Brief Operative Note    Surgery Date: 4/11/2022     Surgeon(s) and Role:     * Nolberto Eng MD - Primary     * Mariza Calzada MD - Resident - Assisting     * Hussain Hurley MD - Fellow        Pre-op Diagnosis:  Personal history of breast cancer [Z85.3]    Post-op Diagnosis:  Post-Op Diagnosis Codes:     * Personal history of breast cancer [Z85.3]    Procedure(s) (LRB):  REPLACEMENT, IMPLANT, BREAST - right  (Right)  MASTOPEXY - left breast (Left)    Anesthesia: General    Operative Findings: Right sided tissue expander removed. Capsulotomy performed. Replaced with 700cc silicone implant. Excess axillary skin and subcutaneous fat excised. Left sided mastopexy of prior breast reduction. Otis drains left bilaterally.     Estimated Blood Loss: 20cc         Specimens:   Specimen (24h ago, onward)             Start     Ordered    04/11/22 0858  Specimen to Pathology, Surgery Breast (Plastics)  Once        Comments: Pre-op Diagnosis: Personal history of breast cancer [Z85.3]    Procedure(s):  REPLACEMENT, IMPLANT, BREAST - right   MASTOPEXY - left breast     Number of specimens: 3    Name of specimens:   1.  Right breast tissue - Permanent  2.  Left breast tissue - Permanent  3.  Right breast implant - gross identification only   References:    Click here for ordering Quick Tip   Question Answer Comment   Procedure Type: Breast Plastics   Specimen Class: Routine/Screening    Release to patient Immediate        04/11/22 0928                  Discharge Note    OUTCOME: Patient tolerated treatment/procedure well without complication and is now ready for discharge.    DISPOSITION: Home or Self Care    FINAL DIAGNOSIS:  <principal problem not specified>    FOLLOWUP: In clinic    DISCHARGE INSTRUCTIONS:    Discharge Procedure Orders   No driving until:   Order Comments: While taking narcotic pain medications.     Notify your health care provider if you experience any of the  following:  temperature >100.4     Notify your health care provider if you experience any of the following:  persistent nausea and vomiting or diarrhea     Notify your health care provider if you experience any of the following:  severe uncontrolled pain     Notify your health care provider if you experience any of the following:  redness, tenderness, or signs of infection (pain, swelling, redness, odor or green/yellow discharge around incision site)

## 2022-04-11 NOTE — PROGRESS NOTES
Discharge instructions given and explained to pt and daughter on how to empty SANDOVAL drains, how to strip sandoval drains, record output, and other discharge instructions.  Both verbalized understanding

## 2022-04-12 DIAGNOSIS — M48.061 DEGENERATIVE LUMBAR SPINAL STENOSIS: ICD-10-CM

## 2022-04-12 NOTE — TELEPHONE ENCOUNTER
No new care gaps identified.  Powered by [employer]. Reference number: [id number].   4/12/2022 9:26:08 AM CDT

## 2022-04-16 RX ORDER — TRAMADOL HYDROCHLORIDE 50 MG/1
TABLET ORAL
Qty: 120 TABLET | Refills: 0 | Status: SHIPPED | OUTPATIENT
Start: 2022-04-18 | End: 2022-05-12 | Stop reason: SDUPTHER

## 2022-04-19 ENCOUNTER — OFFICE VISIT (OUTPATIENT)
Dept: PLASTIC SURGERY | Facility: CLINIC | Age: 72
End: 2022-04-19
Payer: MEDICARE

## 2022-04-19 ENCOUNTER — TELEPHONE (OUTPATIENT)
Dept: HEMATOLOGY/ONCOLOGY | Facility: CLINIC | Age: 72
End: 2022-04-19
Payer: MEDICARE

## 2022-04-19 VITALS
WEIGHT: 256 LBS | HEART RATE: 72 BPM | BODY MASS INDEX: 38.8 KG/M2 | DIASTOLIC BLOOD PRESSURE: 56 MMHG | HEIGHT: 68 IN | SYSTOLIC BLOOD PRESSURE: 116 MMHG

## 2022-04-19 DIAGNOSIS — Z09 SURGERY FOLLOW-UP EXAMINATION: Primary | ICD-10-CM

## 2022-04-19 LAB
FINAL PATHOLOGIC DIAGNOSIS: NORMAL
Lab: NORMAL

## 2022-04-19 PROCEDURE — 3044F HG A1C LEVEL LT 7.0%: CPT | Mod: CPTII,S$GLB,, | Performed by: SURGERY

## 2022-04-19 PROCEDURE — 1159F MED LIST DOCD IN RCRD: CPT | Mod: CPTII,S$GLB,, | Performed by: SURGERY

## 2022-04-19 PROCEDURE — 3074F PR MOST RECENT SYSTOLIC BLOOD PRESSURE < 130 MM HG: ICD-10-PCS | Mod: CPTII,S$GLB,, | Performed by: SURGERY

## 2022-04-19 PROCEDURE — 3078F DIAST BP <80 MM HG: CPT | Mod: CPTII,S$GLB,, | Performed by: SURGERY

## 2022-04-19 PROCEDURE — 3008F BODY MASS INDEX DOCD: CPT | Mod: CPTII,S$GLB,, | Performed by: SURGERY

## 2022-04-19 PROCEDURE — 1125F PR PAIN SEVERITY QUANTIFIED, PAIN PRESENT: ICD-10-PCS | Mod: CPTII,S$GLB,, | Performed by: SURGERY

## 2022-04-19 PROCEDURE — 3288F FALL RISK ASSESSMENT DOCD: CPT | Mod: CPTII,S$GLB,, | Performed by: SURGERY

## 2022-04-19 PROCEDURE — 3288F PR FALLS RISK ASSESSMENT DOCUMENTED: ICD-10-PCS | Mod: CPTII,S$GLB,, | Performed by: SURGERY

## 2022-04-19 PROCEDURE — 3008F PR BODY MASS INDEX (BMI) DOCUMENTED: ICD-10-PCS | Mod: CPTII,S$GLB,, | Performed by: SURGERY

## 2022-04-19 PROCEDURE — 1125F AMNT PAIN NOTED PAIN PRSNT: CPT | Mod: CPTII,S$GLB,, | Performed by: SURGERY

## 2022-04-19 PROCEDURE — 99999 PR PBB SHADOW E&M-EST. PATIENT-LVL IV: ICD-10-PCS | Mod: PBBFAC,,, | Performed by: SURGERY

## 2022-04-19 PROCEDURE — 3044F PR MOST RECENT HEMOGLOBIN A1C LEVEL <7.0%: ICD-10-PCS | Mod: CPTII,S$GLB,, | Performed by: SURGERY

## 2022-04-19 PROCEDURE — 4010F PR ACE/ARB THEARPY RXD/TAKEN: ICD-10-PCS | Mod: CPTII,S$GLB,, | Performed by: SURGERY

## 2022-04-19 PROCEDURE — 3066F NEPHROPATHY DOC TX: CPT | Mod: CPTII,S$GLB,, | Performed by: SURGERY

## 2022-04-19 PROCEDURE — 1101F PT FALLS ASSESS-DOCD LE1/YR: CPT | Mod: CPTII,S$GLB,, | Performed by: SURGERY

## 2022-04-19 PROCEDURE — 1160F RVW MEDS BY RX/DR IN RCRD: CPT | Mod: CPTII,S$GLB,, | Performed by: SURGERY

## 2022-04-19 PROCEDURE — 3061F PR NEG MICROALBUMINURIA RESULT DOCUMENTED/REVIEW: ICD-10-PCS | Mod: CPTII,S$GLB,, | Performed by: SURGERY

## 2022-04-19 PROCEDURE — 1101F PR PT FALLS ASSESS DOC 0-1 FALLS W/OUT INJ PAST YR: ICD-10-PCS | Mod: CPTII,S$GLB,, | Performed by: SURGERY

## 2022-04-19 PROCEDURE — 99024 POSTOP FOLLOW-UP VISIT: CPT | Mod: S$GLB,,, | Performed by: SURGERY

## 2022-04-19 PROCEDURE — 3078F PR MOST RECENT DIASTOLIC BLOOD PRESSURE < 80 MM HG: ICD-10-PCS | Mod: CPTII,S$GLB,, | Performed by: SURGERY

## 2022-04-19 PROCEDURE — 3074F SYST BP LT 130 MM HG: CPT | Mod: CPTII,S$GLB,, | Performed by: SURGERY

## 2022-04-19 PROCEDURE — 1159F PR MEDICATION LIST DOCUMENTED IN MEDICAL RECORD: ICD-10-PCS | Mod: CPTII,S$GLB,, | Performed by: SURGERY

## 2022-04-19 PROCEDURE — 3066F PR DOCUMENTATION OF TREATMENT FOR NEPHROPATHY: ICD-10-PCS | Mod: CPTII,S$GLB,, | Performed by: SURGERY

## 2022-04-19 PROCEDURE — 3061F NEG MICROALBUMINURIA REV: CPT | Mod: CPTII,S$GLB,, | Performed by: SURGERY

## 2022-04-19 PROCEDURE — 1160F PR REVIEW ALL MEDS BY PRESCRIBER/CLIN PHARMACIST DOCUMENTED: ICD-10-PCS | Mod: CPTII,S$GLB,, | Performed by: SURGERY

## 2022-04-19 PROCEDURE — 4010F ACE/ARB THERAPY RXD/TAKEN: CPT | Mod: CPTII,S$GLB,, | Performed by: SURGERY

## 2022-04-19 PROCEDURE — 99024 PR POST-OP FOLLOW-UP VISIT: ICD-10-PCS | Mod: S$GLB,,, | Performed by: SURGERY

## 2022-04-19 PROCEDURE — 99999 PR PBB SHADOW E&M-EST. PATIENT-LVL IV: CPT | Mod: PBBFAC,,, | Performed by: SURGERY

## 2022-04-19 NOTE — PROGRESS NOTES
Patient presents Plastic surgery Clinic after having a right breast reconstruction as well as a left mastopexy.  She has done well.  The right reconstructed breast is larger than the left mastopexy.  I discussed with her in detail that we would wait this that for approximately 3-4 months.  She would have 2 options 1 is to put a smaller implant on the right side and the other is to prevent implant on the left side.  Patient decided if the swelling does not subside enough which I do not think it well she may indeed need to have a smaller implant placed on that side.  We will see her back in 2 weeks.

## 2022-04-19 NOTE — TELEPHONE ENCOUNTER
----- Message from Polly Chavez RN sent at 4/19/2022  2:45 PM CDT -----  Last saw shruthi 5 years ago  ----- Message -----  From: Amy Horowitz RN  Sent: 4/19/2022   1:47 PM CDT  To: [facility] Staff    Dr. Evans would like the above pt to see Dr. Kent for breast recurrence of DCIS. She has seen Dr. Kent in the past. Can you please contact pt to schedule an appt? Thank you.    Amy

## 2022-04-25 ENCOUNTER — TELEPHONE (OUTPATIENT)
Dept: PAIN MEDICINE | Facility: CLINIC | Age: 72
End: 2022-04-25
Payer: MEDICARE

## 2022-04-25 NOTE — TELEPHONE ENCOUNTER
This message is for patient in regards to his/her appointment 04/25/22 at 11:20am      [facility] Policy: For the safety of all patients and staff members.     Patient Visitor policy: During this visit we're asking all patients to only have one visitor over the age of 18yrs old to accompany to be seen by CARMEN Diaz. If patient do not required assistance with their visit, we're asking all visitors to remain outside the waiting area.    Upon arriving to your schedule appointment; patients are required to wear a face mask, if patient do not have a face mask one will be provided entering the facility. If you have any questions or concerns please contact [phone]

## 2022-04-26 ENCOUNTER — OFFICE VISIT (OUTPATIENT)
Dept: PAIN MEDICINE | Facility: CLINIC | Age: 72
End: 2022-04-26
Payer: MEDICARE

## 2022-04-26 VITALS
RESPIRATION RATE: 18 BRPM | WEIGHT: 260 LBS | BODY MASS INDEX: 39.4 KG/M2 | SYSTOLIC BLOOD PRESSURE: 121 MMHG | HEIGHT: 68 IN | HEART RATE: 61 BPM | DIASTOLIC BLOOD PRESSURE: 64 MMHG | TEMPERATURE: 97 F

## 2022-04-26 DIAGNOSIS — M47.816 LUMBAR SPONDYLOSIS: Primary | ICD-10-CM

## 2022-04-26 DIAGNOSIS — M79.10 MUSCLE PAIN: ICD-10-CM

## 2022-04-26 DIAGNOSIS — M46.1 SACROILIITIS: ICD-10-CM

## 2022-04-26 DIAGNOSIS — M79.671 FOOT PAIN, BILATERAL: ICD-10-CM

## 2022-04-26 DIAGNOSIS — M79.672 FOOT PAIN, BILATERAL: ICD-10-CM

## 2022-04-26 PROCEDURE — 3078F DIAST BP <80 MM HG: CPT | Mod: CPTII,S$GLB,, | Performed by: NURSE PRACTITIONER

## 2022-04-26 PROCEDURE — 4010F ACE/ARB THERAPY RXD/TAKEN: CPT | Mod: CPTII,S$GLB,, | Performed by: NURSE PRACTITIONER

## 2022-04-26 PROCEDURE — 99999 PR PBB SHADOW E&M-EST. PATIENT-LVL V: ICD-10-PCS | Mod: PBBFAC,,, | Performed by: NURSE PRACTITIONER

## 2022-04-26 PROCEDURE — 1160F PR REVIEW ALL MEDS BY PRESCRIBER/CLIN PHARMACIST DOCUMENTED: ICD-10-PCS | Mod: CPTII,S$GLB,, | Performed by: NURSE PRACTITIONER

## 2022-04-26 PROCEDURE — 3061F PR NEG MICROALBUMINURIA RESULT DOCUMENTED/REVIEW: ICD-10-PCS | Mod: CPTII,S$GLB,, | Performed by: NURSE PRACTITIONER

## 2022-04-26 PROCEDURE — 99999 PR PBB SHADOW E&M-EST. PATIENT-LVL V: CPT | Mod: PBBFAC,,, | Performed by: NURSE PRACTITIONER

## 2022-04-26 PROCEDURE — 3044F PR MOST RECENT HEMOGLOBIN A1C LEVEL <7.0%: ICD-10-PCS | Mod: CPTII,S$GLB,, | Performed by: NURSE PRACTITIONER

## 2022-04-26 PROCEDURE — 1160F RVW MEDS BY RX/DR IN RCRD: CPT | Mod: CPTII,S$GLB,, | Performed by: NURSE PRACTITIONER

## 2022-04-26 PROCEDURE — 1101F PR PT FALLS ASSESS DOC 0-1 FALLS W/OUT INJ PAST YR: ICD-10-PCS | Mod: CPTII,S$GLB,, | Performed by: NURSE PRACTITIONER

## 2022-04-26 PROCEDURE — 3074F SYST BP LT 130 MM HG: CPT | Mod: CPTII,S$GLB,, | Performed by: NURSE PRACTITIONER

## 2022-04-26 PROCEDURE — 3288F FALL RISK ASSESSMENT DOCD: CPT | Mod: CPTII,S$GLB,, | Performed by: NURSE PRACTITIONER

## 2022-04-26 PROCEDURE — 3066F PR DOCUMENTATION OF TREATMENT FOR NEPHROPATHY: ICD-10-PCS | Mod: CPTII,S$GLB,, | Performed by: NURSE PRACTITIONER

## 2022-04-26 PROCEDURE — 99499 RISK ADDL DX/OHS AUDIT: ICD-10-PCS | Mod: S$GLB,,, | Performed by: NURSE PRACTITIONER

## 2022-04-26 PROCEDURE — 3008F PR BODY MASS INDEX (BMI) DOCUMENTED: ICD-10-PCS | Mod: CPTII,S$GLB,, | Performed by: NURSE PRACTITIONER

## 2022-04-26 PROCEDURE — 3061F NEG MICROALBUMINURIA REV: CPT | Mod: CPTII,S$GLB,, | Performed by: NURSE PRACTITIONER

## 2022-04-26 PROCEDURE — 1125F PR PAIN SEVERITY QUANTIFIED, PAIN PRESENT: ICD-10-PCS | Mod: CPTII,S$GLB,, | Performed by: NURSE PRACTITIONER

## 2022-04-26 PROCEDURE — 3044F HG A1C LEVEL LT 7.0%: CPT | Mod: CPTII,S$GLB,, | Performed by: NURSE PRACTITIONER

## 2022-04-26 PROCEDURE — 4010F PR ACE/ARB THEARPY RXD/TAKEN: ICD-10-PCS | Mod: CPTII,S$GLB,, | Performed by: NURSE PRACTITIONER

## 2022-04-26 PROCEDURE — 99214 PR OFFICE/OUTPT VISIT, EST, LEVL IV, 30-39 MIN: ICD-10-PCS | Mod: S$GLB,,, | Performed by: NURSE PRACTITIONER

## 2022-04-26 PROCEDURE — 99499 UNLISTED E&M SERVICE: CPT | Mod: S$GLB,,, | Performed by: NURSE PRACTITIONER

## 2022-04-26 PROCEDURE — 1159F PR MEDICATION LIST DOCUMENTED IN MEDICAL RECORD: ICD-10-PCS | Mod: CPTII,S$GLB,, | Performed by: NURSE PRACTITIONER

## 2022-04-26 PROCEDURE — 3066F NEPHROPATHY DOC TX: CPT | Mod: CPTII,S$GLB,, | Performed by: NURSE PRACTITIONER

## 2022-04-26 PROCEDURE — 3008F BODY MASS INDEX DOCD: CPT | Mod: CPTII,S$GLB,, | Performed by: NURSE PRACTITIONER

## 2022-04-26 PROCEDURE — 3078F PR MOST RECENT DIASTOLIC BLOOD PRESSURE < 80 MM HG: ICD-10-PCS | Mod: CPTII,S$GLB,, | Performed by: NURSE PRACTITIONER

## 2022-04-26 PROCEDURE — 1159F MED LIST DOCD IN RCRD: CPT | Mod: CPTII,S$GLB,, | Performed by: NURSE PRACTITIONER

## 2022-04-26 PROCEDURE — 3288F PR FALLS RISK ASSESSMENT DOCUMENTED: ICD-10-PCS | Mod: CPTII,S$GLB,, | Performed by: NURSE PRACTITIONER

## 2022-04-26 PROCEDURE — 1125F AMNT PAIN NOTED PAIN PRSNT: CPT | Mod: CPTII,S$GLB,, | Performed by: NURSE PRACTITIONER

## 2022-04-26 PROCEDURE — 1101F PT FALLS ASSESS-DOCD LE1/YR: CPT | Mod: CPTII,S$GLB,, | Performed by: NURSE PRACTITIONER

## 2022-04-26 PROCEDURE — 99214 OFFICE O/P EST MOD 30 MIN: CPT | Mod: S$GLB,,, | Performed by: NURSE PRACTITIONER

## 2022-04-26 PROCEDURE — 3074F PR MOST RECENT SYSTOLIC BLOOD PRESSURE < 130 MM HG: ICD-10-PCS | Mod: CPTII,S$GLB,, | Performed by: NURSE PRACTITIONER

## 2022-04-26 NOTE — PROGRESS NOTES
Chronic patient Established Note (Follow up visit)      SUBJECTIVE:    Interval History 4/26/2022:  Linnea is here for follow up of lower back pain. She is s/p right breast reconstruction as well as a left mastopexy on 4/11/22 by Dr. Eng. She reports doing well from this. She report 90% relief from right L2,3,4, RFA in Nov 2020. She also underwent bilateral SIJ injection in July 2020 with significant benefit of buttock pain. Given her recent surgery, she is unable lay on her chest at this time for a procedure. Her right sided low back pain is her primary complaint. She has not had PT in some time but did find it helpful. She is interested in repeating this. She also has bilateral foot pain which is stiff and sharp. She does not have burning or numbness to her feet at this time but has in the past. She is asking if the foot pain could be from her back. Her pain today is 4/10.    Interval History 1/5/2021:  The patient is here for follow-up of chronic back pain.  She had a visit after her radiofrequency ablation in November for increased pain with Dr. Wilson.  She had trigger point injections at that visit which she said did provide benefit of muscular pain until about one week ago. She also had steroid injections the following day by podiatry. She started to have increased pain and numbness to her feet after this time which she states would radiate upward. She was also having increased anxiety which prompted an ED visit on 12/3/20. She had a follow up with her PCP on 12/17/20 from the ED visit. Her PCP felt that she was having acute anxiety due to extreme stress. She says these symptoms have improved. She also now feels that lumbar RFA provided 80% relief of aching back pain. She is having significant muscular back pain today and is asking to for TPIs. Additionally, her symptoms started around the time that she started Robaxin so she stopped this. Her pain today is 8/10.    Interval History 11/23/2020:  Linnea PATEL  Oc presents to the clinic for a follow-up appointment fromRight L2, L3, L4, L5 RFA . Since the last visit, Linnea Renae states the pain has been worsening. Current pain intensity is 10/10.    Mr. Renae presents s/p R L2-L5 RFA. She has worsened pain since the procedure. She noticed the pain while on the table and it has been very intense since. Pain localized to right lower back with radiation around right back to right buttocks, but not down the leg. No urgent symptoms. She has tried ice packs, tramadol, zanaflex without benefit. Ibuprofen 800 mg is the only thing that takes the edge off. She cannot tell if the injection took away her original pain due to intensity of this pain.    Interval History 10/1/2020  71 yo F presents to the pain clinic w/ CC of LBP radiating into the back of the thigh. Patient states that the pain is similar in intensity to the pain she experienced beforehand which responded well to Lumbar RFA. She states that she is not having pain on the left side of her back though.      Interval History 8/9/2020  Linnea Renae presents to the clinic for a follow-up appointment after injection bilateral SIJ and GTB injections performed on 7/8/20. She tolerated the procedure very well. Since the last visit, Linnea Renae states the pain has been improving. She reports 85% of her pain remains gone. The remaining pain has not been worsening. She is now able to do more around the house and yard than ever in the past 15 years. She is very happy with the results of this intervention. She has no questions about herself. She inquires about whether a friend could come see us in clinic.      She continues using the same pain medications as at her last visit. There have been no changes in her medication regimen or medical history.         Interval HPI 5/19/20:    Ms. Renae was contacted at home for follow-up of her chronic low back pain. She is s/p staged RFA in December 2019 and January 2020 with  significant relief. She states that she was doing well until 4 weeks ago where she started home exercises which she believes has exacerbated her symptoms. She currently endorses 10/10 dull, aching low back and buttock pain similar in character and severity has prior to her RFA procedures. She continues to take Ibuprofen, Zanaflex, Tramadol, and uses Voltaren gel with some relief.      Interval HPI 4/20/20  Linnea Renae presents to tele-medicine appointment for the evaluation of low back pain. The pain started is still improved and symptoms have been improving with current medication regimen      On average pain is rated as 4/10.   Today the pain is rated as 2/10     Interval HPI 3/30/20  Contacted Ms. Renae today for follow-up of her low back pain. She is s/p staged (left then right) L2-3-4-5 RFA where she states she attained 90% relief of her low back pain. She states she continues to experience intermittent, dull, aching bilateral lower lumbar pain with prolonged flexing/extending, prolonged standing, and with strenuous activity, however, it has decreased significantly in severity since prior. She does continue to take Tramadol PRN and Zanaflex PRN with added benefit. She denies any other acute changes at this time.      Interval HPI 7/9/19  Linnea Renae presents to the clinic for follow up of lower back pain.  She reports doing well since last visit.  Her pain has been tolerable.  She had benefit with previous RFAs.  She has been doing PT but feels like it worsens her pain.  She would like to stop and go to the gym on her own.  She is a member at [employer] in [place].  She is not having any leg pain or numbness today.  Her pain today is 2/10.     Interval HPI 5/9/19     Linnea Renae presents to the clinic for follow up of lower back pain.  She reports significant improvement since her previous encounter.  Her left sided back pain has resolved.  She is a slight ache to right lower back and buttocks which  is tolerable.  She is interested in PT for strengthening.  She has done aquatic PT in the past but would like land therapy.  She is a member at [employer] in [place].  She denies any radicular symptoms at this time.  Her pain today is 1/10.    Pain Disability Index Review:  Last 3 PDI Scores 4/26/2022 1/5/2021 11/23/2020   Pain Disability Index (PDI) 50 47 50       Pain Medications:  - Opioids: Ultram (Tramadol HCL)  - Adjuvant Medications: Advil, Motrin (Ibuprofen) and zanaflex  - Anti-Coagulants: Aspirin  - Others: see med list      Opioid Contract: not applicable     report:  Reviewed and consistent with medication use as prescribed.    Pain Procedures:   7/29/16 Right L3-4 TF CHRISTIAN  11/3/17 Bilateral L3-4 and L4-5 facet injections  5/9/18 Bilateral L3-4 and L4-5 facet injections  7/25/18 Bilateral L3-4 and L4-5 facet injections  12/5/18 Right L2,3,4,5 RFA- 80% relief  3/20/19 Left L2,3,4,5 RFA- 100% relief  10/2/19 left L4/5 TFESI  12/18/19 LEFT L2,3,4,5 RFA- 90% relief  1/8/20 RIGHT L2,3,4,5 RFA- 90% relief  7/8/20 B/L SIJ, GTB - 85% relief  11/04/20 Right L2, L3, L4, L5 RFA - 80% relief  11/23/20 TPIs- helpful    Physical Therapy/Home Exercise: yes    Imaging:   Narrative       EXAMINATION:  MRI LUMBAR SPINE W WO CONTRAST    CLINICAL HISTORY:  Low back pain, >6wks conservative tx, persistent-progressive sx, surgical candidate; Spondylosis without myelopathy or radiculopathy, site unspecified    TECHNIQUE:  Multiplanar, multisequence MR images were acquired from the thoracolumbar junction to the sacrum prior to and following administration of 10 cc IV Gadavist.    COMPARISON:  Lumbar spine radiograph 10/11/2017; MRI lumbar spine with/without contrast 09/20/2016    FINDINGS:  Sagittal alignment demonstrates grade 1 anterolisthesis of L3 on L4 with slight uncovering of the intervertebral disc.  Vertebral body heights are satisfactorily maintained.  Intervertebral disc spaces are preserved.  Marrow signal is  within normal limits with no evidence of fracture or marrow replacement process noting a small vertebral body hemangioma at L1.    Conus appears within normal limits terminating at the level of the L1 level.  Cauda equina appears normal.    Paraspinous soft tissues demonstrate mild prominence of the common bile duct and small left renal cysts..    T12-L1:   No spinal canal or neuroforaminal narrowing.    L1-2:  No spinal canal or neuroforaminal narrowing.    L2-3:  Mild posterior disc bulge and moderate bilateral facet arthropathy without spinal canal stenosis or neural foraminal narrowing.    L3-4:  The diffuse posterior disc bulge, buckling of the ligamentum flavum, and moderate bilateral facet arthropathy results in mild spinal canal stenosis and no neural foraminal narrowing.    L4-5:  Diffuse posterior disc bulge with superimposed central disc protrusion, buckling of the ligamentum flavum, and moderate bilateral facet arthropathy result in moderate spinal canal stenosis and no significant neural foraminal narrowing.    L5-S1:  Mild posterior disc bulge and mild facet arthropathy results in mild right/moderate left neural foraminal narrowing.    No abnormal enhancement.       Impression         Multilevel lumbar spondylosis worst at L4-5 resulting in moderate spinal canal stenosis.    Mild prominence of the common bile duct which is nonspecific and may represent sequela of cholecystectomy.            X-Ray Lumbar Spine Ap Lateral w/Flex Ext     Narrative       10/11/17 10:17:19    Accession: [id number]    CLINICAL INDICATION: 67 year old F with  low back pain    COMPARISON: Lumbar spine x-rays, 09/20/2016.    TECHNIQUE: AP, lateral, flexion, extension, and coned down lateral radiographs of the lumbar spine.    FINDINGS:     Vertebral body heights are maintained.  No evidence of fracture.      Normal sagittal alignment is preserved.No significant translation with flexion-extension.    Moderate multilevel  degenerative changes are again present. Mild anterolisthesis of L3 with respect to L4 is again noted. Multilevel facet arthropathy is present, most pronounced in the lower lumbar spine.  No detrimental change is identified when compared with the examination performed one year prior.       Impression           Moderate multilevel degenerative changes in the lumbar spine, similar in appearance to the prior exam.    No evidence of fracture or malalignment.         Allergies:   Review of patient's allergies indicates:   Allergen Reactions    Codeine Itching       Current Medications:   Current Outpatient Medications   Medication Sig Dispense Refill    amitriptyline (ELAVIL) 10 MG tablet Take 1 tablet (10 mg total) by mouth nightly as needed for Insomnia. 30 tablet 2    ammonium lactate 12 % Crea Apply twice daily to affected parts both feet as needed. 140 g 11    aspirin 81 MG Chew Take 81 mg by mouth once daily.      atenoloL (TENORMIN) 50 MG tablet TAKE 1 TABLET BY MOUTH TWICE DAILY 180 tablet 1    atorvastatin (LIPITOR) 20 MG tablet Take 1 tablet (20 mg total) by mouth every evening. 90 tablet 1    blood sugar diagnostic (TRUE METRIX GLUCOSE TEST STRIP) Strp 1 strip by Misc.(Non-Drug; Combo Route) route once daily. 100 each 3    calcium-vitamin D3 500 mg(1,250mg) -200 unit per tablet Take 1 tablet by mouth 2 (two) times daily with meals.       clobetasoL (TEMOVATE) 0.05 % cream Apply topically 2 (two) times daily. Top of both feet 45 g 2    cycloSPORINE (RESTASIS) 0.05 % ophthalmic emulsion Instill 1 drop into both eyes twice a day 180 each 3    fluticasone propionate (FLONASE) 50 mcg/actuation nasal spray 2 sprays (100 mcg total) by Each Nostril route once daily. 48 g 1    hydroCHLOROthiazide (HYDRODIURIL) 12.5 MG Tab Take 1 tablet (12.5 mg total) by mouth once daily. 90 tablet 1    ibuprofen (ADVIL,MOTRIN) 800 MG tablet Take 1 tablet by mouth three times daily as needed 60 tablet 0    ketoconazole  (NIZORAL) 2 % cream Apply topically 2 (two) times daily. Prn irritation body folds 60 g 2    ketoconazole (NIZORAL) 2 % shampoo Apply topically to scalp in place of regular shampoo 2 to 3 times a week 120 mL 9    lancets 33 gauge Misc 1 lancet by Misc.(Non-Drug; Combo Route) route once daily. 100 each 3    LIDOcaine HCL 2% (XYLOCAINE) 2 % jelly Apply topically as needed. Apply topically once nightly to affected part of foot/feet. 30 mL 2    linaCLOtide (LINZESS) 145 mcg Cap capsule Take 1 capsule (145 mcg total) by mouth once daily. 90 capsule 1    loratadine 10 mg Cap       metFORMIN (GLUCOPHAGE) 500 MG tablet Take 2 tablets (1,000 mg total) by mouth 2 (two) times daily with meals. 360 tablet 1    minoxidiL (LONITEN) 2.5 MG tablet Take 1/4  to 1/2 tablet by mouth nightly as tolerated (Patient taking differently: Take 2.5 mg by mouth once daily.) 45 tablet 3    nystatin (MYCOSTATIN) powder Apply topically 4 (four) times daily Under pannus as needed 180 g 2    olmesartan (BENICAR) 20 MG tablet Take 1 tablet (20 mg total) by mouth once daily. 90 tablet 1    omeprazole (PRILOSEC) 20 MG capsule Take 1 capsule by mouth once daily 90 capsule 1    sertraline (ZOLOFT) 50 MG tablet Take 1 tablet (50 mg total) by mouth once daily. 90 tablet 0    tiZANidine (ZANAFLEX) 4 MG tablet Take 1 tablet (4 mg total) by mouth every 8 (eight) hours as needed (Muscle spasm.). 90 tablet 2    topiramate (TOPAMAX) 50 MG tablet Take 1 tablet (50 mg total) by mouth 2 (two) times daily. 180 tablet 0    traMADoL (ULTRAM) 50 mg tablet Take 1 tablet by mouth every 6 hours as needed for pain 120 tablet 0    alcohol antiseptic pads (ALCOHOL PREP PADS TOP)       ALPRAZolam (XANAX) 0.5 MG tablet Take 1 tablet (0.5 mg total) by mouth 3 (three) times daily. (Patient not taking: No sig reported) 30 tablet 0    diclofenac sodium (VOLTAREN) 1 % Gel Apply 2 g topically 4 (four) times daily. (Patient not taking: Reported on 4/26/2022) 100 g 2     multivitamin (THERAGRAN) per tablet Take 1 tablet by mouth once daily.      naloxone (NARCAN) 4 mg/actuation Spry 4mg by nasal route as needed for opioid overdose; may repeat every 2-3 minutes in alternating nostrils until medical help arrives. Call 911 (Patient not taking: Reported on 2022) 2 each 11     No current facility-administered medications for this visit.       REVIEW OF SYSTEMS:    GENERAL:  No weight loss, malaise or fevers.  HEENT:  Negative for frequent or significant headaches.  NECK:  Negative for lumps, goiter, pain and significant neck swelling.  RESPIRATORY:  Negative for cough, wheezing or shortness of breath.  CARDIOVASCULAR:  Negative for chest pain, leg swelling or palpitations.  GI:  Negative for abdominal discomfort, blood in stools or black stools or change in bowel habits.  MUSCULOSKELETAL:  See HPI.  SKIN:  Negative for lesions, rash, and itching.  PSYCH:  +ve for sleep disturbance, mood disorder and recent psychosocial stressors.  HEMATOLOGY/LYMPHOLOGY:  Negative for prolonged bleeding, bruising easily or swollen nodes.  NEURO:   No history of headaches, syncope, paralysis, seizures or tremors.  All other reviewed and negative other than HPI.    Past Medical History:  Past Medical History:   Diagnosis Date    Allergy     Breast cancer     Lumpectomy 10/15.    Chronic constipation     Colon polyps     Diabetes mellitus, type 2     Dry eyes     GERD (gastroesophageal reflux disease)     Hyperlipidemia     Hypertension     Lumbar disc disease     Type 2 diabetes mellitus        Past Surgical History:  Past Surgical History:   Procedure Laterality Date    BREAST BIOPSY      BREAST LUMPECTOMY Right         BREAST RECONSTRUCTION Right 2021    Procedure: RECONSTRUCTION, BREAST;  Surgeon: Nolberto Eng MD;  Location: [facility] OR [address] [place] [address];  Service: Plastics;  Laterality: Right;    CATARACT EXTRACTION, BILATERAL       SECTION      x3     CHOLECYSTECTOMY  2001    COLONOSCOPY N/A 10/11/2018    Procedure: COLONOSCOPY;  Surgeon: Lorenzo Tanner MD;  Location: [facility] ENDO (4TH FLR);  Service: Endoscopy;  Laterality: N/A;    COLONOSCOPY W/ POLYPECTOMY      HYSTERECTOMY  1989    and USO    INJECTION OF FACET JOINT Bilateral 7/25/2018    Procedure: INJECTION, FACET JOINT;  Surgeon: Viet Wilson MD;  Location: [facility], [place], operated by [facility] PAIN MGT;  Service: Pain Management;  Laterality: Bilateral;  LUMBAR BILATERAL L3-L4 AND L4-L5 FACET STEROID INJECTION    NEED CONSENT    INJECTION OF JOINT Bilateral 7/8/2020    Procedure: INJECTION, JOINT, Bilateral SI;  Surgeon: Viet Wilson MD;  Location: [facility], [place], operated [facility] PAIN MGT;  Service: Pain Management;  Laterality: Bilateral;    MASTECTOMY Right 11/29/2021    Procedure: MASTECTOMY, total, right breast with right sentinel lymph node biospy;  Surgeon: Heber Evans MD;  Location: [facility] OR 2ND FLR;  Service: General;  Laterality: Right;    MASTOPEXY Left 4/11/2022    Procedure: MASTOPEXY - left breast;  Surgeon: Nolberto Eng MD;  Location: [facility] OR 2ND FLR;  Service: Plastics;  Laterality: Left;    RADIOFREQUENCY ABLATION Left 3/20/2019    Procedure: RADIOFREQUENCY ABLATION LEFT L2,3,4,5;  Surgeon: Viet Wilson MD;  Location: [facility], [place], operated [facility] PAIN MGT;  Service: Pain Management;  Laterality: Left;  LEFT RFA L2,3,4,5    NEEDS CONSENT    RADIOFREQUENCY ABLATION Left 12/18/2019    Procedure: RADIOFREQUENCY ABLATION, LEFT L2-L3-L4-L5  1 OF 2;  Surgeon: Viet Wilson MD;  Location: [facility] [place], operated [facility] PAIN MGT;  Service: Pain Management;  Laterality: Left;    RADIOFREQUENCY ABLATION Right 1/8/2020    Procedure: RADIOFREQUENCY ABLATION, RIGHT L2-L3-L4-L5 MEDIAL BRANCH 2 OF;  Surgeon: Viet Wilson MD;  Location: [facility] [place], operated by [facility] PAIN MGT;  Service: Pain Management;  Laterality: Right;    RADIOFREQUENCY ABLATION Right 11/4/2020    Procedure: RADIOFREQUENCY ABLATION Right L2, L3, L4, L5;  Surgeon: Viet Wilson MD;  Location: [facility] [place], operated [facility] PAIN MGT;   "Service: Pain Management;  Laterality: Right;  Right L2, L3, L4, L5 RFA    REPLACEMENT OF IMPLANT OF BREAST Right 2022    Procedure: REPLACEMENT, IMPLANT, BREAST - right ;  Surgeon: Nolberto Eng MD;  Location: [facility] OR [address] [place] [address];  Service: Plastics;  Laterality: Right;    TOTAL REDUCTION MAMMOPLASTY Left 2021    Procedure: MAMMOPLASTY, REDUCTION;  Surgeon: Nolberto Eng MD;  Location: [facility] OR [address] [place] [address];  Service: Plastics;  Laterality: Left;    TRANSFORAMINAL EPIDURAL INJECTION OF STEROID Left 10/2/2019    Procedure: INJECTION, STEROID, EPIDURAL, TRANSFORAMINAL APPROACH, L4 AND L5;  Surgeon: Viet Wilson MD;  Location: [facility];  Service: Pain Management;  Laterality: Left;    TUBAL LIGATION         Family History:  Family History   Problem Relation Age of Onset    No Known Problems Mother     No Known Problems Father     Hypertension Sister     Hypertension Brother     Glaucoma Brother     Heart disease Paternal Grandmother     Thyroid disease Paternal Grandfather     No Known Problems Daughter     No Known Problems Daughter     No Known Problems Daughter     Anesthesia problems Neg Hx        Social History:  Social History     Socioeconomic History    Marital status:     Number of children: 3   Tobacco Use    Smoking status: Former Smoker     Packs/day: 0.25     Years: 20.00     Pack years: 5.00     Quit date: 1985     Years since quittin.3    Smokeless tobacco: Never Used    Tobacco comment: Quit mid .   Substance and Sexual Activity    Alcohol use: No     Alcohol/week: 0.0 standard drinks    Drug use: No    Sexual activity: Yes       OBJECTIVE:    /64 (BP Location: Left arm, Patient Position: Sitting, BP Method: Large (Automatic))   Pulse 61   Temp 97.2 °F (36.2 °C) (Temporal)   Resp 18   Ht 5' 8" (1.727 m)   Wt 117.9 kg (260 lb)   BMI 39.53 kg/m²     PHYSICAL EXAMINATION:    General appearance: Well appearing, in no " acute distress, alert and oriented x3.  Psych:  Mood and affect appropriate.  Skin: Skin color, texture, turgor normal, no rashes or lesions visible, in both upper and lower body.  Head/face:  Atraumatic, normocephalic. No palpable lymph nodes  Pulm: non-labored  Back: Straight leg raising in the sitting and supine positions is negative to radicular pain. TTP over bilateral lumbar paraspinals and facet joints. There is pain with lumbar extension and flexion. Positive facet loading bilaterally.  Extremities: Peripheral joint ROM is full and pain free without obvious instability or laxity in all four extremities. No deformities, edema, or skin discoloration. Good capillary refill.  Musculoskeletal: Bilateral upper and lower extremity strength is normal and symmetric.  No atrophy or tone abnormalities are noted.  Neuro: Decreased sensation to BLE.  Gait: Antalgic.    ASSESSMENT: 71 y.o. year old female with low back pain, consistent with      1. Lumbar spondylosis  Ambulatory referral/consult to Physical/Occupational Therapy   2. Muscle pain  Ambulatory referral/consult to Physical/Occupational Therapy   3. Sacroiliitis  Ambulatory referral/consult to Physical/Occupational Therapy         PLAN:     - I have stressed the importance of physical activity and a home exercise plan to help with pain and improve health.    - Previous imaging was reviewed and discussed with the patient today.    - I will order BLE EMG/CNS to further evaluate foot pain.    - Patient can continue with medications for now since they are providing benefits, using them appropriately, and without side effects.    - I will start the patient in PT for back pain. Consider repeat RFAs after she is healed from breast surgery.    - RTC in 3 months or sooner if needed.    - Counseled patient regarding the importance of activity modification, constant sleeping habits and physical therapy.    The above plan and management options were discussed at length  with patient. Patient is in agreement with the above and verbalized understanding.      CARMEN Granados  04/26/2022      I spent a total of 30 minutes on the day of the visit.  This includes face to face time and non-face to face time preparing to see the patient by reviewing previous labs/imaging, obtaining and/or reviewing separately obtained history, documenting clinical information in the electronic or other health record, independently interpreting results and communicating results to the patient/family/caregiver.

## 2022-04-27 ENCOUNTER — OFFICE VISIT (OUTPATIENT)
Dept: PLASTIC SURGERY | Facility: CLINIC | Age: 72
End: 2022-04-27
Payer: MEDICARE

## 2022-04-27 VITALS
HEIGHT: 68 IN | BODY MASS INDEX: 39.4 KG/M2 | HEART RATE: 67 BPM | SYSTOLIC BLOOD PRESSURE: 107 MMHG | DIASTOLIC BLOOD PRESSURE: 53 MMHG | WEIGHT: 260 LBS

## 2022-04-27 DIAGNOSIS — Z09 SURGERY FOLLOW-UP EXAMINATION: Primary | ICD-10-CM

## 2022-04-27 PROCEDURE — 3074F PR MOST RECENT SYSTOLIC BLOOD PRESSURE < 130 MM HG: ICD-10-PCS | Mod: CPTII,S$GLB,, | Performed by: SURGERY

## 2022-04-27 PROCEDURE — 3066F NEPHROPATHY DOC TX: CPT | Mod: CPTII,S$GLB,, | Performed by: SURGERY

## 2022-04-27 PROCEDURE — 3061F PR NEG MICROALBUMINURIA RESULT DOCUMENTED/REVIEW: ICD-10-PCS | Mod: CPTII,S$GLB,, | Performed by: SURGERY

## 2022-04-27 PROCEDURE — 1101F PT FALLS ASSESS-DOCD LE1/YR: CPT | Mod: CPTII,S$GLB,, | Performed by: SURGERY

## 2022-04-27 PROCEDURE — 3066F PR DOCUMENTATION OF TREATMENT FOR NEPHROPATHY: ICD-10-PCS | Mod: CPTII,S$GLB,, | Performed by: SURGERY

## 2022-04-27 PROCEDURE — 1159F PR MEDICATION LIST DOCUMENTED IN MEDICAL RECORD: ICD-10-PCS | Mod: CPTII,S$GLB,, | Performed by: SURGERY

## 2022-04-27 PROCEDURE — 99024 POSTOP FOLLOW-UP VISIT: CPT | Mod: S$GLB,,, | Performed by: SURGERY

## 2022-04-27 PROCEDURE — 1160F RVW MEDS BY RX/DR IN RCRD: CPT | Mod: CPTII,S$GLB,, | Performed by: SURGERY

## 2022-04-27 PROCEDURE — 3044F PR MOST RECENT HEMOGLOBIN A1C LEVEL <7.0%: ICD-10-PCS | Mod: CPTII,S$GLB,, | Performed by: SURGERY

## 2022-04-27 PROCEDURE — 4010F ACE/ARB THERAPY RXD/TAKEN: CPT | Mod: CPTII,S$GLB,, | Performed by: SURGERY

## 2022-04-27 PROCEDURE — 4010F PR ACE/ARB THEARPY RXD/TAKEN: ICD-10-PCS | Mod: CPTII,S$GLB,, | Performed by: SURGERY

## 2022-04-27 PROCEDURE — 1101F PR PT FALLS ASSESS DOC 0-1 FALLS W/OUT INJ PAST YR: ICD-10-PCS | Mod: CPTII,S$GLB,, | Performed by: SURGERY

## 2022-04-27 PROCEDURE — 99999 PR PBB SHADOW E&M-EST. PATIENT-LVL IV: ICD-10-PCS | Mod: PBBFAC,,, | Performed by: SURGERY

## 2022-04-27 PROCEDURE — 3078F PR MOST RECENT DIASTOLIC BLOOD PRESSURE < 80 MM HG: ICD-10-PCS | Mod: CPTII,S$GLB,, | Performed by: SURGERY

## 2022-04-27 PROCEDURE — 1126F AMNT PAIN NOTED NONE PRSNT: CPT | Mod: CPTII,S$GLB,, | Performed by: SURGERY

## 2022-04-27 PROCEDURE — 99024 PR POST-OP FOLLOW-UP VISIT: ICD-10-PCS | Mod: S$GLB,,, | Performed by: SURGERY

## 2022-04-27 PROCEDURE — 3288F FALL RISK ASSESSMENT DOCD: CPT | Mod: CPTII,S$GLB,, | Performed by: SURGERY

## 2022-04-27 PROCEDURE — 3288F PR FALLS RISK ASSESSMENT DOCUMENTED: ICD-10-PCS | Mod: CPTII,S$GLB,, | Performed by: SURGERY

## 2022-04-27 PROCEDURE — 1160F PR REVIEW ALL MEDS BY PRESCRIBER/CLIN PHARMACIST DOCUMENTED: ICD-10-PCS | Mod: CPTII,S$GLB,, | Performed by: SURGERY

## 2022-04-27 PROCEDURE — 1159F MED LIST DOCD IN RCRD: CPT | Mod: CPTII,S$GLB,, | Performed by: SURGERY

## 2022-04-27 PROCEDURE — 1126F PR PAIN SEVERITY QUANTIFIED, NO PAIN PRESENT: ICD-10-PCS | Mod: CPTII,S$GLB,, | Performed by: SURGERY

## 2022-04-27 PROCEDURE — 99999 PR PBB SHADOW E&M-EST. PATIENT-LVL IV: CPT | Mod: PBBFAC,,, | Performed by: SURGERY

## 2022-04-27 PROCEDURE — 3061F NEG MICROALBUMINURIA REV: CPT | Mod: CPTII,S$GLB,, | Performed by: SURGERY

## 2022-04-27 PROCEDURE — 3078F DIAST BP <80 MM HG: CPT | Mod: CPTII,S$GLB,, | Performed by: SURGERY

## 2022-04-27 PROCEDURE — 3008F PR BODY MASS INDEX (BMI) DOCUMENTED: ICD-10-PCS | Mod: CPTII,S$GLB,, | Performed by: SURGERY

## 2022-04-27 PROCEDURE — 3008F BODY MASS INDEX DOCD: CPT | Mod: CPTII,S$GLB,, | Performed by: SURGERY

## 2022-04-27 PROCEDURE — 3044F HG A1C LEVEL LT 7.0%: CPT | Mod: CPTII,S$GLB,, | Performed by: SURGERY

## 2022-04-27 PROCEDURE — 3074F SYST BP LT 130 MM HG: CPT | Mod: CPTII,S$GLB,, | Performed by: SURGERY

## 2022-04-27 NOTE — PROGRESS NOTES
Patient presents Plastic surgery Clinic after having a right implant exchange left mastopexy.  She looks markedly improved today.  The breasts are getting close to being symmetrical.  She may indeed not need any other further intervention.  We will see her back in 2 weeks the

## 2022-05-03 ENCOUNTER — OFFICE VISIT (OUTPATIENT)
Dept: HEMATOLOGY/ONCOLOGY | Facility: CLINIC | Age: 72
End: 2022-05-03
Payer: MEDICARE

## 2022-05-03 DIAGNOSIS — D05.11 DUCTAL CARCINOMA IN SITU (DCIS) OF RIGHT BREAST: Primary | ICD-10-CM

## 2022-05-03 PROCEDURE — 3066F PR DOCUMENTATION OF TREATMENT FOR NEPHROPATHY: ICD-10-PCS | Mod: CPTII,S$GLB,, | Performed by: INTERNAL MEDICINE

## 2022-05-03 PROCEDURE — 4010F PR ACE/ARB THEARPY RXD/TAKEN: ICD-10-PCS | Mod: CPTII,S$GLB,, | Performed by: INTERNAL MEDICINE

## 2022-05-03 PROCEDURE — 3061F NEG MICROALBUMINURIA REV: CPT | Mod: CPTII,S$GLB,, | Performed by: INTERNAL MEDICINE

## 2022-05-03 PROCEDURE — 1159F PR MEDICATION LIST DOCUMENTED IN MEDICAL RECORD: ICD-10-PCS | Mod: CPTII,S$GLB,, | Performed by: INTERNAL MEDICINE

## 2022-05-03 PROCEDURE — 99204 OFFICE O/P NEW MOD 45 MIN: CPT | Mod: S$GLB,,, | Performed by: INTERNAL MEDICINE

## 2022-05-03 PROCEDURE — 99999 PR PBB SHADOW E&M-EST. PATIENT-LVL III: ICD-10-PCS | Mod: PBBFAC,,, | Performed by: INTERNAL MEDICINE

## 2022-05-03 PROCEDURE — 1159F MED LIST DOCD IN RCRD: CPT | Mod: CPTII,S$GLB,, | Performed by: INTERNAL MEDICINE

## 2022-05-03 PROCEDURE — 3044F PR MOST RECENT HEMOGLOBIN A1C LEVEL <7.0%: ICD-10-PCS | Mod: CPTII,S$GLB,, | Performed by: INTERNAL MEDICINE

## 2022-05-03 PROCEDURE — 3061F PR NEG MICROALBUMINURIA RESULT DOCUMENTED/REVIEW: ICD-10-PCS | Mod: CPTII,S$GLB,, | Performed by: INTERNAL MEDICINE

## 2022-05-03 PROCEDURE — 3044F HG A1C LEVEL LT 7.0%: CPT | Mod: CPTII,S$GLB,, | Performed by: INTERNAL MEDICINE

## 2022-05-03 PROCEDURE — 99204 PR OFFICE/OUTPT VISIT, NEW, LEVL IV, 45-59 MIN: ICD-10-PCS | Mod: S$GLB,,, | Performed by: INTERNAL MEDICINE

## 2022-05-03 PROCEDURE — 4010F ACE/ARB THERAPY RXD/TAKEN: CPT | Mod: CPTII,S$GLB,, | Performed by: INTERNAL MEDICINE

## 2022-05-03 PROCEDURE — 99999 PR PBB SHADOW E&M-EST. PATIENT-LVL III: CPT | Mod: PBBFAC,,, | Performed by: INTERNAL MEDICINE

## 2022-05-03 PROCEDURE — 3066F NEPHROPATHY DOC TX: CPT | Mod: CPTII,S$GLB,, | Performed by: INTERNAL MEDICINE

## 2022-05-03 NOTE — PROGRESS NOTES
Subjective:       Patient ID: Linnea Renae is a 71 y.o. female.    Chief Complaint: No chief complaint on file.    HPI       Mrs Rneae is a 71 year old female who, late last year was diagnosed with recurrent DCIS.  Her initial diagnosis of DCIS was in .   She was seen by Dr. Russell and at that time she underwent a lumpectomy and was found to have 6 mm of DCIS but no invasive cancer.  She was also seen by Dr. Carranza who did not feel that radiation was indicated.  I saw her initially in early 2015 and I recommended that she consider treatment with anastrozole.  She took anastrozole for approximately a year, however, she subsequently discontinued due to alopecia.  We had discussed the option of tamoxifen and she elected not to pursue it.  I had last seen her on 2017.    Apparently her screening mammogram in 2021 had shown pleomorphic calcifications in the right breast.  A biopsy was performed on 10/06/2021 and again showed DCIS that was ER positive and PA positive.  The patient was seen by Dr. Hdz and on 2021 she underwent a right mastectomy and sentinel lymph node biopsy and a left breast reduction mammoplasty.  The pathology report indicates that there was no invasive cancer identified.  On the right mastectomy sample there was a 1.4 cm area of DCIS.  [place] lymph node was negative.  There was no invasive or in Situ cancer on the left-sided resected specimen.  In early 2022 she underwent insertion of a permanent prosthesis and reduction mammoplasty in the contralateral breast.  She has made an uneventful recovery and she presents for evaluation.    PAST MEDICAL HISTORY:  As above.  She has a history of hypertension, diabetes, hyperlipidemia, GERD, degenerative lumber joint disease.     PAST SURGICAL HISTORY:  Significant for , tubal ligation, bilateral cataract surgery, cholecystectomy, prior right lumpectomy for DCIS, and hysterectomy.     GYN HISTORY:  Menarche was at 10, first live birth was at 16 and she had a hysterectomy at 38.  One ovary was removed.     FAMILY HISTORY:  Negative for cancer.     SOCIAL HISTORY:  She is a homemaker.  She lives in [place].  She does not drink alcohol and does not smoke.      Review of Systems    Overall she feels well.  She is still somewhat sore from the recent surgery but she denies any anxiety, depression, easy bruising, fevers, chills, night  sweats, weight loss, nausea, vomiting, diarrhea, constipation, diplopia, blurred vision, headache, chest pain, palpitations, shortness of breath, breast pain, abdominal pain, extremity pain, or difficulty ambulating.  The remainder of the ten-point ROS, including general, skin, lymph, H/N, cardiorespiratory, GI, , Neuro, Endocrine, and psychiatric is negative.     Objective:      Physical Exam      She is alert, oriented to time, place, person, pleasant, well      nourished, in no acute physical distress.                                    VITAL SIGNS:  Reviewed                                      HEENT:  Normal.  There are no nasal, oral, lip, gingival, auricular, lid,    or conjunctival lesions.  Mucosae are moist and pink, and there is no        thrush.  Pupils are equal, reactive to light and accommodation.              Extraocular muscle movements are intact.  Dentition is good.  There is no frontal or maxillary tenderness.                                     NECK:  Supple without JVD, adenopathy, or thyromegaly.                       LUNGS:  Clear to auscultation without wheezing, rales, or rhonchi.           CARDIOVASCULAR:  Reveals an S1, S2, no murmurs, no rubs, no gallops.         ABDOMEN:  Soft, nontender, without organomegaly.  Bowel sounds are    present.                                                                     EXTREMITIES:  No cyanosis, clubbing, or edema.                               BREASTS:  She is status post left reduction mammoplasty.  Her  incisions are healing nicely.  She has also she is also status post non nipple sparing mastectomy with a prosthesis.                               LYMPHATIC:  There is no cervical, axillary, or supraclavicular adenopathy.   SKIN:  Warm and moist, without petechiae, rashes, induration, or ecchymoses.           NEUROLOGIC:  DTRs are 0-1+ bilaterally, symmetrical, motor function is 5/5,  and cranial nerves are  within normal limits.    Assessment:          1. DCIS, initially diagnosed in 2015, status post lumpectomy and 1 year of adjuvant hormonal therapy, now with a recurrence treated with a completion mastectomy plus a sentinel lymph node biopsy.  The patient only had DCIS but no invasive cancer in the resected specimen  Plan:         I had a long discussion with Mrs. Renae.  I explained to her that DCIS treated with mastectomy has a 99% chance of long term disease-free survival.  I see no reason to recommend any additional therapy at this point.  Since she requested to follow-up with me, I will see her again in 4 months.  Her multiple questions were answered to her satisfaction.

## 2022-05-09 ENCOUNTER — OFFICE VISIT (OUTPATIENT)
Dept: PODIATRY | Facility: CLINIC | Age: 72
End: 2022-05-09
Payer: MEDICARE

## 2022-05-09 ENCOUNTER — OFFICE VISIT (OUTPATIENT)
Dept: BARIATRICS | Facility: CLINIC | Age: 72
End: 2022-05-09
Payer: MEDICARE

## 2022-05-09 VITALS
HEART RATE: 64 BPM | BODY MASS INDEX: 39.09 KG/M2 | HEIGHT: 68 IN | DIASTOLIC BLOOD PRESSURE: 81 MMHG | RESPIRATION RATE: 18 BRPM | WEIGHT: 257.94 LBS | SYSTOLIC BLOOD PRESSURE: 125 MMHG

## 2022-05-09 VITALS
WEIGHT: 259.06 LBS | DIASTOLIC BLOOD PRESSURE: 78 MMHG | HEART RATE: 59 BPM | OXYGEN SATURATION: 98 % | BODY MASS INDEX: 39.39 KG/M2 | SYSTOLIC BLOOD PRESSURE: 116 MMHG

## 2022-05-09 DIAGNOSIS — L30.4 INTERTRIGO: ICD-10-CM

## 2022-05-09 DIAGNOSIS — M15.9 PRIMARY OSTEOARTHRITIS INVOLVING MULTIPLE JOINTS: ICD-10-CM

## 2022-05-09 DIAGNOSIS — E11.42 DIABETIC PERIPHERAL NEUROPATHY ASSOCIATED WITH TYPE 2 DIABETES MELLITUS: Primary | ICD-10-CM

## 2022-05-09 DIAGNOSIS — G58.9 NERVE ENTRAPMENT: ICD-10-CM

## 2022-05-09 DIAGNOSIS — G89.4 CHRONIC PAIN SYNDROME: ICD-10-CM

## 2022-05-09 DIAGNOSIS — E66.01 CLASS 2 SEVERE OBESITY WITH BODY MASS INDEX (BMI) OF 35 TO 39.9 WITH SERIOUS COMORBIDITY: Primary | ICD-10-CM

## 2022-05-09 PROCEDURE — 4010F ACE/ARB THERAPY RXD/TAKEN: CPT | Mod: CPTII,S$GLB,, | Performed by: INTERNAL MEDICINE

## 2022-05-09 PROCEDURE — 99999 PR PBB SHADOW E&M-EST. PATIENT-LVL II: ICD-10-PCS | Mod: PBBFAC,,, | Performed by: PODIATRIST

## 2022-05-09 PROCEDURE — 1101F PR PT FALLS ASSESS DOC 0-1 FALLS W/OUT INJ PAST YR: ICD-10-PCS | Mod: CPTII,S$GLB,, | Performed by: INTERNAL MEDICINE

## 2022-05-09 PROCEDURE — 99214 OFFICE O/P EST MOD 30 MIN: CPT | Mod: S$GLB,,, | Performed by: INTERNAL MEDICINE

## 2022-05-09 PROCEDURE — 3078F DIAST BP <80 MM HG: CPT | Mod: CPTII,S$GLB,, | Performed by: INTERNAL MEDICINE

## 2022-05-09 PROCEDURE — 3044F HG A1C LEVEL LT 7.0%: CPT | Mod: CPTII,S$GLB,, | Performed by: INTERNAL MEDICINE

## 2022-05-09 PROCEDURE — 99999 PR PBB SHADOW E&M-EST. PATIENT-LVL V: CPT | Mod: PBBFAC,,, | Performed by: INTERNAL MEDICINE

## 2022-05-09 PROCEDURE — 3008F BODY MASS INDEX DOCD: CPT | Mod: CPTII,S$GLB,, | Performed by: INTERNAL MEDICINE

## 2022-05-09 PROCEDURE — 3044F HG A1C LEVEL LT 7.0%: CPT | Mod: CPTII,S$GLB,, | Performed by: PODIATRIST

## 2022-05-09 PROCEDURE — 1159F PR MEDICATION LIST DOCUMENTED IN MEDICAL RECORD: ICD-10-PCS | Mod: CPTII,S$GLB,, | Performed by: INTERNAL MEDICINE

## 2022-05-09 PROCEDURE — 3008F PR BODY MASS INDEX (BMI) DOCUMENTED: ICD-10-PCS | Mod: CPTII,S$GLB,, | Performed by: INTERNAL MEDICINE

## 2022-05-09 PROCEDURE — 3061F NEG MICROALBUMINURIA REV: CPT | Mod: CPTII,S$GLB,, | Performed by: INTERNAL MEDICINE

## 2022-05-09 PROCEDURE — 3078F PR MOST RECENT DIASTOLIC BLOOD PRESSURE < 80 MM HG: ICD-10-PCS | Mod: CPTII,S$GLB,, | Performed by: INTERNAL MEDICINE

## 2022-05-09 PROCEDURE — 3288F PR FALLS RISK ASSESSMENT DOCUMENTED: ICD-10-PCS | Mod: CPTII,S$GLB,, | Performed by: INTERNAL MEDICINE

## 2022-05-09 PROCEDURE — 3074F PR MOST RECENT SYSTOLIC BLOOD PRESSURE < 130 MM HG: ICD-10-PCS | Mod: CPTII,S$GLB,, | Performed by: PODIATRIST

## 2022-05-09 PROCEDURE — 99999 PR PBB SHADOW E&M-EST. PATIENT-LVL II: CPT | Mod: PBBFAC,,, | Performed by: PODIATRIST

## 2022-05-09 PROCEDURE — 3066F NEPHROPATHY DOC TX: CPT | Mod: CPTII,S$GLB,, | Performed by: INTERNAL MEDICINE

## 2022-05-09 PROCEDURE — 1125F PR PAIN SEVERITY QUANTIFIED, PAIN PRESENT: ICD-10-PCS | Mod: CPTII,S$GLB,, | Performed by: INTERNAL MEDICINE

## 2022-05-09 PROCEDURE — 99999 PR PBB SHADOW E&M-EST. PATIENT-LVL V: ICD-10-PCS | Mod: PBBFAC,,, | Performed by: INTERNAL MEDICINE

## 2022-05-09 PROCEDURE — 99214 PR OFFICE/OUTPT VISIT, EST, LEVL IV, 30-39 MIN: ICD-10-PCS | Mod: S$GLB,,, | Performed by: INTERNAL MEDICINE

## 2022-05-09 PROCEDURE — 99214 OFFICE O/P EST MOD 30 MIN: CPT | Mod: S$GLB,,, | Performed by: PODIATRIST

## 2022-05-09 PROCEDURE — 1160F PR REVIEW ALL MEDS BY PRESCRIBER/CLIN PHARMACIST DOCUMENTED: ICD-10-PCS | Mod: CPTII,S$GLB,, | Performed by: INTERNAL MEDICINE

## 2022-05-09 PROCEDURE — 3066F NEPHROPATHY DOC TX: CPT | Mod: CPTII,S$GLB,, | Performed by: PODIATRIST

## 2022-05-09 PROCEDURE — 1159F MED LIST DOCD IN RCRD: CPT | Mod: CPTII,S$GLB,, | Performed by: INTERNAL MEDICINE

## 2022-05-09 PROCEDURE — 3074F SYST BP LT 130 MM HG: CPT | Mod: CPTII,S$GLB,, | Performed by: PODIATRIST

## 2022-05-09 PROCEDURE — 3061F PR NEG MICROALBUMINURIA RESULT DOCUMENTED/REVIEW: ICD-10-PCS | Mod: CPTII,S$GLB,, | Performed by: PODIATRIST

## 2022-05-09 PROCEDURE — 4010F PR ACE/ARB THEARPY RXD/TAKEN: ICD-10-PCS | Mod: CPTII,S$GLB,, | Performed by: PODIATRIST

## 2022-05-09 PROCEDURE — 3044F PR MOST RECENT HEMOGLOBIN A1C LEVEL <7.0%: ICD-10-PCS | Mod: CPTII,S$GLB,, | Performed by: PODIATRIST

## 2022-05-09 PROCEDURE — 3061F NEG MICROALBUMINURIA REV: CPT | Mod: CPTII,S$GLB,, | Performed by: PODIATRIST

## 2022-05-09 PROCEDURE — 1125F AMNT PAIN NOTED PAIN PRSNT: CPT | Mod: CPTII,S$GLB,, | Performed by: PODIATRIST

## 2022-05-09 PROCEDURE — 3066F PR DOCUMENTATION OF TREATMENT FOR NEPHROPATHY: ICD-10-PCS | Mod: CPTII,S$GLB,, | Performed by: INTERNAL MEDICINE

## 2022-05-09 PROCEDURE — 1101F PT FALLS ASSESS-DOCD LE1/YR: CPT | Mod: CPTII,S$GLB,, | Performed by: INTERNAL MEDICINE

## 2022-05-09 PROCEDURE — 1125F PR PAIN SEVERITY QUANTIFIED, PAIN PRESENT: ICD-10-PCS | Mod: CPTII,S$GLB,, | Performed by: PODIATRIST

## 2022-05-09 PROCEDURE — 3044F PR MOST RECENT HEMOGLOBIN A1C LEVEL <7.0%: ICD-10-PCS | Mod: CPTII,S$GLB,, | Performed by: INTERNAL MEDICINE

## 2022-05-09 PROCEDURE — 3008F BODY MASS INDEX DOCD: CPT | Mod: CPTII,S$GLB,, | Performed by: PODIATRIST

## 2022-05-09 PROCEDURE — 3008F PR BODY MASS INDEX (BMI) DOCUMENTED: ICD-10-PCS | Mod: CPTII,S$GLB,, | Performed by: PODIATRIST

## 2022-05-09 PROCEDURE — 3074F SYST BP LT 130 MM HG: CPT | Mod: CPTII,S$GLB,, | Performed by: INTERNAL MEDICINE

## 2022-05-09 PROCEDURE — 4010F ACE/ARB THERAPY RXD/TAKEN: CPT | Mod: CPTII,S$GLB,, | Performed by: PODIATRIST

## 2022-05-09 PROCEDURE — 3061F PR NEG MICROALBUMINURIA RESULT DOCUMENTED/REVIEW: ICD-10-PCS | Mod: CPTII,S$GLB,, | Performed by: INTERNAL MEDICINE

## 2022-05-09 PROCEDURE — 3066F PR DOCUMENTATION OF TREATMENT FOR NEPHROPATHY: ICD-10-PCS | Mod: CPTII,S$GLB,, | Performed by: PODIATRIST

## 2022-05-09 PROCEDURE — 99214 PR OFFICE/OUTPT VISIT, EST, LEVL IV, 30-39 MIN: ICD-10-PCS | Mod: S$GLB,,, | Performed by: PODIATRIST

## 2022-05-09 PROCEDURE — 4010F PR ACE/ARB THEARPY RXD/TAKEN: ICD-10-PCS | Mod: CPTII,S$GLB,, | Performed by: INTERNAL MEDICINE

## 2022-05-09 PROCEDURE — 3074F PR MOST RECENT SYSTOLIC BLOOD PRESSURE < 130 MM HG: ICD-10-PCS | Mod: CPTII,S$GLB,, | Performed by: INTERNAL MEDICINE

## 2022-05-09 PROCEDURE — 3079F PR MOST RECENT DIASTOLIC BLOOD PRESSURE 80-89 MM HG: ICD-10-PCS | Mod: CPTII,S$GLB,, | Performed by: PODIATRIST

## 2022-05-09 PROCEDURE — 3288F FALL RISK ASSESSMENT DOCD: CPT | Mod: CPTII,S$GLB,, | Performed by: INTERNAL MEDICINE

## 2022-05-09 PROCEDURE — 1125F AMNT PAIN NOTED PAIN PRSNT: CPT | Mod: CPTII,S$GLB,, | Performed by: INTERNAL MEDICINE

## 2022-05-09 PROCEDURE — 3079F DIAST BP 80-89 MM HG: CPT | Mod: CPTII,S$GLB,, | Performed by: PODIATRIST

## 2022-05-09 PROCEDURE — 1160F RVW MEDS BY RX/DR IN RCRD: CPT | Mod: CPTII,S$GLB,, | Performed by: INTERNAL MEDICINE

## 2022-05-09 RX ORDER — TOPIRAMATE 100 MG/1
100 TABLET, FILM COATED ORAL 2 TIMES DAILY
Qty: 180 TABLET | Refills: 0 | Status: SHIPPED | OUTPATIENT
Start: 2022-05-09 | End: 2022-08-08

## 2022-05-09 RX ORDER — AMITRIPTYLINE HYDROCHLORIDE 25 MG/1
25 TABLET, FILM COATED ORAL NIGHTLY PRN
Qty: 30 TABLET | Refills: 1 | Status: SHIPPED | OUTPATIENT
Start: 2022-05-09 | End: 2022-08-04

## 2022-05-09 RX ORDER — PREGABALIN 75 MG/1
75 CAPSULE ORAL 2 TIMES DAILY
Qty: 60 CAPSULE | Refills: 1 | Status: SHIPPED | OUTPATIENT
Start: 2022-05-09 | End: 2022-08-04

## 2022-05-09 RX ORDER — KETOCONAZOLE 20 MG/G
CREAM TOPICAL 2 TIMES DAILY
Qty: 60 G | Refills: 2 | Status: SHIPPED | OUTPATIENT
Start: 2022-05-09 | End: 2023-04-19

## 2022-05-09 NOTE — PATIENT INSTRUCTIONS
"  Patient was informed that topiramate is used for migraine prevention and seizures. Weight loss is a common side effect that is well documented. S/he understands this. S/he was informed of the potential side effects such as serious and possibly fatal rash in which case the medication should be discontinued immediately. Paresthesias, forgetfulness, fatigue, kidney stones, GI symptoms, and changes in lab values such as electrolytes, blood counts and kidney function.    topiramate 100 mg morning and evening.     Activity as cleared by plastics.       No soda, sweet tea, juices or lemonade. All drinks should be 5 calories or less.         Limit starchy carbohydrates (bread, rice, pasta, potatoes, corn, peas, oatmeal, grits, tortillas, crackers, chips)    Spring Recipes:    Isola Shake:     Ingredients  ½ cup low fat cottage cheese  ¼ cup vanilla protein powder  1/8 tsp mint extract  2-3 packets of stevia or artificial sweetener of choice  5-10 ice cubes (more or less depending on how thick you would like it)  4 oz of water  2-3 drops of green food coloring OR a small handful of spinach to make it green    Put all ingredients in  and  until desired consistency.      "Unstuffed" Cabbage Rolls:    Ingredients:  1 1/2 to 2 pounds lean ground beef or turkey  1 large onion, chopped  1 clove garlic, minced  1 small cabbage, chopped  2 cans (14.5 ounces each) diced tomatoes  1 can (8 ounces) tomato sauce  ¼ - ½ cup water depending on desired consistency  1 teaspoon ground black pepper, salt to taste    Spray large skillet with nonstick cookspray. Heat pan on medium heat. Sauté the onions until tender, and then add the ground beef (or turkey) and cook until the meat is browned.    Add the garlic, cook an additional minute before adding the remaining ingredients. Cover, reduce the heat and simmer about 25 minutes (or until the cabbage is  fork tender)        Not so Tl's Pie:    Ingredients  2 (or more) " pounds of lean ground beef, or turkey  2 heads of cauliflower or 3 bags of frozen cauliflower, chopped  1 bag of frozen mixed veggies          (NO Corn, Peas or Potatoes!)  1-2 onions  1 egg  1 teaspoon each of basil, garlic powder, pepper, oregano and a little cayenne  4-5 sprays of I cant believe its not butter spray  4 ounces of fat free cream cheese (optional)  Low fat Cheese to top (optional)    Roast cauliflower in oven on 350* F for about 15-20 minutes, until browned and fork tender. (begin ferreira meat while cauliflower is cooking). Place cooked cauliflower in . Add 4 ounces of fat free cream cheese, 4-5 sprays of I cant believe its not butter SPRAY, and spices and puree until creamy.     While cauliflower is roasting, brown meat in large skillet and season to taste. Set aside. Sauté diced onion in skillet until somewhat soft. Set aside with meat. Pour mixed veggies in the skillet to heat on low heat.     Mix the meat, onions, mixed veggies, raw egg and any additional seasonings and put in bottom of 9x13 baking dish. Spread mashed cauliflower mixture over it until smooth.    Bake at 350 for approximately 30 minutes. Add cheese and bake 5 additional minutes (optional). Serve warm (or reheat later).    Crock Pot Balsamic Pork Tenderloin:    Ingredients:  1 tsp dried thyme  1 tsp ground pepper  ½ tsp salt  3 tbsp dried minced onion  2 tsp dried basil  ¼ cup low sodium broth  ½ cup balsamic vinegar  2 cloves garlic, minced  2 lbs Pork Tenderloin    Mix first 5 ingredients together. Rub pork tenderloin with dry seasoning mixture.  In a large sauté pan, heat ¼ cup balsamic vinegar and garlic on medium heat. Add pork to sauté davidson and sear, ferreira all sides. Add low sodium broth, 1 tbsp at a time to keep tenderloin from sticking or use nonstick cookspray.     Transfer meat to crock pot. Pour remaining balsamic vinegar into sauté pan and continue  to stir for a few minutes to deglaze the pan. Pour  juices over the top of the tenderloin in crockpot. Cook on high for 3 hours or until meat thermometer says 170*. Let rest 5-10 minutes and then slice.       Side Dish: Steamed carrots w/ garlic and tray    Ingredients:  2 garlic cloves, minced  1 lb baby carrots  5-6 sprays of I cant believe its not butter SPRAY  1 tsp minced peeled fresh tray  1 tbsp chopped fresh cilantro  ½ tsp grated lime rind  1 tbsp fresh lime juice   ¼ tsp salt    Prepare garlic; let stand 10 minutes. Steam carrots, covered in pot, about 10 minutes or until tender.     In a nonstick skillet over medium heat, spray pan w/ I cant believe its not butter SPRAY. Add garlic and tray and cook 1 minute, stirring constantly. Remove from  heat; stir in carrots, cilantro and remaining ingredients.         Side Dish: Green Bean Almondine    Ingredients:  1 pound fresh green beans, trimmed  1 tbsp sherin vinegar  1 ½ tsp water  1 tsp Rikki Mustard  ¼ tsp salt  ¼ tsp freshly ground black pepper  1 tbsp sliced almonds, toasted    Cook green beans in boiling water for 4 minutes or until crisp-tender. Drain and plunge beans into ice water. Drain well. Pat beans dry w/ paper towel.     Combine vinegar, water, mustard, salt and pepper in a medium bowl. Add beans to vinegar mixture; toss to coat well. Sprinkle with toasted almonds.      How to Cook the Perfect Hard Boiled Egg:    Steam in water: Fill a large pot with 1 inch of water. Place steamer insert inside, cover, and bring to a boil over high heat. Add eggs to steamer basket, cover, and continue cooking 12 minute. If serving cold, immediately place eggs in a bowl of ice water and allow to cool for at least 15 minutes before peeling under cool running water. Store in the refrigerator for up to 5 days.    OR    Purchase Dash Go Rapid Egg Boiler: available @ [employer], [employer] and [employer], [employer], [employer] for ~$15-$20      Classic Egg Salad Recipe:    Ingredients:  8 hard cooked eggs, peeled and  coarsely chopped  4-6 tbsp of low fat or fat free pineda  2 tbsp celery, chopped  2 tsp faith mustard  Dash of cayenne pepper (for spice)  Black pepper, salt to taste    In a medium bowl, combine pineda, celery, mustard and cayenne pepper with chopped eggs. Season w/ pepper and salt. Serve over Lettuce leaves.     Get Creative! Add chopped turkey lara and tomato and serve on lettuce leaves for an egg-cellent BLT egg salad or mix lean diced ham, low fat cheddar and tomatoes for  egg salad    Tuna Deviled Eggs:    Ingredients:  12 hard boiled eggs  1 can of tuna packed in water  1 rib celery, diced  ¼ cup low fat pineda  1 tsp mustard  Garlic powder, black pepper to taste  Dash of paprika (for finish)    Cut eggs in half lengthwise. Remove yolk and mash in bowl. In separate bowl, mix tuna, celery, low fat pineda, mustard and seasonings.  Stir in egg yolks until blended. Stuff eggs and sprinkle dash of paprika      Lara Jalapeno Deviled Eggs:    Ingredients:  12 hard boiled eggs, peeled  ½ cup low fat pineda  1 ½ tsp rice vinegar  ¾ tsp ground mustard  ½ packet splenda  2 jalapenos, seeded and chopped, 6 pieces turkey lara, cooked crisp and crumbled  Dash of paprika (for finish)    Cut eggs in half lengthwise. Remove yolk and mash in bowl. Add pineda, vinegar, spices and Splenda until well combined. Mix in jalapenos and lara. Stuff eggs and sprinkle w/ dash of paprika      Sugar-Free High Protein Brownie Recipe:    Ingredients:  2 cups of pureed zucchini  4 oz fat free cream cheese  1 large egg  2 scoops chocolate protein powder  1 tbsp pure vanilla extract  1/3 cup unsweetened cocoa powder    ¼ tsp salt  2 tbsp chopped nuts  *8-9 packets of splenda or artificial sweetener of choice    Preheat oven to 350* F.     Line an 8x8 pan w/ parchment paper or spray with nonstick cookspray.     In a medium bowl, blend the fat free cream cheese with egg until creamy. Add chocolate protein powder and cocoa powder until creamy. Add  vanilla extract. Stir in pureed zucchini and salt.     The batter will be thick, but not dry. If batter seems dry, add 1-2 tbsp water. Fold in Nuts. Pour batter in prepared pan.     Bake for 30 minutes. Allow to cool completely. Cut into squares and chill to semi-set.     *best if eaten within 2 days but may last 3-4 days in fridge.         Lemon Whip:    Ingredients:  Small box of sugar-free vanilla instant pudding mix  1 small packet of crystal light lemonade mix  2 cups skim milk or fat free fairlife milk  Sugar-free, fat free cool whip     Make sugar-free pudding using 2 cups skim milk according to package. Stir in 1 small packet of crystal light lemonade mix.  Fold in a dollop of sugar-free, fat free cool whip and stir to combine.     Home-made Sugar-free Pudding Pops:    Ingredients:  1 small pkg of sugar-free instant pudding mix (flavor of choice!)  2 cups fat free milk     Beat ingredients with whisk for 2 minutes. Pour into 6 paper or plastic cups or into a popsicle mold. Insert wooden pop stick in center of each cup.     Freeze for 5 hours or until firm. Peel off paper or pull out of popsicle mold.     Variations: add sugar-free syrups to change up the flavor, such as sugar-free chocolate pudding + sugar free raspberry syrup = chocolate raspberry fudgesicle.

## 2022-05-09 NOTE — PROGRESS NOTES
Subjective:       Patient ID: Linnea Renae is a 71 y.o. female.    Chief Complaint: Follow-up    Pt here today for follow-up.Has lost 0 more lbs, net neg 27. Started [id number] piyush diet and topiramate 50 mg BID. Has been on the medication. In early April 2022 she underwent insertion of a permanent prosthesis and reduction mammoplasty in the contralateral breast. Heme onc and plastic not reviewed.  She has restrictions with lifting. Is having trouble with her feet.   She accounts most of her weight gain to stress.   States she does find her appetite is back.        checked today         Ophtho exam 8/6/21- Open angle with borderline findings, low risk, bilateral      checked today. Has been on tramadol chronically.    On metformin 2000 mg BID.     Lab Results   Component Value Date    HGBA1C 5.7 (H) 03/31/2022    HGBA1C 5.7 (H) 11/29/2021    HGBA1C 5.7 (H) 07/16/2021     Lab Results   Component Value Date    LDLCALC 78.8 03/31/2022    CREATININE 0.9 03/31/2022        Review of Systems   Constitutional: Negative for chills and fever.   Respiratory: Negative for shortness of breath.         Denies snoring   Cardiovascular: Positive for leg swelling. Negative for chest pain.   Gastrointestinal: Positive for constipation. Negative for diarrhea.        + GERD   Genitourinary: Negative for difficulty urinating and dysuria.   Musculoskeletal: Positive for arthralgias and back pain.   Skin: Positive for rash.   Neurological: Positive for dizziness. Negative for light-headedness.   Psychiatric/Behavioral: Positive for dysphoric mood. The patient is nervous/anxious.        Objective:     /78   Pulse (!) 59   Wt 117.5 kg (259 lb 0.7 oz)   SpO2 98%   BMI 39.39 kg/m²     Physical Exam   Constitutional: She is oriented to person, place, and time. She appears well-developed. No distress.   obese   HENT:   Head: Normocephalic and atraumatic.   Eyes: Pupils are equal, round, and reactive to light. EOM are normal. No  scleral icterus.   Neck: Normal range of motion. Neck supple.   Cardiovascular: Normal rate.   Pulmonary/Chest: Effort normal.   Musculoskeletal: Normal range of motion. She exhibits no edema.   Neurological: She is alert and oriented to person, place, and time. No cranial nerve deficit.   Skin: Skin is warm and dry. No erythema.   Psychiatric: She has a normal mood and affect. Her behavior is normal. Judgment normal.   Vitals reviewed.      Assessment:       1. Class 2 severe obesity with body mass index (BMI) of 35 to 39.9 with serious comorbidity        Plan:         Class 3 severe obesity due to excess calories with serious comorbidity and body mass index (BMI) of 40.0 to 44.9 in adult      .     Patient was informed that topiramate is used for migraine prevention and seizures. Weight loss is a common side effect that is well documented. S/he understands this. S/he was informed of the potential side effects such as serious and possibly fatal rash in which case the medication should be discontinued immediately. Paresthesias, forgetfulness, fatigue, kidney stones, GI symptoms, and changes in lab values such as electrolytes, blood counts and kidney function.    topiramate 100 mg morning and evening.       No soda, sweet tea, juices or lemonade. All drinks should be 5 calories or less.         Limit starchy carbohydrates (bread, rice, pasta, potatoes, corn, peas, oatmeal, grits, tortillas, crackers, chips)      Spring recipes given.

## 2022-05-10 ENCOUNTER — PROCEDURE VISIT (OUTPATIENT)
Dept: PHYSICAL MEDICINE AND REHAB | Facility: CLINIC | Age: 72
End: 2022-05-10
Payer: MEDICARE

## 2022-05-10 DIAGNOSIS — M79.671 FOOT PAIN, BILATERAL: ICD-10-CM

## 2022-05-10 DIAGNOSIS — M47.816 LUMBAR SPONDYLOSIS: ICD-10-CM

## 2022-05-10 DIAGNOSIS — M79.672 FOOT PAIN, BILATERAL: ICD-10-CM

## 2022-05-10 PROCEDURE — 95910 PR NERVE CONDUCTION STUDY; 7-8 STUDIES: ICD-10-PCS | Mod: S$GLB,,, | Performed by: PHYSICAL MEDICINE & REHABILITATION

## 2022-05-10 PROCEDURE — 95910 NRV CNDJ TEST 7-8 STUDIES: CPT | Mod: S$GLB,,, | Performed by: PHYSICAL MEDICINE & REHABILITATION

## 2022-05-10 PROCEDURE — 95886 PR EMG COMPLETE, W/ NERVE CONDUCTION STUDIES, 5+ MUSCLES: ICD-10-PCS | Mod: S$GLB,,, | Performed by: PHYSICAL MEDICINE & REHABILITATION

## 2022-05-10 PROCEDURE — 95886 MUSC TEST DONE W/N TEST COMP: CPT | Mod: S$GLB,,, | Performed by: PHYSICAL MEDICINE & REHABILITATION

## 2022-05-10 RX ORDER — IBUPROFEN 800 MG/1
TABLET ORAL
Qty: 60 TABLET | Refills: 0 | Status: SHIPPED | OUTPATIENT
Start: 2022-05-10 | End: 2022-07-09 | Stop reason: SDUPTHER

## 2022-05-10 NOTE — PROCEDURES
Test Date:  5/10/2022    Patient: Linnea Renae : 1950 Physician: Allen Shaver D.O.   ID#:  SEX: Female Ref. Phys: Amara Hawthorne,*     HPI: Linnea Renae is a 71 y.o.female who presents for NCS/EMG to evaluate bilateral symmetrical foot pain.  She denies weakness of the feet.       NCV & EMG Findings:   All nerve conduction studies (as indicated in the following tables) were within normal limits.   All examined muscles (as indicated in the following table) showed no evidence of electrical instability.    Impression:  This is a normal electrophysiologic study of the bilateral lower extremities.        ___________________________  Allen Shaver D.O.        NCS+  Motor Nerve Results      Latency Amplitude F-Lat Segment Distance CV Comment   Site (ms) Norm (mV) Norm (ms)  (cm) (m/s) Norm    Left Fibular (EDB)   Ankle 3.6  < 6.5 2.9  > 1.10         Bel Fib Head 11.3 - 2.1 -  Bel Fib Head-Ankle 35 45  > 36    Right Fibular (EDB)   Ankle 3.5  < 6.5 3.1  > 1.10         Bel Fib Head 11.1 - 2.2 -  Bel Fib Head-Ankle 32 42  > 36    Left Tibial (AHB)   Ankle 4.3  < 6.1 4.1  > 1.10         Right Tibial (AHB)   Ankle 3.4  < 6.1 5.1  > 1.10           Sensory Nerve Results      Latency (Peak) Amplitude (P-P) Segment Distance CV Comment   Site (ms) Norm (µV) Norm  (cm) (m/s) Norm    Right Sural   Calf-Lat Mall 3.5  < 4.5 5  > 4 Calf-Lat Mall 14 40  > 35    Left Superficial Fibular   14 cm-Ankle 2.8  < 4.2 10  > 5 14 cm-Ankle 14 50  > 32    Right Superficial Fibular   14 cm-Ankle 2.8  < 4.2 8  > 5 14 cm-Ankle 14 50  > 32      EMG+     Side Muscle Nerve Root Ins Act Fibs Psw Amp Dur Poly Recrt Int Pat Comment   Right Vastus Med Femoral L2-L4 Nml Nml Nml Nml Nml 0 Nml Nml    Right Tib Anterior Fibular,  Deep Fibula... L4-L5 Nml Nml Nml Nml Nml 0 Nml Nml    Right Fib Longus Fibular,  Superficial... L5-S1 Nml Nml Nml Nml Nml 0 Nml Nml    Right Gastroc Tibial S1-S2 Nml Nml Nml Nml Nml 0 Nml Nml    Right  Dorsal Interossei ped I Lateral Plantar S2-S3 Nml Nml Nml Nml Nml 0 Nml Nml    Left Vastus Med Femoral L2-L4 Nml Nml Nml Nml Nml 0 Nml Nml    Left Tib Anterior Fibular,  Deep Fibula... L4-L5 Nml Nml Nml Nml Nml 0 Nml Nml    Left Fib Longus Fibular,  Superficial... L5-S1 Nml Nml Nml Nml Nml 0 Nml Nml    Left Gastroc Tibial S1-S2 Nml Nml Nml Nml Nml 0 Nml Nml    Left Dorsal Interossei ped I Lateral Plantar S2-S3 Nml Nml Nml Nml Nml 0 Nml Nml            Waveforms:    Motor              Sensory

## 2022-05-12 ENCOUNTER — TELEPHONE (OUTPATIENT)
Dept: ADMINISTRATIVE | Facility: OTHER | Age: 72
End: 2022-05-12
Payer: MEDICARE

## 2022-05-12 DIAGNOSIS — M48.061 DEGENERATIVE LUMBAR SPINAL STENOSIS: ICD-10-CM

## 2022-05-12 DIAGNOSIS — M54.17 LUMBOSACRAL RADICULOPATHY: Primary | ICD-10-CM

## 2022-05-12 RX ORDER — TRAMADOL HYDROCHLORIDE 50 MG/1
TABLET ORAL
Qty: 120 TABLET | Refills: 0 | Status: CANCELLED | OUTPATIENT
Start: 2022-05-12

## 2022-05-12 NOTE — TELEPHONE ENCOUNTER
No new care gaps identified.  [facility] Embedded Care Gaps. Reference number: [id number]. 5/12/2022   10:44:25 AM CDT

## 2022-05-14 NOTE — PROGRESS NOTES
Subjective:      Patient ID: Linnea Renae is a 71 y.o. female.    Chief Complaint: Foot Problem, Foot Pain, and Numbness    Linnea is a 71 y.o. female who presents to the clinic upon referral from Dr. Nat marsh. provider found  for evaluation and treatment of diabetic feet. Linnea has a past medical history of Allergy, Breast cancer, Chronic constipation, Colon polyps, Diabetes mellitus, type 2, Dry eyes, GERD (gastroesophageal reflux disease), Hyperlipidemia, Hypertension, Lumbar disc disease, and Type 2 diabetes mellitus.  The bottoms of both feet continue to feel stiff she states.  She continues to have neuropathy type symptoms.  She does not want to pursue further injection therapy.  She is hesitant about surgical intervention for nerve entrapment.  She would like to discuss other medication options.      PCP: Bailee Moss MD    Date Last Seen by PCP:   Chief Complaint   Patient presents with    Foot Problem    Foot Pain    Numbness         Current shoe gear: Casual shoes    Hemoglobin A1C   Date Value Ref Range Status   03/31/2022 5.7 (H) 4.0 - 5.6 % Final     Comment:     ADA Screening Guidelines:  5.7-6.4%  Consistent with prediabetes  >or=6.5%  Consistent with diabetes    High levels of fetal hemoglobin interfere with the HbA1C  assay. Heterozygous hemoglobin variants (HbS, HgC, etc)do  not significantly interfere with this assay.   However, presence of multiple variants may affect accuracy.     11/29/2021 5.7 (H) 4.0 - 5.6 % Final     Comment:     ADA Screening Guidelines:  5.7-6.4%  Consistent with prediabetes  >or=6.5%  Consistent with diabetes    High levels of fetal hemoglobin interfere with the HbA1C  assay. Heterozygous hemoglobin variants (HbS, HgC, etc)do  not significantly interfere with this assay.   However, presence of multiple variants may affect accuracy.     07/16/2021 5.7 (H) 4.0 - 5.6 % Final     Comment:     ADA Screening Guidelines:  5.7-6.4%  Consistent with prediabetes  >or=6.5%   Consistent with diabetes    High levels of fetal hemoglobin interfere with the HbA1C  assay. Heterozygous hemoglobin variants (HbS, HgC, etc)do  not significantly interfere with this assay.   However, presence of multiple variants may affect accuracy.             Review of Systems   Constitution: Negative for chills and fever.   HENT: Negative for congestion and tinnitus.    Eyes: Negative for double vision and visual disturbance.   Cardiovascular: Negative for chest pain and claudication.   Respiratory: Negative for hemoptysis and shortness of breath.    Endocrine: Negative for cold intolerance and heat intolerance.   Hematologic/Lymphatic: Negative for adenopathy and bleeding problem.   Skin: Positive for color change, dry skin and nail changes.   Musculoskeletal: Positive for myalgias and stiffness.   Gastrointestinal: Negative for nausea and vomiting.   Genitourinary: Negative for dysuria and hematuria.   Neurological: Positive for numbness.   Psychiatric/Behavioral: Negative for altered mental status and suicidal ideas.   Allergic/Immunologic: Negative for environmental allergies and persistent infections.           Objective:      Physical Exam  Vitals signs reviewed.   Constitutional:       Appearance: She is well-developed.   Cardiovascular:      Pulses:           Dorsalis pedis pulses are 1+ on the right side and 1+ on the left side.        Posterior tibial pulses are 1+ on the right side and 1+ on the left side.   Pulmonary:      Effort: Pulmonary effort is normal.   Musculoskeletal: Normal range of motion.      Comments: Anterior, lateral, and posterior muscle groups bilateral lower extremities show strength 4 over 5 symmetrically. Inspection and palpation of the joints and bones reveal no crepitus or joint effusion. No tenderness upon palpation. Mild plantar flexor contractures noted to digits 2 through 5 bilaterally.  Angle and base of gait are normal.   Feet:      Right foot:      Skin integrity:  Callus and dry skin present.      Left foot:      Skin integrity: Callus and dry skin present.   Skin:     General: Skin is warm and dry.      Capillary Refill: Capillary refill takes 2 to 3 seconds.      Coloration: Skin is pale.      Comments: Skin bilateral lower extremities noted to be thin, dry, and atrophic.  Toenails normal.   Neurological:      Mental Status: She is alert and oriented to person, place, and time.      Sensory: Sensory deficit present.      Motor: Atrophy present.      Deep Tendon Reflexes: Reflexes abnormal.      Reflex Scores:       Patellar reflexes are 1+ on the right side and 1+ on the left side.       Achilles reflexes are 1+ on the right side and 1+ on the left side.     Comments: Sharp, dull, light touch, and vibratory sensation are diminished bilaterally. Proprioceptive sensation is intact to both lower extremities. [place] Jackie monofilament exam shows loss of protective sensation to plantar toes 1 through 5 bilaterally. Deep tendon reflexes to the patellar tendons is 1 over 4 bilaterally symmetrical. Deep tendon reflexes to the Achilles tendon is 0 over 4 bilaterally symmetrical. No ankle clonus or Babinski reflex noted bilaterally. Coordination is fair to both lower extremities.                 Assessment:       Encounter Diagnoses   Name Primary?    Diabetic peripheral neuropathy associated with type 2 diabetes mellitus Yes    Nerve entrapment     Chronic pain syndrome     Intertrigo          Plan:       Linnea was seen today for foot problem, foot pain and numbness.    Diagnoses and all orders for this visit:    Diabetic peripheral neuropathy associated with type 2 diabetes mellitus  -     pregabalin (LYRICA) 75 MG capsule; Take 1 capsule (75 mg total) by mouth 2 (two) times daily.    Nerve entrapment  -     pregabalin (LYRICA) 75 MG capsule; Take 1 capsule (75 mg total) by mouth 2 (two) times daily.    Chronic pain syndrome  -     pregabalin (LYRICA) 75 MG capsule; Take 1  capsule (75 mg total) by mouth 2 (two) times daily.    Intertrigo  -     ketoconazole (NIZORAL) 2 % cream; Apply topically 2 (two) times daily as needed irritation body folds    Other orders  -     amitriptyline (ELAVIL) 25 MG tablet; Take 1 tablet (25 mg total) by mouth nightly as needed for Insomnia.      I counseled the patient on her conditions, their implications and medical management.    Shoe inspection. Diabetic Foot Education. Patient reminded of the importance of good nutrition and blood sugar control to help prevent podiatric complications of diabetes. Patient instructed on proper foot hygeine. We discussed wearing proper shoe gear, daily foot inspections and Diabetic foot education in detail.    Discussed options for peripheral neuropathy/nerve entrapment syndrome including nerve block therapy, surgical nerve entrapment decompression procedures, and various vitamins and supplementation available shown to improve nerve function.    Follow-up in 3-6 months to discuss possible nerve decompression bilateral tarsal tunnel.  .  Follow-up in 3 months.

## 2022-05-16 ENCOUNTER — PATIENT MESSAGE (OUTPATIENT)
Dept: PAIN MEDICINE | Facility: OTHER | Age: 72
End: 2022-05-16
Payer: MEDICARE

## 2022-05-16 ENCOUNTER — TELEPHONE (OUTPATIENT)
Dept: ADMINISTRATIVE | Facility: OTHER | Age: 72
End: 2022-05-16
Payer: MEDICARE

## 2022-05-16 DIAGNOSIS — M48.061 DEGENERATIVE LUMBAR SPINAL STENOSIS: ICD-10-CM

## 2022-05-16 RX ORDER — TRAMADOL HYDROCHLORIDE 50 MG/1
TABLET ORAL
Qty: 120 TABLET | Refills: 0 | Status: CANCELLED | OUTPATIENT
Start: 2022-05-12

## 2022-05-16 NOTE — TELEPHONE ENCOUNTER
No new care gaps identified.  [facility] Embedded Care Gaps. Reference number: [id number]. 5/16/2022   2:29:33 PM CDT

## 2022-05-16 NOTE — TELEPHONE ENCOUNTER
No new care gaps identified.  [facility] Embedded Care Gaps. Reference number: [id number]. 5/16/2022   2:28:45 PM CDT

## 2022-05-17 RX ORDER — TRAMADOL HYDROCHLORIDE 50 MG/1
TABLET ORAL
Qty: 120 TABLET | Refills: 0 | Status: SHIPPED | OUTPATIENT
Start: 2022-05-17 | End: 2022-06-16 | Stop reason: SDUPTHER

## 2022-05-18 ENCOUNTER — OFFICE VISIT (OUTPATIENT)
Dept: PLASTIC SURGERY | Facility: CLINIC | Age: 72
End: 2022-05-18
Payer: MEDICARE

## 2022-05-18 VITALS — DIASTOLIC BLOOD PRESSURE: 57 MMHG | SYSTOLIC BLOOD PRESSURE: 122 MMHG | HEART RATE: 72 BPM

## 2022-05-18 DIAGNOSIS — Z09 SURGERY FOLLOW-UP EXAMINATION: Primary | ICD-10-CM

## 2022-05-18 PROCEDURE — 1160F PR REVIEW ALL MEDS BY PRESCRIBER/CLIN PHARMACIST DOCUMENTED: ICD-10-PCS | Mod: CPTII,S$GLB,, | Performed by: SURGERY

## 2022-05-18 PROCEDURE — 3074F SYST BP LT 130 MM HG: CPT | Mod: CPTII,S$GLB,, | Performed by: SURGERY

## 2022-05-18 PROCEDURE — 3061F NEG MICROALBUMINURIA REV: CPT | Mod: CPTII,S$GLB,, | Performed by: SURGERY

## 2022-05-18 PROCEDURE — 1101F PR PT FALLS ASSESS DOC 0-1 FALLS W/OUT INJ PAST YR: ICD-10-PCS | Mod: CPTII,S$GLB,, | Performed by: SURGERY

## 2022-05-18 PROCEDURE — 99999 PR PBB SHADOW E&M-EST. PATIENT-LVL IV: CPT | Mod: PBBFAC,,, | Performed by: SURGERY

## 2022-05-18 PROCEDURE — 99024 POSTOP FOLLOW-UP VISIT: CPT | Mod: S$GLB,,, | Performed by: SURGERY

## 2022-05-18 PROCEDURE — 3074F PR MOST RECENT SYSTOLIC BLOOD PRESSURE < 130 MM HG: ICD-10-PCS | Mod: CPTII,S$GLB,, | Performed by: SURGERY

## 2022-05-18 PROCEDURE — 1159F MED LIST DOCD IN RCRD: CPT | Mod: CPTII,S$GLB,, | Performed by: SURGERY

## 2022-05-18 PROCEDURE — 3044F PR MOST RECENT HEMOGLOBIN A1C LEVEL <7.0%: ICD-10-PCS | Mod: CPTII,S$GLB,, | Performed by: SURGERY

## 2022-05-18 PROCEDURE — 1101F PT FALLS ASSESS-DOCD LE1/YR: CPT | Mod: CPTII,S$GLB,, | Performed by: SURGERY

## 2022-05-18 PROCEDURE — 1126F AMNT PAIN NOTED NONE PRSNT: CPT | Mod: CPTII,S$GLB,, | Performed by: SURGERY

## 2022-05-18 PROCEDURE — 1159F PR MEDICATION LIST DOCUMENTED IN MEDICAL RECORD: ICD-10-PCS | Mod: CPTII,S$GLB,, | Performed by: SURGERY

## 2022-05-18 PROCEDURE — 3066F PR DOCUMENTATION OF TREATMENT FOR NEPHROPATHY: ICD-10-PCS | Mod: CPTII,S$GLB,, | Performed by: SURGERY

## 2022-05-18 PROCEDURE — 3066F NEPHROPATHY DOC TX: CPT | Mod: CPTII,S$GLB,, | Performed by: SURGERY

## 2022-05-18 PROCEDURE — 3061F PR NEG MICROALBUMINURIA RESULT DOCUMENTED/REVIEW: ICD-10-PCS | Mod: CPTII,S$GLB,, | Performed by: SURGERY

## 2022-05-18 PROCEDURE — 1126F PR PAIN SEVERITY QUANTIFIED, NO PAIN PRESENT: ICD-10-PCS | Mod: CPTII,S$GLB,, | Performed by: SURGERY

## 2022-05-18 PROCEDURE — 3078F PR MOST RECENT DIASTOLIC BLOOD PRESSURE < 80 MM HG: ICD-10-PCS | Mod: CPTII,S$GLB,, | Performed by: SURGERY

## 2022-05-18 PROCEDURE — 4010F PR ACE/ARB THEARPY RXD/TAKEN: ICD-10-PCS | Mod: CPTII,S$GLB,, | Performed by: SURGERY

## 2022-05-18 PROCEDURE — 99999 PR PBB SHADOW E&M-EST. PATIENT-LVL IV: ICD-10-PCS | Mod: PBBFAC,,, | Performed by: SURGERY

## 2022-05-18 PROCEDURE — 4010F ACE/ARB THERAPY RXD/TAKEN: CPT | Mod: CPTII,S$GLB,, | Performed by: SURGERY

## 2022-05-18 PROCEDURE — 3044F HG A1C LEVEL LT 7.0%: CPT | Mod: CPTII,S$GLB,, | Performed by: SURGERY

## 2022-05-18 PROCEDURE — 3288F PR FALLS RISK ASSESSMENT DOCUMENTED: ICD-10-PCS | Mod: CPTII,S$GLB,, | Performed by: SURGERY

## 2022-05-18 PROCEDURE — 1160F RVW MEDS BY RX/DR IN RCRD: CPT | Mod: CPTII,S$GLB,, | Performed by: SURGERY

## 2022-05-18 PROCEDURE — 3078F DIAST BP <80 MM HG: CPT | Mod: CPTII,S$GLB,, | Performed by: SURGERY

## 2022-05-18 PROCEDURE — 3288F FALL RISK ASSESSMENT DOCD: CPT | Mod: CPTII,S$GLB,, | Performed by: SURGERY

## 2022-05-18 PROCEDURE — 99024 PR POST-OP FOLLOW-UP VISIT: ICD-10-PCS | Mod: S$GLB,,, | Performed by: SURGERY

## 2022-05-18 NOTE — PROGRESS NOTES
Patient presents Plastic surgery Clinic after having breast reconstruction.  Most recently she had an implant exchange on the right as well as a mastopexy on the left.  She looks really good.  She is 5 weeks out of surgery.  Patient complains of large amount of tissue that she has in the back extending into the axilla.  I stated to her that she would need to discuss this with her insurance company is this is a huge surgery and would require incisions all the way up to the middle of the back.  Regarding her breast reconstruction patient complains that the implant is hard I discussed with her this is a soft silicone implant and this is the feel that she will have.  She states that she believes that the right is bigger than the left which it is by a small amount.  I stated if she would like we have 2 options 1. We can put a small implant on the left side are 2. We can do it implant exchange on the right.  I will not do this however until the fall or even want to.  She understands this shoulder return in 2 months.  The

## 2022-05-25 ENCOUNTER — PATIENT MESSAGE (OUTPATIENT)
Dept: PAIN MEDICINE | Facility: OTHER | Age: 72
End: 2022-05-25
Payer: MEDICARE

## 2022-06-02 ENCOUNTER — OFFICE VISIT (OUTPATIENT)
Dept: PODIATRY | Facility: CLINIC | Age: 72
End: 2022-06-02
Payer: MEDICARE

## 2022-06-02 VITALS — BODY MASS INDEX: 39.39 KG/M2 | WEIGHT: 259.06 LBS

## 2022-06-02 DIAGNOSIS — G57.53 TARSAL TUNNEL SYNDROME, BILATERAL LOWER LIMBS: ICD-10-CM

## 2022-06-02 DIAGNOSIS — M51.36 DDD (DEGENERATIVE DISC DISEASE), LUMBAR: ICD-10-CM

## 2022-06-02 DIAGNOSIS — G89.4 CHRONIC PAIN SYNDROME: Primary | ICD-10-CM

## 2022-06-02 PROCEDURE — 3061F PR NEG MICROALBUMINURIA RESULT DOCUMENTED/REVIEW: ICD-10-PCS | Mod: CPTII,S$GLB,, | Performed by: PODIATRIST

## 2022-06-02 PROCEDURE — 1159F MED LIST DOCD IN RCRD: CPT | Mod: CPTII,S$GLB,, | Performed by: PODIATRIST

## 2022-06-02 PROCEDURE — 3008F BODY MASS INDEX DOCD: CPT | Mod: CPTII,S$GLB,, | Performed by: PODIATRIST

## 2022-06-02 PROCEDURE — 99999 PR PBB SHADOW E&M-EST. PATIENT-LVL IV: ICD-10-PCS | Mod: PBBFAC,,, | Performed by: PODIATRIST

## 2022-06-02 PROCEDURE — 99999 PR PBB SHADOW E&M-EST. PATIENT-LVL IV: CPT | Mod: PBBFAC,,, | Performed by: PODIATRIST

## 2022-06-02 PROCEDURE — 4010F ACE/ARB THERAPY RXD/TAKEN: CPT | Mod: CPTII,S$GLB,, | Performed by: PODIATRIST

## 2022-06-02 PROCEDURE — 3061F NEG MICROALBUMINURIA REV: CPT | Mod: CPTII,S$GLB,, | Performed by: PODIATRIST

## 2022-06-02 PROCEDURE — 3066F NEPHROPATHY DOC TX: CPT | Mod: CPTII,S$GLB,, | Performed by: PODIATRIST

## 2022-06-02 PROCEDURE — 4010F PR ACE/ARB THEARPY RXD/TAKEN: ICD-10-PCS | Mod: CPTII,S$GLB,, | Performed by: PODIATRIST

## 2022-06-02 PROCEDURE — 3066F PR DOCUMENTATION OF TREATMENT FOR NEPHROPATHY: ICD-10-PCS | Mod: CPTII,S$GLB,, | Performed by: PODIATRIST

## 2022-06-02 PROCEDURE — 99214 PR OFFICE/OUTPT VISIT, EST, LEVL IV, 30-39 MIN: ICD-10-PCS | Mod: S$GLB,,, | Performed by: PODIATRIST

## 2022-06-02 PROCEDURE — 1125F PR PAIN SEVERITY QUANTIFIED, PAIN PRESENT: ICD-10-PCS | Mod: CPTII,S$GLB,, | Performed by: PODIATRIST

## 2022-06-02 PROCEDURE — 3044F PR MOST RECENT HEMOGLOBIN A1C LEVEL <7.0%: ICD-10-PCS | Mod: CPTII,S$GLB,, | Performed by: PODIATRIST

## 2022-06-02 PROCEDURE — 99214 OFFICE O/P EST MOD 30 MIN: CPT | Mod: S$GLB,,, | Performed by: PODIATRIST

## 2022-06-02 PROCEDURE — 1125F AMNT PAIN NOTED PAIN PRSNT: CPT | Mod: CPTII,S$GLB,, | Performed by: PODIATRIST

## 2022-06-02 PROCEDURE — 3044F HG A1C LEVEL LT 7.0%: CPT | Mod: CPTII,S$GLB,, | Performed by: PODIATRIST

## 2022-06-02 PROCEDURE — 1159F PR MEDICATION LIST DOCUMENTED IN MEDICAL RECORD: ICD-10-PCS | Mod: CPTII,S$GLB,, | Performed by: PODIATRIST

## 2022-06-02 PROCEDURE — 3008F PR BODY MASS INDEX (BMI) DOCUMENTED: ICD-10-PCS | Mod: CPTII,S$GLB,, | Performed by: PODIATRIST

## 2022-06-09 NOTE — PROGRESS NOTES
Subjective:      Patient ID: Linnea Renae is a 71 y.o. female.    Chief Complaint: Diabetes Mellitus (Bailee Moss MD 03/2019 PCP ), Diabetic Foot Exam (Red and hurting feet ), and Foot Swelling    Linnea is a 71 y.o. female who presents to the clinic upon referral from Dr. Nat marsh. provider found  for evaluation and treatment of diabetic feet. Linnea has a past medical history of Allergy, Breast cancer, Chronic constipation, Colon polyps, Diabetes mellitus, type 2, Dry eyes, GERD (gastroesophageal reflux disease), Hyperlipidemia, Hypertension, Lumbar disc disease, and Type 2 diabetes mellitus.  The bottoms of both feet continue to feel stiff she states, however much improved.  She continues to have neuropathy type symptoms.  She does not want to pursue further injection therapy as it causes significantly high blood sugars.  She is hesitant about surgical intervention for nerve entrapment.  She would like to discuss other medication options, including physical therapy.      PCP: Bailee Moss MD    Date Last Seen by PCP:   Chief Complaint   Patient presents with    Diabetes Mellitus     Bailee Moss MD 03/2019 PCP     Diabetic Foot Exam     Red and hurting feet     Foot Swelling         Current shoe gear: Casual shoes    Hemoglobin A1C   Date Value Ref Range Status   03/31/2022 5.7 (H) 4.0 - 5.6 % Final     Comment:     ADA Screening Guidelines:  5.7-6.4%  Consistent with prediabetes  >or=6.5%  Consistent with diabetes    High levels of fetal hemoglobin interfere with the HbA1C  assay. Heterozygous hemoglobin variants (HbS, HgC, etc)do  not significantly interfere with this assay.   However, presence of multiple variants may affect accuracy.     11/29/2021 5.7 (H) 4.0 - 5.6 % Final     Comment:     ADA Screening Guidelines:  5.7-6.4%  Consistent with prediabetes  >or=6.5%  Consistent with diabetes    High levels of fetal hemoglobin interfere with the HbA1C  assay. Heterozygous hemoglobin  variants (HbS, HgC, etc)do  not significantly interfere with this assay.   However, presence of multiple variants may affect accuracy.     07/16/2021 5.7 (H) 4.0 - 5.6 % Final     Comment:     ADA Screening Guidelines:  5.7-6.4%  Consistent with prediabetes  >or=6.5%  Consistent with diabetes    High levels of fetal hemoglobin interfere with the HbA1C  assay. Heterozygous hemoglobin variants (HbS, HgC, etc)do  not significantly interfere with this assay.   However, presence of multiple variants may affect accuracy.             Review of Systems   Constitution: Negative for chills and fever.   HENT: Negative for congestion and tinnitus.    Eyes: Negative for double vision and visual disturbance.   Cardiovascular: Negative for chest pain and claudication.   Respiratory: Negative for hemoptysis and shortness of breath.    Endocrine: Negative for cold intolerance and heat intolerance.   Hematologic/Lymphatic: Negative for adenopathy and bleeding problem.   Skin: Positive for color change, dry skin and nail changes.   Musculoskeletal: Positive for myalgias and stiffness.   Gastrointestinal: Negative for nausea and vomiting.   Genitourinary: Negative for dysuria and hematuria.   Neurological: Positive for numbness.   Psychiatric/Behavioral: Negative for altered mental status and suicidal ideas.   Allergic/Immunologic: Negative for environmental allergies and persistent infections.           Objective:      Physical Exam  Vitals signs reviewed.   Constitutional:       Appearance: She is well-developed.   Cardiovascular:      Pulses:           Dorsalis pedis pulses are 1+ on the right side and 1+ on the left side.        Posterior tibial pulses are 1+ on the right side and 1+ on the left side.   Pulmonary:      Effort: Pulmonary effort is normal.   Musculoskeletal: Normal range of motion.      Comments: Anterior, lateral, and posterior muscle groups bilateral lower extremities show strength 4 over 5 symmetrically.  Inspection and palpation of the joints and bones reveal no crepitus or joint effusion. No tenderness upon palpation. Mild plantar flexor contractures noted to digits 2 through 5 bilaterally.  Angle and base of gait are normal.   Feet:      Right foot:      Skin integrity: Callus and dry skin present.      Left foot:      Skin integrity: Callus and dry skin present.   Skin:     General: Skin is warm and dry.      Capillary Refill: Capillary refill takes 2 to 3 seconds.      Coloration: Skin is pale.      Comments: Skin bilateral lower extremities noted to be thin, dry, and atrophic.  Toenails normal.   Neurological:      Mental Status: She is alert and oriented to person, place, and time.      Sensory: Sensory deficit present.      Motor: Atrophy present.      Deep Tendon Reflexes: Reflexes abnormal.      Reflex Scores:       Patellar reflexes are 1+ on the right side and 1+ on the left side.       Achilles reflexes are 1+ on the right side and 1+ on the left side.     Comments: Sharp, dull, light touch, and vibratory sensation are diminished bilaterally. Proprioceptive sensation is intact to both lower extremities. [place] Jackie monofilament exam shows loss of protective sensation to plantar toes 1 through 5 bilaterally. Deep tendon reflexes to the patellar tendons is 1 over 4 bilaterally symmetrical. Deep tendon reflexes to the Achilles tendon is 0 over 4 bilaterally symmetrical. No ankle clonus or Babinski reflex noted bilaterally. Coordination is fair to both lower extremities.                 Assessment:       Encounter Diagnoses   Name Primary?    Chronic pain syndrome Yes    Tarsal tunnel syndrome, bilateral lower limbs     DDD (degenerative disc disease), lumbar          Plan:       Linnea was seen today for diabetes mellitus, diabetic foot exam and foot swelling.    Diagnoses and all orders for this visit:    Chronic pain syndrome  -     Ambulatory referral/consult to Physical/Occupational Therapy;  Future    Tarsal tunnel syndrome, bilateral lower limbs  -     Ambulatory referral/consult to Physical/Occupational Therapy; Future    DDD (degenerative disc disease), lumbar  -     Ambulatory referral/consult to Physical/Occupational Therapy; Future      I counseled the patient on her conditions, their implications and medical management.    Shoe inspection. Diabetic Foot Education. Patient reminded of the importance of good nutrition and blood sugar control to help prevent podiatric complications of diabetes. Patient instructed on proper foot hygeine. We discussed wearing proper shoe gear, daily foot inspections and Diabetic foot education in detail.    Discussed options for peripheral neuropathy/nerve entrapment syndrome including nerve block therapy, surgical nerve entrapment decompression procedures, and various vitamins and supplementation available shown to improve nerve function.    Physical therapy orders placed including neural gliding and myofascial release.  Follow-up in 3-6 months to discuss possible nerve decompression bilateral tarsal tunnel.  .  Follow-up in 3 months.

## 2022-06-16 DIAGNOSIS — M48.061 DEGENERATIVE LUMBAR SPINAL STENOSIS: ICD-10-CM

## 2022-06-16 NOTE — TELEPHONE ENCOUNTER
No new care gaps identified.  [facility] Embedded Care Gaps. Reference number: [id number]. 6/16/2022   1:10:43 PM CDT

## 2022-06-17 RX ORDER — TRAMADOL HYDROCHLORIDE 50 MG/1
TABLET ORAL
Qty: 120 TABLET | Refills: 0 | Status: SHIPPED | OUTPATIENT
Start: 2022-06-17 | End: 2022-07-14 | Stop reason: SDUPTHER

## 2022-06-17 NOTE — TELEPHONE ENCOUNTER
Regarding refill of Tramadol, I need to know if she's taking Lyrica and Amitriptyline ordered by Podiatry.

## 2022-07-09 DIAGNOSIS — M15.9 PRIMARY OSTEOARTHRITIS INVOLVING MULTIPLE JOINTS: ICD-10-CM

## 2022-07-12 RX ORDER — IBUPROFEN 800 MG/1
TABLET ORAL
Qty: 60 TABLET | Refills: 1 | Status: SHIPPED | OUTPATIENT
Start: 2022-07-12 | End: 2023-03-31 | Stop reason: SDUPTHER

## 2022-07-13 DIAGNOSIS — I10 ESSENTIAL HYPERTENSION: ICD-10-CM

## 2022-07-14 DIAGNOSIS — M48.061 DEGENERATIVE LUMBAR SPINAL STENOSIS: ICD-10-CM

## 2022-07-14 RX ORDER — ATENOLOL 50 MG/1
50 TABLET ORAL 2 TIMES DAILY
Qty: 180 TABLET | Refills: 3 | Status: SHIPPED | OUTPATIENT
Start: 2022-07-14 | End: 2023-04-19 | Stop reason: SDUPTHER

## 2022-07-14 NOTE — TELEPHONE ENCOUNTER
Refill Decision Note   Linnea Renae  is requesting a refill authorization.  Brief Assessment and Rationale for Refill:  Approve     Medication Therapy Plan:           Comments:     No Care Gaps recommended.     Note composed:1:42 PM 07/14/2022

## 2022-07-15 RX ORDER — TRAMADOL HYDROCHLORIDE 50 MG/1
TABLET ORAL
Qty: 120 TABLET | Refills: 0 | Status: SHIPPED | OUTPATIENT
Start: 2022-07-15 | End: 2022-08-17 | Stop reason: SDUPTHER

## 2022-07-15 NOTE — TELEPHONE ENCOUNTER
No new care gaps identified.  [facility] Embedded Care Gaps. Reference number: [id number]. 7/14/2022   7:05:56 PM CDT

## 2022-07-20 ENCOUNTER — OFFICE VISIT (OUTPATIENT)
Dept: PLASTIC SURGERY | Facility: CLINIC | Age: 72
End: 2022-07-20
Payer: MEDICARE

## 2022-07-20 VITALS — DIASTOLIC BLOOD PRESSURE: 61 MMHG | HEART RATE: 71 BPM | SYSTOLIC BLOOD PRESSURE: 135 MMHG

## 2022-07-20 DIAGNOSIS — Z09 SURGERY FOLLOW-UP EXAMINATION: Primary | ICD-10-CM

## 2022-07-20 PROCEDURE — 3066F NEPHROPATHY DOC TX: CPT | Mod: CPTII,S$GLB,, | Performed by: SURGERY

## 2022-07-20 PROCEDURE — 1160F RVW MEDS BY RX/DR IN RCRD: CPT | Mod: CPTII,S$GLB,, | Performed by: SURGERY

## 2022-07-20 PROCEDURE — 3078F DIAST BP <80 MM HG: CPT | Mod: CPTII,S$GLB,, | Performed by: SURGERY

## 2022-07-20 PROCEDURE — 1125F AMNT PAIN NOTED PAIN PRSNT: CPT | Mod: CPTII,S$GLB,, | Performed by: SURGERY

## 2022-07-20 PROCEDURE — 1125F PR PAIN SEVERITY QUANTIFIED, PAIN PRESENT: ICD-10-PCS | Mod: CPTII,S$GLB,, | Performed by: SURGERY

## 2022-07-20 PROCEDURE — 3044F PR MOST RECENT HEMOGLOBIN A1C LEVEL <7.0%: ICD-10-PCS | Mod: CPTII,S$GLB,, | Performed by: SURGERY

## 2022-07-20 PROCEDURE — 3075F SYST BP GE 130 - 139MM HG: CPT | Mod: CPTII,S$GLB,, | Performed by: SURGERY

## 2022-07-20 PROCEDURE — 3044F HG A1C LEVEL LT 7.0%: CPT | Mod: CPTII,S$GLB,, | Performed by: SURGERY

## 2022-07-20 PROCEDURE — 4010F PR ACE/ARB THEARPY RXD/TAKEN: ICD-10-PCS | Mod: CPTII,S$GLB,, | Performed by: SURGERY

## 2022-07-20 PROCEDURE — 3061F PR NEG MICROALBUMINURIA RESULT DOCUMENTED/REVIEW: ICD-10-PCS | Mod: CPTII,S$GLB,, | Performed by: SURGERY

## 2022-07-20 PROCEDURE — 4010F ACE/ARB THERAPY RXD/TAKEN: CPT | Mod: CPTII,S$GLB,, | Performed by: SURGERY

## 2022-07-20 PROCEDURE — 3288F FALL RISK ASSESSMENT DOCD: CPT | Mod: CPTII,S$GLB,, | Performed by: SURGERY

## 2022-07-20 PROCEDURE — 3066F PR DOCUMENTATION OF TREATMENT FOR NEPHROPATHY: ICD-10-PCS | Mod: CPTII,S$GLB,, | Performed by: SURGERY

## 2022-07-20 PROCEDURE — 3288F PR FALLS RISK ASSESSMENT DOCUMENTED: ICD-10-PCS | Mod: CPTII,S$GLB,, | Performed by: SURGERY

## 2022-07-20 PROCEDURE — 99024 POSTOP FOLLOW-UP VISIT: CPT | Mod: S$GLB,,, | Performed by: SURGERY

## 2022-07-20 PROCEDURE — 3075F PR MOST RECENT SYSTOLIC BLOOD PRESS GE 130-139MM HG: ICD-10-PCS | Mod: CPTII,S$GLB,, | Performed by: SURGERY

## 2022-07-20 PROCEDURE — 1160F PR REVIEW ALL MEDS BY PRESCRIBER/CLIN PHARMACIST DOCUMENTED: ICD-10-PCS | Mod: CPTII,S$GLB,, | Performed by: SURGERY

## 2022-07-20 PROCEDURE — 1159F MED LIST DOCD IN RCRD: CPT | Mod: CPTII,S$GLB,, | Performed by: SURGERY

## 2022-07-20 PROCEDURE — 1159F PR MEDICATION LIST DOCUMENTED IN MEDICAL RECORD: ICD-10-PCS | Mod: CPTII,S$GLB,, | Performed by: SURGERY

## 2022-07-20 PROCEDURE — 1101F PR PT FALLS ASSESS DOC 0-1 FALLS W/OUT INJ PAST YR: ICD-10-PCS | Mod: CPTII,S$GLB,, | Performed by: SURGERY

## 2022-07-20 PROCEDURE — 3061F NEG MICROALBUMINURIA REV: CPT | Mod: CPTII,S$GLB,, | Performed by: SURGERY

## 2022-07-20 PROCEDURE — 1101F PT FALLS ASSESS-DOCD LE1/YR: CPT | Mod: CPTII,S$GLB,, | Performed by: SURGERY

## 2022-07-20 PROCEDURE — 3078F PR MOST RECENT DIASTOLIC BLOOD PRESSURE < 80 MM HG: ICD-10-PCS | Mod: CPTII,S$GLB,, | Performed by: SURGERY

## 2022-07-20 PROCEDURE — 99999 PR PBB SHADOW E&M-EST. PATIENT-LVL IV: ICD-10-PCS | Mod: PBBFAC,,, | Performed by: SURGERY

## 2022-07-20 PROCEDURE — 99024 PR POST-OP FOLLOW-UP VISIT: ICD-10-PCS | Mod: S$GLB,,, | Performed by: SURGERY

## 2022-07-20 PROCEDURE — 99999 PR PBB SHADOW E&M-EST. PATIENT-LVL IV: CPT | Mod: PBBFAC,,, | Performed by: SURGERY

## 2022-07-20 NOTE — PROGRESS NOTES
Patient presents Plastic surgery Clinic after having breast reconstruction.  She had a left mastopexy and right implant exchange.  She has a very nice result but she does have a little tightness and size differential on the right compared to the left.  I think we need to do a very quick implant exchange.  I think probably going down about 75-80 cc in the implant would make her more ptotic and more symmetric.  We will arrange this.

## 2022-07-28 ENCOUNTER — TELEPHONE (OUTPATIENT)
Dept: PLASTIC SURGERY | Facility: CLINIC | Age: 72
End: 2022-07-28
Payer: MEDICARE

## 2022-07-28 DIAGNOSIS — Z85.3 HISTORY OF BREAST CANCER: Primary | ICD-10-CM

## 2022-07-28 NOTE — TELEPHONE ENCOUNTER
R/t call and scheduled pt's sx for 8/18 and p/o visit.     ----- Message from Nayla Ash MA sent at 7/28/2022 12:15 PM CDT -----  Regarding: Sx Scheduling  Girl sorry wrong chart, I was in his chart when she called...my bad    ===View-only below this line===  ----- Message -----  From: Meeta Angel LPN  Sent: 7/28/2022  12:13 PM CDT  To: Nayla Ash MA  Subject: RE: Sx Scheduling                                I didn't call him. I see that he was inquiring about the wound vac canister earlier.    ----- Message -----  From: Nayla Ash MA  Sent: 7/28/2022  12:04 PM CDT  To: Meeta Angel LPN  Subject: Sx Scheduling                                    Pt says that she is returning your call.

## 2022-07-28 NOTE — TELEPHONE ENCOUNTER
Spoke w/ pt in an attempt to schedule sx and she stated that she had deaths in her family and cannot schedule anything right now and will call back when she is able.

## 2022-08-03 ENCOUNTER — PATIENT MESSAGE (OUTPATIENT)
Dept: BARIATRICS | Facility: CLINIC | Age: 72
End: 2022-08-03
Payer: MEDICARE

## 2022-08-04 ENCOUNTER — LAB VISIT (OUTPATIENT)
Dept: LAB | Facility: HOSPITAL | Age: 72
End: 2022-08-04
Attending: INTERNAL MEDICINE
Payer: MEDICARE

## 2022-08-04 ENCOUNTER — OFFICE VISIT (OUTPATIENT)
Dept: PRIMARY CARE CLINIC | Facility: CLINIC | Age: 72
End: 2022-08-04
Payer: MEDICARE

## 2022-08-04 VITALS
OXYGEN SATURATION: 99 % | BODY MASS INDEX: 39.09 KG/M2 | SYSTOLIC BLOOD PRESSURE: 122 MMHG | TEMPERATURE: 99 F | DIASTOLIC BLOOD PRESSURE: 68 MMHG | WEIGHT: 257.94 LBS | HEIGHT: 68 IN | HEART RATE: 68 BPM

## 2022-08-04 DIAGNOSIS — E11.40 TYPE 2 DIABETES MELLITUS WITH DIABETIC NEUROPATHY, WITHOUT LONG-TERM CURRENT USE OF INSULIN: ICD-10-CM

## 2022-08-04 DIAGNOSIS — Z85.3 PERSONAL HISTORY OF BREAST CANCER: ICD-10-CM

## 2022-08-04 DIAGNOSIS — N65.1 BREAST RECONSTRUCTION DISPROPORTION: ICD-10-CM

## 2022-08-04 DIAGNOSIS — Z01.818 PRE-OP EXAMINATION: ICD-10-CM

## 2022-08-04 DIAGNOSIS — E78.5 HYPERLIPIDEMIA, UNSPECIFIED HYPERLIPIDEMIA TYPE: ICD-10-CM

## 2022-08-04 DIAGNOSIS — I10 ESSENTIAL HYPERTENSION: ICD-10-CM

## 2022-08-04 DIAGNOSIS — E11.40 TYPE 2 DIABETES MELLITUS WITH DIABETIC NEUROPATHY, WITHOUT LONG-TERM CURRENT USE OF INSULIN: Primary | ICD-10-CM

## 2022-08-04 DIAGNOSIS — M48.061 DEGENERATIVE LUMBAR SPINAL STENOSIS: ICD-10-CM

## 2022-08-04 LAB
ANION GAP SERPL CALC-SCNC: 11 MMOL/L (ref 8–16)
BUN SERPL-MCNC: 17 MG/DL (ref 8–23)
CALCIUM SERPL-MCNC: 10.3 MG/DL (ref 8.7–10.5)
CHLORIDE SERPL-SCNC: 105 MMOL/L (ref 95–110)
CO2 SERPL-SCNC: 25 MMOL/L (ref 23–29)
CREAT SERPL-MCNC: 1 MG/DL (ref 0.5–1.4)
ERYTHROCYTE [DISTWIDTH] IN BLOOD BY AUTOMATED COUNT: 13.6 % (ref 11.5–14.5)
EST. GFR  (NO RACE VARIABLE): 59.9 ML/MIN/1.73 M^2
GLUCOSE SERPL-MCNC: 94 MG/DL (ref 70–110)
HCT VFR BLD AUTO: 41.4 % (ref 37–48.5)
HGB BLD-MCNC: 12.8 G/DL (ref 12–16)
MCH RBC QN AUTO: 27.2 PG (ref 27–31)
MCHC RBC AUTO-ENTMCNC: 30.9 G/DL (ref 32–36)
MCV RBC AUTO: 88 FL (ref 82–98)
PLATELET # BLD AUTO: 357 K/UL (ref 150–450)
PMV BLD AUTO: 10.5 FL (ref 9.2–12.9)
POTASSIUM SERPL-SCNC: 3.9 MMOL/L (ref 3.5–5.1)
RBC # BLD AUTO: 4.71 M/UL (ref 4–5.4)
SODIUM SERPL-SCNC: 141 MMOL/L (ref 136–145)
WBC # BLD AUTO: 9.6 K/UL (ref 3.9–12.7)

## 2022-08-04 PROCEDURE — 1159F PR MEDICATION LIST DOCUMENTED IN MEDICAL RECORD: ICD-10-PCS | Mod: CPTII,S$GLB,, | Performed by: INTERNAL MEDICINE

## 2022-08-04 PROCEDURE — 3078F DIAST BP <80 MM HG: CPT | Mod: CPTII,S$GLB,, | Performed by: INTERNAL MEDICINE

## 2022-08-04 PROCEDURE — 1125F PR PAIN SEVERITY QUANTIFIED, PAIN PRESENT: ICD-10-PCS | Mod: CPTII,S$GLB,, | Performed by: INTERNAL MEDICINE

## 2022-08-04 PROCEDURE — 3074F SYST BP LT 130 MM HG: CPT | Mod: CPTII,S$GLB,, | Performed by: INTERNAL MEDICINE

## 2022-08-04 PROCEDURE — 1101F PT FALLS ASSESS-DOCD LE1/YR: CPT | Mod: CPTII,S$GLB,, | Performed by: INTERNAL MEDICINE

## 2022-08-04 PROCEDURE — 3008F PR BODY MASS INDEX (BMI) DOCUMENTED: ICD-10-PCS | Mod: CPTII,S$GLB,, | Performed by: INTERNAL MEDICINE

## 2022-08-04 PROCEDURE — 3288F FALL RISK ASSESSMENT DOCD: CPT | Mod: CPTII,S$GLB,, | Performed by: INTERNAL MEDICINE

## 2022-08-04 PROCEDURE — 85027 COMPLETE CBC AUTOMATED: CPT | Performed by: INTERNAL MEDICINE

## 2022-08-04 PROCEDURE — 3066F PR DOCUMENTATION OF TREATMENT FOR NEPHROPATHY: ICD-10-PCS | Mod: CPTII,S$GLB,, | Performed by: INTERNAL MEDICINE

## 2022-08-04 PROCEDURE — 4010F PR ACE/ARB THEARPY RXD/TAKEN: ICD-10-PCS | Mod: CPTII,S$GLB,, | Performed by: INTERNAL MEDICINE

## 2022-08-04 PROCEDURE — 3044F HG A1C LEVEL LT 7.0%: CPT | Mod: CPTII,S$GLB,, | Performed by: INTERNAL MEDICINE

## 2022-08-04 PROCEDURE — 3074F PR MOST RECENT SYSTOLIC BLOOD PRESSURE < 130 MM HG: ICD-10-PCS | Mod: CPTII,S$GLB,, | Performed by: INTERNAL MEDICINE

## 2022-08-04 PROCEDURE — 99214 PR OFFICE/OUTPT VISIT, EST, LEVL IV, 30-39 MIN: ICD-10-PCS | Mod: S$GLB,,, | Performed by: INTERNAL MEDICINE

## 2022-08-04 PROCEDURE — 3078F PR MOST RECENT DIASTOLIC BLOOD PRESSURE < 80 MM HG: ICD-10-PCS | Mod: CPTII,S$GLB,, | Performed by: INTERNAL MEDICINE

## 2022-08-04 PROCEDURE — 99999 PR PBB SHADOW E&M-EST. PATIENT-LVL III: CPT | Mod: PBBFAC,,, | Performed by: INTERNAL MEDICINE

## 2022-08-04 PROCEDURE — 1125F AMNT PAIN NOTED PAIN PRSNT: CPT | Mod: CPTII,S$GLB,, | Performed by: INTERNAL MEDICINE

## 2022-08-04 PROCEDURE — 1160F PR REVIEW ALL MEDS BY PRESCRIBER/CLIN PHARMACIST DOCUMENTED: ICD-10-PCS | Mod: CPTII,S$GLB,, | Performed by: INTERNAL MEDICINE

## 2022-08-04 PROCEDURE — 1101F PR PT FALLS ASSESS DOC 0-1 FALLS W/OUT INJ PAST YR: ICD-10-PCS | Mod: CPTII,S$GLB,, | Performed by: INTERNAL MEDICINE

## 2022-08-04 PROCEDURE — 3008F BODY MASS INDEX DOCD: CPT | Mod: CPTII,S$GLB,, | Performed by: INTERNAL MEDICINE

## 2022-08-04 PROCEDURE — 99214 OFFICE O/P EST MOD 30 MIN: CPT | Mod: S$GLB,,, | Performed by: INTERNAL MEDICINE

## 2022-08-04 PROCEDURE — 3061F PR NEG MICROALBUMINURIA RESULT DOCUMENTED/REVIEW: ICD-10-PCS | Mod: CPTII,S$GLB,, | Performed by: INTERNAL MEDICINE

## 2022-08-04 PROCEDURE — 3288F PR FALLS RISK ASSESSMENT DOCUMENTED: ICD-10-PCS | Mod: CPTII,S$GLB,, | Performed by: INTERNAL MEDICINE

## 2022-08-04 PROCEDURE — 1160F RVW MEDS BY RX/DR IN RCRD: CPT | Mod: CPTII,S$GLB,, | Performed by: INTERNAL MEDICINE

## 2022-08-04 PROCEDURE — 99999 PR PBB SHADOW E&M-EST. PATIENT-LVL III: ICD-10-PCS | Mod: PBBFAC,,, | Performed by: INTERNAL MEDICINE

## 2022-08-04 PROCEDURE — 36415 COLL VENOUS BLD VENIPUNCTURE: CPT | Mod: PN | Performed by: INTERNAL MEDICINE

## 2022-08-04 PROCEDURE — 4010F ACE/ARB THERAPY RXD/TAKEN: CPT | Mod: CPTII,S$GLB,, | Performed by: INTERNAL MEDICINE

## 2022-08-04 PROCEDURE — 3061F NEG MICROALBUMINURIA REV: CPT | Mod: CPTII,S$GLB,, | Performed by: INTERNAL MEDICINE

## 2022-08-04 PROCEDURE — 1159F MED LIST DOCD IN RCRD: CPT | Mod: CPTII,S$GLB,, | Performed by: INTERNAL MEDICINE

## 2022-08-04 PROCEDURE — 3044F PR MOST RECENT HEMOGLOBIN A1C LEVEL <7.0%: ICD-10-PCS | Mod: CPTII,S$GLB,, | Performed by: INTERNAL MEDICINE

## 2022-08-04 PROCEDURE — 80048 BASIC METABOLIC PNL TOTAL CA: CPT | Performed by: INTERNAL MEDICINE

## 2022-08-04 PROCEDURE — 3066F NEPHROPATHY DOC TX: CPT | Mod: CPTII,S$GLB,, | Performed by: INTERNAL MEDICINE

## 2022-08-06 RX ORDER — ATORVASTATIN CALCIUM 20 MG/1
20 TABLET, FILM COATED ORAL NIGHTLY
Qty: 90 TABLET | Refills: 1 | Status: SHIPPED | OUTPATIENT
Start: 2022-08-06 | End: 2023-04-19 | Stop reason: SDUPTHER

## 2022-08-06 RX ORDER — HYDROCHLOROTHIAZIDE 12.5 MG/1
12.5 TABLET ORAL DAILY
Qty: 90 TABLET | Refills: 1 | Status: SHIPPED | OUTPATIENT
Start: 2022-08-06 | End: 2023-04-19 | Stop reason: SDUPTHER

## 2022-08-07 NOTE — PROGRESS NOTES
Subjective:       Patient ID: Linnea Renae is a 71 y.o. female.    Chief Complaint: Follow-up    Last seen 4 months ago. Returns for f/u chronic conditions, and pre-op eval prior to another breast surgery on 22 due to implant on right side uncomfortably large.     PMH: .   Hypertension.  Diabetes Type 2, last HbA1c 5.7% .  Hyperlipidemia. LDL 79 .  GERD.  Lumbar Spinal Stenosis seen regularly by the [facility].   Chronic Dry Eyes.   Perennial Allergic Rhinitis.  Morbid Obesity.   Right Breast Cancer diagnosed 10/15.  H/O Colon Polyps.     PSH: C/S x 3, BTL, Hysterectomy and USO, Bilateral Cataracts extracted, Cholecystectomy. Right breast excisional biopsy .    Mammogram 10/21. BMD normal 8/15. Colonoscopy 10/18 - sigmoid diverticulosis, otherwise normal. Eye exam . Podiatry . Prevnar 8/15. Zostavax 3/16. Td . Pneumovax 3/19. Flu shot . Shingrix , . COVID (Pf[employer]) x 3.    Social: Remote tobacco use, quit over 30 years ago. No alcohol.  with three daughters and three grandchildren. Homemaker.     FMH: Heart dis, Thyroid dis.     NKDA. No adverse reaction to general anesthesia.    Medications: list reviewed and reconciled.              Review of Systems   Constitutional: Negative for activity change, appetite change, fatigue, fever and unexpected weight change.   HENT: Negative for nasal congestion, ear pain, hearing loss, rhinorrhea, sneezing, sore throat, trouble swallowing and voice change.    Eyes: Negative for pain and visual disturbance.   Respiratory: Negative for cough, chest tightness, shortness of breath and wheezing.    Cardiovascular: Negative for chest pain, palpitations and leg swelling.   Gastrointestinal: Negative for abdominal pain, blood in stool, constipation, diarrhea, nausea and vomiting.   Genitourinary: Negative for dysuria and frequency.   Musculoskeletal: Positive for back pain. Negative for arthralgias, gait problem, joint  "swelling and myalgias.        Chronic low back pain unchanged.    Integumentary:  Negative for color change and rash.   Neurological: Negative for dizziness, syncope, facial asymmetry, speech difficulty, weakness, numbness and headaches.   Hematological: Negative for adenopathy. Does not bruise/bleed easily.   Psychiatric/Behavioral: Negative for confusion, dysphoric mood and sleep disturbance. The patient is not nervous/anxious.          Objective:    /68, Pulse 68, Temp 98.5, O2 Sat 99%, Ht 5' 8", Wt 258 lbs (from 259.5), BMI=39  Physical Exam  Vitals reviewed.   Constitutional:       General: She is not in acute distress.     Appearance: She is well-developed. She is not diaphoretic.   HENT:      Head: Normocephalic and atraumatic.      Right Ear: Tympanic membrane and ear canal normal.      Left Ear: Tympanic membrane and ear canal normal.      Nose: Nose normal. No congestion.      Mouth/Throat:      Mouth: Mucous membranes are moist.      Pharynx: Oropharynx is clear.   Eyes:      General: No scleral icterus.     Extraocular Movements: Extraocular movements intact.      Conjunctiva/sclera: Conjunctivae normal.      Right eye: Right conjunctiva is not injected.      Left eye: Left conjunctiva is not injected.   Neck:      Thyroid: No thyromegaly.      Vascular: No carotid bruit or JVD.   Cardiovascular:      Rate and Rhythm: Normal rate and regular rhythm.      Pulses: Normal pulses.      Heart sounds: Normal heart sounds. No murmur heard.    No friction rub. No gallop.   Pulmonary:      Effort: Pulmonary effort is normal. No respiratory distress.      Breath sounds: Normal breath sounds. No wheezing, rhonchi or rales.   Abdominal:      General: Bowel sounds are normal. There is no distension.      Palpations: Abdomen is soft. There is no mass.      Tenderness: There is no abdominal tenderness.   Musculoskeletal:         General: No tenderness or deformity. Normal range of motion.      Cervical back: " Normal range of motion and neck supple.      Right lower leg: No edema.      Left lower leg: No edema.   Lymphadenopathy:      Cervical: No cervical adenopathy.   Skin:     General: Skin is warm and dry.      Coloration: Skin is not pale.      Findings: No erythema or rash.      Nails: There is no clubbing.   Neurological:      General: No focal deficit present.      Mental Status: She is alert and oriented to person, place, and time.      Cranial Nerves: No cranial nerve deficit.      Motor: No abnormal muscle tone.      Coordination: Coordination normal.      Gait: Gait normal.   Psychiatric:         Mood and Affect: Mood normal.         Speech: Speech normal.         Behavior: Behavior normal.         Thought Content: Thought content normal.         Judgment: Judgment normal.         Assessment:       Problem List Items Addressed This Visit     Hyperlipidemia    Relevant Medications    atorvastatin (LIPITOR) 20 MG tablet      Other Visit Diagnoses     Type 2 diabetes mellitus with diabetic neuropathy, without long-term current use of insulin    -  Primary    Relevant Orders    CBC Without Differential (Completed)    Basic Metabolic Panel (Completed)    Essential hypertension        Relevant Medications    hydroCHLOROthiazide (HYDRODIURIL) 12.5 MG Tab    Degenerative lumbar spinal stenosis        Personal history of breast cancer        Breast reconstruction disproportion        Pre-op examination              Plan:       Type 2 diabetes mellitus with diabetic neuropathy, without long-term current use of insulin - controlled.  -     CBC Without Differential; Future; Expected date: 08/04/2022  -     Basic Metabolic Panel; Future; Expected date: 08/04/2022    Essential hypertension - controlled, continue same.   -     hydroCHLOROthiazide (HYDRODIURIL) 12.5 MG Tab; Take 1 tablet (12.5 mg total) by mouth once daily.  Dispense: 90 tablet; Refill: 1    Hyperlipidemia, unspecified hyperlipidemia type  -     atorvastatin  (LIPITOR) 20 MG tablet; Take 1 tablet (20 mg total) by mouth every evening.  Dispense: 90 tablet; Refill: 1    Degenerative lumbar spinal stenosis        -     Tramadol recently refilled.     Personal history of breast cancer    Breast reconstruction disproportion    Pre-op examination

## 2022-08-08 ENCOUNTER — OFFICE VISIT (OUTPATIENT)
Dept: BARIATRICS | Facility: CLINIC | Age: 72
End: 2022-08-08
Payer: MEDICARE

## 2022-08-08 VITALS
TEMPERATURE: 98 F | BODY MASS INDEX: 38.95 KG/M2 | OXYGEN SATURATION: 98 % | WEIGHT: 256.19 LBS | SYSTOLIC BLOOD PRESSURE: 114 MMHG | DIASTOLIC BLOOD PRESSURE: 62 MMHG | HEART RATE: 87 BPM

## 2022-08-08 DIAGNOSIS — D05.10 DUCTAL CARCINOMA IN SITU (DCIS) OF BREAST, UNSPECIFIED LATERALITY: ICD-10-CM

## 2022-08-08 DIAGNOSIS — E66.01 CLASS 2 SEVERE OBESITY WITH BODY MASS INDEX (BMI) OF 35 TO 39.9 WITH SERIOUS COMORBIDITY: Primary | ICD-10-CM

## 2022-08-08 PROCEDURE — 1101F PR PT FALLS ASSESS DOC 0-1 FALLS W/OUT INJ PAST YR: ICD-10-PCS | Mod: CPTII,S$GLB,, | Performed by: INTERNAL MEDICINE

## 2022-08-08 PROCEDURE — 1125F PR PAIN SEVERITY QUANTIFIED, PAIN PRESENT: ICD-10-PCS | Mod: CPTII,S$GLB,, | Performed by: INTERNAL MEDICINE

## 2022-08-08 PROCEDURE — 99999 PR PBB SHADOW E&M-EST. PATIENT-LVL III: ICD-10-PCS | Mod: PBBFAC,,, | Performed by: INTERNAL MEDICINE

## 2022-08-08 PROCEDURE — 99214 PR OFFICE/OUTPT VISIT, EST, LEVL IV, 30-39 MIN: ICD-10-PCS | Mod: S$GLB,,, | Performed by: INTERNAL MEDICINE

## 2022-08-08 PROCEDURE — 1160F RVW MEDS BY RX/DR IN RCRD: CPT | Mod: CPTII,S$GLB,, | Performed by: INTERNAL MEDICINE

## 2022-08-08 PROCEDURE — 3288F PR FALLS RISK ASSESSMENT DOCUMENTED: ICD-10-PCS | Mod: CPTII,S$GLB,, | Performed by: INTERNAL MEDICINE

## 2022-08-08 PROCEDURE — 3066F PR DOCUMENTATION OF TREATMENT FOR NEPHROPATHY: ICD-10-PCS | Mod: CPTII,S$GLB,, | Performed by: INTERNAL MEDICINE

## 2022-08-08 PROCEDURE — 3008F PR BODY MASS INDEX (BMI) DOCUMENTED: ICD-10-PCS | Mod: CPTII,S$GLB,, | Performed by: INTERNAL MEDICINE

## 2022-08-08 PROCEDURE — 1159F MED LIST DOCD IN RCRD: CPT | Mod: CPTII,S$GLB,, | Performed by: INTERNAL MEDICINE

## 2022-08-08 PROCEDURE — 3061F PR NEG MICROALBUMINURIA RESULT DOCUMENTED/REVIEW: ICD-10-PCS | Mod: CPTII,S$GLB,, | Performed by: INTERNAL MEDICINE

## 2022-08-08 PROCEDURE — 3008F BODY MASS INDEX DOCD: CPT | Mod: CPTII,S$GLB,, | Performed by: INTERNAL MEDICINE

## 2022-08-08 PROCEDURE — 3066F NEPHROPATHY DOC TX: CPT | Mod: CPTII,S$GLB,, | Performed by: INTERNAL MEDICINE

## 2022-08-08 PROCEDURE — 99214 OFFICE O/P EST MOD 30 MIN: CPT | Mod: S$GLB,,, | Performed by: INTERNAL MEDICINE

## 2022-08-08 PROCEDURE — 3288F FALL RISK ASSESSMENT DOCD: CPT | Mod: CPTII,S$GLB,, | Performed by: INTERNAL MEDICINE

## 2022-08-08 PROCEDURE — 1125F AMNT PAIN NOTED PAIN PRSNT: CPT | Mod: CPTII,S$GLB,, | Performed by: INTERNAL MEDICINE

## 2022-08-08 PROCEDURE — 1159F PR MEDICATION LIST DOCUMENTED IN MEDICAL RECORD: ICD-10-PCS | Mod: CPTII,S$GLB,, | Performed by: INTERNAL MEDICINE

## 2022-08-08 PROCEDURE — 3078F PR MOST RECENT DIASTOLIC BLOOD PRESSURE < 80 MM HG: ICD-10-PCS | Mod: CPTII,S$GLB,, | Performed by: INTERNAL MEDICINE

## 2022-08-08 PROCEDURE — 3044F PR MOST RECENT HEMOGLOBIN A1C LEVEL <7.0%: ICD-10-PCS | Mod: CPTII,S$GLB,, | Performed by: INTERNAL MEDICINE

## 2022-08-08 PROCEDURE — 4010F PR ACE/ARB THEARPY RXD/TAKEN: ICD-10-PCS | Mod: CPTII,S$GLB,, | Performed by: INTERNAL MEDICINE

## 2022-08-08 PROCEDURE — 3061F NEG MICROALBUMINURIA REV: CPT | Mod: CPTII,S$GLB,, | Performed by: INTERNAL MEDICINE

## 2022-08-08 PROCEDURE — 4010F ACE/ARB THERAPY RXD/TAKEN: CPT | Mod: CPTII,S$GLB,, | Performed by: INTERNAL MEDICINE

## 2022-08-08 PROCEDURE — 3044F HG A1C LEVEL LT 7.0%: CPT | Mod: CPTII,S$GLB,, | Performed by: INTERNAL MEDICINE

## 2022-08-08 PROCEDURE — 1101F PT FALLS ASSESS-DOCD LE1/YR: CPT | Mod: CPTII,S$GLB,, | Performed by: INTERNAL MEDICINE

## 2022-08-08 PROCEDURE — 99999 PR PBB SHADOW E&M-EST. PATIENT-LVL III: CPT | Mod: PBBFAC,,, | Performed by: INTERNAL MEDICINE

## 2022-08-08 PROCEDURE — 3078F DIAST BP <80 MM HG: CPT | Mod: CPTII,S$GLB,, | Performed by: INTERNAL MEDICINE

## 2022-08-08 PROCEDURE — 3074F SYST BP LT 130 MM HG: CPT | Mod: CPTII,S$GLB,, | Performed by: INTERNAL MEDICINE

## 2022-08-08 PROCEDURE — 3074F PR MOST RECENT SYSTOLIC BLOOD PRESSURE < 130 MM HG: ICD-10-PCS | Mod: CPTII,S$GLB,, | Performed by: INTERNAL MEDICINE

## 2022-08-08 PROCEDURE — 1160F PR REVIEW ALL MEDS BY PRESCRIBER/CLIN PHARMACIST DOCUMENTED: ICD-10-PCS | Mod: CPTII,S$GLB,, | Performed by: INTERNAL MEDICINE

## 2022-08-08 RX ORDER — TOPIRAMATE 50 MG/1
100 TABLET, FILM COATED ORAL NIGHTLY
Qty: 180 TABLET | Refills: 0 | Status: SHIPPED | OUTPATIENT
Start: 2022-08-08 | End: 2022-09-28 | Stop reason: SDUPTHER

## 2022-08-08 NOTE — PATIENT INSTRUCTIONS
Patient was informed that topiramate is used for migraine prevention and seizures. Weight loss is a common side effect that is well documented. S/he understands this. S/he was informed of the potential side effects such as serious and possibly fatal rash in which case the medication should be discontinued immediately. Paresthesias, forgetfulness, fatigue, kidney stones, GI symptoms, and changes in lab values such as electrolytes, blood counts and kidney function.    topiramate 50 mg 2 tabs in the evening.        No soda, sweet tea, juices or lemonade. All drinks should be 5 calories or less.         Limit starchy carbohydrates (bread, rice, pasta, potatoes, corn, peas, oatmeal, grits, tortillas, crackers, chips)    You needs about 1000 calories and 70 g protein a day to lose weight.     Tips for preparing for hurricane season:    If you are on weight loss medications, please take your medication with you in case of evacuation. Injectable medications should be transported with an ice pack if unopened or room temperature if you have started to use them.   Hurricane supplies do not necessarily need to be junk food or high in carbs or sugar. Shelf stable and healthy choices to include in your supplies could include:  Canned/packets of tuna or chicken  Apples, oranges, banana, pears  Beef or turkey jerky  Sugar free pudding  Pickle, olives, dill relish (mix with the tuna or chicken!)  Low sodium or no salt added canned vegetables  If you get bread, get lite bread (40 calories a slice)  0 sugar sports drinks  Water  String cheese will be okay for a few days without refrigeration or in an ice chest.   Peanut or other nut butter.   Parmesan crisps  Pork skins  Protein shakes (20-30g protein, less than 5 g sugar)  Protein bars (15 or more g protein, less than 5 g sugar)  Don't forget disposable forks, spoons, plates in your supplies  If evacuated, manage stress by taking walks, reading or meditating.   If eating out make  better choices when possible.   Salads with a lean protein and limited dressing, cheese or other toppings  Grilled chicken sandwich or burger without the bun.   Skip the fries!  Order water or unsweetened tea instead of soda  Grocery stores with a deli, salad/food bar can be a good quick and affordable option to replace fast food

## 2022-08-08 NOTE — PROGRESS NOTES
Subjective:       Patient ID: Linnea Renae is a 71 y.o. female.    Chief Complaint: Follow-up    Pt here today for follow-up.Has lost 3 more lbs, net neg 30. Started [id number] piyush diet and increased topiramate to 100 mg BID. Has been on the medication. In early April 2022 she underwent insertion of a permanent prosthesis and reduction mammoplasty in the contralateral breast. Implant exchange planned on the 18th . Heme onc and plastic not reviewed.  She has restrictions with lifting. Is having trouble with her feet.   She accounts most of her weight gain to stress.   States she does find her appetite is less suppressed with the increase to 100 mg BID.       checked today         Ophtho exam 5/10/22- Open angle with borderline findings, low risk, bilateral. No retinopathy.       checked today. Has been on tramadol chronically.    On metformin 2000 mg BID.     Lab Results   Component Value Date    HGBA1C 5.7 (H) 03/31/2022    HGBA1C 5.7 (H) 11/29/2021    HGBA1C 5.7 (H) 07/16/2021     Lab Results   Component Value Date    LDLCALC 78.8 03/31/2022    CREATININE 1.0 08/04/2022        Review of Systems   Constitutional: Negative for chills and fever.   Respiratory: Negative for shortness of breath.         Denies snoring   Cardiovascular: Positive for leg swelling. Negative for chest pain.   Gastrointestinal: Positive for constipation. Negative for diarrhea.        + GERD   Genitourinary: Negative for difficulty urinating and dysuria.   Musculoskeletal: Positive for arthralgias and back pain.   Skin: Positive for rash.   Neurological: Positive for dizziness. Negative for light-headedness.   Psychiatric/Behavioral: Positive for dysphoric mood. The patient is nervous/anxious.        Objective:     /62   Pulse 87   Temp 98.1 °F (36.7 °C)   Wt 116.2 kg (256 lb 2.8 oz)   SpO2 98%   BMI 38.95 kg/m²     Physical Exam   Constitutional: She is oriented to person, place, and time. She appears well-developed. No  distress.   obese   HENT:   Head: Normocephalic and atraumatic.   Eyes: Pupils are equal, round, and reactive to light. EOM are normal. No scleral icterus.   Neck: Normal range of motion. Neck supple.   Cardiovascular: Normal rate.   Pulmonary/Chest: Effort normal.   Musculoskeletal: Normal range of motion. She exhibits no edema.   Neurological: She is alert and oriented to person, place, and time. No cranial nerve deficit.   Skin: Skin is warm and dry. No erythema.   Psychiatric: She has a normal mood and affect. Her behavior is normal. Judgment normal.   Vitals reviewed.      Assessment:       1. Class 2 severe obesity with body mass index (BMI) of 35 to 39.9 with serious comorbidity    2. Ductal carcinoma in situ (DCIS) of breast, unspecified laterality        Plan:         Linnea was seen today for follow-up.    Diagnoses and all orders for this visit:    Class 2 severe obesity with body mass index (BMI) of 35 to 39.9 with serious comorbidity  -     topiramate (TOPAMAX) 50 MG tablet; Take 2 tablets (100 mg total) by mouth every evening.    Ductal carcinoma in situ (DCIS) of breast, unspecified laterality     .     Patient was informed that topiramate is used for migraine prevention and seizures. Weight loss is a common side effect that is well documented. S/he understands this. S/he was informed of the potential side effects such as serious and possibly fatal rash in which case the medication should be discontinued immediately. Paresthesias, forgetfulness, fatigue, kidney stones, GI symptoms, and changes in lab values such as electrolytes, blood counts and kidney function.    topiramate 50 mg 2 tabs in the evening. She will let us know how that dosing works out.      No soda, sweet tea, juices or lemonade. All drinks should be 5 calories or less.         Limit starchy carbohydrates (bread, rice, pasta, potatoes, corn, peas, oatmeal, grits, tortillas, crackers, chips)        Pt  needs about 1000 calories and 70 g  protein a day to lose weight    Hurricane tips given.

## 2022-08-08 NOTE — H&P (VIEW-ONLY)
Subjective:       Patient ID: Linnea Renae is a 71 y.o. female.    Chief Complaint: Follow-up    Pt here today for follow-up.Has lost 3 more lbs, net neg 30. Started [id number] piyush diet and increased topiramate to 100 mg BID. Has been on the medication. In early April 2022 she underwent insertion of a permanent prosthesis and reduction mammoplasty in the contralateral breast. Implant exchange planned on the 18th . Heme onc and plastic not reviewed.  She has restrictions with lifting. Is having trouble with her feet.   She accounts most of her weight gain to stress.   States she does find her appetite is less suppressed with the increase to 100 mg BID.       checked today         Ophtho exam 5/10/22- Open angle with borderline findings, low risk, bilateral. No retinopathy.       checked today. Has been on tramadol chronically.    On metformin 2000 mg BID.     Lab Results   Component Value Date    HGBA1C 5.7 (H) 03/31/2022    HGBA1C 5.7 (H) 11/29/2021    HGBA1C 5.7 (H) 07/16/2021     Lab Results   Component Value Date    LDLCALC 78.8 03/31/2022    CREATININE 1.0 08/04/2022        Review of Systems   Constitutional: Negative for chills and fever.   Respiratory: Negative for shortness of breath.         Denies snoring   Cardiovascular: Positive for leg swelling. Negative for chest pain.   Gastrointestinal: Positive for constipation. Negative for diarrhea.        + GERD   Genitourinary: Negative for difficulty urinating and dysuria.   Musculoskeletal: Positive for arthralgias and back pain.   Skin: Positive for rash.   Neurological: Positive for dizziness. Negative for light-headedness.   Psychiatric/Behavioral: Positive for dysphoric mood. The patient is nervous/anxious.        Objective:     /62   Pulse 87   Temp 98.1 °F (36.7 °C)   Wt 116.2 kg (256 lb 2.8 oz)   SpO2 98%   BMI 38.95 kg/m²     Physical Exam   Constitutional: She is oriented to person, place, and time. She appears well-developed. No  distress.   obese   HENT:   Head: Normocephalic and atraumatic.   Eyes: Pupils are equal, round, and reactive to light. EOM are normal. No scleral icterus.   Neck: Normal range of motion. Neck supple.   Cardiovascular: Normal rate.   Pulmonary/Chest: Effort normal.   Musculoskeletal: Normal range of motion. She exhibits no edema.   Neurological: She is alert and oriented to person, place, and time. No cranial nerve deficit.   Skin: Skin is warm and dry. No erythema.   Psychiatric: She has a normal mood and affect. Her behavior is normal. Judgment normal.   Vitals reviewed.      Assessment:       1. Class 2 severe obesity with body mass index (BMI) of 35 to 39.9 with serious comorbidity    2. Ductal carcinoma in situ (DCIS) of breast, unspecified laterality        Plan:         Linnea was seen today for follow-up.    Diagnoses and all orders for this visit:    Class 2 severe obesity with body mass index (BMI) of 35 to 39.9 with serious comorbidity  -     topiramate (TOPAMAX) 50 MG tablet; Take 2 tablets (100 mg total) by mouth every evening.    Ductal carcinoma in situ (DCIS) of breast, unspecified laterality     .     Patient was informed that topiramate is used for migraine prevention and seizures. Weight loss is a common side effect that is well documented. S/he understands this. S/he was informed of the potential side effects such as serious and possibly fatal rash in which case the medication should be discontinued immediately. Paresthesias, forgetfulness, fatigue, kidney stones, GI symptoms, and changes in lab values such as electrolytes, blood counts and kidney function.    topiramate 50 mg 2 tabs in the evening. She will let us know how that dosing works out.      No soda, sweet tea, juices or lemonade. All drinks should be 5 calories or less.         Limit starchy carbohydrates (bread, rice, pasta, potatoes, corn, peas, oatmeal, grits, tortillas, crackers, chips)        Pt  needs about 1000 calories and 70 g  protein a day to lose weight    Hurricane tips given.

## 2022-08-12 NOTE — ANESTHESIA PAT ROS NOTE
08/12/2022  Linnea Renae is a 72 y.o., female.      Pre-op Assessment          Review of Systems  Anesthesia Hx:  No problems with previous Anesthesia  Denies Family Hx of Anesthesia complications.   Denies Personal Hx of Anesthesia complications.   Social:  Former Smoker, Social Alcohol Use    Hematology/Oncology:  Hematology Normal      Oncology Comments: Current/Recent Breast cancer-right    EENT/Dental:   S/P-[place] with IOL   Cardiovascular:   Exercise tolerance: poor Hypertension hyperlipidemia Walks in yard/some housework   Pulmonary:  Pulmonary Normal    Renal/:   Chronic Renal Disease Chronic Renal Disease CKD- Stage 2   Hepatic/GI:   GERD    Musculoskeletal:   Arthritis  Spine-Lumbar-L4-L5/ DDD-Lumbar   OB/GYN/PEDS:  S/P-Hysterectomy   Neurological:   Neuromuscular Disease, Diabetic Neuropathy   Endocrine:   Diabetes, type 2 A.M.-Blood sugar-runs in the 90s   Dermatological:  Skin Normal    Psych:   Psychiatric History depression Affective disorder        Krupa Jerome RN  8/12/22    Anesthesia Assessment: Preoperative EQUATION    Planned Procedure: Procedure(s) (LRB):  REPLACEMENT, IMPLANT, BREAST (Left)  Requested Anesthesia Type:General  Surgeon: Nolberto Eng MD  Service: Plastics  Known or anticipated Date of Surgery:8/18/2022    Surgeon notes: reviewed and History of breast cancer    Previous anesthesia records:4/11/22-Replacement, Implant, Breast-right/ Mastopexy-Left breast-General- no apparent anesthetic complications and tolerated procedure well- no nausea/vomiting    Anesthesia Hx:  No problems with previous Anesthesia  History of prior surgery of interest to airway management or planning: Denies Family Hx of Anesthesia complications.   Denies Personal Hx of Anesthesia complications.       Airway:  Mallampati: I   Mouth Opening: Normal  TM Distance: 4 - 6 cm  Tongue:  Large  Neck ROM: Normal ROM      Last PCP note: > 1 year ago , within [facility] , 3/19/19-Bailee Moss MD-General-IM-Type 2 diabetes mellitus without complication, without long-term current use of insulin +9 more Dx-Annual Exam     Subspecialty notes: Dermatology, Hematology/Oncology, Ortho, Spine Service, Bariatrics visit/ Breast Surgery visit/ Pain Medicine visit/ Podiatry visit    Other important co-morbidities: DM2, GERD, HLD, HTN and History of breast cancer   Medical History    Diagnosis Date Comment Source   Allergy      Breast cancer  Lumpectomy 10/15.    Chronic constipation      Colon polyps      Diabetes mellitus, type 2      Dry eyes      GERD (gastroesophageal reflux disease)      Hyperlipidemia      Hypertension      Lumbar disc disease      Type 2 diabetes mellitus           Tests already available:  Results have been reviewed. EKG-3/31/22/ Labs-8/4/22-BMP/CBC      Plan:Phone pending      Testing:  None Needed      Patient  has previously scheduled Medical Appointment:None    Navigation: Phone Completed               Consults scheduled.None needed at this time.               Straight Line to surgery.               No tests, anesthesia preop clinic visit, or consult required.          Krupa Jerome RN 8/12/22

## 2022-08-17 ENCOUNTER — TELEPHONE (OUTPATIENT)
Dept: PLASTIC SURGERY | Facility: CLINIC | Age: 72
End: 2022-08-17
Payer: MEDICARE

## 2022-08-17 ENCOUNTER — ANESTHESIA EVENT (OUTPATIENT)
Dept: SURGERY | Facility: HOSPITAL | Age: 72
End: 2022-08-17
Payer: MEDICARE

## 2022-08-17 DIAGNOSIS — M48.061 DEGENERATIVE LUMBAR SPINAL STENOSIS: ICD-10-CM

## 2022-08-17 NOTE — ANESTHESIA PREPROCEDURE EVALUATION
[facility]  Anesthesia Pre-Operative Evaluation         Patient Name/: Linnea Renae, 1950  MRN: [id number]    SUBJECTIVE:     Pre-operative evaluation for Procedure(s) (LRB):  REPLACEMENT, IMPLANT, BREAST (Left)     2022    Linnea Renae is a 72 y.o. 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 There was a size differential noticed, and now plans for left breast replacement.    Patient now presents for the above procedure(s).      Prev airway:   Intubation     Date/Time: 2022 7:50 AM  Performed by: Lois Haynes MD  Authorized by: Marcelino Carbajal MD      Intubation:     Induction:  Intravenous    Intubated:  Postinduction    Mask Ventilation:  Easy with oral airway    Attempts:  1    Attempted By:  Resident anesthesiologist    Method of Intubation:  Video laryngoscopy    Blade:  Melo 3    Laryngeal View Grade: Grade I - full view of cords      Difficult Airway Encountered?: No      Complications:  None    Airway Device:  Oral endotracheal tube    Airway Device Size:  7.0    Style/Cuff Inflation:  Cuffed    Secured at:  The lips    Placement Verified By:  Capnometry    Complicating Factors:  None    Findings Post-Intubation:  BS equal bilateral      Patient Active Problem List   Diagnosis    Hypertension    Type 2 diabetes mellitus without complication, without long-term current use of insulin    Hyperlipidemia    DDD (degenerative disc disease), lumbar    Perennial allergic rhinitis    Dry eyes    Morbid obesity with BMI of 40.0-44.9, adult    GERD (gastroesophageal reflux disease)    Mazoplasia    DCIS (ductal carcinoma in situ) of breast    Anal skin tag    Spinal stenosis, lumbar    Atherosclerosis of aorta    Chronic kidney disease, stage II (mild)    Positive depression screening    Chronic pain disorder    Impaired gait and mobility    Decreased  functional activity tolerance    Osteoarthritis of lumbar spine    Diabetic peripheral neuropathy associated with type 2 diabetes mellitus    Spondylosis without myelopathy    Sacroiliitis    Greater trochanteric bursitis    Chronic pain    Essential (hemorrhagic) thrombocythemia    Unspecified mood (affective) disorder       Review of patient's allergies indicates:   Allergen Reactions    Codeine Itching       Current Inpatient Medications:       No current facility-administered medications on file prior to encounter.     Current Outpatient Medications on File Prior to Encounter   Medication Sig Dispense Refill    alcohol antiseptic pads (ALCOHOL PREP PADS TOP)       ALPRAZolam (XANAX) 0.5 MG tablet Take 1 tablet (0.5 mg total) by mouth 3 (three) times daily. 30 tablet 0    aspirin 81 MG Chew Take 81 mg by mouth once daily.      atenoloL (TENORMIN) 50 MG tablet TAKE 1 TABLET BY MOUTH TWICE DAILY 180 tablet 3    blood sugar diagnostic (TRUE METRIX GLUCOSE TEST STRIP) Strp 1 strip by Misc.(Non-Drug; Combo Route) route once daily. 100 each 3    calcium-vitamin D3 500 mg(1,250mg) -200 unit per tablet Take 1 tablet by mouth 2 (two) times daily with meals.       clobetasoL (TEMOVATE) 0.05 % cream Apply topically 2 (two) times daily. Top of both feet (Patient not taking: Reported on 8/4/2022) 45 g 2    cycloSPORINE (RESTASIS) 0.05 % ophthalmic emulsion Instill 1 drop into both eyes twice a day 180 each 3    diclofenac sodium (VOLTAREN) 1 % Gel Apply 2 g topically 4 (four) times daily. 100 g 2    fluticasone propionate (FLONASE) 50 mcg/actuation nasal spray 2 sprays (100 mcg total) by Each Nostril route once daily. 48 g 1    ibuprofen (ADVIL,MOTRIN) 800 MG tablet Take 1 tablet by mouth 3 times daily as needed 60 tablet 1    ketoconazole (NIZORAL) 2 % cream Apply topically 2 (two) times daily as needed irritation body folds 60 g 2    ketoconazole (NIZORAL) 2 % shampoo Apply topically to scalp in place  of regular shampoo 2 to 3 times a week 120 mL 9    lancets 33 gauge Misc 1 lancet by Misc.(Non-Drug; Combo Route) route once daily. 100 each 3    linaCLOtide (LINZESS) 145 mcg Cap capsule Take 1 capsule (145 mcg total) by mouth once daily. 90 capsule 1    loratadine 10 mg Cap       metFORMIN (GLUCOPHAGE) 500 MG tablet Take 2 tablets (1,000 mg total) by mouth 2 (two) times daily with meals. 360 tablet 1    minoxidiL (LONITEN) 2.5 MG tablet Take 1/4  to 1/2 tablet by mouth nightly as tolerated (Patient taking differently: Take 2.5 mg by mouth once daily.) 45 tablet 3    multivitamin (THERAGRAN) per tablet Take 1 tablet by mouth once daily.      naloxone (NARCAN) 4 mg/actuation Spry 4mg by nasal route as needed for opioid overdose; may repeat every 2-3 minutes in alternating nostrils until medical help arrives. Call 911 (Patient not taking: Reported on 2022) 2 each 11    nystatin (MYCOSTATIN) powder Apply topically 4 (four) times daily Under pannus as needed 180 g 2    olmesartan (BENICAR) 20 MG tablet Take 1 tablet (20 mg total) by mouth once daily. 90 tablet 1    omeprazole (PRILOSEC) 20 MG capsule Take 1 capsule by mouth once daily 90 capsule 1    tiZANidine (ZANAFLEX) 4 MG tablet Take 1 tablet (4 mg total) by mouth every 8 (eight) hours as needed (Muscle spasm.). 90 tablet 2    traMADoL (ULTRAM) 50 mg tablet Take 1 tablet by mouth every 6 hours as needed for pain 120 tablet 0       Past Surgical History:   Procedure Laterality Date    BREAST BIOPSY      BREAST LUMPECTOMY Right     2015    BREAST RECONSTRUCTION Right 2021    Procedure: RECONSTRUCTION, BREAST;  Surgeon: Nolberto Eng MD;  Location: [facility] OR 2ND FLR;  Service: Plastics;  Laterality: Right;    CATARACT EXTRACTION, BILATERAL       SECTION      x3    CHOLECYSTECTOMY      COLONOSCOPY N/A 10/11/2018    Procedure: COLONOSCOPY;  Surgeon: Lorenzo Tanner MD;  Location: [facility] (4TH FLR);  Service: Endoscopy;   Laterality: N/A;    COLONOSCOPY W/ POLYPECTOMY      HYSTERECTOMY  1989    and USO    INJECTION OF FACET JOINT Bilateral 7/25/2018    Procedure: INJECTION, FACET JOINT;  Surgeon: Vite Wilson MD;  Location: [facility] PAIN MGT;  Service: Pain Management;  Laterality: Bilateral;  LUMBAR BILATERAL L3-L4 AND L4-L5 FACET STEROID INJECTION    NEED CONSENT    INJECTION OF JOINT Bilateral 7/8/2020    Procedure: INJECTION, JOINT, Bilateral SI;  Surgeon: Viet Wilson MD;  Location: [facility] PAIN MGT;  Service: Pain Management;  Laterality: Bilateral;    MASTECTOMY Right 11/29/2021    Procedure: MASTECTOMY, total, right breast with right sentinel lymph node biospy;  Surgeon: Heber Evans MD;  [facility];  Service: General;  Laterality: Right;    MASTOPEXY Left 4/11/2022    Procedure: MASTOPEXY - left breast;  Surgeon: Nolberto Eng MD;  [facility];  Service: Plastics;  Laterality: Left;    RADIOFREQUENCY ABLATION Left 3/20/2019    Procedure: RADIOFREQUENCY ABLATION LEFT L2,3,4,5;  Surgeon: Viet Wilson MD;  Location: [facility] PAIN MGT;  Service: Pain Management;  Laterality: Left;  LEFT RFA L2,3,4,5    NEEDS CONSENT    RADIOFREQUENCY ABLATION Left 12/18/2019    Procedure: RADIOFREQUENCY ABLATION, LEFT L2-L3-L4-L5  1 OF 2;  Surgeon: Viet Wilson MD;  Location: [facility] PAIN MGT;  Service: Pain Management;  Laterality: Left;    RADIOFREQUENCY ABLATION Right 1/8/2020    Procedure: RADIOFREQUENCY ABLATION, RIGHT L2-L3-L4-L5 MEDIAL BRANCH 2 OF;  Surgeon: Viet Wilson MD;  Location: [facility] PAIN MGT;  Service: Pain Management;  Laterality: Right;    RADIOFREQUENCY ABLATION Right 11/4/2020    Procedure: RADIOFREQUENCY ABLATION Right L2, L3, L4, L5;  Surgeon: Viet Wilson MD;  Location: [facility] PAIN MGT;  Service: Pain Management;  Laterality: Right;  Right L2, L3, L4, L5 RFA    REPLACEMENT OF IMPLANT OF BREAST Right 4/11/2022    Procedure: REPLACEMENT, IMPLANT,  BREAST - right ;  Surgeon: Nolberto Eng MD;  Location: [facility] OR [address] [place] [address];  Service: Plastics;  Laterality: Right;    TOTAL REDUCTION MAMMOPLASTY Left 11/29/2021    Procedure: MAMMOPLASTY, REDUCTION;  Surgeon: Nolberto Eng MD;  [facility];  Service: Plastics;  Laterality: Left;    TRANSFORAMINAL EPIDURAL INJECTION OF STEROID Left 10/2/2019    Procedure: INJECTION, STEROID, EPIDURAL, TRANSFORAMINAL APPROACH, L4 AND L5;  Surgeon: Viet Wilson MD;  [facility];  Service: Pain Management;  Laterality: Left;    TUBAL LIGATION         Social History:  Tobacco Use: Medium Risk    Smoking Tobacco Use: Former Smoker    Smokeless Tobacco Use: Never Used       Alcohol Use: Not on file       OBJECTIVE:     Vital Signs Range:  BMI Readings from Last 1 Encounters:   08/08/22 38.95 kg/m²               Significant Labs:        Component Value Date/Time    WBC 9.60 08/04/2022 1134    HGB 12.8 08/04/2022 1134    HCT 41.4 08/04/2022 1134     08/04/2022 1134     08/04/2022 1134    K 3.9 08/04/2022 1134     08/04/2022 1134    CO2 25 08/04/2022 1134    GLU 94 08/04/2022 1134    BUN 17 08/04/2022 1134    CREATININE 1.0 08/04/2022 1134    CALCIUM 10.3 08/04/2022 1134    ALBUMIN 3.9 03/31/2022 1037    PROT 7.6 03/31/2022 1037    ALKPHOS 97 03/31/2022 1037    BILITOT 0.5 03/31/2022 1037    AST 19 03/31/2022 1037    ALT 10 03/31/2022 1037    HGBA1C 5.7 (H) 03/31/2022 1037        Please see Results Review for additional labs.     Diagnostic Studies: No relevant studies.    EKG:   Results for orders placed or performed in visit on 03/31/22   IN OFFICE EKG 12-LEAD (to [place])    Collection Time: 03/31/22  9:16 AM    Narrative    Test Reason : I10,Z01.818,    Vent. Rate : 076 BPM     Atrial Rate : 076 BPM     P-R Int : 192 ms          QRS Dur : 082 ms      QT Int : 368 ms       P-R-T Axes : 000 019 024 degrees     QTc Int : 414 ms    Sinus rhythm with frequent Premature  ventricular complexes  Low voltage QRS  Nonspecific ST abnormality  Abnormal ECG  When compared with ECG of 03-DEC-2020 11:14,  Premature ventricular complexes are now Present  Nonspecific ST abnormality is now present  Confirmed by TAMERA CISNEROS MD ([id number]) on 3/31/2022 1:09:22 PM    Referred By: BRISEIDA ODOM           Confirmed By:TAMERA CISNEROS MD       ECHO:  No results found for this or any previous visit.        ASSESSMENT/PLAN:       Pre-op Assessment    I have reviewed the Patient Summary Reports.     I have reviewed the Nursing Notes. I have reviewed the NPO Status.   I have reviewed the Medications.     Review of Systems  Anesthesia Hx:  No problems with previous Anesthesia  History of prior surgery of interest to airway management or planning: Denies Family Hx of Anesthesia complications.   Denies Personal Hx of Anesthesia complications.   Hematology/Oncology:  Hematology Normal   Oncology Normal     EENT/Dental:EENT/Dental Normal   Cardiovascular:   Exercise tolerance: good Hypertension hyperlipidemia    Pulmonary:  Pulmonary Normal    Renal/:   Chronic Renal Disease    Hepatic/GI:   GERD    Musculoskeletal:   Arthritis     Neurological:   Peripheral Neuropathy    Endocrine:   Diabetes, type 2    Psych:   Psychiatric History          Physical Exam  General: Well nourished    Airway:  Mallampati: I   Mouth Opening: Normal  TM Distance: 4 - 6 cm  Tongue: Large  Neck ROM: Normal ROM    Dental:  Intact        Anesthesia Plan  Type of Anesthesia, risks & benefits discussed:    Anesthesia Type: Gen ETT  Intra-op Monitoring Plan: Standard ASA Monitors  Post Op Pain Control Plan: multimodal analgesia and IV/PO Opioids PRN  Induction:  IV  Airway Plan: Video, Post-Induction  Informed Consent: Informed consent signed with the Patient and all parties understand the risks and agree with anesthesia plan.  All questions answered.   ASA Score: 3  Day of Surgery Review of History & Physical: H&P Update referred to  the surgeon/provider.    Ready For Surgery From Anesthesia Perspective.     .

## 2022-08-17 NOTE — TELEPHONE ENCOUNTER
No new care gaps identified.  [facility] Embedded Care Gaps. Reference number: [id number]. 8/17/2022   6:34:31 PM CDT

## 2022-08-17 NOTE — TELEPHONE ENCOUNTER
Contacted pt to discuss arrival time for surgery on Thursday, August 18.  Pt advised to arrive on the 2nd floor, [facility] for 5:30a. Pt reminded not to eat or drink after midnight. Pt verbalized understanding.

## 2022-08-18 ENCOUNTER — ANESTHESIA (OUTPATIENT)
Dept: SURGERY | Facility: HOSPITAL | Age: 72
End: 2022-08-18
Payer: MEDICARE

## 2022-08-18 ENCOUNTER — HOSPITAL ENCOUNTER (OUTPATIENT)
Facility: HOSPITAL | Age: 72
Discharge: HOME | End: 2022-08-18
Attending: SURGERY | Admitting: SURGERY
Payer: MEDICARE

## 2022-08-18 VITALS
OXYGEN SATURATION: 100 % | TEMPERATURE: 98 F | BODY MASS INDEX: 38.95 KG/M2 | RESPIRATION RATE: 14 BRPM | HEIGHT: 68 IN | WEIGHT: 257 LBS | HEART RATE: 60 BPM | SYSTOLIC BLOOD PRESSURE: 103 MMHG | DIASTOLIC BLOOD PRESSURE: 51 MMHG

## 2022-08-18 DIAGNOSIS — Z98.890 STATUS POST BILATERAL BREAST RECONSTRUCTION: ICD-10-CM

## 2022-08-18 LAB
POCT GLUCOSE: 111 MG/DL (ref 70–110)
POCT GLUCOSE: 99 MG/DL (ref 70–110)

## 2022-08-18 PROCEDURE — 19342 INSJ/RPLCMT BRST IMPLT SEP D: CPT | Mod: RT,,, | Performed by: SURGERY

## 2022-08-18 PROCEDURE — 25000003 PHARM REV CODE 250

## 2022-08-18 PROCEDURE — C1789 PROSTHESIS, BREAST, IMP: HCPCS | Performed by: SURGERY

## 2022-08-18 PROCEDURE — 82962 GLUCOSE BLOOD TEST: CPT | Performed by: SURGERY

## 2022-08-18 PROCEDURE — 88300 SURGICAL PATH GROSS: CPT | Performed by: STUDENT IN AN ORGANIZED HEALTH CARE EDUCATION/TRAINING PROGRAM

## 2022-08-18 PROCEDURE — 88300 SURGICAL PATH GROSS: CPT | Mod: 26,,, | Performed by: STUDENT IN AN ORGANIZED HEALTH CARE EDUCATION/TRAINING PROGRAM

## 2022-08-18 PROCEDURE — 19370 REVJ PERI-IMPLT CAPSULE BRST: CPT | Mod: 59,RT,, | Performed by: SURGERY

## 2022-08-18 PROCEDURE — 37000008 HC ANESTHESIA 1ST 15 MINUTES: Performed by: SURGERY

## 2022-08-18 PROCEDURE — 36000706: Performed by: SURGERY

## 2022-08-18 PROCEDURE — 63600175 PHARM REV CODE 636 W HCPCS: Performed by: SURGERY

## 2022-08-18 PROCEDURE — D9220A PRA ANESTHESIA: Mod: ,,, | Performed by: ANESTHESIOLOGY

## 2022-08-18 PROCEDURE — 25000003 PHARM REV CODE 250: Performed by: SURGERY

## 2022-08-18 PROCEDURE — 71000015 HC POSTOP RECOV 1ST HR: Performed by: SURGERY

## 2022-08-18 PROCEDURE — 71000044 HC DOSC ROUTINE RECOVERY FIRST HOUR: Performed by: SURGERY

## 2022-08-18 PROCEDURE — 36000707: Performed by: SURGERY

## 2022-08-18 PROCEDURE — 63600175 PHARM REV CODE 636 W HCPCS

## 2022-08-18 PROCEDURE — 19342 PR DELAY BREAST PROS AFTER BREAST SURG: ICD-10-PCS | Mod: RT,,, | Performed by: SURGERY

## 2022-08-18 PROCEDURE — 88300 PR  SURG PATH,GROSS,LEVEL I: ICD-10-PCS | Mod: 26,,, | Performed by: STUDENT IN AN ORGANIZED HEALTH CARE EDUCATION/TRAINING PROGRAM

## 2022-08-18 PROCEDURE — 37000009 HC ANESTHESIA EA ADD 15 MINS: Performed by: SURGERY

## 2022-08-18 PROCEDURE — D9220A PRA ANESTHESIA: ICD-10-PCS | Mod: ,,, | Performed by: ANESTHESIOLOGY

## 2022-08-18 PROCEDURE — 71000016 HC POSTOP RECOV ADDL HR: Performed by: SURGERY

## 2022-08-18 PROCEDURE — 82962 GLUCOSE BLOOD TEST: CPT | Mod: 91 | Performed by: SURGERY

## 2022-08-18 PROCEDURE — 19370 PR SURGERY OF BREAST CAPSULE: ICD-10-PCS | Mod: 59,RT,, | Performed by: SURGERY

## 2022-08-18 PROCEDURE — 25000003 PHARM REV CODE 250: Performed by: STUDENT IN AN ORGANIZED HEALTH CARE EDUCATION/TRAINING PROGRAM

## 2022-08-18 DEVICE — IMPLANTABLE DEVICE: Type: IMPLANTABLE DEVICE | Site: BREAST | Status: FUNCTIONAL

## 2022-08-18 RX ORDER — SODIUM CHLORIDE 0.9 % (FLUSH) 0.9 %
10 SYRINGE (ML) INJECTION
Status: DISCONTINUED | OUTPATIENT
Start: 2022-08-18 | End: 2022-08-18 | Stop reason: HOSPADM

## 2022-08-18 RX ORDER — HYDROCODONE BITARTRATE AND ACETAMINOPHEN 5; 325 MG/1; MG/1
1 TABLET ORAL EVERY 4 HOURS PRN
Status: DISCONTINUED | OUTPATIENT
Start: 2022-08-18 | End: 2022-08-18 | Stop reason: HOSPADM

## 2022-08-18 RX ORDER — LIDOCAINE HYDROCHLORIDE 10 MG/ML
1 INJECTION, SOLUTION EPIDURAL; INFILTRATION; INTRACAUDAL; PERINEURAL ONCE
Status: COMPLETED | OUTPATIENT
Start: 2022-08-18 | End: 2022-08-18

## 2022-08-18 RX ORDER — SODIUM CHLORIDE 0.9 % (FLUSH) 0.9 %
10 SYRINGE (ML) INJECTION
Status: ACTIVE | OUTPATIENT
Start: 2022-08-18

## 2022-08-18 RX ORDER — CLINDAMYCIN HYDROCHLORIDE 300 MG/1
300 CAPSULE ORAL 3 TIMES DAILY
Qty: 15 CAPSULE | Refills: 0 | Status: SHIPPED | OUTPATIENT
Start: 2022-08-18 | End: 2022-08-23

## 2022-08-18 RX ORDER — CEFAZOLIN SODIUM/WATER 2 G/20 ML
2 SYRINGE (ML) INTRAVENOUS
Status: COMPLETED | OUTPATIENT
Start: 2022-08-18 | End: 2022-08-18

## 2022-08-18 RX ORDER — MIDAZOLAM HYDROCHLORIDE 1 MG/ML
INJECTION, SOLUTION INTRAMUSCULAR; INTRAVENOUS
Status: DISCONTINUED | OUTPATIENT
Start: 2022-08-18 | End: 2022-08-18

## 2022-08-18 RX ORDER — CIPROFLOXACIN 2 MG/ML
INJECTION, SOLUTION INTRAVENOUS
Status: COMPLETED | OUTPATIENT
Start: 2022-08-18 | End: 2022-08-18

## 2022-08-18 RX ORDER — FENTANYL CITRATE 50 UG/ML
INJECTION, SOLUTION INTRAMUSCULAR; INTRAVENOUS
Status: DISCONTINUED | OUTPATIENT
Start: 2022-08-18 | End: 2022-08-18

## 2022-08-18 RX ORDER — MUPIROCIN 20 MG/G
OINTMENT TOPICAL
Status: DISPENSED | OUTPATIENT
Start: 2022-08-18

## 2022-08-18 RX ORDER — EPHEDRINE SULFATE 50 MG/ML
INJECTION, SOLUTION INTRAVENOUS
Status: DISCONTINUED | OUTPATIENT
Start: 2022-08-18 | End: 2022-08-18

## 2022-08-18 RX ORDER — OXYCODONE HYDROCHLORIDE 5 MG/1
5 TABLET ORAL EVERY 4 HOURS PRN
Qty: 28 TABLET | Refills: 0 | Status: SHIPPED | OUTPATIENT
Start: 2022-08-18 | End: 2022-08-18 | Stop reason: ALTCHOICE

## 2022-08-18 RX ORDER — ROCURONIUM BROMIDE 10 MG/ML
INJECTION, SOLUTION INTRAVENOUS
Status: DISCONTINUED | OUTPATIENT
Start: 2022-08-18 | End: 2022-08-18

## 2022-08-18 RX ORDER — HYDROMORPHONE HYDROCHLORIDE 1 MG/ML
0.2 INJECTION, SOLUTION INTRAMUSCULAR; INTRAVENOUS; SUBCUTANEOUS EVERY 5 MIN PRN
Status: CANCELLED | OUTPATIENT
Start: 2022-08-18

## 2022-08-18 RX ORDER — PHENYLEPHRINE HCL IN 0.9% NACL 1 MG/10 ML
SYRINGE (ML) INTRAVENOUS
Status: DISCONTINUED | OUTPATIENT
Start: 2022-08-18 | End: 2022-08-18

## 2022-08-18 RX ORDER — TRAMADOL HYDROCHLORIDE 50 MG/1
50 TABLET ORAL EVERY 6 HOURS PRN
Qty: 28 TABLET | Refills: 0 | Status: SHIPPED | OUTPATIENT
Start: 2022-08-18 | End: 2022-08-18 | Stop reason: HOSPADM

## 2022-08-18 RX ORDER — PROPOFOL 10 MG/ML
VIAL (ML) INTRAVENOUS
Status: DISCONTINUED | OUTPATIENT
Start: 2022-08-18 | End: 2022-08-18

## 2022-08-18 RX ORDER — MUPIROCIN 20 MG/G
OINTMENT TOPICAL 2 TIMES DAILY
Status: DISCONTINUED | OUTPATIENT
Start: 2022-08-18 | End: 2022-08-18 | Stop reason: HOSPADM

## 2022-08-18 RX ORDER — ONDANSETRON 2 MG/ML
INJECTION INTRAMUSCULAR; INTRAVENOUS
Status: DISCONTINUED | OUTPATIENT
Start: 2022-08-18 | End: 2022-08-18

## 2022-08-18 RX ORDER — DEXAMETHASONE SODIUM PHOSPHATE 4 MG/ML
INJECTION, SOLUTION INTRA-ARTICULAR; INTRALESIONAL; INTRAMUSCULAR; INTRAVENOUS; SOFT TISSUE
Status: DISCONTINUED | OUTPATIENT
Start: 2022-08-18 | End: 2022-08-18

## 2022-08-18 RX ORDER — TRAMADOL HYDROCHLORIDE 50 MG/1
TABLET ORAL
Qty: 120 TABLET | Refills: 0 | Status: SHIPPED | OUTPATIENT
Start: 2022-08-18 | End: 2022-09-28 | Stop reason: SDUPTHER

## 2022-08-18 RX ORDER — HALOPERIDOL 5 MG/ML
0.5 INJECTION INTRAMUSCULAR EVERY 10 MIN PRN
Status: DISCONTINUED | OUTPATIENT
Start: 2022-08-18 | End: 2022-08-18 | Stop reason: HOSPADM

## 2022-08-18 RX ADMIN — Medication 50 MCG: at 08:08

## 2022-08-18 RX ADMIN — Medication 2 G: at 08:08

## 2022-08-18 RX ADMIN — SUGAMMADEX 200 MG: 100 INJECTION, SOLUTION INTRAVENOUS at 09:08

## 2022-08-18 RX ADMIN — Medication 50 MCG: at 09:08

## 2022-08-18 RX ADMIN — ONDANSETRON 4 MG: 2 INJECTION INTRAMUSCULAR; INTRAVENOUS at 09:08

## 2022-08-18 RX ADMIN — HYDROCODONE BITARTRATE AND ACETAMINOPHEN 1 TABLET: 5; 325 TABLET ORAL at 10:08

## 2022-08-18 RX ADMIN — EPHEDRINE SULFATE 5 MG: 50 INJECTION INTRAVENOUS at 08:08

## 2022-08-18 RX ADMIN — SODIUM CHLORIDE: 0.9 INJECTION, SOLUTION INTRAVENOUS at 08:08

## 2022-08-18 RX ADMIN — ROCURONIUM BROMIDE 10 MG: 10 INJECTION INTRAVENOUS at 09:08

## 2022-08-18 RX ADMIN — EPHEDRINE SULFATE 10 MG: 50 INJECTION INTRAVENOUS at 08:08

## 2022-08-18 RX ADMIN — EPHEDRINE SULFATE 10 MG: 50 INJECTION INTRAVENOUS at 09:08

## 2022-08-18 RX ADMIN — LIDOCAINE HYDROCHLORIDE 40 ML: 10 INJECTION, SOLUTION EPIDURAL; INFILTRATION; INTRACAUDAL at 08:08

## 2022-08-18 RX ADMIN — ROCURONIUM BROMIDE 10 MG: 10 INJECTION INTRAVENOUS at 08:08

## 2022-08-18 RX ADMIN — MUPIROCIN: 20 OINTMENT TOPICAL at 06:08

## 2022-08-18 RX ADMIN — LIDOCAINE HYDROCHLORIDE 60 ML: 10 INJECTION, SOLUTION EPIDURAL; INFILTRATION; INTRACAUDAL at 08:08

## 2022-08-18 RX ADMIN — GLYCOPYRROLATE 0.2 MG: 0.2 INJECTION, SOLUTION INTRAMUSCULAR; INTRAVITREAL at 09:08

## 2022-08-18 RX ADMIN — FENTANYL CITRATE 100 MCG: 0.05 INJECTION, SOLUTION INTRAMUSCULAR; INTRAVENOUS at 08:08

## 2022-08-18 RX ADMIN — Medication 100 MCG: at 09:08

## 2022-08-18 RX ADMIN — PROPOFOL 160 MG: 10 INJECTION, EMULSION INTRAVENOUS at 08:08

## 2022-08-18 RX ADMIN — Medication 100 MCG: at 08:08

## 2022-08-18 RX ADMIN — MIDAZOLAM HYDROCHLORIDE 2 MG: 1 INJECTION, SOLUTION INTRAMUSCULAR; INTRAVENOUS at 07:08

## 2022-08-18 RX ADMIN — FENTANYL CITRATE 50 MCG: 0.05 INJECTION, SOLUTION INTRAMUSCULAR; INTRAVENOUS at 09:08

## 2022-08-18 RX ADMIN — MIDAZOLAM HYDROCHLORIDE 2 MG: 1 INJECTION, SOLUTION INTRAMUSCULAR; INTRAVENOUS at 08:08

## 2022-08-18 RX ADMIN — DEXAMETHASONE SODIUM PHOSPHATE 4 MG: 4 INJECTION INTRA-ARTICULAR; INTRALESIONAL; INTRAMUSCULAR; INTRAVENOUS; SOFT TISSUE at 08:08

## 2022-08-18 RX ADMIN — ROCURONIUM BROMIDE 40 MG: 10 INJECTION INTRAVENOUS at 08:08

## 2022-08-18 NOTE — TRANSFER OF CARE
"Anesthesia Transfer of Care Note    Patient: Linnea Renae    Procedure(s) Performed: Procedure(s) (LRB):  REPLACEMENT, IMPLANT, BREAST (Right)  EVACUATION, HEMATOMA (Right)    Patient location: [facility]    Anesthesia Type: general    Transport from OR: Transported from OR on 6-10 L/min O2 by face mask with adequate spontaneous ventilation    Post pain: adequate analgesia    Post assessment: no apparent anesthetic complications and tolerated procedure well    Post vital signs: stable    Level of consciousness: awake and alert    Nausea/Vomiting: no nausea/vomiting    Complications: none    Transfer of care protocol was followed      Last vitals:   Visit Vitals  BP (!) 112/56 (BP Location: Left arm, Patient Position: Lying)   Pulse (!) 55   Resp 20   Ht 5' 8" (1.727 m)   Wt 116.6 kg (257 lb)   SpO2 100%   Breastfeeding No   BMI 39.08 kg/m²     "

## 2022-08-18 NOTE — ANESTHESIA PROCEDURE NOTES
Intubation    Date/Time: 8/18/2022 8:22 AM  Performed by: Guido Good DO  Authorized by: Guido Good DO     Intubation:     Induction:  Intravenous    Intubated:  Postinduction    Mask Ventilation:  Easy mask    Attempts:  1    Attempted By:  Student    Method of Intubation:  Video laryngoscopy    Blade:  Melo 3    Laryngeal View Grade: Grade I - full view of cords      Difficult Airway Encountered?: No      Complications:  None    Airway Device:  Oral endotracheal tube    Airway Device Size:  7.0    Style/Cuff Inflation:  Cuffed (inflated to minimal occlusive pressure)    Inflation Amount (mL):  7    Tube secured:  22    Secured at:  The lips    Placement Verified By:  Capnometry and Revisualization with laryngoscopy    Complicating Factors:  None    Findings Post-Intubation:  BS equal bilateral and atraumatic/condition of teeth unchanged

## 2022-08-18 NOTE — INTERVAL H&P NOTE
The patient has been examined and the H&P has been reviewed:    I concur with the findings and no changes have occurred since H&P was written.    Surgery risks, benefits and alternative options discussed and understood by patient/family.      Plan for R implant exchange  ROS negative except for asymmetry    There are no hospital problems to display for this patient.

## 2022-08-18 NOTE — BRIEF OP NOTE
[facility] - Surgery ([facility])  Brief Operative Note    Surgery Date: 8/18/2022     Surgeon(s) and Role:     * Jeffrey Eng MD - Primary    Assisting Surgeon: None    Pre-op Diagnosis:  History of breast cancer [Z85.3]    Post-op Diagnosis:  Post-Op Diagnosis Codes:     * History of breast cancer [Z85.3]    Procedure(s) (LRB):  REPLACEMENT, IMPLANT, BREAST (Right)  EVACUATION, HEMATOMA (Right)    Anesthesia: General    Operative Findings: breast asymmetry     Estimated Blood Loss: * No values recorded between 8/18/2022  8:38 AM and 8/18/2022  9:43 AM *         Specimens:   Specimen (24h ago, onward)             Start     Ordered    08/18/22 0942  Specimen to Pathology, Surgery Breast  Once        Comments: Pre-op Diagnosis: History of breast cancer [Z85.3]Procedure(s):REPLACEMENT, IMPLANT, BREASTEVACUATION, HEMATOMA Number of specimens: 1Name of specimens: 1) Right Explanted Breast Implant     References:    Click here for ordering Quick Tip   Question Answer Comment   Procedure Type: Breast    Which provider would you like to cc? JEFFREY ENG    Release to patient Immediate        08/18/22 0947                  Discharge Note    OUTCOME: Patient tolerated treatment/procedure well without complication and is now ready for discharge.    DISPOSITION: Home or Self Care    FINAL DIAGNOSIS:breast asymmetry and hematoma    FOLLOWUP: In clinic    DISCHARGE INSTRUCTIONS:    Discharge Procedure Orders   Diet general     Remove dressing in 24 hours     Call MD for:  temperature >100.4     Call MD for:  persistent nausea and vomiting     Call MD for:  severe uncontrolled pain     Call MD for:  redness, tenderness, or signs of infection (pain, swelling, redness, odor or green/yellow discharge around incision site)

## 2022-08-18 NOTE — PATIENT INSTRUCTIONS
Okay to remove bandages in 24hrs and shower in 48hrs, avoid submerging the incisions in bath tub/pool  Use of narcotics can lead to constipation, recommend taking a stool softener while on medications  Call the office with any questions or concerns

## 2022-08-19 NOTE — OP NOTE
Date of surgery 08/18/2022  Preoperative diagnosis history of breast cancer  Postoperative diagnosis is the same  Procedure performed  Right implant exchange  Inferior extensive capsulotomy  Surgeon Velasquez  Anesthesia general  Complications normal volume blood loss minimal  Drains none     The patient was placed in a supine position following adequate general endotracheal anesthesia was prepped and draped in normal sterile fashion.  The previous mastectomy incision was then opened.  The previous implant was removed.  There was noted to be some seroma fluid around the implant which was bloody.  This was removed.  Next the wound was irrigated with copious amounts of Betadine followed by triple antibiotic solution.  Temporary sizers were then placed.  Next, extensive checkerboard inferior capsulotomy was then performed.  Gloves were changed and new smooth silicone implant was opened on the back table.  It was soaked in triple antibiotic solution.  It was placed in the pocket.  The capsule was closed using a running 2-0 Vicryl the skin using interrupted 3-0 Monocryl followed by running 4 Monocryl subcuticular suture.  Should be noted the patient was placed in the upright position to make sure that there was good balance and symmetry which there was.  There were no complications with this procedure under dictation

## 2022-08-19 NOTE — ANESTHESIA POSTPROCEDURE EVALUATION
Anesthesia Post Evaluation    Patient: Linnea Renae    Procedure(s) Performed: Procedure(s) (LRB):  REPLACEMENT, IMPLANT, BREAST (Right)  EVACUATION, HEMATOMA (Right)    Final Anesthesia Type: general      Patient location during evaluation: PACU  Patient participation: Yes- Able to Participate  Level of consciousness: awake and alert  Post-procedure vital signs: reviewed and stable  Pain management: adequate  Airway patency: patent    PONV status at discharge: No PONV  Anesthetic complications: no      Cardiovascular status: blood pressure returned to baseline  Respiratory status: unassisted  Hydration status: euvolemic  Follow-up not needed.          Vitals Value Taken Time   /51 08/18/22 1202   Temp 36.5 °C (97.7 °F) 08/18/22 0948   Pulse 60 08/18/22 1209   Resp 14 08/18/22 1209   SpO2 99 % 08/18/22 1209   Vitals shown include unvalidated device data.      No case tracking events are documented in the log.      Pain/Nneka Score: Pain Rating Prior to Med Admin: 10 (8/18/2022 10:17 AM)  Pain Rating Post Med Admin: 4 (8/18/2022 12:00 PM)  Nneka Score: 10 (8/18/2022  9:48 AM)

## 2022-08-22 ENCOUNTER — PES CALL (OUTPATIENT)
Dept: PRIMARY CARE CLINIC | Facility: CLINIC | Age: 72
End: 2022-08-22
Payer: MEDICARE

## 2022-08-24 ENCOUNTER — OFFICE VISIT (OUTPATIENT)
Dept: PLASTIC SURGERY | Facility: CLINIC | Age: 72
End: 2022-08-24
Payer: MEDICARE

## 2022-08-24 VITALS
HEART RATE: 65 BPM | DIASTOLIC BLOOD PRESSURE: 55 MMHG | BODY MASS INDEX: 38.95 KG/M2 | SYSTOLIC BLOOD PRESSURE: 118 MMHG | HEIGHT: 68 IN | WEIGHT: 257 LBS

## 2022-08-24 DIAGNOSIS — Z09 SURGERY FOLLOW-UP EXAMINATION: Primary | ICD-10-CM

## 2022-08-24 PROCEDURE — 3074F SYST BP LT 130 MM HG: CPT | Mod: CPTII,S$GLB,, | Performed by: SURGERY

## 2022-08-24 PROCEDURE — 3044F HG A1C LEVEL LT 7.0%: CPT | Mod: CPTII,S$GLB,, | Performed by: SURGERY

## 2022-08-24 PROCEDURE — 3008F BODY MASS INDEX DOCD: CPT | Mod: CPTII,S$GLB,, | Performed by: SURGERY

## 2022-08-24 PROCEDURE — 1126F AMNT PAIN NOTED NONE PRSNT: CPT | Mod: CPTII,S$GLB,, | Performed by: SURGERY

## 2022-08-24 PROCEDURE — 3078F DIAST BP <80 MM HG: CPT | Mod: CPTII,S$GLB,, | Performed by: SURGERY

## 2022-08-24 PROCEDURE — 3078F PR MOST RECENT DIASTOLIC BLOOD PRESSURE < 80 MM HG: ICD-10-PCS | Mod: CPTII,S$GLB,, | Performed by: SURGERY

## 2022-08-24 PROCEDURE — 1126F PR PAIN SEVERITY QUANTIFIED, NO PAIN PRESENT: ICD-10-PCS | Mod: CPTII,S$GLB,, | Performed by: SURGERY

## 2022-08-24 PROCEDURE — 1101F PR PT FALLS ASSESS DOC 0-1 FALLS W/OUT INJ PAST YR: ICD-10-PCS | Mod: CPTII,S$GLB,, | Performed by: SURGERY

## 2022-08-24 PROCEDURE — 4010F PR ACE/ARB THEARPY RXD/TAKEN: ICD-10-PCS | Mod: CPTII,S$GLB,, | Performed by: SURGERY

## 2022-08-24 PROCEDURE — 3061F NEG MICROALBUMINURIA REV: CPT | Mod: CPTII,S$GLB,, | Performed by: SURGERY

## 2022-08-24 PROCEDURE — 3066F PR DOCUMENTATION OF TREATMENT FOR NEPHROPATHY: ICD-10-PCS | Mod: CPTII,S$GLB,, | Performed by: SURGERY

## 2022-08-24 PROCEDURE — 3061F PR NEG MICROALBUMINURIA RESULT DOCUMENTED/REVIEW: ICD-10-PCS | Mod: CPTII,S$GLB,, | Performed by: SURGERY

## 2022-08-24 PROCEDURE — 1159F MED LIST DOCD IN RCRD: CPT | Mod: CPTII,S$GLB,, | Performed by: SURGERY

## 2022-08-24 PROCEDURE — 1160F RVW MEDS BY RX/DR IN RCRD: CPT | Mod: CPTII,S$GLB,, | Performed by: SURGERY

## 2022-08-24 PROCEDURE — 1160F PR REVIEW ALL MEDS BY PRESCRIBER/CLIN PHARMACIST DOCUMENTED: ICD-10-PCS | Mod: CPTII,S$GLB,, | Performed by: SURGERY

## 2022-08-24 PROCEDURE — 99999 PR PBB SHADOW E&M-EST. PATIENT-LVL III: ICD-10-PCS | Mod: PBBFAC,,, | Performed by: SURGERY

## 2022-08-24 PROCEDURE — 3288F FALL RISK ASSESSMENT DOCD: CPT | Mod: CPTII,S$GLB,, | Performed by: SURGERY

## 2022-08-24 PROCEDURE — 4010F ACE/ARB THERAPY RXD/TAKEN: CPT | Mod: CPTII,S$GLB,, | Performed by: SURGERY

## 2022-08-24 PROCEDURE — 3008F PR BODY MASS INDEX (BMI) DOCUMENTED: ICD-10-PCS | Mod: CPTII,S$GLB,, | Performed by: SURGERY

## 2022-08-24 PROCEDURE — 3044F PR MOST RECENT HEMOGLOBIN A1C LEVEL <7.0%: ICD-10-PCS | Mod: CPTII,S$GLB,, | Performed by: SURGERY

## 2022-08-24 PROCEDURE — 99999 PR PBB SHADOW E&M-EST. PATIENT-LVL III: CPT | Mod: PBBFAC,,, | Performed by: SURGERY

## 2022-08-24 PROCEDURE — 99024 POSTOP FOLLOW-UP VISIT: CPT | Mod: S$GLB,,, | Performed by: SURGERY

## 2022-08-24 PROCEDURE — 3288F PR FALLS RISK ASSESSMENT DOCUMENTED: ICD-10-PCS | Mod: CPTII,S$GLB,, | Performed by: SURGERY

## 2022-08-24 PROCEDURE — 3074F PR MOST RECENT SYSTOLIC BLOOD PRESSURE < 130 MM HG: ICD-10-PCS | Mod: CPTII,S$GLB,, | Performed by: SURGERY

## 2022-08-24 PROCEDURE — 1159F PR MEDICATION LIST DOCUMENTED IN MEDICAL RECORD: ICD-10-PCS | Mod: CPTII,S$GLB,, | Performed by: SURGERY

## 2022-08-24 PROCEDURE — 99024 PR POST-OP FOLLOW-UP VISIT: ICD-10-PCS | Mod: S$GLB,,, | Performed by: SURGERY

## 2022-08-24 PROCEDURE — 3066F NEPHROPATHY DOC TX: CPT | Mod: CPTII,S$GLB,, | Performed by: SURGERY

## 2022-08-24 PROCEDURE — 1101F PT FALLS ASSESS-DOCD LE1/YR: CPT | Mod: CPTII,S$GLB,, | Performed by: SURGERY

## 2022-08-24 NOTE — PROGRESS NOTES
Plastic Surgery Clinic Postop Visit    Subjective:      Linnea Renae is a 72 y.o. year old female who presents to the Plastic Surgery Clinic on 08/24/2022 for follow up visit status post bilateral implant exchange, capsulotomy and evacuation of hematoma doing well. Patient states breast feel significantly better and soft after surgery, no issues. Denies fever, chills, nausea, vomiting, or other systemic signs of infection.    Vitals:    08/24/22 1156   BP: (!) 118/55   Pulse: 65        Review of patient's allergies indicates:   Allergen Reactions    Codeine Itching       Current Outpatient Medications on File Prior to Visit   Medication Sig Dispense Refill    alcohol antiseptic pads (ALCOHOL PREP PADS TOP)       aspirin 81 MG Chew Take 81 mg by mouth once daily.      atenoloL (TENORMIN) 50 MG tablet TAKE 1 TABLET BY MOUTH TWICE DAILY 180 tablet 3    atorvastatin (LIPITOR) 20 MG tablet Take 1 tablet (20 mg total) by mouth every evening. 90 tablet 1    blood sugar diagnostic (TRUE METRIX GLUCOSE TEST STRIP) Strp 1 strip by Misc.(Non-Drug; Combo Route) route once daily. 100 each 3    calcium-vitamin D3 500 mg(1,250mg) -200 unit per tablet Take 1 tablet by mouth 2 (two) times daily with meals.       clobetasoL (TEMOVATE) 0.05 % cream Apply topically 2 (two) times daily. Top of both feet 45 g 2    cycloSPORINE (RESTASIS) 0.05 % ophthalmic emulsion Instill 1 drop into both eyes twice a day 180 each 3    diclofenac sodium (VOLTAREN) 1 % Gel Apply 2 g topically 4 (four) times daily. 100 g 2    fluticasone propionate (FLONASE) 50 mcg/actuation nasal spray 2 sprays (100 mcg total) by Each Nostril route once daily. 48 g 1    hydroCHLOROthiazide (HYDRODIURIL) 12.5 MG Tab Take 1 tablet (12.5 mg total) by mouth once daily. 90 tablet 1    ibuprofen (ADVIL,MOTRIN) 800 MG tablet Take 1 tablet by mouth 3 times daily as needed 60 tablet 1    ketoconazole (NIZORAL) 2 % cream Apply topically 2 (two) times daily as  needed irritation body folds 60 g 2    ketoconazole (NIZORAL) 2 % shampoo Apply topically to scalp in place of regular shampoo 2 to 3 times a week 120 mL 9    lancets 33 gauge Misc 1 lancet by Misc.(Non-Drug; Combo Route) route once daily. 100 each 3    linaCLOtide (LINZESS) 145 mcg Cap capsule Take 1 capsule (145 mcg total) by mouth once daily. 90 capsule 1    loratadine 10 mg Cap       metFORMIN (GLUCOPHAGE) 500 MG tablet Take 2 tablets (1,000 mg total) by mouth 2 (two) times daily with meals. 360 tablet 1    minoxidiL (LONITEN) 2.5 MG tablet Take 1/4  to 1/2 tablet by mouth nightly as tolerated (Patient taking differently: Take by mouth once daily.) 45 tablet 3    multivitamin (THERAGRAN) per tablet Take 1 tablet by mouth once daily.      naloxone (NARCAN) 4 mg/actuation Spry 4mg by nasal route as needed for opioid overdose; may repeat every 2-3 minutes in alternating nostrils until medical help arrives. Call 911 2 each 11    nystatin (MYCOSTATIN) powder Apply topically 4 (four) times daily Under pannus as needed 180 g 2    olmesartan (BENICAR) 20 MG tablet Take 1 tablet (20 mg total) by mouth once daily. 90 tablet 1    omeprazole (PRILOSEC) 20 MG capsule Take 1 capsule by mouth once daily 90 capsule 1    tiZANidine (ZANAFLEX) 4 MG tablet Take 1 tablet (4 mg total) by mouth every 8 (eight) hours as needed (Muscle spasm.). 90 tablet 2    topiramate (TOPAMAX) 50 MG tablet Take 2 tablets (100 mg total) by mouth every evening. 180 tablet 0    traMADoL (ULTRAM) 50 mg tablet Take 1 tablet by mouth every 6 hours as needed for pain 120 tablet 0    ALPRAZolam (XANAX) 0.5 MG tablet Take 1 tablet (0.5 mg total) by mouth 3 (three) times daily. 30 tablet 0    [] clindamycin (CLEOCIN) 300 MG capsule Take 1 capsule (300 mg total) by mouth 3 (three) times daily. for 5 days 15 capsule 0     Current Facility-Administered Medications on File Prior to Visit   Medication Dose Route Frequency Provider Last Rate  Last Admin    mupirocin 2 % ointment   Nasal On Call Procedure Nereida Jreome MD   Given at 22 0656    sodium chloride 0.9% flush 10 mL  10 mL Intravenous PRN Nereida Jerome MD           Patient Active Problem List   Diagnosis    Hypertension    Type 2 diabetes mellitus without complication, without long-term current use of insulin    Hyperlipidemia    DDD (degenerative disc disease), lumbar    Perennial allergic rhinitis    Dry eyes    Morbid obesity with BMI of 40.0-44.9, adult    GERD (gastroesophageal reflux disease)    Mazoplasia    DCIS (ductal carcinoma in situ) of breast    Anal skin tag    Spinal stenosis, lumbar    Atherosclerosis of aorta    Chronic kidney disease, stage II (mild)    Positive depression screening    Chronic pain disorder    Impaired gait and mobility    Decreased functional activity tolerance    Osteoarthritis of lumbar spine    Diabetic peripheral neuropathy associated with type 2 diabetes mellitus    Spondylosis without myelopathy    Sacroiliitis    Greater trochanteric bursitis    Chronic pain    Essential (hemorrhagic) thrombocythemia    Unspecified mood (affective) disorder       Past Surgical History:   Procedure Laterality Date    BREAST BIOPSY      BREAST LUMPECTOMY Right         BREAST RECONSTRUCTION Right 2021    Procedure: RECONSTRUCTION, BREAST;  Surgeon: Nolberto Eng MD;  Location: [facility] OR [address] [place] [address];  Service: Plastics;  Laterality: Right;    CATARACT EXTRACTION, BILATERAL       SECTION      x3    CHOLECYSTECTOMY      COLONOSCOPY N/A 10/11/2018    Procedure: COLONOSCOPY;  Surgeon: Lorenzo Tanner MD;  Location: [facility] (4TH FLR);  Service: Endoscopy;  Laterality: N/A;    COLONOSCOPY W/ POLYPECTOMY      EVACUATION OF HEMATOMA Right 2022    Procedure: EVACUATION, HEMATOMA;  Surgeon: Nolberto Eng MD;  Location: [facility] OR 2ND FLR;  Service: Plastics;  Laterality: Right;    HYSTERECTOMY      and USO     INJECTION OF FACET JOINT Bilateral 7/25/2018    Procedure: INJECTION, FACET JOINT;  Surgeon: Viet Wilson MD;  Location: [facility] PAIN MGT;  Service: Pain Management;  Laterality: Bilateral;  LUMBAR BILATERAL L3-L4 AND L4-L5 FACET STEROID INJECTION    NEED CONSENT    INJECTION OF JOINT Bilateral 7/8/2020    Procedure: INJECTION, JOINT, Bilateral SI;  Surgeon: Viet Wilson MD;  Location: [facility] PAIN MGT;  Service: Pain Management;  Laterality: Bilateral;    MASTECTOMY Right 11/29/2021    Procedure: MASTECTOMY, total, right breast with right sentinel lymph node biospy;  Surgeon: Heber Evans MD;  Location: [facility] OR [facility] FLR;  Service: General;  Laterality: Right;    MASTOPEXY Left 4/11/2022    Procedure: MASTOPEXY - left breast;  Surgeon: Nolberto Eng MD;  [facility];  Service: Plastics;  Laterality: Left;    RADIOFREQUENCY ABLATION Left 3/20/2019    Procedure: RADIOFREQUENCY ABLATION LEFT L2,3,4,5;  Surgeon: Viet Wilson MD;  Location: [facility] PAIN MGT;  Service: Pain Management;  Laterality: Left;  LEFT RFA L2,3,4,5    NEEDS CONSENT    RADIOFREQUENCY ABLATION Left 12/18/2019    Procedure: RADIOFREQUENCY ABLATION, LEFT L2-L3-L4-L5  1 OF 2;  Surgeon: Viet Wilson MD;  Location: [facility] PAIN MGT;  Service: Pain Management;  Laterality: Left;    RADIOFREQUENCY ABLATION Right 1/8/2020    Procedure: RADIOFREQUENCY ABLATION, RIGHT L2-L3-L4-L5 MEDIAL BRANCH 2 OF;  Surgeon: Viet Wilson MD;  Location: [facility] PAIN MGT;  Service: Pain Management;  Laterality: Right;    RADIOFREQUENCY ABLATION Right 11/4/2020    Procedure: RADIOFREQUENCY ABLATION Right L2, L3, L4, L5;  Surgeon: Viet Wilson MD;  Location: [facility] PAIN MGT;  Service: Pain Management;  Laterality: Right;  Right L2, L3, L4, L5 RFA    REPLACEMENT OF IMPLANT OF BREAST Right 4/11/2022    Procedure: REPLACEMENT, IMPLANT, BREAST - right ;  Surgeon: Nolberto Eng MD;  [facility];   Service: Plastics;  Laterality: Right;    REPLACEMENT OF IMPLANT OF BREAST Right 2022    Procedure: REPLACEMENT, IMPLANT, BREAST;  Surgeon: Nolberto Eng MD;  Location: [facility] OR [address] [place] [address];  Service: Plastics;  Laterality: Right;    TOTAL REDUCTION MAMMOPLASTY Left 2021    Procedure: MAMMOPLASTY, REDUCTION;  Surgeon: Nolberto Eng MD;  [facility];  Service: Plastics;  Laterality: Left;    TRANSFORAMINAL EPIDURAL INJECTION OF STEROID Left 10/2/2019    Procedure: INJECTION, STEROID, EPIDURAL, TRANSFORAMINAL APPROACH, L4 AND L5;  Surgeon: Viet Wilson MD;  Location: [facility] PAIN T;  Service: Pain Management;  Laterality: Left;    TUBAL LIGATION         Social History     Socioeconomic History    Marital status:     Number of children: 3   Tobacco Use    Smoking status: Former Smoker     Packs/day: 0.25     Years: 20.00     Pack years: 5.00     Quit date: 1985     Years since quittin.6    Smokeless tobacco: Never Used    Tobacco comment: Quit mid .   Substance and Sexual Activity    Alcohol use: No     Alcohol/week: 0.0 standard drinks    Drug use: No    Sexual activity: Yes           Review of Systems: negative    Objective:     Physical Exam:  Vitals:    22 1156   BP: (!) 118/55   Pulse: 65       WD WN NAD  VSS  Normal resp effort  Bilateral breasts symmetric, left breast s/p reduction with well healed surgery sites +sensation to nipple; right breast s/p implant exchange with dermabond on incision, doing well and no sign of infection        Assessment:       No diagnosis found.    Plan:   72 y.o. female status post implant exchange and capsulotomy doing well   - Doing well, no issues, will continue to monitor, plan for nipple tattoo in 3 months   - Return to clinic in 2 weeks .       All questions were answered. The patient was advised to call the clinic with any questions or concerns prior to their next visit.       Samir  Jhon CHEEK- Fellow

## 2022-08-31 ENCOUNTER — OFFICE VISIT (OUTPATIENT)
Dept: PODIATRY | Facility: CLINIC | Age: 72
End: 2022-08-31
Payer: MEDICARE

## 2022-08-31 VITALS
BODY MASS INDEX: 39.09 KG/M2 | HEIGHT: 68 IN | DIASTOLIC BLOOD PRESSURE: 65 MMHG | RESPIRATION RATE: 18 BRPM | HEART RATE: 90 BPM | SYSTOLIC BLOOD PRESSURE: 131 MMHG | WEIGHT: 257.94 LBS

## 2022-08-31 DIAGNOSIS — E11.42 DIABETIC PERIPHERAL NEUROPATHY ASSOCIATED WITH TYPE 2 DIABETES MELLITUS: Primary | ICD-10-CM

## 2022-08-31 DIAGNOSIS — G89.4 CHRONIC PAIN SYNDROME: ICD-10-CM

## 2022-08-31 PROCEDURE — 99214 PR OFFICE/OUTPT VISIT, EST, LEVL IV, 30-39 MIN: ICD-10-PCS | Mod: S$GLB,,, | Performed by: PODIATRIST

## 2022-08-31 PROCEDURE — 99214 OFFICE O/P EST MOD 30 MIN: CPT | Mod: S$GLB,,, | Performed by: PODIATRIST

## 2022-08-31 PROCEDURE — 3078F PR MOST RECENT DIASTOLIC BLOOD PRESSURE < 80 MM HG: ICD-10-PCS | Mod: CPTII,S$GLB,, | Performed by: PODIATRIST

## 2022-08-31 PROCEDURE — 1125F PR PAIN SEVERITY QUANTIFIED, PAIN PRESENT: ICD-10-PCS | Mod: CPTII,S$GLB,, | Performed by: PODIATRIST

## 2022-08-31 PROCEDURE — 99999 PR PBB SHADOW E&M-EST. PATIENT-LVL II: ICD-10-PCS | Mod: PBBFAC,,, | Performed by: PODIATRIST

## 2022-08-31 PROCEDURE — 3008F PR BODY MASS INDEX (BMI) DOCUMENTED: ICD-10-PCS | Mod: CPTII,S$GLB,, | Performed by: PODIATRIST

## 2022-08-31 PROCEDURE — 3078F DIAST BP <80 MM HG: CPT | Mod: CPTII,S$GLB,, | Performed by: PODIATRIST

## 2022-08-31 PROCEDURE — 3044F HG A1C LEVEL LT 7.0%: CPT | Mod: CPTII,S$GLB,, | Performed by: PODIATRIST

## 2022-08-31 PROCEDURE — 4010F ACE/ARB THERAPY RXD/TAKEN: CPT | Mod: CPTII,S$GLB,, | Performed by: PODIATRIST

## 2022-08-31 PROCEDURE — 3066F PR DOCUMENTATION OF TREATMENT FOR NEPHROPATHY: ICD-10-PCS | Mod: CPTII,S$GLB,, | Performed by: PODIATRIST

## 2022-08-31 PROCEDURE — 4010F PR ACE/ARB THEARPY RXD/TAKEN: ICD-10-PCS | Mod: CPTII,S$GLB,, | Performed by: PODIATRIST

## 2022-08-31 PROCEDURE — 3075F SYST BP GE 130 - 139MM HG: CPT | Mod: CPTII,S$GLB,, | Performed by: PODIATRIST

## 2022-08-31 PROCEDURE — 3061F PR NEG MICROALBUMINURIA RESULT DOCUMENTED/REVIEW: ICD-10-PCS | Mod: CPTII,S$GLB,, | Performed by: PODIATRIST

## 2022-08-31 PROCEDURE — 3066F NEPHROPATHY DOC TX: CPT | Mod: CPTII,S$GLB,, | Performed by: PODIATRIST

## 2022-08-31 PROCEDURE — 1125F AMNT PAIN NOTED PAIN PRSNT: CPT | Mod: CPTII,S$GLB,, | Performed by: PODIATRIST

## 2022-08-31 PROCEDURE — 99999 PR PBB SHADOW E&M-EST. PATIENT-LVL II: CPT | Mod: PBBFAC,,, | Performed by: PODIATRIST

## 2022-08-31 PROCEDURE — 3044F PR MOST RECENT HEMOGLOBIN A1C LEVEL <7.0%: ICD-10-PCS | Mod: CPTII,S$GLB,, | Performed by: PODIATRIST

## 2022-08-31 PROCEDURE — 3075F PR MOST RECENT SYSTOLIC BLOOD PRESS GE 130-139MM HG: ICD-10-PCS | Mod: CPTII,S$GLB,, | Performed by: PODIATRIST

## 2022-08-31 PROCEDURE — 3008F BODY MASS INDEX DOCD: CPT | Mod: CPTII,S$GLB,, | Performed by: PODIATRIST

## 2022-08-31 PROCEDURE — 3061F NEG MICROALBUMINURIA REV: CPT | Mod: CPTII,S$GLB,, | Performed by: PODIATRIST

## 2022-08-31 RX ORDER — PREGABALIN 75 MG/1
75 CAPSULE ORAL 2 TIMES DAILY
Qty: 60 CAPSULE | Refills: 6 | Status: SHIPPED | OUTPATIENT
Start: 2022-08-31 | End: 2023-06-14

## 2022-09-06 LAB
FINAL PATHOLOGIC DIAGNOSIS: NORMAL
GROSS: NORMAL
Lab: NORMAL
MICROSCOPIC EXAM: NORMAL

## 2022-09-07 ENCOUNTER — OFFICE VISIT (OUTPATIENT)
Dept: PLASTIC SURGERY | Facility: CLINIC | Age: 72
End: 2022-09-07
Payer: MEDICARE

## 2022-09-07 VITALS
DIASTOLIC BLOOD PRESSURE: 49 MMHG | BODY MASS INDEX: 38.95 KG/M2 | HEART RATE: 73 BPM | WEIGHT: 257 LBS | HEIGHT: 68 IN | SYSTOLIC BLOOD PRESSURE: 101 MMHG

## 2022-09-07 DIAGNOSIS — Z98.890 HISTORY OF BILATERAL BREAST REDUCTION SURGERY: Primary | ICD-10-CM

## 2022-09-07 DIAGNOSIS — Z09 SURGERY FOLLOW-UP EXAMINATION: Primary | ICD-10-CM

## 2022-09-07 PROCEDURE — 99999 PR PBB SHADOW E&M-EST. PATIENT-LVL II: CPT | Mod: PBBFAC,,, | Performed by: SURGERY

## 2022-09-07 PROCEDURE — 3061F NEG MICROALBUMINURIA REV: CPT | Mod: CPTII,S$GLB,, | Performed by: SURGERY

## 2022-09-07 PROCEDURE — 3074F SYST BP LT 130 MM HG: CPT | Mod: CPTII,S$GLB,, | Performed by: SURGERY

## 2022-09-07 PROCEDURE — 3066F PR DOCUMENTATION OF TREATMENT FOR NEPHROPATHY: ICD-10-PCS | Mod: CPTII,S$GLB,, | Performed by: SURGERY

## 2022-09-07 PROCEDURE — 1159F PR MEDICATION LIST DOCUMENTED IN MEDICAL RECORD: ICD-10-PCS | Mod: CPTII,S$GLB,, | Performed by: SURGERY

## 2022-09-07 PROCEDURE — 3008F PR BODY MASS INDEX (BMI) DOCUMENTED: ICD-10-PCS | Mod: CPTII,S$GLB,, | Performed by: SURGERY

## 2022-09-07 PROCEDURE — 99024 POSTOP FOLLOW-UP VISIT: CPT | Mod: S$GLB,,, | Performed by: SURGERY

## 2022-09-07 PROCEDURE — 99999 PR PBB SHADOW E&M-EST. PATIENT-LVL II: ICD-10-PCS | Mod: PBBFAC,,, | Performed by: SURGERY

## 2022-09-07 PROCEDURE — 3074F PR MOST RECENT SYSTOLIC BLOOD PRESSURE < 130 MM HG: ICD-10-PCS | Mod: CPTII,S$GLB,, | Performed by: SURGERY

## 2022-09-07 PROCEDURE — 3288F FALL RISK ASSESSMENT DOCD: CPT | Mod: CPTII,S$GLB,, | Performed by: SURGERY

## 2022-09-07 PROCEDURE — 1160F RVW MEDS BY RX/DR IN RCRD: CPT | Mod: CPTII,S$GLB,, | Performed by: SURGERY

## 2022-09-07 PROCEDURE — 1159F MED LIST DOCD IN RCRD: CPT | Mod: CPTII,S$GLB,, | Performed by: SURGERY

## 2022-09-07 PROCEDURE — 1125F AMNT PAIN NOTED PAIN PRSNT: CPT | Mod: CPTII,S$GLB,, | Performed by: SURGERY

## 2022-09-07 PROCEDURE — 1101F PT FALLS ASSESS-DOCD LE1/YR: CPT | Mod: CPTII,S$GLB,, | Performed by: SURGERY

## 2022-09-07 PROCEDURE — 1160F PR REVIEW ALL MEDS BY PRESCRIBER/CLIN PHARMACIST DOCUMENTED: ICD-10-PCS | Mod: CPTII,S$GLB,, | Performed by: SURGERY

## 2022-09-07 PROCEDURE — 3061F PR NEG MICROALBUMINURIA RESULT DOCUMENTED/REVIEW: ICD-10-PCS | Mod: CPTII,S$GLB,, | Performed by: SURGERY

## 2022-09-07 PROCEDURE — 99024 PR POST-OP FOLLOW-UP VISIT: ICD-10-PCS | Mod: S$GLB,,, | Performed by: SURGERY

## 2022-09-07 PROCEDURE — 4010F ACE/ARB THERAPY RXD/TAKEN: CPT | Mod: CPTII,S$GLB,, | Performed by: SURGERY

## 2022-09-07 PROCEDURE — 3066F NEPHROPATHY DOC TX: CPT | Mod: CPTII,S$GLB,, | Performed by: SURGERY

## 2022-09-07 PROCEDURE — 1101F PR PT FALLS ASSESS DOC 0-1 FALLS W/OUT INJ PAST YR: ICD-10-PCS | Mod: CPTII,S$GLB,, | Performed by: SURGERY

## 2022-09-07 PROCEDURE — 3044F PR MOST RECENT HEMOGLOBIN A1C LEVEL <7.0%: ICD-10-PCS | Mod: CPTII,S$GLB,, | Performed by: SURGERY

## 2022-09-07 PROCEDURE — 3008F BODY MASS INDEX DOCD: CPT | Mod: CPTII,S$GLB,, | Performed by: SURGERY

## 2022-09-07 PROCEDURE — 3044F HG A1C LEVEL LT 7.0%: CPT | Mod: CPTII,S$GLB,, | Performed by: SURGERY

## 2022-09-07 PROCEDURE — 3078F DIAST BP <80 MM HG: CPT | Mod: CPTII,S$GLB,, | Performed by: SURGERY

## 2022-09-07 PROCEDURE — 1125F PR PAIN SEVERITY QUANTIFIED, PAIN PRESENT: ICD-10-PCS | Mod: CPTII,S$GLB,, | Performed by: SURGERY

## 2022-09-07 PROCEDURE — 4010F PR ACE/ARB THEARPY RXD/TAKEN: ICD-10-PCS | Mod: CPTII,S$GLB,, | Performed by: SURGERY

## 2022-09-07 PROCEDURE — 3078F PR MOST RECENT DIASTOLIC BLOOD PRESSURE < 80 MM HG: ICD-10-PCS | Mod: CPTII,S$GLB,, | Performed by: SURGERY

## 2022-09-07 PROCEDURE — 3288F PR FALLS RISK ASSESSMENT DOCUMENTED: ICD-10-PCS | Mod: CPTII,S$GLB,, | Performed by: SURGERY

## 2022-09-07 NOTE — PROGRESS NOTES
Plastic Surgery Clinic Postop Visit    Subjective:      Linnea Renae is a 72 y.o. year old female who presents to the Plastic Surgery Clinic on 09/07/2022 for follow up visit status post bilateral implant exchange, capsulotomy and evacuation of hematoma doing well. Patient states breast feel significantly better and soft after surgery, no issues. Denies fever, chills, nausea, vomiting, or other systemic signs of infection.    Vitals:    09/07/22 1012   BP: (!) 101/49   Pulse: 73        Review of patient's allergies indicates:   Allergen Reactions    Codeine Itching       Current Outpatient Medications on File Prior to Visit   Medication Sig Dispense Refill    alcohol antiseptic pads (ALCOHOL PREP PADS TOP)       ALPRAZolam (XANAX) 0.5 MG tablet Take 1 tablet (0.5 mg total) by mouth 3 (three) times daily. 30 tablet 0    aspirin 81 MG Chew Take 81 mg by mouth once daily.      atenoloL (TENORMIN) 50 MG tablet TAKE 1 TABLET BY MOUTH TWICE DAILY 180 tablet 3    atorvastatin (LIPITOR) 20 MG tablet Take 1 tablet (20 mg total) by mouth every evening. 90 tablet 1    blood sugar diagnostic (TRUE METRIX GLUCOSE TEST STRIP) Strp 1 strip by Misc.(Non-Drug; Combo Route) route once daily. 100 each 3    calcium-vitamin D3 500 mg(1,250mg) -200 unit per tablet Take 1 tablet by mouth 2 (two) times daily with meals.       clobetasoL (TEMOVATE) 0.05 % cream Apply topically 2 (two) times daily. Top of both feet 45 g 2    cycloSPORINE (RESTASIS) 0.05 % ophthalmic emulsion Instill 1 drop into both eyes twice a day 180 each 3    diclofenac sodium (VOLTAREN) 1 % Gel Apply 2 g topically 4 (four) times daily. 100 g 2    fluticasone propionate (FLONASE) 50 mcg/actuation nasal spray 2 sprays (100 mcg total) by Each Nostril route once daily. 48 g 1    hydroCHLOROthiazide (HYDRODIURIL) 12.5 MG Tab Take 1 tablet (12.5 mg total) by mouth once daily. 90 tablet 1    ibuprofen (ADVIL,MOTRIN) 800 MG tablet Take 1 tablet by mouth 3 times daily  as needed 60 tablet 1    ketoconazole (NIZORAL) 2 % cream Apply topically 2 (two) times daily as needed irritation body folds 60 g 2    ketoconazole (NIZORAL) 2 % shampoo Apply topically to scalp in place of regular shampoo 2 to 3 times a week 120 mL 9    lancets 33 gauge Misc 1 lancet by Misc.(Non-Drug; Combo Route) route once daily. 100 each 3    linaCLOtide (LINZESS) 145 mcg Cap capsule Take 1 capsule (145 mcg total) by mouth once daily. 90 capsule 1    loratadine 10 mg Cap       metFORMIN (GLUCOPHAGE) 500 MG tablet Take 2 tablets (1,000 mg total) by mouth 2 (two) times daily with meals. 360 tablet 1    minoxidiL (LONITEN) 2.5 MG tablet Take 1/4  to 1/2 tablet by mouth nightly as tolerated (Patient taking differently: Take by mouth once daily.) 45 tablet 3    multivitamin (THERAGRAN) per tablet Take 1 tablet by mouth once daily.      naloxone (NARCAN) 4 mg/actuation Spry 4mg by nasal route as needed for opioid overdose; may repeat every 2-3 minutes in alternating nostrils until medical help arrives. Call 911 2 each 11    nystatin (MYCOSTATIN) powder Apply topically 4 (four) times daily Under pannus as needed 180 g 2    olmesartan (BENICAR) 20 MG tablet Take 1 tablet (20 mg total) by mouth once daily. 90 tablet 1    omeprazole (PRILOSEC) 20 MG capsule Take 1 capsule by mouth once daily 90 capsule 1    pregabalin (LYRICA) 75 MG capsule Take 1 capsule (75 mg total) by mouth 2 (two) times daily. 60 capsule 6    tiZANidine (ZANAFLEX) 4 MG tablet Take 1 tablet (4 mg total) by mouth every 8 (eight) hours as needed (Muscle spasm.). 90 tablet 2    topiramate (TOPAMAX) 50 MG tablet Take 2 tablets (100 mg total) by mouth every evening. 180 tablet 0    traMADoL (ULTRAM) 50 mg tablet Take 1 tablet by mouth every 6 hours as needed for pain 120 tablet 0     Current Facility-Administered Medications on File Prior to Visit   Medication Dose Route Frequency Provider Last Rate Last Admin    mupirocin 2 % ointment   Nasal On Call  Procedure Nereida Jerome MD   Given at 22 0656    sodium chloride 0.9% flush 10 mL  10 mL Intravenous PRN Nereida Jerome MD           Patient Active Problem List   Diagnosis    Hypertension    Type 2 diabetes mellitus without complication, without long-term current use of insulin    Hyperlipidemia    DDD (degenerative disc disease), lumbar    Perennial allergic rhinitis    Dry eyes    Morbid obesity with BMI of 40.0-44.9, adult    GERD (gastroesophageal reflux disease)    Mazoplasia    DCIS (ductal carcinoma in situ) of breast    Anal skin tag    Spinal stenosis, lumbar    Atherosclerosis of aorta    Chronic kidney disease, stage II (mild)    Positive depression screening    Chronic pain disorder    Impaired gait and mobility    Decreased functional activity tolerance    Osteoarthritis of lumbar spine    Diabetic peripheral neuropathy associated with type 2 diabetes mellitus    Spondylosis without myelopathy    Sacroiliitis    Greater trochanteric bursitis    Chronic pain    Essential (hemorrhagic) thrombocythemia    Unspecified mood (affective) disorder       Past Surgical History:   Procedure Laterality Date    BREAST BIOPSY      BREAST LUMPECTOMY Right         BREAST RECONSTRUCTION Right 2021    Procedure: RECONSTRUCTION, BREAST;  Surgeon: Nolberto Eng MD;  Location: [facility] OR [address] [place] [address];  Service: Plastics;  Laterality: Right;    CATARACT EXTRACTION, BILATERAL       SECTION      x3    CHOLECYSTECTOMY      COLONOSCOPY N/A 10/11/2018    Procedure: COLONOSCOPY;  Surgeon: Lorenzo Tanner MD;  Location: [facility] (4TH FLR);  Service: Endoscopy;  Laterality: N/A;    COLONOSCOPY W/ POLYPECTOMY      EVACUATION OF HEMATOMA Right 2022    Procedure: EVACUATION, HEMATOMA;  Surgeon: Nolberto Eng MD;  [facility];  Service: Plastics;  Laterality: Right;    HYSTERECTOMY      and USO    INJECTION OF FACET JOINT Bilateral 2018    Procedure: INJECTION, FACET JOINT;   Surgeon: Viet Wilson MD;  Location: [facility] PAIN MGT;  Service: Pain Management;  Laterality: Bilateral;  LUMBAR BILATERAL L3-L4 AND L4-L5 FACET STEROID INJECTION    NEED CONSENT    INJECTION OF JOINT Bilateral 7/8/2020    Procedure: INJECTION, JOINT, Bilateral SI;  Surgeon: Viet Wilson MD;  Location: [facility] PAIN MGT;  Service: Pain Management;  Laterality: Bilateral;    MASTECTOMY Right 11/29/2021    Procedure: MASTECTOMY, total, right breast with right sentinel lymph node biospy;  Surgeon: Heber Evans MD;  [facility];  Service: General;  Laterality: Right;    MASTOPEXY Left 4/11/2022    Procedure: MASTOPEXY - left breast;  Surgeon: Nolberto Eng MD;  [facility];  Service: Plastics;  Laterality: Left;    RADIOFREQUENCY ABLATION Left 3/20/2019    Procedure: RADIOFREQUENCY ABLATION LEFT L2,3,4,5;  Surgeon: Viet Wilson MD;  Location: [facility] PAIN MGT;  Service: Pain Management;  Laterality: Left;  LEFT RFA L2,3,4,5    NEEDS CONSENT    RADIOFREQUENCY ABLATION Left 12/18/2019    Procedure: RADIOFREQUENCY ABLATION, LEFT L2-L3-L4-L5  1 OF 2;  Surgeon: Viet Wilson MD;  Location: [facility] PAIN MGT;  Service: Pain Management;  Laterality: Left;    RADIOFREQUENCY ABLATION Right 1/8/2020    Procedure: RADIOFREQUENCY ABLATION, RIGHT L2-L3-L4-L5 MEDIAL BRANCH 2 OF;  Surgeon: Viet Wilson MD;  Location: [facility] PAIN MGT;  Service: Pain Management;  Laterality: Right;    RADIOFREQUENCY ABLATION Right 11/4/2020    Procedure: RADIOFREQUENCY ABLATION Right L2, L3, L4, L5;  Surgeon: Viet Wilson MD;  Location: [facility] PAIN MGT;  Service: Pain Management;  Laterality: Right;  Right L2, L3, L4, L5 RFA    REPLACEMENT OF IMPLANT OF BREAST Right 4/11/2022    Procedure: REPLACEMENT, IMPLANT, BREAST - right ;  Surgeon: Nolberto Eng MD;  [facility];  Service: Plastics;  Laterality: Right;    REPLACEMENT OF IMPLANT OF BREAST Right 8/18/2022     Procedure: REPLACEMENT, IMPLANT, BREAST;  Surgeon: Nolberto Eng MD;  Location: [address];  Service: Plastics;  Laterality: Right;    TOTAL REDUCTION MAMMOPLASTY Left 2021    Procedure: MAMMOPLASTY, REDUCTION;  Surgeon: Nolberto Eng MD;  Location: [facility] OR [address] [place] [address];  Service: Plastics;  Laterality: Left;    TRANSFORAMINAL EPIDURAL INJECTION OF STEROID Left 10/2/2019    Procedure: INJECTION, STEROID, EPIDURAL, TRANSFORAMINAL APPROACH, L4 AND L5;  Surgeon: Viet Wilson MD;  Location: [facility] PAIN MGT;  Service: Pain Management;  Laterality: Left;    TUBAL LIGATION         Social History     Socioeconomic History    Marital status:     Number of children: 3   Tobacco Use    Smoking status: Former     Packs/day: 0.25     Years: 20.00     Pack years: 5.00     Types: Cigarettes     Quit date: 1985     Years since quittin.7    Smokeless tobacco: Never    Tobacco comments:     Quit mid .   Substance and Sexual Activity    Alcohol use: No     Alcohol/week: 0.0 standard drinks    Drug use: No    Sexual activity: Yes           Review of Systems: negative    Objective:     Physical Exam:  Vitals:    22 1012   BP: (!) 101/49   Pulse: 73       WD WN NAD  VSS  Normal resp effort  Bilateral breasts symmetric, left breast s/p reduction with well healed surgery sites +sensation to nipple; right breast s/p implant exchange with dermabond on incision, doing well and no sign of infection        Assessment:       No diagnosis found.    Plan:   72 y.o. female status post implant exchange and capsulotomy doing well   - Doing well, no issues, will continue to monitor  - Return to clinic in 4 months      All questions were answered. The patient was advised to call the clinic with any questions or concerns prior to their next visit.       Samir Ferreira MD- Fellow

## 2022-09-09 NOTE — PROGRESS NOTES
Subjective:      Patient ID: Linnea Renae is a 72 y.o. female.    Chief Complaint: Follow-up (Foot pain )    Linnea is a 72 y.o. female who presents to the clinic upon referral from Dr. Nat marsh. provider found  for evaluation and treatment of diabetic feet. Linnea has a past medical history of Allergy, Breast cancer, Chronic constipation, Colon polyps, Diabetes mellitus, type 2, Dry eyes, GERD (gastroesophageal reflux disease), Hyperlipidemia, Hypertension, Lumbar disc disease, and Type 2 diabetes mellitus.  The bottoms of both feet continue to feel stiff she states, however much improved.  She continues to have neuropathy type symptoms.  She would like discussed other treatment options of injection therapy.      PCP: Bailee Moss MD    Date Last Seen by PCP:   Chief Complaint   Patient presents with    Follow-up     Foot pain          Current shoe gear: Casual shoes    Hemoglobin A1C   Date Value Ref Range Status   03/31/2022 5.7 (H) 4.0 - 5.6 % Final     Comment:     ADA Screening Guidelines:  5.7-6.4%  Consistent with prediabetes  >or=6.5%  Consistent with diabetes    High levels of fetal hemoglobin interfere with the HbA1C  assay. Heterozygous hemoglobin variants (HbS, HgC, etc)do  not significantly interfere with this assay.   However, presence of multiple variants may affect accuracy.     11/29/2021 5.7 (H) 4.0 - 5.6 % Final     Comment:     ADA Screening Guidelines:  5.7-6.4%  Consistent with prediabetes  >or=6.5%  Consistent with diabetes    High levels of fetal hemoglobin interfere with the HbA1C  assay. Heterozygous hemoglobin variants (HbS, HgC, etc)do  not significantly interfere with this assay.   However, presence of multiple variants may affect accuracy.     07/16/2021 5.7 (H) 4.0 - 5.6 % Final     Comment:     ADA Screening Guidelines:  5.7-6.4%  Consistent with prediabetes  >or=6.5%  Consistent with diabetes    High levels of fetal hemoglobin interfere with the HbA1C  assay. Heterozygous  hemoglobin variants (HbS, HgC, etc)do  not significantly interfere with this assay.   However, presence of multiple variants may affect accuracy.             Review of Systems   Constitution: Negative for chills and fever.   HENT: Negative for congestion and tinnitus.    Eyes: Negative for double vision and visual disturbance.   Cardiovascular: Negative for chest pain and claudication.   Respiratory: Negative for hemoptysis and shortness of breath.    Endocrine: Negative for cold intolerance and heat intolerance.   Hematologic/Lymphatic: Negative for adenopathy and bleeding problem.   Skin: Positive for color change, dry skin and nail changes.   Musculoskeletal: Positive for myalgias and stiffness.   Gastrointestinal: Negative for nausea and vomiting.   Genitourinary: Negative for dysuria and hematuria.   Neurological: Positive for numbness.   Psychiatric/Behavioral: Negative for altered mental status and suicidal ideas.   Allergic/Immunologic: Negative for environmental allergies and persistent infections.           Objective:      Physical Exam  Vitals signs reviewed.   Constitutional:       Appearance: She is well-developed.   Cardiovascular:      Pulses:           Dorsalis pedis pulses are 1+ on the right side and 1+ on the left side.        Posterior tibial pulses are 1+ on the right side and 1+ on the left side.   Pulmonary:      Effort: Pulmonary effort is normal.   Musculoskeletal: Normal range of motion.      Comments: Anterior, lateral, and posterior muscle groups bilateral lower extremities show strength 4 over 5 symmetrically. Inspection and palpation of the joints and bones reveal no crepitus or joint effusion. No tenderness upon palpation. Mild plantar flexor contractures noted to digits 2 through 5 bilaterally.  Angle and base of gait are normal.   Feet:      Right foot:      Skin integrity: Callus and dry skin present.      Left foot:      Skin integrity: Callus and dry skin present.   Skin:      General: Skin is warm and dry.      Capillary Refill: Capillary refill takes 2 to 3 seconds.      Coloration: Skin is pale.      Comments: Skin bilateral lower extremities noted to be thin, dry, and atrophic.  Toenails normal.   Neurological:      Mental Status: She is alert and oriented to person, place, and time.      Sensory: Sensory deficit present.      Motor: Atrophy present.      Deep Tendon Reflexes: Reflexes abnormal.      Reflex Scores:       Patellar reflexes are 1+ on the right side and 1+ on the left side.       Achilles reflexes are 1+ on the right side and 1+ on the left side.     Comments: Sharp, dull, light touch, and vibratory sensation are diminished bilaterally. Proprioceptive sensation is intact to both lower extremities. [place] monofilament exam shows loss of protective sensation to plantar toes 1 through 5 bilaterally. Deep tendon reflexes to the patellar tendons is 1 over 4 bilaterally symmetrical. Deep tendon reflexes to the Achilles tendon is 0 over 4 bilaterally symmetrical. No ankle clonus or Babinski reflex noted bilaterally. Coordination is fair to both lower extremities.                 Assessment:       No diagnosis found.        Plan:       There are no diagnoses linked to this encounter.    I counseled the patient on her conditions, their implications and medical management.    Shoe inspection. Diabetic Foot Education. Patient reminded of the importance of good nutrition and blood sugar control to help prevent podiatric complications of diabetes. Patient instructed on proper foot hygeine. We discussed wearing proper shoe gear, daily foot inspections and Diabetic foot education in detail.    Discussed options for peripheral neuropathy/nerve entrapment syndrome including nerve block therapy, surgical nerve entrapment decompression procedures, and various vitamins and supplementation available shown to improve nerve function.    Begin Lyrica 75 mg b.i.d..  Follow up in 3  months.  .  Follow-up in 3 months.

## 2022-09-28 DIAGNOSIS — E66.01 CLASS 2 SEVERE OBESITY WITH BODY MASS INDEX (BMI) OF 35 TO 39.9 WITH SERIOUS COMORBIDITY: ICD-10-CM

## 2022-09-28 DIAGNOSIS — M48.061 DEGENERATIVE LUMBAR SPINAL STENOSIS: ICD-10-CM

## 2022-09-28 NOTE — TELEPHONE ENCOUNTER
Care Due:                  Date            Visit Type   Department     Provider  --------------------------------------------------------------------------------                                EP -                              PRIMARY      [facility]   Bailee Yayo  Last Visit: 08-      CARE ([facility])   CARE           [place]                              EP -                              PRIMARY      [facility] Yayo  Next Visit: 12-      CARE ([facility])   CARE           [place]                                                            Last  Test          Frequency    Reason                     Performed    Due Date  --------------------------------------------------------------------------------    HBA1C.......  6 months...  metFORMIN................  03- 09-    [facility] Embedded Care Gaps. Reference number: [id number]. 9/28/2022   4:10:39 PM CDT

## 2022-09-29 RX ORDER — TRAMADOL HYDROCHLORIDE 50 MG/1
50 TABLET ORAL EVERY 6 HOURS PRN
Qty: 120 TABLET | Refills: 1 | Status: SHIPPED | OUTPATIENT
Start: 2022-09-29 | End: 2022-11-26 | Stop reason: SDUPTHER

## 2022-09-29 RX ORDER — TOPIRAMATE 50 MG/1
100 TABLET, FILM COATED ORAL NIGHTLY
Qty: 180 TABLET | Refills: 0 | Status: SHIPPED | OUTPATIENT
Start: 2022-09-29 | End: 2022-11-14 | Stop reason: SDUPTHER

## 2022-10-03 ENCOUNTER — OFFICE VISIT (OUTPATIENT)
Dept: SURGERY | Facility: CLINIC | Age: 72
End: 2022-10-03
Payer: MEDICARE

## 2022-10-03 ENCOUNTER — TELEPHONE (OUTPATIENT)
Dept: SURGERY | Facility: CLINIC | Age: 72
End: 2022-10-03

## 2022-10-03 VITALS
HEART RATE: 62 BPM | BODY MASS INDEX: 39.71 KG/M2 | WEIGHT: 262 LBS | SYSTOLIC BLOOD PRESSURE: 113 MMHG | HEIGHT: 68 IN | DIASTOLIC BLOOD PRESSURE: 53 MMHG

## 2022-10-03 DIAGNOSIS — Z85.3 PERSONAL HISTORY OF BREAST CANCER: Primary | ICD-10-CM

## 2022-10-03 DIAGNOSIS — Z12.39 ENCOUNTER FOR SCREENING BREAST EXAMINATION: ICD-10-CM

## 2022-10-03 PROCEDURE — 4010F ACE/ARB THERAPY RXD/TAKEN: CPT | Mod: CPTII,S$GLB,, | Performed by: NURSE PRACTITIONER

## 2022-10-03 PROCEDURE — 3008F BODY MASS INDEX DOCD: CPT | Mod: CPTII,S$GLB,, | Performed by: NURSE PRACTITIONER

## 2022-10-03 PROCEDURE — 1125F AMNT PAIN NOTED PAIN PRSNT: CPT | Mod: CPTII,S$GLB,, | Performed by: NURSE PRACTITIONER

## 2022-10-03 PROCEDURE — 3044F HG A1C LEVEL LT 7.0%: CPT | Mod: CPTII,S$GLB,, | Performed by: NURSE PRACTITIONER

## 2022-10-03 PROCEDURE — 99999 PR PBB SHADOW E&M-EST. PATIENT-LVL II: CPT | Mod: PBBFAC,,, | Performed by: NURSE PRACTITIONER

## 2022-10-03 PROCEDURE — 3066F PR DOCUMENTATION OF TREATMENT FOR NEPHROPATHY: ICD-10-PCS | Mod: CPTII,S$GLB,, | Performed by: NURSE PRACTITIONER

## 2022-10-03 PROCEDURE — 3074F PR MOST RECENT SYSTOLIC BLOOD PRESSURE < 130 MM HG: ICD-10-PCS | Mod: CPTII,S$GLB,, | Performed by: NURSE PRACTITIONER

## 2022-10-03 PROCEDURE — 3061F PR NEG MICROALBUMINURIA RESULT DOCUMENTED/REVIEW: ICD-10-PCS | Mod: CPTII,S$GLB,, | Performed by: NURSE PRACTITIONER

## 2022-10-03 PROCEDURE — 3044F PR MOST RECENT HEMOGLOBIN A1C LEVEL <7.0%: ICD-10-PCS | Mod: CPTII,S$GLB,, | Performed by: NURSE PRACTITIONER

## 2022-10-03 PROCEDURE — 4010F PR ACE/ARB THEARPY RXD/TAKEN: ICD-10-PCS | Mod: CPTII,S$GLB,, | Performed by: NURSE PRACTITIONER

## 2022-10-03 PROCEDURE — 3074F SYST BP LT 130 MM HG: CPT | Mod: CPTII,S$GLB,, | Performed by: NURSE PRACTITIONER

## 2022-10-03 PROCEDURE — 3078F DIAST BP <80 MM HG: CPT | Mod: CPTII,S$GLB,, | Performed by: NURSE PRACTITIONER

## 2022-10-03 PROCEDURE — 3288F FALL RISK ASSESSMENT DOCD: CPT | Mod: CPTII,S$GLB,, | Performed by: NURSE PRACTITIONER

## 2022-10-03 PROCEDURE — 1159F MED LIST DOCD IN RCRD: CPT | Mod: CPTII,S$GLB,, | Performed by: NURSE PRACTITIONER

## 2022-10-03 PROCEDURE — 99213 OFFICE O/P EST LOW 20 MIN: CPT | Mod: S$GLB,,, | Performed by: NURSE PRACTITIONER

## 2022-10-03 PROCEDURE — 99999 PR PBB SHADOW E&M-EST. PATIENT-LVL II: ICD-10-PCS | Mod: PBBFAC,,, | Performed by: NURSE PRACTITIONER

## 2022-10-03 PROCEDURE — 99213 PR OFFICE/OUTPT VISIT, EST, LEVL III, 20-29 MIN: ICD-10-PCS | Mod: S$GLB,,, | Performed by: NURSE PRACTITIONER

## 2022-10-03 PROCEDURE — 3078F PR MOST RECENT DIASTOLIC BLOOD PRESSURE < 80 MM HG: ICD-10-PCS | Mod: CPTII,S$GLB,, | Performed by: NURSE PRACTITIONER

## 2022-10-03 PROCEDURE — 1101F PT FALLS ASSESS-DOCD LE1/YR: CPT | Mod: CPTII,S$GLB,, | Performed by: NURSE PRACTITIONER

## 2022-10-03 PROCEDURE — 3066F NEPHROPATHY DOC TX: CPT | Mod: CPTII,S$GLB,, | Performed by: NURSE PRACTITIONER

## 2022-10-03 PROCEDURE — 3061F NEG MICROALBUMINURIA REV: CPT | Mod: CPTII,S$GLB,, | Performed by: NURSE PRACTITIONER

## 2022-10-03 PROCEDURE — 3288F PR FALLS RISK ASSESSMENT DOCUMENTED: ICD-10-PCS | Mod: CPTII,S$GLB,, | Performed by: NURSE PRACTITIONER

## 2022-10-03 PROCEDURE — 1101F PR PT FALLS ASSESS DOC 0-1 FALLS W/OUT INJ PAST YR: ICD-10-PCS | Mod: CPTII,S$GLB,, | Performed by: NURSE PRACTITIONER

## 2022-10-03 PROCEDURE — 3008F PR BODY MASS INDEX (BMI) DOCUMENTED: ICD-10-PCS | Mod: CPTII,S$GLB,, | Performed by: NURSE PRACTITIONER

## 2022-10-03 PROCEDURE — 1159F PR MEDICATION LIST DOCUMENTED IN MEDICAL RECORD: ICD-10-PCS | Mod: CPTII,S$GLB,, | Performed by: NURSE PRACTITIONER

## 2022-10-03 PROCEDURE — 1125F PR PAIN SEVERITY QUANTIFIED, PAIN PRESENT: ICD-10-PCS | Mod: CPTII,S$GLB,, | Performed by: NURSE PRACTITIONER

## 2022-10-03 NOTE — TELEPHONE ENCOUNTER
----- Message from Katina Buckner NP sent at 10/3/2022  1:38 PM CDT -----  Hi!    Can you schedule a screening mammogram for Ms. Renae in 4-6 weeks please    ThanksKatina

## 2022-10-03 NOTE — PROGRESS NOTES
[facility]  Department of Surgery    REFERRING PROVIDER: No referring provider defined for this encounter. Bailee Moss MD  CHIEF COMPLAINT:   Chief Complaint   Patient presents with    Follow-up     CBE W/MMG         DIAGNOSIS:   This is a 72 y.o. female with a history of stage pTis PN0, grade 2, ER +, CA +DCIS of the right breast.    TREATMENT:     The patient has a history of DCIS of the right breast now s/p right lumpectomy in October of 2015. The histology of that specimen showed a .6cm Grade 2, ER+, CA+ DCIS with negative margins. The patient then denied chemo or radiation therapy, but did elect to undergo hormonal blocking therapy with anastrozole starting in 2015. Since that time she has been under close follow up with yearly mammograms.     Screening mammogram on 10/1/2021 showed a 2.3cm group of pleomorphic calcifications in the 12:00 region of the right breast. 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        She proceeded with right mastectomy with sentinel node biopsy on 11/29/2021 with Dr. Evans. Final pathology revealed 1.4 cm area of DCIS and sentinel lymph node was negative. There was no invasive or in Situ cancer on the left-sided resected specimen. On 4/11/2022 she underwent insertion of a permanent prosthesis and reduction mammoplasty in the contralateral breast.     HISTORY OF PRESENT ILLNESS:   Linnea Renae is a 72 y.o. female comes in for oncological follow up.  She denies change in her breast self-exam specifically denying new masses, skin or nipple changes, or nipple discharge. Does complain of left breast pain and tenderness. There have been no changes to family history. The patient denies constitutional symptoms of night sweats, chills, weight loss, new headaches, visual changes, new back or bony pain, chest pain, or shortness of breath.        Review of Systems: See  HPI/Interval History for other systems reviewed.     IMAGING:   No recent imaging obtained for review during this visit        MEDICATIONS/ALLERGIES:     Current Outpatient Medications   Medication Sig    alcohol antiseptic pads (ALCOHOL PREP PADS TOP)     aspirin 81 MG Chew Take 81 mg by mouth once daily.    atenoloL (TENORMIN) 50 MG tablet TAKE 1 TABLET BY MOUTH TWICE DAILY    atorvastatin (LIPITOR) 20 MG tablet Take 1 tablet (20 mg total) by mouth every evening.    blood sugar diagnostic (TRUE METRIX GLUCOSE TEST STRIP) Strp 1 strip by Misc.(Non-Drug; Combo Route) route once daily.    calcium-vitamin D3 500 mg(1,250mg) -200 unit per tablet Take 1 tablet by mouth 2 (two) times daily with meals.     clobetasoL (TEMOVATE) 0.05 % cream Apply topically 2 (two) times daily. Top of both feet    cycloSPORINE (RESTASIS) 0.05 % ophthalmic emulsion Instill 1 drop into both eyes twice a day    diclofenac sodium (VOLTAREN) 1 % Gel Apply 2 g topically 4 (four) times daily.    fluticasone propionate (FLONASE) 50 mcg/actuation nasal spray 2 sprays (100 mcg total) by Each Nostril route once daily.    hydroCHLOROthiazide (HYDRODIURIL) 12.5 MG Tab Take 1 tablet (12.5 mg total) by mouth once daily.    ibuprofen (ADVIL,MOTRIN) 800 MG tablet Take 1 tablet by mouth 3 times daily as needed    ketoconazole (NIZORAL) 2 % cream Apply topically 2 (two) times daily as needed irritation body folds    ketoconazole (NIZORAL) 2 % shampoo Apply topically to scalp in place of regular shampoo 2 to 3 times a week    lancets 33 gauge Misc 1 lancet by Misc.(Non-Drug; Combo Route) route once daily.    linaCLOtide (LINZESS) 145 mcg Cap capsule Take 1 capsule (145 mcg total) by mouth once daily.    loratadine 10 mg Cap     metFORMIN (GLUCOPHAGE) 500 MG tablet Take 2 tablets (1,000 mg total) by mouth 2 (two) times daily with meals.    minoxidiL (LONITEN) 2.5 MG tablet Take 1/4  to 1/2 tablet by mouth nightly as tolerated (Patient taking differently: Take  "by mouth once daily.)    multivitamin (THERAGRAN) per tablet Take 1 tablet by mouth once daily.    naloxone (NARCAN) 4 mg/actuation Spry 4mg by nasal route as needed for opioid overdose; may repeat every 2-3 minutes in alternating nostrils until medical help arrives. Call 911    nystatin (MYCOSTATIN) powder Apply topically 4 (four) times daily Under pannus as needed    olmesartan (BENICAR) 20 MG tablet Take 1 tablet (20 mg total) by mouth once daily.    omeprazole (PRILOSEC) 20 MG capsule Take 1 capsule by mouth once daily    pregabalin (LYRICA) 75 MG capsule Take 1 capsule (75 mg total) by mouth 2 (two) times daily.    tiZANidine (ZANAFLEX) 4 MG tablet Take 1 tablet (4 mg total) by mouth every 8 (eight) hours as needed (Muscle spasm.).    topiramate (TOPAMAX) 50 MG tablet Take 2 tablets (100 mg total) by mouth every evening.    traMADoL (ULTRAM) 50 mg tablet Take 1 tablet (50 mg total) by mouth every 6 (six) hours as needed for Pain.    ALPRAZolam (XANAX) 0.5 MG tablet Take 1 tablet (0.5 mg total) by mouth 3 (three) times daily.     No current facility-administered medications for this visit.     Facility-Administered Medications Ordered in Other Visits   Medication    mupirocin 2 % ointment    sodium chloride 0.9% flush 10 mL      Review of patient's allergies indicates:   Allergen Reactions    Codeine Itching       PHYSICAL EXAM:   BP (!) 113/53 (BP Location: Left arm, Patient Position: Sitting, BP Method: Large (Automatic))   Pulse 62   Ht 5' 8" (1.727 m)   Wt 118.8 kg (262 lb)   BMI 39.84 kg/m²     Physical Exam   Constitutional: She is oriented to person, place, and time.   HENT:   Head: Normocephalic.   Eyes: Right eye exhibits no discharge. Left eye exhibits no discharge.   Pulmonary/Chest: Effort normal. No respiratory distress. She has no wheezes. Right breast exhibits no mass, no skin change and no tenderness. Left breast exhibits tenderness. Left breast exhibits no inverted nipple, no mass, no nipple " discharge and no skin change.   Abdominal: Normal appearance.   Lymphadenopathy:     She has no cervical adenopathy.   Neurological: She is alert and oriented to person, place, and time.   Skin: Skin is warm and dry. No erythema. No pallor.     Exam done in the upright and supine position.     ASSESSMENT:   This is a 72 y.o. female without evidence of recurrence by exam, history or imaging.       PLAN:   1. We discussed there was nothing concerning on exam today. Discussed gentle massage with vitamin E oil twice daily   2. Continue to follow up with Dr. Woody and Hem Onc team    3. Continue monthly self breast exams and call the clinic with any changes or problems.  4. Left Screening Mammogram due now  5. Return to clinic in 1 year  with imaging     The patient is in agreement with the plan. Questions were encouraged and answered to patient's satisfaction. Linnea will call our office with any questions or concerns.

## 2022-10-06 ENCOUNTER — PATIENT MESSAGE (OUTPATIENT)
Dept: BARIATRICS | Facility: CLINIC | Age: 72
End: 2022-10-06
Payer: MEDICARE

## 2022-10-10 ENCOUNTER — PATIENT MESSAGE (OUTPATIENT)
Dept: ADMINISTRATIVE | Facility: HOSPITAL | Age: 72
End: 2022-10-10
Payer: MEDICARE

## 2022-10-13 ENCOUNTER — CLINICAL SUPPORT (OUTPATIENT)
Dept: REHABILITATION | Facility: HOSPITAL | Age: 72
End: 2022-10-13
Attending: SURGERY
Payer: MEDICARE

## 2022-10-13 DIAGNOSIS — M54.2 NECK PAIN, BILATERAL: Primary | ICD-10-CM

## 2022-10-13 DIAGNOSIS — Z98.890 HISTORY OF BILATERAL BREAST REDUCTION SURGERY: ICD-10-CM

## 2022-10-13 PROCEDURE — 97161 PT EVAL LOW COMPLEX 20 MIN: CPT

## 2022-10-13 PROCEDURE — 97110 THERAPEUTIC EXERCISES: CPT

## 2022-10-13 NOTE — PLAN OF CARE
[facility]   Physical Therapy Initial Evaluation     Date: 10/13/2022   Name: Linnea Renae  Clinic Number: [id number]    Therapy Diagnosis:   Encounter Diagnoses   Name Primary?    History of bilateral breast reduction surgery     Neck pain, bilateral Yes     Physician: Nolberto Eng*    Physician Orders: PT Eval and Treat   Medical Diagnosis from Referral: Z98.890 (ICD-10-CM) - History of bilateral breast reduction surgery  Evaluation Date: 10/13/2022  Authorization Period Expiration: TBD  Plan of Care Expiration: 1/13/2023  Progress Note Due: 11/13/2023  Visit # / Visits authorized: 1/ 1   FOTO: 0/5    Precautions: Standard and breast reduction surgery 8/18/2022     Time In: 1000  Time Out: 1040  Total Appointment Time (timed & untimed codes): 40 minutes      SUBJECTIVE     Date of onset: April 2022    History of current condition - Linnea reports: she's having pain across shouolders, down into the neck, and into the neck. She had recronstruction surgery with implants that were too big and caused the area to get really tight. She stared having the pain after the surgery in April. The pain has been about the same since the last surgery in August. It is a little more on the L because she has been using the L arm more recently since the surgery was on the R. She gets some pinching on the L side of her neck when she moves certain ways. The pinching is the wosre and she had some headaches. The rest of the pain is an aching, tightness, and sometimes like something gets caught. Massage has helped some. She typically lays flat at night and that will help her pain.     Falls: none    Imaging, none    Prior Therapy: yes for leg weakness  Social History: lives with family in [facility] with 0 [employer]   Occupation: retired  Prior Level of Function: completely independent  Current Level of Function: independent with neck and back pain    Pain:  Current 3/10, worst 10/10, best 0/10   Location: bilateral  back , neck , and shoulder   Description: Aching, Tight, and pinching  Aggravating Factors: turning to the L for pinching in neck; using her L shoulder a certain  Easing Factors: massage, pain medication, heating pad, hot bath, and rest and ibuprofen    Patients goals: to get this kink out of my neck and the pain out of my L shoulder;      Medical History:   Past Medical History:   Diagnosis Date    Allergy     Breast cancer     Lumpectomy 10/15.    Chronic constipation     Colon polyps     Diabetes mellitus, type 2     Dry eyes     GERD (gastroesophageal reflux disease)     Hyperlipidemia     Hypertension     Lumbar disc disease     Type 2 diabetes mellitus        Surgical History:   Linnea Renae  has a past surgical history that includes Tubal ligation; Cataract extraction, bilateral; Cholecystectomy ();  section; Hysterectomy (); Breast lumpectomy (Right); Injection of facet joint (Bilateral, 2018); Breast biopsy; Colonoscopy w/ polypectomy; Colonoscopy (N/A, 10/11/2018); Radiofrequency ablation (Left, 3/20/2019); Transforaminal epidural injection of steroid (Left, 10/2/2019); Radiofrequency ablation (Left, 2019); Radiofrequency ablation (Right, 2020); Injection of joint (Bilateral, 2020); Radiofrequency ablation (Right, 2020); Mastectomy (Right, 2021); Total Reduction Mammoplasty (Left, 2021); Breast reconstruction (Right, 2021); Replacement of implant of breast (Right, 2022); Mastopexy (Left, 2022); Replacement of implant of breast (Right, 2022); and Evacuation of hematoma (Right, 2022).    Medications:   Linnea has a current medication list which includes the following prescription(s): alcohol antiseptic pads, alprazolam, aspirin, atenolol, atorvastatin, blood sugar diagnostic, calcium-vitamin d3, clobetasol, restasis, diclofenac sodium, fluticasone propionate, hydrochlorothiazide, ibuprofen, ketoconazole, ketoconazole, lancets,  linaclotide, loratadine, metformin, minoxidil, multivitamin, naloxone, nystatin, olmesartan, omeprazole, pregabalin, tizanidine, topiramate, and tramadol, and the following Facility-Administered Medications: mupirocin and sodium chloride 0.9%.    Allergies:   Review of patient's allergies indicates:   Allergen Reactions    Codeine Itching          OBJECTIVE     Cervical AROM: Pain/Dysfunction with Movement:   Flexion: 55 degrees    Extension: 45 degrees    Right side bendin degrees Contralateral rotation compensation   Left side bendin degrees Contralateral rotation compensation   Right rotation: 55 degrees    Left rotation: 45 degrees       Right Left Comment: ! = pain   Shoulder flexion: 4 4/5    Shoulder Abduction: 45 4/5    Shoulder ER:  4/5    Shoulder IR: 4 4/5    Elbow Flexion:  4/5    Elbow Extension:  4/5 L Pain     : WNL    Posture Assessment: fwd head, inc'd kyphosis    Joint mobility: dec'd at C3-C7 with mild reproduction of symptoms    Palpation: TTP and hypertonicity t/o B cervical and periscapular muscles      Limitation/Restriction for FOTO Survey    Therapist reviewed FOTO scores for Linnea Renae on 10/13/2022.   FOTO documents entered into [employer] - see Media section.    Limitation Score: TBD%         TREATMENT     Total Treatment time (time-based codes) separate from Evaluation: 10 minutes      Linnea received the treatments listed below:      therapeutic exercises to develop strength, endurance, and posture for 10 minutes including:  Review and demonstration of HEP including the following:   - chin tucks 2x10, 3s    Attempted upper trap stretch and levator scapulae stretch but unable to perform due to contralateral muscle cramping    Next session:  STM to upper trap/levator scapulae, neck and periscap  Scap squeezes  shoulder isometrics  Shoulder rolls vs shoulder shrugs    PATIENT EDUCATION AND HOME EXERCISES     Education provided:   - Role of PT  - PT POC  -  HEP    Written Home Exercises Provided: yes. Exercises were reviewed and Linnea was able to demonstrate them prior to the end of the session.  Linnea demonstrated good  understanding of the education provided. See EMR under Patient Instructions for exercises provided during therapy sessions.    ASSESSMENT     Linnea is a 72 y.o. female referred to outpatient Physical Therapy with a medical diagnosis of history of bilateral breast reduction surgery. Patient presents with neck, upper back, and shoulder pain bilaterally. She has increased hypertonicity t/o cervical and periscapular muscles. She has dec'd strength in B UE as indicated by MMT. Pt with limited and painful cervical AROM with contralateral rotational compensation during side bending, indicating possible restriction in lower cervical spine that is further confirmed by joint mobility deficit C3-C7.     Patient prognosis is Excellent.   Patient will benefit from skilled outpatient Physical Therapy to address the deficits stated above and in the chart below, provide patient /family education, and to maximize patientt's level of independence.     Plan of care discussed with patient: Yes  Patient's spiritual, cultural and educational needs considered and patient is agreeable to the plan of care and goals as stated below:     Anticipated Barriers for therapy: co-morbidities    Medical Necessity is demonstrated by the following  History  Co-morbidities and personal factors that may impact the plan of care Co-morbidities:   See history above    Personal Factors:   no deficits     low   Examination  Body Structures and Functions, activity limitations and participation restrictions that may impact the plan of care Body Regions:   neck  back  upper extremities    Body Systems:    ROM  strength  motor control    Participation Restrictions:   Dressing, housework    Activity limitations:   Learning and applying knowledge  no deficits    General Tasks and Commands  no  deficits    Communication  no deficits    Mobility  lifting and carrying objects    Self care  dressing    Domestic Life  doing house work (cleaning house, washing dishes, laundry)    Interactions/Relationships  no deficits    Life Areas  no deficits    Community and Social Life  recreation and leisure         low   Clinical Presentation stable and uncomplicated low   Decision Making/ Complexity Score: low     Goals:  Short Term Goals (4 Weeks):   1. Pt will be independent with HEP to supplement PT in improving pain free cervical mobility  2. Pt will improve cervical AROM 10 deg in limited planes to improve cervical mobility for driving  3. Pt will improve UE MMTs by 1/2 grade in all planes to improve strength for lifting and carrying tasks.  4. Pt will demonstrate improved sitting posture to decrease pain experienced in head and neck.  Long Term Goals (8 Weeks):   1. Pt will improve FOTO to </=TBD% limitation to improve perceived limitation with changing and maintaining mobility.  2. Pt will improve cervical AROM to WNL in all planes to improve cervical mobility for driving   3. Pt will improve UE MMTs 1 grade in all planes to improve strength for lifting and carrying tasks.  4. Pt will report no pain with lifting 15 lbs to promote physical activity.   5. Pt will report no pain with cervical AROM in all planes to promote QOL.     PLAN   Plan of care Certification: 10/13/2022 to 1/13/2023.    Outpatient Physical Therapy 2 times weekly for 12 weeks to include the following interventions: Cervical/Lumbar Traction, Electrical Stimulation  , Gait Training, Manual Therapy, Moist Heat/ Ice, Neuromuscular Re-ed, Patient Education, Self Care, Therapeutic Activities, Therapeutic Exercise, Ultrasound, and dry needling, IASTM, and kinesiotaping PRN.     Mann Corral, PT      I CERTIFY THE NEED FOR THESE SERVICES FURNISHED UNDER THIS PLAN OF TREATMENT AND WHILE UNDER MY CARE   Physician's comments:     Physician's Signature:  ___________________________________________________

## 2022-10-20 ENCOUNTER — CLINICAL SUPPORT (OUTPATIENT)
Dept: REHABILITATION | Facility: HOSPITAL | Age: 72
End: 2022-10-20
Payer: MEDICARE

## 2022-10-20 DIAGNOSIS — Z98.890 HISTORY OF BILATERAL BREAST REDUCTION SURGERY: ICD-10-CM

## 2022-10-20 DIAGNOSIS — R26.89 IMPAIRED GAIT AND MOBILITY: Primary | ICD-10-CM

## 2022-10-20 PROCEDURE — 97110 THERAPEUTIC EXERCISES: CPT

## 2022-10-20 PROCEDURE — 97140 MANUAL THERAPY 1/> REGIONS: CPT

## 2022-10-20 NOTE — PROGRESS NOTES
[facility]   Physical Therapy Treatment Note     Name: Linnea Renae  Clinic Number: [id number]    Therapy Diagnosis:   Encounter Diagnoses   Name Primary?    Impaired gait and mobility Yes    History of bilateral breast reduction surgery      Physician: Nolberto Eng*    Visit Date: 10/20/2022    Physician Orders: PT Eval and Treat   Medical Diagnosis from Referral: Z98.890 (ICD-10-CM) - History of bilateral breast reduction surgery  Evaluation Date: 10/13/2022  Authorization Period Expiration: TBD  Plan of Care Expiration: 1/13/2023  Progress Note Due: 11/13/2023  Visit # / Visits authorized: 1/ 1   FOTO: 0/5     Precautions: Standard and breast reduction surgery 8/18/2022     PTA Visit #: 0/5     Time In: 135  Time Out: 233  Total Billable Time: 58 minutes    SUBJECTIVE     Pt reports: she had increased pain after doing the chin tucks exercise so she stopped doing it.  She was not compliant with home exercise program.  Response to previous treatment: no adverse effects  Functional change: none noted    Pain: 7/10  Location: bilateral neck      OBJECTIVE     Objective Measures updated at progress report unless specified.     Treatment     Linnea received the treatments listed below:      therapeutic exercises to develop strength, endurance, ROM, flexibility, posture, and core stabilization for 33 minutes including:  Chin tucks 2x10, 3s NP  Upper trap stretch   - 4x20 L  - 4x20 R manual  Levator scapulae stretch   - 4x20s L   - 4x20s R manual  Shoulder rolls x20  Scap squeezes x20  Incline no money with cervical rotation x15 B     Next session:  shoulder isometrics      manual therapy techniques: Soft tissue Mobilization were applied to the: neck for 15 minutes, including:  STM to B upper trap, levator scapulae, and cervical paraspinals    hot pack for 10 minutes to neck and top of shoulders.      Patient Education and Home Exercises     Home Exercises Provided and Patient  Education Provided     Education provided:   - HEP    Written Home Exercises Provided: yes. Exercises were reviewed and Linnea was able to demonstrate them prior to the end of the session.  Linnea demonstrated good  understanding of the education provided. See EMR under Patient Instructions for exercises provided during therapy sessions    ASSESSMENT     Pt tolerated first session after initial evaluation fairly well today with no adverse effects. Good response noted to STM at this date with subjective improvements following STM. Improved tolerance to stretching at this date, though she was unable to tolerate active R sided stretching due to L sided cramping. Good tolerance with performed manually by PT. Attempted supine chin tucks but unable due to reproduction of symptoms. Attemtped joint mobilizations, but limited due to L cervical paraspinal hypertonicity and tenderness to palpation. Will progress cervical and shoulder isometrics at next session to promote improved activity tolerance pending pt tolerance.     Linnea Is progressing well towards her goals.   Patient prognosis is Excellent.   Patient will benefit from skilled outpatient Physical Therapy to address the deficits stated above and in the chart below, provide patient /family education, and to maximize patientt's level of independence.      Plan of care discussed with patient: Yes  Patient's spiritual, cultural and educational needs considered and patient is agreeable to the plan of care and goals as stated below:      Anticipated Barriers for therapy: co-morbidities    Goals:   Short Term Goals (4 Weeks):   1. Pt will be independent with HEP to supplement PT in improving pain free cervical mobility  2. Pt will improve cervical AROM 10 deg in limited planes to improve cervical mobility for driving  3. Pt will improve UE MMTs by 1/2 grade in all planes to improve strength for lifting and carrying tasks.  4. Pt will demonstrate improved sitting posture to  decrease pain experienced in head and neck.  Long Term Goals (8 Weeks):   1. Pt will improve FOTO to </=TBD% limitation to improve perceived limitation with changing and maintaining mobility.  2. Pt will improve cervical AROM to WNL in all planes to improve cervical mobility for driving   3. Pt will improve UE MMTs 1 grade in all planes to improve strength for lifting and carrying tasks.  4. Pt will report no pain with lifting 15 lbs to promote physical activity.   5. Pt will report no pain with cervical AROM in all planes to promote QOL.     PLAN     Plan of care Certification: 10/13/2022 to 1/13/2023.     Outpatient Physical Therapy 2 times weekly for 12 weeks to include the following interventions: Cervical/Lumbar Traction, Electrical Stimulation  , Gait Training, Manual Therapy, Moist Heat/ Ice, Neuromuscular Re-ed, Patient Education, Self Care, Therapeutic Activities, Therapeutic Exercise, Ultrasound, and dry needling, IASTM, and kinesiotaping PRN.     Mann Corral, PT

## 2022-10-26 ENCOUNTER — IMMUNIZATION (OUTPATIENT)
Dept: INTERNAL MEDICINE | Facility: CLINIC | Age: 72
End: 2022-10-26
Payer: MEDICARE

## 2022-10-26 DIAGNOSIS — Z23 NEED FOR VACCINATION: Primary | ICD-10-CM

## 2022-10-26 PROCEDURE — 0124A COVID-19, MRNA, LNP-S, BIVALENT BOOSTER, PF, 30 MCG/0.3 ML DOSE: CPT | Mod: PBBFAC | Performed by: INTERNAL MEDICINE

## 2022-10-26 PROCEDURE — 91312 COVID-19, MRNA, LNP-S, BIVALENT BOOSTER, PF, 30 MCG/0.3 ML DOSE: CPT | Mod: S$GLB,,, | Performed by: INTERNAL MEDICINE

## 2022-10-26 PROCEDURE — 91312 COVID-19, MRNA, LNP-S, BIVALENT BOOSTER, PF, 30 MCG/0.3 ML DOSE: ICD-10-PCS | Mod: S$GLB,,, | Performed by: INTERNAL MEDICINE

## 2022-10-27 ENCOUNTER — CLINICAL SUPPORT (OUTPATIENT)
Dept: REHABILITATION | Facility: HOSPITAL | Age: 72
End: 2022-10-27
Payer: MEDICARE

## 2022-10-27 DIAGNOSIS — Z98.890 HISTORY OF BILATERAL BREAST REDUCTION SURGERY: ICD-10-CM

## 2022-10-27 DIAGNOSIS — R26.89 IMPAIRED GAIT AND MOBILITY: Primary | ICD-10-CM

## 2022-10-27 PROCEDURE — 97110 THERAPEUTIC EXERCISES: CPT | Mod: CQ

## 2022-10-27 PROCEDURE — 97140 MANUAL THERAPY 1/> REGIONS: CPT | Mod: CQ

## 2022-10-27 NOTE — PROGRESS NOTES
[facility]   Physical Therapy Treatment Note     Name: Linnea Renae  Clinic Number: [id number]    Therapy Diagnosis:   Encounter Diagnoses   Name Primary?    Impaired gait and mobility Yes    History of bilateral breast reduction surgery      Physician: Nolberto Eng*    Visit Date: 10/27/2022    Physician Orders: PT Eval and Treat   Medical Diagnosis from Referral: Z98.890 (ICD-10-CM) - History of bilateral breast reduction surgery  Evaluation Date: 10/13/2022  Authorization Period Expiration: 12/20/22  Plan of Care Expiration: 1/13/2023  Progress Note Due: 11/13/2023  Visit # / Visits authorized: 1/ 1 2/12  FOTO: 0/5     Precautions: Standard and breast reduction surgery 8/18/2022     PTA Visit #: 1/5     Time In: 11:30 pm  Time Out: 12:45 pm  Total Billable Time: 63 minutes    SUBJECTIVE     Pt reports: she had increased pain after doing the isometric internal rotation exercise so she stopped doing it. Increased pulling and tightness across the R upper chest.  She was not compliant with home exercise program.  Response to previous treatment: no adverse effects  Functional change: none noted    Pain: 7/10  Location: bilateral neck      OBJECTIVE     Objective Measures updated at progress report unless specified.     Treatment     Linnea received the treatments listed below:      therapeutic exercises to develop strength, endurance, ROM, flexibility, posture, and core stabilization for 38 minutes including:  Chin tucks 2x10, 3s NP  Upper trap stretch   - 4x20 L  - 4x20 R manual  Levator scapulae stretch   - 4x20s L   - 4x20s R manual ( attempted Held due to pain)  Shoulder rolls x20  Scap squeezes x20  Incline no money with cervical rotation x15 B NP  shoulder isometrics x 10 5s ( R IR held)    Next session:      manual therapy techniques: Soft tissue Mobilization were applied to the: neck for 15 minutes, including:  STM to B upper trap, levator scapulae, and cervical  paraspinals    hot pack for 10 minutes to neck and top of shoulders.      Patient Education and Home Exercises     Home Exercises Provided and Patient Education Provided     Education provided:   - HEP    Written Home Exercises Provided: yes. Exercises were reviewed and Linnea was able to demonstrate them prior to the end of the session.  Linnea demonstrated good  understanding of the education provided. See EMR under Patient Instructions for exercises provided during therapy sessions    ASSESSMENT   Session modified to accommodate patient reports of L chest pain and tightness with IR isometrics. Pain persist t/o R/L cervical paraspinals. R manual levator stretch held due to increased pain levels. Issued HEP with shoulder isometrics with patient understanding. STM applied to B UT and LS with good response.    Linnea Is progressing well towards her goals.   Patient prognosis is Excellent.   Patient will benefit from skilled outpatient Physical Therapy to address the deficits stated above and in the chart below, provide patient /family education, and to maximize patientt's level of independence.      Plan of care discussed with patient: Yes  Patient's spiritual, cultural and educational needs considered and patient is agreeable to the plan of care and goals as stated below:      Anticipated Barriers for therapy: co-morbidities    Goals:   Short Term Goals (4 Weeks):   1. Pt will be independent with HEP to supplement PT in improving pain free cervical mobility  2. Pt will improve cervical AROM 10 deg in limited planes to improve cervical mobility for driving  3. Pt will improve UE MMTs by 1/2 grade in all planes to improve strength for lifting and carrying tasks.  4. Pt will demonstrate improved sitting posture to decrease pain experienced in head and neck.  Long Term Goals (8 Weeks):   1. Pt will improve FOTO to </=TBD% limitation to improve perceived limitation with changing and maintaining mobility.  2. Pt will improve  cervical AROM to WNL in all planes to improve cervical mobility for driving   3. Pt will improve UE MMTs 1 grade in all planes to improve strength for lifting and carrying tasks.  4. Pt will report no pain with lifting 15 lbs to promote physical activity.   5. Pt will report no pain with cervical AROM in all planes to promote QOL.     PLAN     Plan of care Certification: 10/13/2022 to 1/13/2023.     Outpatient Physical Therapy 2 times weekly for 12 weeks to include the following interventions: Cervical/Lumbar Traction, Electrical Stimulation  , Gait Training, Manual Therapy, Moist Heat/ Ice, Neuromuscular Re-ed, Patient Education, Self Care, Therapeutic Activities, Therapeutic Exercise, Ultrasound, and dry needling, IASTM, and kinesiotaping PRN.     Herrera Zeng, PTA

## 2022-11-01 ENCOUNTER — OFFICE VISIT (OUTPATIENT)
Dept: PODIATRY | Facility: CLINIC | Age: 72
End: 2022-11-01
Payer: MEDICARE

## 2022-11-01 VITALS
BODY MASS INDEX: 39.54 KG/M2 | SYSTOLIC BLOOD PRESSURE: 130 MMHG | WEIGHT: 260.06 LBS | DIASTOLIC BLOOD PRESSURE: 83 MMHG | HEART RATE: 73 BPM

## 2022-11-01 DIAGNOSIS — E11.42 DIABETIC PERIPHERAL NEUROPATHY ASSOCIATED WITH TYPE 2 DIABETES MELLITUS: Primary | ICD-10-CM

## 2022-11-01 DIAGNOSIS — G89.4 CHRONIC PAIN SYNDROME: ICD-10-CM

## 2022-11-01 PROCEDURE — 3066F NEPHROPATHY DOC TX: CPT | Mod: CPTII,S$GLB,, | Performed by: PODIATRIST

## 2022-11-01 PROCEDURE — 3044F HG A1C LEVEL LT 7.0%: CPT | Mod: CPTII,S$GLB,, | Performed by: PODIATRIST

## 2022-11-01 PROCEDURE — 99999 PR PBB SHADOW E&M-EST. PATIENT-LVL II: ICD-10-PCS | Mod: PBBFAC,,, | Performed by: PODIATRIST

## 2022-11-01 PROCEDURE — 3008F PR BODY MASS INDEX (BMI) DOCUMENTED: ICD-10-PCS | Mod: CPTII,S$GLB,, | Performed by: PODIATRIST

## 2022-11-01 PROCEDURE — 4010F ACE/ARB THERAPY RXD/TAKEN: CPT | Mod: CPTII,S$GLB,, | Performed by: PODIATRIST

## 2022-11-01 PROCEDURE — 3079F DIAST BP 80-89 MM HG: CPT | Mod: CPTII,S$GLB,, | Performed by: PODIATRIST

## 2022-11-01 PROCEDURE — 1159F MED LIST DOCD IN RCRD: CPT | Mod: CPTII,S$GLB,, | Performed by: PODIATRIST

## 2022-11-01 PROCEDURE — 3075F PR MOST RECENT SYSTOLIC BLOOD PRESS GE 130-139MM HG: ICD-10-PCS | Mod: CPTII,S$GLB,, | Performed by: PODIATRIST

## 2022-11-01 PROCEDURE — 99999 PR PBB SHADOW E&M-EST. PATIENT-LVL II: CPT | Mod: PBBFAC,,, | Performed by: PODIATRIST

## 2022-11-01 PROCEDURE — 3044F PR MOST RECENT HEMOGLOBIN A1C LEVEL <7.0%: ICD-10-PCS | Mod: CPTII,S$GLB,, | Performed by: PODIATRIST

## 2022-11-01 PROCEDURE — 1159F PR MEDICATION LIST DOCUMENTED IN MEDICAL RECORD: ICD-10-PCS | Mod: CPTII,S$GLB,, | Performed by: PODIATRIST

## 2022-11-01 PROCEDURE — 3075F SYST BP GE 130 - 139MM HG: CPT | Mod: CPTII,S$GLB,, | Performed by: PODIATRIST

## 2022-11-01 PROCEDURE — 99214 PR OFFICE/OUTPT VISIT, EST, LEVL IV, 30-39 MIN: ICD-10-PCS | Mod: S$GLB,,, | Performed by: PODIATRIST

## 2022-11-01 PROCEDURE — 1125F AMNT PAIN NOTED PAIN PRSNT: CPT | Mod: CPTII,S$GLB,, | Performed by: PODIATRIST

## 2022-11-01 PROCEDURE — 3079F PR MOST RECENT DIASTOLIC BLOOD PRESSURE 80-89 MM HG: ICD-10-PCS | Mod: CPTII,S$GLB,, | Performed by: PODIATRIST

## 2022-11-01 PROCEDURE — 1125F PR PAIN SEVERITY QUANTIFIED, PAIN PRESENT: ICD-10-PCS | Mod: CPTII,S$GLB,, | Performed by: PODIATRIST

## 2022-11-01 PROCEDURE — 3061F NEG MICROALBUMINURIA REV: CPT | Mod: CPTII,S$GLB,, | Performed by: PODIATRIST

## 2022-11-01 PROCEDURE — 99214 OFFICE O/P EST MOD 30 MIN: CPT | Mod: S$GLB,,, | Performed by: PODIATRIST

## 2022-11-01 PROCEDURE — 3066F PR DOCUMENTATION OF TREATMENT FOR NEPHROPATHY: ICD-10-PCS | Mod: CPTII,S$GLB,, | Performed by: PODIATRIST

## 2022-11-01 PROCEDURE — 3008F BODY MASS INDEX DOCD: CPT | Mod: CPTII,S$GLB,, | Performed by: PODIATRIST

## 2022-11-01 PROCEDURE — 3061F PR NEG MICROALBUMINURIA RESULT DOCUMENTED/REVIEW: ICD-10-PCS | Mod: CPTII,S$GLB,, | Performed by: PODIATRIST

## 2022-11-01 PROCEDURE — 4010F PR ACE/ARB THEARPY RXD/TAKEN: ICD-10-PCS | Mod: CPTII,S$GLB,, | Performed by: PODIATRIST

## 2022-11-02 DIAGNOSIS — E11.9 TYPE 2 DIABETES MELLITUS WITHOUT COMPLICATION: ICD-10-CM

## 2022-11-07 NOTE — PROGRESS NOTES
Subjective:      Patient ID: Linnea Renae is a 72 y.o. female.    Chief Complaint: Diabetes Mellitus (Bailee Moss MD/PCP - General sometime this year per patient /), Diabetic Foot Exam, and Foot Pain    Linnea is a 72 y.o. female who presents to the clinic upon referral from Dr. Nat marsh. provider found  for evaluation and treatment of diabetic feet. Linnea has a past medical history of Allergy, Breast cancer, Chronic constipation, Colon polyps, Diabetes mellitus, type 2, Dry eyes, GERD (gastroesophageal reflux disease), Hyperlipidemia, Hypertension, Lumbar disc disease, and Type 2 diabetes mellitus.  The bottoms of both feet continue to feel stiff she states, however much improved.  She continues to have neuropathy type symptoms.  She is much improved since last visit using the Lyrica for nerve symptoms including numbness and occasional burning with a feeling of tightness about both feet.      PCP: Baiele Moss MD    Date Last Seen by PCP:   Chief Complaint   Patient presents with    Diabetes Mellitus     Bailee Moss MD  PCP - General sometime this year per patient       Diabetic Foot Exam    Foot Pain         Current shoe gear: Casual shoes    Hemoglobin A1C   Date Value Ref Range Status   03/31/2022 5.7 (H) 4.0 - 5.6 % Final     Comment:     ADA Screening Guidelines:  5.7-6.4%  Consistent with prediabetes  >or=6.5%  Consistent with diabetes    High levels of fetal hemoglobin interfere with the HbA1C  assay. Heterozygous hemoglobin variants (HbS, HgC, etc)do  not significantly interfere with this assay.   However, presence of multiple variants may affect accuracy.     11/29/2021 5.7 (H) 4.0 - 5.6 % Final     Comment:     ADA Screening Guidelines:  5.7-6.4%  Consistent with prediabetes  >or=6.5%  Consistent with diabetes    High levels of fetal hemoglobin interfere with the HbA1C  assay. Heterozygous hemoglobin variants (HbS, HgC, etc)do  not significantly interfere with this assay.    However, presence of multiple variants may affect accuracy.     07/16/2021 5.7 (H) 4.0 - 5.6 % Final     Comment:     ADA Screening Guidelines:  5.7-6.4%  Consistent with prediabetes  >or=6.5%  Consistent with diabetes    High levels of fetal hemoglobin interfere with the HbA1C  assay. Heterozygous hemoglobin variants (HbS, HgC, etc)do  not significantly interfere with this assay.   However, presence of multiple variants may affect accuracy.             Review of Systems   Constitution: Negative for chills and fever.   HENT: Negative for congestion and tinnitus.    Eyes: Negative for double vision and visual disturbance.   Cardiovascular: Negative for chest pain and claudication.   Respiratory: Negative for hemoptysis and shortness of breath.    Endocrine: Negative for cold intolerance and heat intolerance.   Hematologic/Lymphatic: Negative for adenopathy and bleeding problem.   Skin: Positive for color change, dry skin and nail changes.   Musculoskeletal: Positive for myalgias and stiffness.   Gastrointestinal: Negative for nausea and vomiting.   Genitourinary: Negative for dysuria and hematuria.   Neurological: Positive for numbness.   Psychiatric/Behavioral: Negative for altered mental status and suicidal ideas.   Allergic/Immunologic: Negative for environmental allergies and persistent infections.           Objective:      Physical Exam  Vitals signs reviewed.   Constitutional:       Appearance: She is well-developed.   Cardiovascular:      Pulses:           Dorsalis pedis pulses are 1+ on the right side and 1+ on the left side.        Posterior tibial pulses are 1+ on the right side and 1+ on the left side.   Pulmonary:      Effort: Pulmonary effort is normal.   Musculoskeletal: Normal range of motion.      Comments: Anterior, lateral, and posterior muscle groups bilateral lower extremities show strength 4 over 5 symmetrically. Inspection and palpation of the joints and bones reveal no crepitus or joint  effusion. No tenderness upon palpation. Mild plantar flexor contractures noted to digits 2 through 5 bilaterally.  Angle and base of gait are normal.   Feet:      Right foot:      Skin integrity: Callus and dry skin present.      Left foot:      Skin integrity: Callus and dry skin present.   Skin:     General: Skin is warm and dry.      Capillary Refill: Capillary refill takes 2 to 3 seconds.      Coloration: Skin is pale.      Comments: Skin bilateral lower extremities noted to be thin, dry, and atrophic.  Toenails normal.   Neurological:      Mental Status: She is alert and oriented to person, place, and time.      Sensory: Sensory deficit present.      Motor: Atrophy present.      Deep Tendon Reflexes: Reflexes abnormal.      Reflex Scores:       Patellar reflexes are 1+ on the right side and 1+ on the left side.       Achilles reflexes are 1+ on the right side and 1+ on the left side.     Comments: Sharp, dull, light touch, and vibratory sensation are diminished bilaterally. Proprioceptive sensation is intact to both lower extremities. Buchanan Jackie monofilament exam shows loss of protective sensation to plantar toes 1 through 5 bilaterally. Deep tendon reflexes to the patellar tendons is 1 over 4 bilaterally symmetrical. Deep tendon reflexes to the Achilles tendon is 0 over 4 bilaterally symmetrical. No ankle clonus or Babinski reflex noted bilaterally. Coordination is fair to both lower extremities.                 Assessment:       Encounter Diagnoses   Name Primary?    Diabetic peripheral neuropathy associated with type 2 diabetes mellitus Yes    Chronic pain syndrome            Plan:       Linnea was seen today for diabetes mellitus, diabetic foot exam and foot pain.    Diagnoses and all orders for this visit:    Diabetic peripheral neuropathy associated with type 2 diabetes mellitus    Chronic pain syndrome      I counseled the patient on her conditions, their implications and medical management.    Shoe  inspection. Diabetic Foot Education. Patient reminded of the importance of good nutrition and blood sugar control to help prevent podiatric complications of diabetes. Patient instructed on proper foot hygeine. We discussed wearing proper shoe gear, daily foot inspections and Diabetic foot education in detail.    Discussed options for peripheral neuropathy/nerve entrapment syndrome including nerve block therapy, surgical nerve entrapment decompression procedures, and various vitamins and supplementation available shown to improve nerve function.    Continue Lyrica 75 mg b.i.d..  Follow up in 3 months.  .  Follow-up in 3 months.

## 2022-11-14 ENCOUNTER — OFFICE VISIT (OUTPATIENT)
Dept: BARIATRICS | Facility: CLINIC | Age: 72
End: 2022-11-14
Payer: MEDICARE

## 2022-11-14 VITALS
HEART RATE: 70 BPM | DIASTOLIC BLOOD PRESSURE: 60 MMHG | HEIGHT: 68 IN | WEIGHT: 263.44 LBS | BODY MASS INDEX: 39.92 KG/M2 | SYSTOLIC BLOOD PRESSURE: 110 MMHG | OXYGEN SATURATION: 96 %

## 2022-11-14 DIAGNOSIS — E66.01 CLASS 2 SEVERE OBESITY WITH BODY MASS INDEX (BMI) OF 35 TO 39.9 WITH SERIOUS COMORBIDITY: ICD-10-CM

## 2022-11-14 PROCEDURE — 1160F PR REVIEW ALL MEDS BY PRESCRIBER/CLIN PHARMACIST DOCUMENTED: ICD-10-PCS | Mod: CPTII,S$GLB,, | Performed by: INTERNAL MEDICINE

## 2022-11-14 PROCEDURE — 3066F NEPHROPATHY DOC TX: CPT | Mod: CPTII,S$GLB,, | Performed by: INTERNAL MEDICINE

## 2022-11-14 PROCEDURE — 99214 OFFICE O/P EST MOD 30 MIN: CPT | Mod: S$GLB,,, | Performed by: INTERNAL MEDICINE

## 2022-11-14 PROCEDURE — 1101F PT FALLS ASSESS-DOCD LE1/YR: CPT | Mod: CPTII,S$GLB,, | Performed by: INTERNAL MEDICINE

## 2022-11-14 PROCEDURE — 3061F PR NEG MICROALBUMINURIA RESULT DOCUMENTED/REVIEW: ICD-10-PCS | Mod: CPTII,S$GLB,, | Performed by: INTERNAL MEDICINE

## 2022-11-14 PROCEDURE — 3044F PR MOST RECENT HEMOGLOBIN A1C LEVEL <7.0%: ICD-10-PCS | Mod: CPTII,S$GLB,, | Performed by: INTERNAL MEDICINE

## 2022-11-14 PROCEDURE — 99214 PR OFFICE/OUTPT VISIT, EST, LEVL IV, 30-39 MIN: ICD-10-PCS | Mod: S$GLB,,, | Performed by: INTERNAL MEDICINE

## 2022-11-14 PROCEDURE — 1160F RVW MEDS BY RX/DR IN RCRD: CPT | Mod: CPTII,S$GLB,, | Performed by: INTERNAL MEDICINE

## 2022-11-14 PROCEDURE — 1159F MED LIST DOCD IN RCRD: CPT | Mod: CPTII,S$GLB,, | Performed by: INTERNAL MEDICINE

## 2022-11-14 PROCEDURE — 3066F PR DOCUMENTATION OF TREATMENT FOR NEPHROPATHY: ICD-10-PCS | Mod: CPTII,S$GLB,, | Performed by: INTERNAL MEDICINE

## 2022-11-14 PROCEDURE — 3074F PR MOST RECENT SYSTOLIC BLOOD PRESSURE < 130 MM HG: ICD-10-PCS | Mod: CPTII,S$GLB,, | Performed by: INTERNAL MEDICINE

## 2022-11-14 PROCEDURE — 1126F PR PAIN SEVERITY QUANTIFIED, NO PAIN PRESENT: ICD-10-PCS | Mod: CPTII,S$GLB,, | Performed by: INTERNAL MEDICINE

## 2022-11-14 PROCEDURE — 3078F DIAST BP <80 MM HG: CPT | Mod: CPTII,S$GLB,, | Performed by: INTERNAL MEDICINE

## 2022-11-14 PROCEDURE — 3044F HG A1C LEVEL LT 7.0%: CPT | Mod: CPTII,S$GLB,, | Performed by: INTERNAL MEDICINE

## 2022-11-14 PROCEDURE — 1126F AMNT PAIN NOTED NONE PRSNT: CPT | Mod: CPTII,S$GLB,, | Performed by: INTERNAL MEDICINE

## 2022-11-14 PROCEDURE — 1159F PR MEDICATION LIST DOCUMENTED IN MEDICAL RECORD: ICD-10-PCS | Mod: CPTII,S$GLB,, | Performed by: INTERNAL MEDICINE

## 2022-11-14 PROCEDURE — 3288F PR FALLS RISK ASSESSMENT DOCUMENTED: ICD-10-PCS | Mod: CPTII,S$GLB,, | Performed by: INTERNAL MEDICINE

## 2022-11-14 PROCEDURE — 1101F PR PT FALLS ASSESS DOC 0-1 FALLS W/OUT INJ PAST YR: ICD-10-PCS | Mod: CPTII,S$GLB,, | Performed by: INTERNAL MEDICINE

## 2022-11-14 PROCEDURE — 4010F ACE/ARB THERAPY RXD/TAKEN: CPT | Mod: CPTII,S$GLB,, | Performed by: INTERNAL MEDICINE

## 2022-11-14 PROCEDURE — 3008F BODY MASS INDEX DOCD: CPT | Mod: CPTII,S$GLB,, | Performed by: INTERNAL MEDICINE

## 2022-11-14 PROCEDURE — 4010F PR ACE/ARB THEARPY RXD/TAKEN: ICD-10-PCS | Mod: CPTII,S$GLB,, | Performed by: INTERNAL MEDICINE

## 2022-11-14 PROCEDURE — 99999 PR PBB SHADOW E&M-EST. PATIENT-LVL III: CPT | Mod: PBBFAC,,, | Performed by: INTERNAL MEDICINE

## 2022-11-14 PROCEDURE — 3074F SYST BP LT 130 MM HG: CPT | Mod: CPTII,S$GLB,, | Performed by: INTERNAL MEDICINE

## 2022-11-14 PROCEDURE — 3288F FALL RISK ASSESSMENT DOCD: CPT | Mod: CPTII,S$GLB,, | Performed by: INTERNAL MEDICINE

## 2022-11-14 PROCEDURE — 3008F PR BODY MASS INDEX (BMI) DOCUMENTED: ICD-10-PCS | Mod: CPTII,S$GLB,, | Performed by: INTERNAL MEDICINE

## 2022-11-14 PROCEDURE — 3061F NEG MICROALBUMINURIA REV: CPT | Mod: CPTII,S$GLB,, | Performed by: INTERNAL MEDICINE

## 2022-11-14 PROCEDURE — 99999 PR PBB SHADOW E&M-EST. PATIENT-LVL III: ICD-10-PCS | Mod: PBBFAC,,, | Performed by: INTERNAL MEDICINE

## 2022-11-14 PROCEDURE — 3078F PR MOST RECENT DIASTOLIC BLOOD PRESSURE < 80 MM HG: ICD-10-PCS | Mod: CPTII,S$GLB,, | Performed by: INTERNAL MEDICINE

## 2022-11-14 RX ORDER — SEMAGLUTIDE 1.34 MG/ML
0.5 INJECTION, SOLUTION SUBCUTANEOUS
Qty: 3 PEN | Refills: 0 | Status: SHIPPED | OUTPATIENT
Start: 2022-11-21 | End: 2023-02-14

## 2022-11-14 RX ORDER — SEMAGLUTIDE 1.34 MG/ML
0.5 INJECTION, SOLUTION SUBCUTANEOUS
Qty: 1 PEN | Refills: 0 | Status: SHIPPED | OUTPATIENT
Start: 2022-11-14 | End: 2022-12-09 | Stop reason: SDUPTHER

## 2022-11-14 RX ORDER — TOPIRAMATE 50 MG/1
100 TABLET, FILM COATED ORAL NIGHTLY
Qty: 180 TABLET | Refills: 0 | Status: SHIPPED | OUTPATIENT
Start: 2022-11-14 | End: 2023-02-14 | Stop reason: SDUPTHER

## 2022-11-14 NOTE — PROGRESS NOTES
"Subjective:       Patient ID: Linnea Renae is a 72 y.o. female.    Chief Complaint: Follow-up    Pt here today for follow-up.Has gained 7 lbs, net neg 23. Started [id number] piyush diet and decreased topiramate to 100 mg qhs. Has been on the medication.   In early April 2022 she underwent insertion of a permanent prosthesis and reduction mammoplasty in the contralateral breast. Implant exchange planned on the 18th . Heme onc and plastic not reviewed.  She has restrictions with lifting. Is having trouble with neuropathy in her feet, and has had some weight gain.    She accounts most of her weight gain to stress.   States she does find her appetite is less suppressed with the increase to 100 mg BID.       checked today        Ophtho exam 5/10/22- Open angle with borderline findings, low risk, bilateral. No retinopathy.       checked today. Has been on tramadol chronically.    On metformin 2000 mg BID.     Lab Results   Component Value Date    HGBA1C 5.7 (H) 03/31/2022    HGBA1C 5.7 (H) 11/29/2021    HGBA1C 5.7 (H) 07/16/2021     Lab Results   Component Value Date    LDLCALC 78.8 03/31/2022    CREATININE 1.0 08/04/2022        Review of Systems   Constitutional: Negative for chills and fever.   Respiratory: Negative for shortness of breath.         Denies snoring   Cardiovascular: Positive for leg swelling. Negative for chest pain.   Gastrointestinal: Positive for constipation. Negative for diarrhea.        + GERD   Genitourinary: Negative for difficulty urinating and dysuria.   Musculoskeletal: Positive for arthralgias and back pain.   Skin: Positive for rash.   Neurological: Positive for dizziness. Negative for light-headedness.   Psychiatric/Behavioral: Positive for dysphoric mood. The patient is nervous/anxious.        Objective:     /60 (BP Location: Left arm, Patient Position: Sitting, BP Method: Large (Manual))   Pulse 70   Ht 5' 8" (1.727 m)   Wt 119.5 kg (263 lb 7.2 oz)   SpO2 96%   BMI 40.06 " kg/m²     Physical Exam   Constitutional: She is oriented to person, place, and time. She appears well-developed. No distress.   obese   HENT:   Head: Normocephalic and atraumatic.   Eyes: Pupils are equal, round, and reactive to light. EOM are normal. No scleral icterus.   Neck: Normal range of motion. Neck supple.   Cardiovascular: Normal rate.   Pulmonary/Chest: Effort normal.   Musculoskeletal: Normal range of motion. She exhibits no edema.   Neurological: She is alert and oriented to person, place, and time. No cranial nerve deficit.   Skin: Skin is warm and dry. No erythema.   Psychiatric: She has a normal mood and affect. Her behavior is normal. Judgment normal.   Vitals reviewed.      Assessment:       1. Class 2 severe obesity with body mass index (BMI) of 35 to 39.9 with serious comorbidity          Plan:         Linnea was seen today for follow-up.    Diagnoses and all orders for this visit:    Class 2 severe obesity with body mass index (BMI) of 35 to 39.9 with serious comorbidity  -     topiramate (TOPAMAX) 50 MG tablet; Take 2 tablets (100 mg total) by mouth every evening.    Other orders  -     semaglutide (OZEMPIC) 0.25 mg or 0.5 mg(2 mg/1.5 mL) pen injector; Inject 0.5 mg into the skin every 7 days.  -     semaglutide (OZEMPIC) 0.25 mg or 0.5 mg(2 mg/1.5 mL) pen injector; Inject 0.5 mg into the skin every 7 days.       Start Ozempic once a week. Start with 0.25mg once a week x 4 weeks, then 0.5 mg weekly.       Decrease portions as soon as you start Ozempic. Avoid fried, greasy, fatty foods.     Some nausea in the first 2 weeks is not unusual.     If you get pain across the upper abdomen and around to your back, please call the office.       [email or web address] for coupon/videos.        Patient was informed that topiramate is used for migraine prevention and seizures. Weight loss is a common side effect that is well documented. S/he understands this. S/he was informed of the potential side effects  such as serious and possibly fatal rash in which case the medication should be discontinued immediately. Paresthesias, forgetfulness, fatigue, kidney stones, GI symptoms, and changes in lab values such as electrolytes, blood counts and kidney function.    topiramate 50 mg 2 tabs in the evening.     No soda, sweet tea, juices or lemonade. All drinks should be 5 calories or less.         Limit starchy carbohydrates (bread, rice, pasta, potatoes, corn, peas, oatmeal, grits, tortillas, crackers, chips)        You need about 1000 calories and 70 g protein a day to lose weight    Holiday tips given.

## 2022-11-14 NOTE — PATIENT INSTRUCTIONS
Start Ozempic once a week. Start with 0.25mg once a week x 4 weeks, then 0.5 mg weekly.       Decrease portions as soon as you start Ozempic. Avoid fried, greasy, fatty foods.     Some nausea in the first 2 weeks is not unusual.     If you get pain across the upper abdomen and around to your back, please call the office.       [email or web address] for coupon/videos.       Start Ozempic once a week. Start with 0.25mg once a week x 4 weeks, then 0.5 mg weekly.       Decrease portions as soon as you start Ozempic. Avoid fried, greasy, fatty foods.     Some nausea in the first 2 weeks is not unusual.     If you get pain across the upper abdomen and around to your back, please call the office.       [email or web address] for coupon/videos.        Patient was informed that topiramate is used for migraine prevention and seizures. Weight loss is a common side effect that is well documented. S/he understands this. S/he was informed of the potential side effects such as serious and possibly fatal rash in which case the medication should be discontinued immediately. Paresthesias, forgetfulness, fatigue, kidney stones, GI symptoms, and changes in lab values such as electrolytes, blood counts and kidney function.    topiramate 50 mg 2 tabs in the evening.       No soda, sweet tea, juices or lemonade. All drinks should be 5 calories or less.         Limit starchy carbohydrates (bread, rice, pasta, potatoes, corn, peas, oatmeal, grits, tortillas, crackers, chips)        You need about 1000 calories and 70 g protein a day to lose weight    Helpful tips to survive the holidays:  Dont save yourself for the meal: Make sure you continue to eat high protein small meals every 3-4 hours to ensure to do not become over-hungry. Avoid temptation by not showing up to a holiday party or gathering hungry.   Plan ahead. Bring a dish to a party if you know there may not be an appropriate option.   Choose sugar-free drinks: Stick to water or  other sugar-free beverages and make sure you are getting 6-8 cups of fluid each day (but not with meals!). Avoid alcohol, carbonated beverages, and high-fat/high-sugar beverages like hot chocolate and eggnog. Try sugar-free hot cocoa made with skim milk or water, or sugar-free spiced tea to add some holiday flair to your beverage (see sugar-free mulled cider recipe below)  Take your time: Eat mindfully. Dont graze on food throughout the day. Sit down to enjoy your small meals. Chew slowly and thoroughly. Cut your food into small pieces before eating.  Listen to your body: Stop eating as soon as you feel full. Do not feel pressured to try certain (or all) foods or to eat all of the food on your plate. Listen to your hunger cues.   REMEMBER: Make your holidays about spending time with family and friends instead of focusing gatherings around food.  Keep up your exercise routine: Make sure you continue to get regular exercise throughout the holiday season. Encourage friends and family to be active by taking a walk together after a meal, to look at holiday lights, or to window-shop.    Good Holiday Meal Options:  Roasted Turkey, NO skin. Use low sodium broth instead of gravy.   Stuffed Bell Peppers made WITHOUT bread crumbs or Rice. Try using parmesan cheese instead  Gumbo, NO rice. Try picking out mostly the meat/seafood and vegetables with little broth.   Green Bean Casserole made with 98% fat free cream of mushroom soup and crushed almonds/pecans instead of fried onions  Side salad w/ low fat dressing. Try a different kind of salad maybe use Kale or spinach.   Roasted non-starchy vegetables like brussel sprouts, broccoli, green beans, zucchini, butternut squash, cauliflower  Cauliflower Mash (steam or roast cauliflower, puree w/ low fat cheese, dash of fat free milk and 2-3 sprays of I cant believe its not butter spray. Add garlic powder and black pepper to season). Use Low sodium broth instead of gravy.   Try  Loaded Cauliflower Mash (Make cauliflower like above cauliflower mash. Top with diced turkey lara, ¼ cup low fat cheddar cheese and bake @ 350* F for 5-10 minutes, until cheese is melted. Top with minced chives, black pepper and garlic to taste).   Homemade cranberry sauce using Splenda or another alternative sweetener. Boil fresh cranberries and add splenda to taste. Boil until cranberries break open and then simmer until it reaches the consistency you want (less time for more watery sauce and simmer for longer to create a thicker sauce).   Deviled eggs: make using low fat pineda, mustard, DILL relish (not sweet relish).   Vegetable tray w/ Greek yogurt Ranch Dip. Mix 1 packet of [place] ranch dip mix w/ 16 oz low fat plain greek yogurt.     Good Holiday Dessert Options:  High protein Pumpkin Cheesecake (see recipe below)  Pumpkin Whip (see recipe below)  Quest Apple Pie or Cinnamon Roll flavored protein bar (warm in microwave for 10-15 seconds)  Eggnog Protein shake (see recipe below)  Fresh fruit w/ low fat cheese  Sugar-free Jello Parfaits. Layer Red and Green sugar-free jello in cups and top w/ 2 tbsp Sugar-free cool-whip    Pumpkin Cheesecake    8 ounces fat free cream cheese, softened   2 scoops of vanilla protein powder (<4 g sugar per serving)   ¼ tsp Fine salt   2 eggs, at room temperature   1/3 cup fat free sour cream  1/3 cup fat free half and half  1 15 -ounce can pure pumpkin puree   1 tablespoon pumpkin pie spice, plus more for dusting   Unsalted nuts, crushed  *Add splenda to taste    Directions     1. Preheat the oven to 300 degrees F. Line 18 muffin cups with paper liners. Sprinkle 1 tsp crushed unsalted nuts at the bottom of each of muffin cup liner.     2. In a large bowl, beat the cream cheese, vanilla protein powder and 1/4 teaspoon fine salt on medium-high speed until smooth and creamy, 2 to 3 minutes. Scrape down the sides, reduce speed to low and beat in the eggs, 1 at a time, until  combined. Beat in 1/3 cup fat free sour cream and fat free half and half. Stir in the pumpkin puree and pumpkin pie spice until smooth. Divide evenly among cookie-lined paper cups, filling almost all the way to the top.     3. Bake until the filling is just set, 40 to 45 minutes. A sharp knife inserted into the center will come out moist, but clean. Cool completely in tins on a wire rack. Refrigerate until cold, 4 hours, or overnight. Top with a dusting of pumpkin pie spice.    Recipe altered from the following recipe: http://www.[employer]/recipes/food-network-deny/mini-pumpkin-cheesecakes-recipe.print.html?oc=linkback    Pumpkin Whip    Box of sugar-free vanilla pudding  Can of pumpkin puree  Pumpkin Pie spice (sprinkle to taste)  ½ cup of sugar-free Cool Whip    Directions:  Make sugar-free pudding according to package directions using fat free or 1% milk. Stir in pumpkin and cool whip. Add pumpkin pie spice to taste.     Egg Nog Protein shake    8 oz skim or 1% milk  1 scoop vanilla protein powder  1 tbsp sugar-free vanilla pudding mix  ½ tsp butter flavor extract  ½ tsp rum extract  ½ tsp cinnamon     Shake together or blend with ice and serve.     Sugar-Free Mulled Cider    3 oz diet cran-apple juice  6 oz water  1 packet sugar-free apple cider mix  ½ tsp apple pie spice  ½ tsp butter flavor extract  1 tbsp Sugar-free Syrup    Mix together. Warm if needed and serve w/ orange wedge and cinnamon stick.

## 2022-11-26 DIAGNOSIS — M48.061 DEGENERATIVE LUMBAR SPINAL STENOSIS: ICD-10-CM

## 2022-11-26 DIAGNOSIS — J30.89 PERENNIAL ALLERGIC RHINITIS: ICD-10-CM

## 2022-11-26 RX ORDER — FLUTICASONE PROPIONATE 50 MCG
2 SPRAY, SUSPENSION (ML) NASAL DAILY
Qty: 48 G | Refills: 2 | Status: SHIPPED | OUTPATIENT
Start: 2022-11-26 | End: 2024-02-20 | Stop reason: SDUPTHER

## 2022-11-26 NOTE — TELEPHONE ENCOUNTER
No new care gaps identified.  [facility] Embedded Care Gaps. Reference number: [id number]. 11/26/2022   1:19:50 PM CST

## 2022-11-26 NOTE — TELEPHONE ENCOUNTER
No new care gaps identified.  [facility] Embedded Care Gaps. Reference number: [id number]. 11/26/2022   1:18:47 PM CST

## 2022-11-27 NOTE — TELEPHONE ENCOUNTER
Refill Decision Note   Linnea Renae  is requesting a refill authorization.  Brief Assessment and Rationale for Refill:  Approve     Medication Therapy Plan:       Medication Reconciliation Completed: No   Comments:     No Care Gaps recommended.     Note composed:10:14 PM 11/26/2022

## 2022-11-28 ENCOUNTER — HOSPITAL ENCOUNTER (OUTPATIENT)
Dept: RADIOLOGY | Facility: HOSPITAL | Age: 72
Discharge: HOME | End: 2022-11-28
Attending: PHYSICIAN ASSISTANT
Payer: MEDICARE

## 2022-11-28 PROCEDURE — 77063 BREAST TOMOSYNTHESIS BI: CPT | Mod: TC

## 2022-11-28 PROCEDURE — 77067 MAMMO DIGITAL SCREENING LEFT WITH TOMO: ICD-10-PCS | Mod: 26,,, | Performed by: RADIOLOGY

## 2022-11-28 PROCEDURE — 77067 SCR MAMMO BI INCL CAD: CPT | Mod: 26,,, | Performed by: RADIOLOGY

## 2022-11-28 PROCEDURE — 77063 MAMMO DIGITAL SCREENING LEFT WITH TOMO: ICD-10-PCS | Mod: 26,,, | Performed by: RADIOLOGY

## 2022-11-28 PROCEDURE — 77067 SCR MAMMO BI INCL CAD: CPT | Mod: TC

## 2022-11-28 PROCEDURE — 77063 BREAST TOMOSYNTHESIS BI: CPT | Mod: 26,,, | Performed by: RADIOLOGY

## 2022-11-28 RX ORDER — TRAMADOL HYDROCHLORIDE 50 MG/1
50 TABLET ORAL EVERY 6 HOURS PRN
Qty: 120 TABLET | Refills: 1 | Status: SHIPPED | OUTPATIENT
Start: 2022-11-30 | End: 2023-02-11 | Stop reason: SDUPTHER

## 2022-12-02 ENCOUNTER — PATIENT OUTREACH (OUTPATIENT)
Dept: ADMINISTRATIVE | Facility: HOSPITAL | Age: 72
End: 2022-12-02
Payer: MEDICARE

## 2022-12-02 NOTE — PROGRESS NOTES
Patient due for the following   Health Maintenance Due   Topic Date Due    Foot Exam  07/22/2022    Hemoglobin A1c  09/30/2022      Pt has upcoming pcp appt. Provider will order necessary labs at that time.

## 2022-12-05 ENCOUNTER — DOCUMENTATION ONLY (OUTPATIENT)
Dept: REHABILITATION | Facility: HOSPITAL | Age: 72
End: 2022-12-05
Payer: MEDICARE

## 2022-12-05 NOTE — PROGRESS NOTES
PT/PTA met face to face to discuss pt's treatment plan and progress towards established goals. Pt will be seen by a physical therapist minimally every 6th visit or every 30 days.    Herrera Zeng PTA    Face to Face PTA Conference performed with Herrera Zeng PTA regarding patient's current status, overall progress, and plan of care.    [place], PT

## 2022-12-09 ENCOUNTER — OFFICE VISIT (OUTPATIENT)
Dept: PRIMARY CARE CLINIC | Facility: CLINIC | Age: 72
End: 2022-12-09
Payer: MEDICARE

## 2022-12-09 ENCOUNTER — LAB VISIT (OUTPATIENT)
Dept: LAB | Facility: HOSPITAL | Age: 72
End: 2022-12-09
Attending: INTERNAL MEDICINE
Payer: MEDICARE

## 2022-12-09 VITALS
BODY MASS INDEX: 39.51 KG/M2 | HEIGHT: 68 IN | OXYGEN SATURATION: 98 % | SYSTOLIC BLOOD PRESSURE: 106 MMHG | TEMPERATURE: 99 F | WEIGHT: 260.69 LBS | DIASTOLIC BLOOD PRESSURE: 64 MMHG | HEART RATE: 85 BPM

## 2022-12-09 DIAGNOSIS — M79.671 BILATERAL FOOT PAIN: ICD-10-CM

## 2022-12-09 DIAGNOSIS — M79.672 BILATERAL FOOT PAIN: ICD-10-CM

## 2022-12-09 DIAGNOSIS — E11.40 TYPE 2 DIABETES MELLITUS WITH DIABETIC NEUROPATHY, WITHOUT LONG-TERM CURRENT USE OF INSULIN: Primary | ICD-10-CM

## 2022-12-09 DIAGNOSIS — E11.40 TYPE 2 DIABETES MELLITUS WITH DIABETIC NEUROPATHY, WITHOUT LONG-TERM CURRENT USE OF INSULIN: ICD-10-CM

## 2022-12-09 DIAGNOSIS — E78.5 HYPERLIPIDEMIA, UNSPECIFIED HYPERLIPIDEMIA TYPE: ICD-10-CM

## 2022-12-09 DIAGNOSIS — M48.061 DEGENERATIVE LUMBAR SPINAL STENOSIS: ICD-10-CM

## 2022-12-09 DIAGNOSIS — I10 ESSENTIAL HYPERTENSION: ICD-10-CM

## 2022-12-09 LAB
ANION GAP SERPL CALC-SCNC: 10 MMOL/L (ref 8–16)
BUN SERPL-MCNC: 12 MG/DL (ref 8–23)
CALCIUM SERPL-MCNC: 9.7 MG/DL (ref 8.7–10.5)
CHLORIDE SERPL-SCNC: 106 MMOL/L (ref 95–110)
CO2 SERPL-SCNC: 23 MMOL/L (ref 23–29)
CREAT SERPL-MCNC: 0.8 MG/DL (ref 0.5–1.4)
EST. GFR  (NO RACE VARIABLE): >60 ML/MIN/1.73 M^2
ESTIMATED AVG GLUCOSE: 117 MG/DL (ref 68–131)
GLUCOSE SERPL-MCNC: 87 MG/DL (ref 70–110)
HBA1C MFR BLD: 5.7 % (ref 4–5.6)
POTASSIUM SERPL-SCNC: 3.9 MMOL/L (ref 3.5–5.1)
SODIUM SERPL-SCNC: 139 MMOL/L (ref 136–145)
URATE SERPL-MCNC: 7.1 MG/DL (ref 2.4–5.7)

## 2022-12-09 PROCEDURE — 99999 PR PBB SHADOW E&M-EST. PATIENT-LVL III: CPT | Mod: PBBFAC,,, | Performed by: INTERNAL MEDICINE

## 2022-12-09 PROCEDURE — 1125F PR PAIN SEVERITY QUANTIFIED, PAIN PRESENT: ICD-10-PCS | Mod: CPTII,S$GLB,, | Performed by: INTERNAL MEDICINE

## 2022-12-09 PROCEDURE — 3061F NEG MICROALBUMINURIA REV: CPT | Mod: CPTII,S$GLB,, | Performed by: INTERNAL MEDICINE

## 2022-12-09 PROCEDURE — 3044F HG A1C LEVEL LT 7.0%: CPT | Mod: CPTII,S$GLB,, | Performed by: INTERNAL MEDICINE

## 2022-12-09 PROCEDURE — 3078F DIAST BP <80 MM HG: CPT | Mod: CPTII,S$GLB,, | Performed by: INTERNAL MEDICINE

## 2022-12-09 PROCEDURE — 3008F PR BODY MASS INDEX (BMI) DOCUMENTED: ICD-10-PCS | Mod: CPTII,S$GLB,, | Performed by: INTERNAL MEDICINE

## 2022-12-09 PROCEDURE — 3288F FALL RISK ASSESSMENT DOCD: CPT | Mod: CPTII,S$GLB,, | Performed by: INTERNAL MEDICINE

## 2022-12-09 PROCEDURE — 4010F PR ACE/ARB THEARPY RXD/TAKEN: ICD-10-PCS | Mod: CPTII,S$GLB,, | Performed by: INTERNAL MEDICINE

## 2022-12-09 PROCEDURE — 99214 PR OFFICE/OUTPT VISIT, EST, LEVL IV, 30-39 MIN: ICD-10-PCS | Mod: S$GLB,,, | Performed by: INTERNAL MEDICINE

## 2022-12-09 PROCEDURE — 1159F PR MEDICATION LIST DOCUMENTED IN MEDICAL RECORD: ICD-10-PCS | Mod: CPTII,S$GLB,, | Performed by: INTERNAL MEDICINE

## 2022-12-09 PROCEDURE — 1160F RVW MEDS BY RX/DR IN RCRD: CPT | Mod: CPTII,S$GLB,, | Performed by: INTERNAL MEDICINE

## 2022-12-09 PROCEDURE — 99214 OFFICE O/P EST MOD 30 MIN: CPT | Mod: S$GLB,,, | Performed by: INTERNAL MEDICINE

## 2022-12-09 PROCEDURE — 83036 HEMOGLOBIN GLYCOSYLATED A1C: CPT | Performed by: INTERNAL MEDICINE

## 2022-12-09 PROCEDURE — 3066F PR DOCUMENTATION OF TREATMENT FOR NEPHROPATHY: ICD-10-PCS | Mod: CPTII,S$GLB,, | Performed by: INTERNAL MEDICINE

## 2022-12-09 PROCEDURE — 80048 BASIC METABOLIC PNL TOTAL CA: CPT | Performed by: INTERNAL MEDICINE

## 2022-12-09 PROCEDURE — 84550 ASSAY OF BLOOD/URIC ACID: CPT | Performed by: INTERNAL MEDICINE

## 2022-12-09 PROCEDURE — 1160F PR REVIEW ALL MEDS BY PRESCRIBER/CLIN PHARMACIST DOCUMENTED: ICD-10-PCS | Mod: CPTII,S$GLB,, | Performed by: INTERNAL MEDICINE

## 2022-12-09 PROCEDURE — 99999 PR PBB SHADOW E&M-EST. PATIENT-LVL III: ICD-10-PCS | Mod: PBBFAC,,, | Performed by: INTERNAL MEDICINE

## 2022-12-09 PROCEDURE — 3078F PR MOST RECENT DIASTOLIC BLOOD PRESSURE < 80 MM HG: ICD-10-PCS | Mod: CPTII,S$GLB,, | Performed by: INTERNAL MEDICINE

## 2022-12-09 PROCEDURE — 4010F ACE/ARB THERAPY RXD/TAKEN: CPT | Mod: CPTII,S$GLB,, | Performed by: INTERNAL MEDICINE

## 2022-12-09 PROCEDURE — 3044F PR MOST RECENT HEMOGLOBIN A1C LEVEL <7.0%: ICD-10-PCS | Mod: CPTII,S$GLB,, | Performed by: INTERNAL MEDICINE

## 2022-12-09 PROCEDURE — 3008F BODY MASS INDEX DOCD: CPT | Mod: CPTII,S$GLB,, | Performed by: INTERNAL MEDICINE

## 2022-12-09 PROCEDURE — 1101F PR PT FALLS ASSESS DOC 0-1 FALLS W/OUT INJ PAST YR: ICD-10-PCS | Mod: CPTII,S$GLB,, | Performed by: INTERNAL MEDICINE

## 2022-12-09 PROCEDURE — 36415 COLL VENOUS BLD VENIPUNCTURE: CPT | Mod: PN | Performed by: INTERNAL MEDICINE

## 2022-12-09 PROCEDURE — 3074F PR MOST RECENT SYSTOLIC BLOOD PRESSURE < 130 MM HG: ICD-10-PCS | Mod: CPTII,S$GLB,, | Performed by: INTERNAL MEDICINE

## 2022-12-09 PROCEDURE — 3288F PR FALLS RISK ASSESSMENT DOCUMENTED: ICD-10-PCS | Mod: CPTII,S$GLB,, | Performed by: INTERNAL MEDICINE

## 2022-12-09 PROCEDURE — 1159F MED LIST DOCD IN RCRD: CPT | Mod: CPTII,S$GLB,, | Performed by: INTERNAL MEDICINE

## 2022-12-09 PROCEDURE — 3061F PR NEG MICROALBUMINURIA RESULT DOCUMENTED/REVIEW: ICD-10-PCS | Mod: CPTII,S$GLB,, | Performed by: INTERNAL MEDICINE

## 2022-12-09 PROCEDURE — 1125F AMNT PAIN NOTED PAIN PRSNT: CPT | Mod: CPTII,S$GLB,, | Performed by: INTERNAL MEDICINE

## 2022-12-09 PROCEDURE — 3066F NEPHROPATHY DOC TX: CPT | Mod: CPTII,S$GLB,, | Performed by: INTERNAL MEDICINE

## 2022-12-09 PROCEDURE — 3074F SYST BP LT 130 MM HG: CPT | Mod: CPTII,S$GLB,, | Performed by: INTERNAL MEDICINE

## 2022-12-09 PROCEDURE — 1101F PT FALLS ASSESS-DOCD LE1/YR: CPT | Mod: CPTII,S$GLB,, | Performed by: INTERNAL MEDICINE

## 2022-12-09 RX ORDER — OLMESARTAN MEDOXOMIL 20 MG/1
20 TABLET ORAL DAILY
Qty: 90 TABLET | Refills: 2 | Status: SHIPPED | OUTPATIENT
Start: 2022-12-09 | End: 2023-04-19 | Stop reason: SDUPTHER

## 2022-12-09 NOTE — PROGRESS NOTES
Subjective:       Patient ID: Linnea Renae is a 72 y.o. female.    Chief Complaint: Diabetes    Last seen 4 months ago. Returns for f/u chronic conditions. Back pain has been fairly controlled, but having severe bilateral foot pain with paresthesias and burning sensations, sometimes with redness and swelling. Evaluated by Podiatry, started on Lyrica 25mg BID which has helped. Compliant with daily meds as prescribed. Off Metformin since Bariatric Medicine has prescribed Ozempic. Fasting glucose averages in the 90's per history, no log.     PMH: .   Hypertension.  Diabetes Type 2, last HbA1c 5.7% .  Hyperlipidemia. LDL 79 .  GERD.  Lumbar Spinal Stenosis.   Chronic Dry Eyes.   Perennial Allergic Rhinitis.  Morbid Obesity.   Right Breast Cancer diagnosed 10/15.  H/O Colon Polyps.     PSH: C/S x 3, BTL, Hysterectomy and USO, Bilateral Cataracts extracted, Cholecystectomy. Right breast excisional biopsy . Right Mastectomy  with reconstruction, and revisions  and .     Mammogram (Left) normal . BMD normal 8/15. Colonoscopy 10/18 - sigmoid diverticulosis, otherwise normal. Eye exam . Podiatry . Vaccines reviewed, up to date.     Social: Remote tobacco use, quit over 30 years ago. No alcohol.  with three daughters and three grandchildren. Homemaker.     FMH: Heart dis, Thyroid dis.     NKDA. No adverse reaction to general anesthesia.    Medications: list reviewed and reconciled.              Review of Systems   Constitutional:  Negative for chills, diaphoresis, fatigue and fever.   Eyes:  Negative for pain and visual disturbance.   Respiratory:  Negative for cough and shortness of breath.    Cardiovascular:  Negative for chest pain, palpitations and leg swelling.   Gastrointestinal:  Negative for abdominal pain, diarrhea, nausea and vomiting.   Genitourinary:  Negative for dysuria and frequency.   Musculoskeletal:  Positive for arthralgias and back pain.  "  Neurological:  Negative for dizziness, syncope, weakness and headaches.   Psychiatric/Behavioral:  Negative for dysphoric mood. The patient is not nervous/anxious.         Had been feeling a bit agitated in recent months, but this is settling down.        Objective:      Vitals:    12/09/22 1038   BP: 106/64   Pulse: 85   Temp: 98.5 °F (36.9 °C)   SpO2: 98%   Weight: 118.3 kg (260 lb 11.2 oz)   Height: 5' 8" (1.727 m)   BMI=39.6  Physical Exam  Constitutional:       Appearance: She is not ill-appearing.   HENT:      Head: Normocephalic and atraumatic.      Nose: Nose normal. No congestion.      Mouth/Throat:      Mouth: Mucous membranes are moist.      Pharynx: Oropharynx is clear.   Eyes:      Extraocular Movements: Extraocular movements intact.      Conjunctiva/sclera: Conjunctivae normal.   Cardiovascular:      Rate and Rhythm: Normal rate and regular rhythm.      Heart sounds: Normal heart sounds.   Pulmonary:      Effort: Pulmonary effort is normal. No respiratory distress.      Breath sounds: Normal breath sounds. No wheezing or rales.   Chest:      Comments: Some asymmetry remains, reconstructed right breast a bit larger than left, extending a little farther laterally; no erythema or induration of skin.   Musculoskeletal:         General: Normal range of motion.      Right lower leg: No edema.      Left lower leg: No edema.   Skin:     General: Skin is warm and dry.   Neurological:      Mental Status: She is alert.      Cranial Nerves: No cranial nerve deficit.      Coordination: Coordination normal.      Gait: Gait normal.   Psychiatric:         Mood and Affect: Mood normal.         Behavior: Behavior normal.         Thought Content: Thought content normal.       Assessment:       Problem List Items Addressed This Visit       Hyperlipidemia     Other Visit Diagnoses       Type 2 diabetes mellitus with diabetic neuropathy, without long-term current use of insulin    -  Primary    Relevant Orders    Basic " Metabolic Panel    Hemoglobin A1C    Essential hypertension        Relevant Medications    olmesartan (BENICAR) 20 MG tablet    Degenerative lumbar spinal stenosis        Bilateral foot pain        Relevant Orders    Uric Acid              Plan:       Type 2 diabetes mellitus with diabetic neuropathy, without long-term current use of insulin - labs today:  -     Basic Metabolic Panel; Future; Expected date: 12/09/2022  -     Hemoglobin A1C; Future; Expected date: 12/09/2022    Essential hypertension controlled, continue same.  -     olmesartan (BENICAR) 20 MG tablet; Take 1 tablet (20 mg total) by mouth once daily.  Dispense: 90 tablet; Refill: 2    Hyperlipidemia, unspecified hyperlipidemia type - at goal on Atorvastatin 20.    Degenerative lumbar spinal stenosis - Tramadol recently refilled.    Bilateral foot pain  -     Uric Acid; Future; Expected date: 12/09/2022

## 2022-12-11 ENCOUNTER — PATIENT MESSAGE (OUTPATIENT)
Dept: PRIMARY CARE CLINIC | Facility: CLINIC | Age: 72
End: 2022-12-11
Payer: MEDICARE

## 2023-01-19 ENCOUNTER — OFFICE VISIT (OUTPATIENT)
Dept: PODIATRY | Facility: CLINIC | Age: 73
End: 2023-01-19
Payer: MEDICARE

## 2023-01-19 VITALS
HEART RATE: 73 BPM | SYSTOLIC BLOOD PRESSURE: 105 MMHG | BODY MASS INDEX: 39.2 KG/M2 | HEIGHT: 68 IN | DIASTOLIC BLOOD PRESSURE: 65 MMHG | WEIGHT: 258.63 LBS

## 2023-01-19 DIAGNOSIS — E11.42 DIABETIC PERIPHERAL NEUROPATHY ASSOCIATED WITH TYPE 2 DIABETES MELLITUS: Primary | ICD-10-CM

## 2023-01-19 DIAGNOSIS — G89.4 CHRONIC PAIN SYNDROME: ICD-10-CM

## 2023-01-19 PROCEDURE — 1159F PR MEDICATION LIST DOCUMENTED IN MEDICAL RECORD: ICD-10-PCS | Mod: CPTII,S$GLB,, | Performed by: PODIATRIST

## 2023-01-19 PROCEDURE — 1125F AMNT PAIN NOTED PAIN PRSNT: CPT | Mod: CPTII,S$GLB,, | Performed by: PODIATRIST

## 2023-01-19 PROCEDURE — 99214 PR OFFICE/OUTPT VISIT, EST, LEVL IV, 30-39 MIN: ICD-10-PCS | Mod: S$GLB,,, | Performed by: PODIATRIST

## 2023-01-19 PROCEDURE — 1159F MED LIST DOCD IN RCRD: CPT | Mod: CPTII,S$GLB,, | Performed by: PODIATRIST

## 2023-01-19 PROCEDURE — 99214 OFFICE O/P EST MOD 30 MIN: CPT | Mod: S$GLB,,, | Performed by: PODIATRIST

## 2023-01-19 PROCEDURE — 1125F PR PAIN SEVERITY QUANTIFIED, PAIN PRESENT: ICD-10-PCS | Mod: CPTII,S$GLB,, | Performed by: PODIATRIST

## 2023-01-19 PROCEDURE — 99499 RISK ADDL DX/OHS AUDIT: ICD-10-PCS | Mod: S$GLB,,, | Performed by: PODIATRIST

## 2023-01-19 PROCEDURE — 3074F SYST BP LT 130 MM HG: CPT | Mod: CPTII,S$GLB,, | Performed by: PODIATRIST

## 2023-01-19 PROCEDURE — 3078F DIAST BP <80 MM HG: CPT | Mod: CPTII,S$GLB,, | Performed by: PODIATRIST

## 2023-01-19 PROCEDURE — 3008F BODY MASS INDEX DOCD: CPT | Mod: CPTII,S$GLB,, | Performed by: PODIATRIST

## 2023-01-19 PROCEDURE — 99999 PR PBB SHADOW E&M-EST. PATIENT-LVL IV: ICD-10-PCS | Mod: PBBFAC,,, | Performed by: PODIATRIST

## 2023-01-19 PROCEDURE — 99999 PR PBB SHADOW E&M-EST. PATIENT-LVL IV: CPT | Mod: PBBFAC,,, | Performed by: PODIATRIST

## 2023-01-19 PROCEDURE — 99499 UNLISTED E&M SERVICE: CPT | Mod: S$GLB,,, | Performed by: PODIATRIST

## 2023-01-19 PROCEDURE — 3008F PR BODY MASS INDEX (BMI) DOCUMENTED: ICD-10-PCS | Mod: CPTII,S$GLB,, | Performed by: PODIATRIST

## 2023-01-19 PROCEDURE — 3078F PR MOST RECENT DIASTOLIC BLOOD PRESSURE < 80 MM HG: ICD-10-PCS | Mod: CPTII,S$GLB,, | Performed by: PODIATRIST

## 2023-01-19 PROCEDURE — 3074F PR MOST RECENT SYSTOLIC BLOOD PRESSURE < 130 MM HG: ICD-10-PCS | Mod: CPTII,S$GLB,, | Performed by: PODIATRIST

## 2023-01-19 RX ORDER — AMITRIPTYLINE HYDROCHLORIDE 25 MG/1
25 TABLET, FILM COATED ORAL NIGHTLY PRN
Qty: 30 TABLET | Refills: 2 | Status: SHIPPED | OUTPATIENT
Start: 2023-01-19 | End: 2023-04-17 | Stop reason: ALTCHOICE

## 2023-01-25 NOTE — PROGRESS NOTES
Patient, Linnea Renae (MRN #[id number]), presented with a recorded BMI of 39.32 kg/m^2 and a documented comorbidity(s):  - Diabetes Mellitus Type 2  to which the severe obesity is a contributing factor. This is consistent with the definition of severe obesity (BMI 35.0-39.9) with comorbidity (ICD-10 E66.01, Z68.35). The patient's severe obesity was monitored, evaluated, addressed and/or treated. This addendum to the medical record is made on 01/25/2023.

## 2023-01-25 NOTE — PROGRESS NOTES
Subjective:      Patient ID: Linnea Renae is a 72 y.o. female.    Chief Complaint: Foot Pain (Bilateral- entire foot) and Diabetes Mellitus (Bailee Moss MD= 12/09/2022/PCP - General/)      Linnea is a 72 y.o. female who presents to the clinic upon referral from Dr. Nat marsh. provider found  for evaluation and treatment of diabetic feet. Linnea has a past medical history of Allergy, Breast cancer, Chronic constipation, Colon polyps, Diabetes mellitus, type 2, Dry eyes, GERD (gastroesophageal reflux disease), Hyperlipidemia, Hypertension, Lumbar disc disease, and Type 2 diabetes mellitus.  The bottoms of both feet continue to feel stiff she states, however much improved.  She continues to have neuropathy type symptoms however continues to improve with the Lyrica.  Still having some discomfort at night.  PCP: Bailee Moss MD    Date Last Seen by PCP:   Chief Complaint   Patient presents with    Foot Pain     Bilateral- entire foot    Diabetes Mellitus     Bailee Moss MD= 12/09/2022  PCP - General           Current shoe gear: Casual shoes    Hemoglobin A1C   Date Value Ref Range Status   12/09/2022 5.7 (H) 4.0 - 5.6 % Final     Comment:     ADA Screening Guidelines:  5.7-6.4%  Consistent with prediabetes  >or=6.5%  Consistent with diabetes    High levels of fetal hemoglobin interfere with the HbA1C  assay. Heterozygous hemoglobin variants (HbS, HgC, etc)do  not significantly interfere with this assay.   However, presence of multiple variants may affect accuracy.     03/31/2022 5.7 (H) 4.0 - 5.6 % Final     Comment:     ADA Screening Guidelines:  5.7-6.4%  Consistent with prediabetes  >or=6.5%  Consistent with diabetes    High levels of fetal hemoglobin interfere with the HbA1C  assay. Heterozygous hemoglobin variants (HbS, HgC, etc)do  not significantly interfere with this assay.   However, presence of multiple variants may affect accuracy.     11/29/2021 5.7 (H) 4.0 - 5.6 % Final     Comment:      ADA Screening Guidelines:  5.7-6.4%  Consistent with prediabetes  >or=6.5%  Consistent with diabetes    High levels of fetal hemoglobin interfere with the HbA1C  assay. Heterozygous hemoglobin variants (HbS, HgC, etc)do  not significantly interfere with this assay.   However, presence of multiple variants may affect accuracy.             Review of Systems   Constitution: Negative for chills and fever.   HENT: Negative for congestion and tinnitus.    Eyes: Negative for double vision and visual disturbance.   Cardiovascular: Negative for chest pain and claudication.   Respiratory: Negative for hemoptysis and shortness of breath.    Endocrine: Negative for cold intolerance and heat intolerance.   Hematologic/Lymphatic: Negative for adenopathy and bleeding problem.   Skin: Positive for color change, dry skin and nail changes.   Musculoskeletal: Positive for myalgias and stiffness.   Gastrointestinal: Negative for nausea and vomiting.   Genitourinary: Negative for dysuria and hematuria.   Neurological: Positive for numbness.   Psychiatric/Behavioral: Negative for altered mental status and suicidal ideas.   Allergic/Immunologic: Negative for environmental allergies and persistent infections.           Objective:      Physical Exam  Vitals signs reviewed.   Constitutional:       Appearance: She is well-developed.   Cardiovascular:      Pulses:           Dorsalis pedis pulses are 1+ on the right side and 1+ on the left side.        Posterior tibial pulses are 1+ on the right side and 1+ on the left side.   Pulmonary:      Effort: Pulmonary effort is normal.   Musculoskeletal: Normal range of motion.      Comments: Anterior, lateral, and posterior muscle groups bilateral lower extremities show strength 4 over 5 symmetrically. Inspection and palpation of the joints and bones reveal no crepitus or joint effusion. No tenderness upon palpation. Mild plantar flexor contractures noted to digits 2 through 5 bilaterally.  Angle  and base of gait are normal.   Feet:      Right foot:      Skin integrity: Callus and dry skin present.      Left foot:      Skin integrity: Callus and dry skin present.   Skin:     General: Skin is warm and dry.      Capillary Refill: Capillary refill takes 2 to 3 seconds.      Coloration: Skin is pale.      Comments: Skin bilateral lower extremities noted to be thin, dry, and atrophic.  Toenails normal.   Neurological:      Mental Status: She is alert and oriented to person, place, and time.      Sensory: Sensory deficit present.      Motor: Atrophy present.      Deep Tendon Reflexes: Reflexes abnormal.      Reflex Scores:       Patellar reflexes are 1+ on the right side and 1+ on the left side.       Achilles reflexes are 1+ on the right side and 1+ on the left side.     Comments: Sharp, dull, light touch, and vibratory sensation are diminished bilaterally. Proprioceptive sensation is intact to both lower extremities. [place] Jackie monofilament exam shows loss of protective sensation to plantar toes 1 through 5 bilaterally. Deep tendon reflexes to the patellar tendons is 1 over 4 bilaterally symmetrical. Deep tendon reflexes to the Achilles tendon is 0 over 4 bilaterally symmetrical. No ankle clonus or Babinski reflex noted bilaterally. Coordination is fair to both lower extremities.                 Assessment:       Encounter Diagnoses   Name Primary?    Diabetic peripheral neuropathy associated with type 2 diabetes mellitus Yes    Chronic pain syndrome            Plan:       Linnea was seen today for foot pain and diabetes mellitus.    Diagnoses and all orders for this visit:    Diabetic peripheral neuropathy associated with type 2 diabetes mellitus    Chronic pain syndrome    Other orders  -     amitriptyline (ELAVIL) 25 MG tablet; Take 1 tablet (25 mg total) by mouth nightly as needed for Insomnia.        I counseled the patient on her conditions, their implications and medical management.    Shoe  inspection. Diabetic Foot Education. Patient reminded of the importance of good nutrition and blood sugar control to help prevent podiatric complications of diabetes. Patient instructed on proper foot hygeine. We discussed wearing proper shoe gear, daily foot inspections and Diabetic foot education in detail.    Discussed options for peripheral neuropathy/nerve entrapment syndrome including nerve block therapy, surgical nerve entrapment decompression procedures, and various vitamins and supplementation available shown to improve nerve function.    Continue Lyrica 75 mg b.i.d..  Begin amitriptyline q.h.s..  Follow up in 3 months.  .  Follow-up in 3 months.

## 2023-02-07 DIAGNOSIS — Z00.00 ENCOUNTER FOR MEDICARE ANNUAL WELLNESS EXAM: ICD-10-CM

## 2023-02-09 DIAGNOSIS — Z00.00 ENCOUNTER FOR MEDICARE ANNUAL WELLNESS EXAM: ICD-10-CM

## 2023-02-11 DIAGNOSIS — M48.061 DEGENERATIVE LUMBAR SPINAL STENOSIS: ICD-10-CM

## 2023-02-11 NOTE — TELEPHONE ENCOUNTER
Care Due:                  Date            Visit Type   Department     Provider  --------------------------------------------------------------------------------                                EP -                              PRIMARY      [facility] PRIMARY   Bailee Zee  Last Visit: 12-      CARE ([facility])   CARE           [place]                              EP -                              PRIMARY      [facility] PRIMARY   Bailee Yayo  Next Visit: 04-      CARE ([facility])   CARE           [place]                                                            Last  Test          Frequency    Reason                     Performed    Due Date  --------------------------------------------------------------------------------    CMP.........  12 months..  atorvastatin.............  03- 03-    Lipid Panel.  12 months..  atorvastatin.............  03- 03-    [facility] Embedded Care Gaps. Reference number: [id number]. 2/11/2023   7:14:42 AM CST

## 2023-02-13 RX ORDER — TRAMADOL HYDROCHLORIDE 50 MG/1
50 TABLET ORAL EVERY 6 HOURS PRN
Qty: 120 TABLET | Refills: 1 | Status: SHIPPED | OUTPATIENT
Start: 2023-02-13 | End: 2023-04-17 | Stop reason: DRUGHIGH

## 2023-02-14 ENCOUNTER — OFFICE VISIT (OUTPATIENT)
Dept: BARIATRICS | Facility: CLINIC | Age: 73
End: 2023-02-14
Payer: MEDICARE

## 2023-02-14 VITALS
OXYGEN SATURATION: 97 % | BODY MASS INDEX: 41.3 KG/M2 | WEIGHT: 271.63 LBS | SYSTOLIC BLOOD PRESSURE: 104 MMHG | DIASTOLIC BLOOD PRESSURE: 58 MMHG | HEART RATE: 74 BPM

## 2023-02-14 DIAGNOSIS — E66.01 CLASS 3 SEVERE OBESITY DUE TO EXCESS CALORIES WITH SERIOUS COMORBIDITY AND BODY MASS INDEX (BMI) OF 40.0 TO 44.9 IN ADULT: Primary | ICD-10-CM

## 2023-02-14 DIAGNOSIS — E66.01 CLASS 2 SEVERE OBESITY WITH BODY MASS INDEX (BMI) OF 35 TO 39.9 WITH SERIOUS COMORBIDITY: ICD-10-CM

## 2023-02-14 PROCEDURE — 1100F PTFALLS ASSESS-DOCD GE2>/YR: CPT | Mod: CPTII,S$GLB,, | Performed by: INTERNAL MEDICINE

## 2023-02-14 PROCEDURE — 3074F PR MOST RECENT SYSTOLIC BLOOD PRESSURE < 130 MM HG: ICD-10-PCS | Mod: CPTII,S$GLB,, | Performed by: INTERNAL MEDICINE

## 2023-02-14 PROCEDURE — 3008F PR BODY MASS INDEX (BMI) DOCUMENTED: ICD-10-PCS | Mod: CPTII,S$GLB,, | Performed by: INTERNAL MEDICINE

## 2023-02-14 PROCEDURE — 3288F PR FALLS RISK ASSESSMENT DOCUMENTED: ICD-10-PCS | Mod: CPTII,S$GLB,, | Performed by: INTERNAL MEDICINE

## 2023-02-14 PROCEDURE — 1159F MED LIST DOCD IN RCRD: CPT | Mod: CPTII,S$GLB,, | Performed by: INTERNAL MEDICINE

## 2023-02-14 PROCEDURE — 99999 PR PBB SHADOW E&M-EST. PATIENT-LVL III: CPT | Mod: PBBFAC,,, | Performed by: INTERNAL MEDICINE

## 2023-02-14 PROCEDURE — 1100F PR PT FALLS ASSESS DOC 2+ FALLS/FALL W/INJURY/YR: ICD-10-PCS | Mod: CPTII,S$GLB,, | Performed by: INTERNAL MEDICINE

## 2023-02-14 PROCEDURE — 1159F PR MEDICATION LIST DOCUMENTED IN MEDICAL RECORD: ICD-10-PCS | Mod: CPTII,S$GLB,, | Performed by: INTERNAL MEDICINE

## 2023-02-14 PROCEDURE — 3078F DIAST BP <80 MM HG: CPT | Mod: CPTII,S$GLB,, | Performed by: INTERNAL MEDICINE

## 2023-02-14 PROCEDURE — 1160F PR REVIEW ALL MEDS BY PRESCRIBER/CLIN PHARMACIST DOCUMENTED: ICD-10-PCS | Mod: CPTII,S$GLB,, | Performed by: INTERNAL MEDICINE

## 2023-02-14 PROCEDURE — 3074F SYST BP LT 130 MM HG: CPT | Mod: CPTII,S$GLB,, | Performed by: INTERNAL MEDICINE

## 2023-02-14 PROCEDURE — 3008F BODY MASS INDEX DOCD: CPT | Mod: CPTII,S$GLB,, | Performed by: INTERNAL MEDICINE

## 2023-02-14 PROCEDURE — 99999 PR PBB SHADOW E&M-EST. PATIENT-LVL III: ICD-10-PCS | Mod: PBBFAC,,, | Performed by: INTERNAL MEDICINE

## 2023-02-14 PROCEDURE — 1125F PR PAIN SEVERITY QUANTIFIED, PAIN PRESENT: ICD-10-PCS | Mod: CPTII,S$GLB,, | Performed by: INTERNAL MEDICINE

## 2023-02-14 PROCEDURE — 99215 PR OFFICE/OUTPT VISIT, EST, LEVL V, 40-54 MIN: ICD-10-PCS | Mod: S$GLB,,, | Performed by: INTERNAL MEDICINE

## 2023-02-14 PROCEDURE — 99215 OFFICE O/P EST HI 40 MIN: CPT | Mod: S$GLB,,, | Performed by: INTERNAL MEDICINE

## 2023-02-14 PROCEDURE — 1125F AMNT PAIN NOTED PAIN PRSNT: CPT | Mod: CPTII,S$GLB,, | Performed by: INTERNAL MEDICINE

## 2023-02-14 PROCEDURE — 1160F RVW MEDS BY RX/DR IN RCRD: CPT | Mod: CPTII,S$GLB,, | Performed by: INTERNAL MEDICINE

## 2023-02-14 PROCEDURE — 3078F PR MOST RECENT DIASTOLIC BLOOD PRESSURE < 80 MM HG: ICD-10-PCS | Mod: CPTII,S$GLB,, | Performed by: INTERNAL MEDICINE

## 2023-02-14 PROCEDURE — 3288F FALL RISK ASSESSMENT DOCD: CPT | Mod: CPTII,S$GLB,, | Performed by: INTERNAL MEDICINE

## 2023-02-14 RX ORDER — TOPIRAMATE 50 MG/1
100 TABLET, FILM COATED ORAL NIGHTLY
Qty: 180 TABLET | Refills: 0 | Status: SHIPPED | OUTPATIENT
Start: 2023-02-14 | End: 2023-05-25 | Stop reason: SDUPTHER

## 2023-02-14 NOTE — PATIENT INSTRUCTIONS
Contact your health insurance about your free gym membership.      Start Ozempic 1 mg once a week.     Decrease portions as soon as you start Ozempic. Avoid fried, greasy, fatty foods.     Some nausea in the first 2 weeks is not unusual.     If you get pain across the upper abdomen and around to your back, please call the office.       [email or web address] for coupon/videos.        Patient was informed that topiramate is used for migraine prevention and seizures. Weight loss is a common side effect that is well documented. S/he understands this. S/he was informed of the potential side effects such as serious and possibly fatal rash in which case the medication should be discontinued immediately. Paresthesias, forgetfulness, fatigue, kidney stones, GI symptoms, and changes in lab values such as electrolytes, blood counts and kidney function.    topiramate 50 mg 2 tabs in the evening.     No soda, sweet tea, juices or lemonade. All drinks should be 5 calories or less.         Limit starchy carbohydrates (bread, rice, pasta, potatoes, corn, peas, oatmeal, grits, tortillas, crackers, chips)        You need about 1000 calories and 70 g protein a day to lose weight    Eating well to be healthy and lose weight does not have to be hard. It also does not have to be time consuming or expensive. There a lots of ways you can work in healthy choices into your day. Many of these are easy, quick and even family friendly!    Homemade hazelnut au lait  Brew your favorite brand of hazelnut flavored coffee ([employer] makes a good one). Microwave 1/2 cup of milk that fits your eating plan (whole, skim or sugar-free almond milk can all work). Add half to 1 oz sugar free hazelnut syrup.     Quick and easy breakfast  1-2 boiled eggs or mini-frittatas with a tangerine. The boiled eggs and mini-frittatas can both be made ahead and last for up to 4 days in the refrigerator. Bonus if you portion them out in ready to go containers or zipper  bags.     Breakfast Egg Muffins with Lara and Spinach  Makes 12 muffins  Ingredients    6 eggs  ¼ cup milk  ¼ teaspoon salt  2 cups grated cheddar cheese  3/4 cup spinach, cooked and drained (about 8 oz fresh spinach)  6 lara slices, cooked, drained of fat, and chopped  1/2 cup grated Parmesan cheese (optional)    Instructions      Preheat oven to 350 degrees. Use a regular 12-cup muffin pan. Spray the muffin pan with non-stick cooking spray.  In a large bowl, beat eggs until smooth. Add milk, salt, Cheddar cheese and mix. Stir spinach, cooked lara into the egg mixture. Ladle the egg mixture into greased muffin cups ¾ full.  Top each muffin cup with grated Parmesan cheese.  Bake for 25 minutes. Remove from the oven, let the muffins cool for 30 minutes before removing them from the pan.      Be a brown bagger! When you make dinner, plan for an extra helping. When you serve your plate for dinner, serve an additional helping into a container that you can take with you the next day. If you don't have a refrigerator available during the day, an insulated lunch bag and ice packs will help you safely store you lunch.     Cold Brewed Iced tea. Fill a pitcher with 64 oz filtered water. Add either 4 regular tea bags of your choice or a large iced tea bag. Refrigerate over night then remove the tea bags. The tea will not be bitter and is super flavorful. Get creative! Try combinations like green tea and hibiscus tea or black tea with lemon zinger. Add orange or lemon slices for even more flavor.     Snack wisely. Protein filled snacks will fill you up, allowing you to get by with fewer calories. String cheese, pork skins (chicharrones), turkey pepperoni, or celery with cream cheese will all fit the bill.       Ditch the take out. Turkey tacos (with or without a low carb tortilla), burgers (without the bun), or fun stir fries are all quick and easy. The whole family will be happy, and you can save calories and money.       Orange Chicken Stir dudley with asparagus   Makes 6 servings  Ingredients:    1.5 lbs boneless skinless chicken breast/tenders, diced into 1-inch pieces  1 Tbsp extra virgin olive or avocado oil, divided  2 lb asparagus, end portions trimmed and remainder diced into 1 1/2-inch pieces  1 small yellow onion, sliced into thin strips  8 oz button mushrooms, sliced  1 Tbsp peeled and finely grated fresh tray  4 cloves garlic, minced  1/2 cup low-sodium chicken broth  Juice of 2 fresh oranges  2 Tbsp low sodium soy sauce  2 Tbsp cornstarch  Sea salt and freshly ground black pepper    Directions:    In a 12-inch non-stick wok, heat 1/2 oil over moderately high heat. Once oil is hot, add diced chicken and season lightly with salt and pepper. Sauté until cooked through, tossing occasionally, about 5-6 minutes.  Place chicken on a large plate and set aside. Return wok, reduce to medium-high heat, add remaining oil.  Once oil is hot, add asparagus, yellow onion and mushrooms, and sauté until tender-crisp, about 4 - 5 minutes, adding in garlic and tray during the last 1 minute of sautéing.  Meanwhile, in a mixing bowl whisk together chicken broth, orange juice, soy sauce and cornstarch until well blended.  Pour chicken broth mixture into skillet with veggies, season with salt and pepper to taste, and bring mixture to a light boil, stirring constantly. Allow mixture to gently boil, stirring constantly, until thickened, about 1 minute.  Toss chicken into mixture and serve immediately over cauliflower rice or Shirataki noodles.      Skinny Chicken Tortilla Soup  Makes 7 servings    2 teaspoons olive oil  1 cup onion, chopped (about 1 small)  2 cups celery, sliced (about 4 medium stalks)  4 garlic cloves, minced  4 medium tomatoes, chopped  2 cups water  4 cups low-sodium organic chicken broth  3 cups chopped and/or shredded rotisserie chicken, skinless  2 cups sliced carrots (about 3 medium)  1 teaspoon dried oregano  leaves  2 teaspoons chili powder  1 teaspoon garlic powder  2 teaspoons cumin  ½ teaspoon cayenne pepper (add less or omit, if you don't want a spicy soup)  ½ teaspoon sea salt + more to taste  ½ teaspoon pepper + more to taste    Directions:   Put all ingredients into a large crock pot. Cook on low for 5-6 hours.     Optional garnish with chopped avocado, chopped fresh cilantro, crumbled Cotija cheese, sour cream, Greek yogurt, your favorite hot sauce.           Vegan Avocado Banana Chocolate Pudding  Makes 4 servings  Ingredients    1 1/2 ripe avocados  2 ripe bananas  6 tbsp raw cacao powder or unsweetened cocoa powder  2-3 tbsp maple syrup (or calorie free sweetener)  1/4 cup almond milk  Instructions    Blend everything together in a  until the consistency is smooth and velvety. Taste and see if more sweetener is needed and stir to make sure everything is evenly mixed. Blend a second time if needed.  Top with banana slices, raw cacao nibs, almond butter, or any other toppings and enjoy!

## 2023-02-14 NOTE — PROGRESS NOTES
Subjective:       Patient ID: Linnea Renae is a 72 y.o. female.    Chief Complaint: Follow-up      Pt here today for follow-up.Has gained 7 more lbs, net neg 16. Started [id number] piyush diet and decreased topiramate to 100 mg qhs. Also started Ozempic last OV. She has not SE, but appetite is still up Has been on the medication.   In early April 2022 she underwent insertion of a permanent prosthesis and reduction mammoplasty in the contralateral breast. Implant exchange planned on the 18th .  Is having trouble with neuropathy in her feet that is getting a little better, and has had some more weight gain.  States she has a fear of water, so cannot do water exercise.   She accounts most of her weight gain to stress.   States she does find her appetite is less suppressed with the increase to 100 mg BID.       checked today        Ophtho exam 5/10/22- Open angle with borderline findings, low risk, bilateral. No retinopathy.       checked today. Has been on tramadol chronically.    On metformin 2000 mg BID.     Lab Results   Component Value Date    HGBA1C 5.7 (H) 12/09/2022    HGBA1C 5.7 (H) 03/31/2022    HGBA1C 5.7 (H) 11/29/2021     Lab Results   Component Value Date    LDLCALC 78.8 03/31/2022    CREATININE 0.8 12/09/2022        Review of Systems   Constitutional: Negative for chills and fever.   Respiratory: Negative for shortness of breath.         Denies snoring   Cardiovascular: Positive for leg swelling. Negative for chest pain.   Gastrointestinal: Positive for constipation. Negative for diarrhea.        + GERD   Genitourinary: Negative for difficulty urinating and dysuria.   Musculoskeletal: Positive for arthralgias and back pain.   Skin: Positive for rash.   Neurological: Positive for dizziness. Negative for light-headedness.   Psychiatric/Behavioral: Positive for dysphoric mood. The patient is nervous/anxious.        Objective:     BP (!) 104/58   Pulse 74   Wt 123.2 kg (271 lb 9.7 oz)   SpO2 97%   BMI  41.30 kg/m²     Physical Exam   Constitutional: She is oriented to person, place, and time. She appears well-developed. No distress.   obese   HENT:   Head: Normocephalic and atraumatic.   Eyes: Pupils are equal, round, and reactive to light. EOM are normal. No scleral icterus.   Neck: Normal range of motion. Neck supple.   Cardiovascular: Normal rate.   Pulmonary/Chest: Effort normal.   Musculoskeletal: Normal range of motion. She exhibits no edema.   Neurological: She is alert and oriented to person, place, and time. No cranial nerve deficit.   Skin: Skin is warm and dry. No erythema.   Psychiatric: She has a normal mood and affect. Her behavior is normal. Judgment normal.   Vitals reviewed.      Assessment:       1. Class 3 severe obesity due to excess calories with serious comorbidity and body mass index (BMI) of 40.0 to 44.9 in adult    2. Class 2 severe obesity with body mass index (BMI) of 35 to 39.9 with serious comorbidity            Plan:         Linnea was seen today for follow-up.    Diagnoses and all orders for this visit:    Class 3 severe obesity due to excess calories with serious comorbidity and body mass index (BMI) of 40.0 to 44.9 in adult    Class 2 severe obesity with body mass index (BMI) of 35 to 39.9 with serious comorbidity  -     topiramate (TOPAMAX) 50 MG tablet; Take 2 tablets (100 mg total) by mouth every evening.    Other orders  -     semaglutide (OZEMPIC) 1 mg/dose (4 mg/3 mL); Inject 1 mg into the skin every 7 days.           Start Ozempic 1 mg once a week.     Decrease portions as soon as you start Ozempic. Avoid fried, greasy, fatty foods.     Some nausea in the first 2 weeks is not unusual.     If you get pain across the upper abdomen and around to your back, please call the office.       [email or web address] for coupon/videos.        Patient was informed that topiramate is used for migraine prevention and seizures. Weight loss is a common side effect that is well documented. S/he  understands this. S/he was informed of the potential side effects such as serious and possibly fatal rash in which case the medication should be discontinued immediately. Paresthesias, forgetfulness, fatigue, kidney stones, GI symptoms, and changes in lab values such as electrolytes, blood counts and kidney function.    topiramate 50 mg 2 tabs in the evening.     No soda, sweet tea, juices or lemonade. All drinks should be 5 calories or less.         Limit starchy carbohydrates (bread, rice, pasta, potatoes, corn, peas, oatmeal, grits, tortillas, crackers, chips)        You need about 1000 calories and 70 g protein a day to lose weight    Healthy all tips given.

## 2023-02-20 ENCOUNTER — TELEPHONE (OUTPATIENT)
Dept: PLASTIC SURGERY | Facility: HOSPITAL | Age: 73
End: 2023-02-20
Payer: MEDICARE

## 2023-02-20 NOTE — TELEPHONE ENCOUNTER
Pt wanted to confirm her appt for 2/22/2023 at 10:30am. Pt states that she was told that would be her appt time but she did not see it in her Portal. Advised pt that I would escalate this to management. Pt voiced understanding and will come in on 2/22/2023 for her appt.   ----- Message from Brea Torres sent at 2/20/2023  3:26 PM CST -----  Contact: Patient  Type:  Patient Call          Who Called:  patient         Does the patient know what this is regarding?: Requesting a call back pt woudn't give details ; please advise           Would the patient rather a call back or a response via [facility]? Call           Best Call Back Number: [phone]             Additional Information:

## 2023-02-22 ENCOUNTER — PATIENT MESSAGE (OUTPATIENT)
Dept: BARIATRICS | Facility: CLINIC | Age: 73
End: 2023-02-22
Payer: MEDICARE

## 2023-02-22 ENCOUNTER — OFFICE VISIT (OUTPATIENT)
Dept: PLASTIC SURGERY | Facility: CLINIC | Age: 73
End: 2023-02-22
Payer: MEDICARE

## 2023-02-22 VITALS — HEART RATE: 80 BPM | SYSTOLIC BLOOD PRESSURE: 122 MMHG | DIASTOLIC BLOOD PRESSURE: 58 MMHG

## 2023-02-22 DIAGNOSIS — Z09 SURGERY FOLLOW-UP EXAMINATION: Primary | ICD-10-CM

## 2023-02-22 PROCEDURE — 1160F PR REVIEW ALL MEDS BY PRESCRIBER/CLIN PHARMACIST DOCUMENTED: ICD-10-PCS | Mod: CPTII,S$GLB,, | Performed by: SURGERY

## 2023-02-22 PROCEDURE — 1159F PR MEDICATION LIST DOCUMENTED IN MEDICAL RECORD: ICD-10-PCS | Mod: CPTII,S$GLB,, | Performed by: SURGERY

## 2023-02-22 PROCEDURE — 3074F PR MOST RECENT SYSTOLIC BLOOD PRESSURE < 130 MM HG: ICD-10-PCS | Mod: CPTII,S$GLB,, | Performed by: SURGERY

## 2023-02-22 PROCEDURE — 99024 POSTOP FOLLOW-UP VISIT: CPT | Mod: S$GLB,,, | Performed by: SURGERY

## 2023-02-22 PROCEDURE — 99999 PR PBB SHADOW E&M-EST. PATIENT-LVL III: ICD-10-PCS | Mod: PBBFAC,,, | Performed by: SURGERY

## 2023-02-22 PROCEDURE — 99999 PR PBB SHADOW E&M-EST. PATIENT-LVL III: CPT | Mod: PBBFAC,,, | Performed by: SURGERY

## 2023-02-22 PROCEDURE — 3078F PR MOST RECENT DIASTOLIC BLOOD PRESSURE < 80 MM HG: ICD-10-PCS | Mod: CPTII,S$GLB,, | Performed by: SURGERY

## 2023-02-22 PROCEDURE — 1159F MED LIST DOCD IN RCRD: CPT | Mod: CPTII,S$GLB,, | Performed by: SURGERY

## 2023-02-22 PROCEDURE — 3074F SYST BP LT 130 MM HG: CPT | Mod: CPTII,S$GLB,, | Performed by: SURGERY

## 2023-02-22 PROCEDURE — 3078F DIAST BP <80 MM HG: CPT | Mod: CPTII,S$GLB,, | Performed by: SURGERY

## 2023-02-22 PROCEDURE — 99024 PR POST-OP FOLLOW-UP VISIT: ICD-10-PCS | Mod: S$GLB,,, | Performed by: SURGERY

## 2023-02-22 PROCEDURE — 1160F RVW MEDS BY RX/DR IN RCRD: CPT | Mod: CPTII,S$GLB,, | Performed by: SURGERY

## 2023-02-24 NOTE — PROGRESS NOTES
Patient presents [facility] status post bilateral breast reconstruction.  She is done very well.  She has a very nice result.  We will see her back in the office in proximally 2-3 months.

## 2023-03-31 DIAGNOSIS — M15.9 PRIMARY OSTEOARTHRITIS INVOLVING MULTIPLE JOINTS: ICD-10-CM

## 2023-03-31 DIAGNOSIS — K21.9 GERD WITHOUT ESOPHAGITIS: ICD-10-CM

## 2023-03-31 RX ORDER — IBUPROFEN 800 MG/1
TABLET ORAL
Qty: 60 TABLET | Refills: 0 | Status: SHIPPED | OUTPATIENT
Start: 2023-03-31 | End: 2023-08-14 | Stop reason: SDUPTHER

## 2023-04-01 NOTE — TELEPHONE ENCOUNTER
Refill Routing Note   Medication(s) are not appropriate for processing by [facility] for the following reason(s):      No active prescription written by PCP    [facility] action(s):  Defer            Appointments  past 12m or future 3m with PCP    Date Provider   Last Visit   12/9/2022 Bailee Moss MD   Next Visit   4/17/2023 Bailee Moss MD   ED visits in past 90 days: 0        Note composed:4:14 PM 04/01/2023

## 2023-04-01 NOTE — TELEPHONE ENCOUNTER
No new care gaps identified.  [facility] Embedded Care Gaps. Reference number: [id number]. 4/01/2023   2:50:08 PM CDT

## 2023-04-03 ENCOUNTER — PATIENT MESSAGE (OUTPATIENT)
Dept: ADMINISTRATIVE | Facility: HOSPITAL | Age: 73
End: 2023-04-03
Payer: MEDICARE

## 2023-04-04 RX ORDER — OMEPRAZOLE 20 MG/1
CAPSULE, DELAYED RELEASE ORAL
Qty: 90 CAPSULE | Refills: 0 | Status: SHIPPED | OUTPATIENT
Start: 2023-04-04 | End: 2023-06-13 | Stop reason: SDUPTHER

## 2023-04-17 ENCOUNTER — OFFICE VISIT (OUTPATIENT)
Dept: PRIMARY CARE CLINIC | Facility: CLINIC | Age: 73
End: 2023-04-17
Payer: MEDICARE

## 2023-04-17 ENCOUNTER — LAB VISIT (OUTPATIENT)
Dept: LAB | Facility: HOSPITAL | Age: 73
End: 2023-04-17
Attending: INTERNAL MEDICINE
Payer: MEDICARE

## 2023-04-17 VITALS
BODY MASS INDEX: 39.86 KG/M2 | HEART RATE: 64 BPM | DIASTOLIC BLOOD PRESSURE: 72 MMHG | TEMPERATURE: 98 F | HEIGHT: 68 IN | SYSTOLIC BLOOD PRESSURE: 126 MMHG | WEIGHT: 263 LBS | OXYGEN SATURATION: 98 %

## 2023-04-17 DIAGNOSIS — I10 ESSENTIAL HYPERTENSION: ICD-10-CM

## 2023-04-17 DIAGNOSIS — E11.40 TYPE 2 DIABETES MELLITUS WITH DIABETIC NEUROPATHY, WITHOUT LONG-TERM CURRENT USE OF INSULIN: Primary | ICD-10-CM

## 2023-04-17 DIAGNOSIS — E11.40 TYPE 2 DIABETES MELLITUS WITH DIABETIC NEUROPATHY, WITHOUT LONG-TERM CURRENT USE OF INSULIN: ICD-10-CM

## 2023-04-17 DIAGNOSIS — E78.5 HYPERLIPIDEMIA, UNSPECIFIED HYPERLIPIDEMIA TYPE: ICD-10-CM

## 2023-04-17 DIAGNOSIS — M48.061 DEGENERATIVE LUMBAR SPINAL STENOSIS: ICD-10-CM

## 2023-04-17 DIAGNOSIS — I70.0 ATHEROSCLEROSIS OF AORTA: ICD-10-CM

## 2023-04-17 LAB
ALBUMIN SERPL BCP-MCNC: 3.7 G/DL (ref 3.5–5.2)
ALBUMIN/CREAT UR: 5.3 UG/MG (ref 0–30)
ALP SERPL-CCNC: 112 U/L (ref 55–135)
ALT SERPL W/O P-5'-P-CCNC: 15 U/L (ref 10–44)
ANION GAP SERPL CALC-SCNC: 11 MMOL/L (ref 8–16)
AST SERPL-CCNC: 21 U/L (ref 10–40)
BILIRUB SERPL-MCNC: 0.4 MG/DL (ref 0.1–1)
BUN SERPL-MCNC: 13 MG/DL (ref 8–23)
CALCIUM SERPL-MCNC: 9.4 MG/DL (ref 8.7–10.5)
CHLORIDE SERPL-SCNC: 106 MMOL/L (ref 95–110)
CHOLEST SERPL-MCNC: 128 MG/DL (ref 120–199)
CHOLEST/HDLC SERPL: 2.8 {RATIO} (ref 2–5)
CO2 SERPL-SCNC: 22 MMOL/L (ref 23–29)
CREAT SERPL-MCNC: 0.9 MG/DL (ref 0.5–1.4)
CREAT UR-MCNC: 228 MG/DL (ref 15–325)
ERYTHROCYTE [DISTWIDTH] IN BLOOD BY AUTOMATED COUNT: 13.7 % (ref 11.5–14.5)
EST. GFR  (NO RACE VARIABLE): >60 ML/MIN/1.73 M^2
ESTIMATED AVG GLUCOSE: 111 MG/DL (ref 68–131)
GLUCOSE SERPL-MCNC: 91 MG/DL (ref 70–110)
HBA1C MFR BLD: 5.5 % (ref 4–5.6)
HCT VFR BLD AUTO: 40.6 % (ref 37–48.5)
HDLC SERPL-MCNC: 45 MG/DL (ref 40–75)
HDLC SERPL: 35.2 % (ref 20–50)
HGB BLD-MCNC: 12.6 G/DL (ref 12–16)
LDLC SERPL CALC-MCNC: 65.8 MG/DL (ref 63–159)
MCH RBC QN AUTO: 27.3 PG (ref 27–31)
MCHC RBC AUTO-ENTMCNC: 31 G/DL (ref 32–36)
MCV RBC AUTO: 88 FL (ref 82–98)
MICROALBUMIN UR DL<=1MG/L-MCNC: 12 UG/ML
NONHDLC SERPL-MCNC: 83 MG/DL
PLATELET # BLD AUTO: 344 K/UL (ref 150–450)
PMV BLD AUTO: 9.9 FL (ref 9.2–12.9)
POTASSIUM SERPL-SCNC: 3.7 MMOL/L (ref 3.5–5.1)
PROT SERPL-MCNC: 7.7 G/DL (ref 6–8.4)
RBC # BLD AUTO: 4.62 M/UL (ref 4–5.4)
SODIUM SERPL-SCNC: 139 MMOL/L (ref 136–145)
TRIGL SERPL-MCNC: 86 MG/DL (ref 30–150)
WBC # BLD AUTO: 8.07 K/UL (ref 3.9–12.7)

## 2023-04-17 PROCEDURE — 3044F PR MOST RECENT HEMOGLOBIN A1C LEVEL <7.0%: ICD-10-PCS | Mod: CPTII,S$GLB,, | Performed by: INTERNAL MEDICINE

## 2023-04-17 PROCEDURE — 3078F PR MOST RECENT DIASTOLIC BLOOD PRESSURE < 80 MM HG: ICD-10-PCS | Mod: CPTII,S$GLB,, | Performed by: INTERNAL MEDICINE

## 2023-04-17 PROCEDURE — 4010F PR ACE/ARB THEARPY RXD/TAKEN: ICD-10-PCS | Mod: CPTII,S$GLB,, | Performed by: INTERNAL MEDICINE

## 2023-04-17 PROCEDURE — 85027 COMPLETE CBC AUTOMATED: CPT | Performed by: INTERNAL MEDICINE

## 2023-04-17 PROCEDURE — 1159F PR MEDICATION LIST DOCUMENTED IN MEDICAL RECORD: ICD-10-PCS | Mod: CPTII,S$GLB,, | Performed by: INTERNAL MEDICINE

## 2023-04-17 PROCEDURE — 4010F ACE/ARB THERAPY RXD/TAKEN: CPT | Mod: CPTII,S$GLB,, | Performed by: INTERNAL MEDICINE

## 2023-04-17 PROCEDURE — 3008F PR BODY MASS INDEX (BMI) DOCUMENTED: ICD-10-PCS | Mod: CPTII,S$GLB,, | Performed by: INTERNAL MEDICINE

## 2023-04-17 PROCEDURE — 1160F PR REVIEW ALL MEDS BY PRESCRIBER/CLIN PHARMACIST DOCUMENTED: ICD-10-PCS | Mod: CPTII,S$GLB,, | Performed by: INTERNAL MEDICINE

## 2023-04-17 PROCEDURE — 3066F PR DOCUMENTATION OF TREATMENT FOR NEPHROPATHY: ICD-10-PCS | Mod: CPTII,S$GLB,, | Performed by: INTERNAL MEDICINE

## 2023-04-17 PROCEDURE — 3044F HG A1C LEVEL LT 7.0%: CPT | Mod: CPTII,S$GLB,, | Performed by: INTERNAL MEDICINE

## 2023-04-17 PROCEDURE — 3066F NEPHROPATHY DOC TX: CPT | Mod: CPTII,S$GLB,, | Performed by: INTERNAL MEDICINE

## 2023-04-17 PROCEDURE — 83036 HEMOGLOBIN GLYCOSYLATED A1C: CPT | Performed by: INTERNAL MEDICINE

## 2023-04-17 PROCEDURE — 99214 PR OFFICE/OUTPT VISIT, EST, LEVL IV, 30-39 MIN: ICD-10-PCS | Mod: S$GLB,,, | Performed by: INTERNAL MEDICINE

## 2023-04-17 PROCEDURE — 3288F PR FALLS RISK ASSESSMENT DOCUMENTED: ICD-10-PCS | Mod: CPTII,S$GLB,, | Performed by: INTERNAL MEDICINE

## 2023-04-17 PROCEDURE — 3288F FALL RISK ASSESSMENT DOCD: CPT | Mod: CPTII,S$GLB,, | Performed by: INTERNAL MEDICINE

## 2023-04-17 PROCEDURE — 3061F PR NEG MICROALBUMINURIA RESULT DOCUMENTED/REVIEW: ICD-10-PCS | Mod: CPTII,S$GLB,, | Performed by: INTERNAL MEDICINE

## 2023-04-17 PROCEDURE — 99214 OFFICE O/P EST MOD 30 MIN: CPT | Mod: S$GLB,,, | Performed by: INTERNAL MEDICINE

## 2023-04-17 PROCEDURE — 99999 PR PBB SHADOW E&M-EST. PATIENT-LVL V: ICD-10-PCS | Mod: PBBFAC,,, | Performed by: INTERNAL MEDICINE

## 2023-04-17 PROCEDURE — 80053 COMPREHEN METABOLIC PANEL: CPT | Performed by: INTERNAL MEDICINE

## 2023-04-17 PROCEDURE — 80061 LIPID PANEL: CPT | Performed by: INTERNAL MEDICINE

## 2023-04-17 PROCEDURE — 3074F SYST BP LT 130 MM HG: CPT | Mod: CPTII,S$GLB,, | Performed by: INTERNAL MEDICINE

## 2023-04-17 PROCEDURE — 3074F PR MOST RECENT SYSTOLIC BLOOD PRESSURE < 130 MM HG: ICD-10-PCS | Mod: CPTII,S$GLB,, | Performed by: INTERNAL MEDICINE

## 2023-04-17 PROCEDURE — 36415 COLL VENOUS BLD VENIPUNCTURE: CPT | Mod: PN | Performed by: INTERNAL MEDICINE

## 2023-04-17 PROCEDURE — 1125F PR PAIN SEVERITY QUANTIFIED, PAIN PRESENT: ICD-10-PCS | Mod: CPTII,S$GLB,, | Performed by: INTERNAL MEDICINE

## 2023-04-17 PROCEDURE — 1159F MED LIST DOCD IN RCRD: CPT | Mod: CPTII,S$GLB,, | Performed by: INTERNAL MEDICINE

## 2023-04-17 PROCEDURE — 1160F RVW MEDS BY RX/DR IN RCRD: CPT | Mod: CPTII,S$GLB,, | Performed by: INTERNAL MEDICINE

## 2023-04-17 PROCEDURE — 3061F NEG MICROALBUMINURIA REV: CPT | Mod: CPTII,S$GLB,, | Performed by: INTERNAL MEDICINE

## 2023-04-17 PROCEDURE — 82570 ASSAY OF URINE CREATININE: CPT | Performed by: INTERNAL MEDICINE

## 2023-04-17 PROCEDURE — 1125F AMNT PAIN NOTED PAIN PRSNT: CPT | Mod: CPTII,S$GLB,, | Performed by: INTERNAL MEDICINE

## 2023-04-17 PROCEDURE — 1101F PR PT FALLS ASSESS DOC 0-1 FALLS W/OUT INJ PAST YR: ICD-10-PCS | Mod: CPTII,S$GLB,, | Performed by: INTERNAL MEDICINE

## 2023-04-17 PROCEDURE — 3008F BODY MASS INDEX DOCD: CPT | Mod: CPTII,S$GLB,, | Performed by: INTERNAL MEDICINE

## 2023-04-17 PROCEDURE — 1101F PT FALLS ASSESS-DOCD LE1/YR: CPT | Mod: CPTII,S$GLB,, | Performed by: INTERNAL MEDICINE

## 2023-04-17 PROCEDURE — 99999 PR PBB SHADOW E&M-EST. PATIENT-LVL V: CPT | Mod: PBBFAC,,, | Performed by: INTERNAL MEDICINE

## 2023-04-17 PROCEDURE — 3078F DIAST BP <80 MM HG: CPT | Mod: CPTII,S$GLB,, | Performed by: INTERNAL MEDICINE

## 2023-04-17 RX ORDER — TRAMADOL HYDROCHLORIDE 50 MG/1
50 TABLET ORAL EVERY 6 HOURS PRN
Qty: 16 TABLET | Refills: 0 | Status: SHIPPED | OUTPATIENT
Start: 2023-04-17 | End: 2023-04-21

## 2023-04-17 RX ORDER — TRAMADOL HYDROCHLORIDE 100 MG/1
100 TABLET, EXTENDED RELEASE ORAL DAILY
Qty: 30 TABLET | Refills: 2 | Status: SHIPPED | OUTPATIENT
Start: 2023-04-17 | End: 2023-05-05 | Stop reason: DRUGHIGH

## 2023-04-19 PROBLEM — F39 UNSPECIFIED MOOD (AFFECTIVE) DISORDER: Status: RESOLVED | Noted: 2022-01-18 | Resolved: 2023-04-19

## 2023-04-19 PROBLEM — D47.3 ESSENTIAL (HEMORRHAGIC) THROMBOCYTHEMIA: Status: RESOLVED | Noted: 2022-01-18 | Resolved: 2023-04-19

## 2023-04-19 PROBLEM — M46.1 SACROILIITIS: Status: RESOLVED | Noted: 2020-05-22 | Resolved: 2023-04-19

## 2023-04-19 RX ORDER — OLMESARTAN MEDOXOMIL 20 MG/1
20 TABLET ORAL DAILY
Qty: 90 TABLET | Refills: 1 | Status: SHIPPED | OUTPATIENT
Start: 2023-04-19 | End: 2023-11-02 | Stop reason: SDUPTHER

## 2023-04-19 RX ORDER — HYDROCHLOROTHIAZIDE 12.5 MG/1
12.5 TABLET ORAL DAILY
Qty: 90 TABLET | Refills: 1 | Status: SHIPPED | OUTPATIENT
Start: 2023-04-19 | End: 2024-01-04 | Stop reason: SDUPTHER

## 2023-04-19 RX ORDER — ATORVASTATIN CALCIUM 20 MG/1
20 TABLET, FILM COATED ORAL NIGHTLY
Qty: 90 TABLET | Refills: 3 | Status: SHIPPED | OUTPATIENT
Start: 2023-04-19

## 2023-04-19 RX ORDER — ATENOLOL 50 MG/1
50 TABLET ORAL 2 TIMES DAILY
Qty: 180 TABLET | Refills: 1 | Status: SHIPPED | OUTPATIENT
Start: 2023-04-19 | End: 2023-10-12 | Stop reason: SDUPTHER

## 2023-04-20 NOTE — PROGRESS NOTES
Subjective     Patient ID: Linnea Renae is a 72 y.o. female.    Chief Complaint: Diabetes    Last seen 4 months ago. Returns for f/u chronic conditions. Been having severe bilateral foot pain with paresthesias and burning sensations. And now low back pain has flared up. Tramadol 50 mg no longer relieves her pain, she has to take two at a time every 4 hours to get relief. Adds 800 mg Ibuprofen on occasion, and Tizanidine 4mg. Has been on Lyrica 75mg for months, it hasn't really made a difference.    PMH: .   Hypertension.  Diabetes Type 2, HbA1c 5.7% Dec. '22.  Hyperlipidemia. LDL 79 .  GERD.  Lumbar Spinal Stenosis.   Chronic Dry Eyes.   Perennial Allergic Rhinitis.  Morbid Obesity.   Right Breast Cancer .  H/O Colon Polyps.     PSH: C/S x 3, BTL, Hysterectomy and USO, Bilateral Cataracts extracted, Cholecystectomy. Right breast excisional biopsy . Right Mastectomy  with reconstruction, and revisions  and .     Mammogram (Left) normal . BMD normal 8/15. Colonoscopy 10/18 - sigmoid diverticulosis, otherwise normal. Eye exam . Podiatry . Vaccines reviewed, up to date.     Social: Remote tobacco use, quit over 30 years ago. No alcohol.  with three daughters and three grandchildren. Homemaker.     FMH: Heart dis, Thyroid dis.     Allergies: Codeine.    Medications: list reviewed and reconciled.              Review of Systems   Constitutional:  Negative for fever and unexpected weight change.   Respiratory:  Negative for cough and shortness of breath.    Cardiovascular:  Negative for chest pain, palpitations and leg swelling.   Gastrointestinal:  Negative for abdominal pain, diarrhea and vomiting.   Genitourinary:  Negative for dysuria and hematuria.   Musculoskeletal:  Positive for arthralgias, back pain and leg pain. Negative for gait problem.   Neurological:  Negative for dizziness, syncope, weakness and headaches.        Objective   Vitals:    23 1118  "  BP: 126/72   Pulse: 64   Temp: 97.8 °F (36.6 °C)   SpO2: 98%   Weight: 119.3 kg (263 lb 0.1 oz)   Height: 5' 8" (1.727 m)   BMI=40  Physical Exam  Constitutional:       General: She is not in acute distress.  Cardiovascular:      Rate and Rhythm: Normal rate and regular rhythm.      Heart sounds: Normal heart sounds.   Pulmonary:      Effort: Pulmonary effort is normal. No respiratory distress.      Breath sounds: Normal breath sounds. No wheezing or rales.   Musculoskeletal:         General: Tenderness present. No swelling or deformity. Normal range of motion.      Right lower leg: No edema.      Left lower leg: No edema.   Skin:     General: Skin is warm and dry.   Neurological:      Mental Status: She is alert and oriented to person, place, and time.      Cranial Nerves: No cranial nerve deficit.      Coordination: Coordination normal.      Gait: Gait normal.   Psychiatric:         Mood and Affect: Mood normal.         Behavior: Behavior normal.        Assessment and Plan     Problem List Items Addressed This Visit       Hyperlipidemia    Relevant Medications    atorvastatin (LIPITOR) 20 MG tablet    Atherosclerosis of aorta    Relevant Medications    atorvastatin (LIPITOR) 20 MG tablet     Other Visit Diagnoses       Type 2 diabetes mellitus with diabetic neuropathy, without long-term current use of insulin    -  Primary    Relevant Orders    CBC Without Differential (Completed)    Comprehensive Metabolic Panel (Completed)    Lipid Panel (Completed)    Hemoglobin A1C (Completed)    Microalbumin/Creatinine Ratio, Urine (Completed)    Essential hypertension        Relevant Medications    hydroCHLOROthiazide (HYDRODIURIL) 12.5 MG Tab    atenoloL (TENORMIN) 50 MG tablet    olmesartan (BENICAR) 20 MG tablet    Degenerative lumbar spinal stenosis        Relevant Medications    traMADoL (ULTRAM-ER) 100 MG Tb24    traMADoL (ULTRAM) 50 mg tablet            Type 2 diabetes mellitus with diabetic neuropathy, without " long-term current use of insulin  -     CBC Without Differential; Future; Expected date: 04/17/2023  -     Comprehensive Metabolic Panel; Future; Expected date: 04/17/2023  -     Lipid Panel; Future; Expected date: 04/17/2023  -     Hemoglobin A1C; Future; Expected date: 04/17/2023  -     Microalbumin/Creatinine Ratio, Urine    Essential hypertension controlled  -     hydroCHLOROthiazide (HYDRODIURIL) 12.5 MG Tab; Take 1 tablet (12.5 mg total) by mouth once daily.  Dispense: 90 tablet; Refill: 1  -     atenoloL (TENORMIN) 50 MG tablet; TAKE 1 TABLET BY MOUTH TWICE DAILY  Dispense: 180 tablet; Refill: 1  -     olmesartan (BENICAR) 20 MG tablet; Take 1 tablet (20 mg total) by mouth once daily.  Dispense: 90 tablet; Refill: 1    Hyperlipidemia, unspecified hyperlipidemia type  -     atorvastatin (LIPITOR) 20 MG tablet; Take 1 tablet (20 mg total) by mouth every evening.  Dispense: 90 tablet; Refill: 3    Atherosclerosis of aorta  -     atorvastatin (LIPITOR) 20 MG tablet; Take 1 tablet (20 mg total) by mouth every evening.  Dispense: 90 tablet; Refill: 3    Degenerative lumbar spinal stenosis  -     traMADoL (ULTRAM-ER) 100 MG Tb24; Take 1 tablet (100 mg total) by mouth once daily.  Dispense: 30 tablet; Refill: 2  -     traMADoL (ULTRAM) 50 mg tablet; Take 1 tablet (50 mg total) by mouth every 6 (six) hours as needed for Pain.  Dispense: 16 tablet; Refill: 0 - just until pharmacy gets ER in stock.

## 2023-04-30 ENCOUNTER — PATIENT MESSAGE (OUTPATIENT)
Dept: PRIMARY CARE CLINIC | Facility: CLINIC | Age: 73
End: 2023-04-30
Payer: MEDICARE

## 2023-05-01 ENCOUNTER — TELEPHONE (OUTPATIENT)
Dept: PRIMARY CARE CLINIC | Facility: CLINIC | Age: 73
End: 2023-05-01

## 2023-05-01 NOTE — TELEPHONE ENCOUNTER
Mrs Linnea Renae is requesting a stronger strength of the tramadol ER's. She stated that 100mg's she is on isn't lasting long enough. She wanted me to ask y'all to see if Dr. Moss can change it to another strength or change the Med completely.

## 2023-05-03 RX ORDER — PREGABALIN 75 MG/1
75 CAPSULE ORAL 2 TIMES DAILY
Qty: 60 CAPSULE | Refills: 6 | Status: CANCELLED | OUTPATIENT
Start: 2023-05-03 | End: 2023-11-01

## 2023-05-04 ENCOUNTER — TELEPHONE (OUTPATIENT)
Dept: PRIMARY CARE CLINIC | Facility: CLINIC | Age: 73
End: 2023-05-04

## 2023-05-04 DIAGNOSIS — M48.061 DEGENERATIVE LUMBAR SPINAL STENOSIS: ICD-10-CM

## 2023-05-04 NOTE — TELEPHONE ENCOUNTER
----- Message from Meeta Rivas sent at 5/4/2023 11:12 AM CDT -----  Contact: Pt [phone]  Pt is requesting a call back she did not leave details.     Thank you

## 2023-05-05 RX ORDER — TRAMADOL HYDROCHLORIDE 200 MG/1
200 TABLET, EXTENDED RELEASE ORAL DAILY
Qty: 30 TABLET | Refills: 0 | Status: SHIPPED | OUTPATIENT
Start: 2023-05-05 | End: 2023-06-13 | Stop reason: SDUPTHER

## 2023-05-05 NOTE — TELEPHONE ENCOUNTER
That defeats the purpose of switching to an EXTENDED RELEASE 24 hour medication. I will not order 100 BID.

## 2023-05-05 NOTE — TELEPHONE ENCOUNTER
Pt advised Tramadol 200 mg is sent to the pharmacy but pt is asking to have the Tramadol 100 mg BID she take one during the day and then one to help ease the pain when she  go to bed

## 2023-05-05 NOTE — TELEPHONE ENCOUNTER
Increase Tramadol ER to 200 mg daily - new order sent to the pharmacy for this strength, may take two tabs of 100mg together once daily until they run out.

## 2023-05-05 NOTE — TELEPHONE ENCOUNTER
Pt said in these words. Dr Stringer is not feeling what she is feeling and she is asking for you to work with her on this matter. Pt state she don't know what science say but the pain med is not lasting as long as it should. Pt is asking if you deny prescribing Tramadol 100 mg BID can you send another medication.

## 2023-05-08 ENCOUNTER — TELEPHONE (OUTPATIENT)
Dept: PRIMARY CARE CLINIC | Facility: CLINIC | Age: 73
End: 2023-05-08
Payer: MEDICARE

## 2023-05-08 NOTE — TELEPHONE ENCOUNTER
----- Message from Nichole Gan sent at 5/8/2023 11:12 AM CDT -----  Contact: self [phone]  Pt requesting a call stated MA know why.    Please call and advise

## 2023-05-15 ENCOUNTER — TELEPHONE (OUTPATIENT)
Dept: PODIATRY | Facility: CLINIC | Age: 73
End: 2023-05-15
Payer: MEDICARE

## 2023-05-15 NOTE — TELEPHONE ENCOUNTER
Spoke to pt and rescheduled appt. Original appt:5/15/2023.  New Appt:6/14/2023. Pt verbalized understanding.    Sent staff message to Dr. Gr in regards to PT orders discussed on 1/19 appt.

## 2023-05-16 DIAGNOSIS — M48.061 DEGENERATIVE LUMBAR SPINAL STENOSIS: ICD-10-CM

## 2023-05-16 RX ORDER — TIZANIDINE 4 MG/1
4 TABLET ORAL EVERY 8 HOURS PRN
Qty: 90 TABLET | Refills: 2 | Status: SHIPPED | OUTPATIENT
Start: 2023-05-16 | End: 2023-11-27 | Stop reason: SDUPTHER

## 2023-05-16 RX ORDER — SEMAGLUTIDE 1.34 MG/ML
1 INJECTION, SOLUTION SUBCUTANEOUS
Qty: 3 EACH | Refills: 0 | Status: CANCELLED | OUTPATIENT
Start: 2023-05-16 | End: 2024-05-15

## 2023-05-16 NOTE — TELEPHONE ENCOUNTER
Refill Routing Note   Medication(s) are not appropriate for processing by [facility] for the following reason(s):      Medication outside of protocol    [facility] action(s):  Route None identified     Medication Therapy Plan: (ZANAFLEX- non delegated)      Appointments  past 12m or future 3m with PCP    Date Provider   Last Visit   4/17/2023 Bailee Moss MD   Next Visit   7/25/2023 Bailee Moss MD   ED visits in past 90 days: 0        Note composed:2:50 PM 05/16/2023

## 2023-05-16 NOTE — TELEPHONE ENCOUNTER
No care due was identified.  [facility] Embedded Care Due Messages. Reference number: [id number].   5/16/2023 12:44:15 PM CDT

## 2023-05-22 ENCOUNTER — TELEPHONE (OUTPATIENT)
Dept: BARIATRICS | Facility: CLINIC | Age: 73
End: 2023-05-22
Payer: MEDICARE

## 2023-05-22 DIAGNOSIS — E11.9 TYPE 2 DIABETES MELLITUS WITHOUT COMPLICATION, WITHOUT LONG-TERM CURRENT USE OF INSULIN: Primary | ICD-10-CM

## 2023-05-22 RX ORDER — SEMAGLUTIDE 1.34 MG/ML
1 INJECTION, SOLUTION SUBCUTANEOUS
Qty: 3 EACH | Refills: 0 | Status: CANCELLED | OUTPATIENT
Start: 2023-05-16 | End: 2024-05-15

## 2023-05-25 ENCOUNTER — OFFICE VISIT (OUTPATIENT)
Dept: BARIATRICS | Facility: CLINIC | Age: 73
End: 2023-05-25
Payer: MEDICARE

## 2023-05-25 VITALS
DIASTOLIC BLOOD PRESSURE: 72 MMHG | BODY MASS INDEX: 41.21 KG/M2 | HEIGHT: 67 IN | OXYGEN SATURATION: 96 % | WEIGHT: 262.56 LBS | SYSTOLIC BLOOD PRESSURE: 116 MMHG | HEART RATE: 81 BPM

## 2023-05-25 DIAGNOSIS — E11.9 TYPE 2 DIABETES MELLITUS WITHOUT COMPLICATION, WITHOUT LONG-TERM CURRENT USE OF INSULIN: ICD-10-CM

## 2023-05-25 DIAGNOSIS — E66.01 CLASS 2 SEVERE OBESITY WITH BODY MASS INDEX (BMI) OF 35 TO 39.9 WITH SERIOUS COMORBIDITY: ICD-10-CM

## 2023-05-25 PROCEDURE — 3074F SYST BP LT 130 MM HG: CPT | Mod: CPTII,S$GLB,, | Performed by: INTERNAL MEDICINE

## 2023-05-25 PROCEDURE — 1159F MED LIST DOCD IN RCRD: CPT | Mod: CPTII,S$GLB,, | Performed by: INTERNAL MEDICINE

## 2023-05-25 PROCEDURE — 99214 OFFICE O/P EST MOD 30 MIN: CPT | Mod: S$GLB,,, | Performed by: INTERNAL MEDICINE

## 2023-05-25 PROCEDURE — 1125F AMNT PAIN NOTED PAIN PRSNT: CPT | Mod: CPTII,S$GLB,, | Performed by: INTERNAL MEDICINE

## 2023-05-25 PROCEDURE — 3008F BODY MASS INDEX DOCD: CPT | Mod: CPTII,S$GLB,, | Performed by: INTERNAL MEDICINE

## 2023-05-25 PROCEDURE — 3008F PR BODY MASS INDEX (BMI) DOCUMENTED: ICD-10-PCS | Mod: CPTII,S$GLB,, | Performed by: INTERNAL MEDICINE

## 2023-05-25 PROCEDURE — 3044F HG A1C LEVEL LT 7.0%: CPT | Mod: CPTII,S$GLB,, | Performed by: INTERNAL MEDICINE

## 2023-05-25 PROCEDURE — 3288F PR FALLS RISK ASSESSMENT DOCUMENTED: ICD-10-PCS | Mod: CPTII,S$GLB,, | Performed by: INTERNAL MEDICINE

## 2023-05-25 PROCEDURE — 3078F PR MOST RECENT DIASTOLIC BLOOD PRESSURE < 80 MM HG: ICD-10-PCS | Mod: CPTII,S$GLB,, | Performed by: INTERNAL MEDICINE

## 2023-05-25 PROCEDURE — 1160F RVW MEDS BY RX/DR IN RCRD: CPT | Mod: CPTII,S$GLB,, | Performed by: INTERNAL MEDICINE

## 2023-05-25 PROCEDURE — 3078F DIAST BP <80 MM HG: CPT | Mod: CPTII,S$GLB,, | Performed by: INTERNAL MEDICINE

## 2023-05-25 PROCEDURE — 3074F PR MOST RECENT SYSTOLIC BLOOD PRESSURE < 130 MM HG: ICD-10-PCS | Mod: CPTII,S$GLB,, | Performed by: INTERNAL MEDICINE

## 2023-05-25 PROCEDURE — 1160F PR REVIEW ALL MEDS BY PRESCRIBER/CLIN PHARMACIST DOCUMENTED: ICD-10-PCS | Mod: CPTII,S$GLB,, | Performed by: INTERNAL MEDICINE

## 2023-05-25 PROCEDURE — 99999 PR PBB SHADOW E&M-EST. PATIENT-LVL IV: ICD-10-PCS | Mod: PBBFAC,,, | Performed by: INTERNAL MEDICINE

## 2023-05-25 PROCEDURE — 1159F PR MEDICATION LIST DOCUMENTED IN MEDICAL RECORD: ICD-10-PCS | Mod: CPTII,S$GLB,, | Performed by: INTERNAL MEDICINE

## 2023-05-25 PROCEDURE — 1101F PT FALLS ASSESS-DOCD LE1/YR: CPT | Mod: CPTII,S$GLB,, | Performed by: INTERNAL MEDICINE

## 2023-05-25 PROCEDURE — 3044F PR MOST RECENT HEMOGLOBIN A1C LEVEL <7.0%: ICD-10-PCS | Mod: CPTII,S$GLB,, | Performed by: INTERNAL MEDICINE

## 2023-05-25 PROCEDURE — 3288F FALL RISK ASSESSMENT DOCD: CPT | Mod: CPTII,S$GLB,, | Performed by: INTERNAL MEDICINE

## 2023-05-25 PROCEDURE — 3061F PR NEG MICROALBUMINURIA RESULT DOCUMENTED/REVIEW: ICD-10-PCS | Mod: CPTII,S$GLB,, | Performed by: INTERNAL MEDICINE

## 2023-05-25 PROCEDURE — 3066F NEPHROPATHY DOC TX: CPT | Mod: CPTII,S$GLB,, | Performed by: INTERNAL MEDICINE

## 2023-05-25 PROCEDURE — 4010F ACE/ARB THERAPY RXD/TAKEN: CPT | Mod: CPTII,S$GLB,, | Performed by: INTERNAL MEDICINE

## 2023-05-25 PROCEDURE — 99214 PR OFFICE/OUTPT VISIT, EST, LEVL IV, 30-39 MIN: ICD-10-PCS | Mod: S$GLB,,, | Performed by: INTERNAL MEDICINE

## 2023-05-25 PROCEDURE — 3061F NEG MICROALBUMINURIA REV: CPT | Mod: CPTII,S$GLB,, | Performed by: INTERNAL MEDICINE

## 2023-05-25 PROCEDURE — 1125F PR PAIN SEVERITY QUANTIFIED, PAIN PRESENT: ICD-10-PCS | Mod: CPTII,S$GLB,, | Performed by: INTERNAL MEDICINE

## 2023-05-25 PROCEDURE — 99999 PR PBB SHADOW E&M-EST. PATIENT-LVL IV: CPT | Mod: PBBFAC,,, | Performed by: INTERNAL MEDICINE

## 2023-05-25 PROCEDURE — 1101F PR PT FALLS ASSESS DOC 0-1 FALLS W/OUT INJ PAST YR: ICD-10-PCS | Mod: CPTII,S$GLB,, | Performed by: INTERNAL MEDICINE

## 2023-05-25 PROCEDURE — 3066F PR DOCUMENTATION OF TREATMENT FOR NEPHROPATHY: ICD-10-PCS | Mod: CPTII,S$GLB,, | Performed by: INTERNAL MEDICINE

## 2023-05-25 PROCEDURE — 4010F PR ACE/ARB THEARPY RXD/TAKEN: ICD-10-PCS | Mod: CPTII,S$GLB,, | Performed by: INTERNAL MEDICINE

## 2023-05-25 RX ORDER — TOPIRAMATE 50 MG/1
100 TABLET, FILM COATED ORAL NIGHTLY
Qty: 180 TABLET | Refills: 0 | Status: SHIPPED | OUTPATIENT
Start: 2023-05-25 | End: 2023-09-06 | Stop reason: SDUPTHER

## 2023-05-25 NOTE — PROGRESS NOTES
"Subjective:       Patient ID: Linnea Renae is a 72 y.o. female.    Chief Complaint: Follow-up      Pt here today for follow-up.Has lost 9 lbs, net neg 25 lbs. Started [id number] piyush diet and decreased topiramate to 100 mg qhs. Also started Ozempic for DM2, currently on 1 mg weekly. She has not SE, but appetite is  down    In early April 2022 she underwent insertion of a permanent prosthesis and reduction mammoplasty in the contralateral breast. Implant exchange planned on the 18th .  Is having trouble with neuropathy in her feet, and back and hip pain.              checked today        Ophtho exam 5/10/22- Open angle with borderline findings, low risk, bilateral. No retinopathy.       checked today. Has been on tramadol chronically.    On metformin 2000 mg BID.     Lab Results   Component Value Date    HGBA1C 5.5 04/17/2023    HGBA1C 5.7 (H) 12/09/2022    HGBA1C 5.7 (H) 03/31/2022     Lab Results   Component Value Date    LDLCALC 65.8 04/17/2023    CREATININE 0.9 04/17/2023        Review of Systems   Constitutional: Negative for chills and fever.   Respiratory: Negative for shortness of breath.         Denies snoring   Cardiovascular: Positive for leg swelling. Negative for chest pain.   Gastrointestinal: Positive for constipation. Negative for diarrhea.        + GERD   Genitourinary: Negative for difficulty urinating and dysuria.   Musculoskeletal: Positive for arthralgias and back pain.   Skin: Positive for rash.   Neurological: Positive for dizziness. Negative for light-headedness.   Psychiatric/Behavioral: Positive for dysphoric mood. The patient is nervous/anxious.        Objective:     /72 (BP Location: Right arm, Patient Position: Sitting, BP Method: Large (Manual))   Pulse 81   Ht 5' 6.5" (1.689 m)   Wt 119.1 kg (262 lb 9.1 oz)   SpO2 96%   BMI 41.74 kg/m²     Physical Exam   Constitutional: She is oriented to person, place, and time. She appears well-developed. No distress.   obese   HENT: "   Head: Normocephalic and atraumatic.   Eyes: Pupils are equal, round, and reactive to light. EOM are normal. No scleral icterus.   Neck: Normal range of motion. Neck supple.   Cardiovascular: Normal rate.   Pulmonary/Chest: Effort normal.   Musculoskeletal: Normal range of motion. She exhibits no edema.   Neurological: She is alert and oriented to person, place, and time. No cranial nerve deficit.   Skin: Skin is warm and dry. No erythema.   Psychiatric: She has a normal mood and affect. Her behavior is normal. Judgment normal.   Vitals reviewed.      Assessment:       1. Type 2 diabetes mellitus without complication, without long-term current use of insulin    2. Class 2 severe obesity with body mass index (BMI) of 35 to 39.9 with serious comorbidity              Plan:         Linnea was seen today for follow-up.    Diagnoses and all orders for this visit:    Type 2 diabetes mellitus without complication, without long-term current use of insulin  -     semaglutide (OZEMPIC) 1 mg/dose (4 mg/3 mL); Inject 1 mg into the skin every 7 days.    Class 2 severe obesity with body mass index (BMI) of 35 to 39.9 with serious comorbidity  -     topiramate (TOPAMAX) 50 MG tablet; Take 2 tablets (100 mg total) by mouth every evening.             Start Ozempic 1 mg once a week.     Decrease portions as soon as you start Ozempic. Avoid fried, greasy, fatty foods.     Some nausea in the first 2 weeks is not unusual.     If you get pain across the upper abdomen and around to your back, please call the office.       Www[email or web address] for coupon/videos.        Patient was informed that topiramate is used for migraine prevention and seizures. Weight loss is a common side effect that is well documented. S/he understands this. S/he was informed of the potential side effects such as serious and possibly fatal rash in which case the medication should be discontinued immediately. Paresthesias, forgetfulness, fatigue, kidney stones, GI  symptoms, and changes in lab values such as electrolytes, blood counts and kidney function.    topiramate 50 mg 2 tabs in the evening.     No soda, sweet tea, juices or lemonade. All drinks should be 5 calories or less.         Limit starchy carbohydrates (bread, rice, pasta, potatoes, corn, peas, oatmeal, grits, tortillas, crackers, chips)        You need about 1000 calories and 70 g protein a day to lose weight     [place]ia Hardy recipes given.

## 2023-05-25 NOTE — PATIENT INSTRUCTIONS
Start Ozempic 1 mg once a week.     Decrease portions as soon as you start Ozempic. Avoid fried, greasy, fatty foods.     Some nausea in the first 2 weeks is not unusual.     If you get pain across the upper abdomen and around to your back, please call the office.       Www.[employer] for coupon/videos.        Patient was informed that topiramate is used for migraine prevention and seizures. Weight loss is a common side effect that is well documented. S/he understands this. S/he was informed of the potential side effects such as serious and possibly fatal rash in which case the medication should be discontinued immediately. Paresthesias, forgetfulness, fatigue, kidney stones, GI symptoms, and changes in lab values such as electrolytes, blood counts and kidney function.    topiramate 50 mg 2 tabs in the evening.     No soda, sweet tea, juices or lemonade. All drinks should be 5 calories or less.         Limit starchy carbohydrates (bread, rice, pasta, potatoes, corn, peas, oatmeal, grits, tortillas, crackers, chips)        You need about 1000 calories and 70 g protein a day to lose weight    January 28, 2019  Hot Spinach and Artichoke Dip (Recipe)  BY GURWINDER GARCIA RD, CSSD    This creamy spinach dip can double as a protein-rich, gluten-free side dish, game day appetizer or parade route snack.  Calories 85 calories    Fat 3 grams saturated fat    Prep Time 5 minutes  Cook Time10 minutes  Yield Serves 5-10 people  Ingredients  1/2 cup onion, finely chopped  3 (10 ounce) packs of frozen chopped spinach, thawed and squeezed dry  1 (8 ounce) package reduced-fat cream cheese  1 (8 ounce) carton fat-free plain Greek yogurt  1/2 cup Parmesan cheese, grated  1 (14 ounce) can artichoke hearts  1/4 teaspoon salt (optional)  1/4 teaspoon black pepper  1/8 teaspoon crushed red pepper flakes  Instructions  Lightly coat a skillet with cooking spray.  Cook and stir onion over medium heat until transparent (about 5  minutes).  Add spinach. Cook until thoroughly heated (about 1-2 minutes).  Reduce heat and add cream cheese. Stir until melted and smooth.  Stir in Greek yogurt, Parmesan cheese and artichokes. Remove from heat.  Season with salt (optional) and black and red pepper.  Serve with raw vegetables.                          January 31, 2019  Pizza Cups (Recipe)    Trying to eat better but craving pizza? These protein-packed pizza cups will do the trick.    Calories 65 calories per cup  Fat 2.7 grams per cup  Prep Time5 minutes  Cook Time 25 minutes  Yield 12 pizza cups  Ingredients  1 ½ cups liquid egg whites  2 large eggs  1 cup fat free or low moisture shredded mozzarella cheese  37 turkey pepperonis (25 chopped and 12 sliced)  ¼ cup Parmesan cheese  ¼ tsp oregano  ¼ tsp black pepper  Instructions  Preheat oven to 350 degrees Fahrenheit.  Chop up 25 turkey pepperonis into small pieces. In a bowl, combine all ingredients except the remaining 12 sliced turkey pepperoni.  Spray a muffin pan with nonstick spray.  Place a whole sliced turkey pepperoni in the bottom of each of the 12 muffin molds.  Pour or spoon in the mixture in your bowl into each muffin mold evenly ¾ full.  Bake in the oven for 20-25 minutes. Place a toothpick in the center of the pizza cup to ensure all liquid egg has cooked. For a crispier pizza cup, leave in the oven a little longer.  Let cool for a few minutes before serving. Enjoy!

## 2023-06-05 ENCOUNTER — CLINICAL SUPPORT (OUTPATIENT)
Dept: REHABILITATION | Facility: HOSPITAL | Age: 73
End: 2023-06-05
Attending: PODIATRIST
Payer: MEDICARE

## 2023-06-05 DIAGNOSIS — G89.4 CHRONIC PAIN SYNDROME: ICD-10-CM

## 2023-06-05 DIAGNOSIS — E11.42 DIABETIC PERIPHERAL NEUROPATHY ASSOCIATED WITH TYPE 2 DIABETES MELLITUS: ICD-10-CM

## 2023-06-05 PROCEDURE — 97112 NEUROMUSCULAR REEDUCATION: CPT

## 2023-06-05 PROCEDURE — 97161 PT EVAL LOW COMPLEX 20 MIN: CPT

## 2023-06-07 NOTE — PROGRESS NOTES
[facility]   Physical Therapy Treatment Note      Name: Linnea Renae  Clinic Number: [id number]    Therapy Diagnosis:   Encounter Diagnoses   Name Primary?    Diabetic peripheral neuropathy associated with type 2 diabetes mellitus Yes    Impaired gait and mobility      Physician: Burke Gr DPM    Visit Date: 6/8/2023    Physician Orders: PT Eval and Treat   Medical Diagnosis from Referral:   E11.42 (ICD-10-CM) - Diabetic peripheral neuropathy associated with type 2 diabetes mellitus   G89.4 (ICD-10-CM) - Chronic pain syndrome      Evaluation Date: 6/5/2023  Authorization Period Expiration: 5/14/2024  Plan of Care Expiration: 8/25/2023  Progress Note Due: 7/10/2023  Visit # / Visits authorized: 1/20  FOTO: 1/3    PTA Visit #: 1/5     Time In: 1:00 pm  Time Out: 1:58 pm  Total Billable Time: 58 minutes    Precautions: Standard and History of ductal carcinoma in situ of breast      Subjective     Pt reports: the pain is hard to describe, its just there. She has pain in her low back and pain down the left leg. It feels like she has cornflakes in her shoes when she walks. Her feet feel very tight and hurt all the time.  She was compliant with home exercise program.  Response to previous treatment: eval   Functional change: none    Pain: 6/10  Location: across B low back , down lateral LLE , B feet     Objective      Objective Measures updated at progress report unless specified.     Treatment     Linnea received the treatments listed below:      therapeutic exercises to develop strength, endurance, ROM, and core stabilization for 10 minutes including:    LTR: x 3'    manual therapy techniques: Joint mobilizations and Manual traction were applied to the: B low back for 8 minutes, including:  (B) Long axis hip distraction grade II-III    neuromuscular re-education activities to improve: Coordination, Kinesthetic, Sense, and Proprioception for 30 minutes. The following activities were  included:    *Pt in semi reclined position unable to tolerate supine   Posterior pelvic tilts : 2 x 10 reps , 5'' hold   PPT with isometric ball push : x15 reps , 5'' hold  TrA with knee fall out : x 15 reps   Glut sets : x 15 reps , 5'' hold   Supine clamshells RTB : x 15 reps , 5'' hold       Patient Education and Home Exercises       Education provided:   - HEP printed and provided 6/8/2023    Written Home Exercises Provided: yes. Exercises were reviewed and Linnea was able to demonstrate them prior to the end of the session.  Linnea demonstrated good  understanding of the education provided. See EMR under Patient Instructions for exercises provided during therapy sessions    Assessment     Pt presents with B foot pain and low back pain with pain going down the left leg laterally although pt was unable to describe what the pain in her leg felt like. Performed long axis hip distraction with a positive response which pt reports relieved the pain some in her leg and back. Initiation of core strengthening and mobility performed today which pt responded well to and noted decreased low back pain/stiffness upon session completion.     Linnea Is progressing well towards her goals.   Pt prognosis is Fair.     Pt will continue to benefit from skilled outpatient physical therapy to address the deficits listed in the problem list box on initial evaluation, provide pt/family education and to maximize pt's level of independence in the home and community environment.     Pt's spiritual, cultural and educational needs considered and pt agreeable to plan of care and goals.     Anticipated barriers to physical therapy:  Hypersensitivity, chronicity of symptoms     Goals:    Short Term Goals: 4 weeks (Progressing, not met)  Patient will be independent in initial HEP to help supplement PT.   Patient will improve pain at its worst to </= 4/10 to help improve quality of life.   Patient will improve hip mobility by 5 degrees in all planes to  help offload nervous system.      Long Term Goals: 8-10 weeks (Progressing, not met)  Patient will be independent in updated HEP to help supplement PT and maintain gains made in PT.   Patient will improve FOTO limitation score to </= 40% to show improvement in condition.   Patient will improve hip strength by 1/3 MMT to help with gait mechanics.   Patient will report improvement with standing tolerance to >/= 30 min with minimal discomfort in feet.    Plan     Continue with current plan of care with emphasis on flexion based exercises, lumbar/hip range of motion, and core stability.     Holly Barakat, PTA

## 2023-06-08 ENCOUNTER — CLINICAL SUPPORT (OUTPATIENT)
Dept: REHABILITATION | Facility: HOSPITAL | Age: 73
End: 2023-06-08
Payer: MEDICARE

## 2023-06-08 DIAGNOSIS — R26.89 IMPAIRED GAIT AND MOBILITY: ICD-10-CM

## 2023-06-08 DIAGNOSIS — E11.42 DIABETIC PERIPHERAL NEUROPATHY ASSOCIATED WITH TYPE 2 DIABETES MELLITUS: Primary | ICD-10-CM

## 2023-06-08 PROCEDURE — 97110 THERAPEUTIC EXERCISES: CPT | Mod: CQ

## 2023-06-08 PROCEDURE — 97112 NEUROMUSCULAR REEDUCATION: CPT | Mod: CQ

## 2023-06-11 PROBLEM — R26.9 GAIT ABNORMALITY: Status: ACTIVE | Noted: 2018-06-14

## 2023-06-12 ENCOUNTER — CLINICAL SUPPORT (OUTPATIENT)
Dept: REHABILITATION | Facility: HOSPITAL | Age: 73
End: 2023-06-12
Payer: MEDICARE

## 2023-06-12 DIAGNOSIS — R26.9 GAIT ABNORMALITY: Primary | ICD-10-CM

## 2023-06-12 PROCEDURE — 97112 NEUROMUSCULAR REEDUCATION: CPT

## 2023-06-12 PROCEDURE — 97140 MANUAL THERAPY 1/> REGIONS: CPT

## 2023-06-12 NOTE — PLAN OF CARE
"[facility]   Physical Therapy Initial Evaluation        Name: Linnea Renae  Clinic Number: [id number]    Therapy Diagnosis:   Encounter Diagnoses   Name Primary?    Diabetic peripheral neuropathy associated with type 2 diabetes mellitus     Chronic pain syndrome      Physician: Burke Gr DPM     Physician Orders: PT Eval and Treat   Medical Diagnosis from Referral:   E11.42 (ICD-10-CM) - Diabetic peripheral neuropathy associated with type 2 diabetes mellitus   G89.4 (ICD-10-CM) - Chronic pain syndrome     Evaluation Date: 6/5/2023  Authorization Period Expiration: 5/14/2024  Plan of Care Expiration: 8/25/2023  Progress Note Due: 7/10/2023  Visit # / Visits authorized: 1/1   FOTO: 1/3    Precautions: Standard and History of ductal carcinoma in situ of breast      Time In: 10:06 am    Time Out: 10:58 am  Total Appointment Time (timed & untimed codes): 52 minutes    Subjective     Date of onset: Several years     History of current condition - Linnea reports: She had a back injury back in 2005 and her pain it has just escalated since then. She underwent a radiofrequency ablation in 2020 which helped her back pain but then her feet really started bothering her. She tried injection in her feet to help with no relief. She is on lyrica which helps some. She now has difficulty walking, wearing shoes such as tennis shoes, and standing. Her feet turn red and get really hot. The only relief she gets is with ice and cold water poured on them. The lyrica has helped her be able to wear closed toe shoes. She describes the pain as "tight", stabbing, and burning. The top of her feet hurt even to the point of touching them or the covers touching them. Walking does help some but as soon as she stops it comes back again. The feeling on top of her feet is just very sensitive. Doctor Maile told her he thinks her symptoms are coming from her back.   She reports a past medical history of hypertension, " diabetes, and history of breast cancer which she underwent a masectomy and is cancer free no longer following up with an oncologist. She also repor HTN, DM, history of breast cancer and had masectomy and is cancer free and history of back pain.     Imaging: See Epic    Prior Therapy: Yes, not for this   Social History: Lives with her     Occupation: Retired   Prior Level of Function: Pain in her back but able to move and complete ADLs. No pain in her feet.    Current Level of Function: Significant pain and sensitivity in her feet limiting her ability to stand and perform ADLs.      Pain:  Current 10/10, worst 10/10, best 9/10   Location: bilateral feet   Description: Tight  Aggravating Factors: Standing and Walking  Easing Factors: ice and lyrica and sometimes walking helps     Patients goals: To try to get some type of relief in her feet.      Medical History:   Past Medical History:   Diagnosis Date    Allergy     Breast cancer     Lumpectomy 10/15. right    Chronic constipation     Colon polyps     Diabetes mellitus, type 2     Dry eyes     GERD (gastroesophageal reflux disease)     Hyperlipidemia     Hypertension     Lumbar disc disease     Type 2 diabetes mellitus        Surgical History:   Linnea Renae  has a past surgical history that includes Tubal ligation; Cataract extraction, bilateral; Cholecystectomy ();  section; Hysterectomy (); Breast lumpectomy (Right); Injection of facet joint (Bilateral, 2018); Breast biopsy; Colonoscopy w/ polypectomy; Colonoscopy (N/A, 10/11/2018); Radiofrequency ablation (Left, 3/20/2019); Transforaminal epidural injection of steroid (Left, 10/2/2019); Radiofrequency ablation (Left, 2019); Radiofrequency ablation (Right, 2020); Injection of joint (Bilateral, 2020); Radiofrequency ablation (Right, 2020); Mastectomy (Right, 2021); Total Reduction Mammoplasty (Left, 2021); Breast reconstruction (Right, 2021);  Replacement of implant of breast (Right, 4/11/2022); Mastopexy (Left, 4/11/2022); Replacement of implant of breast (Right, 8/18/2022); and Evacuation of hematoma (Right, 8/18/2022).    Medications:   Linnea has a current medication list which includes the following prescription(s): alcohol antiseptic pads, alprazolam, aspirin, atenolol, atorvastatin, blood sugar diagnostic, calcium-vitamin d3, clobetasol, fluticasone propionate, hydrochlorothiazide, ibuprofen, lancets, linaclotide, loratadine, minoxidil, multivitamin, naloxone, nystatin, olmesartan, omeprazole, pregabalin, semaglutide, tizanidine, topiramate, tramadol, and triamcinolone acetonide 0.1%, and the following Facility-Administered Medications: mupirocin and sodium chloride 0.9%.    Allergies:   Review of patient's allergies indicates:   Allergen Reactions    Codeine Itching        Objective      Observation: Patient presents with an overall pleasant general affect who does not appear to be in any acute distress. Noticeable apprehension with any movement in her feet or anything touching her feet.     Gait: Ambulates independently with no assistive device and displays increased pronation with left greater than right and antalgic.      Posture: Standing posture displays increased lumbar lordosis, right iliac crest higher on right than left.     Dermatomes:    Right Left    L2 Intact Intact    L3 Intact Intact   L4 Intact Intact   L5 Intact Intact   S1 Intact Intact   S2  Intact Intact     Myotomes:    Right Left    L2 5/5 5/5   L3 5/5 5/5   L4 5/5 5/5   L5 5/5 5/5   S1 5/5 5/5   S2 5/5 5/5     Range of Motion:  Lumbar    Percentage  Pain    Flexion 75% No Pain   Extension 50% No Pain   Right Sidebend 75% No Pain   Left Sidebend 75% No Pain   Right Rotation 90% No Pain   Left Rotation  90% No Pain   *With lumbar flexion noticeable flexion leaning more to the left.     Hip    Right Left    Flexion 95 degrees  90 degrees    Extension 5 degrees  0 degrees     Internal Rotation 25 degrees  15 degrees    External Rotation 40 degrees  35 degrees      Knee    Right Left    Flexion 120 degrees  120 degrees    Extension 0 degrees  0 degrees      Ankle    Right Left    Dorsiflexion 0 degrees  0 degrees    Plantarflexion 40 degrees  45 degrees    Inversion 20 degrees  20 degrees    Eversion 10 degrees  12 degrees      Lower Extremity Strength (MMT):   Right Left    Hip Flexion 3+/5 3+/5   Hip Abduction 3/5 3/5   Hip Extension 3-/5 3-/5   Knee Flexion 4/5 4/5   Knee Extension 4/5 4/5    Dorsiflexion 3/5 3/5   Plantarflexion 3+/5 3+/5   Inversion 3+/5 3+/5   Eversion 3+/5 3+/5   Foot Intrinsics - FHL  3+/5  3/5     Joint Mobility: decreased hip mobility with PA and inferior glides, decreased talocrural AP glides, decreased thoracic PA     Palpation: Sensitive to touch bilateral feet with ball of foot and arch being the most sensitive.      Special Tests:  - SLR: (+) on L, (-) on R     Functional Tests:  - Single Leg Balance: Left = 4 sec, Right = 6 sec     Limitation/Restriction for FOTO Foot Survey    Therapist reviewed FOTO scores for Linnea Renae on 6/5/2023.   FOTO documents entered into [employer] - see Media section.    Limitation Score: 45%         Treatment     Total Treatment time (time-based codes) separate from Evaluation: 15 minutes     Linnea received the treatments listed below:      therapeutic exercises to develop strength, endurance, ROM, flexibility, posture, and core stabilization for 00 minutes including:      manual therapy techniques: Joint mobilizations and Soft tissue Mobilization were applied to the: hip for 05 minutes, including:    (L) hip lateral distraction with belt   - improved foot pain/discomfort     neuromuscular re-education activities to improve: Balance, Coordination, Kinesthetic, Sense, Proprioception, and Posture for 10 minutes. The following activities were included:    Pt education on importance of desensitization for her feet  Short arches 1x15  reps with 5 sec holds   - working on different surfaces to help with sensitization     therapeutic activities to improve functional performance for 00  minutes, including:      gait training to improve functional mobility and safety for 00  minutes, including:          Patient Education and Home Exercises     Education provided:   - PT POC, Prognosis, and HEP     Written Home Exercises Provided: yes. Exercises were reviewed and Linnea was able to demonstrate them prior to the end of the session.  Linnea demonstrated good  understanding of the education provided. See EMR under Patient Instructions for exercises provided during therapy sessions.    Assessment     Linnea is a 72 y.o. female referred to outpatient Physical Therapy with a medical diagnosis of G89.4 (ICD-10-CM) - Chronic pain syndrome and E11.42 (ICD-10-CM) - Diabetic peripheral neuropathy associated with type 2 diabetes mellitus. Patient presents with decreased range of motion, pain, gait abnormality, decreased lower extremity strength, decreased balance, and functional limitations in ADLs, prolonged standing/sitting. Patient would benefit from skilled PT intervention to address above stated deficits and improve overall functional mobility.    Patient prognosis is Fair.   Patient will benefit from skilled outpatient Physical Therapy to address the deficits stated above and in the chart below, provide patient /family education, and to maximize patientt's level of independence.     Plan of care discussed with patient: Yes  Patient's spiritual, cultural and educational needs considered and patient is agreeable to the plan of care and goals as stated below:     Anticipated Barriers for therapy: Hypersensitivity, chronicity of symptoms, co-morbidities.     Medical Necessity is demonstrated by the following  History  Co-morbidities and personal factors that may impact the plan of care [] LOW: no personal factors / co-morbidities  [] MODERATE: 1-2 personal factors /  co-morbidities  [x] HIGH: 3+ personal factors / co-morbidities    Moderate / High Support Documentation: Chronic pain disorder, diabetic peripheral neuropathy, HTN, atherosclerosis of aorta, diabetes, DCIS      Examination  Body Structures and Functions, activity limitations and participation restrictions that may impact the plan of care [] LOW: addressing 1-2 elements  [] MODERATE: 3+ elements  [x] HIGH: 3+ elements (please support below)    Moderate / High Support Documentation: Gait, balance, sensitivity, range of motion, strength, motor control, neuromuscular re-education, pain      Clinical Presentation [x] LOW: stable  [] MODERATE: Evolving  [] HIGH: Unstable     Decision Making/ Complexity Score: low       Goals:  Short Term Goals: 4 weeks   Patient will be independent in initial HEP to help supplement PT.   Patient will improve pain at its worst to </= 4/10 to help improve quality of life.   Patient will improve hip mobility by 5 degrees in all planes to help offload nervous system.     Long Term Goals: 8-10 weeks   Patient will be independent in updated HEP to help supplement PT and maintain gains made in PT.   Patient will improve FOTO limitation score to </= 40% to show improvement in condition.   Patient will improve hip strength by 1/3 MMT to help with gait mechanics.   Patient will report improvement with standing tolerance to >/= 30 min with minimal discomfort in feet.   Plan     Plan of care Certification: 6/5/2023 to 8/25/2023.    Outpatient Physical Therapy 1-2 times weekly for 10 weeks to include the following interventions: Gait Training, Manual Therapy, Moist Heat/ Ice, Neuromuscular Re-ed, Patient Education, Self Care, Therapeutic Activities, and Therapeutic Exercise.     Kristen Castillo, PT, DPT

## 2023-06-12 NOTE — PROGRESS NOTES
"[facility]   Physical Therapy Treatment Note     Name: Linnea Renae  Clinic Number: [id number]    Therapy Diagnosis:   Encounter Diagnosis   Name Primary?    Gait abnormality Yes     Physician: Burke Gr DPM    Visit Date: 6/12/2023  Physician Orders: PT Eval and Treat   Medical Diagnosis from Referral:   E11.42 (ICD-10-CM) - Diabetic peripheral neuropathy associated with type 2 diabetes mellitus   G89.4 (ICD-10-CM) - Chronic pain syndrome      Evaluation Date: 6/5/2023  Authorization Period Expiration: 5/14/2024  Plan of Care Expiration: 8/25/2023  Progress Note Due: 7/10/2023  Visit # / Visits authorized: 2/20   FOTO: 1/3    PTA Visit #: 0/5     FOTO first follow up:   FOTO second follow up:     Time In: 1:46 pm  Time Out: 2:43 pm   Total Billable Time: 57 minutes (billable = 40 min)    Precautions: Standard and History of ductal carcinoma in situ of breast      SUBJECTIVE     Pt reports: Her feet are hurting and on "fire" and really bothering her today. She is still re-cooperating from excruciating pain in her back following last session. She took the whole weekend to get better with her back. She was not able to do much and her back was in so much pain it was almost like how it was before she got the ablations. "I'm always in a 10/10 pain" but willing to try anything to help with the pain.   She was compliant with home exercise program.  Response to previous treatment: Discomfort/Sore  Functional change: Ongoing     Pain: 10/10  Location: bilateral back  and feet      OBJECTIVE     Objective Measures updated at progress report unless specified.     Treatment     Linnea received the treatments listed below:      therapeutic exercises to develop strength, endurance, ROM, flexibility, posture, and core stabilization for 15 minutes including:    SKTC 1x10 reps each   - increased hip discomfort bilaterally, which was relieved with manual   Lumbar rollouts 1x15 reps with 5 sec holds " "  NuStep x6 min for range of motion, neural mobility, and cardiovascular endurance     manual therapy techniques: Joint mobilizations and Soft tissue Mobilization were applied to the: Lumbar, hip, ankle for 25 minutes, including:    Lumbar/Hip/Ankle Assessment   (B) Talocrural AP mobilizations grade II-III  (B) Lateral hip distraction with belt   (L) Long axis hip distraction grade II-III     neuromuscular re-education activities to improve: Balance, Coordination, Kinesthetic, Sense, Proprioception, and Posture for 17 minutes. The following activities were included:    Pt education on importance of offloading nervous system, hip mobility, and core stability  Hooklying TA activation 2x10 reps with 5 sec holds   Paloff press with BlueTB 2x10 reps with 3 sec holds   DL Shuttle press 3x10-12 reps 25#   - pillow underneath lumbar spine     therapeutic activities to improve functional performance for 00 minutes, including:      gait training to improve functional mobility and safety for 00 minutes, including:      Patient Education and Home Exercises     Home Exercises Provided and Patient Education Provided     Education provided:   - PT POC, importance of offloading nervous system, and core stability  - Add in lumbar rollouts to HEP     Written Home Exercises Provided: Patient instructed to cont prior HEP. Exercises were reviewed and Linnea was able to demonstrate them prior to the end of the session.  Linnea demonstrated good  understanding of the education provided. See EMR under Patient Instructions for exercises provided during therapy sessions    ASSESSMENT     Linnea presented to today's session with increased symptoms in her low back and continued "burning" sensation in bilateral feet. She responds well to manual therapy working on hip mobility. She presented with increased sensitivity with supine position with low back exercises and improved with hooklying and pillow under her lumbar spine. She was progressed with " flexion based exercises with significant improvement in lumbar symptoms following lumbar rollouts and added to HEP. Core stability training added as well. Will continue to monitor and progress as able.    Linnea Is progressing well towards her goals.   Pt prognosis is Fair.     Pt will continue to benefit from skilled outpatient physical therapy to address the deficits listed in the problem list box on initial evaluation, provide pt/family education and to maximize pt's level of independence in the home and community environment.     Pt's spiritual, cultural and educational needs considered and pt agreeable to plan of care and goals.     Anticipated barriers to physical therapy: Hypersensitivity, chronicity of symptoms     Goals:   Short Term Goals: 4 weeks (Progressing, not met)  Patient will be independent in initial HEP to help supplement PT.   Patient will improve pain at its worst to </= 4/10 to help improve quality of life.   Patient will improve hip mobility by 5 degrees in all planes to help offload nervous system.      Long Term Goals: 8-10 weeks (Progressing, not met)  Patient will be independent in updated HEP to help supplement PT and maintain gains made in PT.   Patient will improve FOTO limitation score to </= 40% to show improvement in condition.   Patient will improve hip strength by 1/3 MMT to help with gait mechanics.   Patient will report improvement with standing tolerance to >/= 30 min with minimal discomfort in feet.     PLAN   Plan of care Certification: 6/5/2023 to 8/25/2023.    Continue with current plan of care with emphasis on flexion based exercises, lumbar/hip range of motion, and core stability.     Kristen Castillo, PT, DPT

## 2023-06-13 DIAGNOSIS — M48.061 DEGENERATIVE LUMBAR SPINAL STENOSIS: ICD-10-CM

## 2023-06-13 DIAGNOSIS — K21.9 GERD WITHOUT ESOPHAGITIS: ICD-10-CM

## 2023-06-13 NOTE — TELEPHONE ENCOUNTER
No care due was identified.  Health system Embedded Care Due Messages. Reference number: [id number].   6/13/2023 1:01:25 PM CDT

## 2023-06-14 ENCOUNTER — OFFICE VISIT (OUTPATIENT)
Dept: PODIATRY | Facility: CLINIC | Age: 73
End: 2023-06-14
Payer: MEDICARE

## 2023-06-14 VITALS
BODY MASS INDEX: 41.45 KG/M2 | SYSTOLIC BLOOD PRESSURE: 115 MMHG | HEART RATE: 72 BPM | HEIGHT: 66 IN | DIASTOLIC BLOOD PRESSURE: 62 MMHG | WEIGHT: 257.94 LBS

## 2023-06-14 DIAGNOSIS — G58.9 NERVE ENTRAPMENT: ICD-10-CM

## 2023-06-14 DIAGNOSIS — G89.4 CHRONIC PAIN SYNDROME: Primary | ICD-10-CM

## 2023-06-14 DIAGNOSIS — E11.9 TYPE 2 DIABETES MELLITUS WITHOUT COMPLICATION, WITHOUT LONG-TERM CURRENT USE OF INSULIN: ICD-10-CM

## 2023-06-14 PROCEDURE — 4010F ACE/ARB THERAPY RXD/TAKEN: CPT | Mod: CPTII,S$GLB,, | Performed by: PODIATRIST

## 2023-06-14 PROCEDURE — 3008F PR BODY MASS INDEX (BMI) DOCUMENTED: ICD-10-PCS | Mod: CPTII,S$GLB,, | Performed by: PODIATRIST

## 2023-06-14 PROCEDURE — 1159F MED LIST DOCD IN RCRD: CPT | Mod: CPTII,S$GLB,, | Performed by: PODIATRIST

## 2023-06-14 PROCEDURE — 3078F DIAST BP <80 MM HG: CPT | Mod: CPTII,S$GLB,, | Performed by: PODIATRIST

## 2023-06-14 PROCEDURE — 99999 PR PBB SHADOW E&M-EST. PATIENT-LVL IV: CPT | Mod: PBBFAC,,, | Performed by: PODIATRIST

## 2023-06-14 PROCEDURE — 99999 PR PBB SHADOW E&M-EST. PATIENT-LVL IV: ICD-10-PCS | Mod: PBBFAC,,, | Performed by: PODIATRIST

## 2023-06-14 PROCEDURE — 3066F PR DOCUMENTATION OF TREATMENT FOR NEPHROPATHY: ICD-10-PCS | Mod: CPTII,S$GLB,, | Performed by: PODIATRIST

## 2023-06-14 PROCEDURE — 3078F PR MOST RECENT DIASTOLIC BLOOD PRESSURE < 80 MM HG: ICD-10-PCS | Mod: CPTII,S$GLB,, | Performed by: PODIATRIST

## 2023-06-14 PROCEDURE — 3066F NEPHROPATHY DOC TX: CPT | Mod: CPTII,S$GLB,, | Performed by: PODIATRIST

## 2023-06-14 PROCEDURE — 3061F NEG MICROALBUMINURIA REV: CPT | Mod: CPTII,S$GLB,, | Performed by: PODIATRIST

## 2023-06-14 PROCEDURE — 4010F PR ACE/ARB THEARPY RXD/TAKEN: ICD-10-PCS | Mod: CPTII,S$GLB,, | Performed by: PODIATRIST

## 2023-06-14 PROCEDURE — 99214 OFFICE O/P EST MOD 30 MIN: CPT | Mod: S$GLB,,, | Performed by: PODIATRIST

## 2023-06-14 PROCEDURE — 99214 PR OFFICE/OUTPT VISIT, EST, LEVL IV, 30-39 MIN: ICD-10-PCS | Mod: S$GLB,,, | Performed by: PODIATRIST

## 2023-06-14 PROCEDURE — 3074F SYST BP LT 130 MM HG: CPT | Mod: CPTII,S$GLB,, | Performed by: PODIATRIST

## 2023-06-14 PROCEDURE — 3008F BODY MASS INDEX DOCD: CPT | Mod: CPTII,S$GLB,, | Performed by: PODIATRIST

## 2023-06-14 PROCEDURE — 3044F PR MOST RECENT HEMOGLOBIN A1C LEVEL <7.0%: ICD-10-PCS | Mod: CPTII,S$GLB,, | Performed by: PODIATRIST

## 2023-06-14 PROCEDURE — 3044F HG A1C LEVEL LT 7.0%: CPT | Mod: CPTII,S$GLB,, | Performed by: PODIATRIST

## 2023-06-14 PROCEDURE — 1159F PR MEDICATION LIST DOCUMENTED IN MEDICAL RECORD: ICD-10-PCS | Mod: CPTII,S$GLB,, | Performed by: PODIATRIST

## 2023-06-14 PROCEDURE — 3061F PR NEG MICROALBUMINURIA RESULT DOCUMENTED/REVIEW: ICD-10-PCS | Mod: CPTII,S$GLB,, | Performed by: PODIATRIST

## 2023-06-14 PROCEDURE — 3074F PR MOST RECENT SYSTOLIC BLOOD PRESSURE < 130 MM HG: ICD-10-PCS | Mod: CPTII,S$GLB,, | Performed by: PODIATRIST

## 2023-06-14 RX ORDER — TRAMADOL HYDROCHLORIDE 200 MG/1
200 TABLET, EXTENDED RELEASE ORAL DAILY
Qty: 30 TABLET | Refills: 0 | Status: SHIPPED | OUTPATIENT
Start: 2023-06-14 | End: 2023-07-20 | Stop reason: DRUGHIGH

## 2023-06-14 RX ORDER — PREGABALIN 150 MG/1
150 CAPSULE ORAL 2 TIMES DAILY
Qty: 60 CAPSULE | Refills: 2 | Status: SHIPPED | OUTPATIENT
Start: 2023-06-14 | End: 2023-12-20

## 2023-06-14 RX ORDER — OXYCODONE AND ACETAMINOPHEN 7.5; 325 MG/1; MG/1
1 TABLET ORAL
Qty: 30 TABLET | Refills: 0 | Status: SHIPPED | OUTPATIENT
Start: 2023-06-14 | End: 2023-07-11 | Stop reason: SDUPTHER

## 2023-06-14 RX ORDER — OMEPRAZOLE 20 MG/1
CAPSULE, DELAYED RELEASE ORAL
Qty: 90 CAPSULE | Refills: 1 | Status: SHIPPED | OUTPATIENT
Start: 2023-06-14 | End: 2023-12-11 | Stop reason: SDUPTHER

## 2023-06-15 ENCOUNTER — DOCUMENTATION ONLY (OUTPATIENT)
Dept: REHABILITATION | Facility: HOSPITAL | Age: 73
End: 2023-06-15
Payer: MEDICARE

## 2023-06-15 NOTE — PROGRESS NOTES
Physical Therapy: No show/Cancellation of Visit  Date: 06/15/2023    Patient was a no show to today's PT appointment. Called pt and left VM. Patient's next scheduled appointment is 6/20/23.     Initial Evaluation: 6/5/23  Plan of Care Explanation: 8/25/23  Cancel: 0  No show: 1    Therapist: Holly Barakat, PTA

## 2023-06-19 NOTE — PROGRESS NOTES
[facility]   Physical Therapy Treatment Note     Name: Linnea Renae  Clinic Number: [id number]    Therapy Diagnosis:   Encounter Diagnosis   Name Primary?    Gait abnormality Yes       Physician: Burke Gr DPM    Visit Date: 6/20/2023  Physician Orders: PT Eval and Treat   Medical Diagnosis from Referral:   E11.42 (ICD-10-CM) - Diabetic peripheral neuropathy associated with type 2 diabetes mellitus   G89.4 (ICD-10-CM) - Chronic pain syndrome      Evaluation Date: 6/5/2023  Authorization Period Expiration: 5/14/2024  Plan of Care Expiration: 8/25/2023  Progress Note Due: 7/10/2023  Visit # / Visits authorized: 3/20   FOTO: 1/3    PTA Visit #: 1/5     FOTO first follow up:   FOTO second follow up:     Time In: 11:07 am   Time Out: 11:57 am  Total Billable Time: 50 minutes     Precautions: Standard and History of ductal carcinoma in situ of breast      SUBJECTIVE     Pt reports: shes actually doing pretty good today. Right now she feels pain in the low back , hips and feet but overall its better and more tolerable. She saw Dr. Gr who increased her lyrica and also added oxycodone for her pain. . She had an appointment with Dr gr the next day rushed and wore flip flops and its taken her days to recoupirate from tthat .   She was compliant with home exercise program.  Response to previous treatment: she felt okay after but the next day she flared it up because she wore flip flops to her podiatry appointment and had increased pain for days.   Functional change: Ongoing     Pain:8/10  Location: bilateral back  and feet      OBJECTIVE     Objective Measures updated at progress report unless specified.     Treatment     Linnea received the treatments listed below:      therapeutic exercises to develop strength, endurance, ROM, flexibility, posture, and core stabilization for 10 minutes including:    Lumbar rollouts 1x15 reps with 5 sec holds     Not Performed :   SKTC 1x10 reps each   -  increased hip discomfort bilaterally, which was relieved with manual   NuStep x6 min for range of motion, neural mobility, and cardiovascular endurance       manual therapy techniques: Joint mobilizations and Soft tissue Mobilization were applied to the: Lumbar, hip, ankle for 25 minutes, including:    Lumbar/Hip/Ankle Assessment   (B) Talocrural AP mobilizations grade II-III  (B) Lateral hip distraction with belt   (L) Long axis hip distraction grade II-III     neuromuscular re-education activities to improve: Balance, Coordination, Kinesthetic, Sense, Proprioception, and Posture for 15 minutes. The following activities were included:    Pt education on importance of offloading nervous system, hip mobility, and core stability  Seated TrA with green swiss ball push isometric : 2 x 10 reps , 5'' hold   Seated paloff press with BlueTB 2x10 reps with 3 sec holds   DL Shuttle press 3x10-12 reps 25#   - pillow underneath lumbar spine     Not Performed :   Hooklying TA activation 2x10 reps with 5 sec holds       therapeutic activities to improve functional performance for 00 minutes, including:      gait training to improve functional mobility and safety for 00 minutes, including:      Patient Education and Home Exercises     Home Exercises Provided and Patient Education Provided     Education provided:   - PT POC, importance of offloading nervous system, and core stability  - Add in lumbar rollouts to HEP     Written Home Exercises Provided: Patient instructed to cont prior HEP. Exercises were reviewed and Linnea was able to demonstrate them prior to the end of the session.  Linnea demonstrated good  understanding of the education provided. See EMR under Patient Instructions for exercises provided during therapy sessions    ASSESSMENT     Pt presents with improvement in pain today . She noted significant improvement in pain and symptoms following long axis hip distraction. She responded well to flexion lumbar roll outs . Due  to increased pain in supine/hook lying modified to perform core stabilization exercises in seated today which she had an improved tolerance too. Good response noted upon session completion. Will continue to monitor and progress next .     Linnea Is progressing well towards her goals.   Pt prognosis is Fair.     Pt will continue to benefit from skilled outpatient physical therapy to address the deficits listed in the problem list box on initial evaluation, provide pt/family education and to maximize pt's level of independence in the home and community environment.   Pt's spiritual, cultural and educational needs considered and pt agreeable to plan of care and goals.     Anticipated barriers to physical therapy: Hypersensitivity, chronicity of symptoms     Goals:   Short Term Goals: 4 weeks (Progressing, not met)  Patient will be independent in initial HEP to help supplement PT.   Patient will improve pain at its worst to </= 4/10 to help improve quality of life.   Patient will improve hip mobility by 5 degrees in all planes to help offload nervous system.      Long Term Goals: 8-10 weeks (Progressing, not met)  Patient will be independent in updated HEP to help supplement PT and maintain gains made in PT.   Patient will improve FOTO limitation score to </= 40% to show improvement in condition.   Patient will improve hip strength by 1/3 MMT to help with gait mechanics.   Patient will report improvement with standing tolerance to >/= 30 min with minimal discomfort in feet.     PLAN   Plan of care Certification: 6/5/2023 to 8/25/2023.    Continue with current plan of care with emphasis on flexion based exercises, lumbar/hip range of motion, and core stability.     Holly Barakat, PTA

## 2023-06-20 ENCOUNTER — CLINICAL SUPPORT (OUTPATIENT)
Dept: REHABILITATION | Facility: HOSPITAL | Age: 73
End: 2023-06-20
Payer: MEDICARE

## 2023-06-20 DIAGNOSIS — R26.9 GAIT ABNORMALITY: Primary | ICD-10-CM

## 2023-06-20 PROCEDURE — 97140 MANUAL THERAPY 1/> REGIONS: CPT | Mod: CQ

## 2023-06-20 PROCEDURE — 97112 NEUROMUSCULAR REEDUCATION: CPT | Mod: CQ

## 2023-06-20 PROCEDURE — 97110 THERAPEUTIC EXERCISES: CPT | Mod: CQ

## 2023-06-22 ENCOUNTER — CLINICAL SUPPORT (OUTPATIENT)
Dept: REHABILITATION | Facility: HOSPITAL | Age: 73
End: 2023-06-22
Payer: MEDICARE

## 2023-06-22 DIAGNOSIS — R26.9 GAIT ABNORMALITY: Primary | ICD-10-CM

## 2023-06-22 PROCEDURE — 97112 NEUROMUSCULAR REEDUCATION: CPT | Mod: CQ

## 2023-06-22 NOTE — PROGRESS NOTES
[facility]   Physical Therapy Treatment Note     Name: Linnea Renae  [facility] Number: [id number]    Therapy Diagnosis:   Encounter Diagnosis   Name Primary?    Gait abnormality Yes         Physician: Burke Gr DPM    Visit Date: 6/22/2023  Physician Orders: PT Eval and Treat   Medical Diagnosis from Referral:   E11.42 (ICD-10-CM) - Diabetic peripheral neuropathy associated with type 2 diabetes mellitus   G89.4 (ICD-10-CM) - Chronic pain syndrome      Evaluation Date: 6/5/2023  Authorization Period Expiration: 5/14/2024  Plan of Care Expiration: 8/25/2023  Progress Note Due: 7/10/2023  Visit # / Visits authorized: 3/20   FOTO: 1/3    PTA Visit #: 1/5     FOTO first follow up:   FOTO second follow up:     Time In: 11:05 am   Time Out: 12:00 am  Total Treatment Time: 55 minutes   Total Billable Time: 30 minutes     Precautions: Standard and History of ductal carcinoma in situ of breast      SUBJECTIVE     Pt reports: she continues to feel better overall . She is still having burning pain in the left hip in the butt and in her feet . She feels the pain in the whole left side from the shoulder down the back into her hip and leg .   She was compliant with home exercise program.  Response to previous treatment: she felt good after   Functional change: Ongoing     Pain:8/10  Location: bilateral back  and feet      OBJECTIVE     Objective Measures updated at progress report unless specified.     Treatment     Linnea received the treatments listed below:      therapeutic exercises to develop strength, endurance, ROM, flexibility, posture, and core stabilization for 10 minutes including:    Lumbar rollouts forward: x15 reps with 5 sec holds   Lumbar rollouts lateral : x 10 , 5'' hold   NuStep x5 min for range of motion, neural mobility, and cardiovascular endurance - Pt noted increased pain so d/c after 5'    Not Performed :   SKTC 1x10 reps each   - increased hip discomfort bilaterally, which  was relieved with manual       manual therapy techniques: Joint mobilizations and Soft tissue Mobilization were applied to the: Lumbar, hip, ankle for 15 minutes, including:    Lumbar/Hip/Ankle Assessment   (B) Talocrural AP mobilizations grade II-III  (B) Lateral hip distraction with belt   (L) Long axis hip distraction grade II-III     neuromuscular re-education activities to improve: Balance, Coordination, Kinesthetic, Sense, Proprioception, and Posture for 30 minutes. The following activities were included:    Pt education on importance of offloading nervous system, hip mobility, and core stability  Seated TrA with green swiss ball push isometric : 2 x 10 reps , 5'' hold   Seated paloff press with BlueTB 2x10 reps with 3 sec holds   Seated clamshells GTB : 2 x 10 reps   Seated hip adduction iso with TrA: 2 x 10 reps , 5'' hold   DL Shuttle press 3x10-12 reps 50#   - pillow underneath lumbar spine     Not Performed :   Hooklying TA activation 2x10 reps with 5 sec holds       therapeutic activities to improve functional performance for 00 minutes, including:      gait training to improve functional mobility and safety for 00 minutes, including:      Patient Education and Home Exercises     Home Exercises Provided and Patient Education Provided     Education provided:   - PT POC, importance of offloading nervous system, and core stability  - Add in lumbar rollouts to HEP     Written Home Exercises Provided: Patient instructed to cont prior HEP. Exercises were reviewed and Linnea was able to demonstrate them prior to the end of the session.  Linnea demonstrated good  understanding of the education provided. See EMR under Patient Instructions for exercises provided during therapy sessions    ASSESSMENT     Pt presents with good carryover with pain from previous session . She reports significant relief with long axis hip distraction and also reports improvement in left shoulder pain with manual interventions. She  tolerates seated vs supine core and hip strengthening better as supine/hooklying positioning causes increased pain. Good response noted upon session completion. Will continue to monitor and progress next .     Linnea Is progressing well towards her goals.   Pt prognosis is Fair.     Pt will continue to benefit from skilled outpatient physical therapy to address the deficits listed in the problem list box on initial evaluation, provide pt/family education and to maximize pt's level of independence in the home and community environment.   Pt's spiritual, cultural and educational needs considered and pt agreeable to plan of care and goals.     Anticipated barriers to physical therapy: Hypersensitivity, chronicity of symptoms     Goals:   Short Term Goals: 4 weeks (Progressing, not met)  Patient will be independent in initial HEP to help supplement PT.   Patient will improve pain at its worst to </= 4/10 to help improve quality of life.   Patient will improve hip mobility by 5 degrees in all planes to help offload nervous system.      Long Term Goals: 8-10 weeks (Progressing, not met)  Patient will be independent in updated HEP to help supplement PT and maintain gains made in PT.   Patient will improve FOTO limitation score to </= 40% to show improvement in condition.   Patient will improve hip strength by 1/3 MMT to help with gait mechanics.   Patient will report improvement with standing tolerance to >/= 30 min with minimal discomfort in feet.     PLAN   Plan of care Certification: 6/5/2023 to 8/25/2023.    Continue with current plan of care with emphasis on flexion based exercises, lumbar/hip range of motion, and core stability.     Holly Barakat, PTA

## 2023-06-26 NOTE — PROGRESS NOTES
Subjective:      Patient ID: Linnea Renae is a 72 y.o. female.    Chief Complaint: Diabetic Foot Exam (PCP-Bailee Moss MD-4/17/2023)      Linnea is a 72 y.o. female who presents to the clinic upon referral from Dr. Nat marsh. provider found  for evaluation and treatment of diabetic feet. Linnea has a past medical history of Allergy, Breast cancer, Chronic constipation, Colon polyps, Diabetes mellitus, type 2, Dry eyes, GERD (gastroesophageal reflux disease), Hyperlipidemia, Hypertension, Lumbar disc disease, and Type 2 diabetes mellitus.  The bottoms of both feet continue to feel stiff she states, however much improved.  She continues to have neuropathy type symptoms however continues to improve with the Lyrica.  Still having some discomfort at night, would like to.  PCP: Bailee Moss MD    Date Last Seen by PCP:   Chief Complaint   Patient presents with    Diabetic Foot Exam     PCP-Bailee Moss MD-4/17/2023         Current shoe gear: Casual shoes    Hemoglobin A1C   Date Value Ref Range Status   04/17/2023 5.5 4.0 - 5.6 % Final     Comment:     ADA Screening Guidelines:  5.7-6.4%  Consistent with prediabetes  >or=6.5%  Consistent with diabetes    High levels of fetal hemoglobin interfere with the HbA1C  assay. Heterozygous hemoglobin variants (HbS, HgC, etc)do  not significantly interfere with this assay.   However, presence of multiple variants may affect accuracy.     12/09/2022 5.7 (H) 4.0 - 5.6 % Final     Comment:     ADA Screening Guidelines:  5.7-6.4%  Consistent with prediabetes  >or=6.5%  Consistent with diabetes    High levels of fetal hemoglobin interfere with the HbA1C  assay. Heterozygous hemoglobin variants (HbS, HgC, etc)do  not significantly interfere with this assay.   However, presence of multiple variants may affect accuracy.     03/31/2022 5.7 (H) 4.0 - 5.6 % Final     Comment:     ADA Screening Guidelines:  5.7-6.4%  Consistent with prediabetes  >or=6.5%  Consistent with  diabetes    High levels of fetal hemoglobin interfere with the HbA1C  assay. Heterozygous hemoglobin variants (HbS, HgC, etc)do  not significantly interfere with this assay.   However, presence of multiple variants may affect accuracy.             Review of Systems   Constitution: Negative for chills and fever.   HENT: Negative for congestion and tinnitus.    Eyes: Negative for double vision and visual disturbance.   Cardiovascular: Negative for chest pain and claudication.   Respiratory: Negative for hemoptysis and shortness of breath.    Endocrine: Negative for cold intolerance and heat intolerance.   Hematologic/Lymphatic: Negative for adenopathy and bleeding problem.   Skin: Positive for color change, dry skin and nail changes.   Musculoskeletal: Positive for myalgias and stiffness.   Gastrointestinal: Negative for nausea and vomiting.   Genitourinary: Negative for dysuria and hematuria.   Neurological: Positive for numbness.   Psychiatric/Behavioral: Negative for altered mental status and suicidal ideas.   Allergic/Immunologic: Negative for environmental allergies and persistent infections.           Objective:      Physical Exam  Vitals signs reviewed.   Constitutional:       Appearance: She is well-developed.   Cardiovascular:      Pulses:           Dorsalis pedis pulses are 1+ on the right side and 1+ on the left side.        Posterior tibial pulses are 1+ on the right side and 1+ on the left side.   Pulmonary:      Effort: Pulmonary effort is normal.   Musculoskeletal: Normal range of motion.      Comments: Anterior, lateral, and posterior muscle groups bilateral lower extremities show strength 4 over 5 symmetrically. Inspection and palpation of the joints and bones reveal no crepitus or joint effusion. No tenderness upon palpation. Mild plantar flexor contractures noted to digits 2 through 5 bilaterally.  Angle and base of gait are normal.   Feet:      Right foot:      Skin integrity: Callus and dry skin  present.      Left foot:      Skin integrity: Callus and dry skin present.   Skin:     General: Skin is warm and dry.      Capillary Refill: Capillary refill takes 2 to 3 seconds.      Coloration: Skin is pale.      Comments: Skin bilateral lower extremities noted to be thin, dry, and atrophic.  Toenails normal.   Neurological:      Mental Status: She is alert and oriented to person, place, and time.      Sensory: Sensory deficit present.      Motor: Atrophy present.      Deep Tendon Reflexes: Reflexes abnormal.      Reflex Scores:       Patellar reflexes are 1+ on the right side and 1+ on the left side.       Achilles reflexes are 1+ on the right side and 1+ on the left side.     Comments: Sharp, dull, light touch, and vibratory sensation are diminished bilaterally. Proprioceptive sensation is intact to both lower extremities. [place] monofilament exam shows loss of protective sensation to plantar toes 1 through 5 bilaterally. Deep tendon reflexes to the patellar tendons is 1 over 4 bilaterally symmetrical. Deep tendon reflexes to the Achilles tendon is 0 over 4 bilaterally symmetrical. No ankle clonus or Babinski reflex noted bilaterally. Coordination is fair to both lower extremities.                 Assessment:       Encounter Diagnoses   Name Primary?    Chronic pain syndrome Yes    Nerve entrapment     Type 2 diabetes mellitus without complication, without long-term current use of insulin            Plan:       Linnea was seen today for diabetic foot exam.    Diagnoses and all orders for this visit:    Chronic pain syndrome  -     Ambulatory referral/consult to Physical Medicine Rehab; Future    Nerve entrapment  -     Ambulatory referral/consult to Physical Medicine Rehab; Future    Type 2 diabetes mellitus without complication, without long-term current use of insulin    Other orders  -     oxyCODONE-acetaminophen (PERCOCET) 7.5-325 mg per tablet; Take 1 tablet by mouth every 24 hours as needed for  Pain.  -     pregabalin (LYRICA) 150 MG capsule; Take 1 capsule (150 mg total) by mouth 2 (two) times daily.        I counseled the patient on her conditions, their implications and medical management.    Shoe inspection. Diabetic Foot Education. Patient reminded of the importance of good nutrition and blood sugar control to help prevent podiatric complications of diabetes. Patient instructed on proper foot hygeine. We discussed wearing proper shoe gear, daily foot inspections and Diabetic foot education in detail.    Discussed options for peripheral neuropathy/nerve entrapment syndrome including nerve block therapy, surgical nerve entrapment decompression procedures, and various vitamins and supplementation available shown to improve nerve function.    Begin Lyrica 150 mg b.i.d..  Continue amitriptyline q.h.s..  Follow up in 3 months.  .  Follow-up in 3 months.

## 2023-06-27 ENCOUNTER — PES CALL (OUTPATIENT)
Dept: ADMINISTRATIVE | Facility: CLINIC | Age: 73
End: 2023-06-27
Payer: MEDICARE

## 2023-06-29 ENCOUNTER — CLINICAL SUPPORT (OUTPATIENT)
Dept: REHABILITATION | Facility: HOSPITAL | Age: 73
End: 2023-06-29
Payer: MEDICARE

## 2023-06-29 DIAGNOSIS — R26.9 GAIT ABNORMALITY: Primary | ICD-10-CM

## 2023-06-29 PROCEDURE — 97112 NEUROMUSCULAR REEDUCATION: CPT | Mod: CQ

## 2023-06-29 PROCEDURE — 97110 THERAPEUTIC EXERCISES: CPT | Mod: CQ

## 2023-06-29 NOTE — PROGRESS NOTES
"[facility]   Physical Therapy Treatment Note     Name: Linnea Renae  Clinic Number: [id number]    Therapy Diagnosis:   Encounter Diagnosis   Name Primary?    Gait abnormality Yes       Physician: Burke Gr DPM    Visit Date: 6/29/2023  Physician Orders: PT Eval and Treat   Medical Diagnosis from Referral:   E11.42 (ICD-10-CM) - Diabetic peripheral neuropathy associated with type 2 diabetes mellitus   G89.4 (ICD-10-CM) - Chronic pain syndrome      Evaluation Date: 6/5/2023  Authorization Period Expiration: 5/14/2024  Plan of Care Expiration: 8/25/2023  Progress Note Due: 7/10/2023  Visit # / Visits authorized: 3/20   FOTO: 1/3    PTA Visit #: 1/5     FOTO first follow up:   FOTO second follow up:     Time In: 11:00 am   Time Out: 12:00 pm  Total Treatment Time: 60 minutes   Total Billable Time: 30 minutes     Precautions: Standard and History of ductal carcinoma in situ of breast      SUBJECTIVE     Pt reports: her back and hips are doing okay and feeling better . Pt stated "the feet are another story and 10/10 pain"   She was compliant with home exercise program.- " not as much "   Response to previous treatment: she felt good after   Functional change: Ongoing     Pain:               6-7/10     ;        10/10  Location: bilateral back;        B feet      OBJECTIVE     Objective Measures updated at progress report unless specified.     Treatment     Linnea received the treatments listed below:      therapeutic exercises to develop strength, endurance, ROM, flexibility, posture, and core stabilization for 14 minutes including:    Lumbar rollouts forward: x15 reps with 5 sec holds   Lumbar rollouts lateral : x 10 , 5'' hold   NuStep x6 min for range of motion, neural mobility, and cardiovascular endurance     Not Performed :   SKTC 1x10 reps each   - increased hip discomfort bilaterally, which was relieved with manual       manual therapy techniques: Joint mobilizations and Soft " tissue Mobilization were applied to the: Lumbar, hip, ankle for 15 minutes, including:    Lumbar/Hip/Ankle Assessment   (B) Talocrural AP mobilizations grade II-III  (B) Lateral hip distraction with belt   (L) Long axis hip distraction grade II-III     neuromuscular re-education activities to improve: Balance, Coordination, Kinesthetic, Sense, Proprioception, and Posture for 30 minutes. The following activities were included:    Pt education on importance of offloading nervous system, hip mobility, and core stability  Seated TrA with green swiss ball push isometric : x15 reps , 7-10'' hold   Seated paloff press with BlueTB 2x10 reps with 3 sec holds   Seated clamshells GTB : 2 x 10 reps   DL Shuttle press 3x10-12 reps 50#   - pillow underneath lumbar spine     Not Performed :   Hooklying TA activation 2x10 reps with 5 sec holds   Seated hip adduction iso with TrA: 2 x 10 reps , 5'' hold       therapeutic activities to improve functional performance for 00 minutes, including:      gait training to improve functional mobility and safety for 00 minutes, including:      Patient Education and Home Exercises     Home Exercises Provided and Patient Education Provided     Education provided:   - PT POC, importance of offloading nervous system, and core stability  - Add in lumbar rollouts to HEP     Written Home Exercises Provided: Patient instructed to cont prior HEP. Exercises were reviewed and Linnea was able to demonstrate them prior to the end of the session.  Linnea demonstrated good  understanding of the education provided. See EMR under Patient Instructions for exercises provided during therapy sessions    ASSESSMENT     Pt continues to denote an improvement in low back and hip pain although endorses 10/10 pain in B feet. Following manual interventions pt reports 4/10 low back/hip pain as well as decreased pain and improved mobility in B feet. Continuing to focus on core and hip strengthening which she has been  tolerating well with no symptom exacerbations noted and decreased pain upon session completion. Will continue to monitor and progress next .     Linnea Is progressing well towards her goals.   Pt prognosis is Fair.     Pt will continue to benefit from skilled outpatient physical therapy to address the deficits listed in the problem list box on initial evaluation, provide pt/family education and to maximize pt's level of independence in the home and community environment.   Pt's spiritual, cultural and educational needs considered and pt agreeable to plan of care and goals.     Anticipated barriers to physical therapy: Hypersensitivity, chronicity of symptoms     Goals:   Short Term Goals: 4 weeks (Progressing, not met)  Patient will be independent in initial HEP to help supplement PT.   Patient will improve pain at its worst to </= 4/10 to help improve quality of life.   Patient will improve hip mobility by 5 degrees in all planes to help offload nervous system.      Long Term Goals: 8-10 weeks (Progressing, not met)  Patient will be independent in updated HEP to help supplement PT and maintain gains made in PT.   Patient will improve FOTO limitation score to </= 40% to show improvement in condition.   Patient will improve hip strength by 1/3 MMT to help with gait mechanics.   Patient will report improvement with standing tolerance to >/= 30 min with minimal discomfort in feet.     PLAN   Plan of care Certification: 6/5/2023 to 8/25/2023.    Continue with current plan of care with emphasis on flexion based exercises, lumbar/hip range of motion, and core stability.     Holly Barakat, PTA

## 2023-07-03 ENCOUNTER — CLINICAL SUPPORT (OUTPATIENT)
Dept: REHABILITATION | Facility: HOSPITAL | Age: 73
End: 2023-07-03
Payer: MEDICARE

## 2023-07-03 DIAGNOSIS — R26.9 GAIT ABNORMALITY: ICD-10-CM

## 2023-07-03 DIAGNOSIS — R68.89 DECREASED FUNCTIONAL ACTIVITY TOLERANCE: Primary | ICD-10-CM

## 2023-07-03 PROCEDURE — 97140 MANUAL THERAPY 1/> REGIONS: CPT

## 2023-07-03 PROCEDURE — 97112 NEUROMUSCULAR REEDUCATION: CPT

## 2023-07-03 PROCEDURE — 97110 THERAPEUTIC EXERCISES: CPT

## 2023-07-03 NOTE — PROGRESS NOTES
"[facility]   Physical Therapy Treatment Note     Name: Linnea Renae  Clinic Number: [id number]    Therapy Diagnosis:   Encounter Diagnoses   Name Primary?    Decreased functional activity tolerance Yes    Gait abnormality          Physician: Burke Gr DPM    Visit Date: 7/3/2023  Physician Orders: PT Eval and Treat   Medical Diagnosis from Referral:   E11.42 (ICD-10-CM) - Diabetic peripheral neuropathy associated with type 2 diabetes mellitus   G89.4 (ICD-10-CM) - Chronic pain syndrome      Evaluation Date: 6/5/2023  Authorization Period Expiration: 5/14/2024  Plan of Care Expiration: 8/25/2023  Progress Note Due: 7/10/2023  Visit # / Visits authorized: 3/20   FOTO: 1/3    PTA Visit #: 1/5     FOTO first follow up:   FOTO second follow up:     Time In: 11:02 am   Time Out: 12:00 pm  Total Treatment Time: 58 minutes   Total Billable Time: 58 minutes     Precautions: Standard and History of ductal carcinoma in situ of breast      SUBJECTIVE     Pt reports: "The back is not good today, feels like the pain is coming back". Her feet are feeling a little better. She is also getting pain into both hips to the mid thigh area. Initially she reports it was just in the right leg now in both. She describes the pain in her back as a "throbbing" or "pumping" like pain. The pain in her hips is more of a "burning". She reports the pain increases with most movement and is better with rest. This has increased within the last week. She reports feet are a little better as the sensitivity has really improved and has been taking medication for insomnia which she thinks is helping as well as she can finally sleep.    She was compliant with home exercise program.- " not as much "   Response to previous treatment: she felt good after   Functional change: Ongoing     Pain:               6-7/10     ;        10/10  Location: bilateral back;        B feet      OBJECTIVE     Objective Measures updated at " progress report unless specified.     Observation 7/3/2023:     Range of Motion:  Lumbar     Percentage  Pain    Flexion 80% No Pain    Extension 10% (start)  60% (end) Most Pain back (start)  No Pain (end)    Right Sidebend 50%  Pain R hip   Left Sidebend 50%  Pain L hip    Right Rotation 90% No Pain   Left Rotation  90% No Pain      Hip     Right Left    Flexion 95 degrees  90 degrees    Extension 5 degrees  0 degrees    Internal Rotation 25 degrees* 15 degrees - 20* in hip   External Rotation 45 degrees 35 degrees - 40    *Patient reported pain in bilateral hips with internal rotation assessment.    Knee     Right Left    Flexion 120 degrees  120 degrees    Extension 0 degrees  0 degrees       Ankle     Right Left    Dorsiflexion 0 degrees  0 degrees    Plantarflexion 45 degrees  50 degrees    Inversion 25 degrees  27 degrees   Eversion 12 degrees 15 degrees       Lower Extremity Strength (MMT):     Right Left    Hip Flexion 3+/5 3+/5   Hip Abduction 3/5 3/5   Hip Extension 3-/5 3-/5   Knee Flexion 4/5 4/5   Knee Extension 4+/5 4/5    Dorsiflexion 3/5 3/5   Plantarflexion 3+/5 3+/5   Inversion 4-/5 4-/5   Eversion 4-/5 4-/5   Foot Intrinsics - FHL  3+/5  3/5      Treatment     Linnea received the treatments listed below:      therapeutic exercises to develop strength, endurance, ROM, flexibility, posture, and core stabilization for 15 minutes including:    Assessment as above   Lumbar rollouts forward 1x15 reps 5 sec holds   Standing hip mobility rockback mini squats in // bars with gait belt for distraction 2x10 reps     Not Performed :   SKTC 1x10 reps each   - increased hip discomfort bilaterally, which was relieved with manual   Lumbar rollouts forward: x15 reps with 5 sec holds   Lumbar rollouts lateral : x 10 , 5'' hold   NuStep x6 min for range of motion, neural mobility, and cardiovascular endurance     manual therapy techniques: Joint mobilizations and Soft tissue Mobilization were applied to the: Lumbar,  hip, ankle for 31 minutes, including:    Lumbar/Hip/Ankle Assessment    (B) Talocrural distraction mobilizations grade III-IV   (B) Talocrural AP mobilizations grade II-III  (B) Lateral hip distraction with belt   (B) Inferior hip mobilizations with belt   (B) EOM knee distraction grade III-IV     Not Today:  (L) Long axis hip distraction grade II-III     neuromuscular re-education activities to improve: Balance, Coordination, Kinesthetic, Sense, Proprioception, and Posture for 12 minutes. The following activities were included:    Pt education on importance of offloading nervous system, hip mobility, and core stability  Standing TA activation with physioball and PPT 2x10 reps 5 sec holds   Seated PPT on physioball with paloff press MaroonTB 1x10 reps 5 sec holds     Not Performed :   Hooklying TA activation 2x10 reps with 5 sec holds   Seated hip adduction iso with TrA: 2 x 10 reps , 5'' hold   Seated TrA with green swiss ball push isometric : x15 reps , 7-10'' hold   Seated paloff press with BlueTB 2x10 reps with 3 sec holds   Seated clamshells GTB : 2 x 10 reps   DL Shuttle press 3x10-12 reps 50#   - pillow underneath lumbar spine     therapeutic activities to improve functional performance for 00 minutes, including:    Not Today:  Hip hinge with dowel  DL shuttle squats   Hip hinge box squat elevated surface     gait training to improve functional mobility and safety for 00 minutes, including:      Patient Education and Home Exercises     Home Exercises Provided and Patient Education Provided     Education provided:   - PT POC, importance of offloading nervous system, and core stability  - Add in lumbar rollouts to HEP     Written Home Exercises Provided: Patient instructed to cont prior HEP. Exercises were reviewed and Linnea was able to demonstrate them prior to the end of the session.  Linnea demonstrated good  understanding of the education provided. See EMR under Patient Instructions for exercises provided  "during therapy sessions    ASSESSMENT     Linnea has been seen for a total of 6 visits plus initial evaluation for a medical diagnosis of G89.4 (ICD-10-CM) - Chronic pain syndrome and E11.42 (ICD-10-CM) - Diabetic peripheral neuropathy associated with type 2 diabetes mellitus. She presented to today's session with increased symptoms in her lumbar spine and decreased range of motion since initial evaluation. Further manual therapy utilized and improvement in pain and lumbar motion end of session. She demonstrates improvement in her hip and ankle mobility since beginning physical therapy and improvement in muscle activation. She continues with limitations in her ankle mobility most notably dorsiflexion and hip mobility. Good response to manual therapy intervention at today's session. Emphasis with therex on improving her mobility and offloading her nervous system as well as increased core stability with holds. She tolerated all therex well reporting end of session "I feel much better than when I came in". Plan to assess at follow-up response/carry over from this session. Continue to monitor an progress as able.     Linnea Is progressing well towards her goals.   Pt prognosis is Fair.     Pt will continue to benefit from skilled outpatient physical therapy to address the deficits listed in the problem list box on initial evaluation, provide pt/family education and to maximize pt's level of independence in the home and community environment.   Pt's spiritual, cultural and educational needs considered and pt agreeable to plan of care and goals.     Anticipated barriers to physical therapy: Hypersensitivity, chronicity of symptoms     Goals:   Short Term Goals: 4 weeks (Progressing, not met)  Patient will be independent in initial HEP to help supplement PT. - MET  Patient will improve pain at its worst to </= 4/10 to help improve quality of life.   Patient will improve hip mobility by 5 degrees in all planes to help offload " nervous system.      Long Term Goals: 8-10 weeks (Progressing, not met)  Patient will be independent in updated HEP to help supplement PT and maintain gains made in PT.   Patient will improve FOTO limitation score to </= 40% to show improvement in condition.   Patient will improve hip strength by 1/3 MMT to help with gait mechanics.   Patient will report improvement with standing tolerance to >/= 30 min with minimal discomfort in feet.     PLAN   Plan of care Certification: 6/5/2023 to 8/25/2023.    Continue with current plan of care with emphasis on flexion based exercises, lumbar/hip range of motion, and core stability.     Kristen Castillo, PT, DPT

## 2023-07-03 NOTE — PLAN OF CARE
"[facility]   Physical Therapy Treatment Note     Name: Linnea Renae  Clinic Number: [id number]    Therapy Diagnosis:   Encounter Diagnoses   Name Primary?    Decreased functional activity tolerance Yes    Gait abnormality          Physician: Burke Gr DPM    Visit Date: 7/3/2023  Physician Orders: PT Eval and Treat   Medical Diagnosis from Referral:   E11.42 (ICD-10-CM) - Diabetic peripheral neuropathy associated with type 2 diabetes mellitus   G89.4 (ICD-10-CM) - Chronic pain syndrome      Evaluation Date: 6/5/2023  Authorization Period Expiration: 5/14/2024  Plan of Care Expiration: 8/25/2023  Progress Note Due: 7/10/2023  Visit # / Visits authorized: 3/20   FOTO: 1/3    PTA Visit #: 1/5     FOTO first follow up:   FOTO second follow up:     Time In: 11:02 am   Time Out: 12:00 pm  Total Treatment Time: 58 minutes   Total Billable Time: 58 minutes     Precautions: Standard and History of ductal carcinoma in situ of breast      SUBJECTIVE     Pt reports: "The back is not good today, feels like the pain is coming back". Her feet are feeling a little better. She is also getting pain into both hips to the mid thigh area. Initially she reports it was just in the right leg now in both. She describes the pain in her back as a "throbbing" or "pumping" like pain. The pain in her hips is more of a "burning". She reports the pain increases with most movement and is better with rest. This has increased within the last week. She reports feet are a little better as the sensitivity has really improved and has been taking medication for insomnia which she thinks is helping as well as she can finally sleep.    She was compliant with home exercise program.- " not as much "   Response to previous treatment: she felt good after   Functional change: Ongoing     Pain:               6-7/10     ;        10/10  Location: bilateral back;        B feet      OBJECTIVE     Objective Measures updated at " progress report unless specified.     Observation 7/3/2023:     Range of Motion:  Lumbar     Percentage  Pain    Flexion 80% No Pain    Extension 10% (start)  60% (end) Most Pain back (start)  No Pain (end)    Right Sidebend 50%  Pain R hip   Left Sidebend 50%  Pain L hip    Right Rotation 90% No Pain   Left Rotation  90% No Pain      Hip     Right Left    Flexion 95 degrees  90 degrees    Extension 5 degrees  0 degrees    Internal Rotation 25 degrees* 15 degrees - 20* in hip   External Rotation 45 degrees 35 degrees - 40    *Patient reported pain in bilateral hips with internal rotation assessment.    Knee     Right Left    Flexion 120 degrees  120 degrees    Extension 0 degrees  0 degrees       Ankle     Right Left    Dorsiflexion 0 degrees  0 degrees    Plantarflexion 45 degrees  50 degrees    Inversion 25 degrees  27 degrees   Eversion 12 degrees 15 degrees       Lower Extremity Strength (MMT):     Right Left    Hip Flexion 3+/5 3+/5   Hip Abduction 3/5 3/5   Hip Extension 3-/5 3-/5   Knee Flexion 4/5 4/5   Knee Extension 4+/5 4/5    Dorsiflexion 3/5 3/5   Plantarflexion 3+/5 3+/5   Inversion 4-/5 4-/5   Eversion 4-/5 4-/5   Foot Intrinsics - FHL  3+/5  3/5      Treatment     Linnea received the treatments listed below:      therapeutic exercises to develop strength, endurance, ROM, flexibility, posture, and core stabilization for 15 minutes including:    Assessment as above   Lumbar rollouts forward 1x15 reps 5 sec holds   Standing hip mobility rockback mini squats in // bars with gait belt for distraction 2x10 reps     Not Performed :   SKTC 1x10 reps each   - increased hip discomfort bilaterally, which was relieved with manual   Lumbar rollouts forward: x15 reps with 5 sec holds   Lumbar rollouts lateral : x 10 , 5'' hold   NuStep x6 min for range of motion, neural mobility, and cardiovascular endurance     manual therapy techniques: Joint mobilizations and Soft tissue Mobilization were applied to the: Lumbar,  hip, ankle for 31 minutes, including:    Lumbar/Hip/Ankle Assessment    (B) Talocrural distraction mobilizations grade III-IV   (B) Talocrural AP mobilizations grade II-III  (B) Lateral hip distraction with belt   (B) Inferior hip mobilizations with belt   (B) EOM knee distraction grade III-IV     Not Today:  (L) Long axis hip distraction grade II-III     neuromuscular re-education activities to improve: Balance, Coordination, Kinesthetic, Sense, Proprioception, and Posture for 12 minutes. The following activities were included:    Pt education on importance of offloading nervous system, hip mobility, and core stability  Standing TA activation with physioball and PPT 2x10 reps 5 sec holds   Seated PPT on physioball with paloff press MaroonTB 1x10 reps 5 sec holds     Not Performed :   Hooklying TA activation 2x10 reps with 5 sec holds   Seated hip adduction iso with TrA: 2 x 10 reps , 5'' hold   Seated TrA with green swiss ball push isometric : x15 reps , 7-10'' hold   Seated paloff press with BlueTB 2x10 reps with 3 sec holds   Seated clamshells GTB : 2 x 10 reps   DL Shuttle press 3x10-12 reps 50#   - pillow underneath lumbar spine     therapeutic activities to improve functional performance for 00 minutes, including:    Not Today:  Hip hinge with dowel  DL shuttle squats   Hip hinge box squat elevated surface     gait training to improve functional mobility and safety for 00 minutes, including:      Patient Education and Home Exercises     Home Exercises Provided and Patient Education Provided     Education provided:   - PT POC, importance of offloading nervous system, and core stability  - Add in lumbar rollouts to HEP     Written Home Exercises Provided: Patient instructed to cont prior HEP. Exercises were reviewed and Linnea was able to demonstrate them prior to the end of the session.  Linnea demonstrated good  understanding of the education provided. See EMR under Patient Instructions for exercises provided  "during therapy sessions    ASSESSMENT     Linnea has been seen for a total of 6 visits plus initial evaluation for a medical diagnosis of G89.4 (ICD-10-CM) - Chronic pain syndrome and E11.42 (ICD-10-CM) - Diabetic peripheral neuropathy associated with type 2 diabetes mellitus. She presented to today's session with increased symptoms in her lumbar spine and decreased range of motion since initial evaluation. Further manual therapy utilized and improvement in pain and lumbar motion end of session. She demonstrates improvement in her hip and ankle mobility since beginning physical therapy and improvement in muscle activation. She continues with limitations in her ankle mobility most notably dorsiflexion and hip mobility. Good response to manual therapy intervention at today's session. Emphasis with therex on improving her mobility and offloading her nervous system as well as increased core stability with holds. She tolerated all therex well reporting end of session "I feel much better than when I came in". Plan to assess at follow-up response/carry over from this session. Continue to monitor an progress as able.     Linnea Is progressing well towards her goals.   Pt prognosis is Fair.     Pt will continue to benefit from skilled outpatient physical therapy to address the deficits listed in the problem list box on initial evaluation, provide pt/family education and to maximize pt's level of independence in the home and community environment.   Pt's spiritual, cultural and educational needs considered and pt agreeable to plan of care and goals.     Anticipated barriers to physical therapy: Hypersensitivity, chronicity of symptoms     Goals:   Short Term Goals: 4 weeks (Progressing, not met)  Patient will be independent in initial HEP to help supplement PT. - MET  Patient will improve pain at its worst to </= 4/10 to help improve quality of life.   Patient will improve hip mobility by 5 degrees in all planes to help offload " nervous system.      Long Term Goals: 8-10 weeks (Progressing, not met)  Patient will be independent in updated HEP to help supplement PT and maintain gains made in PT.   Patient will improve FOTO limitation score to </= 40% to show improvement in condition.   Patient will improve hip strength by 1/3 MMT to help with gait mechanics.   Patient will report improvement with standing tolerance to >/= 30 min with minimal discomfort in feet.     PLAN   Plan of care Certification: 6/5/2023 to 8/25/2023.    Continue with current plan of care with emphasis on flexion based exercises, lumbar/hip range of motion, and core stability.     Kristen Castillo, PT, DPT

## 2023-07-05 DIAGNOSIS — M48.061 DEGENERATIVE LUMBAR SPINAL STENOSIS: ICD-10-CM

## 2023-07-05 RX ORDER — TRAMADOL HYDROCHLORIDE 200 MG/1
200 TABLET, EXTENDED RELEASE ORAL DAILY
Qty: 30 TABLET | Refills: 0 | OUTPATIENT
Start: 2023-07-05

## 2023-07-05 NOTE — TELEPHONE ENCOUNTER
No care due was identified.  [facility] Embedded Care Due Messages. Reference number: [id number].   7/05/2023 3:06:27 PM CDT

## 2023-07-05 NOTE — TELEPHONE ENCOUNTER
Refill Routing Note   Medication(s) are not appropriate for processing by [facility] for the following reason(s):      Medication outside of protocol    [facility] action(s):  Route Care Due:  None identified          Appointments  past 12m or future 3m with PCP    Date Provider   Last Visit   4/17/2023 Bailee Moss MD   Next Visit   7/25/2023 Bailee Moss MD   ED visits in past 90 days: 0        Note composed:3:14 PM 07/05/2023

## 2023-07-11 ENCOUNTER — TELEPHONE (OUTPATIENT)
Dept: PRIMARY CARE CLINIC | Facility: CLINIC | Age: 73
End: 2023-07-11
Payer: MEDICARE

## 2023-07-11 DIAGNOSIS — M48.061 DEGENERATIVE LUMBAR SPINAL STENOSIS: ICD-10-CM

## 2023-07-11 RX ORDER — TRAMADOL HYDROCHLORIDE 200 MG/1
200 TABLET, EXTENDED RELEASE ORAL DAILY
Qty: 30 TABLET | Refills: 0 | Status: CANCELLED | OUTPATIENT
Start: 2023-07-11

## 2023-07-11 RX ORDER — OXYCODONE AND ACETAMINOPHEN 7.5; 325 MG/1; MG/1
1 TABLET ORAL
Qty: 30 TABLET | Refills: 0 | Status: SHIPPED | OUTPATIENT
Start: 2023-07-11 | End: 2023-08-11 | Stop reason: SDUPTHER

## 2023-07-11 NOTE — TELEPHONE ENCOUNTER
No care due was identified.  [facility] Embedded Care Due Messages. Reference number: [id number].   7/11/2023 10:04:48 AM CDT

## 2023-07-11 NOTE — TELEPHONE ENCOUNTER
Refill Routing Note   Medication(s) are not appropriate for processing by [facility] for the following reason(s):      Medication outside of protocol    [facility] action(s):  Route Care Due:  None identified          Appointments  past 12m or future 3m with PCP    Date Provider   Last Visit   4/17/2023 Bailee Moss MD   Next Visit   7/25/2023 Bailee Moss MD   ED visits in past 90 days: 0        Note composed:10:17 AM 07/11/2023

## 2023-07-11 NOTE — TELEPHONE ENCOUNTER
The 30 tablets of 7.5mg Percocet she received should help get her to appointment here on 7/25/23 for face to face discussion of pain management.

## 2023-07-12 DIAGNOSIS — M48.061 DEGENERATIVE LUMBAR SPINAL STENOSIS: ICD-10-CM

## 2023-07-12 RX ORDER — TRAMADOL HYDROCHLORIDE 200 MG/1
200 TABLET, EXTENDED RELEASE ORAL DAILY
Qty: 30 TABLET | Refills: 0 | Status: CANCELLED | OUTPATIENT
Start: 2023-07-11

## 2023-07-12 NOTE — TELEPHONE ENCOUNTER
No care due was identified.  [facility] Embedded Care Due Messages. Reference number: [id number].   7/12/2023 8:29:51 AM CDT

## 2023-07-12 NOTE — TELEPHONE ENCOUNTER
Refill Routing Note   Medication(s) are not appropriate for processing by [facility] for the following reason(s):      Medication outside of protocol    [facility] action(s):  Route None identified          Appointments  past 12m or future 3m with PCP    Date Provider   Last Visit   4/17/2023 Bailee Moss MD   Next Visit   7/25/2023 Bailee Moss MD   ED visits in past 90 days: 0        Note composed:8:35 AM 07/12/2023

## 2023-07-13 DIAGNOSIS — M48.061 DEGENERATIVE LUMBAR SPINAL STENOSIS: ICD-10-CM

## 2023-07-13 NOTE — TELEPHONE ENCOUNTER
No care due was identified.  [facility] Embedded Care Due Messages. Reference number: [id number].   7/13/2023 10:09:17 AM CDT

## 2023-07-13 NOTE — TELEPHONE ENCOUNTER
Refill Routing Note   Medication(s) are not appropriate for processing by [facility] for the following reason(s):      Medication outside of protocol    [facility] action(s):  Route Care Due:  None identified            Appointments  past 12m or future 3m with PCP    Date Provider   Last Visit   4/17/2023 Bailee Moss MD   Next Visit   7/25/2023 Bailee Moss MD   ED visits in past 90 days: 0        Note composed:10:11 AM 07/13/2023

## 2023-07-14 DIAGNOSIS — M48.061 DEGENERATIVE LUMBAR SPINAL STENOSIS: ICD-10-CM

## 2023-07-14 RX ORDER — TRAMADOL HYDROCHLORIDE 200 MG/1
200 TABLET, EXTENDED RELEASE ORAL DAILY
Qty: 30 TABLET | Refills: 0 | OUTPATIENT
Start: 2023-07-14

## 2023-07-14 NOTE — TELEPHONE ENCOUNTER
Refill Decision Note   Linnea Renae  is requesting a refill authorization.  Brief Assessment and Rationale for Refill:  Quick Discontinue     Medication Therapy Plan:       Medication Reconciliation Completed: No   Comments:   traMADoL (ULTRAM-ER) 200 MG Tb24 30 tablet 0 7/14/2023  No   Request refused: Patient needs an appointment   Sig - Route: Take 1 tablet (200 mg total) by mouth once daily. - Oral   Class: Normal   Order: [id number]   Date/Time Signed: 7/14/2023 07:52         Ordering Encounter Report    Associated Reports   View Encounter       No Care Gaps recommended.     Note composed:11:06 AM 07/14/2023

## 2023-07-14 NOTE — TELEPHONE ENCOUNTER
No care due was identified.  [facility] Embedded Care Due Messages. Reference number: [id number].   7/14/2023 11:01:03 AM CDT

## 2023-07-17 NOTE — TELEPHONE ENCOUNTER
Provider Staff:  Please note Refusal of medication.     Action required for this patient.      Requested Prescriptions     Refused Prescriptions Disp Refills    traMADoL (ULTRAM-ER) 200 MG Tb24 30 tablet 0     Sig: Take 1 tablet (200 mg total) by mouth once daily.     Refused By: BRISEIDA ODOM     Reason for Refusal: Patient needs an appointment      Thanks!  [facility]   Note composed: 07/16/2023 7:54 PM

## 2023-07-18 ENCOUNTER — TELEPHONE (OUTPATIENT)
Dept: PRIMARY CARE CLINIC | Facility: CLINIC | Age: 73
End: 2023-07-18

## 2023-07-18 NOTE — TELEPHONE ENCOUNTER
----- Message from Jami Mao sent at 7/18/2023  2:22 PM CDT -----  Contact: Self [phone]  Would like to receive medical advice.    Would they like a call back or a response via [facility]:  call back    Additional information:  Calling to request a call return from the nurse. Pt declined reason of message

## 2023-07-18 NOTE — TELEPHONE ENCOUNTER
Pt c/o leg /foot swelling pt is questioning if one of her medications is causing the swelling the last adjustment was the Lyrica

## 2023-07-19 NOTE — PROGRESS NOTES
"[facility]   Physical Therapy Treatment Note     Name: Linnea Renae  Clinic Number: [id number]    Therapy Diagnosis:   Encounter Diagnoses   Name Primary?    Decreased functional activity tolerance Yes    Gait abnormality          Physician: Burke Gr DPM    Visit Date: 7/20/2023  Physician Orders: PT Eval and Treat   Medical Diagnosis from Referral:   E11.42 (ICD-10-CM) - Diabetic peripheral neuropathy associated with type 2 diabetes mellitus   G89.4 (ICD-10-CM) - Chronic pain syndrome      Evaluation Date: 6/5/2023  Authorization Period Expiration: 5/14/2024  Plan of Care Expiration: 8/25/2023  Progress Note Due: 7/10/2023  Visit # / Visits authorized: 7/20   FOTO: 1/3    PTA Visit #: 1/5     FOTO first follow up:   FOTO second follow up:     Time In: 11:00 am   Time Out: 11:55 pm  Total Treatment Time: 55 minutes   Total Billable Time: 55 minutes     Precautions: Standard and History of ductal carcinoma in situ of breast      SUBJECTIVE     Pt reports: she is having a lot of pain in  her feet and in her back . She has pain in both hips and across her low back. She is wearing tennis shoes which she feels more comfortable in . Pt stated she has been having swelling in her feet lately and she sees her PCP today for this .   She was compliant with home exercise program.- " not as much "   Response to previous treatment: she felt relief from the pain for about a day or two   Functional change: Ongoing     Pain:               10/10     ;        10/10  Location: bilateral back;        B feet      OBJECTIVE     Objective Measures updated at progress report unless specified.     Observation 7/3/2023:     Range of Motion:  Lumbar     Percentage  Pain    Flexion 80% No Pain    Extension 10% (start)  60% (end) Most Pain back (start)  No Pain (end)    Right Sidebend 50%  Pain R hip   Left Sidebend 50%  Pain L hip    Right Rotation 90% No Pain   Left Rotation  90% No Pain      Hip     Right " Left    Flexion 95 degrees  90 degrees    Extension 5 degrees  0 degrees    Internal Rotation 25 degrees* 15 degrees - 20* in hip   External Rotation 45 degrees 35 degrees - 40    *Patient reported pain in bilateral hips with internal rotation assessment.    Knee     Right Left    Flexion 120 degrees  120 degrees    Extension 0 degrees  0 degrees       Ankle     Right Left    Dorsiflexion 0 degrees  0 degrees    Plantarflexion 45 degrees  50 degrees    Inversion 25 degrees  27 degrees   Eversion 12 degrees 15 degrees       Lower Extremity Strength (MMT):     Right Left    Hip Flexion 3+/5 3+/5   Hip Abduction 3/5 3/5   Hip Extension 3-/5 3-/5   Knee Flexion 4/5 4/5   Knee Extension 4+/5 4/5    Dorsiflexion 3/5 3/5   Plantarflexion 3+/5 3+/5   Inversion 4-/5 4-/5   Eversion 4-/5 4-/5   Foot Intrinsics - FHL  3+/5  3/5      Treatment     Linnea received the treatments listed below:      therapeutic exercises to develop strength, endurance, ROM, flexibility, posture, and core stabilization for 12 minutes including:    Assessment as above   Lumbar rollouts forward 1x15 reps 5 sec holds   Standing hip mobility rockback mini squats in // bars with gait belt for distraction 2x10 reps     Not Performed :   SKTC 1x10 reps each   - increased hip discomfort bilaterally, which was relieved with manual   Lumbar rollouts lateral : x 10 , 5'' hold     manual therapy techniques: Joint mobilizations and Soft tissue Mobilization were applied to the: Lumbar, hip, ankle for 18 minutes, including:    Lumbar/Hip/Ankle Assessment    (B) Talocrural distraction mobilizations grade III-IV   (B) Talocrural AP mobilizations grade II-III  (B) Lateral hip distraction with belt   (B) Inferior hip mobilizations with belt     Not Today:  (L) Long axis hip distraction grade II-III   (B) EOM knee distraction grade III-IV     neuromuscular re-education activities to improve: Balance, Coordination, Kinesthetic, Sense, Proprioception, and Posture for 25  minutes. The following activities were included:    Pt education on importance of offloading nervous system, hip mobility, and core stability  Standing TA activation with physioball and PPT 2x10 reps 5 sec holds   Seated PPT on physioball with paloff press MaroonTB 1x10 reps 5 sec holds   DL Shuttle press 3x10-12 reps 50#   - pillow underneath lumbar spine     Not Performed :   Hooklying TA activation 2x10 reps with 5 sec holds   Seated hip adduction iso with TrA: 2 x 10 reps , 5'' hold   Seated TrA with green swiss ball push isometric : x15 reps , 7-10'' hold   Seated paloff press with BlueTB 2x10 reps with 3 sec holds   Seated clamshells GTB : 2 x 10 reps       therapeutic activities to improve functional performance for 00 minutes, including:    Not Today:  Hip hinge with dowel  DL shuttle squats   Hip hinge box squat elevated surface     gait training to improve functional mobility and safety for 00 minutes, including:      Patient Education and Home Exercises     Home Exercises Provided and Patient Education Provided     Education provided:   - PT POC, importance of offloading nervous system, and core stability  - Add in lumbar rollouts to HEP     Written Home Exercises Provided: Patient instructed to cont prior HEP. Exercises were reviewed and Linnea was able to demonstrate them prior to the end of the session.  Linnea demonstrated good  understanding of the education provided. See EMR under Patient Instructions for exercises provided during therapy sessions    ASSESSMENT     Pt presents with 10/10 pain noted in his hips , low back and feet. Session initiated with manual interventions to help offload spine and improve mobility which pt responded well to and noted decreased pain following. Emphasis with therex on improving her mobility and offloading her nervous system as well as increased core stability with holds. She demonstrates a good response to above activities. Encouraged HEP compliance for improved  carryover. Continue to monitor an progress as able.     Linnea Is progressing well towards her goals.   Pt prognosis is Fair.     Pt will continue to benefit from skilled outpatient physical therapy to address the deficits listed in the problem list box on initial evaluation, provide pt/family education and to maximize pt's level of independence in the home and community environment.   Pt's spiritual, cultural and educational needs considered and pt agreeable to plan of care and goals.     Anticipated barriers to physical therapy: Hypersensitivity, chronicity of symptoms     Goals:   Short Term Goals: 4 weeks (Progressing, not met)  Patient will be independent in initial HEP to help supplement PT. - MET  Patient will improve pain at its worst to </= 4/10 to help improve quality of life.   Patient will improve hip mobility by 5 degrees in all planes to help offload nervous system.      Long Term Goals: 8-10 weeks (Progressing, not met)  Patient will be independent in updated HEP to help supplement PT and maintain gains made in PT.   Patient will improve FOTO limitation score to </= 40% to show improvement in condition.   Patient will improve hip strength by 1/3 MMT to help with gait mechanics.   Patient will report improvement with standing tolerance to >/= 30 min with minimal discomfort in feet.     PLAN   Plan of care Certification: 6/5/2023 to 8/25/2023.    Continue with current plan of care with emphasis on flexion based exercises, lumbar/hip range of motion, and core stability.     Holly Barakat, PTA

## 2023-07-20 ENCOUNTER — CLINICAL SUPPORT (OUTPATIENT)
Dept: REHABILITATION | Facility: HOSPITAL | Age: 73
End: 2023-07-20
Payer: MEDICARE

## 2023-07-20 ENCOUNTER — LAB VISIT (OUTPATIENT)
Dept: LAB | Facility: HOSPITAL | Age: 73
End: 2023-07-20
Attending: INTERNAL MEDICINE
Payer: MEDICARE

## 2023-07-20 ENCOUNTER — OFFICE VISIT (OUTPATIENT)
Dept: PRIMARY CARE CLINIC | Facility: CLINIC | Age: 73
End: 2023-07-20
Payer: MEDICARE

## 2023-07-20 VITALS
DIASTOLIC BLOOD PRESSURE: 82 MMHG | HEART RATE: 70 BPM | SYSTOLIC BLOOD PRESSURE: 136 MMHG | HEIGHT: 66 IN | BODY MASS INDEX: 43.58 KG/M2 | TEMPERATURE: 98 F | OXYGEN SATURATION: 98 % | WEIGHT: 271.19 LBS

## 2023-07-20 DIAGNOSIS — E11.40 TYPE 2 DIABETES MELLITUS WITH DIABETIC NEUROPATHY, WITHOUT LONG-TERM CURRENT USE OF INSULIN: ICD-10-CM

## 2023-07-20 DIAGNOSIS — R68.89 DECREASED FUNCTIONAL ACTIVITY TOLERANCE: Primary | ICD-10-CM

## 2023-07-20 DIAGNOSIS — R63.5 WEIGHT GAIN: ICD-10-CM

## 2023-07-20 DIAGNOSIS — M79.89 BILATERAL SWELLING OF FEET: Primary | ICD-10-CM

## 2023-07-20 DIAGNOSIS — M48.061 DEGENERATIVE LUMBAR SPINAL STENOSIS: ICD-10-CM

## 2023-07-20 DIAGNOSIS — M79.89 BILATERAL SWELLING OF FEET: ICD-10-CM

## 2023-07-20 DIAGNOSIS — R26.9 GAIT ABNORMALITY: ICD-10-CM

## 2023-07-20 DIAGNOSIS — I10 ESSENTIAL HYPERTENSION: ICD-10-CM

## 2023-07-20 LAB
ALBUMIN SERPL BCP-MCNC: 3.6 G/DL (ref 3.5–5.2)
ALP SERPL-CCNC: 108 U/L (ref 55–135)
ALT SERPL W/O P-5'-P-CCNC: 17 U/L (ref 10–44)
ANION GAP SERPL CALC-SCNC: 11 MMOL/L (ref 8–16)
AST SERPL-CCNC: 25 U/L (ref 10–40)
BILIRUB SERPL-MCNC: 0.2 MG/DL (ref 0.1–1)
BNP SERPL-MCNC: 50 PG/ML (ref 0–99)
BUN SERPL-MCNC: 12 MG/DL (ref 8–23)
CALCIUM SERPL-MCNC: 9.1 MG/DL (ref 8.7–10.5)
CHLORIDE SERPL-SCNC: 104 MMOL/L (ref 95–110)
CO2 SERPL-SCNC: 27 MMOL/L (ref 23–29)
CREAT SERPL-MCNC: 1 MG/DL (ref 0.5–1.4)
EST. GFR  (NO RACE VARIABLE): 59.5 ML/MIN/1.73 M^2
GLUCOSE SERPL-MCNC: 99 MG/DL (ref 70–110)
POTASSIUM SERPL-SCNC: 3.8 MMOL/L (ref 3.5–5.1)
PROT SERPL-MCNC: 7.3 G/DL (ref 6–8.4)
SODIUM SERPL-SCNC: 142 MMOL/L (ref 136–145)
TSH SERPL DL<=0.005 MIU/L-ACNC: 3.85 UIU/ML (ref 0.4–4)

## 2023-07-20 PROCEDURE — 3061F PR NEG MICROALBUMINURIA RESULT DOCUMENTED/REVIEW: ICD-10-PCS | Mod: CPTII,S$GLB,, | Performed by: INTERNAL MEDICINE

## 2023-07-20 PROCEDURE — 3075F SYST BP GE 130 - 139MM HG: CPT | Mod: CPTII,S$GLB,, | Performed by: INTERNAL MEDICINE

## 2023-07-20 PROCEDURE — 4010F PR ACE/ARB THEARPY RXD/TAKEN: ICD-10-PCS | Mod: CPTII,S$GLB,, | Performed by: INTERNAL MEDICINE

## 2023-07-20 PROCEDURE — 3044F HG A1C LEVEL LT 7.0%: CPT | Mod: CPTII,S$GLB,, | Performed by: INTERNAL MEDICINE

## 2023-07-20 PROCEDURE — 1101F PR PT FALLS ASSESS DOC 0-1 FALLS W/OUT INJ PAST YR: ICD-10-PCS | Mod: CPTII,S$GLB,, | Performed by: INTERNAL MEDICINE

## 2023-07-20 PROCEDURE — 1125F AMNT PAIN NOTED PAIN PRSNT: CPT | Mod: CPTII,S$GLB,, | Performed by: INTERNAL MEDICINE

## 2023-07-20 PROCEDURE — 1101F PT FALLS ASSESS-DOCD LE1/YR: CPT | Mod: CPTII,S$GLB,, | Performed by: INTERNAL MEDICINE

## 2023-07-20 PROCEDURE — 97112 NEUROMUSCULAR REEDUCATION: CPT | Mod: CQ

## 2023-07-20 PROCEDURE — 3079F PR MOST RECENT DIASTOLIC BLOOD PRESSURE 80-89 MM HG: ICD-10-PCS | Mod: CPTII,S$GLB,, | Performed by: INTERNAL MEDICINE

## 2023-07-20 PROCEDURE — 1125F PR PAIN SEVERITY QUANTIFIED, PAIN PRESENT: ICD-10-PCS | Mod: CPTII,S$GLB,, | Performed by: INTERNAL MEDICINE

## 2023-07-20 PROCEDURE — 3075F PR MOST RECENT SYSTOLIC BLOOD PRESS GE 130-139MM HG: ICD-10-PCS | Mod: CPTII,S$GLB,, | Performed by: INTERNAL MEDICINE

## 2023-07-20 PROCEDURE — 3044F PR MOST RECENT HEMOGLOBIN A1C LEVEL <7.0%: ICD-10-PCS | Mod: CPTII,S$GLB,, | Performed by: INTERNAL MEDICINE

## 2023-07-20 PROCEDURE — 97140 MANUAL THERAPY 1/> REGIONS: CPT | Mod: CQ

## 2023-07-20 PROCEDURE — 3066F PR DOCUMENTATION OF TREATMENT FOR NEPHROPATHY: ICD-10-PCS | Mod: CPTII,S$GLB,, | Performed by: INTERNAL MEDICINE

## 2023-07-20 PROCEDURE — 36415 COLL VENOUS BLD VENIPUNCTURE: CPT | Mod: PN | Performed by: INTERNAL MEDICINE

## 2023-07-20 PROCEDURE — 84443 ASSAY THYROID STIM HORMONE: CPT | Performed by: INTERNAL MEDICINE

## 2023-07-20 PROCEDURE — 99999 PR PBB SHADOW E&M-EST. PATIENT-LVL V: ICD-10-PCS | Mod: PBBFAC,,, | Performed by: INTERNAL MEDICINE

## 2023-07-20 PROCEDURE — 3288F FALL RISK ASSESSMENT DOCD: CPT | Mod: CPTII,S$GLB,, | Performed by: INTERNAL MEDICINE

## 2023-07-20 PROCEDURE — 99214 PR OFFICE/OUTPT VISIT, EST, LEVL IV, 30-39 MIN: ICD-10-PCS | Mod: S$GLB,,, | Performed by: INTERNAL MEDICINE

## 2023-07-20 PROCEDURE — 1160F PR REVIEW ALL MEDS BY PRESCRIBER/CLIN PHARMACIST DOCUMENTED: ICD-10-PCS | Mod: CPTII,S$GLB,, | Performed by: INTERNAL MEDICINE

## 2023-07-20 PROCEDURE — 3079F DIAST BP 80-89 MM HG: CPT | Mod: CPTII,S$GLB,, | Performed by: INTERNAL MEDICINE

## 2023-07-20 PROCEDURE — 3288F PR FALLS RISK ASSESSMENT DOCUMENTED: ICD-10-PCS | Mod: CPTII,S$GLB,, | Performed by: INTERNAL MEDICINE

## 2023-07-20 PROCEDURE — 1159F MED LIST DOCD IN RCRD: CPT | Mod: CPTII,S$GLB,, | Performed by: INTERNAL MEDICINE

## 2023-07-20 PROCEDURE — 3061F NEG MICROALBUMINURIA REV: CPT | Mod: CPTII,S$GLB,, | Performed by: INTERNAL MEDICINE

## 2023-07-20 PROCEDURE — 4010F ACE/ARB THERAPY RXD/TAKEN: CPT | Mod: CPTII,S$GLB,, | Performed by: INTERNAL MEDICINE

## 2023-07-20 PROCEDURE — 97110 THERAPEUTIC EXERCISES: CPT | Mod: CQ

## 2023-07-20 PROCEDURE — 80053 COMPREHEN METABOLIC PANEL: CPT | Performed by: INTERNAL MEDICINE

## 2023-07-20 PROCEDURE — 99214 OFFICE O/P EST MOD 30 MIN: CPT | Mod: S$GLB,,, | Performed by: INTERNAL MEDICINE

## 2023-07-20 PROCEDURE — 83880 ASSAY OF NATRIURETIC PEPTIDE: CPT | Performed by: INTERNAL MEDICINE

## 2023-07-20 PROCEDURE — 99999 PR PBB SHADOW E&M-EST. PATIENT-LVL V: CPT | Mod: PBBFAC,,, | Performed by: INTERNAL MEDICINE

## 2023-07-20 PROCEDURE — 3008F PR BODY MASS INDEX (BMI) DOCUMENTED: ICD-10-PCS | Mod: CPTII,S$GLB,, | Performed by: INTERNAL MEDICINE

## 2023-07-20 PROCEDURE — 1159F PR MEDICATION LIST DOCUMENTED IN MEDICAL RECORD: ICD-10-PCS | Mod: CPTII,S$GLB,, | Performed by: INTERNAL MEDICINE

## 2023-07-20 PROCEDURE — 3066F NEPHROPATHY DOC TX: CPT | Mod: CPTII,S$GLB,, | Performed by: INTERNAL MEDICINE

## 2023-07-20 PROCEDURE — 1160F RVW MEDS BY RX/DR IN RCRD: CPT | Mod: CPTII,S$GLB,, | Performed by: INTERNAL MEDICINE

## 2023-07-20 PROCEDURE — 3008F BODY MASS INDEX DOCD: CPT | Mod: CPTII,S$GLB,, | Performed by: INTERNAL MEDICINE

## 2023-07-20 RX ORDER — TRAMADOL HYDROCHLORIDE 100 MG/1
100 TABLET, EXTENDED RELEASE ORAL DAILY
Qty: 30 TABLET | Refills: 1 | Status: SHIPPED | OUTPATIENT
Start: 2023-07-20 | End: 2023-12-20

## 2023-07-20 RX ORDER — FUROSEMIDE 20 MG/1
20 TABLET ORAL DAILY PRN
Qty: 30 TABLET | Refills: 1 | Status: SHIPPED | OUTPATIENT
Start: 2023-07-20 | End: 2023-10-05 | Stop reason: SDUPTHER

## 2023-07-21 NOTE — PROGRESS NOTES
Subjective     Patient ID: Linnea Renae is a 72 y.o. female.    Chief Complaint: Leg Swelling    Last seen 3 months ago. Returns early c/o swelling in lower legs and feet off and on. Taking daily meds as prescribed. Admits intermittent increased sodium intake. No associated chest pain, palpitations, orthopnea or NEVILLE. She has severe chronic pain of both lower extremities associated with neuropathy - Percocet recently added to Tramadol by Podiatry. Her Lyrica was also increased, but she doesn't want to take this because she was told by Bariatrics that it's responsible for her increased appetite; so she relies more on the opiates.    PMH: .   Hypertension.  Diabetes Type 2, HbA1c 5.5% .  Hyperlipidemia. LDL 66 .  GERD.  Lumbar Spinal Stenosis.   Chronic Dry Eyes.   Perennial Allergic Rhinitis.  Morbid Obesity.   Right Breast Cancer .  H/O Colon Polyps.     PSH: C/S x 3, BTL, Hysterectomy and USO, Bilateral Cataracts extracted, Cholecystectomy. Right breast excisional biopsy . Right Mastectomy  with reconstruction, and revisions  and .     Mammogram (Left) normal . BMD normal 8/15. Colonoscopy 10/18 - sigmoid diverticulosis, otherwise normal. Eye exam . Podiatry . Vaccines reviewed, up to date.     Social: Remote tobacco use, quit over 30 years ago. No alcohol.  with three daughters and three grandchildren. Homemaker.     FMH: Heart dis, Thyroid dis.     Allergies: Codeine.    Medications: list reviewed and reconciled.            Review of Systems   Constitutional:  Negative for fatigue and fever.   Respiratory:  Negative for cough and shortness of breath.    Cardiovascular:  Negative for chest pain, palpitations and claudication.   Gastrointestinal:  Negative for abdominal pain, diarrhea, nausea and vomiting.   Musculoskeletal:  Positive for back pain and leg pain. Negative for gait problem.        Objective   Vitals:    23 1531   BP: 136/82  "  Pulse: 70   Temp: 98.3 °F (36.8 °C)   SpO2: 98%   Weight: 123 kg (271 lb 3.2 oz)   Height: 5' 6" (1.676 m)      Physical Exam  Constitutional:       General: She is not in acute distress.     Appearance: She is not ill-appearing.   Cardiovascular:      Rate and Rhythm: Normal rate and regular rhythm.   Pulmonary:      Effort: Pulmonary effort is normal. No respiratory distress.      Breath sounds: Normal breath sounds.   Musculoskeletal:         General: No swelling or deformity. Normal range of motion.      Right lower leg: No edema.      Left lower leg: No edema.   Skin:     General: Skin is warm and dry.      Findings: No rash.   Neurological:      Mental Status: She is alert.      Motor: No weakness.      Gait: Gait normal.   Psychiatric:         Mood and Affect: Mood normal.         Behavior: Behavior normal.        Assessment and Plan     1. Bilateral swelling of feet (intermittent)  -     Comprehensive Metabolic Panel; Future; Expected date: 07/20/2023  -     BNP; Future; Expected date: 07/20/2023  -     TSH; Future; Expected date: 07/20/2023  -     furosemide (LASIX) 20 MG tablet; Take 1 tablet (20 mg total) by mouth daily as needed (Edema.).  Dispense: 30 tablet; Refill: 1    2. Essential hypertension typically controlled, continue same Atenolol, HCTZ and Olmesartan.     3. Type 2 diabetes mellitus with diabetic neuropathy, without long-term current use of insulin - well controlled on Semaglutide.    4. Weight gain  -     TSH; Future; Expected date: 07/20/2023    5. Degenerative lumbar spinal stenosis - she finds the 200mg Tramadol ER to be too sedating, wants it back down to 100.   -     traMADoL (ULTRAM-ER) 100 MG Tb24; Take 1 tablet (100 mg total) by mouth once daily.  Dispense: 30 tablet; Refill: 1  -     uses Percocet half tab sparingly for breakthrough pain.       Follow up in about 3 months (around 10/20/2023).      "

## 2023-07-23 ENCOUNTER — PATIENT MESSAGE (OUTPATIENT)
Dept: PRIMARY CARE CLINIC | Facility: CLINIC | Age: 73
End: 2023-07-23
Payer: MEDICARE

## 2023-07-27 RX ORDER — AMITRIPTYLINE HYDROCHLORIDE 25 MG/1
25 TABLET, FILM COATED ORAL NIGHTLY PRN
Qty: 30 TABLET | Refills: 2 | OUTPATIENT
Start: 2023-07-27 | End: 2024-07-26

## 2023-08-11 RX ORDER — OXYCODONE AND ACETAMINOPHEN 7.5; 325 MG/1; MG/1
1 TABLET ORAL
Qty: 30 TABLET | Refills: 0 | Status: SHIPPED | OUTPATIENT
Start: 2023-08-11 | End: 2023-09-11 | Stop reason: SDUPTHER

## 2023-08-14 DIAGNOSIS — M15.9 PRIMARY OSTEOARTHRITIS INVOLVING MULTIPLE JOINTS: ICD-10-CM

## 2023-08-15 RX ORDER — IBUPROFEN 800 MG/1
TABLET ORAL
Qty: 60 TABLET | Refills: 0 | Status: SHIPPED | OUTPATIENT
Start: 2023-08-15 | End: 2023-10-10 | Stop reason: SDUPTHER

## 2023-08-21 NOTE — TELEPHONE ENCOUNTER
She asked to switch back to Tramadol last month, reserving Hydrocodone for occasional treatment of severe pain due to side effects of sedation. \    Please call in one refill of Tramadol at #90 tabs with no additional refills until I see how she is using this.    Opzelura Counseling:  I discussed with the patient the risks of Opzelura including but not limited to nasopharngitis, bronchitis, ear infection, eosinophila, hives, diarrhea, folliculitis, tonsillitis, and rhinorrhea.  Taken orally, this medication has been linked to serious infections; higher rate of mortality; malignancy and lymphoproliferative disorders; major adverse cardiovascular events; thrombosis; thrombocytopenia, anemia, and neutropenia; and lipid elevations.

## 2023-08-23 RX ORDER — AMITRIPTYLINE HYDROCHLORIDE 25 MG/1
25 TABLET, FILM COATED ORAL NIGHTLY PRN
Qty: 30 TABLET | Refills: 2 | OUTPATIENT
Start: 2023-08-23 | End: 2024-08-22

## 2023-09-06 ENCOUNTER — OFFICE VISIT (OUTPATIENT)
Dept: BARIATRICS | Facility: CLINIC | Age: 73
End: 2023-09-06
Payer: MEDICARE

## 2023-09-06 VITALS
BODY MASS INDEX: 44.44 KG/M2 | SYSTOLIC BLOOD PRESSURE: 130 MMHG | DIASTOLIC BLOOD PRESSURE: 67 MMHG | HEART RATE: 75 BPM | OXYGEN SATURATION: 96 % | WEIGHT: 275.38 LBS

## 2023-09-06 DIAGNOSIS — E11.9 TYPE 2 DIABETES MELLITUS WITHOUT COMPLICATION, WITHOUT LONG-TERM CURRENT USE OF INSULIN: ICD-10-CM

## 2023-09-06 DIAGNOSIS — E66.01 CLASS 2 SEVERE OBESITY WITH BODY MASS INDEX (BMI) OF 35 TO 39.9 WITH SERIOUS COMORBIDITY: ICD-10-CM

## 2023-09-06 PROCEDURE — 1160F PR REVIEW ALL MEDS BY PRESCRIBER/CLIN PHARMACIST DOCUMENTED: ICD-10-PCS | Mod: CPTII,S$GLB,, | Performed by: INTERNAL MEDICINE

## 2023-09-06 PROCEDURE — 1125F AMNT PAIN NOTED PAIN PRSNT: CPT | Mod: CPTII,S$GLB,, | Performed by: INTERNAL MEDICINE

## 2023-09-06 PROCEDURE — 99214 OFFICE O/P EST MOD 30 MIN: CPT | Mod: S$GLB,,, | Performed by: INTERNAL MEDICINE

## 2023-09-06 PROCEDURE — 99214 PR OFFICE/OUTPT VISIT, EST, LEVL IV, 30-39 MIN: ICD-10-PCS | Mod: S$GLB,,, | Performed by: INTERNAL MEDICINE

## 2023-09-06 PROCEDURE — 3288F FALL RISK ASSESSMENT DOCD: CPT | Mod: CPTII,S$GLB,, | Performed by: INTERNAL MEDICINE

## 2023-09-06 PROCEDURE — 3288F PR FALLS RISK ASSESSMENT DOCUMENTED: ICD-10-PCS | Mod: CPTII,S$GLB,, | Performed by: INTERNAL MEDICINE

## 2023-09-06 PROCEDURE — 3008F BODY MASS INDEX DOCD: CPT | Mod: CPTII,S$GLB,, | Performed by: INTERNAL MEDICINE

## 2023-09-06 PROCEDURE — 3078F PR MOST RECENT DIASTOLIC BLOOD PRESSURE < 80 MM HG: ICD-10-PCS | Mod: CPTII,S$GLB,, | Performed by: INTERNAL MEDICINE

## 2023-09-06 PROCEDURE — 1101F PT FALLS ASSESS-DOCD LE1/YR: CPT | Mod: CPTII,S$GLB,, | Performed by: INTERNAL MEDICINE

## 2023-09-06 PROCEDURE — 3066F PR DOCUMENTATION OF TREATMENT FOR NEPHROPATHY: ICD-10-PCS | Mod: CPTII,S$GLB,, | Performed by: INTERNAL MEDICINE

## 2023-09-06 PROCEDURE — 3008F PR BODY MASS INDEX (BMI) DOCUMENTED: ICD-10-PCS | Mod: CPTII,S$GLB,, | Performed by: INTERNAL MEDICINE

## 2023-09-06 PROCEDURE — 3078F DIAST BP <80 MM HG: CPT | Mod: CPTII,S$GLB,, | Performed by: INTERNAL MEDICINE

## 2023-09-06 PROCEDURE — 4010F PR ACE/ARB THEARPY RXD/TAKEN: ICD-10-PCS | Mod: CPTII,S$GLB,, | Performed by: INTERNAL MEDICINE

## 2023-09-06 PROCEDURE — 99999 PR PBB SHADOW E&M-EST. PATIENT-LVL III: ICD-10-PCS | Mod: PBBFAC,,, | Performed by: INTERNAL MEDICINE

## 2023-09-06 PROCEDURE — 3044F PR MOST RECENT HEMOGLOBIN A1C LEVEL <7.0%: ICD-10-PCS | Mod: CPTII,S$GLB,, | Performed by: INTERNAL MEDICINE

## 2023-09-06 PROCEDURE — 3061F PR NEG MICROALBUMINURIA RESULT DOCUMENTED/REVIEW: ICD-10-PCS | Mod: CPTII,S$GLB,, | Performed by: INTERNAL MEDICINE

## 2023-09-06 PROCEDURE — 99999 PR PBB SHADOW E&M-EST. PATIENT-LVL III: CPT | Mod: PBBFAC,,, | Performed by: INTERNAL MEDICINE

## 2023-09-06 PROCEDURE — 1160F RVW MEDS BY RX/DR IN RCRD: CPT | Mod: CPTII,S$GLB,, | Performed by: INTERNAL MEDICINE

## 2023-09-06 PROCEDURE — 3066F NEPHROPATHY DOC TX: CPT | Mod: CPTII,S$GLB,, | Performed by: INTERNAL MEDICINE

## 2023-09-06 PROCEDURE — 3044F HG A1C LEVEL LT 7.0%: CPT | Mod: CPTII,S$GLB,, | Performed by: INTERNAL MEDICINE

## 2023-09-06 PROCEDURE — 3061F NEG MICROALBUMINURIA REV: CPT | Mod: CPTII,S$GLB,, | Performed by: INTERNAL MEDICINE

## 2023-09-06 PROCEDURE — 1101F PR PT FALLS ASSESS DOC 0-1 FALLS W/OUT INJ PAST YR: ICD-10-PCS | Mod: CPTII,S$GLB,, | Performed by: INTERNAL MEDICINE

## 2023-09-06 PROCEDURE — 1159F PR MEDICATION LIST DOCUMENTED IN MEDICAL RECORD: ICD-10-PCS | Mod: CPTII,S$GLB,, | Performed by: INTERNAL MEDICINE

## 2023-09-06 PROCEDURE — 1125F PR PAIN SEVERITY QUANTIFIED, PAIN PRESENT: ICD-10-PCS | Mod: CPTII,S$GLB,, | Performed by: INTERNAL MEDICINE

## 2023-09-06 PROCEDURE — 4010F ACE/ARB THERAPY RXD/TAKEN: CPT | Mod: CPTII,S$GLB,, | Performed by: INTERNAL MEDICINE

## 2023-09-06 PROCEDURE — 3075F SYST BP GE 130 - 139MM HG: CPT | Mod: CPTII,S$GLB,, | Performed by: INTERNAL MEDICINE

## 2023-09-06 PROCEDURE — 1159F MED LIST DOCD IN RCRD: CPT | Mod: CPTII,S$GLB,, | Performed by: INTERNAL MEDICINE

## 2023-09-06 PROCEDURE — 3075F PR MOST RECENT SYSTOLIC BLOOD PRESS GE 130-139MM HG: ICD-10-PCS | Mod: CPTII,S$GLB,, | Performed by: INTERNAL MEDICINE

## 2023-09-06 RX ORDER — TOPIRAMATE 50 MG/1
100 TABLET, FILM COATED ORAL NIGHTLY
Qty: 180 TABLET | Refills: 0 | Status: SHIPPED | OUTPATIENT
Start: 2023-09-06 | End: 2023-12-20 | Stop reason: SDUPTHER

## 2023-09-06 NOTE — PATIENT INSTRUCTIONS
Start Ozempic 2 mg once a week.     Decrease portions as soon as you start Ozempic. Avoid fried, greasy, fatty foods.     Some nausea in the first 2 weeks is not unusual.     If you get pain across the upper abdomen and around to your back, please call the office.       [email or web address] for coupon/videos.        Patient was informed that topiramate is used for migraine prevention and seizures. Weight loss is a common side effect that is well documented. S/he understands this. S/he was informed of the potential side effects such as serious and possibly fatal rash in which case the medication should be discontinued immediately. Paresthesias, forgetfulness, fatigue, kidney stones, GI symptoms, and changes in lab values such as electrolytes, blood counts and kidney function.    topiramate 50 mg 2 tabs in the evening.     No soda, sweet tea, juices or lemonade. All drinks should be 5 calories or less.         Limit starchy carbohydrates (bread, rice, pasta, potatoes, corn, peas, oatmeal, grits, tortillas, crackers, chips)        You need about 1000 calories and 70 g protein a day to lose weight      Tips for preparing for hurricane season:    If you are on weight loss medications, please take your medication with you in case of evacuation. Injectable medications should be transported with an ice pack if unopened or room temperature if you have started to use them.   Hurricane supplies do not necessarily need to be junk food or high in carbs or sugar. Shelf stable and healthy choices to include in your supplies could include:  Canned/packets of tuna or chicken  Apples, oranges, banana, pears  Beef or turkey jerky  Sugar free pudding  Pickle, olives, dill relish (mix with the tuna or chicken!)  Low sodium or no salt added canned vegetables  If you get bread, get lite bread (40 calories a slice)  0 sugar sports drinks  Water  String cheese will be okay for a few days without refrigeration or in an ice chest.    Peanut or other nut butter.   Parmesan crisps  Pork skins  Protein shakes (20-30g protein, less than 5 g sugar)  Protein bars (15 or more g protein, less than 5 g sugar)  Don't forget disposable forks, spoons, plates in your supplies  If evacuated, manage stress by taking walks, reading or meditating.   If eating out make better choices when possible.   Salads with a lean protein and limited dressing, cheese or other toppings  Grilled chicken sandwich or burger without the bun.   Skip the fries!  Order water or unsweetened tea instead of soda  Grocery stores with a deli, salad/food bar can be a good quick and affordable option to replace fast food

## 2023-09-06 NOTE — PROGRESS NOTES
Subjective:       Patient ID: Linnea Renae is a 73 y.o. female.    Chief Complaint: Follow-up      Pt here today for follow-up.Has gained 13 lbs, net neg 12 lbs. Started [id number] piyush diet and decreased topiramate to 100 mg qhs. Also started Ozempic for DM2, currently on 1 mg weekly. She has not SE, but appetite is  down  Is having trouble with neuropathy in her feet, and back and foot pain. Lyrica was increased, and she coorelates recent weight gain to that. She is no longer on it. ALso has had more edema. Bailee Moss MD has adjusted her diuretics.  Also percocet, so she has been tired.   In early April 2022 she underwent insertion of a permanent prosthesis and reduction mammoplasty in the contralateral breast. Implant exchange planned on the 18th .               checked today        Ophtho exam 5/10/22- Open angle with borderline findings, low risk, bilateral. No retinopathy.       checked today. Has been on tramadol chronically.    On metformin 2000 mg BID.     Lab Results   Component Value Date    HGBA1C 5.5 04/17/2023    HGBA1C 5.7 (H) 12/09/2022    HGBA1C 5.7 (H) 03/31/2022     Lab Results   Component Value Date    LDLCALC 65.8 04/17/2023    CREATININE 1.0 07/20/2023        Review of Systems   Constitutional: Negative for chills and fever.   Respiratory: Negative for shortness of breath.         Denies snoring   Cardiovascular: Positive for leg swelling. Negative for chest pain.   Gastrointestinal: Positive for constipation. Negative for diarrhea.        + GERD   Genitourinary: Negative for difficulty urinating and dysuria.   Musculoskeletal: Positive for arthralgias and back pain.   Skin: Positive for rash.   Neurological: Positive for dizziness. Negative for light-headedness.   Psychiatric/Behavioral: Positive for dysphoric mood. The patient is nervous/anxious.        Objective:     /67   Pulse 75   Wt 124.9 kg (275 lb 5.7 oz)   SpO2 96%   BMI 44.44 kg/m²     Physical Exam    Constitutional: She is oriented to person, place, and time. She appears well-developed. No distress.   obese   HENT:   Head: Normocephalic and atraumatic.   Eyes: Pupils are equal, round, and reactive to light. EOM are normal. No scleral icterus.   Neck: Normal range of motion. Neck supple.   Cardiovascular: Normal rate.   Pulmonary/Chest: Effort normal.   Musculoskeletal: Normal range of motion. She exhibits + edema.   Neurological: She is alert and oriented to person, place, and time. No cranial nerve deficit.   Skin: Skin is warm and dry. No erythema.   Psychiatric: She has a normal mood and affect. Her behavior is normal. Judgment normal.   Vitals reviewed.      Assessment:       1. Type 2 diabetes mellitus without complication, without long-term current use of insulin    2. Class 2 severe obesity with body mass index (BMI) of 35 to 39.9 with serious comorbidity                Plan:         Linnea was seen today for follow-up.    Diagnoses and all orders for this visit:    Type 2 diabetes mellitus without complication, without long-term current use of insulin  -     semaglutide (OZEMPIC) 2 mg/dose (8 mg/3 mL) PnIj; Inject 2 mg into the skin every 7 days.    Class 2 severe obesity with body mass index (BMI) of 35 to 39.9 with serious comorbidity  -     topiramate (TOPAMAX) 50 MG tablet; Take 2 tablets (100 mg total) by mouth every evening.               Start Ozempic 2 mg once a week.     Decrease portions as soon as you start Ozempic. Avoid fried, greasy, fatty foods.     Some nausea in the first 2 weeks is not unusual.     If you get pain across the upper abdomen and around to your back, please call the office.       [email or web address] for coupon/videos.        Patient was informed that topiramate is used for migraine prevention and seizures. Weight loss is a common side effect that is well documented. S/he understands this. S/he was informed of the potential side effects such as serious and possibly fatal  rash in which case the medication should be discontinued immediately. Paresthesias, forgetfulness, fatigue, kidney stones, GI symptoms, and changes in lab values such as electrolytes, blood counts and kidney function.    topiramate 50 mg 2 tabs in the evening.     No soda, sweet tea, juices or lemonade. All drinks should be 5 calories or less.         Limit starchy carbohydrates (bread, rice, pasta, potatoes, corn, peas, oatmeal, grits, tortillas, crackers, chips)        You need about 1000 calories and 70 g protein a day to lose weight    Hurricane tips given.

## 2023-09-13 RX ORDER — OXYCODONE AND ACETAMINOPHEN 7.5; 325 MG/1; MG/1
1 TABLET ORAL
Qty: 30 TABLET | Refills: 0 | Status: SHIPPED | OUTPATIENT
Start: 2023-09-13 | End: 2023-10-16

## 2023-09-19 ENCOUNTER — OFFICE VISIT (OUTPATIENT)
Dept: PODIATRY | Facility: CLINIC | Age: 73
End: 2023-09-19
Payer: MEDICARE

## 2023-09-19 VITALS
DIASTOLIC BLOOD PRESSURE: 72 MMHG | SYSTOLIC BLOOD PRESSURE: 148 MMHG | WEIGHT: 273.69 LBS | HEART RATE: 81 BPM | BODY MASS INDEX: 43.98 KG/M2 | HEIGHT: 66 IN

## 2023-09-19 DIAGNOSIS — G89.4 CHRONIC PAIN SYNDROME: ICD-10-CM

## 2023-09-19 DIAGNOSIS — E11.9 TYPE 2 DIABETES MELLITUS WITHOUT COMPLICATION, WITHOUT LONG-TERM CURRENT USE OF INSULIN: Primary | ICD-10-CM

## 2023-09-19 DIAGNOSIS — L84 CORN OR CALLUS: ICD-10-CM

## 2023-09-19 PROCEDURE — 3044F HG A1C LEVEL LT 7.0%: CPT | Mod: CPTII,S$GLB,, | Performed by: PODIATRIST

## 2023-09-19 PROCEDURE — 3044F PR MOST RECENT HEMOGLOBIN A1C LEVEL <7.0%: ICD-10-PCS | Mod: CPTII,S$GLB,, | Performed by: PODIATRIST

## 2023-09-19 PROCEDURE — 99214 PR OFFICE/OUTPT VISIT, EST, LEVL IV, 30-39 MIN: ICD-10-PCS | Mod: S$GLB,,, | Performed by: PODIATRIST

## 2023-09-19 PROCEDURE — 4010F ACE/ARB THERAPY RXD/TAKEN: CPT | Mod: CPTII,S$GLB,, | Performed by: PODIATRIST

## 2023-09-19 PROCEDURE — 3066F PR DOCUMENTATION OF TREATMENT FOR NEPHROPATHY: ICD-10-PCS | Mod: CPTII,S$GLB,, | Performed by: PODIATRIST

## 2023-09-19 PROCEDURE — 3066F NEPHROPATHY DOC TX: CPT | Mod: CPTII,S$GLB,, | Performed by: PODIATRIST

## 2023-09-19 PROCEDURE — 3008F PR BODY MASS INDEX (BMI) DOCUMENTED: ICD-10-PCS | Mod: CPTII,S$GLB,, | Performed by: PODIATRIST

## 2023-09-19 PROCEDURE — 3078F DIAST BP <80 MM HG: CPT | Mod: CPTII,S$GLB,, | Performed by: PODIATRIST

## 2023-09-19 PROCEDURE — 3061F NEG MICROALBUMINURIA REV: CPT | Mod: CPTII,S$GLB,, | Performed by: PODIATRIST

## 2023-09-19 PROCEDURE — 3008F BODY MASS INDEX DOCD: CPT | Mod: CPTII,S$GLB,, | Performed by: PODIATRIST

## 2023-09-19 PROCEDURE — 1125F PR PAIN SEVERITY QUANTIFIED, PAIN PRESENT: ICD-10-PCS | Mod: CPTII,S$GLB,, | Performed by: PODIATRIST

## 2023-09-19 PROCEDURE — 4010F PR ACE/ARB THEARPY RXD/TAKEN: ICD-10-PCS | Mod: CPTII,S$GLB,, | Performed by: PODIATRIST

## 2023-09-19 PROCEDURE — 3061F PR NEG MICROALBUMINURIA RESULT DOCUMENTED/REVIEW: ICD-10-PCS | Mod: CPTII,S$GLB,, | Performed by: PODIATRIST

## 2023-09-19 PROCEDURE — 99999 PR PBB SHADOW E&M-EST. PATIENT-LVL II: ICD-10-PCS | Mod: PBBFAC,,, | Performed by: PODIATRIST

## 2023-09-19 PROCEDURE — 99999 PR PBB SHADOW E&M-EST. PATIENT-LVL II: CPT | Mod: PBBFAC,,, | Performed by: PODIATRIST

## 2023-09-19 PROCEDURE — 3077F SYST BP >= 140 MM HG: CPT | Mod: CPTII,S$GLB,, | Performed by: PODIATRIST

## 2023-09-19 PROCEDURE — 3077F PR MOST RECENT SYSTOLIC BLOOD PRESSURE >= 140 MM HG: ICD-10-PCS | Mod: CPTII,S$GLB,, | Performed by: PODIATRIST

## 2023-09-19 PROCEDURE — 99214 OFFICE O/P EST MOD 30 MIN: CPT | Mod: S$GLB,,, | Performed by: PODIATRIST

## 2023-09-19 PROCEDURE — 1125F AMNT PAIN NOTED PAIN PRSNT: CPT | Mod: CPTII,S$GLB,, | Performed by: PODIATRIST

## 2023-09-19 PROCEDURE — 3078F PR MOST RECENT DIASTOLIC BLOOD PRESSURE < 80 MM HG: ICD-10-PCS | Mod: CPTII,S$GLB,, | Performed by: PODIATRIST

## 2023-09-19 NOTE — PROGRESS NOTES
Subjective:      Patient ID: Linnea Renae is a 73 y.o. female.    Chief Complaint: Foot Pain (Bilateral with tingling, mild numbness)      Linnea is a 73 y.o. female who presents to the clinic upon referral from Dr. Nat marsh. provider found  for evaluation and treatment of diabetic feet. Linnea has a past medical history of Allergy, Breast cancer, Chronic constipation, Colon polyps, Diabetes mellitus, type 2, Dry eyes, GERD (gastroesophageal reflux disease), Hyperlipidemia, Hypertension, Lumbar disc disease, and Type 2 diabetes mellitus.  The bottoms of both feet are improving in terms of pain and stiffness/neuropathy symptoms.  Upcoming pain management appointment in December of 2023.  Percocet as needed is helpful.  PCP: Bailee Moss MD    Date Last Seen by PCP:   Chief Complaint   Patient presents with    Foot Pain     Bilateral with tingling, mild numbness         Current shoe gear: Casual shoes    Hemoglobin A1C   Date Value Ref Range Status   04/17/2023 5.5 4.0 - 5.6 % Final     Comment:     ADA Screening Guidelines:  5.7-6.4%  Consistent with prediabetes  >or=6.5%  Consistent with diabetes    High levels of fetal hemoglobin interfere with the HbA1C  assay. Heterozygous hemoglobin variants (HbS, HgC, etc)do  not significantly interfere with this assay.   However, presence of multiple variants may affect accuracy.     12/09/2022 5.7 (H) 4.0 - 5.6 % Final     Comment:     ADA Screening Guidelines:  5.7-6.4%  Consistent with prediabetes  >or=6.5%  Consistent with diabetes    High levels of fetal hemoglobin interfere with the HbA1C  assay. Heterozygous hemoglobin variants (HbS, HgC, etc)do  not significantly interfere with this assay.   However, presence of multiple variants may affect accuracy.     03/31/2022 5.7 (H) 4.0 - 5.6 % Final     Comment:     ADA Screening Guidelines:  5.7-6.4%  Consistent with prediabetes  >or=6.5%  Consistent with diabetes    High levels of fetal hemoglobin interfere with the  HbA1C  assay. Heterozygous hemoglobin variants (HbS, HgC, etc)do  not significantly interfere with this assay.   However, presence of multiple variants may affect accuracy.             Review of Systems   Constitution: Negative for chills and fever.   HENT: Negative for congestion and tinnitus.    Eyes: Negative for double vision and visual disturbance.   Cardiovascular: Negative for chest pain and claudication.   Respiratory: Negative for hemoptysis and shortness of breath.    Endocrine: Negative for cold intolerance and heat intolerance.   Hematologic/Lymphatic: Negative for adenopathy and bleeding problem.   Skin: Positive for color change, dry skin and nail changes.   Musculoskeletal: Positive for myalgias and stiffness.   Gastrointestinal: Negative for nausea and vomiting.   Genitourinary: Negative for dysuria and hematuria.   Neurological: Positive for numbness.   Psychiatric/Behavioral: Negative for altered mental status and suicidal ideas.   Allergic/Immunologic: Negative for environmental allergies and persistent infections.           Objective:      Physical Exam  Vitals signs reviewed.   Constitutional:       Appearance: She is well-developed.   Cardiovascular:      Pulses:           Dorsalis pedis pulses are 1+ on the right side and 1+ on the left side.        Posterior tibial pulses are 1+ on the right side and 1+ on the left side.   Pulmonary:      Effort: Pulmonary effort is normal.   Musculoskeletal: Normal range of motion.      Comments: Anterior, lateral, and posterior muscle groups bilateral lower extremities show strength 4 over 5 symmetrically. Inspection and palpation of the joints and bones reveal no crepitus or joint effusion. No tenderness upon palpation. Mild plantar flexor contractures noted to digits 2 through 5 bilaterally.  Angle and base of gait are normal.   Feet:      Right foot:      Skin integrity: Callus and dry skin present.      Left foot:      Skin integrity: Callus and dry  skin present.   Skin:     General: Skin is warm and dry.      Capillary Refill: Capillary refill takes 2 to 3 seconds.      Coloration: Skin is pale.      Comments: Skin bilateral lower extremities noted to be thin, dry, and atrophic.  Toenails normal.   Neurological:      Mental Status: She is alert and oriented to person, place, and time.      Sensory: Sensory deficit present.      Motor: Atrophy present.      Deep Tendon Reflexes: Reflexes abnormal.      Reflex Scores:       Patellar reflexes are 1+ on the right side and 1+ on the left side.       Achilles reflexes are 1+ on the right side and 1+ on the left side.     Comments: Sharp, dull, light touch, and vibratory sensation are diminished bilaterally. Proprioceptive sensation is intact to both lower extremities. [place] Jackie monofilament exam shows loss of protective sensation to plantar toes 1 through 5 bilaterally. Deep tendon reflexes to the patellar tendons is 1 over 4 bilaterally symmetrical. Deep tendon reflexes to the Achilles tendon is 0 over 4 bilaterally symmetrical. No ankle clonus or Babinski reflex noted bilaterally. Coordination is fair to both lower extremities.                 Assessment:       Encounter Diagnoses   Name Primary?    Type 2 diabetes mellitus without complication, without long-term current use of insulin Yes    Corn or callus     Chronic pain syndrome            Plan:       Linnea was seen today for foot pain.    Diagnoses and all orders for this visit:    Type 2 diabetes mellitus without complication, without long-term current use of insulin  -     DIABETIC SHOES FOR HOME USE    Corn or callus  -     DIABETIC SHOES FOR HOME USE    Chronic pain syndrome  -     DIABETIC SHOES FOR HOME USE        I counseled the patient on her conditions, their implications and medical management.    Shoe inspection. Diabetic Foot Education. Patient reminded of the importance of good nutrition and blood sugar control to help prevent podiatric  complications of diabetes. Patient instructed on proper foot hygeine. We discussed wearing proper shoe gear, daily foot inspections and Diabetic foot education in detail.    Discussed options for peripheral neuropathy/nerve entrapment syndrome including nerve block therapy, surgical nerve entrapment decompression procedures, and various vitamins and supplementation available shown to improve nerve function.    Follow up in 3 months after consultation with pain management.  .  Follow-up in 3 months.

## 2023-09-25 DIAGNOSIS — M48.061 DEGENERATIVE LUMBAR SPINAL STENOSIS: ICD-10-CM

## 2023-09-25 RX ORDER — TRAMADOL HYDROCHLORIDE 100 MG/1
100 TABLET, EXTENDED RELEASE ORAL DAILY
Qty: 30 TABLET | Refills: 1 | Status: CANCELLED | OUTPATIENT
Start: 2023-09-25

## 2023-09-25 NOTE — TELEPHONE ENCOUNTER
Refill Routing Note   Medication(s) are not appropriate for processing by [facility] for the following reason(s):      Medication outside of protocol    [facility] action(s):  Route Care Due:  None identified              Appointments  past 12m or future 3m with PCP    Date Provider   Last Visit   7/20/2023 Bailee Moss MD   Next Visit   12/12/2023 Baliee Moss MD   ED visits in past 90 days: 0        Note composed:9:34 AM 09/25/2023

## 2023-09-25 NOTE — TELEPHONE ENCOUNTER
No care due was identified.  [facility] Embedded Care Due Messages. Reference number: [id number].   9/25/2023 9:25:12 AM CDT

## 2023-09-27 DIAGNOSIS — M48.061 DEGENERATIVE LUMBAR SPINAL STENOSIS: ICD-10-CM

## 2023-09-27 RX ORDER — TRAMADOL HYDROCHLORIDE 100 MG/1
100 TABLET, EXTENDED RELEASE ORAL DAILY
Qty: 30 TABLET | Refills: 1 | Status: CANCELLED | OUTPATIENT
Start: 2023-09-25

## 2023-09-27 NOTE — TELEPHONE ENCOUNTER
No care due was identified.  [facility] Embedded Care Due Messages. Reference number: [id number].   9/27/2023 8:20:42 AM CDT

## 2023-09-29 RX ORDER — TRAMADOL HYDROCHLORIDE 100 MG/1
100 TABLET, EXTENDED RELEASE ORAL DAILY
Qty: 30 TABLET | Refills: 1 | OUTPATIENT
Start: 2023-09-29

## 2023-10-04 DIAGNOSIS — F41.9 ANXIETY: Primary | ICD-10-CM

## 2023-10-04 NOTE — TELEPHONE ENCOUNTER
Refill Routing Note   Medication(s) are not appropriate for processing by [facility] for the following reason(s):      Medication outside of protocol    [facility] action(s):  Route Care Due:  None identified            Appointments  past 12m or future 3m with PCP    Date Provider   Last Visit   7/20/2023 Bailee Moss MD   Next Visit   12/12/2023 Bailee Moss MD   ED visits in past 90 days: 0        Note composed:5:55 PM 10/04/2023

## 2023-10-04 NOTE — TELEPHONE ENCOUNTER
No care due was identified.  [facility] Embedded Care Due Messages. Reference number: [id number].   10/04/2023 5:23:27 PM CDT

## 2023-10-05 DIAGNOSIS — M79.89 BILATERAL SWELLING OF FEET: ICD-10-CM

## 2023-10-06 RX ORDER — FUROSEMIDE 20 MG/1
20 TABLET ORAL DAILY PRN
Qty: 30 TABLET | Refills: 1 | Status: SHIPPED | OUTPATIENT
Start: 2023-10-06

## 2023-10-06 NOTE — TELEPHONE ENCOUNTER
Refill Routing Note     Refill Routing Note   Medication(s) are not appropriate for processing by [facility] for the following reason(s):      Required vitals abnormal  New or recently adjusted medication    [facility] action(s):  Defer Care Due:  None identified            Appointments  past 12m or future 3m with PCP    Date Provider   Last Visit   7/20/2023 Bailee Moss MD   Next Visit   12/12/2023 Bailee Moss MD   ED visits in past 90 days: 0        Note composed:9:49 PM 10/05/2023

## 2023-10-06 NOTE — TELEPHONE ENCOUNTER
No care due was identified.  [facility] Embedded Care Due Messages. Reference number: [id number].   10/05/2023 9:40:09 PM CDT

## 2023-10-09 RX ORDER — ALPRAZOLAM 0.5 MG/1
0.5 TABLET ORAL 2 TIMES DAILY PRN
Qty: 30 TABLET | Refills: 0 | Status: SHIPPED | OUTPATIENT
Start: 2023-10-09

## 2023-10-10 DIAGNOSIS — M15.9 PRIMARY OSTEOARTHRITIS INVOLVING MULTIPLE JOINTS: ICD-10-CM

## 2023-10-10 NOTE — TELEPHONE ENCOUNTER
Care Due:                  Date            Visit Type   Department     Provider  --------------------------------------------------------------------------------                                SAME DAY -                              ESTABLISHED   [facility]   Bailee Yayo  Last Visit: 07-      PATIENT      CARE           Ajay                              EP -                              PRIMARY      [facility]   Bailee Zee  Next Visit: 12-      CARE ([facility])   CARE           Ajay                                                            Last  Test          Frequency    Reason                     Performed    Due Date  --------------------------------------------------------------------------------    eGFR........  12 months..  ibuprofen................  Not Found    Overdue    [employer] Embedded Care Due Messages. Reference number: [id number].   10/10/2023 11:25:00 AM CDT

## 2023-10-11 RX ORDER — IBUPROFEN 800 MG/1
800 TABLET ORAL 2 TIMES DAILY PRN
Qty: 60 TABLET | Refills: 0 | Status: SHIPPED | OUTPATIENT
Start: 2023-10-11 | End: 2023-11-02 | Stop reason: SDUPTHER

## 2023-10-12 DIAGNOSIS — I10 ESSENTIAL HYPERTENSION: ICD-10-CM

## 2023-10-12 NOTE — TELEPHONE ENCOUNTER
No care due was identified.  [facility] Embedded Care Due Messages. Reference number: [id number].   10/12/2023 10:53:27 AM CDT

## 2023-10-12 NOTE — TELEPHONE ENCOUNTER
Refill Routing Note   Medication(s) are not appropriate for processing by [facility] for the following reason(s):      Required vitals abnormal    [facility] action(s):  Defer Care Due:  None identified            Appointments  past 12m or future 3m with PCP    Date Provider   Last Visit   7/20/2023 Bailee Moss MD   Next Visit   12/12/2023 Bailee Moss MD   ED visits in past 90 days: 0        Note composed:4:53 PM 10/12/2023

## 2023-10-13 RX ORDER — ATENOLOL 50 MG/1
50 TABLET ORAL 2 TIMES DAILY
Qty: 180 TABLET | Refills: 2 | Status: SHIPPED | OUTPATIENT
Start: 2023-10-13

## 2023-10-16 ENCOUNTER — OFFICE VISIT (OUTPATIENT)
Dept: PHYSICAL MEDICINE AND REHAB | Facility: CLINIC | Age: 73
End: 2023-10-16
Payer: MEDICARE

## 2023-10-16 ENCOUNTER — LAB VISIT (OUTPATIENT)
Dept: LAB | Facility: HOSPITAL | Age: 73
End: 2023-10-16
Attending: PHYSICAL MEDICINE & REHABILITATION
Payer: MEDICARE

## 2023-10-16 VITALS — BODY MASS INDEX: 43.54 KG/M2 | WEIGHT: 270.94 LBS | OXYGEN SATURATION: 98 % | HEIGHT: 66 IN

## 2023-10-16 DIAGNOSIS — M70.62 TROCHANTERIC BURSITIS OF BOTH HIPS: ICD-10-CM

## 2023-10-16 DIAGNOSIS — M47.816 LUMBAR FACET ARTHROPATHY: ICD-10-CM

## 2023-10-16 DIAGNOSIS — G89.29 CHRONIC BILATERAL LOW BACK PAIN WITHOUT SCIATICA: ICD-10-CM

## 2023-10-16 DIAGNOSIS — E11.42 DIABETIC PERIPHERAL NEUROPATHY: ICD-10-CM

## 2023-10-16 DIAGNOSIS — Z79.891 CHRONICALLY ON OPIATE THERAPY: ICD-10-CM

## 2023-10-16 DIAGNOSIS — M48.061 SPINAL STENOSIS OF LUMBAR REGION WITHOUT NEUROGENIC CLAUDICATION: ICD-10-CM

## 2023-10-16 DIAGNOSIS — M79.672 FOOT PAIN, BILATERAL: Primary | ICD-10-CM

## 2023-10-16 DIAGNOSIS — E66.01 MORBID OBESITY WITH BMI OF 40.0-44.9, ADULT: ICD-10-CM

## 2023-10-16 DIAGNOSIS — M54.50 CHRONIC BILATERAL LOW BACK PAIN WITHOUT SCIATICA: ICD-10-CM

## 2023-10-16 DIAGNOSIS — M48.061 NEURAL FORAMINAL STENOSIS OF LUMBAR SPINE: ICD-10-CM

## 2023-10-16 DIAGNOSIS — M70.61 TROCHANTERIC BURSITIS OF BOTH HIPS: ICD-10-CM

## 2023-10-16 DIAGNOSIS — M51.36 DDD (DEGENERATIVE DISC DISEASE), LUMBAR: ICD-10-CM

## 2023-10-16 DIAGNOSIS — M79.671 FOOT PAIN, BILATERAL: Primary | ICD-10-CM

## 2023-10-16 PROCEDURE — 3044F HG A1C LEVEL LT 7.0%: CPT | Mod: CPTII,S$GLB,, | Performed by: PHYSICAL MEDICINE & REHABILITATION

## 2023-10-16 PROCEDURE — 3061F PR NEG MICROALBUMINURIA RESULT DOCUMENTED/REVIEW: ICD-10-PCS | Mod: CPTII,S$GLB,, | Performed by: PHYSICAL MEDICINE & REHABILITATION

## 2023-10-16 PROCEDURE — 99999 PR PBB SHADOW E&M-EST. PATIENT-LVL IV: CPT | Mod: PBBFAC,,, | Performed by: PHYSICAL MEDICINE & REHABILITATION

## 2023-10-16 PROCEDURE — 3061F NEG MICROALBUMINURIA REV: CPT | Mod: CPTII,S$GLB,, | Performed by: PHYSICAL MEDICINE & REHABILITATION

## 2023-10-16 PROCEDURE — 99999 PR PBB SHADOW E&M-EST. PATIENT-LVL IV: ICD-10-PCS | Mod: PBBFAC,,, | Performed by: PHYSICAL MEDICINE & REHABILITATION

## 2023-10-16 PROCEDURE — 3066F NEPHROPATHY DOC TX: CPT | Mod: CPTII,S$GLB,, | Performed by: PHYSICAL MEDICINE & REHABILITATION

## 2023-10-16 PROCEDURE — 3044F PR MOST RECENT HEMOGLOBIN A1C LEVEL <7.0%: ICD-10-PCS | Mod: CPTII,S$GLB,, | Performed by: PHYSICAL MEDICINE & REHABILITATION

## 2023-10-16 PROCEDURE — 1125F AMNT PAIN NOTED PAIN PRSNT: CPT | Mod: CPTII,S$GLB,, | Performed by: PHYSICAL MEDICINE & REHABILITATION

## 2023-10-16 PROCEDURE — 1101F PT FALLS ASSESS-DOCD LE1/YR: CPT | Mod: CPTII,S$GLB,, | Performed by: PHYSICAL MEDICINE & REHABILITATION

## 2023-10-16 PROCEDURE — 3008F PR BODY MASS INDEX (BMI) DOCUMENTED: ICD-10-PCS | Mod: CPTII,S$GLB,, | Performed by: PHYSICAL MEDICINE & REHABILITATION

## 2023-10-16 PROCEDURE — 4010F ACE/ARB THERAPY RXD/TAKEN: CPT | Mod: CPTII,S$GLB,, | Performed by: PHYSICAL MEDICINE & REHABILITATION

## 2023-10-16 PROCEDURE — 3008F BODY MASS INDEX DOCD: CPT | Mod: CPTII,S$GLB,, | Performed by: PHYSICAL MEDICINE & REHABILITATION

## 2023-10-16 PROCEDURE — 3288F PR FALLS RISK ASSESSMENT DOCUMENTED: ICD-10-PCS | Mod: CPTII,S$GLB,, | Performed by: PHYSICAL MEDICINE & REHABILITATION

## 2023-10-16 PROCEDURE — 1159F PR MEDICATION LIST DOCUMENTED IN MEDICAL RECORD: ICD-10-PCS | Mod: CPTII,S$GLB,, | Performed by: PHYSICAL MEDICINE & REHABILITATION

## 2023-10-16 PROCEDURE — 1159F MED LIST DOCD IN RCRD: CPT | Mod: CPTII,S$GLB,, | Performed by: PHYSICAL MEDICINE & REHABILITATION

## 2023-10-16 PROCEDURE — 99205 OFFICE O/P NEW HI 60 MIN: CPT | Mod: S$GLB,,, | Performed by: PHYSICAL MEDICINE & REHABILITATION

## 2023-10-16 PROCEDURE — 1101F PR PT FALLS ASSESS DOC 0-1 FALLS W/OUT INJ PAST YR: ICD-10-PCS | Mod: CPTII,S$GLB,, | Performed by: PHYSICAL MEDICINE & REHABILITATION

## 2023-10-16 PROCEDURE — 80326 AMPHETAMINES 5 OR MORE: CPT | Performed by: PHYSICAL MEDICINE & REHABILITATION

## 2023-10-16 PROCEDURE — 3066F PR DOCUMENTATION OF TREATMENT FOR NEPHROPATHY: ICD-10-PCS | Mod: CPTII,S$GLB,, | Performed by: PHYSICAL MEDICINE & REHABILITATION

## 2023-10-16 PROCEDURE — 1125F PR PAIN SEVERITY QUANTIFIED, PAIN PRESENT: ICD-10-PCS | Mod: CPTII,S$GLB,, | Performed by: PHYSICAL MEDICINE & REHABILITATION

## 2023-10-16 PROCEDURE — 99205 PR OFFICE/OUTPT VISIT, NEW, LEVL V, 60-74 MIN: ICD-10-PCS | Mod: S$GLB,,, | Performed by: PHYSICAL MEDICINE & REHABILITATION

## 2023-10-16 PROCEDURE — 4010F PR ACE/ARB THEARPY RXD/TAKEN: ICD-10-PCS | Mod: CPTII,S$GLB,, | Performed by: PHYSICAL MEDICINE & REHABILITATION

## 2023-10-16 PROCEDURE — 3288F FALL RISK ASSESSMENT DOCD: CPT | Mod: CPTII,S$GLB,, | Performed by: PHYSICAL MEDICINE & REHABILITATION

## 2023-10-16 RX ORDER — MELOXICAM 15 MG/1
15 TABLET ORAL DAILY
Qty: 30 TABLET | Refills: 3 | Status: SHIPPED | OUTPATIENT
Start: 2023-10-16 | End: 2024-01-04 | Stop reason: SDUPTHER

## 2023-10-16 RX ORDER — TRAMADOL HYDROCHLORIDE 50 MG/1
50 TABLET ORAL 3 TIMES DAILY PRN
Qty: 90 TABLET | Refills: 2 | Status: SHIPPED | OUTPATIENT
Start: 2023-10-16 | End: 2023-10-31 | Stop reason: SDUPTHER

## 2023-10-16 RX ORDER — DULOXETIN HYDROCHLORIDE 30 MG/1
30 CAPSULE, DELAYED RELEASE ORAL DAILY
Qty: 30 CAPSULE | Refills: 1 | Status: SHIPPED | OUTPATIENT
Start: 2023-10-16 | End: 2023-11-04 | Stop reason: SDUPTHER
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
## 2023-10-19 LAB
6MAM UR QL: NOT DETECTED
7AMINOCLONAZEPAM UR QL: NOT DETECTED
A-OH ALPRAZ UR QL: NOT DETECTED
ALPHA-OH-MIDAZOLAM: NOT DETECTED
ALPRAZ UR QL: NOT DETECTED
AMPHET UR QL SCN: NOT DETECTED
ANNOTATION COMMENT IMP: NORMAL
ANNOTATION COMMENT IMP: NORMAL
BARBITURATES UR QL: NOT DETECTED
BUPRENORPHINE UR QL: NOT DETECTED
BZE UR QL: NOT DETECTED
CARBOXYTHC UR QL: NOT DETECTED
CARISOPRODOL UR QL: NOT DETECTED
CLONAZEPAM UR QL: NOT DETECTED
CODEINE UR QL: NOT DETECTED
CREAT UR-MCNC: 58.5 MG/DL (ref 20–400)
DIAZEPAM UR QL: NOT DETECTED
ETHYL GLUCURONIDE UR QL: NOT DETECTED
FENTANYL UR QL: NOT DETECTED
GABAPENTIN: NOT DETECTED
HYDROCODONE UR QL: NOT DETECTED
HYDROMORPHONE UR QL: NOT DETECTED
LORAZEPAM UR QL: NOT DETECTED
MDA UR QL: NOT DETECTED
MDEA UR QL: NOT DETECTED
MDMA UR QL: NOT DETECTED
ME-PHENIDATE UR QL: NOT DETECTED
METHADONE UR QL: NOT DETECTED
METHAMPHET UR QL: NOT DETECTED
MIDAZOLAM UR QL SCN: NOT DETECTED
MORPHINE UR QL: NOT DETECTED
NALOXONE: NOT DETECTED
NORBUPRENORPHINE UR QL CFM: NOT DETECTED
NORDIAZEPAM UR QL: NOT DETECTED
NORFENTANYL UR QL: NOT DETECTED
NORHYDROCODONE UR QL CFM: NOT DETECTED
NORMEPERIDINE UR QL CFM: NOT DETECTED
NOROXYCODONE UR QL CFM: PRESENT
NOROXYMORPHONE UR QL SCN: NOT DETECTED
OXAZEPAM UR QL: NOT DETECTED
OXYCODONE UR QL: PRESENT
OXYMORPHONE UR QL: PRESENT
PATHOLOGY STUDY: NORMAL
PCP UR QL: NOT DETECTED
PHENTERMINE UR QL: NOT DETECTED
PREGABALIN: NOT DETECTED
SERVICE CMNT-IMP: NORMAL
TAPENTADOL UR QL SCN: NOT DETECTED
TAPENTADOL UR QL SCN: NOT DETECTED
TEMAZEPAM UR QL: NOT DETECTED
TRAMADOL UR QL: PRESENT
ZOLPIDEM METABOLITE: NOT DETECTED
ZOLPIDEM UR QL: NOT DETECTED

## 2023-10-23 ENCOUNTER — TELEPHONE (OUTPATIENT)
Dept: PLASTIC SURGERY | Facility: CLINIC | Age: 73
End: 2023-10-23
Payer: MEDICARE

## 2023-10-23 NOTE — TELEPHONE ENCOUNTER
Attempted to contact pt to discuss scheduling. Pt was not available at the time of the call. I left a detailed VM asking pt to call PLS office at her convenience.

## 2023-10-24 ENCOUNTER — PATIENT MESSAGE (OUTPATIENT)
Dept: SURGERY | Facility: CLINIC | Age: 73
End: 2023-10-24
Payer: MEDICARE

## 2023-10-24 DIAGNOSIS — Z85.3 PERSONAL HISTORY OF BREAST CANCER: Primary | ICD-10-CM

## 2023-10-24 DIAGNOSIS — Z12.31 SCREENING MAMMOGRAM, ENCOUNTER FOR: ICD-10-CM

## 2023-10-25 ENCOUNTER — TELEPHONE (OUTPATIENT)
Dept: PLASTIC SURGERY | Facility: CLINIC | Age: 73
End: 2023-10-25
Payer: MEDICARE

## 2023-10-27 ENCOUNTER — TELEPHONE (OUTPATIENT)
Dept: PLASTIC SURGERY | Facility: CLINIC | Age: 73
End: 2023-10-27
Payer: MEDICARE

## 2023-10-27 DIAGNOSIS — E11.9 TYPE 2 DIABETES MELLITUS WITHOUT COMPLICATION: ICD-10-CM

## 2023-10-27 LAB
LEFT EYE DM RETINOPATHY: NEGATIVE
RIGHT EYE DM RETINOPATHY: NEGATIVE

## 2023-10-27 NOTE — TELEPHONE ENCOUNTER
Attempted to contact pt to discuss concerns.  Pt was not available at the time of the call. I left a detailed VM asking pt to call PLS office at her convenience.

## 2023-10-31 RX ORDER — TRAMADOL HYDROCHLORIDE 50 MG/1
50 TABLET ORAL 3 TIMES DAILY PRN
Qty: 90 TABLET | Refills: 2 | Status: SHIPPED | OUTPATIENT
Start: 2023-11-15 | End: 2024-02-06 | Stop reason: SDUPTHER

## 2023-11-02 DIAGNOSIS — M15.9 PRIMARY OSTEOARTHRITIS INVOLVING MULTIPLE JOINTS: ICD-10-CM

## 2023-11-02 DIAGNOSIS — I10 ESSENTIAL HYPERTENSION: ICD-10-CM

## 2023-11-02 DIAGNOSIS — E11.9 TYPE 2 DIABETES MELLITUS WITHOUT COMPLICATION, WITHOUT LONG-TERM CURRENT USE OF INSULIN: ICD-10-CM

## 2023-11-02 NOTE — TELEPHONE ENCOUNTER
No care due was identified.  [facility] Embedded Care Due Messages. Reference number: [id number].   11/02/2023 3:02:25 PM CDT

## 2023-11-02 NOTE — TELEPHONE ENCOUNTER
No care due was identified.  [facility] Embedded Care Due Messages. Reference number: [id number].   11/02/2023 3:04:29 PM CDT

## 2023-11-03 RX ORDER — OLMESARTAN MEDOXOMIL 20 MG/1
20 TABLET ORAL DAILY
Qty: 90 TABLET | Refills: 2 | Status: SHIPPED | OUTPATIENT
Start: 2023-11-03

## 2023-11-03 RX ORDER — IBUPROFEN 800 MG/1
800 TABLET ORAL 2 TIMES DAILY PRN
Qty: 60 TABLET | Refills: 1 | Status: SHIPPED | OUTPATIENT
Start: 2023-11-03 | End: 2024-01-04 | Stop reason: ALTCHOICE

## 2023-11-03 NOTE — TELEPHONE ENCOUNTER
Refill Routing Note   Medication(s) are not appropriate for processing by [facility] for the following reason(s):      Required vitals abnormal    [facility] action(s):  Defer  Approve Care Due:  None identified            Appointments  past 12m or future 3m with PCP    Date Provider   Last Visit   7/20/2023 Bailee Moss MD   Next Visit   12/12/2023 Bailee Moss MD   ED visits in past 90 days: 0        Note composed:9:56 PM 11/02/2023

## 2023-11-06 RX ORDER — DULOXETIN HYDROCHLORIDE 30 MG/1
30 CAPSULE, DELAYED RELEASE ORAL DAILY
Qty: 30 CAPSULE | Refills: 1 | Status: SHIPPED | OUTPATIENT
Start: 2023-11-06 | End: 2023-12-14 | Stop reason: SDUPTHER

## 2023-11-10 ENCOUNTER — PATIENT OUTREACH (OUTPATIENT)
Dept: ADMINISTRATIVE | Facility: HOSPITAL | Age: 73
End: 2023-11-10
Payer: MEDICARE

## 2023-11-24 ENCOUNTER — PATIENT MESSAGE (OUTPATIENT)
Dept: ADMINISTRATIVE | Facility: HOSPITAL | Age: 73
End: 2023-11-24
Payer: MEDICARE

## 2023-11-27 DIAGNOSIS — M48.061 DEGENERATIVE LUMBAR SPINAL STENOSIS: ICD-10-CM

## 2023-11-27 NOTE — TELEPHONE ENCOUNTER
No care due was identified.  [facility] Embedded Care Due Messages. Reference number: [id number].   11/27/2023 12:42:13 PM CST

## 2023-11-28 ENCOUNTER — OFFICE VISIT (OUTPATIENT)
Dept: SURGERY | Facility: CLINIC | Age: 73
End: 2023-11-28
Payer: MEDICARE

## 2023-11-28 ENCOUNTER — HOSPITAL ENCOUNTER (OUTPATIENT)
Dept: RADIOLOGY | Facility: HOSPITAL | Age: 73
Discharge: HOME | End: 2023-11-28
Attending: PHYSICIAN ASSISTANT
Payer: MEDICARE

## 2023-11-28 VITALS
DIASTOLIC BLOOD PRESSURE: 64 MMHG | BODY MASS INDEX: 40.92 KG/M2 | HEART RATE: 77 BPM | WEIGHT: 270 LBS | SYSTOLIC BLOOD PRESSURE: 143 MMHG | HEIGHT: 68 IN

## 2023-11-28 VITALS — BODY MASS INDEX: 40.92 KG/M2 | HEIGHT: 68 IN | WEIGHT: 270 LBS

## 2023-11-28 DIAGNOSIS — Z90.11 S/P MASTECTOMY, RIGHT: ICD-10-CM

## 2023-11-28 DIAGNOSIS — Z85.3 PERSONAL HISTORY OF BREAST CANCER: Primary | ICD-10-CM

## 2023-11-28 DIAGNOSIS — Z85.3 PERSONAL HISTORY OF BREAST CANCER: ICD-10-CM

## 2023-11-28 DIAGNOSIS — Z12.31 SCREENING MAMMOGRAM, ENCOUNTER FOR: ICD-10-CM

## 2023-11-28 DIAGNOSIS — Z12.39 SCREENING BREAST EXAMINATION: ICD-10-CM

## 2023-11-28 PROCEDURE — 3061F NEG MICROALBUMINURIA REV: CPT | Mod: CPTII,S$GLB,, | Performed by: PHYSICIAN ASSISTANT

## 2023-11-28 PROCEDURE — 1159F MED LIST DOCD IN RCRD: CPT | Mod: CPTII,S$GLB,, | Performed by: PHYSICIAN ASSISTANT

## 2023-11-28 PROCEDURE — 1101F PR PT FALLS ASSESS DOC 0-1 FALLS W/OUT INJ PAST YR: ICD-10-PCS | Mod: CPTII,S$GLB,, | Performed by: PHYSICIAN ASSISTANT

## 2023-11-28 PROCEDURE — 3077F PR MOST RECENT SYSTOLIC BLOOD PRESSURE >= 140 MM HG: ICD-10-PCS | Mod: CPTII,S$GLB,, | Performed by: PHYSICIAN ASSISTANT

## 2023-11-28 PROCEDURE — 1101F PT FALLS ASSESS-DOCD LE1/YR: CPT | Mod: CPTII,S$GLB,, | Performed by: PHYSICIAN ASSISTANT

## 2023-11-28 PROCEDURE — 77067 SCR MAMMO BI INCL CAD: CPT | Mod: TC,52

## 2023-11-28 PROCEDURE — 77063 BREAST TOMOSYNTHESIS BI: CPT | Mod: 26,52,, | Performed by: RADIOLOGY

## 2023-11-28 PROCEDURE — 3066F PR DOCUMENTATION OF TREATMENT FOR NEPHROPATHY: ICD-10-PCS | Mod: CPTII,S$GLB,, | Performed by: PHYSICIAN ASSISTANT

## 2023-11-28 PROCEDURE — 1126F PR PAIN SEVERITY QUANTIFIED, NO PAIN PRESENT: ICD-10-PCS | Mod: CPTII,S$GLB,, | Performed by: PHYSICIAN ASSISTANT

## 2023-11-28 PROCEDURE — 77063 MAMMO DIGITAL SCREENING LEFT WITH TOMO: ICD-10-PCS | Mod: 26,52,, | Performed by: RADIOLOGY

## 2023-11-28 PROCEDURE — 3078F PR MOST RECENT DIASTOLIC BLOOD PRESSURE < 80 MM HG: ICD-10-PCS | Mod: CPTII,S$GLB,, | Performed by: PHYSICIAN ASSISTANT

## 2023-11-28 PROCEDURE — 3288F PR FALLS RISK ASSESSMENT DOCUMENTED: ICD-10-PCS | Mod: CPTII,S$GLB,, | Performed by: PHYSICIAN ASSISTANT

## 2023-11-28 PROCEDURE — 4010F ACE/ARB THERAPY RXD/TAKEN: CPT | Mod: CPTII,S$GLB,, | Performed by: PHYSICIAN ASSISTANT

## 2023-11-28 PROCEDURE — 3008F PR BODY MASS INDEX (BMI) DOCUMENTED: ICD-10-PCS | Mod: CPTII,S$GLB,, | Performed by: PHYSICIAN ASSISTANT

## 2023-11-28 PROCEDURE — 1126F AMNT PAIN NOTED NONE PRSNT: CPT | Mod: CPTII,S$GLB,, | Performed by: PHYSICIAN ASSISTANT

## 2023-11-28 PROCEDURE — 3077F SYST BP >= 140 MM HG: CPT | Mod: CPTII,S$GLB,, | Performed by: PHYSICIAN ASSISTANT

## 2023-11-28 PROCEDURE — 3078F DIAST BP <80 MM HG: CPT | Mod: CPTII,S$GLB,, | Performed by: PHYSICIAN ASSISTANT

## 2023-11-28 PROCEDURE — 3044F HG A1C LEVEL LT 7.0%: CPT | Mod: CPTII,S$GLB,, | Performed by: PHYSICIAN ASSISTANT

## 2023-11-28 PROCEDURE — 77067 MAMMO DIGITAL SCREENING LEFT WITH TOMO: ICD-10-PCS | Mod: 26,52,, | Performed by: RADIOLOGY

## 2023-11-28 PROCEDURE — 3066F NEPHROPATHY DOC TX: CPT | Mod: CPTII,S$GLB,, | Performed by: PHYSICIAN ASSISTANT

## 2023-11-28 PROCEDURE — 4010F PR ACE/ARB THEARPY RXD/TAKEN: ICD-10-PCS | Mod: CPTII,S$GLB,, | Performed by: PHYSICIAN ASSISTANT

## 2023-11-28 PROCEDURE — 3008F BODY MASS INDEX DOCD: CPT | Mod: CPTII,S$GLB,, | Performed by: PHYSICIAN ASSISTANT

## 2023-11-28 PROCEDURE — 1159F PR MEDICATION LIST DOCUMENTED IN MEDICAL RECORD: ICD-10-PCS | Mod: CPTII,S$GLB,, | Performed by: PHYSICIAN ASSISTANT

## 2023-11-28 PROCEDURE — 77067 SCR MAMMO BI INCL CAD: CPT | Mod: 26,52,, | Performed by: RADIOLOGY

## 2023-11-28 PROCEDURE — 99214 OFFICE O/P EST MOD 30 MIN: CPT | Mod: S$GLB,,, | Performed by: PHYSICIAN ASSISTANT

## 2023-11-28 PROCEDURE — 99999 PR PBB SHADOW E&M-EST. PATIENT-LVL IV: ICD-10-PCS | Mod: PBBFAC,,, | Performed by: PHYSICIAN ASSISTANT

## 2023-11-28 PROCEDURE — 99999 PR PBB SHADOW E&M-EST. PATIENT-LVL IV: CPT | Mod: PBBFAC,,, | Performed by: PHYSICIAN ASSISTANT

## 2023-11-28 PROCEDURE — 3061F PR NEG MICROALBUMINURIA RESULT DOCUMENTED/REVIEW: ICD-10-PCS | Mod: CPTII,S$GLB,, | Performed by: PHYSICIAN ASSISTANT

## 2023-11-28 PROCEDURE — 3044F PR MOST RECENT HEMOGLOBIN A1C LEVEL <7.0%: ICD-10-PCS | Mod: CPTII,S$GLB,, | Performed by: PHYSICIAN ASSISTANT

## 2023-11-28 PROCEDURE — 99214 PR OFFICE/OUTPT VISIT, EST, LEVL IV, 30-39 MIN: ICD-10-PCS | Mod: S$GLB,,, | Performed by: PHYSICIAN ASSISTANT

## 2023-11-28 PROCEDURE — 3288F FALL RISK ASSESSMENT DOCD: CPT | Mod: CPTII,S$GLB,, | Performed by: PHYSICIAN ASSISTANT

## 2023-11-28 NOTE — PROGRESS NOTES
[facility]  Department of Surgery    REFERRING PROVIDER: No referring provider defined for this encounter. Bailee Moss MD  CHIEF COMPLAINT:   Chief Complaint   Patient presents with    Yearly F/U and Mammogram         DIAGNOSIS:   This is a 73 y.o. female with a history of stage pTis PN0, grade 2, ER +, RI +DCIS of the right breast.    TREATMENT:     The patient has a history of DCIS of the right breast now s/p right lumpectomy in October of 2015. The histology of that specimen showed a .6cm Grade 2, ER+, RI+ DCIS with negative margins. The patient then denied chemo or radiation therapy, but did elect to undergo hormonal blocking therapy with anastrozole starting in 2015. Since that time she has been under close follow up with yearly mammograms.     Screening mammogram on 10/1/2021 showed a 2.3cm group of pleomorphic calcifications in the 12:00 region of the right breast. 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        She proceeded with right mastectomy with sentinel node biopsy on 11/29/2021 with Dr. Evans. Final pathology revealed 1.4 cm area of DCIS and sentinel lymph node was negative. There was no invasive or in Situ cancer on the left-sided resected specimen. On 4/11/2022 she underwent insertion of a permanent prosthesis and reduction mammoplasty in the contralateral breast.     HISTORY OF PRESENT ILLNESS:   Linnea Renae is a 73 y.o. female comes in for oncological follow up.  She denies change in her breast self-exam specifically denying new masses, skin or nipple changes, or nipple discharge. Does complain of left breast pain and tenderness. There have been no changes to family history. The patient denies constitutional symptoms of night sweats, chills, weight loss, new headaches, visual changes, new back or bony pain, chest pain, or shortness of breath.        Review of Systems: See  HPI/Interval History for other systems reviewed.     IMAGIN/28/23 Left Screening Mammo:    Findings:  This procedure was performed using tomosynthesis.   Computer-aided detection was utilized in the interpretation of this examination.     The left breast has scattered areas of fibroglandular density. There is no evidence of suspicious masses, microcalcifications or architectural distortion.     Impression:   No mammographic evidence of malignancy.     BI-RADS Category 1: Negative     Recommendation:  Routine screening mammogram in 1 year is recommended.     MEDICATIONS/ALLERGIES:     Current Outpatient Medications   Medication Sig    alcohol antiseptic pads (ALCOHOL PREP PADS TOP)     ALPRAZolam (XANAX) 0.5 MG tablet Take 1 tablet (0.5 mg total) by mouth 2 (two) times daily as needed for Anxiety.    aspirin 81 MG Chew Take 81 mg by mouth once daily.    atenoloL (TENORMIN) 50 MG tablet TAKE 1 TABLET BY MOUTH TWICE DAILY    atorvastatin (LIPITOR) 20 MG tablet Take 1 tablet (20 mg total) by mouth every evening.    blood sugar diagnostic (TRUE METRIX GLUCOSE TEST STRIP) Strp 1 strip by Misc.(Non-Drug; Combo Route) route once daily.    calcium-vitamin D3 500 mg(1,250mg) -200 unit per tablet Take 1 tablet by mouth 2 (two) times daily with meals.     clobetasoL (CLOBEX) 0.05 % shampoo Apply to scalp in place of regular shampoo weekly, leave on for 10 to 15 minutes before rinsing it out    DULoxetine (CYMBALTA) 30 MG capsule Take 1 capsule (30 mg total) by mouth once daily. In 1-2 weeks, if no relief, may increase dose to 2 capsules once daily. Call for refills.    fluticasone propionate (FLONASE) 50 mcg/actuation nasal spray Instill 2 sprays (100 mcg total) by Each Nostril route once daily.    furosemide (LASIX) 20 MG tablet Take 1 tablet (20 mg total) by mouth daily as needed (Edema.).    hydroCHLOROthiazide (HYDRODIURIL) 12.5 MG Tab Take 1 tablet (12.5 mg total) by mouth once daily.    ibuprofen (ADVIL,MOTRIN)  800 MG tablet Take 1 tablet (800 mg total) by mouth 2 (two) times daily as needed for Pain.    lancets 33 gauge Misc 1 lancet by Misc.(Non-Drug; Combo Route) route once daily.    linaCLOtide (LINZESS) 145 mcg Cap capsule Take 1 capsule (145 mcg total) by mouth once daily.    loratadine 10 mg Cap     meloxicam (MOBIC) 15 MG tablet Take 1 tablet (15 mg total) by mouth once daily.    minoxidiL (LONITEN) 2.5 MG tablet Take 1/4  to 1/2 tablet by mouth nightly as tolerated    multivitamin (THERAGRAN) per tablet Take 1 tablet by mouth once daily.    naloxone (NARCAN) 4 mg/actuation Spry 4mg by nasal route as needed for opioid overdose; may repeat every 2-3 minutes in alternating nostrils until medical help arrives. Call 911    nystatin (MYCOSTATIN) powder Apply topically 4 (four) times daily Under pannus as needed    olmesartan (BENICAR) 20 MG tablet Take 1 tablet (20 mg total) by mouth once daily.    omeprazole (PRILOSEC) 20 MG capsule Take 1 capsule by mouth once daily    pregabalin (LYRICA) 150 MG capsule Take 1 capsule (150 mg total) by mouth 2 (two) times daily.    semaglutide (OZEMPIC) 2 mg/dose (8 mg/3 mL) PnIj Inject 2 mg into the skin every 7 days.    tiZANidine (ZANAFLEX) 4 MG tablet Take 1 tablet (4 mg total) by mouth every 8 (eight) hours as needed (Muscle spasm.).    topiramate (TOPAMAX) 50 MG tablet Take 2 tablets (100 mg total) by mouth every evening.    traMADoL (ULTRAM) 50 mg tablet Take 1 tablet (50 mg total) by mouth 3 (three) times daily as needed for Pain.    traMADoL (ULTRAM-ER) 100 MG Tb24 Take 1 tablet (100 mg total) by mouth once daily. (Patient not taking: Reported on 9/19/2023)    triamcinolone acetonide 0.1% (KENALOG) 0.1 % ointment Apply to scalp in areas of scalp irritation at bedtime     No current facility-administered medications for this visit.     Facility-Administered Medications Ordered in Other Visits   Medication    mupirocin 2 % ointment    sodium chloride 0.9% flush 10 mL     "  Review of patient's allergies indicates:   Allergen Reactions    Codeine Itching       PHYSICAL EXAM:   BP (!) 143/64   Pulse 77   Ht 5' 8" (1.727 m)   Wt 122.5 kg (270 lb)   BMI 41.05 kg/m²     Physical Exam   Vitals reviewed.  Constitutional: She is oriented to person, place, and time.   HENT:   Head: Normocephalic and atraumatic.   Nose: Nose normal.   Eyes: Pupils are equal, round, and reactive to light. Right eye exhibits no discharge. Left eye exhibits no discharge.   Pulmonary/Chest: Effort normal and breath sounds normal. No stridor. No respiratory distress. She has no wheezes. She exhibits no mass, no tenderness and no edema. Right breast exhibits tenderness. Right breast exhibits no mass and no skin change. Left breast exhibits no inverted nipple, no mass, no nipple discharge, no skin change and no tenderness. No breast swelling or bleeding. Breasts are symmetrical.       Abdominal: Normal appearance.   Genitourinary: No breast swelling or bleeding.   Musculoskeletal: Lymphadenopathy:      Cervical: No cervical adenopathy.     Neurological: She is alert and oriented to person, place, and time.   Skin: Skin is warm and dry. No erythema. No pallor.     Psychiatric: Her behavior is normal. Mood, judgment and thought content normal.     Exam done in the upright and supine position.     ASSESSMENT:   This is a 73 y.o. female without evidence of recurrence by exam, history or imaging.       PLAN:   1. We discussed there was nothing concerning on exam today. Discussed gentle massage with vitamin E oil twice daily   2. Continue to follow up with Dr. Woody and Hem Onc team    3. Continue monthly self breast exams and call the clinic with any changes or problems.  4. Left Screening Mammogram due 1 year.   5. Return to clinic in 1 year  with imaging     The patient is in agreement with the plan. Questions were encouraged and answered to patient's satisfaction. Linnea will call our office with any questions " or concerns.

## 2023-11-29 RX ORDER — TIZANIDINE 4 MG/1
4 TABLET ORAL EVERY 8 HOURS PRN
Qty: 90 TABLET | Refills: 2 | Status: SHIPPED | OUTPATIENT
Start: 2023-11-29

## 2023-12-07 ENCOUNTER — PATIENT OUTREACH (OUTPATIENT)
Dept: ADMINISTRATIVE | Facility: HOSPITAL | Age: 73
End: 2023-12-07
Payer: MEDICARE

## 2023-12-07 NOTE — PROGRESS NOTES

## 2023-12-08 ENCOUNTER — PATIENT OUTREACH (OUTPATIENT)
Dept: ADMINISTRATIVE | Facility: HOSPITAL | Age: 73
End: 2023-12-08
Payer: MEDICARE

## 2023-12-08 NOTE — PROGRESS NOTES
Patient due for the following    RSV Vaccine (Age 60+ and Pregnant patients) (1 - 1-dose 60+ series)    Influenza Vaccine (1)    COVID-19 Vaccine (5 - 2023-24 season)    Colorectal Cancer Screening     Hemoglobin A1c       Immunizations: reviewed and updated  Care Everywhere: triggered  Care Teams: up to date  Outreach: none needed    LVM regarding colon cancer screening

## 2023-12-11 DIAGNOSIS — K21.9 GERD WITHOUT ESOPHAGITIS: ICD-10-CM

## 2023-12-11 RX ORDER — OMEPRAZOLE 20 MG/1
CAPSULE, DELAYED RELEASE ORAL
Qty: 90 CAPSULE | Refills: 1 | Status: SHIPPED | OUTPATIENT
Start: 2023-12-11

## 2023-12-11 NOTE — TELEPHONE ENCOUNTER
Refill Decision Note   Linnea Renae  is requesting a refill authorization.  Brief Assessment and Rationale for Refill:  Approve     Medication Therapy Plan:         Comments:     Note composed:7:03 AM 12/11/2023

## 2023-12-11 NOTE — TELEPHONE ENCOUNTER
No care due was identified.  [facility] Embedded Care Due Messages. Reference number: [id number].   12/11/2023 6:43:38 AM CST

## 2023-12-14 RX ORDER — DULOXETIN HYDROCHLORIDE 30 MG/1
30 CAPSULE, DELAYED RELEASE ORAL DAILY
Qty: 30 CAPSULE | Refills: 1 | Status: SHIPPED | OUTPATIENT
Start: 2023-12-14 | End: 2024-01-04 | Stop reason: SDUPTHER

## 2023-12-20 ENCOUNTER — OFFICE VISIT (OUTPATIENT)
Dept: BARIATRICS | Facility: CLINIC | Age: 73
End: 2023-12-20
Payer: MEDICARE

## 2023-12-20 VITALS
SYSTOLIC BLOOD PRESSURE: 130 MMHG | HEIGHT: 67 IN | BODY MASS INDEX: 41.42 KG/M2 | DIASTOLIC BLOOD PRESSURE: 72 MMHG | WEIGHT: 263.88 LBS | OXYGEN SATURATION: 98 % | HEART RATE: 74 BPM

## 2023-12-20 DIAGNOSIS — E11.9 TYPE 2 DIABETES MELLITUS WITHOUT COMPLICATION, WITHOUT LONG-TERM CURRENT USE OF INSULIN: ICD-10-CM

## 2023-12-20 DIAGNOSIS — E66.01 CLASS 2 SEVERE OBESITY WITH BODY MASS INDEX (BMI) OF 35 TO 39.9 WITH SERIOUS COMORBIDITY: ICD-10-CM

## 2023-12-20 PROCEDURE — 3075F PR MOST RECENT SYSTOLIC BLOOD PRESS GE 130-139MM HG: ICD-10-PCS | Mod: CPTII,S$GLB,, | Performed by: INTERNAL MEDICINE

## 2023-12-20 PROCEDURE — 3044F PR MOST RECENT HEMOGLOBIN A1C LEVEL <7.0%: ICD-10-PCS | Mod: CPTII,S$GLB,, | Performed by: INTERNAL MEDICINE

## 2023-12-20 PROCEDURE — 3066F NEPHROPATHY DOC TX: CPT | Mod: CPTII,S$GLB,, | Performed by: INTERNAL MEDICINE

## 2023-12-20 PROCEDURE — 3061F NEG MICROALBUMINURIA REV: CPT | Mod: CPTII,S$GLB,, | Performed by: INTERNAL MEDICINE

## 2023-12-20 PROCEDURE — 1159F PR MEDICATION LIST DOCUMENTED IN MEDICAL RECORD: ICD-10-PCS | Mod: CPTII,S$GLB,, | Performed by: INTERNAL MEDICINE

## 2023-12-20 PROCEDURE — 99212 PR OFFICE/OUTPT VISIT, EST, LEVL II, 10-19 MIN: ICD-10-PCS | Mod: S$GLB,,, | Performed by: INTERNAL MEDICINE

## 2023-12-20 PROCEDURE — 1160F RVW MEDS BY RX/DR IN RCRD: CPT | Mod: CPTII,S$GLB,, | Performed by: INTERNAL MEDICINE

## 2023-12-20 PROCEDURE — 3075F SYST BP GE 130 - 139MM HG: CPT | Mod: CPTII,S$GLB,, | Performed by: INTERNAL MEDICINE

## 2023-12-20 PROCEDURE — 3078F PR MOST RECENT DIASTOLIC BLOOD PRESSURE < 80 MM HG: ICD-10-PCS | Mod: CPTII,S$GLB,, | Performed by: INTERNAL MEDICINE

## 2023-12-20 PROCEDURE — 3066F PR DOCUMENTATION OF TREATMENT FOR NEPHROPATHY: ICD-10-PCS | Mod: CPTII,S$GLB,, | Performed by: INTERNAL MEDICINE

## 2023-12-20 PROCEDURE — 3288F PR FALLS RISK ASSESSMENT DOCUMENTED: ICD-10-PCS | Mod: CPTII,S$GLB,, | Performed by: INTERNAL MEDICINE

## 2023-12-20 PROCEDURE — 3044F HG A1C LEVEL LT 7.0%: CPT | Mod: CPTII,S$GLB,, | Performed by: INTERNAL MEDICINE

## 2023-12-20 PROCEDURE — 4010F ACE/ARB THERAPY RXD/TAKEN: CPT | Mod: CPTII,S$GLB,, | Performed by: INTERNAL MEDICINE

## 2023-12-20 PROCEDURE — 3078F DIAST BP <80 MM HG: CPT | Mod: CPTII,S$GLB,, | Performed by: INTERNAL MEDICINE

## 2023-12-20 PROCEDURE — 1125F AMNT PAIN NOTED PAIN PRSNT: CPT | Mod: CPTII,S$GLB,, | Performed by: INTERNAL MEDICINE

## 2023-12-20 PROCEDURE — 1159F MED LIST DOCD IN RCRD: CPT | Mod: CPTII,S$GLB,, | Performed by: INTERNAL MEDICINE

## 2023-12-20 PROCEDURE — 99999 PR PBB SHADOW E&M-EST. PATIENT-LVL V: CPT | Mod: PBBFAC,,, | Performed by: INTERNAL MEDICINE

## 2023-12-20 PROCEDURE — 3008F BODY MASS INDEX DOCD: CPT | Mod: CPTII,S$GLB,, | Performed by: INTERNAL MEDICINE

## 2023-12-20 PROCEDURE — 1125F PR PAIN SEVERITY QUANTIFIED, PAIN PRESENT: ICD-10-PCS | Mod: CPTII,S$GLB,, | Performed by: INTERNAL MEDICINE

## 2023-12-20 PROCEDURE — 99212 OFFICE O/P EST SF 10 MIN: CPT | Mod: S$GLB,,, | Performed by: INTERNAL MEDICINE

## 2023-12-20 PROCEDURE — 1160F PR REVIEW ALL MEDS BY PRESCRIBER/CLIN PHARMACIST DOCUMENTED: ICD-10-PCS | Mod: CPTII,S$GLB,, | Performed by: INTERNAL MEDICINE

## 2023-12-20 PROCEDURE — 1101F PR PT FALLS ASSESS DOC 0-1 FALLS W/OUT INJ PAST YR: ICD-10-PCS | Mod: CPTII,S$GLB,, | Performed by: INTERNAL MEDICINE

## 2023-12-20 PROCEDURE — 3061F PR NEG MICROALBUMINURIA RESULT DOCUMENTED/REVIEW: ICD-10-PCS | Mod: CPTII,S$GLB,, | Performed by: INTERNAL MEDICINE

## 2023-12-20 PROCEDURE — 3288F FALL RISK ASSESSMENT DOCD: CPT | Mod: CPTII,S$GLB,, | Performed by: INTERNAL MEDICINE

## 2023-12-20 PROCEDURE — 3008F PR BODY MASS INDEX (BMI) DOCUMENTED: ICD-10-PCS | Mod: CPTII,S$GLB,, | Performed by: INTERNAL MEDICINE

## 2023-12-20 PROCEDURE — 99999 PR PBB SHADOW E&M-EST. PATIENT-LVL V: ICD-10-PCS | Mod: PBBFAC,,, | Performed by: INTERNAL MEDICINE

## 2023-12-20 PROCEDURE — 4010F PR ACE/ARB THEARPY RXD/TAKEN: ICD-10-PCS | Mod: CPTII,S$GLB,, | Performed by: INTERNAL MEDICINE

## 2023-12-20 PROCEDURE — 1101F PT FALLS ASSESS-DOCD LE1/YR: CPT | Mod: CPTII,S$GLB,, | Performed by: INTERNAL MEDICINE

## 2023-12-20 RX ORDER — TOPIRAMATE 50 MG/1
100 TABLET, FILM COATED ORAL NIGHTLY
Qty: 180 TABLET | Refills: 0 | Status: SHIPPED | OUTPATIENT
Start: 2023-12-20 | End: 2024-03-27 | Stop reason: SDUPTHER

## 2023-12-20 NOTE — PATIENT INSTRUCTIONS
Start Ozempic 2 mg once a week.     Decrease portions as soon as you start Ozempic. Avoid fried, greasy, fatty foods.     Some nausea in the first 2 weeks is not unusual.     If you get pain across the upper abdomen and around to your back, please call the office.       [email or web address] for coupon/videos.        Patient was informed that topiramate is used for migraine prevention and seizures. Weight loss is a common side effect that is well documented. S/he understands this. S/he was informed of the potential side effects such as serious and possibly fatal rash in which case the medication should be discontinued immediately. Paresthesias, forgetfulness, fatigue, kidney stones, GI symptoms, and changes in lab values such as electrolytes, blood counts and kidney function.    topiramate 50 mg 2 tabs in the evening.     No soda, sweet tea, juices or lemonade. All drinks should be 5 calories or less.         Limit starchy carbohydrates (bread, rice, pasta, potatoes, corn, peas, oatmeal, grits, tortillas, crackers, chips)        You need about 1000 calories and 70 g protein a day to lose weight    Helpful tips to survive the holidays:  Dont save yourself for the meal: Make sure you continue to eat high protein small meals every 3-4 hours to ensure to do not become over-hungry. Avoid temptation by not showing up to a holiday party or gathering hungry.   Plan ahead. Bring a dish to a party if you know there may not be an appropriate option.   Choose sugar-free drinks: Stick to water or other sugar-free beverages and make sure you are getting 6-8 cups of fluid each day (but not with meals!). Avoid alcohol, carbonated beverages, and high-fat/high-sugar beverages like hot chocolate and eggnog. Try sugar-free hot cocoa made with skim milk or water, or sugar-free spiced tea to add some holiday flair to your beverage (see sugar-free mulled cider recipe below)  Take your time: Eat mindfully. Dont graze on food  throughout the day. Sit down to enjoy your small meals. Chew slowly and thoroughly. Cut your food into small pieces before eating.  Listen to your body: Stop eating as soon as you feel full. Do not feel pressured to try certain (or all) foods or to eat all of the food on your plate. Listen to your hunger cues.   REMEMBER: Make your holidays about spending time with family and friends instead of focusing gatherings around food.  Keep up your exercise routine: Make sure you continue to get regular exercise throughout the holiday season. Encourage friends and family to be active by taking a walk together after a meal, to look at holiday lights, or to window-shop.    Good Holiday Meal Options:  Roasted Turkey, NO skin. Use low sodium broth instead of gravy.   Stuffed Bell Peppers made WITHOUT bread crumbs or Rice. Try using parmesan cheese instead  Gumbo, NO rice. Try picking out mostly the meat/seafood and vegetables with little broth.   Green Bean Casserole made with 98% fat free cream of mushroom soup and crushed almonds/pecans instead of fried onions  Side salad w/ low fat dressing. Try a different kind of salad maybe use Kale or spinach.   Roasted non-starchy vegetables like brussel sprouts, broccoli, green beans, zucchini, butternut squash, cauliflower  Cauliflower Mash (steam or roast cauliflower, puree w/ low fat cheese, dash of fat free milk and 2-3 sprays of I cant believe its not butter spray. Add garlic powder and black pepper to season). Use Low sodium broth instead of gravy.   Try Loaded Cauliflower Mash (Make cauliflower like above cauliflower mash. Top with diced turkey lara, ¼ cup low fat cheddar cheese and bake @ 350* F for 5-10 minutes, until cheese is melted. Top with minced chives, black pepper and garlic to taste).   Homemade cranberry sauce using Splenda or another alternative sweetener. Boil fresh cranberries and add splenda to taste. Boil until cranberries break open and then simmer until it  reaches the consistency you want (less time for more watery sauce and simmer for longer to create a thicker sauce).   Deviled eggs: make using low fat pineda, mustard, DILL relish (not sweet relish).   Vegetable tray w/ Greek yogurt Ranch Dip. Mix 1 packet of [place] ranch dip mix w/ 16 oz low fat plain greek yogurt.     Good Holiday Dessert Options:  High protein Pumpkin Cheesecake (see recipe below)  Pumpkin Whip (see recipe below)  Quest Apple Pie or Cinnamon Roll flavored protein bar (warm in microwave for 10-15 seconds)  Eggnog Protein shake (see recipe below)  Fresh fruit w/ low fat cheese  Sugar-free Jello Parfaits. Layer Red and Green sugar-free jello in cups and top w/ 2 tbsp Sugar-free cool-whip    Pumpkin Cheesecake    8 ounces fat free cream cheese, softened   2 scoops of vanilla protein powder (<4 g sugar per serving)   ¼ tsp Fine salt   2 eggs, at room temperature   1/3 cup fat free sour cream  1/3 cup fat free half and half  1 15 -ounce can pure pumpkin puree   1 tablespoon pumpkin pie spice, plus more for dusting   Unsalted nuts, crushed  *Add splenda to taste    Directions     1. Preheat the oven to 300 degrees F. Line 18 muffin cups with paper liners. Sprinkle 1 tsp crushed unsalted nuts at the bottom of each of muffin cup liner.     2. In a large bowl, beat the cream cheese, vanilla protein powder and 1/4 teaspoon fine salt on medium-high speed until smooth and creamy, 2 to 3 minutes. Scrape down the sides, reduce speed to low and beat in the eggs, 1 at a time, until combined. Beat in 1/3 cup fat free sour cream and fat free half and half. Stir in the pumpkin puree and pumpkin pie spice until smooth. Divide evenly among cookie-lined paper cups, filling almost all the way to the top.     3. Bake until the filling is just set, 40 to 45 minutes. A sharp knife inserted into the center will come out moist, but clean. Cool completely in tins on a wire rack. Refrigerate until cold, 4 hours, or  overnight. Top with a dusting of pumpkin pie spice.    Recipe altered from the following recipe: http://www.[employer].com/recipes/food-network-deny/mini-pumpkin-cheesecakes-recipe.print.html?oc=linkback    Pumpkin Whip    Box of sugar-free vanilla pudding  Can of pumpkin puree  Pumpkin Pie spice (sprinkle to taste)  ½ cup of sugar-free Cool Whip    Directions:  Make sugar-free pudding according to package directions using fat free or 1% milk. Stir in pumpkin and cool whip. Add pumpkin pie spice to taste.     Egg Nog Protein shake    8 oz skim or 1% milk  1 scoop vanilla protein powder  1 tbsp sugar-free vanilla pudding mix  ½ tsp butter flavor extract  ½ tsp rum extract  ½ tsp cinnamon     Shake together or blend with ice and serve.     Sugar-Free Mulled Cider    3 oz diet cran-apple juice  6 oz water  1 packet sugar-free apple cider mix  ½ tsp apple pie spice  ½ tsp butter flavor extract  1 tbsp Sugar-free Syrup    Mix together. Warm if needed and serve w/ orange wedge and cinnamon stick.

## 2023-12-20 NOTE — PROGRESS NOTES
Subjective:       Patient ID: Linnea Renae is a 73 y.o. female.    Chief Complaint: Follow-up      Pt here today for follow-up.Has lost 12 lbs, net neg 24 lbs. Started [id number] piyush diet and decreased topiramate to 100 mg qhs. Also started Ozempic for DM2, currently on 2mg weekly. She has not SE, but appetite is  down.   Is having trouble with neuropathy in her feet, and back and foot pain. Lyrica was increased, and she coorelates recent weight gain to that. She is no longer on it. ALso has had more edema. Bailee Moss MD has adjusted her diuretics.  Also percocet, so she has been tired.   In early April 2022 she underwent insertion of a permanent prosthesis and reduction mammoplasty in the contralateral breast. Implant exchange planned on the 18th .               checked today        Ophtho exam 5/10/22- Open angle with borderline findings, low risk, bilateral. No retinopathy.       checked today. Has been on tramadol chronically.    On metformin 2000 mg BID.     Lab Results   Component Value Date    HGBA1C 5.5 04/17/2023    HGBA1C 5.7 (H) 12/09/2022    HGBA1C 5.7 (H) 03/31/2022     Lab Results   Component Value Date    LDLCALC 65.8 04/17/2023    CREATININE 1.0 07/20/2023        Review of Systems   Constitutional: Negative for chills and fever.   Respiratory: Negative for shortness of breath.         Denies snoring   Cardiovascular: Positive for leg swelling. Negative for chest pain.   Gastrointestinal: Positive for constipation. Negative for diarrhea.        + GERD   Genitourinary: Negative for difficulty urinating and dysuria.   Musculoskeletal: Positive for arthralgias and back pain.   Skin: Positive for rash.   Neurological: Positive for dizziness. Negative for light-headedness.   Psychiatric/Behavioral: Positive for dysphoric mood. The patient is nervous/anxious.        Objective:     /72 (BP Location: Right arm, Patient Position: Sitting, BP Method: Large (Manual))   Pulse 74   Ht 5'  "6.5" (1.689 m)   Wt 119.7 kg (263 lb 14.3 oz)   SpO2 98%   BMI 41.95 kg/m²     Physical Exam   Constitutional: She is oriented to person, place, and time. She appears well-developed. No distress.   obese   HENT:   Head: Normocephalic and atraumatic.   Eyes: Pupils are equal, round, and reactive to light. EOM are normal. No scleral icterus.   Neck: Normal range of motion. Neck supple.   Cardiovascular: Normal rate.   Pulmonary/Chest: Effort normal.   Musculoskeletal: Normal range of motion. She exhibits + edema.   Neurological: She is alert and oriented to person, place, and time. No cranial nerve deficit.   Skin: Skin is warm and dry. No erythema.   Psychiatric: She has a normal mood and affect. Her behavior is normal. Judgment normal.   Vitals reviewed.      Assessment:       1. Class 2 severe obesity with body mass index (BMI) of 35 to 39.9 with serious comorbidity    2. Type 2 diabetes mellitus without complication, without long-term current use of insulin                  Plan:         Linnea was seen today for follow-up.    Diagnoses and all orders for this visit:    Class 2 severe obesity with body mass index (BMI) of 35 to 39.9 with serious comorbidity  -     topiramate (TOPAMAX) 50 MG tablet; Take 2 tablets (100 mg total) by mouth every evening.    Type 2 diabetes mellitus without complication, without long-term current use of insulin  -     semaglutide (OZEMPIC) 2 mg/dose (8 mg/3 mL) PnIj; Inject 2 mg into the skin every 7 days.                 Start Ozempic 2 mg once a week.     Decrease portions as soon as you start Ozempic. Avoid fried, greasy, fatty foods.     Some nausea in the first 2 weeks is not unusual.     If you get pain across the upper abdomen and around to your back, please call the office.       [email or web address] for coupon/videos.        Patient was informed that topiramate is used for migraine prevention and seizures. Weight loss is a common side effect that is well documented. S/he " understands this. S/he was informed of the potential side effects such as serious and possibly fatal rash in which case the medication should be discontinued immediately. Paresthesias, forgetfulness, fatigue, kidney stones, GI symptoms, and changes in lab values such as electrolytes, blood counts and kidney function.    topiramate 50 mg 2 tabs in the evening.     No soda, sweet tea, juices or lemonade. All drinks should be 5 calories or less.         Limit starchy carbohydrates (bread, rice, pasta, potatoes, corn, peas, oatmeal, grits, tortillas, crackers, chips)        You need about 1000 calories and 70 g protein a day to lose weight    Holiday tips given.

## 2024-01-04 ENCOUNTER — PATIENT MESSAGE (OUTPATIENT)
Dept: PRIMARY CARE CLINIC | Facility: CLINIC | Age: 74
End: 2024-01-04

## 2024-01-04 ENCOUNTER — LAB VISIT (OUTPATIENT)
Dept: LAB | Facility: HOSPITAL | Age: 74
End: 2024-01-04
Attending: INTERNAL MEDICINE
Payer: MEDICARE

## 2024-01-04 ENCOUNTER — OFFICE VISIT (OUTPATIENT)
Dept: PRIMARY CARE CLINIC | Facility: CLINIC | Age: 74
End: 2024-01-04
Payer: MEDICARE

## 2024-01-04 VITALS
WEIGHT: 264.88 LBS | BODY MASS INDEX: 41.57 KG/M2 | SYSTOLIC BLOOD PRESSURE: 138 MMHG | TEMPERATURE: 98 F | HEIGHT: 67 IN | HEART RATE: 70 BPM | DIASTOLIC BLOOD PRESSURE: 88 MMHG | OXYGEN SATURATION: 99 %

## 2024-01-04 DIAGNOSIS — E11.40 TYPE 2 DIABETES MELLITUS WITH DIABETIC NEUROPATHY, WITHOUT LONG-TERM CURRENT USE OF INSULIN: ICD-10-CM

## 2024-01-04 DIAGNOSIS — Z12.11 SCREENING FOR COLORECTAL CANCER: ICD-10-CM

## 2024-01-04 DIAGNOSIS — E66.01 CLASS 3 SEVERE OBESITY DUE TO EXCESS CALORIES WITH SERIOUS COMORBIDITY AND BODY MASS INDEX (BMI) OF 40.0 TO 44.9 IN ADULT: ICD-10-CM

## 2024-01-04 DIAGNOSIS — I70.0 ATHEROSCLEROSIS OF AORTA: ICD-10-CM

## 2024-01-04 DIAGNOSIS — M48.061 DEGENERATIVE LUMBAR SPINAL STENOSIS: ICD-10-CM

## 2024-01-04 DIAGNOSIS — Z23 NEED FOR COVID-19 VACCINE: ICD-10-CM

## 2024-01-04 DIAGNOSIS — E11.40 TYPE 2 DIABETES MELLITUS WITH DIABETIC NEUROPATHY, WITHOUT LONG-TERM CURRENT USE OF INSULIN: Primary | ICD-10-CM

## 2024-01-04 DIAGNOSIS — E78.5 HYPERLIPIDEMIA, UNSPECIFIED HYPERLIPIDEMIA TYPE: ICD-10-CM

## 2024-01-04 DIAGNOSIS — I10 ESSENTIAL HYPERTENSION: ICD-10-CM

## 2024-01-04 DIAGNOSIS — Z12.12 SCREENING FOR COLORECTAL CANCER: ICD-10-CM

## 2024-01-04 LAB
ALBUMIN SERPL BCP-MCNC: 3.7 G/DL (ref 3.5–5.2)
ALBUMIN/CREAT UR: 7 UG/MG (ref 0–30)
ALP SERPL-CCNC: 114 U/L (ref 55–135)
ALT SERPL W/O P-5'-P-CCNC: 15 U/L (ref 10–44)
ANION GAP SERPL CALC-SCNC: 6 MMOL/L (ref 8–16)
AST SERPL-CCNC: 21 U/L (ref 10–40)
BASOPHILS # BLD AUTO: 0.07 K/UL (ref 0–0.2)
BASOPHILS NFR BLD: 0.8 % (ref 0–1.9)
BILIRUB SERPL-MCNC: 0.6 MG/DL (ref 0.1–1)
BUN SERPL-MCNC: 16 MG/DL (ref 8–23)
CALCIUM SERPL-MCNC: 9.4 MG/DL (ref 8.7–10.5)
CHLORIDE SERPL-SCNC: 110 MMOL/L (ref 95–110)
CHOLEST SERPL-MCNC: 141 MG/DL (ref 120–199)
CHOLEST/HDLC SERPL: 3.4 {RATIO} (ref 2–5)
CO2 SERPL-SCNC: 23 MMOL/L (ref 23–29)
CREAT SERPL-MCNC: 0.8 MG/DL (ref 0.5–1.4)
CREAT UR-MCNC: 172 MG/DL (ref 15–325)
DIFFERENTIAL METHOD BLD: ABNORMAL
EOSINOPHIL # BLD AUTO: 0.6 K/UL (ref 0–0.5)
EOSINOPHIL NFR BLD: 6.8 % (ref 0–8)
ERYTHROCYTE [DISTWIDTH] IN BLOOD BY AUTOMATED COUNT: 13.6 % (ref 11.5–14.5)
EST. GFR  (NO RACE VARIABLE): >60 ML/MIN/1.73 M^2
ESTIMATED AVG GLUCOSE: 111 MG/DL (ref 68–131)
GLUCOSE SERPL-MCNC: 83 MG/DL (ref 70–110)
HBA1C MFR BLD: 5.5 % (ref 4–5.6)
HCT VFR BLD AUTO: 42.1 % (ref 37–48.5)
HDLC SERPL-MCNC: 42 MG/DL (ref 40–75)
HDLC SERPL: 29.8 % (ref 20–50)
HGB BLD-MCNC: 13.3 G/DL (ref 12–16)
IMM GRANULOCYTES # BLD AUTO: 0.01 K/UL (ref 0–0.04)
IMM GRANULOCYTES NFR BLD AUTO: 0.1 % (ref 0–0.5)
LDLC SERPL CALC-MCNC: 85 MG/DL (ref 63–159)
LYMPHOCYTES # BLD AUTO: 2.8 K/UL (ref 1–4.8)
LYMPHOCYTES NFR BLD: 29.7 % (ref 18–48)
MCH RBC QN AUTO: 27.9 PG (ref 27–31)
MCHC RBC AUTO-ENTMCNC: 31.6 G/DL (ref 32–36)
MCV RBC AUTO: 88 FL (ref 82–98)
MICROALBUMIN UR DL<=1MG/L-MCNC: 12 UG/ML
MONOCYTES # BLD AUTO: 0.6 K/UL (ref 0.3–1)
MONOCYTES NFR BLD: 6.6 % (ref 4–15)
NEUTROPHILS # BLD AUTO: 5.2 K/UL (ref 1.8–7.7)
NEUTROPHILS NFR BLD: 56 % (ref 38–73)
NONHDLC SERPL-MCNC: 99 MG/DL
NRBC BLD-RTO: 0 /100 WBC
PLATELET # BLD AUTO: 368 K/UL (ref 150–450)
PMV BLD AUTO: 10.3 FL (ref 9.2–12.9)
POTASSIUM SERPL-SCNC: 4.7 MMOL/L (ref 3.5–5.1)
PROT SERPL-MCNC: 7.6 G/DL (ref 6–8.4)
RBC # BLD AUTO: 4.76 M/UL (ref 4–5.4)
SODIUM SERPL-SCNC: 139 MMOL/L (ref 136–145)
TRIGL SERPL-MCNC: 70 MG/DL (ref 30–150)
WBC # BLD AUTO: 9.25 K/UL (ref 3.9–12.7)

## 2024-01-04 PROCEDURE — 3079F DIAST BP 80-89 MM HG: CPT | Mod: CPTII,S$GLB,, | Performed by: INTERNAL MEDICINE

## 2024-01-04 PROCEDURE — 1126F AMNT PAIN NOTED NONE PRSNT: CPT | Mod: CPTII,S$GLB,, | Performed by: INTERNAL MEDICINE

## 2024-01-04 PROCEDURE — 3075F SYST BP GE 130 - 139MM HG: CPT | Mod: CPTII,S$GLB,, | Performed by: INTERNAL MEDICINE

## 2024-01-04 PROCEDURE — 99999 PR PBB SHADOW E&M-EST. PATIENT-LVL V: CPT | Mod: PBBFAC,,, | Performed by: INTERNAL MEDICINE

## 2024-01-04 PROCEDURE — 80053 COMPREHEN METABOLIC PANEL: CPT | Performed by: INTERNAL MEDICINE

## 2024-01-04 PROCEDURE — 3288F FALL RISK ASSESSMENT DOCD: CPT | Mod: CPTII,S$GLB,, | Performed by: INTERNAL MEDICINE

## 2024-01-04 PROCEDURE — 82043 UR ALBUMIN QUANTITATIVE: CPT | Performed by: INTERNAL MEDICINE

## 2024-01-04 PROCEDURE — 1159F MED LIST DOCD IN RCRD: CPT | Mod: CPTII,S$GLB,, | Performed by: INTERNAL MEDICINE

## 2024-01-04 PROCEDURE — 1160F RVW MEDS BY RX/DR IN RCRD: CPT | Mod: CPTII,S$GLB,, | Performed by: INTERNAL MEDICINE

## 2024-01-04 PROCEDURE — 80061 LIPID PANEL: CPT | Performed by: INTERNAL MEDICINE

## 2024-01-04 PROCEDURE — 3008F BODY MASS INDEX DOCD: CPT | Mod: CPTII,S$GLB,, | Performed by: INTERNAL MEDICINE

## 2024-01-04 PROCEDURE — 3044F HG A1C LEVEL LT 7.0%: CPT | Mod: CPTII,S$GLB,, | Performed by: INTERNAL MEDICINE

## 2024-01-04 PROCEDURE — 36415 COLL VENOUS BLD VENIPUNCTURE: CPT | Mod: PN | Performed by: INTERNAL MEDICINE

## 2024-01-04 PROCEDURE — 3066F NEPHROPATHY DOC TX: CPT | Mod: CPTII,S$GLB,, | Performed by: INTERNAL MEDICINE

## 2024-01-04 PROCEDURE — 3061F NEG MICROALBUMINURIA REV: CPT | Mod: CPTII,S$GLB,, | Performed by: INTERNAL MEDICINE

## 2024-01-04 PROCEDURE — 99214 OFFICE O/P EST MOD 30 MIN: CPT | Mod: S$GLB,,, | Performed by: INTERNAL MEDICINE

## 2024-01-04 PROCEDURE — 85025 COMPLETE CBC W/AUTO DIFF WBC: CPT | Performed by: INTERNAL MEDICINE

## 2024-01-04 PROCEDURE — 83036 HEMOGLOBIN GLYCOSYLATED A1C: CPT | Performed by: INTERNAL MEDICINE

## 2024-01-04 PROCEDURE — 1101F PT FALLS ASSESS-DOCD LE1/YR: CPT | Mod: CPTII,S$GLB,, | Performed by: INTERNAL MEDICINE

## 2024-01-04 RX ORDER — MELOXICAM 15 MG/1
15 TABLET ORAL DAILY PRN
Qty: 30 TABLET | Refills: 2 | Status: SHIPPED | OUTPATIENT
Start: 2024-01-04

## 2024-01-04 RX ORDER — HYDROCHLOROTHIAZIDE 12.5 MG/1
12.5 TABLET ORAL DAILY
Qty: 90 TABLET | Refills: 1 | Status: SHIPPED | OUTPATIENT
Start: 2024-01-04

## 2024-01-04 RX ORDER — DULOXETIN HYDROCHLORIDE 30 MG/1
60 CAPSULE, DELAYED RELEASE ORAL DAILY
Qty: 60 CAPSULE | Refills: 2 | Status: SHIPPED | OUTPATIENT
Start: 2024-01-04

## 2024-01-06 PROBLEM — E11.40 TYPE 2 DIABETES MELLITUS WITH DIABETIC NEUROPATHY, WITHOUT LONG-TERM CURRENT USE OF INSULIN: Status: ACTIVE | Noted: 2024-01-06

## 2024-01-06 PROBLEM — R26.9 GAIT ABNORMALITY: Status: RESOLVED | Noted: 2018-06-14 | Resolved: 2024-01-06

## 2024-01-07 NOTE — PROGRESS NOTES
"Subjective     Patient ID: Linnea Renae is a 73 y.o. female.    Chief Complaint: Hypertension and Diabetes    Last seen 6 months ago. Returns for scheduled follow up chronic medical conditions. Compliant with daily meds as prescribed, except stopped taking Hydrochlorothiazide three months ago when Furosemide was ordered for edema. The swelling has resolved, she is not using Furosemide. Blood pressure is running a little high, previously well controlled - will resume HCTZ.   No home glucose monitoring reported. Continues to follow with Bariatric Medicine for medical management of obesity, glucose has been well controlled on Semaglutide. Evaluated by Physical Medicine for chronic bilateral lower extremity pain - started on Duloxetine and Meloxicam. No longer using Percocet or extended release Tramadol, just immediate release Tramadol sparingly prn, and has "no pain today".     PMH: .   Hypertension.  Diabetes Type 2, HbA1c 5.5% .  Hyperlipidemia. LDL 66 .  GERD.  Lumbar Spinal Stenosis.   Chronic Dry Eyes.   Perennial Allergic Rhinitis.  Morbid Obesity.   Right Breast Cancer .  H/O Colon Polyps.     PSH: C/S x 3, BTL, Hysterectomy and USO, Bilateral Cataracts extracted, Cholecystectomy. Right breast excisional biopsy . Right Mastectomy  with reconstruction, and revisions  and .     Mammogram (Left) normal . BMD normal 8/15. Colonoscopy 10/18 - sigmoid diverticulosis, otherwise normal. Eye exam 10/23 Dr. Aden Haskins. Podiatry . Vaccines reviewed.    Social: Remote tobacco use, quit over 30 years ago. No alcohol.  with three daughters and three grandchildren. Homemaker.     FMH: Heart dis, Thyroid dis.     Allergies: Codeine.    Medications: list reviewed and reconciled.                Review of Systems   Constitutional:  Negative for activity change, appetite change, fatigue, fever and unexpected weight change.   HENT:  Negative for nasal congestion, ear " "pain, hearing loss, rhinorrhea, sneezing, sore throat, trouble swallowing and voice change.    Eyes:  Negative for pain and visual disturbance.   Respiratory:  Negative for cough, chest tightness, shortness of breath and wheezing.    Cardiovascular:  Negative for chest pain, palpitations and leg swelling.   Gastrointestinal:  Negative for abdominal pain, blood in stool, constipation, diarrhea, nausea and vomiting.   Genitourinary:  Negative for dysuria and frequency.   Musculoskeletal:  Negative for arthralgias, gait problem, joint swelling and myalgias.   Integumentary:  Negative for color change and rash.   Neurological:  Negative for dizziness, syncope, facial asymmetry, speech difficulty, weakness and headaches.        Mild paresthesias in feet persist.   Hematological:  Negative for adenopathy. Does not bruise/bleed easily.   Psychiatric/Behavioral:  Negative for confusion, dysphoric mood and sleep disturbance. The patient is not nervous/anxious.           Objective   Vitals:    01/04/24 1404   BP: 138/88   Pulse: 70   Temp: 98.1 °F (36.7 °C)   SpO2: 99%   Weight: 120.2 kg (264 lb 14.4 oz)   Height: 5' 6.5" (1.689 m)      Physical Exam  Vitals reviewed.   Constitutional:       General: She is not in acute distress.     Appearance: She is well-developed. She is not diaphoretic.   HENT:      Head: Normocephalic and atraumatic.      Nose: Nose normal. No congestion.      Mouth/Throat:      Mouth: Mucous membranes are moist.      Pharynx: Oropharynx is clear.   Eyes:      Extraocular Movements: Extraocular movements intact.      Conjunctiva/sclera: Conjunctivae normal.      Right eye: Right conjunctiva is not injected.      Left eye: Left conjunctiva is not injected.   Neck:      Thyroid: No thyromegaly.      Vascular: No JVD.   Cardiovascular:      Rate and Rhythm: Normal rate and regular rhythm.      Pulses: Normal pulses.      Heart sounds: Normal heart sounds.   Pulmonary:      Effort: Pulmonary effort is " normal. No respiratory distress.      Breath sounds: Normal breath sounds. No wheezing, rhonchi or rales.   Abdominal:      General: Bowel sounds are normal. There is no distension.      Palpations: Abdomen is soft.      Tenderness: There is no abdominal tenderness.   Musculoskeletal:         General: No tenderness or deformity. Normal range of motion.      Cervical back: Normal range of motion and neck supple.      Right lower leg: No edema.      Left lower leg: No edema.   Skin:     General: Skin is warm and dry.      Coloration: Skin is not pale.      Findings: No erythema or rash.      Nails: There is no clubbing.   Neurological:      General: No focal deficit present.      Mental Status: She is alert and oriented to person, place, and time.      Cranial Nerves: No cranial nerve deficit.      Motor: No weakness or abnormal muscle tone.      Coordination: Coordination normal.      Gait: Gait normal.   Psychiatric:         Mood and Affect: Mood and affect normal.         Speech: Speech normal.         Behavior: Behavior normal.         Thought Content: Thought content normal.          Assessment and Plan     1. Type 2 diabetes mellitus with diabetic neuropathy, without long-term current use of insulin  -     CBC Auto Differential; Future; Expected date: 01/04/2024  -     Comprehensive Metabolic Panel; Future; Expected date: 01/04/2024  -     Hemoglobin A1C; Future; Expected date: 01/04/2024  -     Microalbumin/Creatinine Ratio, Urine    2. Essential hypertension  -     Resume HydroCHLOROthiazide (HYDRODIURIL) 12.5 MG Tab; Take 1 tablet (12.5 mg total) by mouth once daily.  Dispense: 90 tablet; Refill: 1  -     Continue Atenolol and Olmesartan the same.     3. Hyperlipidemia, unspecified hyperlipidemia type  -     Lipid Panel; Future; Expected date: 01/04/2024    4. Atherosclerosis of aorta        -     continue Atorvastatin 20 mg daily.     5. Degenerative lumbar spinal stenosis  -     DULoxetine (CYMBALTA) 30 MG  capsule; Take 2 capsules (60 mg total) by mouth once daily.  Dispense: 60 capsule; Refill: 2  -     meloxicam (MOBIC) 15 MG tablet; Take 1 tablet (15 mg total) by mouth daily as needed for Pain.  Dispense: 30 tablet; Refill: 2    6. Class 3 severe obesity due to excess calories with serious comorbidity and body mass index (BMI) of 40.0 to 44.9 in adult        -     continue semaglutide, diet, f/u with Bariatrics.     7. Screening for colorectal cancer  -     Ambulatory referral/consult to Endo Procedure ; Future; Expected date: 01/05/2024    8. Need for COVID-19 vaccine - COVID booster today.        Follow up in about 6 months (around 7/4/2024).

## 2024-01-12 ENCOUNTER — TELEPHONE (OUTPATIENT)
Dept: PODIATRY | Facility: CLINIC | Age: 74
End: 2024-01-12
Payer: MEDICARE

## 2024-01-12 NOTE — TELEPHONE ENCOUNTER
Patient gave verbal understanding of emergency freeze plan that clinic shoaib be closed. She will like a call to know if her appointment will be rescheduled or confirmed. Patient was very pleasant of the heads up call.

## 2024-01-23 ENCOUNTER — CLINICAL SUPPORT (OUTPATIENT)
Dept: ENDOSCOPY | Facility: HOSPITAL | Age: 74
End: 2024-01-23
Attending: INTERNAL MEDICINE
Payer: MEDICARE

## 2024-01-23 ENCOUNTER — TELEPHONE (OUTPATIENT)
Dept: ENDOSCOPY | Facility: HOSPITAL | Age: 74
End: 2024-01-23

## 2024-01-23 DIAGNOSIS — Z12.11 SCREENING FOR COLORECTAL CANCER: ICD-10-CM

## 2024-01-23 DIAGNOSIS — Z12.12 SCREENING FOR COLORECTAL CANCER: ICD-10-CM

## 2024-01-23 NOTE — PLAN OF CARE
Contacted patient to schedule colonoscopy. Patient wants to do procedure at [facility] [place]. No availability at this time. New PAT appt scheduled. Patient verbalized understanding.

## 2024-01-23 NOTE — TELEPHONE ENCOUNTER
Contacted patient to schedule colonoscopy. Patient wants to do procedure at [facility] [place]. No availability at this time. New PAT appt scheduled. Patient verbalized understanding.

## 2024-02-06 RX ORDER — TRAMADOL HYDROCHLORIDE 50 MG/1
50 TABLET ORAL 3 TIMES DAILY PRN
Qty: 90 TABLET | Refills: 2 | Status: CANCELLED | OUTPATIENT
Start: 2024-02-06 | End: 2024-05-06

## 2024-02-10 RX ORDER — TRAMADOL HYDROCHLORIDE 50 MG/1
50 TABLET ORAL 3 TIMES DAILY PRN
Qty: 90 TABLET | Refills: 2 | Status: SHIPPED | OUTPATIENT
Start: 2024-02-10 | End: 2024-04-29 | Stop reason: SDUPTHER

## 2024-02-20 DIAGNOSIS — J30.89 PERENNIAL ALLERGIC RHINITIS: ICD-10-CM

## 2024-02-20 NOTE — TELEPHONE ENCOUNTER
No care due was identified.  Health [facility] Embedded Care Due Messages. Reference number: [id number].   2/20/2024 5:54:48 PM CST

## 2024-02-21 RX ORDER — FLUTICASONE PROPIONATE 50 MCG
2 SPRAY, SUSPENSION (ML) NASAL DAILY
Qty: 48 G | Refills: 3 | Status: SHIPPED | OUTPATIENT
Start: 2024-02-21

## 2024-02-21 NOTE — TELEPHONE ENCOUNTER
Refill Routing Note   Medication(s) are not appropriate for processing by [facility] for the following reason(s):        Due for refill >6 months ago    [facility] action(s):  Defer        Medication Therapy Plan: fluticasone 5 mcg-instill 2 sprays ien QD- 48g + 2 ordered 14 months ago.      Appointments  past 12m or future 3m with PCP    Date Provider   Last Visit   1/4/2024 Bailee Moss MD   Next Visit   7/9/2024 Bailee Moss MD   ED visits in past 90 days: 0        Note composed:7:37 PM 02/20/2024

## 2024-02-26 ENCOUNTER — TELEPHONE (OUTPATIENT)
Dept: ENDOSCOPY | Facility: HOSPITAL | Age: 74
End: 2024-02-26

## 2024-02-26 ENCOUNTER — CLINICAL SUPPORT (OUTPATIENT)
Dept: ENDOSCOPY | Facility: HOSPITAL | Age: 74
End: 2024-02-26
Attending: INTERNAL MEDICINE
Payer: MEDICARE

## 2024-02-26 DIAGNOSIS — Z12.11 SCREENING FOR COLORECTAL CANCER: ICD-10-CM

## 2024-02-26 DIAGNOSIS — Z12.12 SCREENING FOR COLORECTAL CANCER: ICD-10-CM

## 2024-02-26 NOTE — TELEPHONE ENCOUNTER
Contacted patient to schedule colonoscopy. . No slots available at this time. June 2024 schedule not yet open. New PAT appt scheduled. Patient verbalized understanding.

## 2024-03-20 ENCOUNTER — OFFICE VISIT (OUTPATIENT)
Dept: PODIATRY | Facility: CLINIC | Age: 74
End: 2024-03-20
Payer: MEDICARE

## 2024-03-20 VITALS
WEIGHT: 260.06 LBS | BODY MASS INDEX: 41.79 KG/M2 | HEART RATE: 79 BPM | DIASTOLIC BLOOD PRESSURE: 58 MMHG | HEIGHT: 66 IN | SYSTOLIC BLOOD PRESSURE: 120 MMHG

## 2024-03-20 DIAGNOSIS — E11.9 TYPE 2 DIABETES MELLITUS WITHOUT COMPLICATION, WITHOUT LONG-TERM CURRENT USE OF INSULIN: Primary | ICD-10-CM

## 2024-03-20 DIAGNOSIS — G89.4 CHRONIC PAIN SYNDROME: ICD-10-CM

## 2024-03-20 DIAGNOSIS — L84 CORN OR CALLUS: ICD-10-CM

## 2024-03-20 PROCEDURE — 1125F AMNT PAIN NOTED PAIN PRSNT: CPT | Mod: CPTII,S$GLB,, | Performed by: PODIATRIST

## 2024-03-20 PROCEDURE — 99213 OFFICE O/P EST LOW 20 MIN: CPT | Mod: S$GLB,,, | Performed by: PODIATRIST

## 2024-03-20 PROCEDURE — 4010F ACE/ARB THERAPY RXD/TAKEN: CPT | Mod: CPTII,S$GLB,, | Performed by: PODIATRIST

## 2024-03-20 PROCEDURE — 3061F NEG MICROALBUMINURIA REV: CPT | Mod: CPTII,S$GLB,, | Performed by: PODIATRIST

## 2024-03-20 PROCEDURE — 3078F DIAST BP <80 MM HG: CPT | Mod: CPTII,S$GLB,, | Performed by: PODIATRIST

## 2024-03-20 PROCEDURE — 3044F HG A1C LEVEL LT 7.0%: CPT | Mod: CPTII,S$GLB,, | Performed by: PODIATRIST

## 2024-03-20 PROCEDURE — 3074F SYST BP LT 130 MM HG: CPT | Mod: CPTII,S$GLB,, | Performed by: PODIATRIST

## 2024-03-20 PROCEDURE — 3066F NEPHROPATHY DOC TX: CPT | Mod: CPTII,S$GLB,, | Performed by: PODIATRIST

## 2024-03-20 PROCEDURE — 99999 PR PBB SHADOW E&M-EST. PATIENT-LVL II: CPT | Mod: PBBFAC,,, | Performed by: PODIATRIST

## 2024-03-20 PROCEDURE — 3008F BODY MASS INDEX DOCD: CPT | Mod: CPTII,S$GLB,, | Performed by: PODIATRIST

## 2024-03-27 ENCOUNTER — OFFICE VISIT (OUTPATIENT)
Dept: BARIATRICS | Facility: CLINIC | Age: 74
End: 2024-03-27
Payer: MEDICARE

## 2024-03-27 VITALS
WEIGHT: 256.19 LBS | HEART RATE: 73 BPM | BODY MASS INDEX: 41.17 KG/M2 | OXYGEN SATURATION: 99 % | HEIGHT: 66 IN | SYSTOLIC BLOOD PRESSURE: 135 MMHG | DIASTOLIC BLOOD PRESSURE: 61 MMHG

## 2024-03-27 DIAGNOSIS — E11.9 TYPE 2 DIABETES MELLITUS WITHOUT COMPLICATION, WITHOUT LONG-TERM CURRENT USE OF INSULIN: ICD-10-CM

## 2024-03-27 DIAGNOSIS — E66.01 CLASS 2 SEVERE OBESITY WITH BODY MASS INDEX (BMI) OF 35 TO 39.9 WITH SERIOUS COMORBIDITY: ICD-10-CM

## 2024-03-27 PROCEDURE — 99999 PR PBB SHADOW E&M-EST. PATIENT-LVL V: CPT | Mod: PBBFAC,,, | Performed by: INTERNAL MEDICINE

## 2024-03-27 PROCEDURE — 3078F DIAST BP <80 MM HG: CPT | Mod: CPTII,S$GLB,, | Performed by: INTERNAL MEDICINE

## 2024-03-27 PROCEDURE — 3066F NEPHROPATHY DOC TX: CPT | Mod: CPTII,S$GLB,, | Performed by: INTERNAL MEDICINE

## 2024-03-27 PROCEDURE — 3008F BODY MASS INDEX DOCD: CPT | Mod: CPTII,S$GLB,, | Performed by: INTERNAL MEDICINE

## 2024-03-27 PROCEDURE — 3061F NEG MICROALBUMINURIA REV: CPT | Mod: CPTII,S$GLB,, | Performed by: INTERNAL MEDICINE

## 2024-03-27 PROCEDURE — 3288F FALL RISK ASSESSMENT DOCD: CPT | Mod: CPTII,S$GLB,, | Performed by: INTERNAL MEDICINE

## 2024-03-27 PROCEDURE — 1160F RVW MEDS BY RX/DR IN RCRD: CPT | Mod: CPTII,S$GLB,, | Performed by: INTERNAL MEDICINE

## 2024-03-27 PROCEDURE — 1159F MED LIST DOCD IN RCRD: CPT | Mod: CPTII,S$GLB,, | Performed by: INTERNAL MEDICINE

## 2024-03-27 PROCEDURE — 3075F SYST BP GE 130 - 139MM HG: CPT | Mod: CPTII,S$GLB,, | Performed by: INTERNAL MEDICINE

## 2024-03-27 PROCEDURE — 4010F ACE/ARB THERAPY RXD/TAKEN: CPT | Mod: CPTII,S$GLB,, | Performed by: INTERNAL MEDICINE

## 2024-03-27 PROCEDURE — 99214 OFFICE O/P EST MOD 30 MIN: CPT | Mod: S$GLB,,, | Performed by: INTERNAL MEDICINE

## 2024-03-27 PROCEDURE — 1125F AMNT PAIN NOTED PAIN PRSNT: CPT | Mod: CPTII,S$GLB,, | Performed by: INTERNAL MEDICINE

## 2024-03-27 PROCEDURE — 1101F PT FALLS ASSESS-DOCD LE1/YR: CPT | Mod: CPTII,S$GLB,, | Performed by: INTERNAL MEDICINE

## 2024-03-27 PROCEDURE — 3044F HG A1C LEVEL LT 7.0%: CPT | Mod: CPTII,S$GLB,, | Performed by: INTERNAL MEDICINE

## 2024-03-27 RX ORDER — TOPIRAMATE 50 MG/1
50 TABLET, FILM COATED ORAL NIGHTLY
Qty: 180 TABLET | Refills: 1 | Status: SHIPPED | OUTPATIENT
Start: 2024-03-27 | End: 2025-03-27

## 2024-03-27 NOTE — PATIENT INSTRUCTIONS
"     Start Ozempic 2 mg once a week.     Decrease portions as soon as you start Ozempic. Avoid fried, greasy, fatty foods.     Some nausea in the first 2 weeks is not unusual.     If you get pain across the upper abdomen and around to your back, please call the office.       [email or web address] for coupon/videos.        Patient was informed that topiramate is used for migraine prevention and seizures. Weight loss is a common side effect that is well documented. S/he understands this. S/he was informed of the potential side effects such as serious and possibly fatal rash in which case the medication should be discontinued immediately. Paresthesias, forgetfulness, fatigue, kidney stones, GI symptoms, and changes in lab values such as electrolytes, blood counts and kidney function.    topiramate 50 mg 2 tabs in the evening.     No soda, sweet tea, juices or lemonade. All drinks should be 5 calories or less.         Limit starchy carbohydrates (bread, rice, pasta, potatoes, corn, peas, oatmeal, grits, tortillas, crackers, chips)        You need about 1000 calories and 70 g protein a day to lose weight    Spring Recipes:    L[place] Shake:     Ingredients  ½ cup low fat cottage cheese  ¼ cup vanilla protein powder  1/8 tsp mint extract  2-3 packets of stevia or artificial sweetener of choice  5-10 ice cubes (more or less depending on how thick you would like it)  4 oz of water  2-3 drops of green food coloring OR a small handful of spinach to make it green    Put all ingredients in  and  until desired consistency.      "Unstuffed" Cabbage Rolls:    Ingredients:  1 1/2 to 2 pounds lean ground beef or turkey  1 large onion, chopped  1 clove garlic, minced  1 small cabbage, chopped  2 cans (14.5 ounces each) diced tomatoes  1 can (8 ounces) tomato sauce  ¼ - ½ cup water depending on desired consistency  1 teaspoon ground black pepper, salt to taste    Spray large skillet with nonstick cookspray. Heat pan on " medium heat. Sauté the onions until tender, and then add the ground beef (or turkey) and cook until the meat is browned.    Add the garlic, cook an additional minute before adding the remaining ingredients. Cover, reduce the heat and simmer about 25 minutes (or until the cabbage is  fork tender)        Not so Tl's Pie:    Ingredients  2 (or more) pounds of lean ground beef, or turkey  2 heads of cauliflower or 3 bags of frozen cauliflower, chopped  1 bag of frozen mixed veggies          (NO Corn, Peas or Potatoes!)  1-2 onions  1 egg  1 teaspoon each of basil, garlic powder, pepper, oregano and a little cayenne  4-5 sprays of I cant believe its not butter spray  4 ounces of fat free cream cheese (optional)  Low fat Cheese to top (optional)    Roast cauliflower in oven on 350* F for about 15-20 minutes, until browned and fork tender. (begin ferreira meat while cauliflower is cooking). Place cooked cauliflower in . Add 4 ounces of fat free cream cheese, 4-5 sprays of I cant believe its not butter SPRAY, and spices and puree until creamy.     While cauliflower is roasting, brown meat in large skillet and season to taste. Set aside. Sauté diced onion in skillet until somewhat soft. Set aside with meat. Pour mixed veggies in the skillet to heat on low heat.     Mix the meat, onions, mixed veggies, raw egg and any additional seasonings and put in bottom of 9x13 baking dish. Spread mashed cauliflower mixture over it until smooth.    Bake at 350 for approximately 30 minutes. Add cheese and bake 5 additional minutes (optional). Serve warm (or reheat later).    Crock Pot Balsamic Pork Tenderloin:    Ingredients:  1 tsp dried thyme  1 tsp ground pepper  ½ tsp salt  3 tbsp dried minced onion  2 tsp dried basil  ¼ cup low sodium broth  ½ cup balsamic vinegar  2 cloves garlic, minced  2 lbs Pork Tenderloin    Mix first 5 ingredients together. Rub pork tenderloin with dry seasoning mixture.  In a large sauté pan,  heat ¼ cup balsamic vinegar and garlic on medium heat. Add pork to sauté davidson and sear, ferreira all sides. Add low sodium broth, 1 tbsp at a time to keep tenderloin from sticking or use nonstick cookspray.     Transfer meat to crock pot. Pour remaining balsamic vinegar into sauté pan and continue  to stir for a few minutes to deglaze the pan. Pour juices over the top of the tenderloin in crockpot. Cook on high for 3 hours or until meat thermometer says 170*. Let rest 5-10 minutes and then slice.       Side Dish: Steamed carrots w/ garlic and tray    Ingredients:  2 garlic cloves, minced  1 lb baby carrots  5-6 sprays of I cant believe its not butter SPRAY  1 tsp minced peeled fresh tray  1 tbsp chopped fresh cilantro  ½ tsp grated lime rind  1 tbsp fresh lime juice   ¼ tsp salt    Prepare garlic; let stand 10 minutes. Steam carrots, covered in pot, about 10 minutes or until tender.     In a nonstick skillet over medium heat, spray pan w/ I cant believe its not butter SPRAY. Add garlic and tray and cook 1 minute, stirring constantly. Remove from  heat; stir in carrots, cilantro and remaining ingredients.         Side Dish: Green Bean Almondine    Ingredients:  1 pound fresh green beans, trimmed  1 tbsp sherin vinegar  1 ½ tsp water  1 tsp Rikki Mustard  ¼ tsp salt  ¼ tsp freshly ground black pepper  1 tbsp sliced almonds, toasted    Cook green beans in boiling water for 4 minutes or until crisp-tender. Drain and plunge beans into ice water. Drain well. Pat beans dry w/ paper towel.     Combine vinegar, water, mustard, salt and pepper in a medium bowl. Add beans to vinegar mixture; toss to coat well. Sprinkle with toasted almonds.      How to Cook the Perfect Hard Boiled Egg:    Steam in water: Fill a large pot with 1 inch of water. Place steamer insert inside, cover, and bring to a boil over high heat. Add eggs to steamer basket, cover, and continue cooking 12 minute. If serving cold, immediately place eggs  in a bowl of ice water and allow to cool for at least 15 minutes before peeling under cool running water. Store in the refrigerator for up to 5 days.    OR    Purchase Dash Go Rapid Egg Boiler: available @ [employer], [employer], [employer], [employer] for ~$15-$20      Classic Egg Salad Recipe:    Ingredients:  8 hard cooked eggs, peeled and coarsely chopped  4-6 tbsp of low fat or fat free pineda  2 tbsp celery, chopped  2 tsp faith mustard  Dash of cayenne pepper (for spice)  Black pepper, salt to taste    In a medium bowl, combine pineda, celery, mustard and cayenne pepper with chopped eggs. Season w/ pepper and salt. Serve over Lettuce leaves.     Get Creative! Add chopped turkey lara and tomato and serve on lettuce leaves for an egg-cellent BLT egg salad or mix lean diced ham, low fat cheddar and tomatoes for  egg salad    Tuna Deviled Eggs:    Ingredients:  12 hard boiled eggs  1 can of tuna packed in water  1 rib celery, diced  ¼ cup low fat pineda  1 tsp mustard  Garlic powder, black pepper to taste  Dash of paprika (for finish)    Cut eggs in half lengthwise. Remove yolk and mash in bowl. In separate bowl, mix tuna, celery, low fat pineda, mustard and seasonings.  Stir in egg yolks until blended. Stuff eggs and sprinkle dash of paprika      Lara Jalapeno Deviled Eggs:    Ingredients:  12 hard boiled eggs, peeled  ½ cup low fat pineda  1 ½ tsp rice vinegar  ¾ tsp ground mustard  ½ packet splenda  2 jalapenos, seeded and chopped, 6 pieces turkey lara, cooked crisp and crumbled  Dash of paprika (for finish)    Cut eggs in half lengthwise. Remove yolk and mash in bowl. Add pineda, vinegar, spices and Splenda until well combined. Mix in jalapenos and lara. Stuff eggs and sprinkle w/ dash of paprika      Sugar-Free High Protein Brownie Recipe:    Ingredients:  2 cups of pureed zucchini  4 oz fat free cream cheese  1 large egg  2 scoops chocolate protein powder  1 tbsp pure vanilla extract  1/3 cup unsweetened  cocoa powder    ¼ tsp salt  2 tbsp chopped nuts  *8-9 packets of splenda or artificial sweetener of choice    Preheat oven to 350* F.     Line an 8x8 pan w/ parchment paper or spray with nonstick cookspray.     In a medium bowl, blend the fat free cream cheese with egg until creamy. Add chocolate protein powder and cocoa powder until creamy. Add vanilla extract. Stir in pureed zucchini and salt.     The batter will be thick, but not dry. If batter seems dry, add 1-2 tbsp water. Fold in Nuts. Pour batter in prepared pan.     Bake for 30 minutes. Allow to cool completely. Cut into squares and chill to semi-set.     *best if eaten within 2 days but may last 3-4 days in fridge.         Lemon Whip:    Ingredients:  Small box of sugar-free vanilla instant pudding mix  1 small packet of crystal light lemonade mix  2 cups skim milk or fat free fairlife milk  Sugar-free, fat free cool whip     Make sugar-free pudding using 2 cups skim milk according to package. Stir in 1 small packet of crystal light lemonade mix.  Fold in a dollop of sugar-free, fat free cool whip and stir to combine.     Home-made Sugar-free Pudding Pops:    Ingredients:  1 small pkg of sugar-free instant pudding mix (flavor of choice!)  2 cups fat free milk     Beat ingredients with whisk for 2 minutes. Pour into 6 paper or plastic cups or into a popsicle mold. Insert wooden pop stick in center of each cup.     Freeze for 5 hours or until firm. Peel off paper or pull out of popsicle mold.     Variations: add sugar-free syrups to change up the flavor, such as sugar-free chocolate pudding + sugar free raspberry syrup = chocolate raspberry fudgesicle.

## 2024-03-27 NOTE — PROGRESS NOTES
"Subjective:       Patient ID: Linnea eRnae is a 73 y.o. female.    Chief Complaint: Follow-up      Pt here today for follow-up.Has lost 7 lbs, net neg 31 lbs. Started [id number] piyush diet and decreased topiramate to 100 mg qhs. Also started Ozempic for DM2, currently on 2mg weekly. She has some had some indigestion. Resumed prilosec about  1 month ago. Appetite is  down.   Is having trouble with neuropathy in her feet, and back and foot pain. Lyrica was increased, and she coorelates recent weight gain to that. She is no longer on it. Does take tramadol prn. ALso has had more edema. Bailee Moss MD has adjusted her diuretics.  Also percocet, so she has been tired.   In early April 2022 she underwent insertion of a permanent prosthesis and reduction mammoplasty in the contralateral breast. Implant exchange planned on the 18th .               checked today        Ophtho exam 5/10/22- Open angle with borderline findings, low risk, bilateral. No retinopathy.       checked today. Has been on tramadol chronically.    On metformin 2000 mg BID.     Lab Results   Component Value Date    HGBA1C 5.5 01/04/2024    HGBA1C 5.5 04/17/2023    HGBA1C 5.7 (H) 12/09/2022     Lab Results   Component Value Date    LDLCALC 85.0 01/04/2024    CREATININE 0.8 01/04/2024        Review of Systems   Constitutional: Negative for chills and fever.   Respiratory: Negative for shortness of breath.         Denies snoring   Cardiovascular: Positive for leg swelling. Negative for chest pain.   Gastrointestinal: Positive for constipation. Negative for diarrhea.        + GERD   Genitourinary: Negative for difficulty urinating and dysuria.   Musculoskeletal: Positive for arthralgias and back pain.   Skin: Positive for rash.   Neurological: Positive for dizziness. Negative for light-headedness.   Psychiatric/Behavioral: Positive for dysphoric mood. The patient is nervous/anxious.        Objective:     /61   Pulse 73   Ht 5' 6" " (1.676 m)   Wt 116.2 kg (256 lb 2.8 oz)   SpO2 99%   BMI 41.35 kg/m²     Physical Exam   Constitutional: She is oriented to person, place, and time. She appears well-developed. No distress.   obese   HENT:   Head: Normocephalic and atraumatic.   Eyes: Pupils are equal, round, and reactive to light. EOM are normal. No scleral icterus.   Neck: Normal range of motion. Neck supple.   Cardiovascular: Normal rate.   Pulmonary/Chest: Effort normal.   Musculoskeletal: Normal range of motion. She exhibits + edema.   Neurological: She is alert and oriented to person, place, and time. No cranial nerve deficit.   Skin: Skin is warm and dry. No erythema.   Psychiatric: She has a normal mood and affect. Her behavior is normal. Judgment normal.   Vitals reviewed.      Assessment:       1. Type 2 diabetes mellitus without complication, without long-term current use of insulin    2. Class 2 severe obesity with body mass index (BMI) of 35 to 39.9 with serious comorbidity                    Plan:         Linnea was seen today for follow-up.    Diagnoses and all orders for this visit:    Type 2 diabetes mellitus without complication, without long-term current use of insulin  -     semaglutide (OZEMPIC) 2 mg/dose (8 mg/3 mL) PnIj; Inject 2 mg into the skin every 7 days.    Class 2 severe obesity with body mass index (BMI) of 35 to 39.9 with serious comorbidity  -     topiramate (TOPAMAX) 50 MG tablet; Take 1 tablet (50 mg total) by mouth every evening.                   Start Ozempic 2 mg once a week.     Decrease portions as soon as you start Ozempic. Avoid fried, greasy, fatty foods.     Some nausea in the first 2 weeks is not unusual.     If you get pain across the upper abdomen and around to your back, please call the office.       [email or web address] for coupon/videos.        Patient was informed that topiramate is used for migraine prevention and seizures. Weight loss is a common side effect that is well documented. S/he  understands this. S/he was informed of the potential side effects such as serious and possibly fatal rash in which case the medication should be discontinued immediately. Paresthesias, forgetfulness, fatigue, kidney stones, GI symptoms, and changes in lab values such as electrolytes, blood counts and kidney function.    topiramate 50 mg 2 tabs in the evening.     No soda, sweet tea, juices or lemonade. All drinks should be 5 calories or less.         Limit starchy carbohydrates (bread, rice, pasta, potatoes, corn, peas, oatmeal, grits, tortillas, crackers, chips)        You need about 1000 calories and 70 g protein a day to lose weight    Spring recipes given.

## 2024-04-07 NOTE — PROGRESS NOTES
Subjective:      Patient ID: Linnea Renae is a 73 y.o. female.    Chief Complaint: Follow-up and Foot Pain (Bilateral )      Linnea is a 73 y.o. female who presents to the clinic upon referral from Dr. Nat marsh. provider found  for evaluation and treatment of diabetic feet. Linnea has a past medical history of Allergy, Breast cancer, Chronic constipation, Colon polyps, Diabetes mellitus, type 2, Dry eyes, GERD (gastroesophageal reflux disease), Hyperlipidemia, Hypertension, Lumbar disc disease, and Type 2 diabetes mellitus.  The bottoms of both feet are improving in terms of pain and stiffness/neuropathy symptoms.  Overall much improved in terms of pain.  Has been seeing pain management as well.      PCP: Bailee Moss MD    Date Last Seen by PCP:   Chief Complaint   Patient presents with    Follow-up    Foot Pain     Bilateral          Current shoe gear: Casual shoes    Hemoglobin A1C   Date Value Ref Range Status   01/04/2024 5.5 4.0 - 5.6 % Final     Comment:     ADA Screening Guidelines:  5.7-6.4%  Consistent with prediabetes  >or=6.5%  Consistent with diabetes    High levels of fetal hemoglobin interfere with the HbA1C  assay. Heterozygous hemoglobin variants (HbS, HgC, etc)do  not significantly interfere with this assay.   However, presence of multiple variants may affect accuracy.     04/17/2023 5.5 4.0 - 5.6 % Final     Comment:     ADA Screening Guidelines:  5.7-6.4%  Consistent with prediabetes  >or=6.5%  Consistent with diabetes    High levels of fetal hemoglobin interfere with the HbA1C  assay. Heterozygous hemoglobin variants (HbS, HgC, etc)do  not significantly interfere with this assay.   However, presence of multiple variants may affect accuracy.     12/09/2022 5.7 (H) 4.0 - 5.6 % Final     Comment:     ADA Screening Guidelines:  5.7-6.4%  Consistent with prediabetes  >or=6.5%  Consistent with diabetes    High levels of fetal hemoglobin interfere with the HbA1C  assay. Heterozygous hemoglobin  variants (HbS, HgC, etc)do  not significantly interfere with this assay.   However, presence of multiple variants may affect accuracy.             Review of Systems   Constitution: Negative for chills and fever.   HENT: Negative for congestion and tinnitus.    Eyes: Negative for double vision and visual disturbance.   Cardiovascular: Negative for chest pain and claudication.   Respiratory: Negative for hemoptysis and shortness of breath.    Endocrine: Negative for cold intolerance and heat intolerance.   Hematologic/Lymphatic: Negative for adenopathy and bleeding problem.   Skin: Positive for color change, dry skin and nail changes.   Musculoskeletal: Positive for myalgias and stiffness.   Gastrointestinal: Negative for nausea and vomiting.   Genitourinary: Negative for dysuria and hematuria.   Neurological: Positive for numbness.   Psychiatric/Behavioral: Negative for altered mental status and suicidal ideas.   Allergic/Immunologic: Negative for environmental allergies and persistent infections.           Objective:      Physical Exam  Vitals signs reviewed.   Constitutional:       Appearance: She is well-developed.   Cardiovascular:      Pulses:           Dorsalis pedis pulses are 1+ on the right side and 1+ on the left side.        Posterior tibial pulses are 1+ on the right side and 1+ on the left side.   Pulmonary:      Effort: Pulmonary effort is normal.   Musculoskeletal: Normal range of motion.      Comments: Anterior, lateral, and posterior muscle groups bilateral lower extremities show strength 4 over 5 symmetrically. Inspection and palpation of the joints and bones reveal no crepitus or joint effusion. No tenderness upon palpation. Mild plantar flexor contractures noted to digits 2 through 5 bilaterally.  Angle and base of gait are normal.   Feet:      Right foot:      Skin integrity: Callus and dry skin present.      Left foot:      Skin integrity: Callus and dry skin present.   Skin:     General: Skin  is warm and dry.      Capillary Refill: Capillary refill takes 2 to 3 seconds.      Coloration: Skin is pale.      Comments: Skin bilateral lower extremities noted to be thin, dry, and atrophic.  Toenails normal.   Neurological:      Mental Status: She is alert and oriented to person, place, and time.      Sensory: Sensory deficit present.      Motor: Atrophy present.      Deep Tendon Reflexes: Reflexes abnormal.      Reflex Scores:       Patellar reflexes are 1+ on the right side and 1+ on the left side.       Achilles reflexes are 1+ on the right side and 1+ on the left side.     Comments: Sharp, dull, light touch, and vibratory sensation are diminished bilaterally. Proprioceptive sensation is intact to both lower extremities. [place] Jackie monofilament exam shows loss of protective sensation to plantar toes 1 through 5 bilaterally. Deep tendon reflexes to the patellar tendons is 1 over 4 bilaterally symmetrical. Deep tendon reflexes to the Achilles tendon is 0 over 4 bilaterally symmetrical. No ankle clonus or Babinski reflex noted bilaterally. Coordination is fair to both lower extremities.                 Assessment:       Encounter Diagnoses   Name Primary?    Type 2 diabetes mellitus without complication, without long-term current use of insulin Yes    Corn or callus     Chronic pain syndrome            Plan:       Linnea was seen today for follow-up and foot pain.    Diagnoses and all orders for this visit:    Type 2 diabetes mellitus without complication, without long-term current use of insulin    Corn or callus    Chronic pain syndrome        I counseled the patient on her conditions, their implications and medical management.    Shoe inspection. Diabetic Foot Education. Patient reminded of the importance of good nutrition and blood sugar control to help prevent podiatric complications of diabetes. Patient instructed on proper foot hygeine. We discussed wearing proper shoe gear, daily foot inspections  and Diabetic foot education in detail.    Discussed options for peripheral neuropathy/nerve entrapment syndrome including nerve block therapy, surgical nerve entrapment decompression procedures, and various vitamins and supplementation available shown to improve nerve function.    Follow up in 3 months after consultation with pain management.  .  Follow-up in 3 months.

## 2024-04-10 DIAGNOSIS — M48.061 DEGENERATIVE LUMBAR SPINAL STENOSIS: ICD-10-CM

## 2024-04-10 RX ORDER — DULOXETIN HYDROCHLORIDE 30 MG/1
60 CAPSULE, DELAYED RELEASE ORAL DAILY
Qty: 180 CAPSULE | Refills: 2 | Status: SHIPPED | OUTPATIENT
Start: 2024-04-10 | End: 2024-04-29

## 2024-04-11 NOTE — TELEPHONE ENCOUNTER
Refill Decision Note   Linnea Renae  is requesting a refill authorization.  Brief Assessment and Rationale for Refill:  Approve     Medication Therapy Plan:         Comments:     Note composed:10:38 PM 04/10/2024

## 2024-04-11 NOTE — TELEPHONE ENCOUNTER
No care due was identified.  Health [facility] Embedded Care Due Messages. Reference number: [id number].   4/10/2024 9:52:20 PM CDT

## 2024-04-15 DIAGNOSIS — K21.9 GERD WITHOUT ESOPHAGITIS: ICD-10-CM

## 2024-04-16 ENCOUNTER — TELEPHONE (OUTPATIENT)
Dept: ENDOSCOPY | Facility: HOSPITAL | Age: 74
End: 2024-04-16

## 2024-04-16 ENCOUNTER — CLINICAL SUPPORT (OUTPATIENT)
Dept: ENDOSCOPY | Facility: HOSPITAL | Age: 74
End: 2024-04-16
Attending: INTERNAL MEDICINE
Payer: MEDICARE

## 2024-04-16 DIAGNOSIS — Z12.11 SCREENING FOR COLORECTAL CANCER: ICD-10-CM

## 2024-04-16 DIAGNOSIS — Z12.12 SCREENING FOR COLORECTAL CANCER: ICD-10-CM

## 2024-04-16 RX ORDER — SODIUM, POTASSIUM,MAG SULFATES 17.5-3.13G
1 SOLUTION, RECONSTITUTED, ORAL ORAL DAILY
Qty: 1 KIT | Refills: 0 | Status: SHIPPED | OUTPATIENT
Start: 2024-04-16 | End: 2024-04-19

## 2024-04-16 RX ORDER — OMEPRAZOLE 20 MG/1
20 CAPSULE, DELAYED RELEASE ORAL DAILY
Qty: 90 CAPSULE | Refills: 2 | Status: SHIPPED | OUTPATIENT
Start: 2024-04-16

## 2024-04-16 NOTE — TELEPHONE ENCOUNTER
Refill Decision Note   Linnea Renae  is requesting a refill authorization.  Brief Assessment and Rationale for Refill:  Approve     Medication Therapy Plan:         Comments:     Note composed:12:56 PM 04/16/2024

## 2024-04-16 NOTE — TELEPHONE ENCOUNTER
No care due was identified.  Health [facility] Embedded Care Due Messages. Reference number: [id number].   4/15/2024 10:17:41 PM CDT

## 2024-04-16 NOTE — TELEPHONE ENCOUNTER
Spoke to pt to schedule procedure(s) Colonoscopy       Physician to perform procedure(s) Dr. OSCAR Darnell  Date of Procedure (s) 7/10/24  Arrival Time 1:10 PM  Time of Procedure(s) 2:10 PM   Location of Procedure(s) [place] 4th Floor  Type of Rx Prep sent to patient: Suprep  Instructions provided to patient via [facility]    Patient was informed on the following information and verbalized understanding. Screening questionnaire reviewed with patient and complete. If procedure requires anesthesia, a responsible adult needs to be present to accompany the patient home, patient cannot drive after receiving anesthesia. Appointment details are tentative, especially check-in time. Patient will receive a prep-op call 7 days prior to confirm check-in time for procedure. If applicable the patient should contact their pharmacy to verify Rx for procedure prep is ready for pick-up. Patient was advised to call the scheduling department at [phone] if pharmacy states no Rx is available. Patient was advised to call the endoscopy scheduling department if any questions or concerns arise.      [facility] Scheduling Department

## 2024-04-19 ENCOUNTER — PATIENT OUTREACH (OUTPATIENT)
Dept: ADMINISTRATIVE | Facility: HOSPITAL | Age: 74
End: 2024-04-19
Payer: MEDICARE

## 2024-04-19 DIAGNOSIS — M48.061 DEGENERATIVE LUMBAR SPINAL STENOSIS: ICD-10-CM

## 2024-04-19 NOTE — TELEPHONE ENCOUNTER
No care due was identified.  Health [facility] Embedded Care Due Messages. Reference number: [id number].   4/19/2024 4:46:08 PM CDT

## 2024-04-19 NOTE — TELEPHONE ENCOUNTER
Refill Routing Note   Medication(s) are not appropriate for processing by [facility] for the following reason(s):        Outside of protocol    [facility] action(s):  Route               Appointments  past 12m or future 3m with PCP    Date Provider   Last Visit   1/4/2024 Bailee Moss MD   Next Visit   7/9/2024 Bailee Moss MD   ED visits in past 90 days: 0        Note composed:5:15 PM 04/19/2024

## 2024-04-19 NOTE — PROGRESS NOTES
Patient due for the following    COVID-19 Vaccine (5 - 2023-24 season)    Colorectal Cancer Screening     Foot Exam       Colonoscopy scheduled.    Immunizations: reviewed and updated  Care Everywhere: triggered  Care Teams: up to date  Outreach: completed

## 2024-04-21 RX ORDER — MELOXICAM 15 MG/1
15 TABLET ORAL DAILY PRN
Qty: 30 TABLET | Refills: 2 | Status: SHIPPED | OUTPATIENT
Start: 2024-04-21

## 2024-04-29 ENCOUNTER — OFFICE VISIT (OUTPATIENT)
Dept: PHYSICAL MEDICINE AND REHAB | Facility: CLINIC | Age: 74
End: 2024-04-29
Payer: MEDICARE

## 2024-04-29 ENCOUNTER — TELEPHONE (OUTPATIENT)
Dept: BARIATRICS | Facility: CLINIC | Age: 74
End: 2024-04-29
Payer: MEDICARE

## 2024-04-29 VITALS — WEIGHT: 267.06 LBS | HEIGHT: 66 IN | BODY MASS INDEX: 42.92 KG/M2

## 2024-04-29 DIAGNOSIS — M70.62 TROCHANTERIC BURSITIS OF BOTH HIPS: ICD-10-CM

## 2024-04-29 DIAGNOSIS — E11.42 DIABETIC PERIPHERAL NEUROPATHY: ICD-10-CM

## 2024-04-29 DIAGNOSIS — Z79.891 CHRONICALLY ON OPIATE THERAPY: ICD-10-CM

## 2024-04-29 DIAGNOSIS — M70.61 TROCHANTERIC BURSITIS OF BOTH HIPS: ICD-10-CM

## 2024-04-29 DIAGNOSIS — M48.061 DEGENERATIVE LUMBAR SPINAL STENOSIS: ICD-10-CM

## 2024-04-29 DIAGNOSIS — G89.29 CHRONIC BILATERAL LOW BACK PAIN WITHOUT SCIATICA: ICD-10-CM

## 2024-04-29 DIAGNOSIS — M51.36 DDD (DEGENERATIVE DISC DISEASE), LUMBAR: ICD-10-CM

## 2024-04-29 DIAGNOSIS — M54.50 CHRONIC BILATERAL LOW BACK PAIN WITHOUT SCIATICA: ICD-10-CM

## 2024-04-29 DIAGNOSIS — M48.061 SPINAL STENOSIS OF LUMBAR REGION WITHOUT NEUROGENIC CLAUDICATION: ICD-10-CM

## 2024-04-29 DIAGNOSIS — M48.061 NEURAL FORAMINAL STENOSIS OF LUMBAR SPINE: ICD-10-CM

## 2024-04-29 DIAGNOSIS — M47.816 LUMBAR FACET ARTHROPATHY: ICD-10-CM

## 2024-04-29 DIAGNOSIS — E66.01 MORBID OBESITY WITH BMI OF 40.0-44.9, ADULT: ICD-10-CM

## 2024-04-29 DIAGNOSIS — M79.671 FOOT PAIN, BILATERAL: Primary | ICD-10-CM

## 2024-04-29 DIAGNOSIS — M79.672 FOOT PAIN, BILATERAL: Primary | ICD-10-CM

## 2024-04-29 PROCEDURE — 1159F MED LIST DOCD IN RCRD: CPT | Mod: HCNC,CPTII,S$GLB, | Performed by: PHYSICAL MEDICINE & REHABILITATION

## 2024-04-29 PROCEDURE — 3288F FALL RISK ASSESSMENT DOCD: CPT | Mod: HCNC,CPTII,S$GLB, | Performed by: PHYSICAL MEDICINE & REHABILITATION

## 2024-04-29 PROCEDURE — 3061F NEG MICROALBUMINURIA REV: CPT | Mod: HCNC,CPTII,S$GLB, | Performed by: PHYSICAL MEDICINE & REHABILITATION

## 2024-04-29 PROCEDURE — 1125F AMNT PAIN NOTED PAIN PRSNT: CPT | Mod: HCNC,CPTII,S$GLB, | Performed by: PHYSICAL MEDICINE & REHABILITATION

## 2024-04-29 PROCEDURE — 1101F PT FALLS ASSESS-DOCD LE1/YR: CPT | Mod: HCNC,CPTII,S$GLB, | Performed by: PHYSICAL MEDICINE & REHABILITATION

## 2024-04-29 PROCEDURE — 4010F ACE/ARB THERAPY RXD/TAKEN: CPT | Mod: HCNC,CPTII,S$GLB, | Performed by: PHYSICAL MEDICINE & REHABILITATION

## 2024-04-29 PROCEDURE — 3044F HG A1C LEVEL LT 7.0%: CPT | Mod: HCNC,CPTII,S$GLB, | Performed by: PHYSICAL MEDICINE & REHABILITATION

## 2024-04-29 PROCEDURE — 99214 OFFICE O/P EST MOD 30 MIN: CPT | Mod: HCNC,S$GLB,, | Performed by: PHYSICAL MEDICINE & REHABILITATION

## 2024-04-29 PROCEDURE — 99999 PR PBB SHADOW E&M-EST. PATIENT-LVL III: CPT | Mod: PBBFAC,HCNC,, | Performed by: PHYSICAL MEDICINE & REHABILITATION

## 2024-04-29 PROCEDURE — 3066F NEPHROPATHY DOC TX: CPT | Mod: HCNC,CPTII,S$GLB, | Performed by: PHYSICAL MEDICINE & REHABILITATION

## 2024-04-29 RX ORDER — LIDOCAINE 50 MG/G
1-2 PATCH TOPICAL DAILY
Qty: 60 PATCH | Refills: 2 | Status: SHIPPED | OUTPATIENT
Start: 2024-04-29

## 2024-04-29 RX ORDER — TRAMADOL HYDROCHLORIDE 50 MG/1
50 TABLET ORAL EVERY 6 HOURS PRN
Qty: 120 TABLET | Refills: 2 | Status: SHIPPED | OUTPATIENT
Start: 2024-05-06 | End: 2024-08-04

## 2024-04-29 RX ORDER — DULOXETIN HYDROCHLORIDE 30 MG/1
30 CAPSULE, DELAYED RELEASE ORAL SEE ADMIN INSTRUCTIONS
Start: 2024-04-29 | End: 2024-06-17 | Stop reason: SDUPTHER

## 2024-04-29 NOTE — PROGRESS NOTES
Pt called to be scheduled for a July appt. Informed pt there is no available appts at this time , please call back after 4/1 or schedule through the [employer] portal. Pt stated she did not have a appt scheduled during her last ov in March due to the appt daylin not being available .     Pt stated she needs a 3-4 m f/u and not a 6 mo f/u. Pt stated Ozempic is not working and she is about to eat her whole house. Pt stated it is not her fault that she was not able to be scheduled in March. Pt stated whatever computer scheduling issue is happening needs to be fixed asap and she needs a appt asap. Pt stated she can not wait until September to be seen.       Pt requested to be scheduled sooner and speak to .

## 2024-04-29 NOTE — TELEPHONE ENCOUNTER
She can schedule and get on the wait list. The schedule is at capacity, so there is not more I can do about that. I would not have much in the way of other options to offer or switch at this time any how 2/2 med shortages, coverages, etc.   It does look like she has refills of her ozempic.

## 2024-04-29 NOTE — TELEPHONE ENCOUNTER
----- Message from Yulissa Yoder sent at 4/29/2024 11:04 AM CDT -----  Regarding: apt  Contact: [phone]  Pt calling in to schedule apt please call to discuss  Further

## 2024-04-29 NOTE — PROGRESS NOTES
Subjective:       Patient ID: Linnea Renae is a 73 y.o. female.    Chief Complaint: No chief complaint on file.      HPI      Mrs. Perera is a 73-year-old female with of hypertension, diabetes mellitus type 2, diabetic neuropathy, hyperlipidemia, GERD, right breast cancer status post Left mastopexy and reconstruction (04/2022), chronic low back pain status post multiple spine injections.  She was referred by Podiatry presented to the [facility] on 10/16/2023 for chronic bilateral foot pain with history of diabetic neuropathy, and for chronic low back pain status post multiple spine procedures, listed below.  She was started on meloxicam, duloxetine and p.r.n. tramadol.  She was referred to physical therapy.    The patient is coming to the clinic for follow-up.  Her low back pain has been better.  It is an almost constant throbbing and stabbing sensation in the lower lumbar spine and across her back. It shoots bilaterally to the buttock and hip regions.  She denies however any recent shooting pain to her legs.  Her pain is aggravated by standing, walking, bending and lifting.  It is better with sitting down or lying down.  Her max pain is 10/10 and minimum 4/10.  Today at rest her pain is 4/10.  She denies any lower extremity weakness.  She denies any bowel or bladder incontinence.  She denies any leg claudications.      Her bilateral foot pain has been better. It is a constant numbness and tingling sensations in both feet.  It is worse usually at night.  Her max pain is 10/10 and minimum 4/10.  Today it is 4/10.      She is currently taking:  Meloxicam 15 mg by mouth once per day  Duloxetine 30 mg capsules, 2 capsules once daily  Tramadol 50 mg p.r.n., usually 1 tablet 3 times per day but recently 4 times due to severity of pain.  She reports that in the past gabapentin caused.  Pregabalin caused weight gain.  She was not able to commit to physical therapy.    Review of the [facility] Clinic on  "04/26/2022 shows the following procedures:   "Pain Procedures:   7/29/16 Right L3-4 TF CHRISTIAN  11/3/17 Bilateral L3-4 and L4-5 facet injections  5/9/18 Bilateral L3-4 and L4-5 facet injections  7/25/18 Bilateral L3-4 and L4-5 facet injections  12/5/18 Right L2,3,4,5 RFA- 80% relief  3/20/19 Left L2,3,4,5 RFA- 100% relief  10/2/19 left L4/5 TFESI  12/18/19 LEFT L2,3,4,5 RFA- 90% relief  1/8/20 RIGHT L2,3,4,5 RFA- 90% relief  7/8/20 B/L SIJ, GTB - 85% relief  11/04/20 Right L2, L3, L4, L5 RFA - 80% relief  11/23/20 TPIs- helpful"    Past Medical History:   Diagnosis Date    Allergy     Breast cancer     Lumpectomy 10/15. right    Chronic constipation     Colon polyps     Diabetes mellitus, type 2     Dry eyes     GERD (gastroesophageal reflux disease)     Hyperlipidemia     Hypertension     Lumbar disc disease     Type 2 diabetes mellitus         Review of patient's allergies indicates:   Allergen Reactions    Codeine Itching        Review of Systems   Constitutional:  Negative for chills and fever.   Eyes:  Negative for visual disturbance.   Respiratory:  Negative for shortness of breath.    Cardiovascular:  Negative for chest pain.   Gastrointestinal:  Positive for constipation. Negative for blood in stool, nausea and vomiting.   Genitourinary:  Negative for difficulty urinating.   Musculoskeletal:  Positive for back pain. Negative for arthralgias, gait problem and neck pain.   Neurological:  Positive for numbness and headaches. Negative for dizziness and weakness.   Psychiatric/Behavioral:  Negative for behavioral problems and sleep disturbance.              Objective:      Physical Exam  Vitals reviewed.   Constitutional:       Appearance: She is well-developed.   HENT:      Head: Normocephalic and atraumatic.   Eyes:      Extraocular Movements: Extraocular movements intact.   Musculoskeletal:      Cervical back: Normal range of motion. No tenderness.      Comments: BUE:  ROM:   RUE: full.   LUE: full.  Strength:    " RUE: 5/5 at shoulder abduction, 5 elbow flexion, 5 elbow extension, 5 hand .   LUE: 5/5 at shoulder abduction, 5 elbow flexion, 5 elbow extension, 5 hand .  Sensation to pinprick:   RUE: intact.   LUE: intact.  DTR:    RUE: +1 biceps, +1 triceps.   LUE:  +1 biceps, +1 triceps.      BLE:  ROM:   RLE: full.   LLE: full.  Knee crepitus:   RLE: -ve.   LLE: -ve.   Strength:    RLE: 5/5 at hip flexion, 5 knee extension, 5 ankle DF, 5 PF, 5 EHL.   LLE: 5/5 at hip flexion, 5 knee extension, 5 ankle DF, 5 PF, 5 EHL.  Sensation to pinprick:     RLE: hypersensitive.       LLE: hypersensitive.   DTR:     RLE: +1 knee, +1 ankle.    LLE: +1 knee, +1 ankle.  Clonus:    Rt ankle: -ve.    Lt ankle: -ve.  SLR:      RLE: -ve at 60 degrees.      LLE: -ve at 60 degrees.   MARY:     RLE: -ve.      LLE: -ve.  +ve mod tenderness over lumbar spine.  +ve mod tenderness over bilateral trochanteric bursa.  No leg length discrepancy.    Directional Preference:  Spine flexion: 90 degrees , no pain in back.  Spine extension: 20 degrees, mod pain in back.  Lateral bending: mod pain to Right, mild pain to Left.      Heel walking: WNL.  Toe walking: WNL.  Gait: slow garrett, waddling.   Skin:     General: Skin is warm.   Neurological:      General: No focal deficit present.      Mental Status: She is alert.   Psychiatric:         Behavior: Behavior normal.         Assessment:       1. Foot pain, bilateral    2. Diabetic peripheral neuropathy    3. Chronic bilateral low back pain without sciatica    4. DDD (degenerative disc disease), lumbar    5. Lumbar facet arthropathy    6. Spinal stenosis of lumbar region without neurogenic claudication (L4-L5, moderate)    7. Neural foraminal stenosis of lumbar spine    8. Trochanteric bursitis of both hips    9. Morbid obesity with BMI of 40.0-44.9, adult    10. Chronically on opiate therapy        Plan:       - Continue  meloxicam (MOBIC) 15 MG tablet; Take 1 tablet (15 mg total) by mouth once  daily.  - Increase DULoxetine (CYMBALTA) 30 MG capsule; Take 1 capsule (30 mg total) by mouth As instructed (take 2 cqpsules in the morning, and 1 in the evening).  - Increase traMADoL (ULTRAM) 50 mg tablet; Take 1 tablet (50 mg total) by mouth every 6 (six) hours as needed for Pain.  - Start LIDOcaine (LIDODERM) 5 %; Place 1-2 patches onto the skin once daily. Remove & Discard patch within 12 hours or as directed by MD  - The patient was encouraged to adopt a regular Home Exercise Program, and provided with printouts.  - Weight loss was encouraged.  - Follow up in about 4 months (around 8/29/2024).    This was a 33 minute visit (including review of records), 50% of which was spent educating the patient about the diagnosis and the treatment plan.    This note was partly generated with [employer] voice recognition software. I apologize for any possible typographical errors.

## 2024-04-29 NOTE — PATIENT INSTRUCTIONS
Neck/Back Pain [General]    Both neck and back pain are usually caused by injury to the muscles or ligaments of the spine. Sometimes the disks that separate each bone of the spine may cause pain by putting pressure on a nearby nerve. Back and neck pain may appear after a sudden twisting/bending force (such as in a car accident), or sometimes after a simple awkward movement. In either case, muscle spasm is often present and adds to the pain.  Acute neck and back pain usually gets better in one to two weeks. Pain related to disk disease, arthritis in the spinal joints or spinal stenosis (narrowing of the spinal canal) can become chronic and last for months or years.  Home Care:  FOR NECK PAIN: Use a comfortable pillow that supports the head and keeps the spine in a neutral position. The position of the head should not be tilted forward or backward.  FOR BACK PAIN: You may need to stay in bed the first few days. But, as soon as possible, begin sitting or walking to avoid problems with prolonged bed rest (muscle weakness, worsening back stiffness and pain, blood clots in the legs).  When in bed, try to find a position of comfort. A firm mattress is best. Try lying flat on your back with pillows under your knees. You can also try lying on your side with your knees bent up towards your chest and a pillow between your knees.  Avoid prolonged sitting. This puts more stress on the lower back than standing or walking.  During the first two days after injury, apply an ICE PACK to the painful area for 20 minutes every 2-4 hours. This will reduce swelling and pain. HEAT (hot shower, hot bath or heating pad) works well for muscle spasm. You can start with ice, then switch to heat after two days. Some patients feel best alternating ice and heat treatments. Use the one method that feels the best to you.  You may use acetaminophen (Tylenol) or ibuprofen (Motrin, Advil) to control pain, unless another pain medicine was prescribed.  [NOTE: If you have chronic liver or kidney disease or ever had a stomach ulcer or GI bleeding, talk with your doctor before using these medicines.]  Be aware of safe lifting methods and do not lift anything over 15 pounds until all the pain is gone.  Follow Up  with your physician or this facility if your symptoms do not start to improve after one week. Physical therapy or further tests may be needed.  [NOTE: If X-rays were taken, they will be reviewed by a radiologist. You will be notified of any new findings that may affect your care.]  Get Prompt Medical Attention  if any of the following occur:  Pain becomes worse or spreads into your arms or legs  Weakness, numbness or pain in one or both arms or legs  Loss of bowel or bladder control  Numbness in the groin area  Difficulty walking  Fever of 100.4ºF (38ºC) or higher, or as directed by your healthcare provider  © 7064-4726 [place] [address]. All rights reserved. This information is not intended as a substitute for professional medical care. Always follow your healthcare professional's instructions.    Back Exercises: Back Press  Do this exercise on your hands and knees. Keep your knees under your hips and your hands under your shoulders. Keep your spine in a neutral position (not arched or sagging). Be sure to maintain your necks natural curve.  Tighten your abdominal and buttocks muscles to press your back upward. Let your head drop slightly.  Hold for 5 seconds. Return to starting position.  Repeat 5 times.     © 4370-8643 [facility], [address] [place] [address]. All rights reserved. This information is not intended as a substitute for professional medical care. Always follow your healthcare professional's instructions.    Back Exercises: Back Release  Do this exercise on your hands and knees. Keep your knees under your hips and your hands under your shoulders. Keep your spine in a neutral position  (not arched or sagging). Be sure to maintain your necks natural curve.  Relax your abdominal and buttocks muscles, lift your head, and let your back sag. Be sure to keep your weight evenly distributed. Dont sit back on your hips.   Hold for 5 seconds.  Return to starting position.  Repeat 5 times.  © 9316-28[address] [place] [address]. All rights reserved. This information is not intended as a substitute for professional medical care. Always follow your healthcare professional's instructions.    Back Exercises: Bridge  The Bridge exercise strengthens your abdominal, buttocks, and hamstring muscles. This helps keep your back stable and aligned when you walk.  Lie on the floor with your back and palms flat. Bend your knees. Keep your feet flat on the floor.  Contract your abdominal and buttocks muscles. Slowly lift your buttocks off the floor until there is a straight line from your knees to your shoulders.  Hold for 5  seconds. Repeat 10 times.    © 2683-9911 [place] [address]. All rights reserved. This information is not intended as a substitute for professional medical care. Always follow your healthcare professional's instructions.    Back Exercises: Elbow Press    To start, lie face down on your stomach, feet slightly apart, forehead on the floor. Breathe deeply. You should feel comfortable and relaxed in this position.  Press up on your forearms. Keep your abdomen and hips on the floor.  Hold for 20 seconds. Lower slowly.  Repeat 2 times.  Return to starting position.  © 9833-6352 [place] [address]. All rights reserved. This information is not intended as a substitute for professional medical care. Always follow your healthcare professional's instructions.    Back Exercises: Pelvic Tilt  To start, lie on your back with your knees bent and feet flat on the floor. Dont press your neck or lower  back to the floor. Breathe deeply. You should feel comfortable and relaxed in this position.  Tighten your abdomen and buttocks, and press your lower back toward the floor. This should be a small, subtle movement.  Hold for 5 seconds. Release.  Repeat 5 times.         © 8639-5296 [facility], [address] [place] [address]. All rights reserved. This information is not intended as a substitute for professional medical care. Always follow your healthcare professional's instructions.    Back Exercises: Partial Curl-Ups          To start, lie on your back with your knees bent and feet flat on the floor. Dont press your neck or lower back to the floor. Breathe deeply. You should feel comfortable and relaxed in this position.  Cross your arms loosely.  Tighten your abdomen and curl skilled nursing up, keeping your head in line with your shoulders.  Hold for 5 seconds. Uncurl to lie down.  Repeat 5 times.   © 6519-1009 Latonya [facility], [address] [place] [address]. All rights reserved. This information is not intended as a substitute for professional medical care. Always follow your healthcare professional's instructions.    Back Exercises: Lower Back Stretch                            To start, sit in a chair with your feet flat on the floor. Shift your weight slightly forward to avoid rounding your back. Relax, and keep your ears, shoulders, and hips aligned.  Sit with your feet well apart.  Bend forward and touch the floor with the backs of your hands. Relax and let your body drop.  Hold for 20 seconds. Return to starting position.  Repeat 2 times.   © 8203-1841 Latonya [facility], [address] [place] [address]. All rights reserved. This information is not intended as a substitute for professional medical care. Always follow your healthcare professional's instructions.    Back Exercises: Seated Rotation      To start, sit in a chair with your feet flat on the floor. Shift your weight  slightly forward to avoid rounding your back. Relax, and keep your ears, shoulders, and hips aligned.  Fold your arms, elbows just below shoulder height.  Turn from the waist with hips forward. Turn your head last.  Hold for a count of 5. Return to starting position.  Repeat 5 times on one side. Then switch sides.  © 9151-4784 [facility], [address] [place] [address]. All rights reserved. This information is not intended as a substitute for professional medical care. Always follow your healthcare professional's instructions.    Back Exercises: Side Stretch  To start, sit in a chair with your feet flat on the floor. Shift your weight slightly forward to avoid rounding your back. Relax. Keep your ears, shoulders, and hips aligned.  Stretch your right arm overhead.  Slowly bend to the left. Dont twist your torso.  Hold for 20 seconds. Return to starting position.  Repeat 2 times. Then, switch to the other side.  © 0141-0322 Latonya [facility], [address] [place] [address], [place] [address]. All rights reserved. This information is not intended as a substitute for professional medical care. Always follow your healthcare professional's instructions

## 2024-05-02 NOTE — TELEPHONE ENCOUNTER
Tonja Keenan NP will be seeing a few medical weight loss pts on Monday and Friday. Pt can see her sooner if she would like. Please be sure to put in appt notes that she is medical weight loss if she goes on Tonja's schedule.

## 2024-05-04 ENCOUNTER — PATIENT MESSAGE (OUTPATIENT)
Dept: ADMINISTRATIVE | Facility: CLINIC | Age: 74
End: 2024-05-04
Payer: MEDICARE

## 2024-05-06 ENCOUNTER — TELEPHONE (OUTPATIENT)
Dept: ADMINISTRATIVE | Facility: CLINIC | Age: 74
End: 2024-05-06
Payer: MEDICARE

## 2024-05-06 ENCOUNTER — TELEPHONE (OUTPATIENT)
Dept: BARIATRICS | Facility: CLINIC | Age: 74
End: 2024-05-06
Payer: MEDICARE

## 2024-05-06 NOTE — TELEPHONE ENCOUNTER
----- Message from Veronica Wagner, Costa sent at 5/6/2024  2:41 PM CDT -----  Regarding: Topamax 50mg  Hello,    Pt states she is to take 2 tablets by mouth once daily (an increase from 1 tablet). If this is correct, please send in a new prescription to [facility]. Thank you

## 2024-05-07 RX ORDER — ALCOHOL 2.38 KG/3.79L
1 GEL TOPICAL 2 TIMES DAILY
Qty: 180 CAPSULE | Refills: 0 | Status: SHIPPED | OUTPATIENT
Start: 2024-05-07

## 2024-05-08 ENCOUNTER — TELEPHONE (OUTPATIENT)
Dept: ADMINISTRATIVE | Facility: CLINIC | Age: 74
End: 2024-05-08
Payer: MEDICARE

## 2024-05-08 ENCOUNTER — OFFICE VISIT (OUTPATIENT)
Dept: HOME HEALTH SERVICES | Facility: CLINIC | Age: 74
End: 2024-05-08
Payer: MEDICARE

## 2024-05-08 VITALS — HEIGHT: 66 IN | BODY MASS INDEX: 41.95 KG/M2 | WEIGHT: 261 LBS

## 2024-05-08 DIAGNOSIS — E66.01 MORBID OBESITY WITH BMI OF 40.0-44.9, ADULT: ICD-10-CM

## 2024-05-08 DIAGNOSIS — I10 PRIMARY HYPERTENSION: ICD-10-CM

## 2024-05-08 DIAGNOSIS — I70.0 ATHEROSCLEROSIS OF AORTA: ICD-10-CM

## 2024-05-08 DIAGNOSIS — F41.9 ANXIETY: ICD-10-CM

## 2024-05-08 DIAGNOSIS — Z00.00 ENCOUNTER FOR PREVENTIVE HEALTH EXAMINATION: ICD-10-CM

## 2024-05-08 DIAGNOSIS — G89.4 CHRONIC PAIN SYNDROME: ICD-10-CM

## 2024-05-08 DIAGNOSIS — Z00.00 ENCOUNTER FOR MEDICARE ANNUAL WELLNESS EXAM: Primary | ICD-10-CM

## 2024-05-08 DIAGNOSIS — F11.20 UNCOMPLICATED OPIOID DEPENDENCE: ICD-10-CM

## 2024-05-08 DIAGNOSIS — K21.9 GASTROESOPHAGEAL REFLUX DISEASE, UNSPECIFIED WHETHER ESOPHAGITIS PRESENT: ICD-10-CM

## 2024-05-08 DIAGNOSIS — Z79.4 TYPE 2 DIABETES MELLITUS WITH DIABETIC NEUROPATHY, WITH LONG-TERM CURRENT USE OF INSULIN: ICD-10-CM

## 2024-05-08 DIAGNOSIS — E11.40 TYPE 2 DIABETES MELLITUS WITH DIABETIC NEUROPATHY, WITH LONG-TERM CURRENT USE OF INSULIN: ICD-10-CM

## 2024-05-08 PROCEDURE — G0439 PPPS, SUBSEQ VISIT: HCPCS | Mod: 95,,, | Performed by: NURSE PRACTITIONER

## 2024-05-08 PROCEDURE — 1125F AMNT PAIN NOTED PAIN PRSNT: CPT | Mod: CPTII,95,, | Performed by: NURSE PRACTITIONER

## 2024-05-08 PROCEDURE — 3066F NEPHROPATHY DOC TX: CPT | Mod: CPTII,95,, | Performed by: NURSE PRACTITIONER

## 2024-05-08 PROCEDURE — 3061F NEG MICROALBUMINURIA REV: CPT | Mod: CPTII,95,, | Performed by: NURSE PRACTITIONER

## 2024-05-08 PROCEDURE — 1159F MED LIST DOCD IN RCRD: CPT | Mod: CPTII,95,, | Performed by: NURSE PRACTITIONER

## 2024-05-08 PROCEDURE — 4010F ACE/ARB THERAPY RXD/TAKEN: CPT | Mod: CPTII,95,, | Performed by: NURSE PRACTITIONER

## 2024-05-08 PROCEDURE — 3288F FALL RISK ASSESSMENT DOCD: CPT | Mod: CPTII,95,, | Performed by: NURSE PRACTITIONER

## 2024-05-08 PROCEDURE — 1160F RVW MEDS BY RX/DR IN RCRD: CPT | Mod: CPTII,95,, | Performed by: NURSE PRACTITIONER

## 2024-05-08 PROCEDURE — 1101F PT FALLS ASSESS-DOCD LE1/YR: CPT | Mod: CPTII,95,, | Performed by: NURSE PRACTITIONER

## 2024-05-08 PROCEDURE — 1158F ADVNC CARE PLAN TLK DOCD: CPT | Mod: CPTII,95,, | Performed by: NURSE PRACTITIONER

## 2024-05-08 PROCEDURE — 3044F HG A1C LEVEL LT 7.0%: CPT | Mod: CPTII,95,, | Performed by: NURSE PRACTITIONER

## 2024-05-13 PROBLEM — Z79.4 TYPE 2 DIABETES MELLITUS WITH DIABETIC NEUROPATHY, WITH LONG-TERM CURRENT USE OF INSULIN: Status: ACTIVE | Noted: 2024-01-06

## 2024-05-13 PROBLEM — F11.20 OPIOID DEPENDENCE: Status: ACTIVE | Noted: 2024-05-13

## 2024-05-13 NOTE — TELEPHONE ENCOUNTER
----- Message from Bertin SONYA Jony sent at 11/4/2017 11:32 AM CDT -----  Contact: Patient  Patient needs medication of tromindal because she is currently out. Patient had a procedure done on yesterday Nov. 3, 2017 and is in pain as well. Please contact Patient ASAP at [phone]. Thank You.    Referred to Chan ARIAS

## 2024-05-14 PROBLEM — F41.9 ANXIETY: Status: ACTIVE | Noted: 2024-05-14

## 2024-05-14 NOTE — PATIENT INSTRUCTIONS
Counseling and Referral of Other Preventative  (Italic type indicates deductible and co-insurance are waived)    Patient Name: Linnea Renae  Today's Date: 5/13/2024    Health Maintenance       Date Due Completion Date    COVID-19 Vaccine (5 - 2023-24 season) 09/01/2023 10/26/2022    Colorectal Cancer Screening 10/11/2023 10/11/2018    Override on 11/1/2012: Done (Per 08/24/15 OV note, patient states that completed at [facility] 3 polyps removed)    Foot Exam 06/14/2024 6/14/2023    Hemoglobin A1c 07/04/2024 1/4/2024    Eye Exam 10/27/2024 10/27/2023    Override on 1/14/2021: Done    Override on 10/3/2016: Done (Sees Dr. Gibbs)    Mammogram 11/28/2024 11/28/2023    Diabetes Urine Screening 01/04/2025 1/4/2024    Lipid Panel 01/04/2025 1/4/2024    High Dose Statin 05/08/2025 5/8/2024    TETANUS VACCINE 01/01/2029 1/1/2019        No orders of the defined types were placed in this encounter.    The following information is provided to all patients.  This information is to help you find resources for any of the problems found today that may be affecting your health:                  Living healthy guide: www.[facility].[place].gov      Understanding Diabetes: www.diabetes.org      Eating healthy: www.cdc.gov/healthyweight      CDC home safety checklist: www.cdc.gov/steadi/patient.html      Agency on Aging: www.goea.[place].[facility]      Alcoholics anonymous (AA): www.aa.org      Physical Activity: www.gerald.nih.gov/bl4jxip      Tobacco use: www.quitwithusla.org

## 2024-05-14 NOTE — PROGRESS NOTES
The patient location is: [place]  The chief complaint leading to consultation is: Health Risk Assessment    Visit type: audiovisual    Face to Face time with patient: 20  35 minutes of total time spent on the encounter, which includes face to face time and non-face to face time preparing to see the patient (eg, review of tests), Obtaining and/or reviewing separately obtained history, Documenting clinical information in the electronic or other health record, Independently interpreting results (not separately reported) and communicating results to the patient/family/caregiver, or Care coordination (not separately reported).         Each patient to whom he or she provides medical services by telemedicine is:  (1) informed of the relationship between the physician and patient and the respective role of any other health care provider with respect to management of the patient; and (2) notified that he or she may decline to receive medical services by telemedicine and may withdraw from such care at any time.    Notes:       Linnea Renae presented for an initial Medicare AWV today. The following components were reviewed and updated:    Medical history  Family History  Social history  Allergies and Current Medications  Health Risk Assessment  Health Maintenance  Care Team    **See Completed Assessments for Annual Wellness visit with in the encounter summary    The following assessments were completed:  Depression Screening  Cognitive function Screening  Timed Get Up Test  Whisper Test      Opioid documentation:      Patient does have a current opioid prescription.      Patient accepted further discussion regarding opioid medication use.      Patient is currently taking tramadol narcotic for back pain.        Pain level today is 2/10.       In addition to narcotic pain medications, patient is also using Cymbalta, Mobic, and Lidocaine patches for pain control.       Patient is followed by a specialist currently for their pain  "and will not be referred today.       Patient's opioid risk potential based on ORT-OUD tool:       Daniel each box that applies   No   Yes     Family history of substance abuse   Alcohol [x] []   Illegal drugs [x] []   Rx drugs [x] []     Personal history of substance abuse   Alcohol [x] []   Illegal drugs [x] []   Rx drugs [x] []     Age between 16-45 years   [x]   []     Patient with ADD, OCD, Bipolar disorder, schizoprenia   [x]   []     Patient with depression   [x]   []                         Scoring total                                                        0         Non-opioid treatment options have been discussed today and added to the patient's after visit summary.          Vitals:    05/08/24 1103   Weight: 118.4 kg (261 lb)   Height: 5' 6" (1.676 m)     Body mass index is 42.13 kg/m².       Physical Exam  Constitutional:       Appearance: She is obese.   Neurological:      General: No focal deficit present.      Mental Status: She is alert and oriented to person, place, and time.           Diagnoses and health risks identified today and associated recommendations/orders:  1. Encounter for Medicare annual wellness exam  - Ambulatory Referral/Consult to Enhanced Annual Wellness Visit (eAWV)    2. Encounter for preventive health examination  Assessments completed. Preventive measures, health maintenance, and immunizations reviewed with patient.    3. Morbid obesity with BMI of 40.0-44.9, adult  Stable, patient on Topamax. Followed by Bariatrics.    4. Type 2 diabetes mellitus with diabetic neuropathy, with long-term current use of insulin  Stable, patient on Ozempic. Followed by PCP.    5. Atherosclerosis of aorta  Stable, patient on Aspirin. Followed by PCP.    6. Uncomplicated opioid dependence  Stable, patient on Tramadol. Followed by Physical Medicine and Rehab.    7. Chronic pain syndrome  Stable, patient on Tramadol, Mobic, and Lidocaine patches. Followed by Physical Medicine and Rehab.    8. Primary " hypertension  Stable, patient on Atenolol, Hydrochlorothiazide, and Benicar. Followed by PCP.    9. Gastroesophageal reflux disease, unspecified whether esophagitis present  Stable, patient on Prilosec. Followed by PCP.      Provided Linnea with a 5-10 year written screening schedule and personal prevention plan. Recommendations were developed using the USPSTF age appropriate recommendations. Education, counseling, and referrals were provided as needed.  After Visit Summary printed and given to patient which includes a list of additional screenings\tests needed.    Follow up in about 1 year (around 5/8/2025) for your next annual wellness visit.      Cynthia Gill, JE  I offered to discuss advanced care planning, including how to pick a person who would make decisions for you if you were unable to make them for yourself, called a health care power of , and what kind of decisions you might make such as use of life sustaining treatments such as ventilators and tube feeding when faced with a life limiting illness recorded on a living will that they will need to know. (How you want to be cared for as you near the end of your natural life)     X Patient is interested in learning more about how to make advanced directives.  I provided them paperwork and offered to discuss this with them.

## 2024-06-17 DIAGNOSIS — G89.29 CHRONIC BILATERAL LOW BACK PAIN WITHOUT SCIATICA: ICD-10-CM

## 2024-06-17 DIAGNOSIS — M54.50 CHRONIC BILATERAL LOW BACK PAIN WITHOUT SCIATICA: ICD-10-CM

## 2024-06-17 DIAGNOSIS — I10 ESSENTIAL HYPERTENSION: ICD-10-CM

## 2024-06-17 DIAGNOSIS — M79.671 FOOT PAIN, BILATERAL: ICD-10-CM

## 2024-06-17 DIAGNOSIS — M79.672 FOOT PAIN, BILATERAL: ICD-10-CM

## 2024-06-17 RX ORDER — DULOXETIN HYDROCHLORIDE 30 MG/1
30 CAPSULE, DELAYED RELEASE ORAL SEE ADMIN INSTRUCTIONS
Qty: 90 CAPSULE | Refills: 1 | Status: SHIPPED | OUTPATIENT
Start: 2024-06-17

## 2024-06-17 RX ORDER — ATENOLOL 50 MG/1
50 TABLET ORAL 2 TIMES DAILY
Qty: 180 TABLET | Refills: 1 | Status: SHIPPED | OUTPATIENT
Start: 2024-06-17

## 2024-06-17 NOTE — TELEPHONE ENCOUNTER
No care due was identified.  [facility] Embedded Care Due Messages. Reference number: [id number].   6/17/2024 11:32:04 AM CDT

## 2024-06-17 NOTE — TELEPHONE ENCOUNTER
Refill Decision Note   Linnea Renae  is requesting a refill authorization.  Brief Assessment and Rationale for Refill:  Approve     Medication Therapy Plan:         Comments:     Note composed:4:34 PM 06/17/2024

## 2024-06-21 DIAGNOSIS — I70.0 ATHEROSCLEROSIS OF AORTA: ICD-10-CM

## 2024-06-21 DIAGNOSIS — E78.5 HYPERLIPIDEMIA, UNSPECIFIED HYPERLIPIDEMIA TYPE: ICD-10-CM

## 2024-06-21 RX ORDER — ATORVASTATIN CALCIUM 20 MG/1
20 TABLET, FILM COATED ORAL NIGHTLY
Qty: 90 TABLET | Refills: 1 | Status: SHIPPED | OUTPATIENT
Start: 2024-06-21

## 2024-06-22 NOTE — TELEPHONE ENCOUNTER
No care due was identified.  [facility] Embedded Care Due Messages. Reference number: [id number].   6/21/2024 11:02:01 PM CDT

## 2024-06-22 NOTE — TELEPHONE ENCOUNTER
Refill Decision Note   Linnea Renae  is requesting a refill authorization.  Brief Assessment and Rationale for Refill:  Approve     Medication Therapy Plan:         Comments:     Note composed:11:17 PM 06/21/2024

## 2024-07-03 ENCOUNTER — PATIENT MESSAGE (OUTPATIENT)
Dept: ENDOSCOPY | Facility: HOSPITAL | Age: 74
End: 2024-07-03
Payer: MEDICARE

## 2024-07-03 ENCOUNTER — TELEPHONE (OUTPATIENT)
Dept: ENDOSCOPY | Facility: HOSPITAL | Age: 74
End: 2024-07-03
Payer: MEDICARE

## 2024-07-03 NOTE — TELEPHONE ENCOUNTER
Confirmed  colonoscopy appt for 7/10/24 with pt. Confirmed receipt of prep . New set of instructions placed in portal . Reviewed instructions pt denies taking blood thinning  medications. Last dose of ozempic 7/2/24 and will not resume until after procedure. Confirmed ride home after procedure.Pt verbalized understanding

## 2024-07-09 ENCOUNTER — OFFICE VISIT (OUTPATIENT)
Dept: PRIMARY CARE CLINIC | Facility: CLINIC | Age: 74
End: 2024-07-09
Payer: MEDICARE

## 2024-07-09 ENCOUNTER — TELEPHONE (OUTPATIENT)
Dept: BARIATRICS | Facility: CLINIC | Age: 74
End: 2024-07-09
Payer: MEDICARE

## 2024-07-09 ENCOUNTER — LAB VISIT (OUTPATIENT)
Dept: LAB | Facility: HOSPITAL | Age: 74
End: 2024-07-09
Attending: INTERNAL MEDICINE
Payer: MEDICARE

## 2024-07-09 VITALS
BODY MASS INDEX: 43.72 KG/M2 | HEART RATE: 76 BPM | WEIGHT: 272.06 LBS | HEIGHT: 66 IN | OXYGEN SATURATION: 97 % | TEMPERATURE: 99 F | DIASTOLIC BLOOD PRESSURE: 86 MMHG | SYSTOLIC BLOOD PRESSURE: 136 MMHG

## 2024-07-09 DIAGNOSIS — Z12.12 SCREENING FOR COLORECTAL CANCER: ICD-10-CM

## 2024-07-09 DIAGNOSIS — E11.40 TYPE 2 DIABETES MELLITUS WITH DIABETIC NEUROPATHY, WITHOUT LONG-TERM CURRENT USE OF INSULIN: ICD-10-CM

## 2024-07-09 DIAGNOSIS — F32.A DEPRESSION, UNSPECIFIED DEPRESSION TYPE: ICD-10-CM

## 2024-07-09 DIAGNOSIS — N76.0 BACTERIAL VAGINOSIS: ICD-10-CM

## 2024-07-09 DIAGNOSIS — I10 ESSENTIAL HYPERTENSION: ICD-10-CM

## 2024-07-09 DIAGNOSIS — E66.01 CLASS 3 SEVERE OBESITY DUE TO EXCESS CALORIES WITH SERIOUS COMORBIDITY AND BODY MASS INDEX (BMI) OF 40.0 TO 44.9 IN ADULT: ICD-10-CM

## 2024-07-09 DIAGNOSIS — Z12.11 SCREENING FOR COLORECTAL CANCER: ICD-10-CM

## 2024-07-09 DIAGNOSIS — I10 ESSENTIAL HYPERTENSION: Primary | ICD-10-CM

## 2024-07-09 DIAGNOSIS — B96.89 BACTERIAL VAGINOSIS: ICD-10-CM

## 2024-07-09 DIAGNOSIS — E78.5 HYPERLIPIDEMIA, UNSPECIFIED HYPERLIPIDEMIA TYPE: ICD-10-CM

## 2024-07-09 DIAGNOSIS — M48.061 DEGENERATIVE LUMBAR SPINAL STENOSIS: ICD-10-CM

## 2024-07-09 LAB
ANION GAP SERPL CALC-SCNC: 9 MMOL/L (ref 8–16)
BUN SERPL-MCNC: 18 MG/DL (ref 8–23)
CALCIUM SERPL-MCNC: 9.4 MG/DL (ref 8.7–10.5)
CHLORIDE SERPL-SCNC: 107 MMOL/L (ref 95–110)
CO2 SERPL-SCNC: 23 MMOL/L (ref 23–29)
CREAT SERPL-MCNC: 1 MG/DL (ref 0.5–1.4)
EST. GFR  (NO RACE VARIABLE): 59.1 ML/MIN/1.73 M^2
ESTIMATED AVG GLUCOSE: 120 MG/DL (ref 68–131)
GLUCOSE SERPL-MCNC: 99 MG/DL (ref 70–110)
HBA1C MFR BLD: 5.8 % (ref 4–5.6)
POTASSIUM SERPL-SCNC: 4.2 MMOL/L (ref 3.5–5.1)
SODIUM SERPL-SCNC: 139 MMOL/L (ref 136–145)

## 2024-07-09 PROCEDURE — 4010F ACE/ARB THERAPY RXD/TAKEN: CPT | Mod: HCNC,CPTII,S$GLB, | Performed by: INTERNAL MEDICINE

## 2024-07-09 PROCEDURE — 3079F DIAST BP 80-89 MM HG: CPT | Mod: HCNC,CPTII,S$GLB, | Performed by: INTERNAL MEDICINE

## 2024-07-09 PROCEDURE — 1101F PT FALLS ASSESS-DOCD LE1/YR: CPT | Mod: HCNC,CPTII,S$GLB, | Performed by: INTERNAL MEDICINE

## 2024-07-09 PROCEDURE — 99999 PR PBB SHADOW E&M-EST. PATIENT-LVL V: CPT | Mod: PBBFAC,HCNC,, | Performed by: INTERNAL MEDICINE

## 2024-07-09 PROCEDURE — 3061F NEG MICROALBUMINURIA REV: CPT | Mod: HCNC,CPTII,S$GLB, | Performed by: INTERNAL MEDICINE

## 2024-07-09 PROCEDURE — 1159F MED LIST DOCD IN RCRD: CPT | Mod: HCNC,CPTII,S$GLB, | Performed by: INTERNAL MEDICINE

## 2024-07-09 PROCEDURE — 99214 OFFICE O/P EST MOD 30 MIN: CPT | Mod: HCNC,S$GLB,, | Performed by: INTERNAL MEDICINE

## 2024-07-09 PROCEDURE — 1160F RVW MEDS BY RX/DR IN RCRD: CPT | Mod: HCNC,CPTII,S$GLB, | Performed by: INTERNAL MEDICINE

## 2024-07-09 PROCEDURE — 3075F SYST BP GE 130 - 139MM HG: CPT | Mod: HCNC,CPTII,S$GLB, | Performed by: INTERNAL MEDICINE

## 2024-07-09 PROCEDURE — 3288F FALL RISK ASSESSMENT DOCD: CPT | Mod: HCNC,CPTII,S$GLB, | Performed by: INTERNAL MEDICINE

## 2024-07-09 PROCEDURE — 3066F NEPHROPATHY DOC TX: CPT | Mod: HCNC,CPTII,S$GLB, | Performed by: INTERNAL MEDICINE

## 2024-07-09 PROCEDURE — 36415 COLL VENOUS BLD VENIPUNCTURE: CPT | Mod: HCNC,PN | Performed by: INTERNAL MEDICINE

## 2024-07-09 PROCEDURE — 80048 BASIC METABOLIC PNL TOTAL CA: CPT | Mod: HCNC | Performed by: INTERNAL MEDICINE

## 2024-07-09 PROCEDURE — 3044F HG A1C LEVEL LT 7.0%: CPT | Mod: HCNC,CPTII,S$GLB, | Performed by: INTERNAL MEDICINE

## 2024-07-09 PROCEDURE — 3008F BODY MASS INDEX DOCD: CPT | Mod: HCNC,CPTII,S$GLB, | Performed by: INTERNAL MEDICINE

## 2024-07-09 PROCEDURE — 1125F AMNT PAIN NOTED PAIN PRSNT: CPT | Mod: HCNC,CPTII,S$GLB, | Performed by: INTERNAL MEDICINE

## 2024-07-09 PROCEDURE — 83036 HEMOGLOBIN GLYCOSYLATED A1C: CPT | Mod: HCNC | Performed by: INTERNAL MEDICINE

## 2024-07-09 RX ORDER — HYDROCHLOROTHIAZIDE 12.5 MG/1
12.5 TABLET ORAL DAILY
Qty: 90 TABLET | Refills: 1 | Status: SHIPPED | OUTPATIENT
Start: 2024-07-09

## 2024-07-09 RX ORDER — METRONIDAZOLE 500 MG/1
500 TABLET ORAL EVERY 12 HOURS
Qty: 10 TABLET | Refills: 0 | Status: SHIPPED | OUTPATIENT
Start: 2024-07-09 | End: 2024-07-14

## 2024-07-09 RX ORDER — OLMESARTAN MEDOXOMIL 20 MG/1
20 TABLET ORAL DAILY
Qty: 90 TABLET | Refills: 1 | Status: SHIPPED | OUTPATIENT
Start: 2024-07-09

## 2024-07-09 NOTE — TELEPHONE ENCOUNTER
Added pt to wait list for a sooner appt. Marked it as high priority per pt request. Offer pt a appt with Tonja Keenan NP to be seen sooner. Pt stated she does not want to see another provider for medical wl.

## 2024-07-09 NOTE — TELEPHONE ENCOUNTER
----- Message from Jennifer Sorto sent at 2024  2:47 PM CDT -----  Regarding: sooner appt  Contact: Pt @ [phone]  Pt called in to schedule an appt; no available appts in Epic. Pt is asking for a call back soon to schedule. Thanks.           Patient's DX: f/u          Patient requesting call back or [facility] [phone]

## 2024-07-09 NOTE — PROGRESS NOTES
Subjective     Patient ID: Linnea Renae is a 73 y.o. female.    Chief Complaint: Follow-up    Last seen 6 months ago. Returns for scheduled follow up chronic medical conditions.     PMH: .   Hypertension.  Diabetes Type 2, HbA1c 5.5% .  Hyperlipidemia. LDL 85 .   GERD.  Lumbar Spinal Stenosis.   Chronic Dry Eyes.   Perennial Allergic Rhinitis.  Morbid Obesity.   Right Breast Cancer .  H/O Colon Polyps.     PSH: C/S x 3, BTL, Hysterectomy and USO, Bilateral Cataracts extracted, Cholecystectomy. Right breast excisional biopsy . Right Mastectomy  with reconstruction, and revisions  and .     Mammogram (Left) normal . BMD normal 8/15. Colonoscopy 10/18 - sigmoid diverticulosis, otherwise normal. Eye exam 3/24 Dr. Aden Haskins. Podiatry 3/24. Vaccines reviewed.    Social: Remote tobacco use, quit over 30 years ago. No alcohol.  with three daughters and three grandchildren. Homemaker.     FMH: Heart dis, Thyroid dis.     Allergies: Codeine.    Medications: list reviewed and reconciled.                Review of Systems   Constitutional:  Negative for fatigue and fever.   Eyes:  Negative for visual disturbance.   Respiratory:  Negative for cough and shortness of breath.    Cardiovascular:  Negative for chest pain, palpitations and leg swelling.   Gastrointestinal:  Negative for abdominal pain, diarrhea, nausea and vomiting.   Genitourinary:  Positive for vaginal discharge. Negative for dysuria, frequency, pelvic pain and vaginal bleeding.   Musculoskeletal:  Positive for back pain.   Integumentary:  Negative for rash and wound.   Neurological:  Negative for seizures, syncope, facial asymmetry, speech difficulty and headaches.        Neuropathy pain both feet.    Psychiatric/Behavioral:  Positive for dysphoric mood. Negative for confusion and hallucinations.           Objective   Vitals:    24 1057   BP: 136/86   BP Location: Right arm   Patient Position: Sitting  "  Pulse: 76   Temp: 98.5 °F (36.9 °C)   TempSrc: Oral   SpO2: 97%   Weight: 123.4 kg (272 lb 0.8 oz)   Height: 5' 6" (1.676 m)   BMI=43.9  Physical Exam  Constitutional:       General: She is not in acute distress.     Appearance: She is not ill-appearing.      Comments: Appropriately groomed, ambulatory without aid, accompanied by daughter.    HENT:      Head: Normocephalic and atraumatic.      Mouth/Throat:      Mouth: Mucous membranes are moist.      Pharynx: Oropharynx is clear.   Cardiovascular:      Rate and Rhythm: Normal rate and regular rhythm.   Pulmonary:      Effort: Pulmonary effort is normal. No respiratory distress.      Breath sounds: Normal breath sounds. No wheezing or rales.   Musculoskeletal:         General: Normal range of motion.      Right lower leg: No edema.      Left lower leg: No edema.   Skin:     General: Skin is warm and dry.   Neurological:      Mental Status: She is alert and oriented to person, place, and time.      Cranial Nerves: No cranial nerve deficit.      Coordination: Coordination normal.      Gait: Gait normal.   Psychiatric:         Mood and Affect: Mood normal.         Behavior: Behavior normal.         Thought Content: Thought content normal.         Judgment: Judgment normal.          Assessment and Plan     1. Essential hypertension typically controlled, continue same.   -     Basic Metabolic Panel; Future; Expected date: 07/09/2024  -     hydroCHLOROthiazide (HYDRODIURIL) 12.5 MG Tab; Take 1 tablet (12.5 mg total) by mouth once daily.  Dispense: 90 tablet; Refill: 1  -     olmesartan (BENICAR) 20 MG tablet; Take 1 tablet (20 mg total) by mouth once daily.  Dispense: 90 tablet; Refill: 1    2. Type 2 diabetes mellitus with diabetic neuropathy, without long-term current use of insulin  -     Hemoglobin A1C; Future; Expected date: 07/09/2024    3. Hyperlipidemia, unspecified hyperlipidemia type - controlled, continue Atorvastatin.     4. Class 3 severe obesity due to " excess calories with serious comorbidity and body mass index (BMI) of 40.0 to 44.9 in adult       -      continue diet, meds per Bariatrics.     5. Degenerative lumbar spinal stenosis    6. Depression, unspecified depression type       -      discussed increasing Duloxetine from 30 to 60 mg daily, but it appears Physical Med has just increased this to 3 caps daily (90 mg).    7. Bacterial vaginosis  -     metroNIDAZOLE (FLAGYL) 500 MG tablet; Take 1 tablet (500 mg total) by mouth every 12 (twelve) hours. for 5 days  Dispense: 10 tablet; Refill: 0    8. Screening for colorectal cancer - Colonoscopy is ordered and scheduled tomorrow.       Follow up in about 6 months (around 1/9/2025).

## 2024-07-10 ENCOUNTER — ANESTHESIA EVENT (OUTPATIENT)
Dept: ENDOSCOPY | Facility: HOSPITAL | Age: 74
End: 2024-07-10
Payer: MEDICARE

## 2024-07-10 ENCOUNTER — HOSPITAL ENCOUNTER (OUTPATIENT)
Facility: HOSPITAL | Age: 74
Discharge: HOME | End: 2024-07-10
Attending: STUDENT IN AN ORGANIZED HEALTH CARE EDUCATION/TRAINING PROGRAM | Admitting: STUDENT IN AN ORGANIZED HEALTH CARE EDUCATION/TRAINING PROGRAM
Payer: MEDICARE

## 2024-07-10 ENCOUNTER — ANESTHESIA (OUTPATIENT)
Dept: ENDOSCOPY | Facility: HOSPITAL | Age: 74
End: 2024-07-10
Payer: MEDICARE

## 2024-07-10 VITALS
TEMPERATURE: 97 F | HEIGHT: 66 IN | DIASTOLIC BLOOD PRESSURE: 65 MMHG | OXYGEN SATURATION: 98 % | WEIGHT: 271 LBS | SYSTOLIC BLOOD PRESSURE: 138 MMHG | HEART RATE: 79 BPM | BODY MASS INDEX: 43.55 KG/M2 | RESPIRATION RATE: 18 BRPM

## 2024-07-10 DIAGNOSIS — Z12.11 COLON CANCER SCREENING: Primary | ICD-10-CM

## 2024-07-10 LAB — POCT GLUCOSE: 119 MG/DL (ref 70–110)

## 2024-07-10 PROCEDURE — 45385 COLONOSCOPY W/LESION REMOVAL: CPT | Mod: PT,HCNC | Performed by: STUDENT IN AN ORGANIZED HEALTH CARE EDUCATION/TRAINING PROGRAM

## 2024-07-10 PROCEDURE — 82962 GLUCOSE BLOOD TEST: CPT | Mod: HCNC | Performed by: STUDENT IN AN ORGANIZED HEALTH CARE EDUCATION/TRAINING PROGRAM

## 2024-07-10 PROCEDURE — 88305 TISSUE EXAM BY PATHOLOGIST: CPT | Mod: 26,HCNC,, | Performed by: PATHOLOGY

## 2024-07-10 PROCEDURE — 27201089 HC SNARE, DISP (ANY): Mod: HCNC | Performed by: STUDENT IN AN ORGANIZED HEALTH CARE EDUCATION/TRAINING PROGRAM

## 2024-07-10 PROCEDURE — 37000009 HC ANESTHESIA EA ADD 15 MINS: Mod: HCNC | Performed by: STUDENT IN AN ORGANIZED HEALTH CARE EDUCATION/TRAINING PROGRAM

## 2024-07-10 PROCEDURE — 45385 COLONOSCOPY W/LESION REMOVAL: CPT | Mod: PT,HCNC,, | Performed by: STUDENT IN AN ORGANIZED HEALTH CARE EDUCATION/TRAINING PROGRAM

## 2024-07-10 PROCEDURE — 37000008 HC ANESTHESIA 1ST 15 MINUTES: Mod: HCNC | Performed by: STUDENT IN AN ORGANIZED HEALTH CARE EDUCATION/TRAINING PROGRAM

## 2024-07-10 PROCEDURE — 88305 TISSUE EXAM BY PATHOLOGIST: CPT | Mod: HCNC | Performed by: PATHOLOGY

## 2024-07-10 PROCEDURE — 25000003 PHARM REV CODE 250: Mod: HCNC | Performed by: STUDENT IN AN ORGANIZED HEALTH CARE EDUCATION/TRAINING PROGRAM

## 2024-07-10 PROCEDURE — 27200997: Mod: HCNC | Performed by: STUDENT IN AN ORGANIZED HEALTH CARE EDUCATION/TRAINING PROGRAM

## 2024-07-10 PROCEDURE — 25000003 PHARM REV CODE 250: Mod: HCNC | Performed by: NURSE ANESTHETIST, CERTIFIED REGISTERED

## 2024-07-10 PROCEDURE — 63600175 PHARM REV CODE 636 W HCPCS: Mod: HCNC | Performed by: NURSE ANESTHETIST, CERTIFIED REGISTERED

## 2024-07-10 RX ORDER — LIDOCAINE HYDROCHLORIDE 20 MG/ML
INJECTION INTRAVENOUS
Status: DISCONTINUED | OUTPATIENT
Start: 2024-07-10 | End: 2024-07-10

## 2024-07-10 RX ORDER — PROPOFOL 10 MG/ML
VIAL (ML) INTRAVENOUS
Status: DISCONTINUED | OUTPATIENT
Start: 2024-07-10 | End: 2024-07-10

## 2024-07-10 RX ORDER — SODIUM CHLORIDE 9 MG/ML
INJECTION, SOLUTION INTRAVENOUS CONTINUOUS
Status: DISCONTINUED | OUTPATIENT
Start: 2024-07-10 | End: 2024-07-10 | Stop reason: HOSPADM

## 2024-07-10 RX ORDER — PROPOFOL 10 MG/ML
VIAL (ML) INTRAVENOUS CONTINUOUS PRN
Status: DISCONTINUED | OUTPATIENT
Start: 2024-07-10 | End: 2024-07-10

## 2024-07-10 RX ADMIN — PROPOFOL 70 MG: 10 INJECTION, EMULSION INTRAVENOUS at 02:07

## 2024-07-10 RX ADMIN — PROPOFOL 150 MCG/KG/MIN: 10 INJECTION, EMULSION INTRAVENOUS at 02:07

## 2024-07-10 RX ADMIN — LIDOCAINE HYDROCHLORIDE 50 MG: 20 INJECTION INTRAVENOUS at 02:07

## 2024-07-10 RX ADMIN — SODIUM CHLORIDE: 0.9 INJECTION, SOLUTION INTRAVENOUS at 01:07

## 2024-07-10 NOTE — H&P
Short Stay Endoscopy History and Physical    PCP - Bailee Moss MD  Referring Physician - PRE-ADMIT NURSE 1, ENDO -[facility]  No address on file    Procedure - Colonoscopy  ASA - per anesthesia  Mallampati - per anesthesia  History of Anesthesia problems - no  Family history Anesthesia problems -  no   Plan of anesthesia - General    HPI  73 y.o. female  Reason for procedure:   Screening for colorectal cancer [Z12.11, Z12.12]    Normal in 2018   Prior colon polyps     ROS:  Constitutional: No fevers, chills, No weight loss  CV: No chest pain  Pulm: No cough, No shortness of breath  GI: see HPI    Medical History:  has a past medical history of Allergy, Breast cancer, Chronic constipation, Colon polyps, Diabetes mellitus, type 2, Dry eyes, GERD (gastroesophageal reflux disease), Hyperlipidemia, Hypertension, Lumbar disc disease, and Type 2 diabetes mellitus.    Surgical History:  has a past surgical history that includes Tubal ligation; Cataract extraction, bilateral; Cholecystectomy ();  section; Hysterectomy (); Breast lumpectomy (Right); Injection of facet joint (Bilateral, 2018); Breast biopsy; Colonoscopy w/ polypectomy; Colonoscopy (N/A, 10/11/2018); Radiofrequency ablation (Left, 3/20/2019); Transforaminal epidural injection of steroid (Left, 10/2/2019); Radiofrequency ablation (Left, 2019); Radiofrequency ablation (Right, 2020); Injection of joint (Bilateral, 2020); Radiofrequency ablation (Right, 2020); Mastectomy (Right, 2021); Total Reduction Mammoplasty (Left, 2021); Breast reconstruction (Right, 2021); Replacement of implant of breast (Right, 2022); Mastopexy (Left, 2022); Replacement of implant of breast (Right, 2022); and Evacuation of hematoma (Right, 2022).    Family History: family history includes Glaucoma in her brother; Heart disease in her paternal grandmother; Hypertension in her brother and  sister; No Known Problems in her daughter, daughter, daughter, father, and mother; Thyroid disease in her paternal grandfather..    Social History:  reports that she quit smoking about 39 years ago. Her smoking use included cigarettes. She started smoking about 59 years ago. She has a 5 pack-year smoking history. She has never used smokeless tobacco. She reports that she does not drink alcohol and does not use drugs.    Review of patient's allergies indicates:   Allergen Reactions    Codeine Itching       Medications:   Medications Prior to Admission   Medication Sig Dispense Refill Last Dose    alcohol antiseptic pads (ALCOHOL PREP PADS TOP)        ALPRAZolam (XANAX) 0.5 MG tablet Take 1 tablet (0.5 mg total) by mouth 2 (two) times daily as needed for Anxiety. 30 tablet 0     aspirin 81 MG Chew Take 81 mg by mouth once daily.       atenoloL (TENORMIN) 50 MG tablet TAKE 1 TABLET BY MOUTH TWICE DAILY 180 tablet 1     atorvastatin (LIPITOR) 20 MG tablet Take 1 tablet (20 mg total) by mouth every evening. 90 tablet 1     blood sugar diagnostic (TRUE METRIX GLUCOSE TEST STRIP) Strp 1 strip by Misc.(Non-Drug; Combo Route) route once daily. 100 each 3     calcium-vitamin D3 500 mg(1,250mg) -200 unit per tablet Take 1 tablet by mouth 2 (two) times daily with meals.        clobetasoL (CLOBEX) 0.05 % shampoo Apply to scalp in place of regular shampoo weekly, leave on for 10 to 15 minutes before rinsing it out 118 mL 5     DULoxetine (CYMBALTA) 30 MG capsule Take 1 capsule (30 mg total) by mouth As instructed (take 2 cqpsules in the morning, and 1 in the evening). 90 capsule 1     fluticasone propionate (FLONASE) 50 mcg/actuation nasal spray Instill 2 sprays (100 mcg total) by Each Nostril route once daily. 48 g 3     furosemide (LASIX) 20 MG tablet Take 1 tablet (20 mg total) by mouth daily as needed (Edema.). 30 tablet 1     hydroCHLOROthiazide (HYDRODIURIL) 12.5 MG Tab Take 1 tablet (12.5 mg total) by mouth once daily. 90  tablet 1     lancets 33 gauge Misc 1 lancet by Misc.(Non-Drug; Combo Route) route once daily. 100 each 3     jtzcakyod-N5-kaY59-algal oil (FOLTANX RF) 3 mg-35 mg-2 mg -90.314 mg Cap Take 1 tablet by mouth 2 (two) times daily. 180 capsule 0     LIDOcaine (LIDODERM) 5 % Place 1-2 patches onto the skin once daily. Remove & Discard patch within 12 hours or as directed by MD Gonzalez patch 2     loratadine 10 mg Cap        meloxicam (MOBIC) 15 MG tablet Take 1 tablet (15 mg total) by mouth daily as needed for Pain. 30 tablet 2     metroNIDAZOLE (FLAGYL) 500 MG tablet Take 1 tablet (500 mg total) by mouth every 12 (twelve) hours. for 5 days 10 tablet 0     minoxidiL (LONITEN) 2.5 MG tablet Take 1/4  to 1/2 tablet by mouth nightly as tolerated 45 tablet 3     multivitamin (THERAGRAN) per tablet Take 1 tablet by mouth once daily.       naloxone (NARCAN) 4 mg/actuation Spry 4mg by nasal route as needed for opioid overdose; may repeat every 2-3 minutes in alternating nostrils until medical help arrives. Call 911 2 each 11     nystatin (MYCOSTATIN) powder Apply topically 4 (four) times daily Under pannus as needed 180 g 2     olmesartan (BENICAR) 20 MG tablet Take 1 tablet (20 mg total) by mouth once daily. 90 tablet 1     omeprazole (PRILOSEC) 20 MG capsule Take 1 capsule (20 mg total) by mouth once daily. 90 capsule 2     semaglutide (OZEMPIC) 2 mg/dose (8 mg/3 mL) PnIj Inject 2 mg into the skin every 7 days. 9 mL 1     tiZANidine (ZANAFLEX) 4 MG tablet Take 1 tablet (4 mg total) by mouth every 8 (eight) hours as needed (Muscle spasm.). 90 tablet 2     topiramate (TOPAMAX) 50 MG tablet Take 1 tablet (50 mg total) by mouth every evening. 180 tablet 1     traMADoL (ULTRAM) 50 mg tablet Take 1 tablet (50 mg total) by mouth every 6 (six) hours as needed for Pain. 120 tablet 2     triamcinolone acetonide 0.1% (KENALOG) 0.1 % ointment Apply to scalp in areas of scalp irritation at bedtime 454 g 5        Physical Exam:    Vital Signs:  There were no vitals filed for this visit.    General Appearance: Well appearing in no acute distress  Abdomen: Soft, non tender, non distended with normal bowel sounds, no masses    Labs:  Lab Results   Component Value Date    WBC 9.25 01/04/2024    HGB 13.3 01/04/2024    HCT 42.1 01/04/2024     01/04/2024    CHOL 141 01/04/2024    TRIG 70 01/04/2024    HDL 42 01/04/2024    ALT 15 01/04/2024    AST 21 01/04/2024     07/09/2024    K 4.2 07/09/2024     07/09/2024    CREATININE 1.0 07/09/2024    BUN 18 07/09/2024    CO2 23 07/09/2024    TSH 3.846 07/20/2023    HGBA1C 5.8 (H) 07/09/2024       I have explained the risks and benefits of this endoscopic procedure to the patient including but not limited to bleeding, inflammation, infection, perforation, and death.    Assessment/Plan:     CRC Surveillance     - Proceed with colonoscopy     Jessica Darnell MD  Gastroenterology   [facility]

## 2024-07-10 NOTE — PROVATION PATIENT INSTRUCTIONS
Discharge Summary/Instructions after an Endoscopic Procedure  Patient Name: Linnea Renae  Patient MRN: [id number]  Patient YOB: 1950  Wednesday, July 10, 2024  Jessica Darnell MD  Dear patient,  As a result of recent federal legislation (The Federal Cures Act), you may   receive lab or pathology results from your procedure in your [facility]   account before your physician is able to contact you. Your physician or   their representative will relay the results to you with their   recommendations at their soonest availability.  Thank you,  RESTRICTIONS:  During your procedure today, you received medications for sedation.  These   medications may affect your judgment, balance and coordination.  Therefore,   for 24 hours, you have the following restrictions:   - DO NOT drive a car, operate machinery, make legal/financial decisions,   sign important papers or drink alcohol.    ACTIVITY:  Today: no heavy lifting, straining or running due to procedural   sedation/anesthesia.  The following day: return to full activity including work.  DIET:  Eat and drink normally unless instructed otherwise.     TREATMENT FOR COMMON SIDE EFFECTS:  - Mild abdominal pain, nausea, belching, bloating or excessive gas:  rest,   eat lightly and use a heating pad.  - Sore Throat: treat with throat lozenges and/or gargle with warm salt   water.  - Because air was used during the procedure, expelling large amounts of air   from your rectum or belching is normal.  - If a bowel prep was taken, you may not have a bowel movement for 1-3 days.    This is normal.  SYMPTOMS TO WATCH FOR AND REPORT TO YOUR PHYSICIAN:  1. Abdominal pain or bloating, other than gas cramps.  2. Chest pain.  3. Back pain.  4. Signs of infection such as: chills or fever occurring within 24 hours   after the procedure.  5. Rectal bleeding, which would show as bright red, maroon, or black stools.   (A tablespoon of blood from the rectum is not serious, especially if    hemorrhoids are present.)  6. Vomiting.  7. Weakness or dizziness.  GO DIRECTLY TO THE NEAREST EMERGENCY ROOM IF YOU HAVE ANY OF THE FOLLOWING:      Difficulty breathing              Chills and/or fever over 101 F   Persistent vomiting and/or vomiting blood   Severe abdominal pain   Severe chest pain   Black, tarry stools   Bleeding- more than one tablespoon   Any other symptom or condition that you feel may need urgent attention  Your doctor recommends these additional instructions:  If any biopsies were taken, your doctors clinic will contact you in 1 to 2   weeks with any results.  - Discharge patient to home (ambulatory).   - Resume regular diet.   - Continue present medications.   - Await pathology results.   - Repeat colonoscopy in 7 years for surveillance.  For questions, problems or results please call your physician - Jessica Darnell MD at Work:  [phone].  [facility] [place], EMERGENCY ROOM PHONE NUMBER: [phone]  IF A COMPLICATION OR EMERGENCY SITUATION ARISES AND YOU ARE UNABLE TO REACH   YOUR PHYSICIAN - GO DIRECTLY TO THE EMERGENCY ROOM.  Jessica Darnell MD  7/10/2024 3:04:15 PM  This report has been verified and signed electronically.  Dear patient,  As a result of recent federal legislation (The Federal Cures Act), you may   receive lab or pathology results from your procedure in your [facility]   account before your physician is able to contact you. Your physician or   their representative will relay the results to you with their   recommendations at their soonest availability.  Thank you,  PROVATION

## 2024-07-10 NOTE — ANESTHESIA PREPROCEDURE EVALUATION
07/10/2024  Linnea Renae is a 73 y.o., female.    Active Problem List with Overview Notes    Diagnosis Date Noted    Anxiety 2024    Opioid dependence 2024    Type 2 diabetes mellitus with diabetic neuropathy, with long-term current use of insulin 2024    History of bilateral breast reduction surgery 10/13/2022    Neck pain, bilateral 10/13/2022    Chronic pain 2020    Greater trochanteric bursitis 2020    Spondylosis without myelopathy 2020    Diabetic peripheral neuropathy associated with type 2 diabetes mellitus 2019    Osteoarthritis of lumbar spine 2018    Chronic pain disorder 2017    Atherosclerosis of aorta 2016    Spinal stenosis, lumbar 10/19/2016    Anal skin tag 2016    DCIS (ductal carcinoma in situ) of breast 10/22/2015    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/2015    Hypertension 2014    Hyperlipidemia 2014    DDD (degenerative disc disease), lumbar 2014    Perennial allergic rhinitis 2014    Dry eyes 2014    Morbid obesity with BMI of 40.0-44.9, adult 2014    GERD (gastroesophageal reflux disease) 2014     Past Surgical History:   Procedure Laterality Date    BREAST BIOPSY      BREAST LUMPECTOMY Right         BREAST RECONSTRUCTION Right 2021    Procedure: RECONSTRUCTION, BREAST;  Surgeon: Nolberto Eng MD;  Location: [address];  Service: Plastics;  Laterality: Right;    CATARACT EXTRACTION, BILATERAL       SECTION      x3    CHOLECYSTECTOMY      COLONOSCOPY N/A 10/11/2018    Procedure: COLONOSCOPY;  Surgeon: Lorenzo Tanner MD;  Location: [facility] (4TH FLR);  Service: Endoscopy;  Laterality: N/A;    COLONOSCOPY W/ POLYPECTOMY      EVACUATION OF HEMATOMA Right 2022    Procedure: EVACUATION, HEMATOMA;  Surgeon: Nolberto Eng,  MD;  Location: [facility] OR [facility] F[facility];  Service: Plastics;  Laterality: Right;    HYSTERECTOMY  1989    and USO    INJECTION OF FACET JOINT Bilateral 7/25/2018    Procedure: INJECTION, FACET JOINT;  Surgeon: Viet Wilson MD;  Location: [facility] PAIN MGT;  Service: Pain Management;  Laterality: Bilateral;  LUMBAR BILATERAL L3-L4 AND L4-L5 FACET STEROID INJECTION    NEED CONSENT    INJECTION OF JOINT Bilateral 7/8/2020    Procedure: INJECTION, JOINT, Bilateral SI;  Surgeon: Viet Wilson MD;  Location: [facility];  Service: Pain Management;  Laterality: Bilateral;    MASTECTOMY Right 11/29/2021    Procedure: MASTECTOMY, total, right breast with right sentinel lymph node biospy;  Surgeon: Heber Evans MD;  [facility];  Service: General;  Laterality: Right;    MASTOPEXY Left 4/11/2022    Procedure: MASTOPEXY - left breast;  Surgeon: Nolberto Eng MD;  Location: [facility];  Service: Plastics;  Laterality: Left;    RADIOFREQUENCY ABLATION Left 3/20/2019    Procedure: RADIOFREQUENCY ABLATION LEFT L2,3,4,5;  Surgeon: Viet Wilson MD;  Location: [facility];  Service: Pain Management;  Laterality: Left;  LEFT RFA L2,3,4,5    NEEDS CONSENT    RADIOFREQUENCY ABLATION Left 12/18/2019    Procedure: RADIOFREQUENCY ABLATION, LEFT L2-L3-L4-L5  1 OF 2;  Surgeon: Viet Wilson MD;  Location: [facility]IN [facility]GT;  Service: Pain Management;  Laterality: Left;    RADIOFREQUENCY ABLATION Right 1/8/2020    Procedure: RADIOFREQUENCY ABLATION, RIGHT L2-L3-L4-L5 MEDIAL BRANCH 2 OF;  Surgeon: Viet Wilson MD;  Location: [facility] PAIN [facility]GT;  Service: Pain Management;  Laterality: Right;    RADIOFREQUENCY ABLATION Right 11/4/2020    Procedure: RADIOFREQUENCY ABLATION Right L2, L3, L4, L5;  Surgeon: Viet Wilson MD;  Location: [facility]GT;  Service: Pain Management;  Laterality: Right;  Right L2, L3, L4, L5 RFA    REPLACEMENT OF IMPLANT OF BREAST Right 4/11/2022    Procedure:  REPLACEMENT, IMPLANT, BREAST - right ;  Surgeon: Nolberto Eng MD;  [facility];  Service: Plastics;  Laterality: Right;    REPLACEMENT OF IMPLANT OF BREAST Right 2022    Procedure: REPLACEMENT, IMPLANT, BREAST;  Surgeon: Nolberto Eng MD;  Location: [facility] OR [facility];  Service: Plastics;  Laterality: Right;    TOTAL REDUCTION MAMMOPLASTY Left 2021    Procedure: MAMMOPLASTY, REDUCTION;  Surgeon: Nolberto Eng MD;  Location: [facility];  Service: Plastics;  Laterality: Left;    TRANSFORAMINAL EPIDURAL INJECTION OF STEROID Left 10/2/2019    Procedure: INJECTION, STEROID, EPIDURAL, TRANSFORAMINAL APPROACH, L4 AND L5;  Surgeon: Viet Wilson MD;  Location: [facility];  Service: Pain Management;  Laterality: Left;    TUBAL LIGATION       Results for orders placed or performed in visit on 22   IN OFFICE EKG 12-LEAD (to [place])    Collection Time: 22  9:16 AM    Narrative    Test Reason : I10,Z01.818,    Vent. Rate : 076 BPM     Atrial Rate : 076 BPM     P-R Int : 192 ms          QRS Dur : 082 ms      QT Int : 368 ms       P-R-T Axes : 000 019 024 degrees     QTc Int : 414 ms    Sinus rhythm with frequent Premature ventricular complexes  Low voltage QRS  Nonspecific ST abnormality  Abnormal ECG  When compared with ECG of 03-DEC-2020 11:14,  Premature ventricular complexes are now Present  Nonspecific ST abnormality is now present  Confirmed by AMELIA CHEEK, TAMERA ([id number]) on 3/31/2022 1:09:22 PM    Referred By: BRISEIDA ODOM           Confirmed By:TAMERA CISNEROS MD     Social History     Socioeconomic History    Marital status:     Number of children: 3   Tobacco Use    Smoking status: Former     Current packs/day: 0.00     Average packs/day: 0.3 packs/day for 20.0 years (5.0 ttl pk-yrs)     Types: Cigarettes     Start date: 1965     Quit date: 1985     Years since quittin.5    Smokeless tobacco: Never    Tobacco  comments:     Quit mid 1980's.   Substance and Sexual Activity    Alcohol use: No     Alcohol/week: 0.0 standard drinks of alcohol    Drug use: No    Sexual activity: Yes     Partners: Male     Social Determinants of Health     Financial Resource Strain: Medium Risk (5/8/2024)    Overall Financial Resource Strain (CARDIA)     Difficulty of Paying Living Expenses: Somewhat hard   Food Insecurity: Food Insecurity Present (5/8/2024)    Hunger Vital Sign     Worried About Running Out of Food in the Last Year: Sometimes true     Ran Out of Food in the Last Year: Sometimes true   Transportation Needs: No Transportation Needs (5/8/2024)    PRAPARE - Transportation     Lack of Transportation (Medical): No     Lack of Transportation (Non-Medical): No   Physical Activity: Insufficiently Active (5/8/2024)    Exercise Vital Sign     Days of Exercise per Week: 1 day     Minutes of Exercise per Session: 10 min   Stress: Stress Concern Present (5/8/2024)    [place] [employer] - Occupational Stress Questionnaire     Feeling of Stress : To some extent   Housing Stability: Low Risk  (5/8/2024)    Housing Stability Vital Sign     Unable to Pay for Housing in the Last Year: No     Homeless in the Last Year: No       Pre-op Assessment    I have reviewed the Patient Summary Reports.    I have reviewed the NPO Status.   I have reviewed the Medications.     Review of Systems  Anesthesia Hx:  No problems with previous Anesthesia                Social:  Former Smoker       Hematology/Oncology:  Hematology Normal                       --  Cancer in past history:       Breast       surgery       EENT/Dental:  EENT/Dental Normal           Cardiovascular:     Hypertension           hyperlipidemia   ECG has been reviewed.                          Pulmonary:  Pulmonary Normal                       Renal/:  Renal/ Normal                 Hepatic/GI:     GERD             Musculoskeletal:  Arthritis                Neurological:    Neuromuscular Disease,                                   Endocrine:  Diabetes, type 2         Morbid Obesity / BMI > 40  Dermatological:  Skin Normal    Psych:  Psychiatric Normal                  Physical Exam  General: Well nourished, Cooperative, Alert and Oriented    Airway:  Mallampati: II   Mouth Opening: Normal  TM Distance: Normal  Tongue: Normal  Neck ROM: Normal ROM    Dental:  Intact    Chest/Lungs:  Normal Respiratory Rate    Anesthesia Plan  Type of Anesthesia, risks & benefits discussed:    Anesthesia Type: Gen Natural Airway  Intra-op Monitoring Plan: Standard ASA Monitors  Post Op Pain Control Plan: multimodal analgesia  Induction:  IV  Informed Consent: Informed consent signed with the Patient and all parties understand the risks and agree with anesthesia plan.  All questions answered.   ASA Score: 3  Day of Surgery Review of History & Physical: H&P Update referred to the surgeon/provider.    Ready For Surgery From Anesthesia Perspective.   .

## 2024-07-10 NOTE — TRANSFER OF CARE
"Anesthesia Transfer of Care Note    Patient: Linnea Renae    Procedure(s) Performed: Procedure(s) (LRB):  COLONOSCOPY (N/A)    Patient location: GI    Anesthesia Type: MAC and general    Transport from OR: Transported from OR on room air with adequate spontaneous ventilation    Post pain: adequate analgesia    Post assessment: no apparent anesthetic complications and tolerated procedure well    Post vital signs: stable    Level of consciousness: awake and alert    Nausea/Vomiting: no nausea/vomiting    Complications: none    Transfer of care protocol was followed      Last vitals: Visit Vitals  BP (!) 142/69 (BP Location: Left arm, Patient Position: Lying)   Pulse 89   Temp 36.3 °C (97.3 °F) (Temporal)   Resp 16   Ht 5' 6" (1.676 m)   Wt 122.9 kg (271 lb)   SpO2 96%   Breastfeeding No   BMI 43.74 kg/m²     "

## 2024-07-10 NOTE — ANESTHESIA POSTPROCEDURE EVALUATION
Anesthesia Post Evaluation    Patient: Linnea Renae    Procedure(s) Performed: Procedure(s) (LRB):  COLONOSCOPY (N/A)    Final Anesthesia Type: general      Patient location during evaluation: GI PACU  Patient participation: Yes- Able to Participate  Level of consciousness: awake and alert  Post-procedure vital signs: reviewed and stable  Pain management: adequate  Airway patency: patent    PONV status at discharge: No PONV  Anesthetic complications: no      Cardiovascular status: blood pressure returned to baseline and stable  Respiratory status: unassisted, spontaneous ventilation and room air  Hydration status: euvolemic  Follow-up not needed.              Vitals Value Taken Time   /65 07/10/24 1541   Temp 36.3 °C (97.3 °F) 07/10/24 1509   Pulse 79 07/10/24 1541   Resp 18 07/10/24 1541   SpO2 98 % 07/10/24 1541         Event Time   Out of Recovery 15:53:06         Pain/Nneka Score: Nneka Score: 10 (7/10/2024  3:09 PM)

## 2024-07-12 LAB
FINAL PATHOLOGIC DIAGNOSIS: NORMAL
GROSS: NORMAL
Lab: NORMAL

## 2024-07-18 DIAGNOSIS — M48.061 DEGENERATIVE LUMBAR SPINAL STENOSIS: ICD-10-CM

## 2024-07-18 RX ORDER — MELOXICAM 15 MG/1
15 TABLET ORAL DAILY PRN
Qty: 90 TABLET | Refills: 1 | Status: SHIPPED | OUTPATIENT
Start: 2024-07-18

## 2024-07-18 NOTE — TELEPHONE ENCOUNTER
No care due was identified.  [facility] Embedded Care Due Messages. Reference number: [id number].   7/18/2024 10:54:26 AM CDT

## 2024-07-21 ENCOUNTER — PATIENT MESSAGE (OUTPATIENT)
Dept: PRIMARY CARE CLINIC | Facility: CLINIC | Age: 74
End: 2024-07-21
Payer: MEDICARE

## 2024-07-24 ENCOUNTER — OFFICE VISIT (OUTPATIENT)
Dept: BARIATRICS | Facility: CLINIC | Age: 74
End: 2024-07-24
Payer: MEDICARE

## 2024-07-24 VITALS
HEART RATE: 70 BPM | SYSTOLIC BLOOD PRESSURE: 102 MMHG | BODY MASS INDEX: 42.72 KG/M2 | OXYGEN SATURATION: 100 % | WEIGHT: 272.19 LBS | HEIGHT: 67 IN | DIASTOLIC BLOOD PRESSURE: 62 MMHG

## 2024-07-24 DIAGNOSIS — E66.01 CLASS 2 SEVERE OBESITY WITH BODY MASS INDEX (BMI) OF 35 TO 39.9 WITH SERIOUS COMORBIDITY: Primary | ICD-10-CM

## 2024-07-24 DIAGNOSIS — E11.9 TYPE 2 DIABETES MELLITUS WITHOUT COMPLICATION, WITHOUT LONG-TERM CURRENT USE OF INSULIN: ICD-10-CM

## 2024-07-24 PROCEDURE — 3288F FALL RISK ASSESSMENT DOCD: CPT | Mod: HCNC,CPTII,S$GLB, | Performed by: INTERNAL MEDICINE

## 2024-07-24 PROCEDURE — 3061F NEG MICROALBUMINURIA REV: CPT | Mod: HCNC,CPTII,S$GLB, | Performed by: INTERNAL MEDICINE

## 2024-07-24 PROCEDURE — 1101F PT FALLS ASSESS-DOCD LE1/YR: CPT | Mod: HCNC,CPTII,S$GLB, | Performed by: INTERNAL MEDICINE

## 2024-07-24 PROCEDURE — 1159F MED LIST DOCD IN RCRD: CPT | Mod: HCNC,CPTII,S$GLB, | Performed by: INTERNAL MEDICINE

## 2024-07-24 PROCEDURE — 3078F DIAST BP <80 MM HG: CPT | Mod: HCNC,CPTII,S$GLB, | Performed by: INTERNAL MEDICINE

## 2024-07-24 PROCEDURE — 3044F HG A1C LEVEL LT 7.0%: CPT | Mod: HCNC,CPTII,S$GLB, | Performed by: INTERNAL MEDICINE

## 2024-07-24 PROCEDURE — 4010F ACE/ARB THERAPY RXD/TAKEN: CPT | Mod: HCNC,CPTII,S$GLB, | Performed by: INTERNAL MEDICINE

## 2024-07-24 PROCEDURE — 99213 OFFICE O/P EST LOW 20 MIN: CPT | Mod: HCNC,S$GLB,, | Performed by: INTERNAL MEDICINE

## 2024-07-24 PROCEDURE — 99999 PR PBB SHADOW E&M-EST. PATIENT-LVL V: CPT | Mod: PBBFAC,HCNC,, | Performed by: INTERNAL MEDICINE

## 2024-07-24 PROCEDURE — 3066F NEPHROPATHY DOC TX: CPT | Mod: HCNC,CPTII,S$GLB, | Performed by: INTERNAL MEDICINE

## 2024-07-24 PROCEDURE — 3008F BODY MASS INDEX DOCD: CPT | Mod: HCNC,CPTII,S$GLB, | Performed by: INTERNAL MEDICINE

## 2024-07-24 PROCEDURE — 1126F AMNT PAIN NOTED NONE PRSNT: CPT | Mod: HCNC,CPTII,S$GLB, | Performed by: INTERNAL MEDICINE

## 2024-07-24 PROCEDURE — 3074F SYST BP LT 130 MM HG: CPT | Mod: HCNC,CPTII,S$GLB, | Performed by: INTERNAL MEDICINE

## 2024-07-24 RX ORDER — TOPIRAMATE 50 MG/1
50 TABLET, FILM COATED ORAL NIGHTLY
Qty: 180 TABLET | Refills: 1 | Status: SHIPPED | OUTPATIENT
Start: 2024-07-24 | End: 2025-07-24

## 2024-07-24 NOTE — PROGRESS NOTES
"Subjective:       Patient ID: Linnea Renae is a 73 y.o. female.    Chief Complaint: Follow-up      Pt here today for follow-up.Has gained 16 lbs, net neg 15 lbs. Started [id number] piyush diet and decreased topiramate to 100 mg qhs. Also started Ozempic for DM2, currently on 2mg weekly. She had some indigestion, but it is a little better. Resumed prilosec. Appetite is  down.   States she was having some cravings for a frosty so was eating more that and domenic wafers instead of normal meals. Also feeling lack of motivation, not wanting to be around anyone lately.   Still feeling tired. She is on neuropathy.          Prev med hx    checked today        Ophtho exam 5/10/22- Open angle with borderline findings, low risk, bilateral. No retinopathy.       checked today. Has been on tramadol chronically.    On metformin 2000 mg BID.     Lab Results   Component Value Date    HGBA1C 5.8 (H) 07/09/2024    HGBA1C 5.5 01/04/2024    HGBA1C 5.5 04/17/2023     Lab Results   Component Value Date    LDLCALC 85.0 01/04/2024    CREATININE 1.0 07/09/2024          Review of Systems   Constitutional: Negative for chills and fever.   Respiratory: Negative for shortness of breath.         Denies snoring   Cardiovascular: Positive for leg swelling. Negative for chest pain.   Gastrointestinal: Positive for constipation. Negative for diarrhea.        + GERD   Genitourinary: Negative for difficulty urinating and dysuria.   Musculoskeletal: Positive for arthralgias and back pain.   Skin: Positive for rash.   Neurological: Positive for dizziness. Negative for light-headedness.   Psychiatric/Behavioral: Positive for dysphoric mood. The patient is nervous/anxious.        Objective:     /62 (BP Location: Left arm, Patient Position: Sitting, BP Method: Large (Manual))   Pulse 70   Ht 5' 6.5" (1.689 m)   Wt 123.5 kg (272 lb 2.5 oz)   SpO2 100%   BMI 43.27 kg/m²     Physical Exam   Constitutional: She is oriented to person, place, and " time. She appears well-developed. No distress.   obese   HENT:   Head: Normocephalic and atraumatic.   Eyes: Pupils are equal, round, and reactive to light. EOM are normal. No scleral icterus.   Neck: Normal range of motion. Neck supple.   Cardiovascular: Normal rate.   Pulmonary/Chest: Effort normal.   Musculoskeletal: Normal range of motion. She exhibits + edema.   Neurological: She is alert and oriented to person, place, and time. No cranial nerve deficit.   Skin: Skin is warm and dry. No erythema.   Psychiatric: She has a normal mood and affect. Her behavior is normal. Judgment normal.   Vitals reviewed.      Assessment:       1. Class 2 severe obesity with body mass index (BMI) of 35 to 39.9 with serious comorbidity    2. Type 2 diabetes mellitus without complication, without long-term current use of insulin                      Plan:         Linnea was seen today for follow-up.    Diagnoses and all orders for this visit:    Class 2 severe obesity with body mass index (BMI) of 35 to 39.9 with serious comorbidity  -     topiramate (TOPAMAX) 50 MG tablet; Take 1 tablet (50 mg total) by mouth every evening.    Type 2 diabetes mellitus without complication, without long-term current use of insulin  -     tirzepatide 7.5 mg/0.5 mL PnIj; Inject 7.5 mg into the skin every 7 days.                     Start Mounjaro 7.5 mg once a week.    Decrease portions as soon as you start Mounjaro. Avoid fried, greasy, fatty foods.     Some nausea in the first 2 weeks is not unusual.     If you get pain across the upper abdomen and around to your back, please call the office.              Patient was informed that topiramate is used for migraine prevention and seizures. Weight loss is a common side effect that is well documented. S/he understands this. S/he was informed of the potential side effects such as serious and possibly fatal rash in which case the medication should be discontinued immediately. Paresthesias, forgetfulness,  fatigue, kidney stones, GI symptoms, and changes in lab values such as electrolytes, blood counts and kidney function.    topiramate 50 mg 2 tabs in the evening.     No soda, sweet tea, juices or lemonade. All drinks should be 5 calories or less.         Limit starchy carbohydrates (bread, rice, pasta, potatoes, corn, peas, oatmeal, grits, tortillas, crackers, chips)        You need about 1000 calories and 70 g protein a day to lose weight    Hurricane tips given.

## 2024-07-24 NOTE — PATIENT INSTRUCTIONS
Start Mounjaro 7.5 mg once a week.    Decrease portions as soon as you start Mounjaro. Avoid fried, greasy, fatty foods.     Some nausea in the first 2 weeks is not unusual.     If you get pain across the upper abdomen and around to your back, please call the office.              Patient was informed that topiramate is used for migraine prevention and seizures. Weight loss is a common side effect that is well documented. S/he understands this. S/he was informed of the potential side effects such as serious and possibly fatal rash in which case the medication should be discontinued immediately. Paresthesias, forgetfulness, fatigue, kidney stones, GI symptoms, and changes in lab values such as electrolytes, blood counts and kidney function.    topiramate 50 mg 2 tabs in the evening.     No soda, sweet tea, juices or lemonade. All drinks should be 5 calories or less.         Limit starchy carbohydrates (bread, rice, pasta, potatoes, corn, peas, oatmeal, grits, tortillas, crackers, chips)        You need about 1000 calories and 70 g protein a day to lose weight      Tips for preparing for hurricane season:    If you are on weight loss medications, please take your medication with you in case of evacuation. Injectable medications should be transported with an ice pack if unopened or room temperature if you have started to use them.   Hurricane supplies do not necessarily need to be junk food or high in carbs or sugar. Shelf stable and healthy choices to include in your supplies could include:  Canned/packets of tuna or chicken  Apples, oranges, banana, pears  Beef or turkey jerky  Sugar free pudding  Pickle, olives, dill relish (mix with the tuna or chicken!)  Low sodium or no salt added canned vegetables  If you get bread, get lite bread (40 calories a slice)  0 sugar sports drinks  Water  String cheese will be okay for a few days without refrigeration or in an ice chest.   Peanut or other nut butter.   Parmesan  crisps  Pork skins  Protein shakes (20-30g protein, less than 5 g sugar)  Protein bars (15 or more g protein, less than 5 g sugar)  Don't forget disposable forks, spoons, plates in your supplies  If evacuated, manage stress by taking walks, reading or meditating.   If eating out make better choices when possible.   Salads with a lean protein and limited dressing, cheese or other toppings  Grilled chicken sandwich or burger without the bun.   Skip the fries!  Order water or unsweetened tea instead of soda  Grocery stores with a deli, salad/food bar can be a good quick and affordable option to replace fast food

## 2024-08-08 DIAGNOSIS — M54.50 CHRONIC BILATERAL LOW BACK PAIN WITHOUT SCIATICA: ICD-10-CM

## 2024-08-08 DIAGNOSIS — G89.29 CHRONIC BILATERAL LOW BACK PAIN WITHOUT SCIATICA: ICD-10-CM

## 2024-08-08 RX ORDER — TRAMADOL HYDROCHLORIDE 50 MG/1
50 TABLET ORAL EVERY 6 HOURS PRN
Qty: 120 TABLET | Refills: 2 | Status: CANCELLED | OUTPATIENT
Start: 2024-08-08 | End: 2024-11-06

## 2024-08-09 ENCOUNTER — PATIENT MESSAGE (OUTPATIENT)
Dept: PRIMARY CARE CLINIC | Facility: CLINIC | Age: 74
End: 2024-08-09
Payer: MEDICARE

## 2024-08-09 DIAGNOSIS — G89.29 CHRONIC BILATERAL LOW BACK PAIN WITHOUT SCIATICA: ICD-10-CM

## 2024-08-09 DIAGNOSIS — M54.50 CHRONIC BILATERAL LOW BACK PAIN WITHOUT SCIATICA: ICD-10-CM

## 2024-08-09 RX ORDER — TRAMADOL HYDROCHLORIDE 50 MG/1
50 TABLET ORAL EVERY 6 HOURS PRN
Qty: 120 TABLET | Refills: 2 | Status: SHIPPED | OUTPATIENT
Start: 2024-08-09 | End: 2024-11-07

## 2024-08-09 RX ORDER — TRAMADOL HYDROCHLORIDE 50 MG/1
50 TABLET ORAL EVERY 6 HOURS PRN
Qty: 120 TABLET | Refills: 2 | Status: CANCELLED | OUTPATIENT
Start: 2024-08-08 | End: 2024-11-06

## 2024-08-10 DIAGNOSIS — M79.671 FOOT PAIN, BILATERAL: ICD-10-CM

## 2024-08-10 DIAGNOSIS — G89.29 CHRONIC BILATERAL LOW BACK PAIN WITHOUT SCIATICA: ICD-10-CM

## 2024-08-10 DIAGNOSIS — M79.672 FOOT PAIN, BILATERAL: ICD-10-CM

## 2024-08-10 DIAGNOSIS — M54.50 CHRONIC BILATERAL LOW BACK PAIN WITHOUT SCIATICA: ICD-10-CM

## 2024-08-12 ENCOUNTER — TELEPHONE (OUTPATIENT)
Dept: BARIATRICS | Facility: CLINIC | Age: 74
End: 2024-08-12
Payer: MEDICARE

## 2024-08-12 DIAGNOSIS — E66.01 CLASS 2 SEVERE OBESITY WITH BODY MASS INDEX (BMI) OF 35 TO 39.9 WITH SERIOUS COMORBIDITY: ICD-10-CM

## 2024-08-12 RX ORDER — TOPIRAMATE 50 MG/1
100 TABLET, FILM COATED ORAL NIGHTLY
Qty: 180 TABLET | Refills: 1 | Status: SHIPPED | OUTPATIENT
Start: 2024-08-12 | End: 2025-08-12

## 2024-08-12 RX ORDER — DULOXETIN HYDROCHLORIDE 30 MG/1
30 CAPSULE, DELAYED RELEASE ORAL SEE ADMIN INSTRUCTIONS
Qty: 90 CAPSULE | Refills: 1 | Status: SHIPPED | OUTPATIENT
Start: 2024-08-12

## 2024-08-12 NOTE — TELEPHONE ENCOUNTER
----- Message from Veronica Wagner PharmD sent at 8/12/2024 11:42 AM CDT -----  Regarding: Topamax 50mg  Hello,    Pt states she was told to take 2 tablets every evening of Topamax 50mg. Her rx show one tablet every evening. Pt is out and it is too soon to refill on insurance (because the directions read 1 qpm). Can you please send over an updated rx with directions of 2 qpm to [facility], so it can be filled for the patient? Thank you

## 2024-08-12 NOTE — TELEPHONE ENCOUNTER
----- Message from Veronica Wagner PharmD sent at 8/12/2024 11:42 AM CDT -----  Regarding: Topamax 50mg  Hello,    Pt states she was told to take 2 tablets every evening of Topamax 50mg. Her rx show one tablet every evening. Pt is out and it is too soon to refill on insurance (because the directions read 1 qpm). Can you please send over an updated rx with directions of 2 qpm to [facility], so it can be filled for the patient? Thank you

## 2024-08-12 NOTE — TELEPHONE ENCOUNTER
Contacted pharmacy, prescription has been sent over and patient will be contacted when ready for

## 2024-08-27 ENCOUNTER — OFFICE VISIT (OUTPATIENT)
Dept: PHYSICAL MEDICINE AND REHAB | Facility: CLINIC | Age: 74
End: 2024-08-27
Payer: MEDICARE

## 2024-08-27 VITALS — HEIGHT: 67 IN | BODY MASS INDEX: 42.8 KG/M2 | WEIGHT: 272.69 LBS | OXYGEN SATURATION: 98 %

## 2024-08-27 DIAGNOSIS — M47.816 LUMBAR FACET ARTHROPATHY: ICD-10-CM

## 2024-08-27 DIAGNOSIS — M70.61 TROCHANTERIC BURSITIS OF BOTH HIPS: ICD-10-CM

## 2024-08-27 DIAGNOSIS — M48.061 NEURAL FORAMINAL STENOSIS OF LUMBAR SPINE: ICD-10-CM

## 2024-08-27 DIAGNOSIS — E66.01 MORBID OBESITY WITH BMI OF 40.0-44.9, ADULT: ICD-10-CM

## 2024-08-27 DIAGNOSIS — M79.671 FOOT PAIN, BILATERAL: ICD-10-CM

## 2024-08-27 DIAGNOSIS — Z79.891 CHRONICALLY ON OPIATE THERAPY: ICD-10-CM

## 2024-08-27 DIAGNOSIS — M70.62 TROCHANTERIC BURSITIS OF BOTH HIPS: ICD-10-CM

## 2024-08-27 DIAGNOSIS — M54.50 CHRONIC BILATERAL LOW BACK PAIN WITHOUT SCIATICA: Primary | ICD-10-CM

## 2024-08-27 DIAGNOSIS — G89.29 CHRONIC BILATERAL LOW BACK PAIN WITHOUT SCIATICA: Primary | ICD-10-CM

## 2024-08-27 DIAGNOSIS — M51.36 DDD (DEGENERATIVE DISC DISEASE), LUMBAR: ICD-10-CM

## 2024-08-27 DIAGNOSIS — E11.42 DIABETIC PERIPHERAL NEUROPATHY: ICD-10-CM

## 2024-08-27 DIAGNOSIS — M48.061 SPINAL STENOSIS OF LUMBAR REGION WITHOUT NEUROGENIC CLAUDICATION: ICD-10-CM

## 2024-08-27 DIAGNOSIS — M79.672 FOOT PAIN, BILATERAL: ICD-10-CM

## 2024-08-27 PROCEDURE — 99999 PR PBB SHADOW E&M-EST. PATIENT-LVL II: CPT | Mod: PBBFAC,HCNC,, | Performed by: PHYSICAL MEDICINE & REHABILITATION

## 2024-08-27 PROCEDURE — 3066F NEPHROPATHY DOC TX: CPT | Mod: HCNC,CPTII,S$GLB, | Performed by: PHYSICAL MEDICINE & REHABILITATION

## 2024-08-27 PROCEDURE — 1126F AMNT PAIN NOTED NONE PRSNT: CPT | Mod: HCNC,CPTII,S$GLB, | Performed by: PHYSICAL MEDICINE & REHABILITATION

## 2024-08-27 PROCEDURE — 3061F NEG MICROALBUMINURIA REV: CPT | Mod: HCNC,CPTII,S$GLB, | Performed by: PHYSICAL MEDICINE & REHABILITATION

## 2024-08-27 PROCEDURE — 4010F ACE/ARB THERAPY RXD/TAKEN: CPT | Mod: HCNC,CPTII,S$GLB, | Performed by: PHYSICAL MEDICINE & REHABILITATION

## 2024-08-27 PROCEDURE — 99213 OFFICE O/P EST LOW 20 MIN: CPT | Mod: HCNC,S$GLB,, | Performed by: PHYSICAL MEDICINE & REHABILITATION

## 2024-08-27 PROCEDURE — 1101F PT FALLS ASSESS-DOCD LE1/YR: CPT | Mod: HCNC,CPTII,S$GLB, | Performed by: PHYSICAL MEDICINE & REHABILITATION

## 2024-08-27 PROCEDURE — 3288F FALL RISK ASSESSMENT DOCD: CPT | Mod: HCNC,CPTII,S$GLB, | Performed by: PHYSICAL MEDICINE & REHABILITATION

## 2024-08-27 PROCEDURE — 3044F HG A1C LEVEL LT 7.0%: CPT | Mod: HCNC,CPTII,S$GLB, | Performed by: PHYSICAL MEDICINE & REHABILITATION

## 2024-08-27 PROCEDURE — 1159F MED LIST DOCD IN RCRD: CPT | Mod: HCNC,CPTII,S$GLB, | Performed by: PHYSICAL MEDICINE & REHABILITATION

## 2024-08-27 NOTE — PROGRESS NOTES
Subjective:       Patient ID: Linnea Renae is a 74 y.o. female.    Chief Complaint: No chief complaint on file.      HPI      Mrs. Perera is a 74-year-old female with of hypertension, diabetes mellitus type 2, diabetic neuropathy, hyperlipidemia, GERD, right breast cancer status post Left mastopexy and reconstruction (04/2022), chronic low back pain status post multiple spine injections.  She is followed up at the [facility] for chronic bilateral foot pain with history of diabetic neuropathy, and for chronic low back pain status post multiple spine procedures, listed below.  Her last visit to the clinic was on 4/29/2024.  She was maintained on meloxicam, duloxetine (dose was increased), p.r.n. tramadol (the dose was increased).  She was started on lidocaine patches.    The patient is coming to the clinic for follow-up.  Her low back pain has been much better.  It is an almost constant throbbing and stabbing sensation in the lower lumbar spine and across her back. It shoots bilaterally to the buttock and hip regions.  She denies however any recent shooting pain to her legs.  Her pain is aggravated by standing, walking, bending and lifting.  It is better with sitting down or lying down.  Her max pain is 10/10 and minimum 0/10.  Today at rest her pain is 0/10.  She has mild lower extremity weakness.  She denies any bowel or bladder incontinence.  She denies any leg claudications.      Her bilateral foot pain has been under good control. It is a constant numbness and tingling sensations in both feet.     She is currently taking:  Meloxicam 15 mg by mouth about once per week (it was decreased by her Primary Care Provider due to borderline GFR)  Duloxetine 30 mg capsules, 2 capsules in the morning and 1 at bedtime.  Tramadol 50 mg p.r.n., usually 1 tablet 4 times per day.  She reports that in the past gabapentin did not help.  Pregabalin caused weight gain.      Review of the [facility] note on  "04/26/2022 shows the following procedures:   "Pain Procedures:   7/29/16 Right L3-4 TF CHRISTIAN  11/3/17 Bilateral L3-4 and L4-5 facet injections  5/9/18 Bilateral L3-4 and L4-5 facet injections  7/25/18 Bilateral L3-4 and L4-5 facet injections  12/5/18 Right L2,3,4,5 RFA- 80% relief  3/20/19 Left L2,3,4,5 RFA- 100% relief  10/2/19 left L4/5 TFESI  12/18/19 LEFT L2,3,4,5 RFA- 90% relief  1/8/20 RIGHT L2,3,4,5 RFA- 90% relief  7/8/20 B/L SIJ, GTB - 85% relief  11/04/20 Right L2, L3, L4, L5 RFA - 80% relief  11/23/20 TPIs- helpful"    Past Medical History:   Diagnosis Date    Allergy     Breast cancer     Lumpectomy 10/15. right    Chronic constipation     Colon polyps     Diabetes mellitus, type 2     Dry eyes     GERD (gastroesophageal reflux disease)     Hyperlipidemia     Hypertension     Lumbar disc disease     Type 2 diabetes mellitus         Review of patient's allergies indicates:   Allergen Reactions    Codeine Itching        Review of Systems   Constitutional:  Negative for chills and fever.   Eyes:  Negative for visual disturbance.   Respiratory:  Negative for shortness of breath.    Cardiovascular:  Negative for chest pain.   Gastrointestinal:  Positive for constipation. Negative for blood in stool, nausea and vomiting.   Genitourinary:  Negative for difficulty urinating.   Musculoskeletal:  Positive for back pain. Negative for arthralgias, gait problem and neck pain.   Neurological:  Positive for numbness and headaches. Negative for dizziness and weakness.   Psychiatric/Behavioral:  Negative for behavioral problems and sleep disturbance.              Objective:      Physical Exam  Vitals reviewed.   Constitutional:       Appearance: She is well-developed.   HENT:      Head: Normocephalic and atraumatic.   Eyes:      Extraocular Movements: Extraocular movements intact.   Musculoskeletal:      Cervical back: Normal range of motion. No tenderness.      Comments: BUE:  ROM:   RUE: full.   LUE: full.  Strength:    " RUE: 5/5 at shoulder abduction, 5 elbow flexion, 5 elbow extension, 5 hand .   LUE: 5/5 at shoulder abduction, 5 elbow flexion, 5 elbow extension, 5 hand .  Sensation to pinprick:   RUE: intact.   LUE: intact.  DTR:    RUE: +1 biceps, +1 triceps.   LUE:  +1 biceps, +1 triceps.      BLE:  ROM:   RLE: full.   LLE: full.  Knee crepitus:   RLE: -ve.   LLE: -ve.   Strength:    RLE: 5/5 at hip flexion, 5 knee extension, 5 ankle DF, 5 PF, 5 EHL.   LLE: 5/5 at hip flexion, 5 knee extension, 5 ankle DF, 5 PF, 5 EHL.  Sensation to pinprick:     RLE: hypersensitive.       LLE: hypersensitive.   DTR:     RLE: +1 knee, +1 ankle.    LLE: +1 knee, +1 ankle.  Clonus:    Rt ankle: -ve.    Lt ankle: -ve.  SLR (sitting):      RLE: -ve.      LLE: -ve.    +ve mod tenderness over lumbar spine.  +ve mod tenderness over bilateral trochanteric bursa.    Gait: slow garrett, waddling.   Skin:     General: Skin is warm.   Neurological:      General: No focal deficit present.      Mental Status: She is alert.   Psychiatric:         Behavior: Behavior normal.         Assessment:       1. Chronic bilateral low back pain without sciatica    2. DDD (degenerative disc disease), lumbar    3. Lumbar facet arthropathy    4. Spinal stenosis of lumbar region without neurogenic claudication (L4-L5, moderate)    5. Neural foraminal stenosis of lumbar spine    6. Trochanteric bursitis of both hips    7. Foot pain, bilateral    8. Diabetic peripheral neuropathy    9. Morbid obesity with BMI of 40.0-44.9, adult    10. Chronically on opiate therapy        Plan:       - Continue  meloxicam (MOBIC) 15 MG tablet; Take 1 tablet (15 mg total) by mouth once daily p.r.n.  - Continue DULoxetine (CYMBALTA) 30 MG capsule; Take 1 capsule (30 mg total) by mouth As instructed (take 2 cqpsules in the morning, and 1 in the evening).  - Continue traMADoL (ULTRAM) 50 mg tablet; Take 1 tablet (50 mg total) by mouth every 6 (six) hours as needed for Pain.  - Continue  LIDOcaine (LIDODERM) 5 %; Place 1-2 patches onto the skin once daily. Remove & Discard patch within 12 hours or as directed by MD  - Weight loss was encouraged.  - She was asked to call when ready and specify which location of physical therapy she wants to go to.  - Follow up in about 4 months (around 12/27/2024).      This was a 25 minute visit, 50% of which was spent educating the patient about the diagnosis and the treatment plan.    This note was partly generated with [employer] voice recognition software. I apologize for any possible typographical errors.

## 2024-08-30 NOTE — TELEPHONE ENCOUNTER
Pt is calling to request to increase the Tramadol 100 mg only last at least 6 hours if she take it at 8pm she is able to rest at night. The medication is not staying in her system long enough for the pain. She is requesting to change the directions or can you increase the strength    Saucerization Depth: dermis and superficial adipose tissue

## 2024-09-15 DIAGNOSIS — M48.061 DEGENERATIVE LUMBAR SPINAL STENOSIS: ICD-10-CM

## 2024-09-15 NOTE — TELEPHONE ENCOUNTER
No care due was identified.  Health [facility] Embedded Care Due Messages. Reference number: [id number].   9/15/2024 11:36:08 AM CDT

## 2024-09-17 RX ORDER — TIZANIDINE 4 MG/1
4 TABLET ORAL EVERY 8 HOURS PRN
Qty: 90 TABLET | Refills: 1 | Status: SHIPPED | OUTPATIENT
Start: 2024-09-17

## 2024-10-08 DIAGNOSIS — F41.9 ANXIETY: ICD-10-CM

## 2024-10-08 RX ORDER — ALPRAZOLAM 0.5 MG/1
0.5 TABLET ORAL 2 TIMES DAILY PRN
Qty: 30 TABLET | Refills: 0 | Status: SHIPPED | OUTPATIENT
Start: 2024-10-08

## 2024-10-08 NOTE — TELEPHONE ENCOUNTER
Care Due:                  Date            Visit Type   Department     Provider  --------------------------------------------------------------------------------                                EP -                              PRIMARY      [facility] PRIMARY   Bailee Zee  Last Visit: 07-      CARE ([facility])   CARE           Ajay  Next Visit: None Scheduled  None         None Found                                                            Last  Test          Frequency    Reason                     Performed    Due Date  --------------------------------------------------------------------------------    CBC.........  12 months..  meloxicam................  01- 12-    CMP.........  12 months..  atorvastatin, meloxicam..  01- 12-    Lipid Panel.  12 months..  atorvastatin.............  01- 12-    [employer] Embedded Care Due Messages. Reference number: [id number].   10/08/2024 12:12:52 PM CDT

## 2024-10-23 DIAGNOSIS — M54.50 CHRONIC BILATERAL LOW BACK PAIN WITHOUT SCIATICA: ICD-10-CM

## 2024-10-23 DIAGNOSIS — M79.672 FOOT PAIN, BILATERAL: ICD-10-CM

## 2024-10-23 DIAGNOSIS — M79.671 FOOT PAIN, BILATERAL: ICD-10-CM

## 2024-10-23 DIAGNOSIS — G89.29 CHRONIC BILATERAL LOW BACK PAIN WITHOUT SCIATICA: ICD-10-CM

## 2024-10-23 RX ORDER — DULOXETIN HYDROCHLORIDE 30 MG/1
30 CAPSULE, DELAYED RELEASE ORAL SEE ADMIN INSTRUCTIONS
Qty: 90 CAPSULE | Refills: 1 | Status: SHIPPED | OUTPATIENT
Start: 2024-10-23

## 2024-10-25 ENCOUNTER — TELEPHONE (OUTPATIENT)
Dept: PRIMARY CARE CLINIC | Facility: CLINIC | Age: 74
End: 2024-10-25
Payer: MEDICARE

## 2024-10-25 NOTE — TELEPHONE ENCOUNTER
----- Message from Jami sent at 10/25/2024 12:18 PM CDT -----  Contact: Self [phone]  Would like to receive medical advice.    Would they like a call back or a response via [facility]:  call back    Additional information:  Pt is requesting a referral from provider, but did not want to give additional info and would rather speak with nurse.

## 2024-10-25 NOTE — TELEPHONE ENCOUNTER
Spoke to the patient. The patient is requesting a referral to an external podiatry clinic. The patient does not know if the order should be for podiatry or home health. She is going to contact the company and ask what type of referral is needed and call back. Fax -[phone]. Name - [employer].     Patient also requesting an order for her , Thierno Renae [birth year].

## 2024-10-28 ENCOUNTER — TELEPHONE (OUTPATIENT)
Dept: BARIATRICS | Facility: CLINIC | Age: 74
End: 2024-10-28
Payer: MEDICARE

## 2024-10-30 ENCOUNTER — PATIENT MESSAGE (OUTPATIENT)
Dept: BARIATRICS | Facility: CLINIC | Age: 74
End: 2024-10-30
Payer: MEDICARE

## 2024-10-30 ENCOUNTER — PATIENT MESSAGE (OUTPATIENT)
Dept: PRIMARY CARE CLINIC | Facility: CLINIC | Age: 74
End: 2024-10-30
Payer: MEDICARE

## 2024-10-30 ENCOUNTER — PATIENT MESSAGE (OUTPATIENT)
Dept: SURGERY | Facility: CLINIC | Age: 74
End: 2024-10-30
Payer: MEDICARE

## 2024-10-30 DIAGNOSIS — Z12.31 SCREENING MAMMOGRAM, ENCOUNTER FOR: ICD-10-CM

## 2024-10-30 DIAGNOSIS — Z85.3 PERSONAL HISTORY OF BREAST CANCER: Primary | ICD-10-CM

## 2024-10-30 DIAGNOSIS — E11.9 TYPE 2 DIABETES MELLITUS WITHOUT COMPLICATION, WITHOUT LONG-TERM CURRENT USE OF INSULIN: Primary | ICD-10-CM

## 2024-10-31 RX ORDER — SEMAGLUTIDE 2.68 MG/ML
2 INJECTION, SOLUTION SUBCUTANEOUS
Qty: 3 ML | Refills: 5 | Status: SHIPPED | OUTPATIENT
Start: 2024-10-31

## 2024-11-06 DIAGNOSIS — G89.29 CHRONIC BILATERAL LOW BACK PAIN WITHOUT SCIATICA: ICD-10-CM

## 2024-11-06 DIAGNOSIS — M54.50 CHRONIC BILATERAL LOW BACK PAIN WITHOUT SCIATICA: ICD-10-CM

## 2024-11-07 RX ORDER — TRAMADOL HYDROCHLORIDE 50 MG/1
50 TABLET ORAL EVERY 6 HOURS PRN
Qty: 120 TABLET | Refills: 0 | Status: SHIPPED | OUTPATIENT
Start: 2024-11-07 | End: 2025-02-05

## 2024-11-12 DIAGNOSIS — I70.0 ATHEROSCLEROSIS OF AORTA: ICD-10-CM

## 2024-11-12 DIAGNOSIS — E11.9 TYPE 2 DIABETES MELLITUS WITHOUT COMPLICATION, WITHOUT LONG-TERM CURRENT USE OF INSULIN: ICD-10-CM

## 2024-11-12 DIAGNOSIS — E78.5 HYPERLIPIDEMIA, UNSPECIFIED HYPERLIPIDEMIA TYPE: ICD-10-CM

## 2024-11-12 RX ORDER — ATORVASTATIN CALCIUM 20 MG/1
20 TABLET, FILM COATED ORAL NIGHTLY
Qty: 90 TABLET | Refills: 0 | Status: SHIPPED | OUTPATIENT
Start: 2024-11-12

## 2024-11-12 NOTE — TELEPHONE ENCOUNTER
Refill Decision Note   Linnea Renae  is requesting a refill authorization.  Brief Assessment and Rationale for Refill:  Approve     Medication Therapy Plan:         Comments:     Note composed:8:44 AM 11/12/2024

## 2024-11-12 NOTE — TELEPHONE ENCOUNTER
No care due was identified.  Health [facility] Embedded Care Due Messages. Reference number: [id number].   11/12/2024 6:51:42 AM CST

## 2024-11-15 ENCOUNTER — PATIENT MESSAGE (OUTPATIENT)
Dept: PODIATRY | Facility: CLINIC | Age: 74
End: 2024-11-15
Payer: MEDICARE

## 2024-11-18 ENCOUNTER — TELEPHONE (OUTPATIENT)
Dept: PODIATRY | Facility: CLINIC | Age: 74
End: 2024-11-18
Payer: MEDICARE

## 2024-11-18 NOTE — TELEPHONE ENCOUNTER
Patient gave verbal understanding of being rescheduled with Dr Maile bradley on 11/19 with new evaluation linked to wait list or sooner evaluation

## 2024-11-21 ENCOUNTER — OFFICE VISIT (OUTPATIENT)
Dept: BARIATRICS | Facility: CLINIC | Age: 74
End: 2024-11-21
Payer: MEDICARE

## 2024-11-21 VITALS
HEART RATE: 78 BPM | HEIGHT: 67 IN | BODY MASS INDEX: 44.11 KG/M2 | WEIGHT: 281.06 LBS | OXYGEN SATURATION: 97 % | SYSTOLIC BLOOD PRESSURE: 110 MMHG | DIASTOLIC BLOOD PRESSURE: 68 MMHG

## 2024-11-21 DIAGNOSIS — E66.812 CLASS 2 SEVERE OBESITY WITH BODY MASS INDEX (BMI) OF 35 TO 39.9 WITH SERIOUS COMORBIDITY: ICD-10-CM

## 2024-11-21 DIAGNOSIS — E11.9 TYPE 2 DIABETES MELLITUS WITHOUT COMPLICATION, WITHOUT LONG-TERM CURRENT USE OF INSULIN: ICD-10-CM

## 2024-11-21 DIAGNOSIS — E66.01 CLASS 2 SEVERE OBESITY WITH BODY MASS INDEX (BMI) OF 35 TO 39.9 WITH SERIOUS COMORBIDITY: ICD-10-CM

## 2024-11-21 PROCEDURE — 99213 OFFICE O/P EST LOW 20 MIN: CPT | Mod: HCNC,S$GLB,, | Performed by: INTERNAL MEDICINE

## 2024-11-21 PROCEDURE — 99999 PR PBB SHADOW E&M-EST. PATIENT-LVL V: CPT | Mod: PBBFAC,HCNC,, | Performed by: INTERNAL MEDICINE

## 2024-11-21 PROCEDURE — 1160F RVW MEDS BY RX/DR IN RCRD: CPT | Mod: HCNC,CPTII,S$GLB, | Performed by: INTERNAL MEDICINE

## 2024-11-21 PROCEDURE — 1159F MED LIST DOCD IN RCRD: CPT | Mod: HCNC,CPTII,S$GLB, | Performed by: INTERNAL MEDICINE

## 2024-11-21 PROCEDURE — 3061F NEG MICROALBUMINURIA REV: CPT | Mod: HCNC,CPTII,S$GLB, | Performed by: INTERNAL MEDICINE

## 2024-11-21 PROCEDURE — 3078F DIAST BP <80 MM HG: CPT | Mod: HCNC,CPTII,S$GLB, | Performed by: INTERNAL MEDICINE

## 2024-11-21 PROCEDURE — 3044F HG A1C LEVEL LT 7.0%: CPT | Mod: HCNC,CPTII,S$GLB, | Performed by: INTERNAL MEDICINE

## 2024-11-21 PROCEDURE — 1101F PT FALLS ASSESS-DOCD LE1/YR: CPT | Mod: HCNC,CPTII,S$GLB, | Performed by: INTERNAL MEDICINE

## 2024-11-21 PROCEDURE — 4010F ACE/ARB THERAPY RXD/TAKEN: CPT | Mod: HCNC,CPTII,S$GLB, | Performed by: INTERNAL MEDICINE

## 2024-11-21 PROCEDURE — 3066F NEPHROPATHY DOC TX: CPT | Mod: HCNC,CPTII,S$GLB, | Performed by: INTERNAL MEDICINE

## 2024-11-21 PROCEDURE — 3074F SYST BP LT 130 MM HG: CPT | Mod: HCNC,CPTII,S$GLB, | Performed by: INTERNAL MEDICINE

## 2024-11-21 PROCEDURE — 3288F FALL RISK ASSESSMENT DOCD: CPT | Mod: HCNC,CPTII,S$GLB, | Performed by: INTERNAL MEDICINE

## 2024-11-21 PROCEDURE — 1125F AMNT PAIN NOTED PAIN PRSNT: CPT | Mod: HCNC,CPTII,S$GLB, | Performed by: INTERNAL MEDICINE

## 2024-11-21 RX ORDER — TOPIRAMATE 50 MG/1
100 TABLET, FILM COATED ORAL NIGHTLY
Qty: 180 TABLET | Refills: 1 | Status: SHIPPED | OUTPATIENT
Start: 2024-11-21 | End: 2025-11-21

## 2024-11-21 NOTE — PROGRESS NOTES
"Subjective:       Patient ID: Linnea Renae is a 74 y.o. female.    Chief Complaint: Follow-up      Pt here today for follow-up.Has gained  9 lbs, net neg 6 lbs. Started [id number] piyush diet and decreased topiramate to 100 mg qhs. Also started Ozempic for DM2, currently on 2mg weekly. Switched to mounjaro. She felt very tired and had some itching and GI SE with it. DId attempt to  pt to adjust her protein and fat intake, but she was preferred switching back to Ozempic right away.     States she was having some cravings for a frosty and chocolate ice cream, so was eating more that and domenic wafers instead of normal meals. Also feeling lack of motivation, not wanting to be around anyone lately.   Still feeling tired. SHe is on tramadol. She is not taking vitamins.           Prev med hx    checked today        Ophtho exam 5/10/22- Open angle with borderline findings, low risk, bilateral. No retinopathy.       checked today. Has been on tramadol chronically.    On metformin 2000 mg BID.     Lab Results   Component Value Date    HGBA1C 5.8 (H) 07/09/2024    HGBA1C 5.5 01/04/2024    HGBA1C 5.5 04/17/2023     Lab Results   Component Value Date    LDLCALC 85.0 01/04/2024    CREATININE 1.0 07/09/2024          Review of Systems   Constitutional: Negative for chills and fever.   Respiratory: Negative for shortness of breath.         Denies snoring   Cardiovascular: Positive for leg swelling. Negative for chest pain.   Gastrointestinal: Positive for constipation. Negative for diarrhea.        + GERD   Genitourinary: Negative for difficulty urinating and dysuria.   Musculoskeletal: Positive for arthralgias and back pain.   Skin: Positive for rash.   Neurological: Positive for dizziness. Negative for light-headedness.   Psychiatric/Behavioral: Positive for dysphoric mood. The patient is nervous/anxious.        Objective:     /68   Pulse 78   Ht 5' 6.5" (1.689 m)   Wt 127.5 kg (281 lb 1.4 oz)   SpO2 97%   BMI " 44.69 kg/m²     Physical Exam   Constitutional: She is oriented to person, place, and time. She appears well-developed. No distress.   obese   HENT:   Head: Normocephalic and atraumatic.   Eyes: Pupils are equal, round, and reactive to light. EOM are normal. No scleral icterus.   Neck: Normal range of motion. Neck supple.   Cardiovascular: Normal rate.   Pulmonary/Chest: Effort normal.   Musculoskeletal: Normal range of motion. She exhibits + edema.   Neurological: She is alert and oriented to person, place, and time. No cranial nerve deficit.   Skin: Skin is warm and dry. No erythema.   Psychiatric: She has a normal mood and affect. Her behavior is normal. Judgment normal.   Vitals reviewed.      Assessment:       1. Class 2 severe obesity with body mass index (BMI) of 35 to 39.9 with serious comorbidity    2. Type 2 diabetes mellitus without complication, without long-term current use of insulin                        Plan:         Linnea was seen today for follow-up.    Diagnoses and all orders for this visit:    Class 2 severe obesity with body mass index (BMI) of 35 to 39.9 with serious comorbidity  -     topiramate (TOPAMAX) 50 MG tablet; Take 2 tablets (100 mg total) by mouth every evening.    Type 2 diabetes mellitus without complication, without long-term current use of insulin                  Start Ozempic 2 mg once a week.    Decrease portions as soon as you start Ozempic. Avoid fried, greasy, fatty foods.     Some nausea in the first 2 weeks is not unusual.     If you get pain across the upper abdomen and around to your back, please call the office.       [email or web address] for coupon/videos.           Patient was informed that topiramate is used for migraine prevention and seizures. Weight loss is a common side effect that is well documented. S/he understands this. S/he was informed of the potential side effects such as serious and possibly fatal rash in which case the medication should be  discontinued immediately. Paresthesias, forgetfulness, fatigue, kidney stones, GI symptoms, and changes in lab values such as electrolytes, blood counts and kidney function.    topiramate 50 mg 2 tabs in the evening.     No soda, sweet tea, juices or lemonade. All drinks should be 5 calories or less.         Limit starchy carbohydrates (bread, rice, pasta, potatoes, corn, peas, oatmeal, grits, tortillas, crackers, chips)        You need about 1000 calories and 70 g protein a day to lose weight    Holiday tips given.

## 2024-11-21 NOTE — PATIENT INSTRUCTIONS
Start Ozempic 2 mg once a week.    Decrease portions as soon as you start Ozempic. Avoid fried, greasy, fatty foods.     Some nausea in the first 2 weeks is not unusual.     If you get pain across the upper abdomen and around to your back, please call the office.       [email or web address] for coupon/videos.           Patient was informed that topiramate is used for migraine prevention and seizures. Weight loss is a common side effect that is well documented. S/he understands this. S/he was informed of the potential side effects such as serious and possibly fatal rash in which case the medication should be discontinued immediately. Paresthesias, forgetfulness, fatigue, kidney stones, GI symptoms, and changes in lab values such as electrolytes, blood counts and kidney function.    topiramate 50 mg 2 tabs in the evening.     No soda, sweet tea, juices or lemonade. All drinks should be 5 calories or less.         Limit starchy carbohydrates (bread, rice, pasta, potatoes, corn, peas, oatmeal, grits, tortillas, crackers, chips)        You need about 1000 calories and 70 g protein a day to lose weight      Required Vitamin/Mineral Supplements:    Multivitamins - one taken twice a day       Iron- 18 mg in each multivitamin or 36 mg total per day (may be included in multivitamin)     Super B-complex with 50 mg Thiamine (Vitamin B1)- once daily  Calcium Citrate + Vitamin D- 500 mg three times per day or 600 mg twice a day  Vitamin B12- 500 mcg sublingual daily or monthly injections     *NO gummy multivitamins due to poor quality and sugar content.  *Do NOT take calcium citrate and Iron within 2 hours of each other due to poor absorption.          Sample meal plan  80-120g protein; 6528-5404 calories  Protein drinks and bars: 0-4 grams sugar  Drink lots of water throughout the day and exercise!  MENU # 1  Breakfast: 2 eggs, 1 turkey sausage nina  Lunch: 2-3 roll-ups (sliced turkey, low-fat slice cheese), baby carrots  dipped in 1 Tbsp natural peanut butter  Mid-Day Snack: ¼ cup unsalted almonds, ½ cup fruit  Supper: 1 chicken thigh simmered in tomato sauce + 2 Tbsp mozzarella cheese, ½ cup black beans, 1/2 cup steamed veggies  Evening Snack: 1/2 cup grapes with 4 cubes low-fat cheddar cheese   ___________________________________________________  MENU # 2  Breakfast: protein drink  Mid-morning snack : ¼ cup unsalted nuts  Lunch: 1 cup tuna or chicken salad made with light pineda, over salad.   Supper: hamburger nina, slice of cheese, 1 cup steamed veggies.   Snack: light yogurt      Menu #3  Breakfast: 6oz plain Greek yogurt + fruit (½ banana, ½ cup fruit - pineapple, blueberries, strawberries, peach), may add Splenda to juliann.  Lunch: ½  chicken breast w/ slice pepper favian cheese, 1/2 cup steamed veggies and small salad with Salad Spritzer.    Mid-Day snack: protein drink   Supper: 4oz Grilled fish, grilled veggie kabob ( mushrooms, onion, bell pepper, yellow squash, zucchini, cherry tomatoes)  Evening Snack: fudgsicle no-sugar-added    Menu # 4  Breakfast: vanilla iced coffee: 1 scoop vanilla protein powder + 4oz skim milk + 4oz coffee   Snack: protein bar  Lunch: 2 Lettuce tacos: ¼ cup seasoned ground turkey wrapped in a Lyle lettuce leaf with 1 Tbsp shredded cheese and dollop low-fat sour cream  Dinner: Shrimp omelet: 2 eggs, ½ cup shrimp, green onions, and shredded cheese        Menu #5  Breakfast: 1 cup low-fat cottage cheese, ½ cup peaches (no sugar added)  Lunch: 2 oz baked pork chop, 1 cup okra and tomato stew  Snack: 1 cup black beans + salsa + dollop sour cream  Dinner: Caprese chicken salad: 2 oz chicken, 1oz fresh mozzarella, sliced tomato, 1 Tbsp olive oil, basil  Snack: sugar-free pudding cup      Menu #6  Breakfast: ½ cup part-skim ricotta cheese, 2 Tbsp sugar-free strawberry preserves, ¼ cup slivered almonds  Lunch: 2 oz canned chicken, 1oz shredded cheddar cheese, ½ cup black beans, 2 Tbsp salsa  Snack:  Protein drink  Dinner: 4 oz grilled salmon steak, over mixed green salad with light dressing         Helpful tips to survive the holidays:  Dont save yourself for the meal: Make sure you continue to eat high protein small meals every 3-4 hours to ensure to do not become over-hungry. Avoid temptation by not showing up to a holiday party or gathering hungry.   Plan ahead. Bring a dish to a party if you know there may not be an appropriate option.   Choose sugar-free drinks: Stick to water or other sugar-free beverages and make sure you are getting 6-8 cups of fluid each day (but not with meals!). Avoid alcohol, carbonated beverages, and high-fat/high-sugar beverages like hot chocolate and eggnog. Try sugar-free hot cocoa made with skim milk or water, or sugar-free spiced tea to add some holiday flair to your beverage (see sugar-free mulled cider recipe below)  Take your time: Eat mindfully. Dont graze on food throughout the day. Sit down to enjoy your small meals. Chew slowly and thoroughly. Cut your food into small pieces before eating.  Listen to your body: Stop eating as soon as you feel full. Do not feel pressured to try certain (or all) foods or to eat all of the food on your plate. Listen to your hunger cues.   REMEMBER: Make your holidays about spending time with family and friends instead of focusing gatherings around food.  Keep up your exercise routine: Make sure you continue to get regular exercise throughout the holiday season. Encourage friends and family to be active by taking a walk together after a meal, to look at holiday lights, or to window-shop.    Good Holiday Meal Options:  Roasted Turkey, NO skin. Use low sodium broth instead of gravy.   Stuffed Bell Peppers made WITHOUT bread crumbs or Rice. Try using parmesan cheese instead  Gumbo, NO rice. Try picking out mostly the meat/seafood and vegetables with little broth.   Green Bean Casserole made with 98% fat free cream of mushroom soup and  crushed almonds/pecans instead of fried onions  Side salad w/ low fat dressing. Try a different kind of salad maybe use Kale or spinach.   Roasted non-starchy vegetables like brussel sprouts, broccoli, green beans, zucchini, butternut squash, cauliflower  Cauliflower Mash (steam or roast cauliflower, puree w/ low fat cheese, dash of fat free milk and 2-3 sprays of I cant believe its not butter spray. Add garlic powder and black pepper to season). Use Low sodium broth instead of gravy.   Try Loaded Cauliflower Mash (Make cauliflower like above cauliflower mash. Top with diced turkey lara, ¼ cup low fat cheddar cheese and bake @ 350* F for 5-10 minutes, until cheese is melted. Top with minced chives, black pepper and garlic to taste).   Homemade cranberry sauce using Splenda or another alternative sweetener. Boil fresh cranberries and add splenda to taste. Boil until cranberries break open and then simmer until it reaches the consistency you want (less time for more watery sauce and simmer for longer to create a thicker sauce).   Deviled eggs: make using low fat pineda, mustard, DILL relish (not sweet relish).   Vegetable tray w/ Greek yogurt Ranch Dip. Mix 1 packet of [place] ranch dip mix w/ 16 oz low fat plain greek yogurt.     Good Holiday Dessert Options:  High protein Pumpkin Cheesecake (see recipe below)  Pumpkin Whip (see recipe below)  Quest Apple Pie or Cinnamon Roll flavored protein bar (warm in microwave for 10-15 seconds)  Eggnog Protein shake (see recipe below)  Fresh fruit w/ low fat cheese  Sugar-free Jello Parfaits. Layer Red and Green sugar-free jello in cups and top w/ 2 tbsp Sugar-free cool-whip    Pumpkin Cheesecake    8 ounces fat free cream cheese, softened   2 scoops of vanilla protein powder (<4 g sugar per serving)   ¼ tsp Fine salt   2 eggs, at room temperature   1/3 cup fat free sour cream  1/3 cup fat free half and half  1 15 -ounce can pure pumpkin puree   1 tablespoon pumpkin pie  spice, plus more for dusting   Unsalted nuts, crushed  *Add splenda to taste    Directions     1. Preheat the oven to 300 degrees F. Line 18 muffin cups with paper liners. Sprinkle 1 tsp crushed unsalted nuts at the bottom of each of muffin cup liner.     2. In a large bowl, beat the cream cheese, vanilla protein powder and 1/4 teaspoon fine salt on medium-high speed until smooth and creamy, 2 to 3 minutes. Scrape down the sides, reduce speed to low and beat in the eggs, 1 at a time, until combined. Beat in 1/3 cup fat free sour cream and fat free half and half. Stir in the pumpkin puree and pumpkin pie spice until smooth. Divide evenly among cookie-lined paper cups, filling almost all the way to the top.     3. Bake until the filling is just set, 40 to 45 minutes. A sharp knife inserted into the center will come out moist, but clean. Cool completely in tins on a wire rack. Refrigerate until cold, 4 hours, or overnight. Top with a dusting of pumpkin pie spice.    Recipe altered from the following recipe: http://www.[employer]ish[employer].RFinity/recipes/food-network-deny/mini-pumpkin-cheesecakes-recipe.print.html?oc=linkback    Pumpkin Whip    Box of sugar-free vanilla pudding  Can of pumpkin puree  Pumpkin Pie spice (sprinkle to taste)  ½ cup of sugar-free Cool Whip    Directions:  Make sugar-free pudding according to package directions using fat free or 1% milk. Stir in pumpkin and cool whip. Add pumpkin pie spice to taste.     Egg Nog Protein shake    8 oz skim or 1% milk  1 scoop vanilla protein powder  1 tbsp sugar-free vanilla pudding mix  ½ tsp butter flavor extract  ½ tsp rum extract  ½ tsp cinnamon     Shake together or blend with ice and serve.     Sugar-Free Mulled Cider    3 oz diet cran-apple juice  6 oz water  1 packet sugar-free apple cider mix  ½ tsp apple pie spice  ½ tsp butter flavor extract  1 tbsp Sugar-free Syrup    Mix together. Warm if needed and serve w/ orange wedge and cinnamon stick.

## 2024-11-22 ENCOUNTER — OFFICE VISIT (OUTPATIENT)
Dept: SURGERY | Facility: CLINIC | Age: 74
End: 2024-11-22
Payer: MEDICARE

## 2024-11-22 ENCOUNTER — HOSPITAL ENCOUNTER (OUTPATIENT)
Dept: RADIOLOGY | Facility: HOSPITAL | Age: 74
Discharge: HOME | End: 2024-11-22
Attending: PHYSICIAN ASSISTANT
Payer: MEDICARE

## 2024-11-22 DIAGNOSIS — Z12.31 SCREENING MAMMOGRAM, ENCOUNTER FOR: ICD-10-CM

## 2024-11-22 DIAGNOSIS — Z85.3 PERSONAL HISTORY OF BREAST CANCER: ICD-10-CM

## 2024-11-22 DIAGNOSIS — Z90.11 S/P MASTECTOMY, RIGHT: ICD-10-CM

## 2024-11-22 DIAGNOSIS — Z85.3 PERSONAL HISTORY OF BREAST CANCER: Primary | ICD-10-CM

## 2024-11-22 DIAGNOSIS — Z12.39 SCREENING BREAST EXAMINATION: ICD-10-CM

## 2024-11-22 PROCEDURE — 99999 PR PBB SHADOW E&M-EST. PATIENT-LVL I: CPT | Mod: PBBFAC,HCNC,, | Performed by: PHYSICIAN ASSISTANT

## 2024-11-22 PROCEDURE — 77063 BREAST TOMOSYNTHESIS BI: CPT | Mod: TC,52,HCNC

## 2024-11-22 PROCEDURE — 77067 SCR MAMMO BI INCL CAD: CPT | Mod: 26,52,HCNC, | Performed by: RADIOLOGY

## 2024-11-22 PROCEDURE — 77063 BREAST TOMOSYNTHESIS BI: CPT | Mod: 26,52,HCNC, | Performed by: RADIOLOGY

## 2024-11-22 NOTE — Clinical Note
Would you mind just scheduling a visit for her one year out with a left screening mammogram? She is ok with whatever day or time is available.   Thank you!

## 2024-11-22 NOTE — PROGRESS NOTES
[facility]  Department of Surgery    REFERRING PROVIDER: No referring provider defined for this encounter. Bailee Moss MD  CHIEF COMPLAINT:   No chief complaint on file.        DIAGNOSIS:   This is a 74 y.o. female with a history of stage pTis PN0, grade 2, ER +, ID +DCIS of the right breast.    TREATMENT:   The patient has a history of DCIS of the right breast now s/p right lumpectomy in October of 2015. The histology of that specimen showed a .6cm Grade 2, ER+, ID+ DCIS with negative margins. The patient then denied chemo or radiation therapy, but did elect to undergo hormonal blocking therapy with anastrozole starting in 2015. Since that time she has been under close follow up with yearly mammograms.     Screening mammogram on 10/1/2021 showed a 2.3cm group of pleomorphic calcifications in the 12:00 region of the right breast. 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        She proceeded with right mastectomy with sentinel node biopsy on 11/29/2021 with Dr. Evans. Final pathology revealed 1.4 cm area of DCIS and sentinel lymph node was negative. There was no invasive or in Situ cancer on the left-sided resected specimen. On 4/11/2022 she underwent insertion of a permanent prosthesis and reduction mammoplasty in the contralateral breast.     HISTORY OF PRESENT ILLNESS:   Linnea Renae is a 74 y.o. female comes in for oncological follow up.  She denies change in her breast self-exam specifically denying new masses, skin or nipple changes, or nipple discharge. Does complain of left breast pain and tenderness. There have been no changes to family history. The patient denies constitutional symptoms of night sweats, chills, weight loss, new headaches, visual changes, new back or bony pain, chest pain, or shortness of breath.        Review of Systems: See HPI/Interval History for other systems reviewed.      IMAGIN/28/23 Left Screening Mammo:    Findings:  This procedure was performed using tomosynthesis.   Computer-aided detection was utilized in the interpretation of this examination.     The left breast has scattered areas of fibroglandular density. There is no evidence of suspicious masses, microcalcifications or architectural distortion.     Impression:   No mammographic evidence of malignancy.     BI-RADS Category 1: Negative     Recommendation:  Routine screening mammogram in 1 year is recommended.     MEDICATIONS/ALLERGIES:     Current Outpatient Medications   Medication Sig    alcohol antiseptic pads (ALCOHOL PREP PADS TOP)     ALPRAZolam (XANAX) 0.5 MG tablet Take 1 tablet (0.5 mg total) by mouth 2 (two) times daily as needed for Anxiety.    aspirin 81 MG Chew Take 81 mg by mouth once daily.    atenoloL (TENORMIN) 50 MG tablet TAKE 1 TABLET BY MOUTH TWICE DAILY    atorvastatin (LIPITOR) 20 MG tablet Take 1 tablet (20 mg total) by mouth every evening.    blood sugar diagnostic (TRUE METRIX GLUCOSE TEST STRIP) Strp 1 strip to check blood glucose once daily.    calcium-vitamin D3 500 mg(1,250mg) -200 unit per tablet Take 1 tablet by mouth 2 (two) times daily with meals.     clobetasoL (CLOBEX) 0.05 % shampoo Apply to scalp in place of regular shampoo weekly, leave on for 10 to 15 minutes before rinsing it out    DULoxetine (CYMBALTA) 30 MG capsule Take 1 capsule (30 mg total) by mouth As instructed (take 2 capsules in the morning, and 1 in the evening).    fluticasone propionate (FLONASE) 50 mcg/actuation nasal spray Instill 2 sprays (100 mcg total) by Each Nostril route once daily.    furosemide (LASIX) 20 MG tablet Take 1 tablet (20 mg total) by mouth daily as needed (Edema.).    hydroCHLOROthiazide (HYDRODIURIL) 12.5 MG Tab Take 1 tablet (12.5 mg total) by mouth once daily.    lancets 33 gauge Misc 1 lancet by Misc.(Non-Drug; Combo Route) route once daily.    euersjrzg-[id number]-algal oil (FOLTANX  RF) 3 mg-35 mg-2 mg -90.314 mg Cap Take 1 tablet by mouth 2 (two) times daily.    LIDOcaine (LIDODERM) 5 % Place 1-2 patches onto the skin once daily. Remove & Discard patch within 12 hours or as directed by MD    loratadine 10 mg Cap     meloxicam (MOBIC) 15 MG tablet Take 1 tablet (15 mg total) by mouth daily as needed for Pain.    minoxidiL (LONITEN) 2.5 MG tablet Take 1/4  to 1/2 tablet by mouth nightly as tolerated    multivitamin (THERAGRAN) per tablet Take 1 tablet by mouth once daily.    naloxone (NARCAN) 4 mg/actuation Spry 4mg by nasal route as needed for opioid overdose; may repeat every 2-3 minutes in alternating nostrils until medical help arrives. Call 911    nystatin (MYCOSTATIN) powder Apply topically 4 (four) times daily Under pannus as needed    olmesartan (BENICAR) 20 MG tablet Take 1 tablet (20 mg total) by mouth once daily.    omeprazole (PRILOSEC) 20 MG capsule Take 1 capsule (20 mg total) by mouth once daily.    semaglutide (OZEMPIC) 2 mg/dose (8 mg/3 mL) PnIj Inject 2 mg into the skin every 7 days.    tiZANidine (ZANAFLEX) 4 MG tablet Take 1 tablet (4 mg total) by mouth every 8 (eight) hours as needed (Muscle spasm.).    topiramate (TOPAMAX) 50 MG tablet Take 2 tablets (100 mg total) by mouth every evening.    traMADoL (ULTRAM) 50 mg tablet Take 1 tablet (50 mg total) by mouth every 6 (six) hours as needed for Pain.    triamcinolone acetonide 0.1% (KENALOG) 0.1 % ointment Apply to scalp in areas of scalp irritation at bedtime     No current facility-administered medications for this visit.     Facility-Administered Medications Ordered in Other Visits   Medication    mupirocin 2 % ointment    sodium chloride 0.9% flush 10 mL      Review of patient's allergies indicates:   Allergen Reactions    Codeine Itching       PHYSICAL EXAM:   There were no vitals taken for this visit.    Physical Exam   Vitals reviewed.  Constitutional: She is oriented to person, place, and time.   HENT:   Head:  Normocephalic and atraumatic.   Nose: Nose normal.   Eyes: Pupils are equal, round, and reactive to light. Right eye exhibits no discharge. Left eye exhibits no discharge.   Pulmonary/Chest: Effort normal and breath sounds normal. No stridor. No respiratory distress. She has no wheezes. She exhibits no mass, no tenderness and no edema. Right breast exhibits tenderness. Right breast exhibits no mass and no skin change. Left breast exhibits no inverted nipple, no mass, no nipple discharge, no skin change and no tenderness. No breast swelling or bleeding. Breasts are symmetrical.       Abdominal: Normal appearance.   Genitourinary: No breast swelling or bleeding.   Musculoskeletal: Lymphadenopathy:      Cervical: No cervical adenopathy.     Neurological: She is alert and oriented to person, place, and time.   Skin: Skin is warm and dry. No erythema. No pallor.     Psychiatric: Her behavior is normal. Mood, judgment and thought content normal.     Exam done in the upright and supine position.     ASSESSMENT:   This is a 74 y.o. female without evidence of recurrence by exam, history or imaging.       PLAN:   1. We discussed there was nothing concerning on exam today. Discussed gentle massage with vitamin E oil twice daily   2. Will place onc [facility] referral for ongoing mood - patient dealing with stressors at home.   3. Continue monthly self breast exams and call the clinic with any changes or problems.  4. Left Screening Mammogram due 1 year.   5. Return to clinic in 1 year  with imaging     The patient is in agreement with the plan. Questions were encouraged and answered to patient's satisfaction. Linnea will call our office with any questions or concerns.

## 2024-11-25 ENCOUNTER — TELEPHONE (OUTPATIENT)
Dept: PSYCHIATRY | Facility: CLINIC | Age: 74
End: 2024-11-25
Payer: MEDICARE

## 2024-12-09 ENCOUNTER — TELEPHONE (OUTPATIENT)
Dept: PSYCHIATRY | Facility: CLINIC | Age: 74
End: 2024-12-09
Payer: MEDICARE

## 2024-12-10 DIAGNOSIS — M79.672 FOOT PAIN, BILATERAL: ICD-10-CM

## 2024-12-10 DIAGNOSIS — M54.50 CHRONIC BILATERAL LOW BACK PAIN WITHOUT SCIATICA: ICD-10-CM

## 2024-12-10 DIAGNOSIS — I10 ESSENTIAL HYPERTENSION: ICD-10-CM

## 2024-12-10 DIAGNOSIS — M79.671 FOOT PAIN, BILATERAL: ICD-10-CM

## 2024-12-10 DIAGNOSIS — G89.29 CHRONIC BILATERAL LOW BACK PAIN WITHOUT SCIATICA: ICD-10-CM

## 2024-12-10 RX ORDER — ATENOLOL 50 MG/1
50 TABLET ORAL 2 TIMES DAILY
Qty: 180 TABLET | Refills: 1 | Status: SHIPPED | OUTPATIENT
Start: 2024-12-10

## 2024-12-10 RX ORDER — DULOXETIN HYDROCHLORIDE 30 MG/1
30 CAPSULE, DELAYED RELEASE ORAL SEE ADMIN INSTRUCTIONS
Qty: 90 CAPSULE | Refills: 1 | Status: SHIPPED | OUTPATIENT
Start: 2024-12-10

## 2024-12-10 RX ORDER — TRAMADOL HYDROCHLORIDE 50 MG/1
50 TABLET ORAL EVERY 6 HOURS PRN
Qty: 120 TABLET | Refills: 0 | OUTPATIENT
Start: 2024-12-10 | End: 2025-03-10

## 2024-12-10 NOTE — TELEPHONE ENCOUNTER
Care Due:                  Date            Visit Type   Department     Provider  --------------------------------------------------------------------------------                                EP -                              PRIMARY      [facility] PRIMARY   Bailee Zee  Last Visit: 07-      CARE ([facility])   CARE           Ajay  Next Visit: None Scheduled  None         None Found                                                            Last  Test          Frequency    Reason                     Performed    Due Date  --------------------------------------------------------------------------------    CBC.........  12 months..  meloxicam................  01- 12-    CMP.........  12 months..  atorvastatin, meloxicam..  01- 12-    Lipid Panel.  12 months..  atorvastatin.............  01- 12-    [employer] Embedded Care Due Messages. Reference number: [id number].   12/10/2024 6:42:05 AM CST

## 2024-12-10 NOTE — TELEPHONE ENCOUNTER
Provider Staff:  Action required for this patient    Requires labs - CBC, CMP, lipid panel     Please see care gap opportunities below in Care Due Message.    Thanks!  [facility]     Appointments      Date Provider   Last Visit   7/9/2024 Bailee Moss MD   Next Visit   12/10/2024 Bailee Moss MD     Refill Decision Note   Linnea Renae  is requesting a refill authorization.  Brief Assessment and Rationale for Refill:  Approve     Medication Therapy Plan:        Comments:     Note composed:1:32 PM 12/10/2024

## 2024-12-13 DIAGNOSIS — G89.29 CHRONIC BILATERAL LOW BACK PAIN WITHOUT SCIATICA: ICD-10-CM

## 2024-12-13 DIAGNOSIS — M54.50 CHRONIC BILATERAL LOW BACK PAIN WITHOUT SCIATICA: ICD-10-CM

## 2024-12-13 RX ORDER — TRAMADOL HYDROCHLORIDE 50 MG/1
50 TABLET ORAL EVERY 6 HOURS PRN
Qty: 120 TABLET | Refills: 0 | Status: SHIPPED | OUTPATIENT
Start: 2024-12-13 | End: 2025-03-13

## 2024-12-14 DIAGNOSIS — I10 ESSENTIAL HYPERTENSION: ICD-10-CM

## 2024-12-15 NOTE — TELEPHONE ENCOUNTER
No care due was identified.  [facility] Embedded Care Due Messages. Reference number: [id number].   12/14/2024 8:44:57 PM CST

## 2024-12-16 RX ORDER — HYDROCHLOROTHIAZIDE 12.5 MG/1
12.5 TABLET ORAL DAILY
Qty: 90 TABLET | Refills: 1 | Status: SHIPPED | OUTPATIENT
Start: 2024-12-16

## 2024-12-16 NOTE — TELEPHONE ENCOUNTER
Refill Decision Note   Linnea Renae  is requesting a refill authorization.  Brief Assessment and Rationale for Refill:  Approve     Medication Therapy Plan:         Comments:     Note composed:5:18 AM 12/16/2024

## 2024-12-31 ENCOUNTER — TELEPHONE (OUTPATIENT)
Dept: PSYCHIATRY | Facility: CLINIC | Age: 74
End: 2024-12-31
Payer: MEDICARE

## 2025-01-11 DIAGNOSIS — M54.50 CHRONIC BILATERAL LOW BACK PAIN WITHOUT SCIATICA: ICD-10-CM

## 2025-01-11 DIAGNOSIS — G89.29 CHRONIC BILATERAL LOW BACK PAIN WITHOUT SCIATICA: ICD-10-CM

## 2025-01-13 RX ORDER — TRAMADOL HYDROCHLORIDE 50 MG/1
50 TABLET ORAL EVERY 6 HOURS PRN
Qty: 120 TABLET | Refills: 0 | Status: SHIPPED | OUTPATIENT
Start: 2025-01-15 | End: 2025-04-15

## 2025-01-22 ENCOUNTER — TELEPHONE (OUTPATIENT)
Dept: PSYCHIATRY | Facility: CLINIC | Age: 75
End: 2025-01-22
Payer: MEDICARE

## 2025-01-22 NOTE — TELEPHONE ENCOUNTER
Message left for patient about transition to video visit. Requested patient message us in the chart or leave a phone message for receipt on Friday if she wishes to reschedule, instead.  SONYA Joseph, PhD

## 2025-01-23 ENCOUNTER — TELEPHONE (OUTPATIENT)
Dept: PSYCHIATRY | Facility: CLINIC | Age: 75
End: 2025-01-23
Payer: MEDICARE

## 2025-01-23 DIAGNOSIS — M54.50 CHRONIC BILATERAL LOW BACK PAIN WITHOUT SCIATICA: ICD-10-CM

## 2025-01-23 DIAGNOSIS — M79.671 FOOT PAIN, BILATERAL: ICD-10-CM

## 2025-01-23 DIAGNOSIS — M79.672 FOOT PAIN, BILATERAL: ICD-10-CM

## 2025-01-23 DIAGNOSIS — G89.29 CHRONIC BILATERAL LOW BACK PAIN WITHOUT SCIATICA: ICD-10-CM

## 2025-01-23 NOTE — TELEPHONE ENCOUNTER
Phoned patient, again, due to lack of check-in at appt time.   Information provided about rescheduling.   SONYA Joseph, PhD

## 2025-01-24 ENCOUNTER — TELEPHONE (OUTPATIENT)
Dept: PSYCHIATRY | Facility: CLINIC | Age: 75
End: 2025-01-24
Payer: MEDICARE

## 2025-01-24 RX ORDER — DULOXETIN HYDROCHLORIDE 30 MG/1
30 CAPSULE, DELAYED RELEASE ORAL SEE ADMIN INSTRUCTIONS
Qty: 90 CAPSULE | Refills: 1 | Status: SHIPPED | OUTPATIENT
Start: 2025-01-24

## 2025-02-10 DIAGNOSIS — G89.29 CHRONIC BILATERAL LOW BACK PAIN WITHOUT SCIATICA: ICD-10-CM

## 2025-02-10 DIAGNOSIS — M54.50 CHRONIC BILATERAL LOW BACK PAIN WITHOUT SCIATICA: ICD-10-CM

## 2025-02-11 ENCOUNTER — OFFICE VISIT (OUTPATIENT)
Dept: PSYCHIATRY | Facility: CLINIC | Age: 75
End: 2025-02-11
Payer: MEDICARE

## 2025-02-11 DIAGNOSIS — Z85.3 PERSONAL HISTORY OF BREAST CANCER: ICD-10-CM

## 2025-02-11 DIAGNOSIS — F51.04 INSOMNIA, PSYCHOPHYSIOLOGICAL: ICD-10-CM

## 2025-02-11 DIAGNOSIS — F41.1 GENERALIZED ANXIETY DISORDER: Primary | ICD-10-CM

## 2025-02-11 DIAGNOSIS — D05.11 DUCTAL CARCINOMA IN SITU (DCIS) OF RIGHT BREAST: ICD-10-CM

## 2025-02-11 PROCEDURE — 1160F RVW MEDS BY RX/DR IN RCRD: CPT | Mod: HCNC,CPTII,S$GLB, | Performed by: PSYCHOLOGIST

## 2025-02-11 PROCEDURE — 99999 PR PBB SHADOW E&M-EST. PATIENT-LVL II: CPT | Mod: PBBFAC,HCNC,, | Performed by: PSYCHOLOGIST

## 2025-02-11 PROCEDURE — 90791 PSYCH DIAGNOSTIC EVALUATION: CPT | Mod: HCNC,S$GLB,, | Performed by: PSYCHOLOGIST

## 2025-02-11 PROCEDURE — 1159F MED LIST DOCD IN RCRD: CPT | Mod: HCNC,CPTII,S$GLB, | Performed by: PSYCHOLOGIST

## 2025-02-11 RX ORDER — TRAMADOL HYDROCHLORIDE 50 MG/1
50 TABLET ORAL EVERY 6 HOURS PRN
Qty: 120 TABLET | Refills: 0 | Status: SHIPPED | OUTPATIENT
Start: 2025-02-12 | End: 2025-05-13
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
## 2025-02-11 NOTE — LETTER
February 11, 2025        Yessenia Biswas PA-C  [address]  [facility] [address]             [facility] - Psychiatry  [address]  [facility] [address]  Phone: [phone]  Fax: [phone]   Patient: Linnea Renae   MR Number: [id number]   YOB: 1950   Date of Visit: 2/11/2025       Dear MAICOL Biswas:    Thank you for referring Linnea Renae to me for evaluation. Below are the relevant portions of my assessment and plan of care.    Linnea Renae is a 74 y.o. female referred by Yessenia Biswas PA-C for psychological evaluation and treatment.  Ms. Renae appears to be experiencing minimal difficulty coping with her cancer treatment or survivorship. She reports lifelong difficulty coping with emotions and would benefit from distress tolerance/emotion management skills and possible future ACT-based intervention. Future PTSD intervention may be warranted.  She will be referred to the psychiatry clinic for treatment consideration. She would benefit from Sleep Medicine evaluation/treatment. Marital therapy may be of benefit.  She will propose this to her  and contact me for potential referrals if he agrees.     If you have questions, please do not hesitate to call me. I look forward to following Linnea along with you.    Sincerely,      Myles Joseph, PhD           CC  No Recipients

## 2025-02-17 ENCOUNTER — OFFICE VISIT (OUTPATIENT)
Dept: PHYSICAL MEDICINE AND REHAB | Facility: CLINIC | Age: 75
End: 2025-02-17
Payer: MEDICARE

## 2025-02-17 VITALS
WEIGHT: 282.06 LBS | SYSTOLIC BLOOD PRESSURE: 117 MMHG | HEIGHT: 66 IN | HEART RATE: 79 BPM | BODY MASS INDEX: 45.33 KG/M2 | DIASTOLIC BLOOD PRESSURE: 62 MMHG

## 2025-02-17 DIAGNOSIS — M48.061 NEURAL FORAMINAL STENOSIS OF LUMBAR SPINE: ICD-10-CM

## 2025-02-17 DIAGNOSIS — M48.061 SPINAL STENOSIS OF LUMBAR REGION WITHOUT NEUROGENIC CLAUDICATION: ICD-10-CM

## 2025-02-17 DIAGNOSIS — E11.42 DIABETIC PERIPHERAL NEUROPATHY: ICD-10-CM

## 2025-02-17 DIAGNOSIS — M54.50 CHRONIC BILATERAL LOW BACK PAIN WITHOUT SCIATICA: Primary | ICD-10-CM

## 2025-02-17 DIAGNOSIS — M79.672 FOOT PAIN, BILATERAL: ICD-10-CM

## 2025-02-17 DIAGNOSIS — M70.62 TROCHANTERIC BURSITIS OF BOTH HIPS: ICD-10-CM

## 2025-02-17 DIAGNOSIS — M79.671 FOOT PAIN, BILATERAL: ICD-10-CM

## 2025-02-17 DIAGNOSIS — M51.360 DEGENERATION OF INTERVERTEBRAL DISC OF LUMBAR REGION WITH DISCOGENIC BACK PAIN: ICD-10-CM

## 2025-02-17 DIAGNOSIS — G89.29 CHRONIC BILATERAL LOW BACK PAIN WITHOUT SCIATICA: Primary | ICD-10-CM

## 2025-02-17 DIAGNOSIS — M70.61 TROCHANTERIC BURSITIS OF BOTH HIPS: ICD-10-CM

## 2025-02-17 DIAGNOSIS — E66.01 MORBID OBESITY WITH BMI OF 40.0-44.9, ADULT: ICD-10-CM

## 2025-02-17 DIAGNOSIS — Z79.891 CHRONICALLY ON OPIATE THERAPY: ICD-10-CM

## 2025-02-17 DIAGNOSIS — M47.816 LUMBAR FACET ARTHROPATHY: ICD-10-CM

## 2025-02-17 RX ORDER — DULOXETIN HYDROCHLORIDE 60 MG/1
60 CAPSULE, DELAYED RELEASE ORAL 2 TIMES DAILY
Qty: 60 CAPSULE | Refills: 3 | Status: SHIPPED | OUTPATIENT
Start: 2025-02-17 | End: 2026-02-17

## 2025-02-17 NOTE — PROGRESS NOTES
"Subjective:       Patient ID: Linnea Renae is a 74 y.o. female.    Chief Complaint: Follow-up      HPI    Mrs. Perera is a 74-year-old female with of hypertension, diabetes mellitus type 2, diabetic neuropathy, hyperlipidemia, GERD, right breast cancer status post Left mastopexy and reconstruction (04/2022), chronic low back pain status post multiple spine injections.  She is followed up at the [facility] for chronic bilateral foot pain with history of diabetic neuropathy, and for chronic low back pain status post multiple spine procedures, listed below.  Her last visit to the clinic was on 8/27/2024.  She was maintained on meloxicam, duloxetine, p.r.n., p.r.n. tramadol (the dose was increased).      The patient is coming to the clinic for follow-up.  Her low back pain has been worse.  It is an almost constant throbbing and stabbing sensation in the lower lumbar spine and across her back. It shoots bilaterally to the buttock and hip regions.  She denies however any recent shooting pain to her legs.  Her pain is aggravated by standing, walking, bending and lifting.  It is better with sitting down or lying down.  Her max pain is 10/10 and minimum 0/10.  Today at rest her pain is 0/10.  She has mild lower extremity weakness.  She denies any bowel or bladder incontinence.  She denies any leg claudications.      Her bilateral foot pain has been worse. It is a constant numbness and tingling sensations in both feet.     She is currently taking:  Meloxicam 15 mg by mouth about once per week (it was decreased by her Primary Care Provider due to borderline GFR)  Duloxetine 30 mg capsules, 2 capsules in the morning and 1 at bedtime.  Tramadol 50 mg p.r.n., usually 1 tablet 4 times per day.  She reports that in the past gabapentin did not help.  Pregabalin caused weight gain.      Review of the [facility] note on 04/26/2022 shows the following procedures:   "Pain Procedures:   7/29/16 Right L3-4 TF " "CHRISTIAN  11/3/17 Bilateral L3-4 and L4-5 facet injections  5/9/18 Bilateral L3-4 and L4-5 facet injections  7/25/18 Bilateral L3-4 and L4-5 facet injections  12/5/18 Right L2,3,4,5 RFA- 80% relief  3/20/19 Left L2,3,4,5 RFA- 100% relief  10/2/19 left L4/5 TFESI  12/18/19 LEFT L2,3,4,5 RFA- 90% relief  1/8/20 RIGHT L2,3,4,5 RFA- 90% relief  7/8/20 B/L SIJ, GTB - 85% relief  11/04/20 Right L2, L3, L4, L5 RFA - 80% relief  11/23/20 TPIs- helpful"    Past Medical History:   Diagnosis Date    Allergy     Breast cancer     Lumpectomy 10/15. right    Chronic constipation     Colon polyps     Diabetes mellitus, type 2     Dry eyes     GERD (gastroesophageal reflux disease)     Hyperlipidemia     Hypertension     Lumbar disc disease     Type 2 diabetes mellitus         Review of patient's allergies indicates:   Allergen Reactions    Codeine Itching        Review of Systems   Constitutional:  Negative for chills and fever.   Eyes:  Negative for visual disturbance.   Respiratory:  Negative for shortness of breath.    Cardiovascular:  Negative for chest pain.   Gastrointestinal:  Positive for constipation. Negative for blood in stool, nausea and vomiting.   Genitourinary:  Negative for difficulty urinating.   Musculoskeletal:  Positive for back pain. Negative for arthralgias, gait problem and neck pain.   Neurological:  Positive for numbness and headaches. Negative for dizziness and weakness.   Psychiatric/Behavioral:  Negative for behavioral problems and sleep disturbance.              Objective:      Physical Exam  Vitals reviewed.   Constitutional:       Appearance: She is well-developed.   HENT:      Head: Normocephalic and atraumatic.   Eyes:      Extraocular Movements: Extraocular movements intact.   Musculoskeletal:      Cervical back: Normal range of motion. No tenderness.      Comments: BUE:  ROM:   RUE: full.   LUE: full.  Strength:    RUE: 5/5 at shoulder abduction, 5 elbow flexion, 5 elbow extension, 5 hand .   LUE: " 5/5 at shoulder abduction, 5 elbow flexion, 5 elbow extension, 5 hand .  Sensation to pinprick:   RUE: intact.   LUE: intact.  DTR:    RUE: +1 biceps, +1 triceps.   LUE:  +1 biceps, +1 triceps.      BLE:  ROM:   RLE: full.   LLE: full.  Knee crepitus:   RLE: -ve.   LLE: -ve.   Strength:    RLE: 5/5 at hip flexion, 5 knee extension, 5 ankle DF, 5 PF, 5 EHL.   LLE: 5/5 at hip flexion, 5 knee extension, 5 ankle DF, 5 PF, 5 EHL.  Sensation to pinprick:     RLE: hypersensitive.       LLE: hypersensitive.   DTR:     RLE: +1 knee, +1 ankle.    LLE: +1 knee, +1 ankle.  Clonus:    Rt ankle: -ve.    Lt ankle: -ve.  SLR (sitting):      RLE: -ve.      LLE: -ve.    +ve mod tenderness over lumbar spine.    Gait: slow garrett, waddling.   Skin:     General: Skin is warm.   Neurological:      General: No focal deficit present.      Mental Status: She is alert.   Psychiatric:         Behavior: Behavior normal.         Assessment:       1. Chronic bilateral low back pain without sciatica    2. Degeneration of intervertebral disc of lumbar region with discogenic back pain    3. Lumbar facet arthropathy    4. Spinal stenosis of lumbar region without neurogenic claudication (L4-L5, moderate)    5. Neural foraminal stenosis of lumbar spine    6. Trochanteric bursitis of both hips    7. Foot pain, bilateral    8. Diabetic peripheral neuropathy    9. Morbid obesity with BMI of 40.0-44.9, adult    10. Chronically on opiate therapy        Plan:       - Continue  meloxicam (MOBIC) 15 MG tablet; Take 1 tablet (15 mg total) by mouth once daily p.r.n.  - Increase DULoxetine (CYMBALTA) 60 MG capsule; Take 1 capsule (60 mg total) by mouth 2 (two) times daily.  - Continue traMADoL (ULTRAM) 50 mg tablet; Take 1 tablet (50 mg total) by mouth every 6 (six) hours as needed for Pain.  - Continue LIDOcaine (LIDODERM) 5 %; Place 1-2 patches onto the skin once daily. Remove & Discard patch within 12 hours or as directed by MD  -  Ambulatory  Referral/Consult to Physical/ Therapy; Future  - Weight loss was encouraged.  - He was told if there is no relief, we may consider changing her to stronger pain medications.  - Follow up in about 4 months (around 6/17/2025).      This was a 30 minute visit (including review of records), 50% of which was spent educating the patient about the diagnosis and the treatment plan.      This note was partly generated with [employer] voice recognition software. I apologize for any possible typographical errors.

## 2025-02-19 DIAGNOSIS — E11.9 TYPE 2 DIABETES MELLITUS WITHOUT COMPLICATION: ICD-10-CM

## 2025-02-26 DIAGNOSIS — K21.9 GERD WITHOUT ESOPHAGITIS: ICD-10-CM

## 2025-02-26 DIAGNOSIS — I10 ESSENTIAL HYPERTENSION: ICD-10-CM

## 2025-02-26 DIAGNOSIS — E78.5 HYPERLIPIDEMIA, UNSPECIFIED HYPERLIPIDEMIA TYPE: ICD-10-CM

## 2025-02-26 DIAGNOSIS — I70.0 ATHEROSCLEROSIS OF AORTA: ICD-10-CM

## 2025-02-26 DIAGNOSIS — M48.061 DEGENERATIVE LUMBAR SPINAL STENOSIS: ICD-10-CM

## 2025-02-26 RX ORDER — OLMESARTAN MEDOXOMIL 20 MG/1
20 TABLET ORAL DAILY
Qty: 90 TABLET | Refills: 1 | Status: SHIPPED | OUTPATIENT
Start: 2025-02-26

## 2025-02-26 RX ORDER — ATORVASTATIN CALCIUM 20 MG/1
20 TABLET, FILM COATED ORAL NIGHTLY
Qty: 90 TABLET | Refills: 0 | Status: SHIPPED | OUTPATIENT
Start: 2025-02-26

## 2025-02-26 RX ORDER — OMEPRAZOLE 20 MG/1
20 CAPSULE, DELAYED RELEASE ORAL DAILY
Qty: 90 CAPSULE | Refills: 1 | Status: SHIPPED | OUTPATIENT
Start: 2025-02-26

## 2025-02-27 NOTE — TELEPHONE ENCOUNTER
No care due was identified.  [facility] Embedded Care Due Messages. Reference number: [id number].   2/26/2025 6:05:54 PM CST   4th digit

## 2025-02-27 NOTE — TELEPHONE ENCOUNTER
Overdue for follow up, nothing scheduled. Please contact patient for appointment (annual), then send refill request back to me. Has 5 lab orders on chart to be done (fasting) prior to office visit.

## 2025-02-27 NOTE — TELEPHONE ENCOUNTER
Care Due:                  Date            Visit Type   Department     Provider  --------------------------------------------------------------------------------                                EP -                              PRIMARY      [facility] PRIMARY   Bailee Zee  Last Visit: 07-      CARE ([facility])   CARE           Ajay  Next Visit: None Scheduled  None         None Found                                                            Last  Test          Frequency    Reason                     Performed    Due Date  --------------------------------------------------------------------------------    CBC.........  12 months..  meloxicam................  01- 12-    CMP.........  12 months..  atorvastatin, meloxicam..  01- 12-    Lipid Panel.  12 months..  atorvastatin.............  01- 12-    [employer] Embedded Care Due Messages. Reference number: [id number].   2/26/2025 6:03:52 PM CST

## 2025-02-27 NOTE — TELEPHONE ENCOUNTER
Refill Routing Note   Medication(s) are not appropriate for processing by [facility] for the following reason(s):        Required labs outdated    [facility] action(s):  Defer  Approve   Requires labs : Yes             Appointments  past 12m or future 3m with PCP    Date Provider   Last Visit   7/9/2024 Bailee Moss MD   Next Visit   2/26/2025 Bailee Moss MD   ED visits in past 90 days: 0        Note composed:7:14 PM 02/26/2025

## 2025-02-28 RX ORDER — TIZANIDINE 4 MG/1
4 TABLET ORAL EVERY 8 HOURS PRN
Qty: 90 TABLET | Refills: 1 | Status: SHIPPED | OUTPATIENT
Start: 2025-02-28

## 2025-02-28 NOTE — TELEPHONE ENCOUNTER
Spoke With Pt schedule her Fasting Labs 3/3/25 At 8:00  RTC 3/3/25 At 2:30 Annual        Please Advised

## 2025-03-12 ENCOUNTER — TELEPHONE (OUTPATIENT)
Dept: PRIMARY CARE CLINIC | Facility: CLINIC | Age: 75
End: 2025-03-12
Payer: MEDICARE

## 2025-03-12 DIAGNOSIS — G89.29 CHRONIC BILATERAL LOW BACK PAIN WITHOUT SCIATICA: ICD-10-CM

## 2025-03-12 DIAGNOSIS — M54.50 CHRONIC BILATERAL LOW BACK PAIN WITHOUT SCIATICA: ICD-10-CM

## 2025-03-12 RX ORDER — TRAMADOL HYDROCHLORIDE 50 MG/1
50 TABLET ORAL EVERY 6 HOURS PRN
Qty: 120 TABLET | Refills: 2 | Status: SHIPPED | OUTPATIENT
Start: 2025-03-12 | End: 2025-06-10

## 2025-03-12 RX ORDER — TRAMADOL HYDROCHLORIDE 50 MG/1
50 TABLET ORAL EVERY 6 HOURS PRN
Qty: 120 TABLET | Refills: 0 | Status: CANCELLED | OUTPATIENT
Start: 2025-03-12 | End: 2025-06-10

## 2025-03-12 NOTE — TELEPHONE ENCOUNTER
----- Message from Mary sent at 3/11/2025  3:57 PM CDT -----  Contact: Surinder/Nereida call pt @[phone]  Type: Orders RequestWhat orders/ testing are being requested?A1CIs there a future appointment scheduled for the patient with PCP?No When?Would you prefer a response via [employer]?call back Comments:Nereida also stated that she would like for the doctor to know patient has a couple of falls in January. Please advise

## 2025-03-13 DIAGNOSIS — E78.5 HYPERLIPIDEMIA, UNSPECIFIED HYPERLIPIDEMIA TYPE: ICD-10-CM

## 2025-03-13 DIAGNOSIS — I70.0 ATHEROSCLEROSIS OF AORTA: ICD-10-CM

## 2025-03-13 NOTE — TELEPHONE ENCOUNTER
Refill Routing Note   Medication(s) are not appropriate for processing by [facility] for the following reason(s):        Required labs outdated    [facility] action(s):  Defer               Appointments  past 12m or future 3m with PCP    Date Provider   Last Visit   7/9/2024 Bailee Moss MD   Next Visit   4/28/2025 Bailee Moss MD   ED visits in past 90 days: 0        Note composed:4:47 PM 03/13/2025

## 2025-03-13 NOTE — TELEPHONE ENCOUNTER
No care due was identified.  [facility] Embedded Care Due Messages. Reference number: [id number].   3/13/2025 4:40:12 PM CDT

## 2025-03-14 RX ORDER — ATORVASTATIN CALCIUM 20 MG/1
20 TABLET, FILM COATED ORAL NIGHTLY
Qty: 90 TABLET | Refills: 0 | OUTPATIENT
Start: 2025-03-14

## 2025-03-21 ENCOUNTER — PATIENT MESSAGE (OUTPATIENT)
Dept: PSYCHIATRY | Facility: CLINIC | Age: 75
End: 2025-03-21
Payer: MEDICARE

## 2025-03-31 ENCOUNTER — OFFICE VISIT (OUTPATIENT)
Dept: BARIATRICS | Facility: CLINIC | Age: 75
End: 2025-03-31
Payer: MEDICARE

## 2025-03-31 ENCOUNTER — LAB VISIT (OUTPATIENT)
Dept: LAB | Facility: HOSPITAL | Age: 75
End: 2025-03-31
Attending: INTERNAL MEDICINE
Payer: MEDICARE

## 2025-03-31 VITALS
DIASTOLIC BLOOD PRESSURE: 63 MMHG | WEIGHT: 281.31 LBS | SYSTOLIC BLOOD PRESSURE: 135 MMHG | BODY MASS INDEX: 45.21 KG/M2 | HEIGHT: 66 IN | OXYGEN SATURATION: 96 % | HEART RATE: 73 BPM

## 2025-03-31 DIAGNOSIS — E66.01 CLASS 2 SEVERE OBESITY WITH BODY MASS INDEX (BMI) OF 35 TO 39.9 WITH SERIOUS COMORBIDITY: ICD-10-CM

## 2025-03-31 DIAGNOSIS — E11.9 TYPE 2 DIABETES MELLITUS WITHOUT COMPLICATION: ICD-10-CM

## 2025-03-31 DIAGNOSIS — E66.812 CLASS 2 SEVERE OBESITY WITH BODY MASS INDEX (BMI) OF 35 TO 39.9 WITH SERIOUS COMORBIDITY: ICD-10-CM

## 2025-03-31 DIAGNOSIS — I10 ESSENTIAL HYPERTENSION: ICD-10-CM

## 2025-03-31 DIAGNOSIS — E78.5 HYPERLIPIDEMIA, UNSPECIFIED HYPERLIPIDEMIA TYPE: ICD-10-CM

## 2025-03-31 DIAGNOSIS — E11.9 TYPE 2 DIABETES MELLITUS WITHOUT COMPLICATION, WITHOUT LONG-TERM CURRENT USE OF INSULIN: ICD-10-CM

## 2025-03-31 LAB
ALBUMIN SERPL BCP-MCNC: 3.6 G/DL (ref 3.5–5.2)
ALP SERPL-CCNC: 123 UNIT/L (ref 40–150)
ALT SERPL W/O P-5'-P-CCNC: 18 UNIT/L (ref 10–44)
ANION GAP (OHS): 11 MMOL/L (ref 8–16)
AST SERPL-CCNC: 22 UNIT/L (ref 11–45)
BILIRUB SERPL-MCNC: 0.4 MG/DL (ref 0.1–1)
BUN SERPL-MCNC: 14 MG/DL (ref 8–23)
CALCIUM SERPL-MCNC: 9.6 MG/DL (ref 8.7–10.5)
CHLORIDE SERPL-SCNC: 107 MMOL/L (ref 95–110)
CHOLEST SERPL-MCNC: 152 MG/DL (ref 120–199)
CHOLEST/HDLC SERPL: 3 {RATIO} (ref 2–5)
CO2 SERPL-SCNC: 24 MMOL/L (ref 23–29)
CREAT SERPL-MCNC: 1 MG/DL (ref 0.5–1.4)
EAG (OHS): 114 MG/DL (ref 68–131)
ERYTHROCYTE [DISTWIDTH] IN BLOOD BY AUTOMATED COUNT: 13.9 % (ref 11.5–14.5)
GFR SERPLBLD CREATININE-BSD FMLA CKD-EPI: 59 ML/MIN/1.73/M2
GLUCOSE SERPL-MCNC: 123 MG/DL (ref 70–110)
HBA1C MFR BLD: 5.6 % (ref 4–5.6)
HCT VFR BLD AUTO: 42.3 % (ref 37–48.5)
HDLC SERPL-MCNC: 50 MG/DL (ref 40–75)
HDLC SERPL: 32.9 % (ref 20–50)
HGB BLD-MCNC: 13.2 GM/DL (ref 12–16)
LDLC SERPL CALC-MCNC: 79.2 MG/DL (ref 63–159)
MCH RBC QN AUTO: 28.3 PG (ref 27–31)
MCHC RBC AUTO-ENTMCNC: 31.2 G/DL (ref 32–36)
MCV RBC AUTO: 91 FL (ref 82–98)
NONHDLC SERPL-MCNC: 102 MG/DL
PLATELET # BLD AUTO: 370 K/UL (ref 150–450)
PMV BLD AUTO: 9.1 FL (ref 9.2–12.9)
POTASSIUM SERPL-SCNC: 4 MMOL/L (ref 3.5–5.1)
PROT SERPL-MCNC: 8.1 GM/DL (ref 6–8.4)
RBC # BLD AUTO: 4.66 M/UL (ref 4–5.4)
SODIUM SERPL-SCNC: 142 MMOL/L (ref 136–145)
TRIGL SERPL-MCNC: 114 MG/DL (ref 30–150)
WBC # BLD AUTO: 13.62 K/UL (ref 3.9–12.7)

## 2025-03-31 PROCEDURE — 85027 COMPLETE CBC AUTOMATED: CPT | Mod: HCNC

## 2025-03-31 PROCEDURE — 3288F FALL RISK ASSESSMENT DOCD: CPT | Mod: HCNC,CPTII,S$GLB, | Performed by: INTERNAL MEDICINE

## 2025-03-31 PROCEDURE — 83036 HEMOGLOBIN GLYCOSYLATED A1C: CPT | Mod: HCNC

## 2025-03-31 PROCEDURE — 99999 PR PBB SHADOW E&M-EST. PATIENT-LVL V: CPT | Mod: PBBFAC,HCNC,, | Performed by: INTERNAL MEDICINE

## 2025-03-31 PROCEDURE — 3078F DIAST BP <80 MM HG: CPT | Mod: HCNC,CPTII,S$GLB, | Performed by: INTERNAL MEDICINE

## 2025-03-31 PROCEDURE — 1160F RVW MEDS BY RX/DR IN RCRD: CPT | Mod: HCNC,CPTII,S$GLB, | Performed by: INTERNAL MEDICINE

## 2025-03-31 PROCEDURE — 80053 COMPREHEN METABOLIC PANEL: CPT | Mod: HCNC

## 2025-03-31 PROCEDURE — 1125F AMNT PAIN NOTED PAIN PRSNT: CPT | Mod: HCNC,CPTII,S$GLB, | Performed by: INTERNAL MEDICINE

## 2025-03-31 PROCEDURE — 1100F PTFALLS ASSESS-DOCD GE2>/YR: CPT | Mod: HCNC,CPTII,S$GLB, | Performed by: INTERNAL MEDICINE

## 2025-03-31 PROCEDURE — 3075F SYST BP GE 130 - 139MM HG: CPT | Mod: HCNC,CPTII,S$GLB, | Performed by: INTERNAL MEDICINE

## 2025-03-31 PROCEDURE — 36415 COLL VENOUS BLD VENIPUNCTURE: CPT | Mod: HCNC

## 2025-03-31 PROCEDURE — 82465 ASSAY BLD/SERUM CHOLESTEROL: CPT | Mod: HCNC

## 2025-03-31 PROCEDURE — 99212 OFFICE O/P EST SF 10 MIN: CPT | Mod: HCNC,S$GLB,, | Performed by: INTERNAL MEDICINE

## 2025-03-31 PROCEDURE — 3044F HG A1C LEVEL LT 7.0%: CPT | Mod: HCNC,CPTII,S$GLB, | Performed by: INTERNAL MEDICINE

## 2025-03-31 PROCEDURE — 1159F MED LIST DOCD IN RCRD: CPT | Mod: HCNC,CPTII,S$GLB, | Performed by: INTERNAL MEDICINE

## 2025-03-31 RX ORDER — SEMAGLUTIDE 2.68 MG/ML
2 INJECTION, SOLUTION SUBCUTANEOUS
Qty: 3 ML | Refills: 5 | Status: SHIPPED | OUTPATIENT
Start: 2025-03-31

## 2025-03-31 RX ORDER — TOPIRAMATE 50 MG/1
100 TABLET, FILM COATED ORAL NIGHTLY
Qty: 180 TABLET | Refills: 1 | Status: SHIPPED | OUTPATIENT
Start: 2025-03-31 | End: 2026-03-31

## 2025-03-31 NOTE — PROGRESS NOTES
"Subjective:       Patient ID: Linnea Renae is a 74 y.o. female.    Chief Complaint: Follow-up      Pt here today for follow-up.Has lost 0 lbs, net neg 6 lbs. Started [id number] piyush diet and decreased topiramate to 100 mg qhs. Also started Ozempic for DM2, currently on 2mg weekly. Feels like she is less hungry.     States has been eating cereal for breakfast. Getting meals from [place]. Getting up to 60 meals until re-approved.   She has been referred to psych.   Still feeling tired. SHe is on tramadol. She is not taking vitamins.           Prev med hx    checked today        Ophtho exam 5/10/22- Open angle with borderline findings, low risk, bilateral. No retinopathy.     Prev meds hx:    checked today. Has been on tramadol chronically.    On metformin 2000 mg BID.   Switched to mounjaro. She felt very tired and had some itching and GI SE with it. Did attempt to  pt to adjust her protein and fat intake, but she was preferred switching back to Ozempic right away.   Lab Results   Component Value Date    HGBA1C 5.8 (H) 07/09/2024    HGBA1C 5.5 01/04/2024    HGBA1C 5.5 04/17/2023     Lab Results   Component Value Date    LDLCALC 85.0 01/04/2024    CREATININE 1.0 07/09/2024          Review of Systems   Constitutional: Negative for chills and fever.   Respiratory: Negative for shortness of breath.         Denies snoring   Cardiovascular: Positive for leg swelling. Negative for chest pain.   Gastrointestinal: Positive for constipation. Negative for diarrhea.        + GERD   Genitourinary: Negative for difficulty urinating and dysuria.   Musculoskeletal: Positive for arthralgias and back pain.   Skin: Positive for rash.   Neurological: Positive for dizziness. Negative for light-headedness.   Psychiatric/Behavioral: Positive for dysphoric mood. The patient is nervous/anxious.        Objective:     /63   Pulse 73   Ht 5' 6" (1.676 m)   Wt 127.6 kg (281 lb 4.9 oz)   SpO2 96%   BMI 45.40 kg/m² "     Physical Exam   Constitutional: She is oriented to person, place, and time. She appears well-developed. No distress.   obese   HENT:   Head: Normocephalic and atraumatic.   Eyes: Pupils are equal, round, and reactive to light. EOM are normal. No scleral icterus.   Neck: Normal range of motion. Neck supple.   Cardiovascular: Normal rate.   Pulmonary/Chest: Effort normal.   Musculoskeletal: Normal range of motion. She exhibits + edema.   Neurological: She is alert and oriented to person, place, and time. No cranial nerve deficit.   Skin: Skin is warm and dry. No erythema.   Psychiatric: She has a normal mood and affect. Her behavior is normal. Judgment normal.   Vitals reviewed.      Assessment:       1. Type 2 diabetes mellitus without complication, without long-term current use of insulin    2. Class 2 severe obesity with body mass index (BMI) of 35 to 39.9 with serious comorbidity                          Plan:         Linnea was seen today for follow-up.    Diagnoses and all orders for this visit:    Type 2 diabetes mellitus without complication, without long-term current use of insulin  -     semaglutide (OZEMPIC) 2 mg/dose (8 mg/3 mL) PnIj; Inject 2 mg into the skin every 7 days.    Class 2 severe obesity with body mass index (BMI) of 35 to 39.9 with serious comorbidity  -     topiramate (TOPAMAX) 50 MG tablet; Take 2 tablets (100 mg total) by mouth every evening.                    Ozempic 2 mg once a week.    Decrease portions as soon as you start Ozempic. Avoid fried, greasy, fatty foods.     Some nausea in the first 2 weeks is not unusual.     If you get pain across the upper abdomen and around to your back, please call the office.       [email or web address] for coupon/videos.           Patient was informed that topiramate is used for migraine prevention and seizures. Weight loss is a common side effect that is well documented. S/he understands this. S/he was informed of the potential side effects such  as serious and possibly fatal rash in which case the medication should be discontinued immediately. Paresthesias, forgetfulness, fatigue, kidney stones, GI symptoms, and changes in lab values such as electrolytes, blood counts and kidney function.    topiramate 50 mg 2 tabs in the evening.     No soda, sweet tea, juices or lemonade. All drinks should be 5 calories or less.         Limit starchy carbohydrates (bread, rice, pasta, potatoes, corn, peas, oatmeal, grits, tortillas, crackers, chips)        You need about 1000 calories and 70 g protein a day to lose weight    Weight training handouts given.

## 2025-03-31 NOTE — PATIENT INSTRUCTIONS
Ozempic 2 mg once a week.    Decrease portions as soon as you start Ozempic. Avoid fried, greasy, fatty foods.     Some nausea in the first 2 weeks is not unusual.     If you get pain across the upper abdomen and around to your back, please call the office.       Www[email or web address] for coupon/videos.           Patient was informed that topiramate is used for migraine prevention and seizures. Weight loss is a common side effect that is well documented. S/he understands this. S/he was informed of the potential side effects such as serious and possibly fatal rash in which case the medication should be discontinued immediately. Paresthesias, forgetfulness, fatigue, kidney stones, GI symptoms, and changes in lab values such as electrolytes, blood counts and kidney function.    topiramate 50 mg 2 tabs in the evening.     No soda, sweet tea, juices or lemonade. All drinks should be 5 calories or less.         Limit starchy carbohydrates (bread, rice, pasta, potatoes, corn, peas, oatmeal, grits, tortillas, crackers, chips)        You need about 1000 calories and 70 g protein a day to lose weight      Sample Weight Training Plan ( adapted from [facility]):    1.Goblet Squat  How to:    Stand with feet hip-width apart and hold a weight vertically in front of chest, elbows pointing toward the floor.  Push hips back and bend knees to lower into a squat.  Drive through heels to stand back up to starting position. That's 1 rep. Complete three to five reps with a heavy weight.      2.Bent-Over Row  How to:    With a dumbbell in each hand and feet under hips, hinge at hips with knees slightly bent and arms just in front of legs.  Drive one elbow back toward hips, feeling shoulder blades squeeze together, pulling weight toward side body.  Slowly lower weight back down, then repeat with other arm. That's 1 rep. Complete three to five reps with a medium-heavy weight.        3.Lateral Lunge  How to:    Holding a  weight at chest or dumbbells at each side, stand up straight with feet hip-width apart.  Take a large step to the right, sit hips back, and lower down until right knee is nearly parallel with the floor. Your left leg should be straight.  Return to start. That's 1 rep. Complete 10 reps on each side.      4.Chest Press  How to:    Lie flat on back, or on a bench, with feet flat on the ground. With a dumbbell in each hand, extend arms directly over shoulders, palms facing toward feet.  Squeeze shoulder blades together and slowly bend elbows, lowering the weights out to the side, parallel with shoulders, until elbows form 90-degree angles.  Slowly drive the dumbbells back up to start, squeezing shoulder blades the entire time. Thats 1 rep. Complete three to five reps with a medium-heavy weight.      5.Split Stance Shoulder Press    How to:    Grab a pair of dumbbells or a resistance band. Stagger stance into a wide step, one foot forward and one back with hips squared, and hold the weights or band just above shoulders, elbows close to sides.  Leaning forward ever so slightly, bend both knees, and press through front heel while simultaneously lifting the weights or band to the tesha, keeping elbows forward and arms in line with your ears.  Lower weights or band back to shoulders. That's 1 rep. Do 10 reps, alternating side for each set.        6.Alternating Reverse Lunge To Bicep Curl  How to:    Grab a pair of dumbbells and hold them at arm's length next to sides, palms facing each other. Stand tall with feet hip-width apart.  Step backward with right leg and lower body until front knee is bent 90 degrees. At the same time as you lunge, curl both dumbbells up to shoulders.  Lower the dumbbells as you return to the starting position. Step back with the other leg and repeat.That's 1 rep. Complete 12 reps with a medium weight.

## 2025-04-08 ENCOUNTER — LAB VISIT (OUTPATIENT)
Dept: LAB | Facility: HOSPITAL | Age: 75
End: 2025-04-08
Attending: INTERNAL MEDICINE
Payer: MEDICARE

## 2025-04-08 DIAGNOSIS — E11.9 TYPE 2 DIABETES MELLITUS WITHOUT COMPLICATION: ICD-10-CM

## 2025-04-08 LAB
ALBUMIN/CREAT UR: 11.5 UG/MG
CREAT UR-MCNC: 338 MG/DL (ref 15–325)
MICROALBUMIN UR-MCNC: 39 UG/ML (ref ?–5000)

## 2025-04-08 PROCEDURE — 82043 UR ALBUMIN QUANTITATIVE: CPT | Mod: HCNC

## 2025-04-11 ENCOUNTER — TELEPHONE (OUTPATIENT)
Dept: PHYSICAL MEDICINE AND REHAB | Facility: CLINIC | Age: 75
End: 2025-04-11
Payer: MEDICARE

## 2025-04-11 DIAGNOSIS — M79.671 FOOT PAIN, BILATERAL: ICD-10-CM

## 2025-04-11 DIAGNOSIS — G89.29 CHRONIC BILATERAL LOW BACK PAIN WITHOUT SCIATICA: Primary | ICD-10-CM

## 2025-04-11 DIAGNOSIS — M79.672 FOOT PAIN, BILATERAL: ICD-10-CM

## 2025-04-11 DIAGNOSIS — M54.50 CHRONIC BILATERAL LOW BACK PAIN WITHOUT SCIATICA: Primary | ICD-10-CM

## 2025-04-11 DIAGNOSIS — M47.816 LUMBAR FACET ARTHROPATHY: ICD-10-CM

## 2025-04-11 RX ORDER — HYDROCODONE BITARTRATE AND ACETAMINOPHEN 5; 325 MG/1; MG/1
1 TABLET ORAL 3 TIMES DAILY PRN
Qty: 90 TABLET | Refills: 0 | Status: SHIPPED | OUTPATIENT
Start: 2025-04-11

## 2025-04-11 NOTE — TELEPHONE ENCOUNTER
I called the patient.    Her back has been worse.  She previously failed multiple spine injections and is not interested in more injections at this point.  She previously failed gabapentin and pregabalin.  She is currently on duloxetine and p.r.n. tramadol.  She feels tramadol is not helping.  I told her I will switch her to hydrocodone/APAP (5/325 p.o. t.i.d. PRN)   Medical records in inbox- child to be seen tomorrow

## 2025-04-11 NOTE — TELEPHONE ENCOUNTER
----- Message from Nurse Berta sent at 4/11/2025  1:54 PM CDT -----  Regarding: FW: Advise  Contact: [phone]  Please advise.  ----- Message -----  From: Taylor Fajardo  Sent: 4/11/2025   1:38 PM CDT  To: Jg HASSAN Staff  Subject: Advise                                           Type:  AdviceWho Called: PatientSymptoms (please be specific): Unbearable painWould the patient rather a call back or a response via [facility]? Call BackBest Call Back Number: [phone] Information: Pt is asking for a return call from nurse or provider in regards to getting an additional pain medication. She states the pain medication she is currently taking is not working and that her pain level is over a 10.  ----- Message -----  From: Taylor Fajardo  Sent: 4/11/2025   1:38 PM CDT  To: Jg HASSAN Staff  Subject: Advise                                           Type:  AdviceWho Called: PatientSymptoms (please be specific): Unbearable painWould the patient rather a call back or a response via [facility]? Call [facility]Best Call Back Number: [phone] Information: Pt is asking for a return call from nurse or provider in regards to getting an additional pain medication. She states the pain medication she is currently taking is not working and that her pain level is over a 10.

## 2025-04-17 ENCOUNTER — PATIENT MESSAGE (OUTPATIENT)
Dept: PSYCHIATRY | Facility: CLINIC | Age: 75
End: 2025-04-17
Payer: MEDICARE

## 2025-04-21 DIAGNOSIS — N18.2 TYPE 2 DIABETES MELLITUS WITH STAGE 2 CHRONIC KIDNEY DISEASE, WITHOUT LONG-TERM CURRENT USE OF INSULIN: ICD-10-CM

## 2025-04-21 DIAGNOSIS — E11.22 TYPE 2 DIABETES MELLITUS WITH STAGE 2 CHRONIC KIDNEY DISEASE, WITHOUT LONG-TERM CURRENT USE OF INSULIN: ICD-10-CM

## 2025-04-21 NOTE — TELEPHONE ENCOUNTER
No care due was identified.  [facility] Embedded Care Due Messages. Reference number: [id number].   4/21/2025 9:36:11 AM CDT

## 2025-04-22 RX ORDER — LANCETS 33 GAUGE
1 EACH MISCELLANEOUS DAILY
Qty: 100 EACH | Refills: 3 | Status: SHIPPED | OUTPATIENT
Start: 2025-04-22

## 2025-04-22 NOTE — TELEPHONE ENCOUNTER
Refill Routing Note   Medication(s) are not appropriate for processing by [facility] for the following reason(s):        Due for refill >6 months ago  No active prescription written by provider    [facility] action(s):  Defer               Appointments  past 12m or future 3m with PCP    Date Provider   Last Visit   7/9/2024 Bailee Moss MD   Next Visit   4/28/2025 Bailee Moss MD   ED visits in past 90 days: 0        Note composed:11:41 PM 04/21/2025

## 2025-04-28 ENCOUNTER — OFFICE VISIT (OUTPATIENT)
Dept: PRIMARY CARE CLINIC | Facility: CLINIC | Age: 75
End: 2025-04-28
Payer: MEDICARE

## 2025-04-28 VITALS
HEART RATE: 73 BPM | SYSTOLIC BLOOD PRESSURE: 134 MMHG | HEIGHT: 66 IN | BODY MASS INDEX: 46.56 KG/M2 | OXYGEN SATURATION: 98 % | WEIGHT: 289.69 LBS | RESPIRATION RATE: 19 BRPM | DIASTOLIC BLOOD PRESSURE: 70 MMHG

## 2025-04-28 DIAGNOSIS — M48.061 DEGENERATIVE LUMBAR SPINAL STENOSIS: ICD-10-CM

## 2025-04-28 DIAGNOSIS — E78.5 HYPERLIPIDEMIA, UNSPECIFIED HYPERLIPIDEMIA TYPE: ICD-10-CM

## 2025-04-28 DIAGNOSIS — H61.20 IMPACTED CERUMEN, UNSPECIFIED LATERALITY: ICD-10-CM

## 2025-04-28 DIAGNOSIS — J01.90 ACUTE NON-RECURRENT SINUSITIS, UNSPECIFIED LOCATION: ICD-10-CM

## 2025-04-28 DIAGNOSIS — I70.0 ATHEROSCLEROSIS OF AORTA: ICD-10-CM

## 2025-04-28 DIAGNOSIS — I10 ESSENTIAL HYPERTENSION: ICD-10-CM

## 2025-04-28 DIAGNOSIS — E11.22 TYPE 2 DIABETES MELLITUS WITH STAGE 3A CHRONIC KIDNEY DISEASE, WITHOUT LONG-TERM CURRENT USE OF INSULIN: Primary | ICD-10-CM

## 2025-04-28 DIAGNOSIS — Z79.891 LONG TERM PRESCRIPTION OPIATE USE: ICD-10-CM

## 2025-04-28 DIAGNOSIS — N18.31 TYPE 2 DIABETES MELLITUS WITH STAGE 3A CHRONIC KIDNEY DISEASE, WITHOUT LONG-TERM CURRENT USE OF INSULIN: Primary | ICD-10-CM

## 2025-04-28 DIAGNOSIS — G47.00 INSOMNIA, UNSPECIFIED TYPE: ICD-10-CM

## 2025-04-28 PROCEDURE — 1125F AMNT PAIN NOTED PAIN PRSNT: CPT | Mod: CPTII,HCNC,S$GLB, | Performed by: INTERNAL MEDICINE

## 2025-04-28 PROCEDURE — 99999 PR PBB SHADOW E&M-EST. PATIENT-LVL V: CPT | Mod: PBBFAC,HCNC,, | Performed by: INTERNAL MEDICINE

## 2025-04-28 PROCEDURE — 3288F FALL RISK ASSESSMENT DOCD: CPT | Mod: CPTII,HCNC,S$GLB, | Performed by: INTERNAL MEDICINE

## 2025-04-28 PROCEDURE — 3044F HG A1C LEVEL LT 7.0%: CPT | Mod: CPTII,HCNC,S$GLB, | Performed by: INTERNAL MEDICINE

## 2025-04-28 PROCEDURE — G2211 COMPLEX E/M VISIT ADD ON: HCPCS | Mod: HCNC,S$GLB,, | Performed by: INTERNAL MEDICINE

## 2025-04-28 PROCEDURE — 3078F DIAST BP <80 MM HG: CPT | Mod: CPTII,HCNC,S$GLB, | Performed by: INTERNAL MEDICINE

## 2025-04-28 PROCEDURE — 1101F PT FALLS ASSESS-DOCD LE1/YR: CPT | Mod: CPTII,HCNC,S$GLB, | Performed by: INTERNAL MEDICINE

## 2025-04-28 PROCEDURE — 1160F RVW MEDS BY RX/DR IN RCRD: CPT | Mod: CPTII,HCNC,S$GLB, | Performed by: INTERNAL MEDICINE

## 2025-04-28 PROCEDURE — 99214 OFFICE O/P EST MOD 30 MIN: CPT | Mod: HCNC,S$GLB,, | Performed by: INTERNAL MEDICINE

## 2025-04-28 PROCEDURE — 1159F MED LIST DOCD IN RCRD: CPT | Mod: CPTII,HCNC,S$GLB, | Performed by: INTERNAL MEDICINE

## 2025-04-28 PROCEDURE — 3075F SYST BP GE 130 - 139MM HG: CPT | Mod: CPTII,HCNC,S$GLB, | Performed by: INTERNAL MEDICINE

## 2025-04-28 RX ORDER — ATENOLOL 50 MG/1
50 TABLET ORAL 2 TIMES DAILY
Qty: 180 TABLET | Refills: 2 | Status: SHIPPED | OUTPATIENT
Start: 2025-04-28

## 2025-04-28 RX ORDER — HYDROXYZINE PAMOATE 25 MG/1
25-50 CAPSULE ORAL NIGHTLY PRN
Qty: 60 CAPSULE | Refills: 1 | Status: SHIPPED | OUTPATIENT
Start: 2025-04-28

## 2025-04-28 RX ORDER — ATORVASTATIN CALCIUM 20 MG/1
20 TABLET, FILM COATED ORAL NIGHTLY
Qty: 90 TABLET | Refills: 3 | Status: SHIPPED | OUTPATIENT
Start: 2025-04-28

## 2025-04-28 RX ORDER — HYDROCHLOROTHIAZIDE 12.5 MG/1
12.5 TABLET ORAL DAILY
Qty: 90 TABLET | Refills: 2 | Status: SHIPPED | OUTPATIENT
Start: 2025-04-28

## 2025-04-28 RX ORDER — AMOXICILLIN AND CLAVULANATE POTASSIUM 875; 125 MG/1; MG/1
1 TABLET, FILM COATED ORAL 2 TIMES DAILY
Qty: 14 TABLET | Refills: 0 | Status: SHIPPED | OUTPATIENT
Start: 2025-04-28 | End: 2025-05-05

## 2025-04-29 ENCOUNTER — TELEPHONE (OUTPATIENT)
Dept: PRIMARY CARE CLINIC | Facility: CLINIC | Age: 75
End: 2025-04-29
Payer: MEDICARE

## 2025-04-29 NOTE — TELEPHONE ENCOUNTER
----- Message from Hui sent at 4/28/2025  4:11 PM CDT -----  Pt is wanting a referral for ENT.Thanks

## 2025-05-01 NOTE — PROGRESS NOTES
Subjective     Patient ID: Linnea Renae is a 74 y.o. female.    Chief Complaint: Annual Exam    Last seen 6 months ago. Returns for scheduled follow up chronic medical conditions.     PMH: .   Hypertension.  Diabetes Type 2, HbA1c 5.5% .  Hyperlipidemia. LDL 85 .   GERD.  Lumbar Spinal Stenosis.   Chronic Dry Eyes.   Perennial Allergic Rhinitis.  Morbid Obesity.   Right Breast Cancer .  H/O Colon Polyps.     PSH: C/S x 3, BTL, Hysterectomy and USO, Bilateral Cataracts extracted, Cholecystectomy. Right breast excisional biopsy . Right Mastectomy  with reconstruction, and revisions  and .     Mammogram (Left) normal . BMD normal 8/15. Colonoscopy 10/18 - sigmoid diverticulosis, otherwise normal. Eye exam 3/24 Dr. Aden Haskins. Podiatry 3/24. Vaccines reviewed.    Social: Remote tobacco use, quit over 30 years ago. No alcohol.  with three daughters and three grandchildren. Homemaker.     FMH: Heart dis, Thyroid dis.     Allergies: Codeine.    Medications: list reviewed and reconciled.                Review of Systems   Constitutional:  Negative for activity change, appetite change, fatigue, fever and unexpected weight change.   HENT:  Positive for nasal congestion, rhinorrhea and sinus pressure/congestion. Negative for sneezing, sore throat, trouble swallowing and voice change.    Eyes:  Negative for pain and visual disturbance.   Respiratory:  Negative for cough, chest tightness, shortness of breath and wheezing.    Cardiovascular:  Negative for chest pain, palpitations and leg swelling.   Gastrointestinal:  Negative for abdominal pain, blood in stool, constipation, diarrhea, nausea and vomiting.   Genitourinary:  Negative for dysuria, frequency, pelvic pain and vaginal bleeding.   Musculoskeletal:  Positive for back pain and leg pain. Negative for arthralgias, gait problem, joint swelling and myalgias.   Integumentary:  Negative for color change and rash.  "  Neurological:  Negative for dizziness, syncope, facial asymmetry, speech difficulty, weakness, numbness and headaches.   Hematological:  Negative for adenopathy. Does not bruise/bleed easily.   Psychiatric/Behavioral:  Positive for dysphoric mood and sleep disturbance. Negative for agitation, confusion and depressed mood. The patient is not nervous/anxious.           Objective   Vitals:    04/28/25 1503   BP: 134/70   BP Location: Right arm   Patient Position: Sitting   Pulse: 73   Resp: 19   SpO2: 98%   Weight: 131.4 kg (289 lb 11 oz)   Height: 5' 6" (1.676 m)   BMI=46.8  Physical Exam  Constitutional:       General: She is not in acute distress.     Appearance: She is obese.      Comments: Appropriately groomed, ambulatory without aid, in exam room alone.    HENT:      Nose: Nose normal. No congestion.      Mouth/Throat:      Mouth: Mucous membranes are moist.      Pharynx: Oropharynx is clear.   Eyes:      Extraocular Movements: Extraocular movements intact.      Conjunctiva/sclera: Conjunctivae normal.   Cardiovascular:      Rate and Rhythm: Normal rate and regular rhythm.      Pulses: Normal pulses.      Heart sounds: Normal heart sounds.   Pulmonary:      Effort: Pulmonary effort is normal. No respiratory distress.      Breath sounds: Normal breath sounds. No wheezing, rhonchi or rales.   Abdominal:      General: Bowel sounds are normal. There is no distension.      Palpations: Abdomen is soft.      Tenderness: There is no abdominal tenderness.   Musculoskeletal:         General: Normal range of motion.      Cervical back: Normal range of motion and neck supple.      Right lower leg: No edema.      Left lower leg: No edema.   Skin:     General: Skin is warm and dry.      Findings: No rash.   Neurological:      Mental Status: She is alert and oriented to person, place, and time.      Cranial Nerves: No cranial nerve deficit.      Coordination: Coordination normal.      Gait: Gait normal.   Psychiatric:        "  Mood and Affect: Mood normal.         Behavior: Behavior normal.         Thought Content: Thought content normal.         Judgment: Judgment normal.       Lab Visit on 04/08/2025   Component Date Value    Urine Microalbumin 04/08/2025 39.0     Urine Creatinine 04/08/2025 338.0 (H)     Microalbumin/Creatinine * 04/08/2025 11.5      3/31/25: CBC normal except WBC 13.6, CMP normal except Fasting Glucose 123, GFR 59, HbA1c 5.6%, TChol 152, , HDL 50, LDL 79.     Assessment and Plan     1. Type 2 diabetes mellitus with stage 3a chronic kidney disease, without long-term current use of insulin        -     controlled, continue Ozempic 2 mg weekly.    2. Essential hypertension controlled  -     hydroCHLOROthiazide 12.5 MG Tab; Take 1 tablet (12.5 mg total) by mouth once daily.  Dispense: 90 tablet; Refill: 2  -     atenoloL (TENORMIN) 50 MG tablet; TAKE 1 TABLET BY MOUTH TWICE DAILY  Dispense: 180 tablet; Refill: 2  -     olmesartan 20 mg recently refilled.    3. Hyperlipidemia, unspecified hyperlipidemia type controlled.  -     atorvastatin (LIPITOR) 20 MG tablet; Take 1 tablet (20 mg total) by mouth every evening.  Dispense: 90 tablet; Refill: 3    4. Atherosclerosis of aorta  -     atorvastatin (LIPITOR) 20 MG tablet; Take 1 tablet (20 mg total) by mouth every evening.  Dispense: 90 tablet; Refill: 3    5. Acute non-recurrent sinusitis, unspecified location  -     amoxicillin-clavulanate 875-125mg (AUGMENTIN) 875-125 mg per tablet; Take 1 tablet by mouth 2 (two) times daily. for 7 days  Dispense: 14 tablet; Refill: 0    6. Insomnia, unspecified type  -     hydrOXYzine pamoate (VISTARIL) 25 MG Cap; Take 1-2 capsules (25-50 mg total) by mouth nightly as needed (Insomnia).  Dispense: 60 capsule; Refill: 1    7. Degenerative lumbar spinal stenosis with chronic neuropathic pain both lower extremities        -      Pain Mgmt following.    8. Long term prescription opiate use - uncomplicated.    9. Impacted cerumen,  unspecified laterality - she complained of ears stopped up after being discharged.   -     Ambulatory referral/consult to ENT; Future; Expected date: 05/05/2025       Follow up in about 6 months (around 10/28/2025).

## 2025-05-08 DIAGNOSIS — M79.672 FOOT PAIN, BILATERAL: ICD-10-CM

## 2025-05-08 DIAGNOSIS — M79.671 FOOT PAIN, BILATERAL: ICD-10-CM

## 2025-05-08 DIAGNOSIS — G89.29 CHRONIC BILATERAL LOW BACK PAIN WITHOUT SCIATICA: ICD-10-CM

## 2025-05-08 DIAGNOSIS — M54.50 CHRONIC BILATERAL LOW BACK PAIN WITHOUT SCIATICA: ICD-10-CM

## 2025-05-08 DIAGNOSIS — M47.816 LUMBAR FACET ARTHROPATHY: ICD-10-CM

## 2025-05-08 RX ORDER — HYDROCODONE BITARTRATE AND ACETAMINOPHEN 5; 325 MG/1; MG/1
1 TABLET ORAL 3 TIMES DAILY PRN
Qty: 90 TABLET | Refills: 0 | Status: SHIPPED | OUTPATIENT
Start: 2025-05-09 | End: 2025-06-08

## 2025-05-25 DIAGNOSIS — M54.50 CHRONIC BILATERAL LOW BACK PAIN WITHOUT SCIATICA: ICD-10-CM

## 2025-05-25 DIAGNOSIS — G89.29 CHRONIC BILATERAL LOW BACK PAIN WITHOUT SCIATICA: ICD-10-CM

## 2025-05-26 RX ORDER — DULOXETIN HYDROCHLORIDE 60 MG/1
60 CAPSULE, DELAYED RELEASE ORAL 2 TIMES DAILY
Qty: 60 CAPSULE | Refills: 3 | Status: SHIPPED | OUTPATIENT
Start: 2025-05-26 | End: 2026-05-26

## 2025-06-02 ENCOUNTER — TELEPHONE (OUTPATIENT)
Dept: PHYSICAL MEDICINE AND REHAB | Facility: CLINIC | Age: 75
End: 2025-06-02
Payer: MEDICARE

## 2025-06-09 DIAGNOSIS — M47.816 LUMBAR FACET ARTHROPATHY: ICD-10-CM

## 2025-06-09 DIAGNOSIS — M54.50 CHRONIC BILATERAL LOW BACK PAIN WITHOUT SCIATICA: ICD-10-CM

## 2025-06-09 DIAGNOSIS — M79.672 FOOT PAIN, BILATERAL: ICD-10-CM

## 2025-06-09 DIAGNOSIS — K21.9 GERD WITHOUT ESOPHAGITIS: ICD-10-CM

## 2025-06-09 DIAGNOSIS — G89.29 CHRONIC BILATERAL LOW BACK PAIN WITHOUT SCIATICA: ICD-10-CM

## 2025-06-09 DIAGNOSIS — M79.671 FOOT PAIN, BILATERAL: ICD-10-CM

## 2025-06-09 RX ORDER — OMEPRAZOLE 20 MG/1
20 CAPSULE, DELAYED RELEASE ORAL DAILY
Qty: 90 CAPSULE | Refills: 3 | Status: SHIPPED | OUTPATIENT
Start: 2025-06-09

## 2025-06-10 RX ORDER — HYDROCODONE BITARTRATE AND ACETAMINOPHEN 5; 325 MG/1; MG/1
1 TABLET ORAL 3 TIMES DAILY PRN
Qty: 90 TABLET | Refills: 0 | Status: SHIPPED | OUTPATIENT
Start: 2025-06-11 | End: 2025-07-11

## 2025-06-10 NOTE — TELEPHONE ENCOUNTER
Refill Decision Note   Linnea Renae  is requesting a refill authorization.  Brief Assessment and Rationale for Refill:  Approve     Medication Therapy Plan:        Comments:     Note composed:10:45 PM 06/09/2025

## 2025-06-10 NOTE — TELEPHONE ENCOUNTER
No care due was identified.  [facility] Embedded Care Due Messages. Reference number: [id number].   6/09/2025 10:36:29 PM CDT

## 2025-06-12 ENCOUNTER — PATIENT MESSAGE (OUTPATIENT)
Dept: PSYCHIATRY | Facility: CLINIC | Age: 75
End: 2025-06-12
Payer: MEDICARE

## 2025-06-17 ENCOUNTER — PATIENT MESSAGE (OUTPATIENT)
Dept: PRIMARY CARE CLINIC | Facility: CLINIC | Age: 75
End: 2025-06-17
Payer: MEDICARE

## 2025-06-20 ENCOUNTER — TELEPHONE (OUTPATIENT)
Dept: PRIMARY CARE CLINIC | Facility: CLINIC | Age: 75
End: 2025-06-20
Payer: MEDICARE

## 2025-06-20 DIAGNOSIS — E11.40 TYPE 2 DIABETES MELLITUS WITH DIABETIC NEUROPATHY, WITHOUT LONG-TERM CURRENT USE OF INSULIN: Primary | ICD-10-CM

## 2025-06-20 NOTE — TELEPHONE ENCOUNTER
Copied from CRM #[id number]. Topic: General Inquiry - Patient Advice  >> Jun 19, 2025 12:40 PM Deneen wrote:  .1MEDICALADVICE     Patient is calling for Medical Advice regarding:pt is asking for a callback from Katelyn regarding the message that was sent. Please call her back and advise.    How long has patient had these symptoms:    Pharmacy name and phone#:    Patient wants a call back or thru [facility], provide patient's call back phone number:[phone]    Comments:    Please advise patient replies from provider may take up to 48 hours.

## 2025-06-20 NOTE — TELEPHONE ENCOUNTER
I called and sw pt, she and  Thierno are requesting referral to [facility] (fax #:  at [phone]). Pt states that she and  do not want to discontinue care with Podiatrist Dr. Gr but they are struggling with foot maintenance and Dr. Gr does not do nail care. [facility] provides routine care and they do home visits per pt's research. LOV 04/28/25. NOV  10/28/25.

## 2025-07-03 ENCOUNTER — LAB VISIT (OUTPATIENT)
Dept: LAB | Facility: HOSPITAL | Age: 75
End: 2025-07-03
Payer: MEDICARE

## 2025-07-03 ENCOUNTER — OFFICE VISIT (OUTPATIENT)
Dept: PODIATRY | Facility: CLINIC | Age: 75
End: 2025-07-03
Payer: MEDICARE

## 2025-07-03 ENCOUNTER — OFFICE VISIT (OUTPATIENT)
Dept: PHYSICAL MEDICINE AND REHAB | Facility: CLINIC | Age: 75
End: 2025-07-03
Payer: MEDICARE

## 2025-07-03 VITALS
BODY MASS INDEX: 46.72 KG/M2 | HEART RATE: 83 BPM | HEIGHT: 66 IN | DIASTOLIC BLOOD PRESSURE: 65 MMHG | WEIGHT: 290.69 LBS | SYSTOLIC BLOOD PRESSURE: 130 MMHG

## 2025-07-03 VITALS — HEIGHT: 66 IN | BODY MASS INDEX: 46.38 KG/M2 | WEIGHT: 288.56 LBS

## 2025-07-03 DIAGNOSIS — Z79.891 CHRONICALLY ON OPIATE THERAPY: ICD-10-CM

## 2025-07-03 DIAGNOSIS — E11.42 DIABETIC PERIPHERAL NEUROPATHY: ICD-10-CM

## 2025-07-03 DIAGNOSIS — M79.672 FOOT PAIN, BILATERAL: ICD-10-CM

## 2025-07-03 DIAGNOSIS — M47.816 LUMBAR FACET ARTHROPATHY: ICD-10-CM

## 2025-07-03 DIAGNOSIS — M79.671 FOOT PAIN, BILATERAL: ICD-10-CM

## 2025-07-03 DIAGNOSIS — M79.644 CHRONIC THUMB PAIN, BILATERAL: ICD-10-CM

## 2025-07-03 DIAGNOSIS — M70.62 TROCHANTERIC BURSITIS OF BOTH HIPS: ICD-10-CM

## 2025-07-03 DIAGNOSIS — M51.360 DEGENERATION OF INTERVERTEBRAL DISC OF LUMBAR REGION WITH DISCOGENIC BACK PAIN: ICD-10-CM

## 2025-07-03 DIAGNOSIS — E66.01 MORBID OBESITY WITH BMI OF 40.0-44.9, ADULT: ICD-10-CM

## 2025-07-03 DIAGNOSIS — M48.061 SPINAL STENOSIS OF LUMBAR REGION WITHOUT NEUROGENIC CLAUDICATION: ICD-10-CM

## 2025-07-03 DIAGNOSIS — G57.30 SUPERFICIAL PERONEAL NERVE NEUROPATHY, UNSPECIFIED LATERALITY: ICD-10-CM

## 2025-07-03 DIAGNOSIS — M79.645 CHRONIC THUMB PAIN, BILATERAL: ICD-10-CM

## 2025-07-03 DIAGNOSIS — E11.9 TYPE 2 DIABETES MELLITUS WITHOUT COMPLICATION, WITHOUT LONG-TERM CURRENT USE OF INSULIN: Primary | ICD-10-CM

## 2025-07-03 DIAGNOSIS — M54.50 CHRONIC BILATERAL LOW BACK PAIN WITHOUT SCIATICA: Primary | ICD-10-CM

## 2025-07-03 DIAGNOSIS — M70.61 TROCHANTERIC BURSITIS OF BOTH HIPS: ICD-10-CM

## 2025-07-03 DIAGNOSIS — G89.4 CHRONIC PAIN SYNDROME: ICD-10-CM

## 2025-07-03 DIAGNOSIS — G89.29 CHRONIC BILATERAL LOW BACK PAIN WITHOUT SCIATICA: Primary | ICD-10-CM

## 2025-07-03 DIAGNOSIS — G89.29 CHRONIC THUMB PAIN, BILATERAL: ICD-10-CM

## 2025-07-03 DIAGNOSIS — M48.061 NEURAL FORAMINAL STENOSIS OF LUMBAR SPINE: ICD-10-CM

## 2025-07-03 PROCEDURE — 80347 BENZODIAZEPINES 13 OR MORE: CPT | Mod: HCNC

## 2025-07-03 PROCEDURE — 99999 PR PBB SHADOW E&M-EST. PATIENT-LVL IV: CPT | Mod: PBBFAC,,, | Performed by: PODIATRIST

## 2025-07-03 PROCEDURE — 99999 PR PBB SHADOW E&M-EST. PATIENT-LVL IV: CPT | Mod: PBBFAC,,, | Performed by: PHYSICAL MEDICINE & REHABILITATION

## 2025-07-03 RX ORDER — HYDROCODONE BITARTRATE AND ACETAMINOPHEN 7.5; 325 MG/1; MG/1
1 TABLET ORAL 3 TIMES DAILY PRN
Qty: 90 TABLET | Refills: 0 | Status: SHIPPED | OUTPATIENT
Start: 2025-07-14 | End: 2025-08-13

## 2025-07-03 RX ORDER — DIAZEPAM 5 MG/1
5 TABLET ORAL SEE ADMIN INSTRUCTIONS
Qty: 2 TABLET | Refills: 0 | Status: SHIPPED | OUTPATIENT
Start: 2025-07-03

## 2025-07-03 RX ORDER — CELECOXIB 200 MG/1
200 CAPSULE ORAL DAILY
Qty: 60 CAPSULE | Refills: 3 | Status: SHIPPED | OUTPATIENT
Start: 2025-07-03

## 2025-07-03 NOTE — PROGRESS NOTES
Subjective:       Patient ID: Linnea Renae is a 74 y.o. female.    Chief Complaint: No chief complaint on file.      HPI    Mrs. Perera is a 74-year-old female with of hypertension, diabetes mellitus type 2, diabetic neuropathy, hyperlipidemia, GERD, right breast cancer status post Left mastopexy and reconstruction (04/2022), chronic low back pain status post multiple spine injections.  She is followed up at the [facility] for chronic bilateral foot pain with history of diabetic neuropathy, and for chronic low back pain status post multiple spine procedures, listed below.  Her last visit to the clinic was on 8/27/2024.  She was maintained on meloxicam, duloxetine, p.r.n., p.r.n. tramadol (the dose was increased).      4/11/25: She sent a message indicating her back has been worse. She previously failed multiple spine injections and is not interested in more injections at this point.She previously failed gabapentin and pregabalin.  She is currently on duloxetine and p.r.n. tramadol.  She feels tramadol is not helping.  I switched her to hydrocodone/APAP (5/325 p.o. t.i.d. PRN)    The patient is coming to the clinic for follow-up.  Her low back pain has been worse.  It is an almost constant throbbing and stabbing sensation in the lower lumbar spine and across her back. It shoots bilaterally to the buttock and hip regions.  She denies however any recent shooting pain to her legs.  Her pain is aggravated by standing, walking, bending and lifting.  It is better with sitting down or lying down.  Her max pain is 10/10 and minimum 0/10.  Today at rest her pain is 0/10.  She has mild lower extremity weakness. Her legs occasionally give out on her She reports few falls,  She denies any bowel or bladder incontinence.  She denies any leg claudications but has not hood walking too far..      Her bilateral foot pain has been worse. It is a constant numbness and tingling sensations in both feet.     She has also been  "complaining of pain in her bilateral 1st CMC joints.  They are tender.  Pain is worse with increased tongue movement.    She is currently taking:  Hydrocodone/APAP 5/325 p.r.n. 3 times per day.  She reports poor relief and asking about increase.  Tizanidine 4 mg tablets, 1 tablet p.r.n. 3-4 times per week.  He was on meloxicam but stopped due to lack of relief   She was on duloxetine but stopped due to lack of relief  She reports that in the past gabapentin did not help.  Pregabalin caused weight gain.      Review of the [facility] Medicine Clinic note on 04/26/2022 shows the following procedures:   "Pain Procedures:   7/29/16 Right L3-4 TF CHRISTIAN  11/3/17 Bilateral L3-4 and L4-5 facet injections  5/9/18 Bilateral L3-4 and L4-5 facet injections  7/25/18 Bilateral L3-4 and L4-5 facet injections  12/5/18 Right L2,3,4,5 RFA- 80% relief  3/20/19 Left L2,3,4,5 RFA- 100% relief  10/2/19 left L4/5 TFESI  12/18/19 LEFT L2,3,4,5 RFA- 90% relief  1/8/20 RIGHT L2,3,4,5 RFA- 90% relief  7/8/20 B/L SIJ, GTB - 85% relief  11/04/20 Right L2, L3, L4, L5 RFA - 80% relief  11/23/20 TPIs- helpful"  She is now more amenable to having more spine injections.      Past Medical History:   Diagnosis Date    Allergy     Breast cancer     Lumpectomy 10/15. right    Chronic constipation     Colon polyps     Diabetes mellitus, type 2     Dry eyes     GERD (gastroesophageal reflux disease)     Hyperlipidemia     Hypertension     Lumbar disc disease     Type 2 diabetes mellitus         Review of patient's allergies indicates:   Allergen Reactions    Codeine Itching        Review of Systems   Constitutional:  Negative for chills and fever.   Eyes:  Negative for visual disturbance.   Respiratory:  Negative for shortness of breath.    Cardiovascular:  Negative for chest pain.   Gastrointestinal:  Positive for constipation. Negative for blood in stool, nausea and vomiting.   Genitourinary:  Negative for difficulty urinating.   Musculoskeletal:  Positive for " back pain. Negative for arthralgias, gait problem and neck pain.   Neurological:  Positive for weakness and numbness. Negative for dizziness and headaches.   Psychiatric/Behavioral:  Negative for behavioral problems and sleep disturbance.              Objective:      Physical Exam  Vitals reviewed.   Constitutional:       Appearance: She is well-developed.   HENT:      Head: Normocephalic and atraumatic.   Eyes:      Extraocular Movements: Extraocular movements intact.   Musculoskeletal:      Cervical back: Normal range of motion. No tenderness.      Comments: BUE:  ROM:   RUE: full.   LUE: full.  Strength:    RUE: 5/5 at shoulder abduction, 5 elbow flexion, 5 elbow extension, 5 hand .   LUE: 5/5 at shoulder abduction, 5 elbow flexion, 5 elbow extension, 5 hand .  Sensation to pinprick:   RUE: intact.   LUE: intact.  DTR:    RUE: +1 biceps, +1 triceps.   LUE:  +1 biceps, +1 triceps.      BLE:  ROM:   RLE: full.   LLE: full.  Knee crepitus:   RLE: -ve.   LLE: -ve.   Strength:    RLE: 5/5 at hip flexion, 5 knee extension, 5 ankle DF, 5 PF, 5 EHL.   LLE: 5/5 at hip flexion, 5 knee extension, 5 ankle DF, 5 PF, 5 EHL.  Sensation to pinprick:     RLE: hypersensitive.       LLE: hypersensitive.   DTR:     RLE: +1 knee, +1 ankle.    LLE: +1 knee, +1 ankle.  Clonus:    Rt ankle: -ve.    Lt ankle: -ve.  SLR (sitting):      RLE: -ve.      LLE: -ve.    +ve mod tenderness over lumbar spine.    Gait: slow garrett, waddling.   Skin:     General: Skin is warm.   Neurological:      General: No focal deficit present.      Mental Status: She is alert.   Psychiatric:         Behavior: Behavior normal.         Assessment:       1. Chronic bilateral low back pain without sciatica    2. Degeneration of intervertebral disc of lumbar region with discogenic back pain    3. Lumbar facet arthropathy    4. Spinal stenosis of lumbar region without neurogenic claudication (L4-L5, moderate)    5. Neural foraminal stenosis of lumbar spine     6. Trochanteric bursitis of both hips    7. Foot pain, bilateral    8. Diabetic peripheral neuropathy    9. Chronic thumb pain, bilateral    10. Morbid obesity with BMI of 40.0-44.9, adult    11. Chronically on opiate therapy        Plan:         MRI Lumbar Spine Without Contrast; Future.  Referral for spine injections may be done pending MRI report.   diazePAM (VALIUM) 5 MG tablet; Take 1 tablet (5 mg total) by mouth As instructed for Anxiety. Take 1 tablet half an hour before MRI. May repeat if necessary  Start celecoxib (CELEBREX) 200 MG capsule; Take 1 capsule (200 mg total) by mouth once daily. In 1-2 weeks, if no relief, may increase dose to twice per day.  Increase HYDROcodone-acetaminophen (NORCO) 7.5-325 mg per tablet; Take 1 tablet by mouth 3 (three) times daily as needed for Pain.  She was told she can increase hydrocodone/APAP 5/325 up to 4 times per day and refilling 7.5/325 tablets.  She was asking about starting Valium to help her with her nurse.  She was told that this is a not a safe combination hydrocodone/APAP.  She was encouraged to get an appointment with psychiatry if she needs specialized help.  Ambulatory referral/consult to Orthopedics/Hand clinic (for evaluation and treatment/possible corticosteroid injections of bilateral 1st CMC joints).  Pain Clinic Drug Screen; Future  Follow up in about 4 months (around 11/3/2025).      This was a 40 minute visit (including review of records), 50% of which was spent educating the patient about the diagnosis and the treatment plan.      This note was partly generated with [employer] voice recognition software. I apologize for any possible typographical errors.

## 2025-07-07 LAB
1OH-MIDAZOLAM UR QL SCN: NOT DETECTED
6MAM UR QL: NOT DETECTED
7AMINOCLONAZEPAM UR QL: NOT DETECTED
A-OH ALPRAZ UR QL: NOT DETECTED
ALPRAZ UR QL: NOT DETECTED
AMPHET UR QL SCN: NOT DETECTED
ANNOTATION COMMENT IMP: ABNORMAL
AR NOROXYMORPHONE (CUTOFF 100 NG/ML): NOT DETECTED
AR OXYMORPHONE (CUTOFF 40 NG/ML): NOT DETECTED
BARBITURATES UR QL: NEGATIVE
BUPRENORPHINE UR QL: NOT DETECTED
BZE UR QL: NEGATIVE
CARBOXYTHC UR QL: NEGATIVE
CARISOPRODOL UR QL: NEGATIVE
CLONAZEPAM UR QL: NOT DETECTED
CODEINE UR QL: NOT DETECTED
CREAT UR-MCNC: 271.6 MG/DL
DIAZEPAM UR QL: NOT DETECTED
ETHYL GLUCURONIDE UR QL: NEGATIVE
FENTANYL UR QL: NOT DETECTED
GABAPENTIN UR QL CFM: NOT DETECTED
HYDROCODONE UR QL: PRESENT
HYDROMORPHONE UR QL: PRESENT
LORAZEPAM UR QL: NOT DETECTED
MDA UR QL: NOT DETECTED
MDEA UR QL: NOT DETECTED
MDMA UR QL: NOT DETECTED
ME-PHENIDATE UR QL: NOT DETECTED
METHADONE UR QL: NEGATIVE
METHAMPHET UR QL: NOT DETECTED
MIDAZOLAM UR QL SCN: NOT DETECTED
MORPHINE UR QL: NOT DETECTED
NALOXONE UR QL CFM: NOT DETECTED
NORBUPRENORPHINE UR QL CFM: NOT DETECTED
NORDIAZEPAM UR QL: NOT DETECTED
NORFENTANYL UR QL: NOT DETECTED
NORMEPERIDINE UR QL CFM: NOT DETECTED
NOROXYCODONE UR QL CFM: PRESENT
NOROXYMORPHONE UR QL SCN: NOT DETECTED
OXAZEPAM UR QL: NOT DETECTED
OXYCODONE UR QL: NOT DETECTED
PATHOLOGY STUDY: ABNORMAL
PCP UR QL: NEGATIVE
PHENTERMINE UR QL: NOT DETECTED
PREGABALIN UR QL CFM: NOT DETECTED
SERVICE CMNT-IMP: ABNORMAL
TAPENTADOL UR QL SCN: NOT DETECTED
TAPENTADOL UR QL SCN: NOT DETECTED
TEMAZEPAM UR QL: NOT DETECTED
TRAMADOL UR QL: NEGATIVE
ZOLPIDEM PHENYL-4-CARB UR QL SCN: NOT DETECTED
ZOLPIDEM UR QL: NOT DETECTED

## 2025-07-10 NOTE — PROGRESS NOTES
Subjective:      Patient ID: Linnea Renae is a 74 y.o. female.    Chief Complaint: Foot Pain (Bilateral 10/10 pain ) and Foot Swelling (Bilateral )      Linnea is a 74 y.o. female who presents to the clinic upon referral from Dr. Nat marsh. provider found  for evaluation and treatment of diabetic feet. Linnea has a past medical history of Allergy, Breast cancer, Chronic constipation, Colon polyps, Diabetes mellitus, type 2, Dry eyes, GERD (gastroesophageal reflux disease), Hyperlipidemia, Hypertension, Lumbar disc disease, and Type 2 diabetes mellitus.  The bottoms of both feet are improving in terms of pain and stiffness/neuropathy symptoms.  Increased pain recently to the anterior aspects of both lower extremities.      PCP: Bailee Moss MD    Date Last Seen by PCP:   Chief Complaint   Patient presents with    Foot Pain     Bilateral 10/10 pain     Foot Swelling     Bilateral          Current shoe gear: Casual shoes    Hemoglobin A1C   Date Value Ref Range Status   07/09/2024 5.8 (H) 4.0 - 5.6 % Final     Comment:     ADA Screening Guidelines:  5.7-6.4%  Consistent with prediabetes  >or=6.5%  Consistent with diabetes    High levels of fetal hemoglobin interfere with the HbA1C  assay. Heterozygous hemoglobin variants (HbS, HgC, etc)do  not significantly interfere with this assay.   However, presence of multiple variants may affect accuracy.     01/04/2024 5.5 4.0 - 5.6 % Final     Comment:     ADA Screening Guidelines:  5.7-6.4%  Consistent with prediabetes  >or=6.5%  Consistent with diabetes    High levels of fetal hemoglobin interfere with the HbA1C  assay. Heterozygous hemoglobin variants (HbS, HgC, etc)do  not significantly interfere with this assay.   However, presence of multiple variants may affect accuracy.     04/17/2023 5.5 4.0 - 5.6 % Final     Comment:     ADA Screening Guidelines:  5.7-6.4%  Consistent with prediabetes  >or=6.5%  Consistent with diabetes    High levels of fetal hemoglobin  interfere with the HbA1C  assay. Heterozygous hemoglobin variants (HbS, HgC, etc)do  not significantly interfere with this assay.   However, presence of multiple variants may affect accuracy.       Hemoglobin A1c   Date Value Ref Range Status   03/31/2025 5.6 4.0 - 5.6 % Final     Comment:     ADA Screening Guidelines:  5.7-6.4%  Consistent with prediabetes  >=6.5%  Consistent with diabetes    High levels of fetal hemoglobin interfere with the HbA1C  assay. Heterozygous hemoglobin variants (HbS, HgC, etc)do  not significantly interfere with this assay.   However, presence of multiple variants may affect accuracy.           Review of Systems   Constitution: Negative for chills and fever.   HENT: Negative for congestion and tinnitus.    Eyes: Negative for double vision and visual disturbance.   Cardiovascular: Negative for chest pain and claudication.   Respiratory: Negative for hemoptysis and shortness of breath.    Endocrine: Negative for cold intolerance and heat intolerance.   Hematologic/Lymphatic: Negative for adenopathy and bleeding problem.   Skin: Positive for color change, dry skin and nail changes.   Musculoskeletal: Positive for myalgias and stiffness.   Gastrointestinal: Negative for nausea and vomiting.   Genitourinary: Negative for dysuria and hematuria.   Neurological: Positive for numbness.   Psychiatric/Behavioral: Negative for altered mental status and suicidal ideas.   Allergic/Immunologic: Negative for environmental allergies and persistent infections.           Objective:      Physical Exam  Vitals signs reviewed.   Constitutional:       Appearance: She is well-developed.   Cardiovascular:      Pulses:           Dorsalis pedis pulses are 1+ on the right side and 1+ on the left side.        Posterior tibial pulses are 1+ on the right side and 1+ on the left side.   Pulmonary:      Effort: Pulmonary effort is normal.   Musculoskeletal: Normal range of motion.      Comments: Anterior, lateral, and  posterior muscle groups bilateral lower extremities show strength 4 over 5 symmetrically. Inspection and palpation of the joints and bones reveal no crepitus or joint effusion. No tenderness upon palpation. Mild plantar flexor contractures noted to digits 2 through 5 bilaterally.  Angle and base of gait are normal.   Feet:      Right foot:      Skin integrity: Callus and dry skin present.      Left foot:      Skin integrity: Callus and dry skin present.   Skin:     General: Skin is warm and dry.      Capillary Refill: Capillary refill takes 2 to 3 seconds.      Coloration: Skin is pale.      Comments: Skin bilateral lower extremities noted to be thin, dry, and atrophic.  Toenails normal.   Neurological:      Mental Status: She is alert and oriented to person, place, and time.      Sensory: Sensory deficit present.      Motor: Atrophy present.      Deep Tendon Reflexes: Reflexes abnormal.      Reflex Scores:       Patellar reflexes are 1+ on the right side and 1+ on the left side.       Achilles reflexes are 1+ on the right side and 1+ on the left side.     Comments: Sharp, dull, light touch, and vibratory sensation are diminished bilaterally. Proprioceptive sensation is intact to both lower extremities. Trimble Jackie monofilament exam shows loss of protective sensation to plantar toes 1 through 5 bilaterally. Deep tendon reflexes to the patellar tendons is 1 over 4 bilaterally symmetrical. Deep tendon reflexes to the Achilles tendon is 0 over 4 bilaterally symmetrical. No ankle clonus or Babinski reflex noted bilaterally. Coordination is fair to both lower extremities.       Bilateral provocation sign/Tinel sign to superficial peroneal.          Assessment:       Encounter Diagnoses   Name Primary?    Type 2 diabetes mellitus without complication, without long-term current use of insulin Yes    Superficial peroneal nerve neuropathy, unspecified laterality     Chronic pain syndrome            Plan:       Linnea whiting  seen today for foot pain and foot swelling.    Diagnoses and all orders for this visit:    Type 2 diabetes mellitus without complication, without long-term current use of insulin    Superficial peroneal nerve neuropathy, unspecified laterality    Chronic pain syndrome        I counseled the patient on her conditions, their implications and medical management.    Shoe inspection. Diabetic Foot Education. Patient reminded of the importance of good nutrition and blood sugar control to help prevent podiatric complications of diabetes. Patient instructed on proper foot hygeine. We discussed wearing proper shoe gear, daily foot inspections and Diabetic foot education in detail.      Nerve injection:    Performed by:  Burke Gr DPM    Preop diagnosis:  Neuropathy symptoms/paresthesias/pain    The area overlying the bilateral superficial peroneal nerve(s) was sterilely prepped, verbal consent was obtained, 2 cc's of 0.5% Marcaine plain was infiltrated around the affected nerve(s) for a diagnostic nerve block.  This was well tolerated with no complications.    Discussed options for peripheral neuropathy/nerve entrapment syndrome including nerve block therapy, surgical nerve entrapment decompression procedures, and various vitamins and supplementation available shown to improve nerve function.    Follow up in 3 months after consultation with pain management.  .  Follow-up in 3 months.

## 2025-07-24 DIAGNOSIS — M79.645 CHRONIC THUMB PAIN, BILATERAL: Primary | ICD-10-CM

## 2025-07-24 DIAGNOSIS — G89.29 CHRONIC THUMB PAIN, BILATERAL: Primary | ICD-10-CM

## 2025-07-24 DIAGNOSIS — M79.644 CHRONIC THUMB PAIN, BILATERAL: Primary | ICD-10-CM

## 2025-07-25 ENCOUNTER — HOSPITAL ENCOUNTER (OUTPATIENT)
Dept: RADIOLOGY | Facility: OTHER | Age: 75
Discharge: HOME | End: 2025-07-25
Attending: SURGERY
Payer: MEDICARE

## 2025-07-25 ENCOUNTER — OFFICE VISIT (OUTPATIENT)
Dept: ORTHOPEDICS | Facility: CLINIC | Age: 75
End: 2025-07-25
Payer: MEDICARE

## 2025-07-25 DIAGNOSIS — M79.644 CHRONIC THUMB PAIN, BILATERAL: ICD-10-CM

## 2025-07-25 DIAGNOSIS — G89.29 CHRONIC THUMB PAIN, BILATERAL: ICD-10-CM

## 2025-07-25 DIAGNOSIS — M79.645 CHRONIC THUMB PAIN, BILATERAL: ICD-10-CM

## 2025-07-25 PROCEDURE — 73130 X-RAY EXAM OF HAND: CPT | Mod: TC,50,HCNC

## 2025-07-25 PROCEDURE — 99999 PR PBB SHADOW E&M-EST. PATIENT-LVL IV: CPT | Mod: PBBFAC,HCNC,, | Performed by: SURGERY

## 2025-07-25 PROCEDURE — 73130 X-RAY EXAM OF HAND: CPT | Mod: 26,50,HCNC, | Performed by: RADIOLOGY

## 2025-07-25 RX ORDER — LIDOCAINE HYDROCHLORIDE 10 MG/ML
1 INJECTION, SOLUTION EPIDURAL; INFILTRATION; INTRACAUDAL; PERINEURAL
Status: COMPLETED | OUTPATIENT
Start: 2025-07-25 | End: 2025-07-25

## 2025-07-25 RX ORDER — TRIAMCINOLONE ACETONIDE 40 MG/ML
40 INJECTION, SUSPENSION INTRA-ARTICULAR; INTRAMUSCULAR
Status: COMPLETED | OUTPATIENT
Start: 2025-07-25 | End: 2025-07-25

## 2025-07-25 RX ADMIN — LIDOCAINE HYDROCHLORIDE 10 MG: 10 INJECTION, SOLUTION EPIDURAL; INFILTRATION; INTRACAUDAL; PERINEURAL at 12:07

## 2025-07-25 RX ADMIN — TRIAMCINOLONE ACETONIDE 40 MG: 40 INJECTION, SUSPENSION INTRA-ARTICULAR; INTRAMUSCULAR at 12:07

## 2025-07-25 NOTE — PROGRESS NOTES
Plastic, Reconstructive, and Hand Surgery  History and Physical     Patient: Linnea Renae  MRN:  [id number]  : 1950  Date of Service: 2025  PCP: Bailee Moss MD  Referring Provider: Graciela Gregorio MD           Chief Complaint: B hand pain     Date of injury/Start of symptoms: 20+ yrs  How did the injury occur if there was an injury: none     Worker's Compensation:  no     History of Present Illness:    Linnea Renae is a 74 y.o. right hand dominant female who presents with B hand pain    Ongoing for 20+ yrs  B thumb pain, R > L  Throbbing pain, radiating up the arm  Remote blunt trauma to R thumb  Using a brace - helpful    No tob  +DM - A1c 5.6  No BT     Past Medical History:   Diagnosis Date    Allergy     Breast cancer     Lumpectomy 10/15. right    Chronic constipation     Colon polyps     Diabetes mellitus, type 2     Dry eyes     GERD (gastroesophageal reflux disease)     Hyperlipidemia     Hypertension     Lumbar disc disease     Type 2 diabetes mellitus         Past Surgical History:   Procedure Laterality Date    BREAST BIOPSY      BREAST LUMPECTOMY Right         BREAST RECONSTRUCTION Right 2021    Procedure: RECONSTRUCTION, BREAST;  Surgeon: Nolberto Eng MD;  Location: [facility] OR [address] [place] [address];  Service: Plastics;  Laterality: Right;    CATARACT EXTRACTION, BILATERAL       SECTION      x3    CHOLECYSTECTOMY      COLONOSCOPY N/A 10/11/2018    Procedure: COLONOSCOPY;  Surgeon: Lorenzo Tanner MD;  [facility]  Service: Endoscopy;  Laterality: N/A;    COLONOSCOPY N/A 7/10/2024    Procedure: COLONOSCOPY;  Surgeon: Jessica Darnell MD;  Location: [facility] (4TH FLR);  Service: Endoscopy;  Laterality: N/A;  Referral Bailee Moss. Suprep instr. to pharmacy. EC Ozempic [facility] .EC  7/3-pre call complete-last dose ozempic 24-tb    COLONOSCOPY W/ POLYPECTOMY      EVACUATION OF HEMATOMA Right 2022    Procedure: EVACUATION, HEMATOMA;   Surgeon: Nolberto Eng MD;  Location: [facility] OR [facility] FLR;  Service: Plastics;  Laterality: Right;    HYSTERECTOMY  1989    and USO    INJECTION OF FACET JOINT Bilateral 7/25/2018    Procedure: INJECTION, FACET JOINT;  Surgeon: Viet Wilson MD;  Location: [facility] PAIN MGT;  Service: Pain Management;  Laterality: Bilateral;  LUMBAR BILATERAL L3-L4 AND L4-L5 FACET STEROID INJECTION    NEED CONSENT    INJECTION OF JOINT Bilateral 7/8/2020    Procedure: INJECTION, JOINT, Bilateral SI;  Surgeon: Viet Wilson MD;  Location: [facility] PAIN MGT;  Service: Pain Management;  Laterality: Bilateral;    MASTECTOMY Right 11/29/2021    Procedure: MASTECTOMY, total, right breast with right sentinel lymph node biospy;  Surgeon: Heber Evans MD;  [facility];  Service: General;  Laterality: Right;    MASTOPEXY Left 4/11/2022    Procedure: MASTOPEXY - left breast;  Surgeon: Nolberto Eng MD;  [facility];  Service: Plastics;  Laterality: Left;    RADIOFREQUENCY ABLATION Left 3/20/2019    Procedure: RADIOFREQUENCY ABLATION LEFT L2,3,4,5;  Surgeon: Viet Wilson MD;  Location: [facility] PAIN MGT;  Service: Pain Management;  Laterality: Left;  LEFT RFA L2,3,4,5    NEEDS CONSENT    RADIOFREQUENCY ABLATION Left 12/18/2019    Procedure: RADIOFREQUENCY ABLATION, LEFT L2-L3-L4-L5  1 OF 2;  Surgeon: Viet Wilson MD;  Location: [facility] PAIN MGT;  Service: Pain Management;  Laterality: Left;    RADIOFREQUENCY ABLATION Right 1/8/2020    Procedure: RADIOFREQUENCY ABLATION, RIGHT L2-L3-L4-L5 MEDIAL BRANCH 2 OF;  Surgeon: Viet Wilson MD;  Location: [facility] PAIN MGT;  Service: Pain Management;  Laterality: Right;    RADIOFREQUENCY ABLATION Right 11/4/2020    Procedure: RADIOFREQUENCY ABLATION Right L2, L3, L4, L5;  Surgeon: Viet Wilson MD;  Location: [facility] PAIN MGT;  Service: Pain Management;  Laterality: Right;  Right L2, L3, L4, L5 RFA    REPLACEMENT OF IMPLANT OF BREAST Right  2022    Procedure: REPLACEMENT, IMPLANT, BREAST - right ;  Surgeon: Nolberto Eng MD;  [facility];  Service: Plastics;  Laterality: Right;    REPLACEMENT OF IMPLANT OF BREAST Right 2022    Procedure: REPLACEMENT, IMPLANT, BREAST;  Surgeon: Nolberto Eng MD;  [facility];  Service: Plastics;  Laterality: Right;    TOTAL REDUCTION MAMMOPLASTY Left 2021    Procedure: MAMMOPLASTY, REDUCTION;  Surgeon: Nolberto Eng MD;  [facility];  Service: Plastics;  Laterality: Left;    TRANSFORAMINAL EPIDURAL INJECTION OF STEROID Left 10/2/2019    Procedure: INJECTION, STEROID, EPIDURAL, TRANSFORAMINAL APPROACH, L4 AND L5;  Surgeon: Viet Wilson MD;  Location: [facility] [place];  Service: Pain Management;  Laterality: Left;    TUBAL LIGATION          Family History   Problem Relation Name Age of Onset    No Known Problems Mother      No Known Problems Father      Hypertension Sister x5: half siblings     Hypertension Brother x4: half siblings     Glaucoma Brother x4: half siblings     No Known Problems Daughter      No Known Problems Daughter      No Known Problems Daughter      Heart disease Paternal Grandmother      Thyroid disease Paternal Grandfather      Anesthesia problems Neg Hx          Social History     Socioeconomic History    Marital status:      Spouse name: Thierno    Number of children: 3   Tobacco Use    Smoking status: Former     Current packs/day: 0.00     Average packs/day: 0.3 packs/day for 20.0 years (5.0 ttl pk-yrs)     Types: Cigarettes     Start date: 1965     Quit date: 1985     Years since quittin.5    Smokeless tobacco: Never    Tobacco comments:     Quit mid .   Substance and Sexual Activity    Alcohol use: No     Alcohol/week: 0.0 standard drinks of alcohol    Drug use: No    Sexual activity: Yes     Partners: Male   Social History Narrative    3 adult daughters, 3 grandchildren     Social  Drivers of Health     Financial Resource Strain: Medium Risk (5/8/2024)    Overall Financial Resource Strain (CARDIA)     Difficulty of Paying Living Expenses: Somewhat hard   Food Insecurity: Food Insecurity Present (5/8/2024)    Hunger Vital Sign     Worried About Running Out of Food in the Last Year: Sometimes true     Ran Out of Food in the Last Year: Sometimes true   Transportation Needs: No Transportation Needs (5/8/2024)    PRAPARE - Transportation     Lack of Transportation (Medical): No     Lack of Transportation (Non-Medical): No   Physical Activity: Insufficiently Active (5/8/2024)    Exercise Vital Sign     Days of Exercise per Week: 1 day     Minutes of Exercise per Session: 10 min   Stress: Stress Concern Present (5/8/2024)    [employer] - Occupational Stress Questionnaire     Feeling of Stress : To some extent   Housing Stability: Unknown (5/8/2024)    Housing Stability Vital Sign     Unable to Pay for Housing in the Last Year: No     Homeless in the Last Year: No         Review of patient's allergies indicates:   Allergen Reactions    Codeine Itching        Medications Ordered Prior to Encounter[1]     Review of Systems:    Negative as per HPI     Physical Exam:  There were no vitals filed for this visit.  Gen: NAD, A&O x3  Pulm: unlabored  CV: regular rate  B Hand:  +TTP Th CMCJ, R > L; +edema Thumb; +Grind; full fist; K10; cap refill 2-3 sec. 2pd wnl      Diagnostic Studies:  I independently interpreted the following diagnostic studies and my findings are:     B hand X-ray: Th CMC OA     Assessment and Plan:  74 y.o. R hand dominant female who presents with B Th CMC OA     Discussed findings at length.  20 yr h/o B hand pain  Clinically, most consistent with B Th CMC OA  I have applied B Th CMC braces to be worn at night and PRN.   KL inj  Briefly discussed the role of surgery  All questions answered. Patient verbalizes understanding and agreement with treatment plan.        Follow up: prn  Restriction: none     After informed consent was obtained, the patient's right dorsal radial wrist was prepped with alcohol. 0.5 mL Kenalog (40 mg/mL) and 0.5 mL 1% lidocaine were injected into the carpometacarpal joint right thumb.  Hemostasis was excellent.  A Band-Aid was applied.  Patient tolerated the procedure without issue.     After informed consent was obtained, the patient's left dorsal radial wrist was prepped with alcohol. 0.5 mL Kenalog (40 mg/mL) and 0.5 mL 1% lidocaine were injected into the carpometacarpal joint left thumb.  Hemostasis was excellent.  A Band-Aid was applied.  Patient tolerated the procedure without issue.      I spent 20 minutes on this patient encounter which included review of medical records.  Over half the time was spent with the patient face to face discussing the treatment plan, counseling and coordinating care.     César Davis MD  07/25/2025 12:37 PM                                                                                                     This document was transcribed using voice recognition software without a human . It may contain typographical, grammatical, and/or syntax errors.          [1]   Current Outpatient Medications on File Prior to Visit   Medication Sig Dispense Refill    alcohol antiseptic pads (ALCOHOL PREP PADS TOP)       aspirin 81 MG Chew Take 81 mg by mouth once daily.      atenoloL (TENORMIN) 50 MG tablet TAKE 1 TABLET BY MOUTH TWICE DAILY 180 tablet 2    atorvastatin (LIPITOR) 20 MG tablet Take 1 tablet (20 mg total) by mouth every evening. 90 tablet 3    blood sugar diagnostic (TRUE METRIX GLUCOSE TEST STRIP) Strp 1 strip to check blood glucose once daily. 100 each 3    calcium-vitamin D3 500 mg(1,250mg) -200 unit per tablet Take 1 tablet by mouth 2 (two) times daily with meals.      celecoxib (CELEBREX) 200 MG capsule Take 1 capsule (200 mg total) by mouth once daily. In 1 to 2 weeks, if no relief,  may increase dose to twice per day. 60 capsule 3    desonide (DESOWEN) 0.05 % cream apply to dark spots nightly or every other night 15 g 5    diazePAM (VALIUM) 5 MG tablet Take 1 tablet (5 mg total) by mouth As instructed for Anxiety. Take 1 tablet 30 minutes before MRI. May repeat if necessary. 2 tablet 0    fluticasone propionate (FLONASE) 50 mcg/actuation nasal spray Instill 2 sprays (100 mcg total) by Each Nostril route once daily. 48 g 3    hydroCHLOROthiazide 12.5 MG Tab Take 1 tablet (12.5 mg total) by mouth once daily. 90 tablet 2    HYDROcodone-acetaminophen (NORCO) 7.5-325 mg per tablet Take 1 tablet by mouth 3 (three) times daily as needed for Pain. (Patient not taking: Reported on 7/3/2025) 90 tablet 0    hydrOXYzine pamoate (VISTARIL) 25 MG Cap Take 1-2 capsules (25-50 mg total) by mouth nightly as needed (Insomnia). 60 capsule 1    lancets (TRUEPLUS LANCETS) 33 gauge Misc 1 lancet  by Subdermal route once daily. 100 each 3    anfpxoghk-Y1-qiY04-algal oil (FOLTANX RF) 3 mg-35 mg-2 mg -90.314 mg Cap Take 1 tablet by mouth 2 (two) times daily. 180 capsule 0    loratadine 10 mg Cap       minoxidiL (LONITEN) 2.5 MG tablet Take 1/4  to 1/2 tablet by mouth nightly as tolerated 45 tablet 3    multivitamin (THERAGRAN) per tablet Take 1 tablet by mouth once daily.      naloxone (NARCAN) 4 mg/actuation Spry 4mg by nasal route as needed for opioid overdose; may repeat every 2-3 minutes in alternating nostrils until medical help arrives. Call 911 2 each 11    olmesartan (BENICAR) 20 MG tablet Take 1 tablet (20 mg total) by mouth once daily. 90 tablet 1    omeprazole (PRILOSEC) 20 MG capsule Take 1 capsule (20 mg total) by mouth once daily. 90 capsule 3    semaglutide (OZEMPIC) 2 mg/dose (8 mg/3 mL) PnIj Inject 2 mg into the skin every 7 days. 3 mL 5    tiZANidine (ZANAFLEX) 4 MG tablet Take 1 tablet (4 mg total) by mouth every 8 (eight) hours as needed (Muscle spasm.). 90 tablet 1    topiramate (TOPAMAX) 50 MG  tablet Take 2 tablets (100 mg total) by mouth every evening. 180 tablet 1    triamcinolone acetonide 0.1% (KENALOG) 0.1 % ointment Apply to scalp in areas of scalp irritation at bedtime 454 g 5     Current Facility-Administered Medications on File Prior to Visit   Medication Dose Route Frequency Provider Last Rate Last Admin    mupirocin 2 % ointment   Nasal On Call Procedure Nereida Jerome MD   Given at 08/18/22 0656    sodium chloride 0.9% flush 10 mL  10 mL Intravenous PRN Nereida Jerome MD

## 2025-07-30 NOTE — PROGRESS NOTES
Patient ID: Linnea Renae is a 74 y.o. female.    Chief Complaint: Cerumen Impaction    History of Present Illness    CHIEF COMPLAINT:  - Patient is referred by Dr. Bailee Moss with complaints of earwax buildup causing hearing difficulties and ear discomfort.    HPI:  - Patient, a 74-year-old female, reports earwax accumulation causing discomfort, drainage, and hearing interference. She attempted removal using a needle-nose clipper, while her granddaughter's attempt with a cotton swab exacerbated the blockage. She confirms hearing impairment, noting increased television volume.  She denies using closed captions while watching TV  - She reports brief episodes of dizziness upon standing, resolving after leaning over a chair. She has intermittent ear itching without discharge.  - She describes year-round allergy symptoms including congestion, ear, throat, and nasal discomfort. She has headaches, primarily ocular, occasionally temporal, and intermittent otalgia.  - Regarding weight management, she has been on topiramate and Ozempic for approximately 3 years. She initially lost about 30 lbs but regained it. Recently, her weight has begun to decrease again. Her current weight is 282 lbs, down from an initial weight exceeding 290 lbs. She has an upcoming bariatrics appointment.  - She denies tinnitus and otorrhea.    MEDICAL HISTORY:  - Breast cancer: Twice  - Allergies: Year-round symptoms  - Hearing loss: Mild to moderate sensorineural hearing loss    SURGICAL HISTORY:  - C-sections: 3  - Lumpectomy: for breast cancer  - Mastectomy: for breast cancer recurrence    TEST RESULTS:  - Audiogram: mild to moderate sensorineural hearing loss, normal or near normal auditory discrimination at elevated hearing levels    ALLERGIES:  - Environmental allergens: congestion, ear aches, throat aches, nose aches, itchiness, headaches (particularly in the eyes and sometimes on the side of the head), shooting pain in the ears      REVIEW OF SYSTEMS:  Constitutional:  +fatigue, + planned weight loss  ENT:  +hearing loss, plus nasal congestion, +ear a/pain, pus sore throat  Eyes:  +itching, +redness, pus discharge  Chest:  Plus suyapa +acid reflux, +heartburn rtness of breath  Gastrointestinal:  Musculoskeletal:  +muscle aches/pain, + joint pain, +back pain, +back pain  Allergy:  +perennial allergic rhinitis  Endocrine:  +type 2 diabetes mellitus        Physical Exam  Vitals and nursing note reviewed.   Constitutional:       General: She is awake.      Appearance: Normal appearance. She is well-developed and well-groomed. She is morbidly obese.   HENT:      Head: Normocephalic.      Jaw: There is normal jaw occlusion.      Salivary Glands: Right salivary gland is not diffusely enlarged or tender. Left salivary gland is not diffusely enlarged or tender.      Right Ear: Hearing, ear canal and external ear normal. There is impacted cerumen. Tympanic membrane is retracted.      Left Ear: Hearing, ear canal and external ear normal. Tympanic membrane is retracted.      Nose: Septal deviation (Septum deviated to the right), mucosal edema (cyanotic, boggy inferior turbinates bilaterally) and rhinorrhea (clear mucus bilaterally) present. No nasal deformity. Rhinorrhea is clear.      Right Turbinates: Enlarged and pale.      Left Turbinates: Enlarged and pale.      Mouth/Throat:      Lips: No lesions.      Mouth: Mucous membranes are moist. No oral lesions.      Dentition: No gum lesions.      Tongue: No lesions.      Palate: No mass and lesions.      Pharynx: Oropharynx is clear. Uvula midline.      Tonsils: 2+ on the right. 2+ on the left.   Eyes:      General: Lids are normal. Vision grossly intact.         Right eye: No discharge.         Left eye: No discharge.      Extraocular Movements: Extraocular movements intact.      Conjunctiva/sclera: Conjunctivae normal.      Pupils: Pupils are equal, round, and reactive to light.   Neck:      Thyroid: No  thyroid mass or thyromegaly.      Trachea: Trachea normal. No tracheal deviation.   Cardiovascular:      Rate and Rhythm: Normal rate and regular rhythm.      Pulses: Normal pulses.      Heart sounds: Normal heart sounds.   Pulmonary:      Effort: Pulmonary effort is normal.      Breath sounds: Normal breath sounds. No stridor. No decreased breath sounds, wheezing, rhonchi or rales.   Abdominal:      General: Bowel sounds are normal.      Palpations: Abdomen is soft.      Tenderness: There is no abdominal tenderness.   Musculoskeletal:      Cervical back: Neck supple. No muscular tenderness. Decreased range of motion.   Lymphadenopathy:      Head:      Right side of head: No submental, submandibular, preauricular, posterior auricular or occipital adenopathy.      Left side of head: No submental, submandibular, preauricular, posterior auricular or occipital adenopathy.      Cervical: No cervical adenopathy.   Skin:     General: Skin is warm and dry.      Findings: No petechiae or rash.      Nails: There is no clubbing.   Neurological:      Mental Status: She is alert and oriented to person, place, and time.      Cranial Nerves: No cranial nerve deficit.      Sensory: No sensory deficit.      Gait: Gait normal.   Psychiatric:         Speech: Speech normal.         Behavior: Behavior normal. Behavior is cooperative.         Thought Content: Thought content normal.         Judgment: Judgment normal.       I personally reviewed the above audiogram and discussed in full detail with the patient during her visit.  I answered all of her questions and provided her copy of the audiogram.  Pertinent findings mild-to-moderate sensorineural hearing loss with normal auditory discrimination at elevated hearing levels in DE 8 tympanograms bilaterally        Assessment & Plan    H61.21 Impacted cerumen of right ear  H93.293 Abnormal auditory perception of both ears  H93.8X3 Sensation of plugged ear on both sides  J30.89 Non-seasonal  allergic rhinitis, unspecified trigger  J34.2 Nasal septal deviation  E66.01, Z68.41 Morbid obesity with BMI of 40.0-44.9, adult  H90.3 Sensorineural hearing loss (SNHL) of both ears    IMPACTED CERUMEN OF RIGHT EAR:  - Assessed earwax issue and its impact on hearing.  - Performed ear cleaning procedure.    NON-SEASONAL ALLERGIC RHINITIS, UNSPECIFIED TRIGGER:  - Evaluated allergy symptoms, noting year-round occurrence and explaining that allergies can be genetically determined and can develop later in life.  - Considered additional allergy management strategies.  - Started combination of Azelastine and Fluticasone nasal spray, to be used twice daily in both nostrils on an everyday basis.  - Contact via portal in 3 weeks if allergy symptoms and issues persist.  I will start her on a nighttime dose of montelukast if that is the case.    SENSORINEURAL HEARING LOSS (SNHL) OF BOTH EARS:  - Reviewed audiogram results, indicating mild to moderate sensorineural hearing loss.  - Determined patient would benefit from bilateral amplification.  Copy of audiogram with written medical clearance for bilateral amplification/hearing aids giving the patient.  She will contact her insurer to determine who their preferred provider for hearing aids is.         Follow up in 6 weeks.            No follow-ups on file.    This note was generated with the assistance of ambient listening technology. Verbal consent was obtained by the patient and accompanying visitor(s) for the recording of patient appointment to facilitate this note. I attest to having reviewed and edited the generated note for accuracy, though some syntax or spelling errors may persist. Please contact the author of this note for any clarification.

## 2025-07-31 ENCOUNTER — CLINICAL SUPPORT (OUTPATIENT)
Dept: OTOLARYNGOLOGY | Facility: CLINIC | Age: 75
End: 2025-07-31
Payer: MEDICARE

## 2025-07-31 ENCOUNTER — OFFICE VISIT (OUTPATIENT)
Dept: OTOLARYNGOLOGY | Facility: CLINIC | Age: 75
End: 2025-07-31
Payer: MEDICARE

## 2025-07-31 VITALS — OXYGEN SATURATION: 96 % | HEART RATE: 78 BPM | WEIGHT: 284.81 LBS | BODY MASS INDEX: 45.97 KG/M2

## 2025-07-31 DIAGNOSIS — H90.3 SENSORINEURAL HEARING LOSS (SNHL) OF BOTH EARS: ICD-10-CM

## 2025-07-31 DIAGNOSIS — J30.89 NON-SEASONAL ALLERGIC RHINITIS, UNSPECIFIED TRIGGER: ICD-10-CM

## 2025-07-31 DIAGNOSIS — H61.21 IMPACTED CERUMEN OF RIGHT EAR: ICD-10-CM

## 2025-07-31 DIAGNOSIS — E66.01 MORBID OBESITY WITH BMI OF 40.0-44.9, ADULT: ICD-10-CM

## 2025-07-31 DIAGNOSIS — J34.2 NASAL SEPTAL DEVIATION: ICD-10-CM

## 2025-07-31 DIAGNOSIS — H93.293 ABNORMAL AUDITORY PERCEPTION OF BOTH EARS: Primary | ICD-10-CM

## 2025-07-31 DIAGNOSIS — H93.8X3 SENSATION OF PLUGGED EAR ON BOTH SIDES: ICD-10-CM

## 2025-07-31 DIAGNOSIS — H90.3 SENSORINEURAL HEARING LOSS (SNHL), BILATERAL: Primary | ICD-10-CM

## 2025-07-31 PROCEDURE — 99999 PR PBB SHADOW E&M-EST. PATIENT-LVL V: CPT | Mod: PBBFAC,HCNC,, | Performed by: SPECIALIST

## 2025-07-31 PROCEDURE — 92567 TYMPANOMETRY: CPT | Mod: HCNC,S$GLB,,

## 2025-07-31 PROCEDURE — 92557 COMPREHENSIVE HEARING TEST: CPT | Mod: HCNC,S$GLB,,

## 2025-07-31 RX ORDER — AZELASTINE 1 MG/ML
SPRAY, METERED NASAL
Qty: 90 ML | Refills: 3 | Status: SHIPPED | OUTPATIENT
Start: 2025-07-31

## 2025-07-31 NOTE — PROCEDURES
"Ear Cerumen Removal    Date/Time: 7/31/2025 1:00 PM    Performed by: BISHNU Lora MD  Authorized by: BISHNU Lora MD    Time out: Immediately prior to procedure a "time out" was called to verify the correct patient, procedure, equipment, support staff and site/side marked as required.      Local anesthetic:  None  Location details:  Right ear  Procedure type: curette    Procedure type comment:  Wire loop  Cerumen  Removal Results:  Cerumen completely removed  Patient tolerance:  Patient tolerated the procedure well with no immediate complications    "

## 2025-07-31 NOTE — PROGRESS NOTES
History:  Linnea Renae, a 74 y.o. female, was seen today for an audiologic evaluation following cerumen management by ENT.  She reported hearing well, though others think she has some trouble.  She reported some lightheadedness when getting up quickly.  Patient denied otalgia, aural fullness, tinnitus, and vertigo.    Results:  Tympanometry revealed Type A tympanogram in the right ear and Type A tympanogram in the left ear.   Pure tone audiometry revealed normal hearing sensitivity sloping to moderate sensorineural hearing loss in both ears.  Speech reception thresholds were obtained at 20 dB HL in the right ear and 10 dB HL in the left ear..  Word recognition scores were 88% in the right ear and 100% in the left ear.    Recommendations:  ENT evaluation today as scheduled  Use hearing protection when in noise.  Hearing aid consultation. Recommend contacting insurance to ask about hearing aid benefits and in-network providers if applicable.  Return for follow-up audiologic evaluation annually or sooner if a change in hearing is noted.

## 2025-08-14 ENCOUNTER — OFFICE VISIT (OUTPATIENT)
Dept: BARIATRICS | Facility: CLINIC | Age: 75
End: 2025-08-14
Payer: MEDICARE

## 2025-08-14 VITALS
OXYGEN SATURATION: 98 % | HEIGHT: 67 IN | SYSTOLIC BLOOD PRESSURE: 122 MMHG | HEART RATE: 70 BPM | DIASTOLIC BLOOD PRESSURE: 70 MMHG | WEIGHT: 291.44 LBS | BODY MASS INDEX: 45.74 KG/M2

## 2025-08-14 DIAGNOSIS — E11.9 TYPE 2 DIABETES MELLITUS WITHOUT COMPLICATION, WITHOUT LONG-TERM CURRENT USE OF INSULIN: ICD-10-CM

## 2025-08-14 DIAGNOSIS — E66.01 CLASS 2 SEVERE OBESITY WITH BODY MASS INDEX (BMI) OF 35 TO 39.9 WITH SERIOUS COMORBIDITY: Primary | ICD-10-CM

## 2025-08-14 DIAGNOSIS — K21.9 GASTROESOPHAGEAL REFLUX DISEASE, UNSPECIFIED WHETHER ESOPHAGITIS PRESENT: ICD-10-CM

## 2025-08-14 DIAGNOSIS — K59.09 CHRONIC CONSTIPATION: ICD-10-CM

## 2025-08-14 DIAGNOSIS — E66.812 CLASS 2 SEVERE OBESITY WITH BODY MASS INDEX (BMI) OF 35 TO 39.9 WITH SERIOUS COMORBIDITY: Primary | ICD-10-CM

## 2025-08-14 DIAGNOSIS — Z79.4 TYPE 2 DIABETES MELLITUS WITH DIABETIC NEUROPATHY, WITH LONG-TERM CURRENT USE OF INSULIN: ICD-10-CM

## 2025-08-14 DIAGNOSIS — R14.0 ABDOMINAL BLOATING: ICD-10-CM

## 2025-08-14 DIAGNOSIS — E11.40 TYPE 2 DIABETES MELLITUS WITH DIABETIC NEUROPATHY, WITH LONG-TERM CURRENT USE OF INSULIN: ICD-10-CM

## 2025-08-14 PROCEDURE — 3288F FALL RISK ASSESSMENT DOCD: CPT | Mod: CPTII,HCNC,S$GLB, | Performed by: INTERNAL MEDICINE

## 2025-08-14 PROCEDURE — 3074F SYST BP LT 130 MM HG: CPT | Mod: CPTII,HCNC,S$GLB, | Performed by: INTERNAL MEDICINE

## 2025-08-14 PROCEDURE — 99213 OFFICE O/P EST LOW 20 MIN: CPT | Mod: HCNC,S$GLB,, | Performed by: INTERNAL MEDICINE

## 2025-08-14 PROCEDURE — 1101F PT FALLS ASSESS-DOCD LE1/YR: CPT | Mod: CPTII,HCNC,S$GLB, | Performed by: INTERNAL MEDICINE

## 2025-08-14 PROCEDURE — 99999 PR PBB SHADOW E&M-EST. PATIENT-LVL III: CPT | Mod: PBBFAC,HCNC,, | Performed by: INTERNAL MEDICINE

## 2025-08-14 PROCEDURE — 1125F AMNT PAIN NOTED PAIN PRSNT: CPT | Mod: CPTII,HCNC,S$GLB, | Performed by: INTERNAL MEDICINE

## 2025-08-14 PROCEDURE — 3078F DIAST BP <80 MM HG: CPT | Mod: CPTII,HCNC,S$GLB, | Performed by: INTERNAL MEDICINE

## 2025-08-14 PROCEDURE — 1159F MED LIST DOCD IN RCRD: CPT | Mod: CPTII,HCNC,S$GLB, | Performed by: INTERNAL MEDICINE

## 2025-08-14 PROCEDURE — 1160F RVW MEDS BY RX/DR IN RCRD: CPT | Mod: CPTII,HCNC,S$GLB, | Performed by: INTERNAL MEDICINE

## 2025-08-14 RX ORDER — SEMAGLUTIDE 2.68 MG/ML
2 INJECTION, SOLUTION SUBCUTANEOUS
Qty: 3 ML | Refills: 5 | Status: SHIPPED | OUTPATIENT
Start: 2025-08-14

## 2025-08-14 RX ORDER — TOPIRAMATE 100 MG/1
100 TABLET, FILM COATED ORAL NIGHTLY
Qty: 90 TABLET | Refills: 1 | Status: SHIPPED | OUTPATIENT
Start: 2025-08-14 | End: 2026-08-14

## 2025-08-15 DIAGNOSIS — M54.50 CHRONIC BILATERAL LOW BACK PAIN WITHOUT SCIATICA: ICD-10-CM

## 2025-08-15 DIAGNOSIS — G89.29 CHRONIC BILATERAL LOW BACK PAIN WITHOUT SCIATICA: ICD-10-CM

## 2025-08-18 RX ORDER — HYDROCODONE BITARTRATE AND ACETAMINOPHEN 7.5; 325 MG/1; MG/1
1 TABLET ORAL 3 TIMES DAILY PRN
Qty: 90 TABLET | Refills: 0 | Status: SHIPPED | OUTPATIENT
Start: 2025-08-18 | End: 2025-09-18

## 2025-08-20 ENCOUNTER — PATIENT MESSAGE (OUTPATIENT)
Dept: BARIATRICS | Facility: CLINIC | Age: 75
End: 2025-08-20
Payer: MEDICARE

## 2025-08-25 DIAGNOSIS — I10 ESSENTIAL HYPERTENSION: ICD-10-CM

## 2025-08-26 RX ORDER — OLMESARTAN MEDOXOMIL 20 MG/1
20 TABLET ORAL DAILY
Qty: 90 TABLET | Refills: 2 | Status: SHIPPED | OUTPATIENT
Start: 2025-08-26

## 2025-09-05 ENCOUNTER — TELEPHONE (OUTPATIENT)
Dept: PODIATRY | Facility: CLINIC | Age: 75
End: 2025-09-05
Payer: MEDICARE

## 2025-09-05 DIAGNOSIS — E11.40 TYPE 2 DIABETES MELLITUS WITH DIABETIC NEUROPATHY, WITHOUT LONG-TERM CURRENT USE OF INSULIN: Primary | ICD-10-CM

## (undated) DEVICE — BOVIE SUCTION

## (undated) DEVICE — SYR 30CC LUER LOCK

## (undated) DEVICE — ELECTRODE REM PLYHSV RETURN 9

## (undated) DEVICE — SKINMARKER & RULER REGULAR X-F

## (undated) DEVICE — BLADE SURG #15 CARBON STEEL

## (undated) DEVICE — DRAIN CHANNEL ROUND 10FR

## (undated) DEVICE — STAPLER SKIN ROTATING HEAD

## (undated) DEVICE — RETRACTOR RADIALUX LIGHTED

## (undated) DEVICE — NDL HYPODERMIC BLUNT 18G 1.5IN

## (undated) DEVICE — DRAPE STERI INSTRUMENT 1018

## (undated) DEVICE — SUT 2-0 VICRYL / SH (J417)

## (undated) DEVICE — SYR 50CC LL

## (undated) DEVICE — SUT PDS II 2-0 CT1

## (undated) DEVICE — SEE L#159833

## (undated) DEVICE — SPONGE LAP 18X18 PREWASHED

## (undated) DEVICE — REMOVER STAPLE SKIN STERILE

## (undated) DEVICE — SUT MONOCRYL PLUS UD 3-0 27

## (undated) DEVICE — BANDAGE ADHESIVE

## (undated) DEVICE — GOWN SURGICAL X-LARGE

## (undated) DEVICE — SUT SILK 2-0 PS 18IN BLACK

## (undated) DEVICE — DRAPE HALF SURGICAL 40X58IN

## (undated) DEVICE — DRESSING LEUKOPLAST FLEX 1X3IN

## (undated) DEVICE — Device

## (undated) DEVICE — PAD ABD 8X10 STERILE

## (undated) DEVICE — SYS PRINEO SKIN CLOSURE

## (undated) DEVICE — MANIFOLD 4 PORT

## (undated) DEVICE — BOWL STERILE LARGE 32OZ

## (undated) DEVICE — NDL HYPO REG 25G X 1 1/2

## (undated) DEVICE — PACK UNIVERSAL SPLIT II

## (undated) DEVICE — COVER PROBE NL STRL 3.6X96IN

## (undated) DEVICE — EVACUATOR WOUND BULB 100CC

## (undated) DEVICE — GOWN AERO CHROME W/ TOWEL XL

## (undated) DEVICE — TRAY MINOR GEN SURG

## (undated) DEVICE — TIP YANKAUERS BULB NO VENT

## (undated) DEVICE — IMPLANTABLE DEVICE
Type: IMPLANTABLE DEVICE | Site: BREAST | Status: NON-FUNCTIONAL
Removed: 2022-08-18

## (undated) DEVICE — SUT VICRYL 3-0 27 SH

## (undated) DEVICE — ADHESIVE DERMABOND ADVANCED

## (undated) DEVICE — SUT MONOCRYL 3-0 PS-2 UND

## (undated) DEVICE — BLADE SURG CARBON STEEL #10

## (undated) DEVICE — GAUZE DERMACEA LOW PLY 2X4YRDS

## (undated) DEVICE — BACITRACIN ZINC OINT 0.9GM

## (undated) DEVICE — APPLICATOR CHLORAPREP ORN 26ML

## (undated) DEVICE — SYS CLSR DERMABOND PRINEO 22CM

## (undated) DEVICE — DRESSING XEROFORM FOIL PK 1X8

## (undated) DEVICE — CUP MEDICINE GRADUATED 1OZ

## (undated) DEVICE — TOWEL OR STER DISP BLUE 17X27

## (undated) DEVICE — SYR 10CC LUER LOCK

## (undated) DEVICE — DRAIN CHANNEL ROUND 15FR

## (undated) DEVICE — DRESSING ABD OPEN GRANUFOAM

## (undated) DEVICE — SUT MCRYL PLUS 4-0 PS2 27IN

## (undated) DEVICE — GAUZE SPONGE 4X4 12PLY

## (undated) DEVICE — NDL SPINAL SPINOCAN 22GX3.5

## (undated) DEVICE — APPLIER CLIP LIAGCLIP 9.375IN

## (undated) DEVICE — SUT 2-0 VICRYL / CT-1

## (undated) DEVICE — BRA CLASSIC COMFORT 46 BEIGE

## (undated) DEVICE — SPONGE DERMACEA GAUZE 4X4

## (undated) DEVICE — TOWEL OR DISP STRL BLUE 4/PK

## (undated) DEVICE — SOL 9P NACL IRR PIC IL

## (undated) DEVICE — COMPRESSION BRA STYLE 7 48

## (undated) DEVICE — SUT ETHILON 2-0 BLK PS-2

## (undated) DEVICE — TRAY FOLEY 16FR INFECTION CONT

## (undated) DEVICE — SEE MEDLINE ITEM 157128

## (undated) DEVICE — DRESSING GAUZE XEROFORM 5X9

## (undated) DEVICE — SUT PROLENE 5-0 PS-2 18

## (undated) DEVICE — SUT ETHILON 5-0 18IN PLAIN

## (undated) DEVICE — SUT ETHILON 2-0 PSLX 30IN

## (undated) DEVICE — STAPLER SKIN REGULAR

## (undated) DEVICE — SYR DISP LL 5CC

## (undated) DEVICE — ELECTRODE EXTENDED BLADE

## (undated) DEVICE — GAUZE FLUFF XXLG 36X36 2 PLY

## (undated) DEVICE — IMPLANTABLE DEVICE
Type: IMPLANTABLE DEVICE | Site: BREAST | Status: NON-FUNCTIONAL
Removed: 2022-04-11

## (undated) DEVICE — MEDC POVIDONE IODINE PREP 2 OZ